# Patient Record
Sex: FEMALE | Race: WHITE | Employment: OTHER | URBAN - METROPOLITAN AREA
[De-identification: names, ages, dates, MRNs, and addresses within clinical notes are randomized per-mention and may not be internally consistent; named-entity substitution may affect disease eponyms.]

---

## 2021-09-13 NOTE — LETTER
[facility] [address], [address] [place] [address]  Consent for Procedure/Sedation  Date: ***         Time: ***    {[facility] ivs consent:6329} none

## 2022-11-24 ENCOUNTER — HOSPITAL ENCOUNTER (INPATIENT)
Facility: HOSPITAL | Age: 87
LOS: 12 days | Discharge: NURSING, REHAB OR LONG-TERM CARE | End: 2022-12-06
Attending: EMERGENCY MEDICINE | Admitting: STUDENT IN AN ORGANIZED HEALTH CARE EDUCATION/TRAINING PROGRAM
Payer: MEDICARE

## 2022-11-24 ENCOUNTER — APPOINTMENT (OUTPATIENT)
Dept: GENERAL RADIOLOGY | Facility: HOSPITAL | Age: 87
End: 2022-11-24
Attending: EMERGENCY MEDICINE
Payer: MEDICARE

## 2022-11-24 ENCOUNTER — APPOINTMENT (OUTPATIENT)
Dept: CT IMAGING | Facility: HOSPITAL | Age: 87
End: 2022-11-24
Attending: EMERGENCY MEDICINE
Payer: MEDICARE

## 2022-11-24 DIAGNOSIS — S32.010S CLOSED COMPRESSION FRACTURE OF L1 LUMBAR VERTEBRA, SEQUELA: ICD-10-CM

## 2022-11-24 DIAGNOSIS — R11.2 NAUSEA AND VOMITING IN ADULT: ICD-10-CM

## 2022-11-24 DIAGNOSIS — S32.030S CLOSED COMPRESSION FRACTURE OF L3 LUMBAR VERTEBRA, SEQUELA: ICD-10-CM

## 2022-11-24 DIAGNOSIS — S32.040S CLOSED COMPRESSION FRACTURE OF L4 LUMBAR VERTEBRA, SEQUELA: ICD-10-CM

## 2022-11-24 DIAGNOSIS — E87.1 HYPONATREMIA: Primary | ICD-10-CM

## 2022-11-24 DIAGNOSIS — M54.9 INTRACTABLE BACK PAIN: ICD-10-CM

## 2022-11-24 LAB
ALBUMIN SERPL-MCNC: 3.3 G/DL (ref 3.4–5)
ALBUMIN/GLOB SERPL: 0.9 {RATIO} (ref 1–2)
ALP LIVER SERPL-CCNC: 107 U/L
ALT SERPL-CCNC: 21 U/L
ANION GAP SERPL CALC-SCNC: 13 MMOL/L (ref 0–18)
AST SERPL-CCNC: 33 U/L (ref 15–37)
BASOPHILS # BLD AUTO: 0.01 X10(3) UL (ref 0–0.2)
BASOPHILS NFR BLD AUTO: 0.1 %
BILIRUB SERPL-MCNC: 1.2 MG/DL (ref 0.1–2)
BUN BLD-MCNC: 11 MG/DL (ref 7–18)
CALCIUM BLD-MCNC: 9.2 MG/DL (ref 8.5–10.1)
CHLORIDE SERPL-SCNC: 82 MMOL/L (ref 98–112)
CO2 SERPL-SCNC: 23 MMOL/L (ref 21–32)
CREAT BLD-MCNC: 0.72 MG/DL
EOSINOPHIL # BLD AUTO: 0 X10(3) UL (ref 0–0.7)
EOSINOPHIL NFR BLD AUTO: 0 %
ERYTHROCYTE [DISTWIDTH] IN BLOOD BY AUTOMATED COUNT: 12.1 %
GFR SERPLBLD BASED ON 1.73 SQ M-ARVRAT: 79 ML/MIN/1.73M2 (ref 60–?)
GLOBULIN PLAS-MCNC: 3.5 G/DL (ref 2.8–4.4)
GLUCOSE BLD-MCNC: 105 MG/DL (ref 70–99)
HCT VFR BLD AUTO: 35.7 %
HGB BLD-MCNC: 12.6 G/DL
IMM GRANULOCYTES # BLD AUTO: 0.05 X10(3) UL (ref 0–1)
IMM GRANULOCYTES NFR BLD: 0.6 %
LIPASE SERPL-CCNC: 96 U/L (ref 73–393)
LYMPHOCYTES # BLD AUTO: 0.62 X10(3) UL (ref 1–4)
LYMPHOCYTES NFR BLD AUTO: 6.9 %
MCH RBC QN AUTO: 31.7 PG (ref 26–34)
MCHC RBC AUTO-ENTMCNC: 35.3 G/DL (ref 31–37)
MCV RBC AUTO: 89.7 FL
MONOCYTES # BLD AUTO: 0.41 X10(3) UL (ref 0.1–1)
MONOCYTES NFR BLD AUTO: 4.5 %
NEUTROPHILS # BLD AUTO: 7.95 X10 (3) UL (ref 1.5–7.7)
NEUTROPHILS # BLD AUTO: 7.95 X10(3) UL (ref 1.5–7.7)
NEUTROPHILS NFR BLD AUTO: 87.9 %
OSMOLALITY SERPL CALC.SUM OF ELEC: 246 MOSM/KG (ref 275–295)
PLATELET # BLD AUTO: 177 10(3)UL (ref 150–450)
POTASSIUM SERPL-SCNC: 3.4 MMOL/L (ref 3.5–5.1)
PROT SERPL-MCNC: 6.8 G/DL (ref 6.4–8.2)
RBC # BLD AUTO: 3.98 X10(6)UL
SARS-COV-2 RNA RESP QL NAA+PROBE: NOT DETECTED
SODIUM SERPL-SCNC: 118 MMOL/L (ref 136–145)
WBC # BLD AUTO: 9 X10(3) UL (ref 4–11)

## 2022-11-24 PROCEDURE — 99222 1ST HOSP IP/OBS MODERATE 55: CPT | Performed by: STUDENT IN AN ORGANIZED HEALTH CARE EDUCATION/TRAINING PROGRAM

## 2022-11-24 PROCEDURE — 72072 X-RAY EXAM THORAC SPINE 3VWS: CPT | Performed by: EMERGENCY MEDICINE

## 2022-11-24 PROCEDURE — 70450 CT HEAD/BRAIN W/O DYE: CPT | Performed by: EMERGENCY MEDICINE

## 2022-11-24 PROCEDURE — 72110 X-RAY EXAM L-2 SPINE 4/>VWS: CPT | Performed by: EMERGENCY MEDICINE

## 2022-11-24 RX ORDER — ACETAMINOPHEN AND CODEINE PHOSPHATE 300; 30 MG/1; MG/1
1 TABLET ORAL EVERY 4 HOURS PRN
Status: DISCONTINUED | OUTPATIENT
Start: 2022-11-24 | End: 2022-12-06

## 2022-11-24 RX ORDER — SENNOSIDES 8.6 MG
17.2 TABLET ORAL NIGHTLY PRN
Status: DISCONTINUED | OUTPATIENT
Start: 2022-11-24 | End: 2022-12-06

## 2022-11-24 RX ORDER — MORPHINE SULFATE 2 MG/ML
2 INJECTION, SOLUTION INTRAMUSCULAR; INTRAVENOUS EVERY 30 MIN PRN
Status: DISCONTINUED | OUTPATIENT
Start: 2022-11-24 | End: 2022-11-26

## 2022-11-24 RX ORDER — ACETAMINOPHEN 500 MG
500 TABLET ORAL EVERY 4 HOURS PRN
Status: DISCONTINUED | OUTPATIENT
Start: 2022-11-24 | End: 2022-12-06

## 2022-11-24 RX ORDER — BISACODYL 10 MG
10 SUPPOSITORY, RECTAL RECTAL
Status: DISCONTINUED | OUTPATIENT
Start: 2022-11-24 | End: 2022-12-06

## 2022-11-24 RX ORDER — ACETAMINOPHEN AND CODEINE PHOSPHATE 300; 30 MG/1; MG/1
2 TABLET ORAL EVERY 4 HOURS PRN
Status: DISCONTINUED | OUTPATIENT
Start: 2022-11-24 | End: 2022-12-06

## 2022-11-24 RX ORDER — POLYETHYLENE GLYCOL 3350 17 G/17G
17 POWDER, FOR SOLUTION ORAL DAILY PRN
Status: DISCONTINUED | OUTPATIENT
Start: 2022-11-24 | End: 2022-12-06

## 2022-11-24 RX ORDER — ENOXAPARIN SODIUM 100 MG/ML
40 INJECTION SUBCUTANEOUS DAILY
Status: DISCONTINUED | OUTPATIENT
Start: 2022-11-25 | End: 2022-12-06

## 2022-11-24 RX ORDER — SODIUM CHLORIDE 9 MG/ML
INJECTION, SOLUTION INTRAVENOUS CONTINUOUS
Status: DISCONTINUED | OUTPATIENT
Start: 2022-11-24 | End: 2022-11-25

## 2022-11-24 RX ORDER — ONDANSETRON 2 MG/ML
4 INJECTION INTRAMUSCULAR; INTRAVENOUS EVERY 6 HOURS PRN
Status: DISCONTINUED | OUTPATIENT
Start: 2022-11-24 | End: 2022-12-06

## 2022-11-24 RX ORDER — SODIUM PHOSPHATE, DIBASIC AND SODIUM PHOSPHATE, MONOBASIC 7; 19 G/133ML; G/133ML
1 ENEMA RECTAL ONCE AS NEEDED
Status: DISCONTINUED | OUTPATIENT
Start: 2022-11-24 | End: 2022-12-06

## 2022-11-24 RX ORDER — METOCLOPRAMIDE HYDROCHLORIDE 5 MG/ML
10 INJECTION INTRAMUSCULAR; INTRAVENOUS EVERY 8 HOURS PRN
Status: DISCONTINUED | OUTPATIENT
Start: 2022-11-24 | End: 2022-12-06

## 2022-11-24 RX ORDER — POTASSIUM CHLORIDE 20 MEQ/1
40 TABLET, EXTENDED RELEASE ORAL ONCE
Status: COMPLETED | OUTPATIENT
Start: 2022-11-24 | End: 2022-11-24

## 2022-11-25 ENCOUNTER — APPOINTMENT (OUTPATIENT)
Dept: MRI IMAGING | Facility: HOSPITAL | Age: 87
End: 2022-11-25
Attending: INTERNAL MEDICINE
Payer: MEDICARE

## 2022-11-25 LAB
ANION GAP SERPL CALC-SCNC: 11 MMOL/L (ref 0–18)
ANION GAP SERPL CALC-SCNC: 15 MMOL/L (ref 0–18)
BASOPHILS # BLD AUTO: 0.01 X10(3) UL (ref 0–0.2)
BASOPHILS NFR BLD AUTO: 0.1 %
BILIRUB UR QL CFM: NEGATIVE
BILIRUB UR QL CFM: NEGATIVE
BUN BLD-MCNC: 8 MG/DL (ref 7–18)
BUN BLD-MCNC: 9 MG/DL (ref 7–18)
CALCIUM BLD-MCNC: 8.6 MG/DL (ref 8.5–10.1)
CALCIUM BLD-MCNC: 9 MG/DL (ref 8.5–10.1)
CHLORIDE SERPL-SCNC: 83 MMOL/L (ref 98–112)
CHLORIDE SERPL-SCNC: 85 MMOL/L (ref 98–112)
CLARITY UR REFRACT.AUTO: CLEAR
CLARITY UR REFRACT.AUTO: CLEAR
CO2 SERPL-SCNC: 19 MMOL/L (ref 21–32)
CO2 SERPL-SCNC: 21 MMOL/L (ref 21–32)
COLOR UR AUTO: YELLOW
COLOR UR AUTO: YELLOW
CREAT BLD-MCNC: 0.52 MG/DL
CREAT BLD-MCNC: 0.54 MG/DL
EOSINOPHIL # BLD AUTO: 0 X10(3) UL (ref 0–0.7)
EOSINOPHIL NFR BLD AUTO: 0 %
ERYTHROCYTE [DISTWIDTH] IN BLOOD BY AUTOMATED COUNT: 12.3 %
GFR SERPLBLD BASED ON 1.73 SQ M-ARVRAT: 87 ML/MIN/1.73M2 (ref 60–?)
GFR SERPLBLD BASED ON 1.73 SQ M-ARVRAT: 88 ML/MIN/1.73M2 (ref 60–?)
GLUCOSE BLD-MCNC: 101 MG/DL (ref 70–99)
GLUCOSE BLD-MCNC: 88 MG/DL (ref 70–99)
GLUCOSE UR STRIP.AUTO-MCNC: NEGATIVE MG/DL
GLUCOSE UR STRIP.AUTO-MCNC: NEGATIVE MG/DL
HCT VFR BLD AUTO: 32.8 %
HGB BLD-MCNC: 11.7 G/DL
HYALINE CASTS #/AREA URNS AUTO: PRESENT /LPF
HYALINE CASTS #/AREA URNS AUTO: PRESENT /LPF
IMM GRANULOCYTES # BLD AUTO: 0.04 X10(3) UL (ref 0–1)
IMM GRANULOCYTES NFR BLD: 0.5 %
KETONES UR STRIP.AUTO-MCNC: 40 MG/DL
KETONES UR STRIP.AUTO-MCNC: 40 MG/DL
LEUKOCYTE ESTERASE UR QL STRIP.AUTO: NEGATIVE
LEUKOCYTE ESTERASE UR QL STRIP.AUTO: NEGATIVE
LYMPHOCYTES # BLD AUTO: 0.65 X10(3) UL (ref 1–4)
LYMPHOCYTES NFR BLD AUTO: 7.5 %
MCH RBC QN AUTO: 31.7 PG (ref 26–34)
MCHC RBC AUTO-ENTMCNC: 35.7 G/DL (ref 31–37)
MCV RBC AUTO: 88.9 FL
MONOCYTES # BLD AUTO: 0.56 X10(3) UL (ref 0.1–1)
MONOCYTES NFR BLD AUTO: 6.5 %
NEUTROPHILS # BLD AUTO: 7.37 X10 (3) UL (ref 1.5–7.7)
NEUTROPHILS # BLD AUTO: 7.37 X10(3) UL (ref 1.5–7.7)
NEUTROPHILS NFR BLD AUTO: 85.4 %
NITRITE UR QL STRIP.AUTO: NEGATIVE
NITRITE UR QL STRIP.AUTO: NEGATIVE
OSMOLALITY SERPL CALC.SUM OF ELEC: 242 MOSM/KG (ref 275–295)
OSMOLALITY SERPL CALC.SUM OF ELEC: 242 MOSM/KG (ref 275–295)
PH UR STRIP.AUTO: 6 [PH] (ref 5–8)
PH UR STRIP.AUTO: 6 [PH] (ref 5–8)
PLATELET # BLD AUTO: 172 10(3)UL (ref 150–450)
POTASSIUM SERPL-SCNC: 3.2 MMOL/L (ref 3.5–5.1)
POTASSIUM SERPL-SCNC: 3.5 MMOL/L (ref 3.5–5.1)
RBC # BLD AUTO: 3.69 X10(6)UL
SODIUM SERPL-SCNC: 117 MMOL/L (ref 136–145)
SODIUM SERPL-SCNC: 117 MMOL/L (ref 136–145)
SODIUM SERPL-SCNC: 40 MMOL/L
SP GR UR STRIP.AUTO: >=1.03 (ref 1–1.03)
SP GR UR STRIP.AUTO: >=1.03 (ref 1–1.03)
TSI SER-ACNC: 1.22 MIU/ML (ref 0.36–3.74)
UROBILINOGEN UR STRIP.AUTO-MCNC: 0.2 MG/DL
UROBILINOGEN UR STRIP.AUTO-MCNC: 0.2 MG/DL
WBC # BLD AUTO: 8.6 X10(3) UL (ref 4–11)

## 2022-11-25 PROCEDURE — 99232 SBSQ HOSP IP/OBS MODERATE 35: CPT | Performed by: INTERNAL MEDICINE

## 2022-11-25 PROCEDURE — 0QS03ZZ REPOSITION LUMBAR VERTEBRA, PERCUTANEOUS APPROACH: ICD-10-PCS | Performed by: RADIOLOGY

## 2022-11-25 PROCEDURE — 99221 1ST HOSP IP/OBS SF/LOW 40: CPT | Performed by: NEUROLOGICAL SURGERY

## 2022-11-25 PROCEDURE — 99223 1ST HOSP IP/OBS HIGH 75: CPT | Performed by: INTERNAL MEDICINE

## 2022-11-25 PROCEDURE — 0QU03JZ SUPPLEMENT LUMBAR VERTEBRA WITH SYNTHETIC SUBSTITUTE, PERCUTANEOUS APPROACH: ICD-10-PCS | Performed by: RADIOLOGY

## 2022-11-25 PROCEDURE — 72158 MRI LUMBAR SPINE W/O & W/DYE: CPT | Performed by: INTERNAL MEDICINE

## 2022-11-25 PROCEDURE — 72157 MRI CHEST SPINE W/O & W/DYE: CPT | Performed by: INTERNAL MEDICINE

## 2022-11-25 RX ORDER — GADOTERATE MEGLUMINE 376.9 MG/ML
15 INJECTION INTRAVENOUS
Status: COMPLETED | OUTPATIENT
Start: 2022-11-25 | End: 2022-11-25

## 2022-11-25 RX ORDER — SODIUM CHLORIDE 1000 MG
1 TABLET, SOLUBLE MISCELLANEOUS
Status: DISCONTINUED | OUTPATIENT
Start: 2022-11-25 | End: 2022-11-29

## 2022-11-25 RX ORDER — POTASSIUM CHLORIDE 20 MEQ/1
40 TABLET, EXTENDED RELEASE ORAL ONCE
Status: COMPLETED | OUTPATIENT
Start: 2022-11-25 | End: 2022-11-25

## 2022-11-25 RX ORDER — SODIUM CHLORIDE 9 MG/ML
INJECTION, SOLUTION INTRAVENOUS CONTINUOUS
Status: DISCONTINUED | OUTPATIENT
Start: 2022-11-25 | End: 2022-11-27

## 2022-11-25 NOTE — PLAN OF CARE
NURSING ADMISSION NOTE    Patient admitted via Cart  Oriented to room. Safety precautions initiated. Bed in low position. Call light in reach. Assumed care of pt at 2030. Pt a&o x3. VSS. Afebrile. Pt c/o nausea, PRN given with good relief. Lidocaine patch placed mid back for pain, good relief. Voiding. Daughter at bedside & aware of POC. Call light within reach. Safety precautions in place.      Problem: METABOLIC/FLUID AND ELECTROLYTES - ADULT  Goal: Electrolytes maintained within normal limits  Description: INTERVENTIONS:  - Monitor labs and rhythm and assess patient for signs and symptoms of electrolyte imbalances  - Administer electrolyte replacement as ordered  - Monitor response to electrolyte replacements, including rhythm and repeat lab results as appropriate  - Fluid restriction as ordered  - Instruct patient on fluid and nutrition restrictions as appropriate  Outcome: Progressing     Problem: Impaired Functional Mobility  Goal: Achieve highest/safest level of mobility/gait  Description: Interventions:  - Assess patient's functional ability and stability  - Promote increasing activity/tolerance for mobility and gait  - Educate and engage patient/family in tolerated activity level and precautions  Outcome: Progressing     Problem: GASTROINTESTINAL - ADULT  Goal: Minimal or absence of nausea and vomiting  Description: INTERVENTIONS:  - Maintain adequate hydration with IV or PO as ordered and tolerated  - Nasogastric tube to low intermittent suction as ordered  - Evaluate effectiveness of ordered antiemetic medications  - Provide nonpharmacologic comfort measures as appropriate  - Advance diet as tolerated, if ordered  - Obtain nutritional consult as needed  - Evaluate fluid balance  Outcome: Progressing     Problem: GASTROINTESTINAL - ADULT  Goal: Maintains adequate nutritional intake (undernourished)  Description: INTERVENTIONS:  - Monitor percentage of each meal consumed  - Identify factors contributing to decreased intake, treat as appropriate  - Assist with meals as needed  - Monitor I&O, WT and lab values  - Obtain nutritional consult as needed  - Optimize oral hygiene and moisture  - Encourage food from home; allow for food preferences  - Enhance eating environment  Outcome: Progressing

## 2022-11-25 NOTE — PHYSICAL THERAPY NOTE
PT order received, chart reviewed. D/w MD, to hold PT until cleared by neurosurgery, now with order for MRI. Will f/u for PT at a later date when appropriate for skilled intervention.

## 2022-11-25 NOTE — PLAN OF CARE
Bedrest until cleared by neurosurgery. MRI ordered, awaiting completion. Turns in bed. Incontinent. NA still low. Per nephrology: IV fluids restarted, 1200 mL fluid restriction and start NA tabs . Intermittent nausea today, Zofran given x2 with improvement. A&Ox2-3. Sleeping in between cares. Low appetite, needs lots of encouragement to eat. Pain in mid back is minimal at rest, but spikes with movement. Pt is leary to take prns due to adverse reactions to several pain meds in the past. Pt feels lidocaine patch and rest are helpful for pain management at this time. May need more intervention once she is off bedrest.     Addendum: Pt is strict I&Os. Straight cathed for urine sample this afternoon, then placed Purewick late afternoon. Has not urinated since straight cath. If unable to obtain strict I&Os, may need to discuss Kiran with MD. If pt is cleared from bedrest, may be able to ambulate to bathroom for I&Os.

## 2022-11-26 LAB
ANION GAP SERPL CALC-SCNC: 12 MMOL/L (ref 0–18)
BUN BLD-MCNC: 7 MG/DL (ref 7–18)
CALCIUM BLD-MCNC: 9.1 MG/DL (ref 8.5–10.1)
CHLORIDE SERPL-SCNC: 87 MMOL/L (ref 98–112)
CO2 SERPL-SCNC: 20 MMOL/L (ref 21–32)
CREAT BLD-MCNC: 0.62 MG/DL
GFR SERPLBLD BASED ON 1.73 SQ M-ARVRAT: 85 ML/MIN/1.73M2 (ref 60–?)
GLUCOSE BLD-MCNC: 83 MG/DL (ref 70–99)
OSMOLALITY SERPL CALC.SUM OF ELEC: 245 MOSM/KG (ref 275–295)
PHOSPHATE SERPL-MCNC: 2.1 MG/DL (ref 2.5–4.9)
POTASSIUM SERPL-SCNC: 3.4 MMOL/L (ref 3.5–5.1)
POTASSIUM SERPL-SCNC: 3.4 MMOL/L (ref 3.5–5.1)
POTASSIUM SERPL-SCNC: 3.5 MMOL/L (ref 3.5–5.1)
SODIUM SERPL-SCNC: 119 MMOL/L (ref 136–145)
SODIUM SERPL-SCNC: 122 MMOL/L (ref 136–145)

## 2022-11-26 PROCEDURE — 99232 SBSQ HOSP IP/OBS MODERATE 35: CPT | Performed by: INTERNAL MEDICINE

## 2022-11-26 PROCEDURE — 99233 SBSQ HOSP IP/OBS HIGH 50: CPT | Performed by: INTERNAL MEDICINE

## 2022-11-26 PROCEDURE — 99231 SBSQ HOSP IP/OBS SF/LOW 25: CPT | Performed by: NEUROLOGICAL SURGERY

## 2022-11-26 RX ORDER — KETOROLAC TROMETHAMINE 15 MG/ML
15 INJECTION, SOLUTION INTRAMUSCULAR; INTRAVENOUS EVERY 8 HOURS PRN
Status: DISPENSED | OUTPATIENT
Start: 2022-11-26 | End: 2022-11-28

## 2022-11-26 RX ORDER — POTASSIUM CHLORIDE 14.9 MG/ML
20 INJECTION INTRAVENOUS ONCE
Status: DISCONTINUED | OUTPATIENT
Start: 2022-11-26 | End: 2022-11-26

## 2022-11-26 RX ORDER — FAMOTIDINE 20 MG/1
20 TABLET, FILM COATED ORAL DAILY
Status: DISCONTINUED | OUTPATIENT
Start: 2022-11-26 | End: 2022-12-06

## 2022-11-26 NOTE — PLAN OF CARE
Patient is alert and oriented, on RA, complains of back pain. Daughter doesn't want pt to have any narcotics for pain, hot pack given. Pt changed and repositioned, no acute events overnight. Bed alarm is on, call light within reach, will continue to monitor. Daughter at the bedside.       Problem: METABOLIC/FLUID AND ELECTROLYTES - ADULT  Goal: Electrolytes maintained within normal limits  Description: INTERVENTIONS:  - Monitor labs and rhythm and assess patient for signs and symptoms of electrolyte imbalances  - Administer electrolyte replacement as ordered  - Monitor response to electrolyte replacements, including rhythm and repeat lab results as appropriate  - Fluid restriction as ordered  - Instruct patient on fluid and nutrition restrictions as appropriate  Outcome: Progressing     Problem: Impaired Functional Mobility  Goal: Achieve highest/safest level of mobility/gait  Description: Interventions:  - Assess patient's functional ability and stability  - Promote increasing activity/tolerance for mobility and gait  - Educate and engage patient/family in tolerated activity level and precautions    Outcome: Progressing     Problem: GASTROINTESTINAL - ADULT  Goal: Minimal or absence of nausea and vomiting  Description: INTERVENTIONS:  - Maintain adequate hydration with IV or PO as ordered and tolerated  - Nasogastric tube to low intermittent suction as ordered  - Evaluate effectiveness of ordered antiemetic medications  - Provide nonpharmacologic comfort measures as appropriate  - Advance diet as tolerated, if ordered  - Obtain nutritional consult as needed  - Evaluate fluid balance  Outcome: Progressing

## 2022-11-27 ENCOUNTER — APPOINTMENT (OUTPATIENT)
Dept: GENERAL RADIOLOGY | Facility: HOSPITAL | Age: 87
End: 2022-11-27
Attending: INTERNAL MEDICINE
Payer: MEDICARE

## 2022-11-27 LAB
ANION GAP SERPL CALC-SCNC: 17 MMOL/L (ref 0–18)
BUN BLD-MCNC: 7 MG/DL (ref 7–18)
CALCIUM BLD-MCNC: 8.7 MG/DL (ref 8.5–10.1)
CHLORIDE SERPL-SCNC: 90 MMOL/L (ref 98–112)
CO2 SERPL-SCNC: 15 MMOL/L (ref 21–32)
CREAT BLD-MCNC: 0.45 MG/DL
GFR SERPLBLD BASED ON 1.73 SQ M-ARVRAT: 91 ML/MIN/1.73M2 (ref 60–?)
GLUCOSE BLD-MCNC: 85 MG/DL (ref 70–99)
OSMOLALITY SERPL CALC.SUM OF ELEC: 251 MOSM/KG (ref 275–295)
PHOSPHATE SERPL-MCNC: 2.8 MG/DL (ref 2.5–4.9)
POTASSIUM SERPL-SCNC: 3.7 MMOL/L (ref 3.5–5.1)
SODIUM SERPL-SCNC: 122 MMOL/L (ref 136–145)

## 2022-11-27 PROCEDURE — 99233 SBSQ HOSP IP/OBS HIGH 50: CPT | Performed by: INTERNAL MEDICINE

## 2022-11-27 PROCEDURE — 99231 SBSQ HOSP IP/OBS SF/LOW 25: CPT | Performed by: NEUROLOGICAL SURGERY

## 2022-11-27 PROCEDURE — 74018 RADEX ABDOMEN 1 VIEW: CPT | Performed by: INTERNAL MEDICINE

## 2022-11-27 PROCEDURE — 99232 SBSQ HOSP IP/OBS MODERATE 35: CPT | Performed by: INTERNAL MEDICINE

## 2022-11-27 RX ORDER — METOPROLOL TARTRATE 5 MG/5ML
5 INJECTION INTRAVENOUS ONCE
Status: COMPLETED | OUTPATIENT
Start: 2022-11-27 | End: 2022-11-27

## 2022-11-27 RX ORDER — POTASSIUM CHLORIDE 20 MEQ/1
20 TABLET, EXTENDED RELEASE ORAL ONCE
Status: COMPLETED | OUTPATIENT
Start: 2022-11-27 | End: 2022-11-27

## 2022-11-27 NOTE — PLAN OF CARE
Assumed care @ 0730. Patient alert, oriented to person, place and situation. Patient c/o moderate lower back pain with movement. Patient with poor oral intake. Patient's heart rate noted to be irregular 120's @ times, patient placed on Cardiac Telemetry monitoring. Tele shows Atrial Fibrillation, heart rate 110's-130's, occasionally 140's, Dr. Tino Cedillo notified. 1850- Patient c/o severe abdominal pain,  Patient's abdomen rounded soft, tender, bowel sounds active. Dr. Tino Cedillo notified, Toradol given as needed, KUB done. Lopressor 5 mg IV given,  ml bolus given. Patient's heart rate decreased to 80's-100's after Lopressor given converted to NSR @ 1800, heart rate 80's. . KUB showed bladder distention, bladder scan showed greater than 999 ml. Dr. Tino Cedillo notified,. Straight cath done, obtained 1500 ml catherine urine, Dr. Tino Cedillo and Dr. Martina Espinoza notified.

## 2022-11-27 NOTE — PLAN OF CARE
Lidocaine to mid back. Toradol given x1 this evening for new complaint of abdominal pressure. MD notified of new pain. Turns in bed. Pt in sever pain with turns, improves with rest. Avoiding narcotics per pt daughter. Pt also not requesting stronger intervention for pain at this time. Purewick in place. Sleeping most of the day. Taking small bites of food with lots of encouragement. Daughter at bedside attempting to feed pt often t/o the day. Pt with very poor appetite. NA improving slightly this afternoon.

## 2022-11-28 LAB
ANION GAP SERPL CALC-SCNC: 10 MMOL/L (ref 0–18)
BUN BLD-MCNC: 10 MG/DL (ref 7–18)
CALCIUM BLD-MCNC: 8.7 MG/DL (ref 8.5–10.1)
CHLORIDE SERPL-SCNC: 94 MMOL/L (ref 98–112)
CO2 SERPL-SCNC: 24 MMOL/L (ref 21–32)
CREAT BLD-MCNC: 0.56 MG/DL
GFR SERPLBLD BASED ON 1.73 SQ M-ARVRAT: 87 ML/MIN/1.73M2 (ref 60–?)
GLUCOSE BLD-MCNC: 83 MG/DL (ref 70–99)
INR BLD: 1.14 (ref 0.85–1.16)
OSMOLALITY SERPL CALC.SUM OF ELEC: 264 MOSM/KG (ref 275–295)
POTASSIUM SERPL-SCNC: 4 MMOL/L (ref 3.5–5.1)
PROTHROMBIN TIME: 14.6 SECONDS (ref 11.6–14.8)
SODIUM SERPL-SCNC: 128 MMOL/L (ref 136–145)

## 2022-11-28 PROCEDURE — 99232 SBSQ HOSP IP/OBS MODERATE 35: CPT | Performed by: INTERNAL MEDICINE

## 2022-11-28 PROCEDURE — 99231 SBSQ HOSP IP/OBS SF/LOW 25: CPT

## 2022-11-28 PROCEDURE — 99233 SBSQ HOSP IP/OBS HIGH 50: CPT | Performed by: INTERNAL MEDICINE

## 2022-11-28 PROCEDURE — 99232 SBSQ HOSP IP/OBS MODERATE 35: CPT | Performed by: PHYSICIAN ASSISTANT

## 2022-11-28 NOTE — CDS QUERY
Diagnosis Clarification - Compression Fracture  Migel Javed,  Clinical information in the patient's medical record (provided below) includes documentation of Compression Fracture. For accurate ICD-10-CM code assignment ,  PLEASE CHECK THE DIAGNOSIS THAT APPLIES TO THE ACUTE COMPRESSION FRACTURES AT T11 AND L4. SELECTION BY PROVIDER ONLY  [x  ] Traumatic Compression Fracture  [  ] Non-Traumatic Compression Fracture-due to_______________________  [  ] Other (please specify): ___________________________________________________________    [  ] Unable to Determine (please comment) ______________________________________________      Documentation from the medical record  Risk: adv age, recent fall, osteopenia    Clinical indicators : Severe back pain-intractable . Recent fall from toilet- 2 days PTA to buttocks. Per ER note, has been treated with meloxicam, Tylenol 3 and duloxetine for pain over past month by PCP for multiple spinal fractures  . These have been weaned rrapidly over past 24 hours due to nausea and vomiting. 11/24 Xray Thoracic and lumbar spine-FINDINGS:     There is exaggeration of the normal thoracic kyphosis. There is a moderate chronic appearing compression fracture within the mid/lower thoracic spine. There is a severe chronic appearing compression fracture within the lower thoracic spine. FINDINGS:         Precise level numbering is difficult given the overlying structures. Moderate age indeterminate compression fracture of L3. Mild chronic appearing compression fracture of L4. Mild to moderate chronic appearing compression fracture of L1. Osteopenia. Moderate degenerative changes in the lumbar spine. Multilevel facet arthropathy. MRI-CONCLUSION:         1. Chronic T7 and T9 vertebral compression deformities. 2. Acute T11 compression deformity with partial marrow replacement enhancement is noted.   Possibility of metastatic disease or cyst region cannot be excluded. This enhancement may be reactionary as well. 3. The chronic L1 and L3 compression deformities. 4. There is partial sacralization of L5.       5. Acute compression deformity of L4 with enhancement is noted. The enhancement may be related to the acute compression deformity itself. A marrow replacing lesion cannot be excluded. Repeat examination in 3-6 weeks to re-evaluate may be done. 11/26- Neuro Surgery PN-80year-old female with new T11 and L4 fractures with multiple other chronic fractures  A nice long discussion with the daughter regarding treatment options  Treatment options be bracing versus kyphoplasty    Treatment: Xrays, MRI, pain control, NS consult, brace vs kyphoplasty.      For questions regarding this query, please contact:Clinical  Louis Pop RN, BSN, CCDS                         [id number]       THIS FORM IS A PERMANENT PART OF THE MEDICAL RECORD

## 2022-11-28 NOTE — PLAN OF CARE
Pt received lethargic, more awake late morning/afternoon. Oriented x3. C/o back pain with movement. Poor appetite. Pt needs a lot of encouragement to eat. Large medications given crushed in applesauce. VSS, afebrile. HR in 140s late morning, confirmed with telemetry monitor that patient was in afib. 0600 metoprolol was held by night RN due to pt being NPO for possible procedure. Pt given scheduled metoprolol at 1010, HR decreased to mid 70s. Daughter updated on POC. Call light within reach. Fall precautions in place. 1800- Received call from IR that kyphoplasty will be done on 11/30. Informed patient and family, verbalized understanding.      Problem: METABOLIC/FLUID AND ELECTROLYTES - ADULT  Goal: Electrolytes maintained within normal limits  Description: INTERVENTIONS:  - Monitor labs and rhythm and assess patient for signs and symptoms of electrolyte imbalances  - Administer electrolyte replacement as ordered  - Monitor response to electrolyte replacements, including rhythm and repeat lab results as appropriate  - Fluid restriction as ordered  - Instruct patient on fluid and nutrition restrictions as appropriate  Outcome: Progressing     Problem: Impaired Functional Mobility  Goal: Achieve highest/safest level of mobility/gait  Description: Interventions:  - Assess patient's functional ability and stability  - Promote increasing activity/tolerance for mobility and gait  - Educate and engage patient/family in tolerated activity level and precautions  Outcome: Progressing     Problem: GASTROINTESTINAL - ADULT  Goal: Minimal or absence of nausea and vomiting  Description: INTERVENTIONS:  - Maintain adequate hydration with IV or PO as ordered and tolerated  - Nasogastric tube to low intermittent suction as ordered  - Evaluate effectiveness of ordered antiemetic medications  - Provide nonpharmacologic comfort measures as appropriate  - Advance diet as tolerated, if ordered  - Obtain nutritional consult as needed  - Evaluate fluid balance  Outcome: Progressing     Problem: GASTROINTESTINAL - ADULT  Goal: Maintains adequate nutritional intake (undernourished)  Description: INTERVENTIONS:  - Monitor percentage of each meal consumed  - Identify factors contributing to decreased intake, treat as appropriate  - Assist with meals as needed  - Monitor I&O, WT and lab values  - Obtain nutritional consult as needed  - Optimize oral hygiene and moisture  - Encourage food from home; allow for food preferences  - Enhance eating environment  Outcome: Not Progressing

## 2022-11-28 NOTE — OCCUPATIONAL THERAPY NOTE
Att'd to see pt this AM for OT eval. Per chart review and discussion with RN, Pt is on bedrest until sx planned for tomorrow. OT will follow up at later time as appropriate.

## 2022-11-28 NOTE — PROGRESS NOTES
Patient seen by Micaela Bumpers, PA-C today. Patient and family have decided to pursue IR kyphoplasty, she is scheduled for procedure tomorrow. No further recommendations from Neurosurgical standpoint. Will sign off. No follow up with our service is indicated. Gary Ventura.  Willian Masters, [address]  11/28/2022 3:12 PM  Spectra [id number]

## 2022-11-28 NOTE — PLAN OF CARE
Patient overnight was lethargic, oriented to person however disoriented to time, situation and place. Pt was having conversations with \"\" in room and daughter Tracy Hoffmann informed writer RN that he has been  for awhile. Tracyerickson Hoffmann informed of patient's disorientation at night and this is baseline. Pt was not impulsive, pleasantly confused and reoriented frequently. Daughter Tracy Hoffmann stayed at night for several hours to keep patient company. Patient denied pain. Lidocaine patch removed per MAR. Pt requested ice cream at  and patient consumed entire cup of vanilla ice cream before bed. HR controlled overnight 70s-80s NSR. Room air. VSS. Denied pain and did not show any signs of discomfort. Kiran intact draining catherine urine. Fall risk precautions in place. Patient has been NPO since midnight for possible kyphoplasty today. Daughter requested to be updated with a time if procedure is scheduled in IR. Will continue to monitor.     Problem: METABOLIC/FLUID AND ELECTROLYTES - ADULT  Goal: Electrolytes maintained within normal limits  Description: INTERVENTIONS:  - Monitor labs and rhythm and assess patient for signs and symptoms of electrolyte imbalances  - Administer electrolyte replacement as ordered  - Monitor response to electrolyte replacements, including rhythm and repeat lab results as appropriate  - Fluid restriction as ordered  - Instruct patient on fluid and nutrition restrictions as appropriate  Outcome: Progressing     Problem: Impaired Functional Mobility  Goal: Achieve highest/safest level of mobility/gait  Description: Interventions:  - Assess patient's functional ability and stability  - Promote increasing activity/tolerance for mobility and gait  - Educate and engage patient/family in tolerated activity level and precautions  Outcome: Progressing     Problem: GASTROINTESTINAL - ADULT  Goal: Minimal or absence of nausea and vomiting  Description: INTERVENTIONS:  - Maintain adequate hydration with IV or PO as ordered and tolerated  - Nasogastric tube to low intermittent suction as ordered  - Evaluate effectiveness of ordered antiemetic medications  - Provide nonpharmacologic comfort measures as appropriate  - Advance diet as tolerated, if ordered  - Obtain nutritional consult as needed  - Evaluate fluid balance  Outcome: Progressing  Goal: Maintains adequate nutritional intake (undernourished)  Description: INTERVENTIONS:  - Monitor percentage of each meal consumed  - Identify factors contributing to decreased intake, treat as appropriate  - Assist with meals as needed  - Monitor I&O, WT and lab values  - Obtain nutritional consult as needed  - Optimize oral hygiene and moisture  - Encourage food from home; allow for food preferences  - Enhance eating environment  Outcome: Progressing

## 2022-11-29 ENCOUNTER — ANESTHESIA EVENT (OUTPATIENT)
Dept: INTERVENTIONAL RADIOLOGY/VASCULAR | Facility: HOSPITAL | Age: 87
End: 2022-11-29
Payer: MEDICARE

## 2022-11-29 LAB
ANION GAP SERPL CALC-SCNC: 10 MMOL/L (ref 0–18)
BUN BLD-MCNC: 7 MG/DL (ref 7–18)
CALCIUM BLD-MCNC: 9.5 MG/DL (ref 8.5–10.1)
CHLORIDE SERPL-SCNC: 98 MMOL/L (ref 98–112)
CO2 SERPL-SCNC: 28 MMOL/L (ref 21–32)
CREAT BLD-MCNC: 0.45 MG/DL
GFR SERPLBLD BASED ON 1.73 SQ M-ARVRAT: 91 ML/MIN/1.73M2 (ref 60–?)
GLUCOSE BLD-MCNC: 90 MG/DL (ref 70–99)
OSMOLALITY SERPL CALC.SUM OF ELEC: 280 MOSM/KG (ref 275–295)
POTASSIUM SERPL-SCNC: 3 MMOL/L (ref 3.5–5.1)
SODIUM SERPL-SCNC: 136 MMOL/L (ref 136–145)

## 2022-11-29 PROCEDURE — 99233 SBSQ HOSP IP/OBS HIGH 50: CPT | Performed by: INTERNAL MEDICINE

## 2022-11-29 PROCEDURE — 99232 SBSQ HOSP IP/OBS MODERATE 35: CPT | Performed by: INTERNAL MEDICINE

## 2022-11-29 RX ORDER — POTASSIUM CHLORIDE 29.8 MG/ML
40 INJECTION INTRAVENOUS ONCE
Status: COMPLETED | OUTPATIENT
Start: 2022-11-29 | End: 2022-11-29

## 2022-11-29 NOTE — PROGRESS NOTES
11/29/22 1309   Clinical Encounter Type   Visited With Patient   Taxonomy   Intended Effects Promote a sense of peace   Methods Offer spiritual/Orthodoxy support   Interventions Active listening; Ask guided questions; Acknowledge current situation;[place]      visited with patient. Patient expressed a need for prayer for upcoming surgery.  completed a spontaneous prayer followed by [facility].  remains available. Spiritual Care can be requested via an [employer] consult or by pager at [phone]. JEREMÍAS Zavala [facility]  Extension: [phone]

## 2022-11-29 NOTE — SLP NOTE
SLP visit attempted x2 to monitor diet tolerance following advancement yesterday. However, pt politely requested SLP revisit at a later time as she was either resting or on the phone at time of my visits. Patient planned for kyphoplasty tomorrow and NPO after midnight for procedure. SLP will continue to monitor and re-attempt as appropriate.

## 2022-11-29 NOTE — PROGRESS NOTES
Pt alert x 4. VSS. Complaint of discomfort of right breast. Offered pain medication. Declined pain meds. Warm pack given with moderate relief. Afebrile. Kiran catheter in place with adequate amount of output. Daughter requested a decongestant for pt. MD nayak and PRN robitussin ordered. Call light on. No further needs at this time.

## 2022-11-29 NOTE — PLAN OF CARE
Alert and forgetful at times, K level was low, replaced per MD's order. Still has pain to mid left back, on Lidoderm patch with some relief. Manages to roll from side to sides. Cardiac monitor shows SVT with rates between 140-150's, symptomatic, has left upper chest discomfort, warm compress in place, given Tylenol with good relief, took a nap for a while, in better spirit when she awoke. Appetite is improving. For kyphoplasty tomorrow.    Problem: METABOLIC/FLUID AND ELECTROLYTES - ADULT  Goal: Electrolytes maintained within normal limits  Description: INTERVENTIONS:  - Monitor labs and rhythm and assess patient for signs and symptoms of electrolyte imbalances  - Administer electrolyte replacement as ordered  - Monitor response to electrolyte replacements, including rhythm and repeat lab results as appropriate  - Fluid restriction as ordered  - Instruct patient on fluid and nutrition restrictions as appropriate  Outcome: Not Progressing     Problem: Impaired Functional Mobility  Goal: Achieve highest/safest level of mobility/gait  Description: Interventions:  - Assess patient's functional ability and stability  - Promote increasing activity/tolerance for mobility and gait  - Educate and engage patient/family in tolerated activity level and precautions    Outcome: Not Progressing     Problem: GASTROINTESTINAL - ADULT  Goal: Minimal or absence of nausea and vomiting  Description: INTERVENTIONS:  - Maintain adequate hydration with IV or PO as ordered and tolerated  - Nasogastric tube to low intermittent suction as ordered  - Evaluate effectiveness of ordered antiemetic medications  - Provide nonpharmacologic comfort measures as appropriate  - Advance diet as tolerated, if ordered  - Obtain nutritional consult as needed  - Evaluate fluid balance  Outcome: Progressing

## 2022-11-29 NOTE — OCCUPATIONAL THERAPY NOTE
OT order noted in chart, OT order seen to be cancelled by MD, order acknowledged by this writer. Please re-order if needs arise after surgery.

## 2022-11-30 ENCOUNTER — APPOINTMENT (OUTPATIENT)
Dept: INTERVENTIONAL RADIOLOGY/VASCULAR | Facility: HOSPITAL | Age: 87
End: 2022-11-30
Attending: NEUROLOGICAL SURGERY
Payer: MEDICARE

## 2022-11-30 ENCOUNTER — ANESTHESIA (OUTPATIENT)
Dept: INTERVENTIONAL RADIOLOGY/VASCULAR | Facility: HOSPITAL | Age: 87
End: 2022-11-30
Payer: MEDICARE

## 2022-11-30 LAB
ANION GAP SERPL CALC-SCNC: 7 MMOL/L (ref 0–18)
BUN BLD-MCNC: 6 MG/DL (ref 7–18)
CALCIUM BLD-MCNC: 8.6 MG/DL (ref 8.5–10.1)
CHLORIDE SERPL-SCNC: 100 MMOL/L (ref 98–112)
CO2 SERPL-SCNC: 28 MMOL/L (ref 21–32)
CREAT BLD-MCNC: 0.52 MG/DL
GFR SERPLBLD BASED ON 1.73 SQ M-ARVRAT: 88 ML/MIN/1.73M2 (ref 60–?)
GLUCOSE BLD-MCNC: 92 MG/DL (ref 70–99)
OSMOLALITY SERPL CALC.SUM OF ELEC: 277 MOSM/KG (ref 275–295)
POTASSIUM SERPL-SCNC: 3.5 MMOL/L (ref 3.5–5.1)
SODIUM SERPL-SCNC: 135 MMOL/L (ref 136–145)

## 2022-11-30 PROCEDURE — 99232 SBSQ HOSP IP/OBS MODERATE 35: CPT | Performed by: HOSPITALIST

## 2022-11-30 PROCEDURE — 99232 SBSQ HOSP IP/OBS MODERATE 35: CPT | Performed by: INTERNAL MEDICINE

## 2022-11-30 RX ORDER — POTASSIUM CHLORIDE 29.8 MG/ML
40 INJECTION INTRAVENOUS ONCE
Status: DISCONTINUED | OUTPATIENT
Start: 2022-11-30 | End: 2022-11-30 | Stop reason: SDUPTHER

## 2022-11-30 RX ORDER — METOCLOPRAMIDE HYDROCHLORIDE 5 MG/ML
10 INJECTION INTRAMUSCULAR; INTRAVENOUS EVERY 8 HOURS PRN
Status: DISCONTINUED | OUTPATIENT
Start: 2022-11-30 | End: 2022-11-30 | Stop reason: HOSPADM

## 2022-11-30 RX ORDER — ONDANSETRON 2 MG/ML
4 INJECTION INTRAMUSCULAR; INTRAVENOUS EVERY 6 HOURS PRN
Status: DISCONTINUED | OUTPATIENT
Start: 2022-11-30 | End: 2022-11-30 | Stop reason: HOSPADM

## 2022-11-30 RX ORDER — CEFAZOLIN SODIUM/WATER 2 G/20 ML
SYRINGE (ML) INTRAVENOUS
Status: COMPLETED
Start: 2022-11-30 | End: 2022-11-30

## 2022-11-30 RX ORDER — NALOXONE HYDROCHLORIDE 0.4 MG/ML
80 INJECTION, SOLUTION INTRAMUSCULAR; INTRAVENOUS; SUBCUTANEOUS AS NEEDED
Status: DISCONTINUED | OUTPATIENT
Start: 2022-11-30 | End: 2022-11-30 | Stop reason: HOSPADM

## 2022-11-30 RX ORDER — LIDOCAINE HYDROCHLORIDE 10 MG/ML
INJECTION, SOLUTION INFILTRATION; PERINEURAL
Status: COMPLETED
Start: 2022-11-30 | End: 2022-11-30

## 2022-11-30 RX ORDER — CEFAZOLIN SODIUM/WATER 2 G/20 ML
SYRINGE (ML) INTRAVENOUS AS NEEDED
Status: DISCONTINUED | OUTPATIENT
Start: 2022-11-30 | End: 2022-11-30 | Stop reason: SURG

## 2022-11-30 RX ORDER — RUFINAMIDE 40 MG/ML
1 SUSPENSION ORAL DAILY
Status: DISCONTINUED | OUTPATIENT
Start: 2022-11-30 | End: 2022-12-06

## 2022-11-30 RX ADMIN — CEFAZOLIN SODIUM/WATER 2 G: 2 G/20 ML SYRINGE (ML) INTRAVENOUS at 11:39:00

## 2022-11-30 NOTE — ANESTHESIA POSTPROCEDURE EVALUATION
Ewa Chris [address] Patient Status:  Inpatient   Age/Gender 80year old female MRN [id number]   [facility] PCP Ligia Catalan MD       Anesthesia Post-op Note        Procedure Summary     Date: 11/30/22 Room / Location: [facility] Interventional Suites    Anesthesia Start: 0725 Anesthesia Stop: 9407    Procedure: IR VERTEBROPLASTY Diagnosis: (t11, l4 fx)    Scheduled Providers: Luly Mcduffie MD Anesthesiologist: Luly Mcduffie MD    Anesthesia Type: MAC ASA Status: 2          Anesthesia Type: MAC    Vitals Value Taken Time   /68 11/30/22 1206   Temp 98 11/30/22 1206   Pulse 73 11/30/22 1206   Resp 26 11/30/22 1206   SpO2 97 % 11/30/22 1206   Vitals shown include unvalidated device data. Patient Location: PACU    Anesthesia Type: general    Airway Patency: patent and extubated    Postop Pain Control: adequate    Mental Status: mildly sedated but able to meaningfully participate in the post-anesthesia evaluation    Nausea/Vomiting: none    Cardiopulmonary/Hydration status: stable euvolemic    Complications: no apparent anesthesia related complications    Postop vital signs: stable    Dental Exam: Unchanged from Preop    Patient to be discharged from PACU when criteria met.

## 2022-11-30 NOTE — PLAN OF CARE
Pt a/o x3, forgetful at times. VSS on RA. C/o back pain, interventions denied. Meds per MAR. Kiran draining. BM x2 overnight. Fall risk protocol followed, call light within reach.      Problem: GASTROINTESTINAL - ADULT  Goal: Minimal or absence of nausea and vomiting  Description: INTERVENTIONS:  - Maintain adequate hydration with IV or PO as ordered and tolerated  - Nasogastric tube to low intermittent suction as ordered  - Evaluate effectiveness of ordered antiemetic medications  - Provide nonpharmacologic comfort measures as appropriate  - Advance diet as tolerated, if ordered  - Obtain nutritional consult as needed  - Evaluate fluid balance  Outcome: Progressing

## 2022-11-30 NOTE — PROCEDURES
[facility]  Procedure Note    Omelia Asai Patient Status:  Inpatient    1931 MRN [id number]   [facility] Day # 6 PCP Taqueria Diaz MD     Procedure: L4 Kyphoplasty    Pre-Procedure Diagnosis:  L4 vertebral body fracture    Post-Procedure Diagnosis: Same    Anesthesia:  Sedation    Findings:  No complication    Specimens: None    Blood Loss:  < 5 cc    Tourniquet Time: None  Complications:  None  Drains:  None    Secondary Diagnosis:  N/A    Cadence Carlos MD  2022

## 2022-12-01 ENCOUNTER — APPOINTMENT (OUTPATIENT)
Dept: GENERAL RADIOLOGY | Facility: HOSPITAL | Age: 87
End: 2022-12-01
Attending: PHYSICIAN ASSISTANT
Payer: MEDICARE

## 2022-12-01 LAB
ANION GAP SERPL CALC-SCNC: 2 MMOL/L (ref 0–18)
BUN BLD-MCNC: 7 MG/DL (ref 7–18)
CALCIUM BLD-MCNC: 8.9 MG/DL (ref 8.5–10.1)
CHLORIDE SERPL-SCNC: 102 MMOL/L (ref 98–112)
CO2 SERPL-SCNC: 30 MMOL/L (ref 21–32)
CREAT BLD-MCNC: 0.53 MG/DL
GFR SERPLBLD BASED ON 1.73 SQ M-ARVRAT: 88 ML/MIN/1.73M2 (ref 60–?)
GLUCOSE BLD-MCNC: 92 MG/DL (ref 70–99)
OSMOLALITY SERPL CALC.SUM OF ELEC: 276 MOSM/KG (ref 275–295)
PHOSPHATE SERPL-MCNC: 3.1 MG/DL (ref 2.5–4.9)
POTASSIUM SERPL-SCNC: 4 MMOL/L (ref 3.5–5.1)
SODIUM SERPL-SCNC: 134 MMOL/L (ref 136–145)

## 2022-12-01 PROCEDURE — 72100 X-RAY EXAM L-S SPINE 2/3 VWS: CPT | Performed by: PHYSICIAN ASSISTANT

## 2022-12-01 PROCEDURE — 99232 SBSQ HOSP IP/OBS MODERATE 35: CPT | Performed by: HOSPITALIST

## 2022-12-01 NOTE — PLAN OF CARE
Pt had kyphoplasty of l4 today. Unable to kyphoplasty T11. Neurosurgery aware and will see pt tomorrow. TLSO brace ordered and  came out to measure pt this evening. Pt on bedrest until she has the brace and upright XR imaging. A&Ox3-4 but forgetful this am. A&Ox1-2 post procedure. Dressing to back WNL and CMS intact. Attempted to order pt dinner, but pt declined all options offered. Pt's daughter Todd Ast at bedside this evening helping pt order/eat food. BM today. Kiran in place. Tele NSR. Bed alarm on.

## 2022-12-01 NOTE — PROGRESS NOTES
Neurosurgery was notified yesterday of unsuccessful T11 kyphoplasty. Recommend TLSO brace to be worn when OOB for pain control. Order for brace placed yesterday. Discussed with RN - [facility] measured patient last night, brace to be delivered today. Upright XR lumbar ordered, should be completed once brace at bedside. No further recommendations from neurosurgical standpoint. Mily Metcalf.  Ashlee Gaspar [address]  12/1/2022 8:36 AM  Spectra [id number]

## 2022-12-01 NOTE — PLAN OF CARE
Patient alert and oriented x 2-3, forgetful, lungs clear on room air, abdomen soft, bowel sounds present, passing flatus, incontinent of bowel, duong draining clear, yellow urine, denies pain while lying still in bed, neurovascularly intact, IVF infusing TKVO, continuous telemetry and POX, VSS, daughter at bedside.

## 2022-12-01 NOTE — PLAN OF CARE
A&Ox3-4 today, but forgetful. Sleeping most of the day. VSS. Very poor appetite. Declined anything for breakfast. RN ordered turkey and mashed potatoes for lunch. Pt took 2 bites of each. RN encouraged pt to take another bite or two of turkey, which she was only able to take one more bite due to poor appetite. RN offered to help pt eat, but pt stated she was able to feed herself (which she was able). Denies nausea. Calorie count and dietician ordered. Tylenol with codeine given x2 to help with pt's severe pain with turning in bed. Pt reports it seemed to help somewhat, but still having moderate pain with turning in bed. TLSO brace brought in and modified late afternoon. After brace applied, pt reported, \"I can't tell if I am having back pain since I am having so much pain in my breasts and I can't breathe. I'm sure I am still having back pain\". Pt reports brace hurts her hernia, breasts, and restricts her breathing. Pt states she doesn't think she can eat with brace on. Another rep from [facility] to come back this evening to modify further for pt's hernia. [facility] rep suggested pt to wear a sports bra to help with breast pain. Left message for daughter to request sports bra and gave update on plan of care. Xray done with TLSO brace on. Spoke with Dr. Vickey Goodell who will review these. Per MD- ok to order PT/OT and pt can be out of bed with TLSO brace on. Removed Kiran per protocol. Due to void by 0000. Placing Purewick at shift change.

## 2022-12-02 PROCEDURE — 99231 SBSQ HOSP IP/OBS SF/LOW 25: CPT

## 2022-12-02 PROCEDURE — 99232 SBSQ HOSP IP/OBS MODERATE 35: CPT | Performed by: HOSPITALIST

## 2022-12-02 RX ORDER — DILTIAZEM HYDROCHLORIDE 5 MG/ML
5 INJECTION INTRAVENOUS ONCE
Status: COMPLETED | OUTPATIENT
Start: 2022-12-02 | End: 2022-12-02

## 2022-12-02 RX ORDER — DILTIAZEM HYDROCHLORIDE 120 MG/1
120 CAPSULE, EXTENDED RELEASE ORAL DAILY
Status: DISCONTINUED | OUTPATIENT
Start: 2022-12-02 | End: 2022-12-02

## 2022-12-02 RX ORDER — DILTIAZEM HYDROCHLORIDE 120 MG/1
120 CAPSULE, EXTENDED RELEASE ORAL DAILY
Status: DISCONTINUED | OUTPATIENT
Start: 2022-12-02 | End: 2022-12-04

## 2022-12-02 RX ORDER — METOPROLOL TARTRATE 5 MG/5ML
2.5 INJECTION INTRAVENOUS ONCE
Status: COMPLETED | OUTPATIENT
Start: 2022-12-02 | End: 2022-12-02

## 2022-12-02 RX ORDER — SODIUM CHLORIDE 9 MG/ML
INJECTION, SOLUTION INTRAVENOUS CONTINUOUS
Status: DISCONTINUED | OUTPATIENT
Start: 2022-12-02 | End: 2022-12-04

## 2022-12-02 NOTE — PLAN OF CARE
Patient alert and oriented x3-4, drowsy. VSS, afebrile. C/o back pain with movement declines medication. Denies SOB or nausea. Daughter was able to get patient to eat some food this evening for dinner. Still has poor appetite. Patient resting comfortably in bed, daughter at the bedside. Fall precautions in place. Call light in reach. 0130 - pts heart rate jumping up to the 140s. Patient does has a history of Afib and currently in afib. MD paged orders placed for fluid and one time dose of lopressor. Medications given per MAR. Heart rate remains in the 140s, notified MD. Orders placed for cardizem. Medication given per [facility]. Heart rate returned to high 90s - low 100s. Updated daughter at bedside on plan of care during this time. Will continue to monitor. Patient has yet to void post duong. Bladder scan only showing 286cc. MD notified. No new orders, just continue IV fluid as ordered.     Problem: METABOLIC/FLUID AND ELECTROLYTES - ADULT  Goal: Electrolytes maintained within normal limits  Description: INTERVENTIONS:  - Monitor labs and rhythm and assess patient for signs and symptoms of electrolyte imbalances  - Administer electrolyte replacement as ordered  - Monitor response to electrolyte replacements, including rhythm and repeat lab results as appropriate  - Fluid restriction as ordered  - Instruct patient on fluid and nutrition restrictions as appropriate  Outcome: Progressing     Problem: Impaired Functional Mobility  Goal: Achieve highest/safest level of mobility/gait  Description: Interventions:  - Assess patient's functional ability and stability  - Promote increasing activity/tolerance for mobility and gait  - Educate and engage patient/family in tolerated activity level and precautions  Outcome: Progressing     Problem: GASTROINTESTINAL - ADULT  Goal: Minimal or absence of nausea and vomiting  Description: INTERVENTIONS:  - Maintain adequate hydration with IV or PO as ordered and tolerated  - Nasogastric tube to low intermittent suction as ordered  - Evaluate effectiveness of ordered antiemetic medications  - Provide nonpharmacologic comfort measures as appropriate  - Advance diet as tolerated, if ordered  - Obtain nutritional consult as needed  - Evaluate fluid balance  Outcome: Progressing  Goal: Maintains adequate nutritional intake (undernourished)  Description: INTERVENTIONS:  - Monitor percentage of each meal consumed  - Identify factors contributing to decreased intake, treat as appropriate  - Assist with meals as needed  - Monitor I&O, WT and lab values  - Obtain nutritional consult as needed  - Optimize oral hygiene and moisture  - Encourage food from home; allow for food preferences  - Enhance eating environment  Outcome: Progressing     Problem: Delirium  Goal: Minimize duration of delirium  Description: Interventions:  - Encourage use of hearing aids, eye glasses  - Promote highest level of mobility daily  - Provide frequent reorientation  - Promote wakefulness i.e. lights on, blinds open  - Promote sleep, encourage patient's normal rest cycle i.e. lights off, TV off, minimize noise and interruptions  - Encourage family to assist in orientation and promotion of home routines  Outcome: Progressing

## 2022-12-02 NOTE — PHYSICAL THERAPY NOTE
PT order received, unsuccessful T11 kyphoplasty. Now has a TLSO to wear when OOB for pain control. Hold PT eval this AM due to increase HR. Will f/u later in PM as schedule allows, otherwise will be seen tomorrow. Nursing is aware.

## 2022-12-02 NOTE — OCCUPATIONAL THERAPY NOTE
Per PT services: \"PT/OT order received, unsuccessful T11 kyphoplasty. Now has a TLSO to wear when OOB for pain control. Hold PT/OT eval this AM due to elevated HR. Pt will be seen tomorrow. Nursing is aware. \"    Thank you for allowing me to care for this patient,   Glenis Dick, OTR/L   Occupational Therapist   [facility]

## 2022-12-03 PROCEDURE — 99232 SBSQ HOSP IP/OBS MODERATE 35: CPT | Performed by: HOSPITALIST

## 2022-12-03 NOTE — PLAN OF CARE
Pt received A&Ox2-3, confused/forgetful at times. Afebrile. C/o severe bilateral shoulder pain, heat packs applied. 1 tab tylenol-3 given per [facility] w/ relief, MD aware. Pt in a.fib w/ RVR, rates running 130s-140s. 12.5mg metoprolol ordered x2 this AM for total of 37.5mg, no change in rates. Dr. Lelia Alvarez and Ramin SHARMA updated on status frequently. Both IV and PO cardizem given per orders/MAR. Rate controlled briefly w/ IVP, but back sustaining in 130s-140s this afternoon. BP 83/56, repeat 92/61. Per Dr. Lelia Alvarez, okay to still give 60mg PO cardizem. Monitoring BP per flowsheets. As of 1855, rate now controlled a.fib in 80s-90s. /73. Bladder scanning pt q6h, orders to straight cath for >400cc. Straight cathed x2 today. Pt has not voided. PT/OT to f/u tomorrow pending HR. TLSO brace at bedside. Poor appetite, pt's dtr bringing food from home. IVF infusing @ 100ml/hr. Fall precautions in place. Call light in reach.

## 2022-12-03 NOTE — CM/SW NOTE
12/03/22 1600   CM/SW Referral Data   Referral Source Social Work (self-referral)   Reason for Referral Discharge planning   Informant EMR;Clinical Staff Member   Discharge Needs   Anticipated D/C needs Subacute rehab;Transportation services   Services Requested   PASRR Level 1 Submitted Yes       HOME SITUATION  Type of Home: House   Home Layout: Two level;Bed/bath upstairs  Stairs to Enter : 2  Stairs to Bedroom: 13  Railing: Yes     Lives With: Daughter (Daughter's fiance)  Drives: Yes     Prior Level of [place] per PT eval: Patient reports being independent gait without device, independent with stair negotiation, was able to drive, independent with ADL/self-care. Patient is a 79 y/o woman admitted with intractable back pain now s/p kyphoplasty. Noted therapy recommendations for [facility]. Referrals sent to facilities via [address]. PASRR completed and added to referral.  Await responses for accepting facilities for further DC planning. / to remain available for support and/or discharge planning.      Tila Melton LCSW  Discharge Planner  [phone]

## 2022-12-03 NOTE — PLAN OF CARE
Received pt alert and oriented x3, confused at times. VSS. Afebrile. Pt Afib at start of shift and switched to SR at 0200. HR between 70s-90s overnight. No complaints of pain or signs of discomfort. Bladder scan q6h. Bladder scan volume at 0000 was 325ml. Orders to straight cath >400ml. IVF infusing. Safety precautions in place and call light within reach. Bladder scan at 0600 was 398ml.     Problem: METABOLIC/FLUID AND ELECTROLYTES - ADULT  Goal: Electrolytes maintained within normal limits  Description: INTERVENTIONS:  - Monitor labs and rhythm and assess patient for signs and symptoms of electrolyte imbalances  - Administer electrolyte replacement as ordered  - Monitor response to electrolyte replacements, including rhythm and repeat lab results as appropriate  - Fluid restriction as ordered  - Instruct patient on fluid and nutrition restrictions as appropriate  Outcome: Progressing     Problem: GASTROINTESTINAL - ADULT  Goal: Minimal or absence of nausea and vomiting  Description: INTERVENTIONS:  - Maintain adequate hydration with IV or PO as ordered and tolerated  - Nasogastric tube to low intermittent suction as ordered  - Evaluate effectiveness of ordered antiemetic medications  - Provide nonpharmacologic comfort measures as appropriate  - Advance diet as tolerated, if ordered  - Obtain nutritional consult as needed  - Evaluate fluid balance  Outcome: Progressing     Problem: Delirium  Goal: Minimize duration of delirium  Description: Interventions:  - Encourage use of hearing aids, eye glasses  - Promote highest level of mobility daily  - Provide frequent reorientation  - Promote wakefulness i.e. lights on, blinds open  - Promote sleep, encourage patient's normal rest cycle i.e. lights off, TV off, minimize noise and interruptions  - Encourage family to assist in orientation and promotion of home routines  Outcome: Progressing

## 2022-12-03 NOTE — PLAN OF CARE
Patient c/o mild pain on lower back. Patient with poor oral intake. Tele showed NSR early this morning, converted to AFIB, heart rate 90's-100''s, denies chest discomfort, Dr. Juan Carlos Duenas notified. Patient did ntt void ,bladder scan @ 1230 was >999, ml Kiran inserted, obtained 900 ml catherine urine.

## 2022-12-04 ENCOUNTER — APPOINTMENT (OUTPATIENT)
Dept: GENERAL RADIOLOGY | Facility: HOSPITAL | Age: 87
End: 2022-12-04
Attending: INTERNAL MEDICINE
Payer: MEDICARE

## 2022-12-04 LAB
ANION GAP SERPL CALC-SCNC: 6 MMOL/L (ref 0–18)
BASOPHILS # BLD AUTO: 0.03 X10(3) UL (ref 0–0.2)
BASOPHILS NFR BLD AUTO: 0.2 %
BUN BLD-MCNC: 8 MG/DL (ref 7–18)
CALCIUM BLD-MCNC: 8.6 MG/DL (ref 8.5–10.1)
CHLORIDE SERPL-SCNC: 104 MMOL/L (ref 98–112)
CO2 SERPL-SCNC: 24 MMOL/L (ref 21–32)
CREAT BLD-MCNC: 0.38 MG/DL
EOSINOPHIL # BLD AUTO: 0.03 X10(3) UL (ref 0–0.7)
EOSINOPHIL NFR BLD AUTO: 0.2 %
ERYTHROCYTE [DISTWIDTH] IN BLOOD BY AUTOMATED COUNT: 13.7 %
GFR SERPLBLD BASED ON 1.73 SQ M-ARVRAT: 95 ML/MIN/1.73M2 (ref 60–?)
GLUCOSE BLD-MCNC: 115 MG/DL (ref 70–99)
HCT VFR BLD AUTO: 38.6 %
HGB BLD-MCNC: 13 G/DL
IMM GRANULOCYTES # BLD AUTO: 0.07 X10(3) UL (ref 0–1)
IMM GRANULOCYTES NFR BLD: 0.6 %
LYMPHOCYTES # BLD AUTO: 0.97 X10(3) UL (ref 1–4)
LYMPHOCYTES NFR BLD AUTO: 7.7 %
MAGNESIUM SERPL-MCNC: 1.5 MG/DL (ref 1.6–2.6)
MCH RBC QN AUTO: 31.6 PG (ref 26–34)
MCHC RBC AUTO-ENTMCNC: 33.7 G/DL (ref 31–37)
MCV RBC AUTO: 93.9 FL
MONOCYTES # BLD AUTO: 1.02 X10(3) UL (ref 0.1–1)
MONOCYTES NFR BLD AUTO: 8.1 %
NEUTROPHILS # BLD AUTO: 10.4 X10 (3) UL (ref 1.5–7.7)
NEUTROPHILS # BLD AUTO: 10.4 X10(3) UL (ref 1.5–7.7)
NEUTROPHILS NFR BLD AUTO: 83.2 %
OSMOLALITY SERPL CALC.SUM OF ELEC: 277 MOSM/KG (ref 275–295)
PLATELET # BLD AUTO: 303 10(3)UL (ref 150–450)
POTASSIUM SERPL-SCNC: 3.2 MMOL/L (ref 3.5–5.1)
RBC # BLD AUTO: 4.11 X10(6)UL
SODIUM SERPL-SCNC: 134 MMOL/L (ref 136–145)
WBC # BLD AUTO: 12.5 X10(3) UL (ref 4–11)

## 2022-12-04 PROCEDURE — 99232 SBSQ HOSP IP/OBS MODERATE 35: CPT | Performed by: HOSPITALIST

## 2022-12-04 PROCEDURE — 71045 X-RAY EXAM CHEST 1 VIEW: CPT | Performed by: INTERNAL MEDICINE

## 2022-12-04 RX ORDER — METOPROLOL TARTRATE 5 MG/5ML
2.5 INJECTION INTRAVENOUS EVERY 6 HOURS
Status: DISCONTINUED | OUTPATIENT
Start: 2022-12-04 | End: 2022-12-04

## 2022-12-04 RX ORDER — DILTIAZEM HYDROCHLORIDE 5 MG/ML
10 INJECTION INTRAVENOUS EVERY 4 HOURS PRN
Status: DISCONTINUED | OUTPATIENT
Start: 2022-12-04 | End: 2022-12-06

## 2022-12-04 RX ORDER — DILTIAZEM HYDROCHLORIDE 180 MG/1
180 CAPSULE, EXTENDED RELEASE ORAL DAILY
Status: DISCONTINUED | OUTPATIENT
Start: 2022-12-04 | End: 2022-12-05

## 2022-12-04 RX ORDER — FUROSEMIDE 10 MG/ML
20 INJECTION INTRAMUSCULAR; INTRAVENOUS ONCE
Status: COMPLETED | OUTPATIENT
Start: 2022-12-04 | End: 2022-12-04

## 2022-12-04 RX ORDER — DILTIAZEM HYDROCHLORIDE 5 MG/ML
10 INJECTION INTRAVENOUS ONCE
Status: COMPLETED | OUTPATIENT
Start: 2022-12-04 | End: 2022-12-04

## 2022-12-04 NOTE — PROGRESS NOTES
Pt is alert and oriented and daughter at bedside. Pt is on room air. Pt has duong draining yellow urine. Pt has been resting comfortably.

## 2022-12-04 NOTE — OCCUPATIONAL THERAPY NOTE
Spoke with Charge RN, pt is currently in AFib with RVR, Charge RN stated not approp for movement at this time. OT will follow as approp.

## 2022-12-05 ENCOUNTER — APPOINTMENT (OUTPATIENT)
Dept: GENERAL RADIOLOGY | Facility: HOSPITAL | Age: 87
End: 2022-12-05
Attending: HOSPITALIST
Payer: MEDICARE

## 2022-12-05 LAB
ANION GAP SERPL CALC-SCNC: 5 MMOL/L (ref 0–18)
ATRIAL RATE: 170 BPM
BUN BLD-MCNC: 9 MG/DL (ref 7–18)
CALCIUM BLD-MCNC: 8.8 MG/DL (ref 8.5–10.1)
CHLORIDE SERPL-SCNC: 103 MMOL/L (ref 98–112)
CO2 SERPL-SCNC: 28 MMOL/L (ref 21–32)
CREAT BLD-MCNC: 0.53 MG/DL
ERYTHROCYTE [DISTWIDTH] IN BLOOD BY AUTOMATED COUNT: 13.7 %
GFR SERPLBLD BASED ON 1.73 SQ M-ARVRAT: 87 ML/MIN/1.73M2 (ref 60–?)
GLUCOSE BLD-MCNC: 110 MG/DL (ref 70–99)
HCT VFR BLD AUTO: 38.1 %
HGB BLD-MCNC: 13 G/DL
MAGNESIUM SERPL-MCNC: 2.4 MG/DL (ref 1.6–2.6)
MCH RBC QN AUTO: 31.5 PG (ref 26–34)
MCHC RBC AUTO-ENTMCNC: 34.1 G/DL (ref 31–37)
MCV RBC AUTO: 92.3 FL
OSMOLALITY SERPL CALC.SUM OF ELEC: 281 MOSM/KG (ref 275–295)
PLATELET # BLD AUTO: 330 10(3)UL (ref 150–450)
POTASSIUM SERPL-SCNC: 3.4 MMOL/L (ref 3.5–5.1)
POTASSIUM SERPL-SCNC: 3.5 MMOL/L (ref 3.5–5.1)
Q-T INTERVAL: 276 MS
QRS DURATION: 76 MS
QTC CALCULATION (BEZET): 414 MS
R AXIS: 16 DEGREES
RBC # BLD AUTO: 4.13 X10(6)UL
SODIUM SERPL-SCNC: 136 MMOL/L (ref 136–145)
T AXIS: -64 DEGREES
VENTRICULAR RATE: 135 BPM
WBC # BLD AUTO: 8.5 X10(3) UL (ref 4–11)

## 2022-12-05 PROCEDURE — 99232 SBSQ HOSP IP/OBS MODERATE 35: CPT | Performed by: HOSPITALIST

## 2022-12-05 PROCEDURE — 71045 X-RAY EXAM CHEST 1 VIEW: CPT | Performed by: HOSPITALIST

## 2022-12-05 RX ORDER — DILTIAZEM HYDROCHLORIDE 120 MG/1
240 CAPSULE, EXTENDED RELEASE ORAL DAILY
Status: DISCONTINUED | OUTPATIENT
Start: 2022-12-05 | End: 2022-12-06

## 2022-12-05 NOTE — PROGRESS NOTES
Pt is alert and oriented and daughter at bedside. Pt has complaints of pain to back and  Given tylenol #3 1 tab with good relief. Pt has duong draining yellow urine. Pt has call light in reach and safety bed alarm on .

## 2022-12-05 NOTE — CM/SW NOTE
SW met with patient at bedside to provide her with a copy of [facility] patient choice list.     SW also called patient's daughter Ursula Cloud to provide her the list as well but was unable to reach her and left a voicemail requesting a call back. SW will continue to follow for plan of care changes and remain available for any additional DC needs or concerns.      Flakita Hull MSW, LSW  Discharge Planner   [phone]

## 2022-12-05 NOTE — PLAN OF CARE
Assumed care @ 0730 Patient c/o severe pain on left chest, respirations 24, slightly labored, O2 sat 92-94% on room air, lungs sound diminished. Telemetry showed Atrial Fibrillation, with RVR, heart rate 120's-140's, DR. Reno nayak, EKG done, chest xray done. Patient declined pain medication. , daughter @ bedside. Patient seen by Dr. Olivia Catalan, Cardizem 10 mg IV given. Patient's heart rate decreased to 80's-110's ,still Atrial Fibrillation chest pain subsided. Patient with poor oral intake. Kiran draining good amount of catherine urine. 1652- Tele shows Afib, heart rate 120's-140's, patient denies chest pain c/o slight  shortness of breath, O2 sat 92-94% on room air, Dr. Olivia Catalan notified, CArdizem 10 mg IV given, Lasix 20 mg IV given. . Patient's heart rate 100's-110's after Cardizem given, still in Atrial Fibrillation.

## 2022-12-05 NOTE — PLAN OF CARE
Patient received at 0730 on 12/5/22. Patient alert and oriented x4, no c/o pain, and duong draining with yellow urine. On tele - atrial fibrillation. Patient complains of decreased appetite, encouraged PO intake. Patient reports intermittent back pain, PRNs offered - pt declined, reports repositioning provides some relief. Family member at the bedside. Fall precautions in place, call light within reach, and bed alarm on.

## 2022-12-05 NOTE — PHYSICAL THERAPY NOTE
PHYSICAL THERAPY TREATMENT NOTE - INPATIENT    Room Number: 450/965-X     Session: 1       Presenting Problem: back pain, fall  Co-Morbidities : A-fib, chronic back pain, CA  ASSESSMENT     Pt with reports of increased mid back pain with movement. Educated pt in use of TLSO, as pt states she has not used it yet. Pt with inc HR with activity and reports of dizziness upon sitting EOB. BP assessed and at 142/105. Rn notified of HR and BP, stating pt can proceed as tolerated. However, pt declined OOB activity. Mod/Max A for bed mobility and sitting EOB activities. Pt would continue to benefit from inpatient skilled PT services to address these deficits and assist with return to PLOF. The AM-PAC '6-Clicks' Inpatient Basic Mobility Short Form was completed and this patient is demonstrating a 86.62% degree of impairment in mobility. Research supports that patients with this level of impairment may benefit from DC recommendation to [facility]. DISCHARGE RECOMMENDATIONS  PT Discharge Recommendations: Sub-acute rehabilitation     PLAN  PT Treatment Plan: Bed mobility; Patient education;Gait training;Range of motion;Strengthening;Transfer training  Rehab Potential : Fair  Frequency (Obs): 3-5x/week    CURRENT GOALS     Goal #1 Patient is able to demonstrate supine - sit EOB @ level: cga via log roll     Goal #2 Patient is able to demonstrate transfers Sit to/from Stand at assistance level: minimum assistance     Goal #3 Patient is able to ambulate 50 feet with assist device: walker - rolling at assistance level: cga     Goal #4    Goal #5    Goal #6    Goal Comments: Goals established on 12/3/2022    12/5/2022 all goals ongoing      SUBJECTIVE  \"I don't know how much I will be able to do. \"    OBJECTIVE  Precautions: TLSO (when sitting EOB, or OOB, when walking)    WEIGHT BEARING RESTRICTION  Weight Bearing Restriction: None                PAIN ASSESSMENT   Rating: 10  Location: mid back, upper L posterior shoulder  Management Techniques: Activity promotion;Relaxation;Repositioning; Body mechanics    BALANCE                                                                                                                       Static Sitting: Fair -  Dynamic Sitting: Poor +           Static Standing: Not tested  Dynamic Standing: Not tested    ACTIVITY TOLERANCE  Pulse: (!) 135  Heart Rate Source: Monitor     BP: (!) 142/105  BP Location: Left arm  BP Method: Automatic  Patient Position: Sitting    O2 WALK  Oxygen Therapy  SPO2% on Room Air at Rest: 97      AM-PAC '6-Clicks' INPATIENT SHORT FORM - BASIC MOBILITY  How much difficulty does the patient currently have. .. Patient Difficulty: Turning over in bed (including adjusting bedclothes, sheets and blankets)?: A Lot   Patient Difficulty: Sitting down on and standing up from a chair with arms (e.g., wheelchair, bedside commode, etc.): Unable   Patient Difficulty: Moving from lying on back to sitting on the side of the bed?: A Lot   How much help from another person does the patient currently need. .. Help from Another: Moving to and from a bed to a chair (including a wheelchair)?: Total   Help from Another: Need to walk in hospital room?: Total   Help from Another: Climbing 3-5 steps with a railing?: Total       AM-PAC Score:  Raw Score: 8   Approx Degree of Impairment: 86.62%   Standardized Score (AM-PAC Scale): 28.58   CMS Modifier (G-Code): CM    FUNCTIONAL ABILITY STATUS  Gait Assessment   Functional Mobility/Gait Assessment  Gait Assistance: Not tested    Skilled Therapy Provided    Bed Mobility:  Rolling: Mod A   Supine<>Sit: Mod A with HOB elevated   Sit<>Supine: Max A x 2     Transfer Mobility:  Sit<>Stand: NT   Stand<>Sit: NT   Gait: NT    Therapist's Comments: Pt lying in bed and agreed to PT. Pain rated 4/10 at rest. Instructed pt in ankle pumps and heel slides. Pt with reports of LE stiffness.  Pt instructed in proper body mechanics with bed mobility, log rolling technique. Mod A for rolling. Instructed in sidelying<>sit to EOB. Cued for breathing throughout. Inc HR upon sitting EOB, reports of dizziness, and BP elevated. Called RN, RN present and states ok to proceed, however with current vitals and pt's reports of dizziness, determined it was best not to proceed to attempt to standing. Pt assisted back into supine. HR slowly dec to 110's. Pt with reports of L posterior shoulder pain and mid back pain rated 10/10. RN notified. Pt left with all needs in reach, in comfortable position, and RN aware of session. THERAPEUTIC EXERCISES  Lower Extremity Ankle pumps  Heel slides  Within pain free range. Upper Extremity NT     Position Supine     Repetitions   10   Sets   1     Patient End of Session: In bed;Needs met;Call light within reach;RN aware of session/findings; All patient questions and concerns addressed;Bracing education provided per handout;SCDs in place; Alarm set

## 2022-12-06 VITALS
OXYGEN SATURATION: 94 % | HEIGHT: 67 IN | DIASTOLIC BLOOD PRESSURE: 81 MMHG | SYSTOLIC BLOOD PRESSURE: 127 MMHG | HEART RATE: 95 BPM | RESPIRATION RATE: 18 BRPM | TEMPERATURE: 98 F | BODY MASS INDEX: 21.97 KG/M2 | WEIGHT: 140 LBS

## 2022-12-06 LAB — POTASSIUM SERPL-SCNC: 4.3 MMOL/L (ref 3.5–5.1)

## 2022-12-06 PROCEDURE — 99239 HOSP IP/OBS DSCHRG MGMT >30: CPT | Performed by: HOSPITALIST

## 2022-12-06 RX ORDER — DILTIAZEM HYDROCHLORIDE 240 MG/1
240 CAPSULE, COATED, EXTENDED RELEASE ORAL DAILY
Qty: 30 CAPSULE | Refills: 0 | Status: SHIPPED | OUTPATIENT
Start: 2022-12-07

## 2022-12-06 RX ORDER — ACETAMINOPHEN AND CODEINE PHOSPHATE 300; 30 MG/1; MG/1
1 TABLET ORAL EVERY 6 HOURS PRN
Qty: 15 TABLET | Refills: 0 | Status: SHIPPED | OUTPATIENT
Start: 2022-12-06

## 2022-12-06 RX ORDER — RUFINAMIDE 40 MG/ML
1 SUSPENSION ORAL DAILY
Qty: 30 TABLET | Refills: 0 | Status: SHIPPED | OUTPATIENT
Start: 2022-12-07

## 2022-12-06 RX ORDER — ACETAMINOPHEN 500 MG
500 TABLET ORAL EVERY 4 HOURS PRN
Qty: 20 TABLET | Refills: 0 | Status: SHIPPED | OUTPATIENT
Start: 2022-12-06

## 2022-12-06 RX ORDER — TAMSULOSIN HYDROCHLORIDE 0.4 MG/1
0.4 CAPSULE ORAL NIGHTLY
Qty: 30 CAPSULE | Refills: 0 | Status: SHIPPED | OUTPATIENT
Start: 2022-12-06 | End: 2023-01-05

## 2022-12-06 NOTE — DISCHARGE INSTRUCTIONS
TLSO brace when you are out of bed.    Continue PT/OT  pull duong in 2-3 days once stronger at rehab, continue flomax to help urine output  Continue nutritional supplements and encourage oral intake    Future Appointments   Date Time Provider Karyn Carranza   12/23/2022  2:00 PM Lillie Roy, CHERELLE ALICIA [facility] [place]

## 2022-12-06 NOTE — PLAN OF CARE
Pt/dtr focused on improving her nutrition/strength. They are agreeable w/ rehab and dtr plans to try to improve pt po intake w/ shakes/supplements. We discussed importance of nutrition/PT.  Ok to [employer] as reviewed w/ MD.     ZACHARY Ahmadi  12:09 PM

## 2022-12-06 NOTE — PROGRESS NOTES
Pt is alert and oriented x4 family at bedside and pt is on room air. Pt has duong draining yellow urine. Pt has maintained a meli heart rate through the night. Safety measures in place.

## 2022-12-06 NOTE — CM/SW NOTE
CHICHI met with patient and her daughter at bedside. Patient reported that she would like to go to [address] [facility]. CHICHI reserved [facility] and will continue to provide facility with updates until medically cleared. Patient will not need insurance authorization. PASRR completed. CHICHI will continue to follow for plan of care changes and remain available for any additional DC needs or concerns.      Landen Claire MSW, LSW  Discharge Planner   [phone]

## 2022-12-06 NOTE — CM/SW NOTE
12/06/22 1400   Discharge disposition   Expected discharge disposition Skilled Nurs   Discharge transportation Tamra Warren spoke with Octaviano Denson at [address] [facility] [address] and they are able to accept patient today. [facility] [phone] has been requested to arrange ambulance for  time of 4:30pm. PCS form completed. [facility] Phone [phone]     SW will continue to follow for plan of care changes and remain available for any additional DC needs or concerns.      Justin Angel MSW, LSW  Discharge Planner   [phone]

## 2022-12-06 NOTE — PROGRESS NOTES
Patient chart reviewed for discharge: Medication Reconciliation completed, Specialist/PCP follow up listed, and disease specific Instructions/Education included in After Visit Summary. Discharge RN notified patient's RN of AVS completion and verified all consultants have signed off. Patient's RN to notify DC RN if discharge status changes. Plan for DC to [facility], pending acceptance/ bed availability. 1430 Pt set up for BLS transport for 4:30 to 4:45. Primary nurse Yojana informed. PCS form completed.

## 2022-12-07 ENCOUNTER — EXTERNAL FACILITY (OUTPATIENT)
Dept: FAMILY MEDICINE CLINIC | Facility: CLINIC | Age: 87
End: 2022-12-07

## 2022-12-07 DIAGNOSIS — S32.010S CLOSED COMPRESSION FRACTURE OF L1 LUMBAR VERTEBRA, SEQUELA: ICD-10-CM

## 2022-12-07 DIAGNOSIS — I48.91 ATRIAL FIBRILLATION WITH RVR (HCC): ICD-10-CM

## 2022-12-07 DIAGNOSIS — R33.9 URINARY RETENTION: ICD-10-CM

## 2022-12-07 DIAGNOSIS — E22.2 SIADH (SYNDROME OF INAPPROPRIATE ADH PRODUCTION) (HCC): ICD-10-CM

## 2022-12-07 DIAGNOSIS — S22.000D COMPRESSION FRACTURE OF THORACIC VERTEBRA WITH ROUTINE HEALING, UNSPECIFIED THORACIC VERTEBRAL LEVEL, SUBSEQUENT ENCOUNTER: ICD-10-CM

## 2022-12-07 DIAGNOSIS — R63.8 DECREASED ORAL INTAKE: ICD-10-CM
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
## 2022-12-07 DIAGNOSIS — M54.9 INTRACTABLE BACK PAIN: Primary | ICD-10-CM

## 2022-12-07 PROCEDURE — 99306 1ST NF CARE HIGH MDM 50: CPT | Performed by: FAMILY MEDICINE

## 2022-12-07 PROCEDURE — 1111F DSCHRG MED/CURRENT MED MERGE: CPT | Performed by: FAMILY MEDICINE

## 2022-12-08 PROBLEM — I48.91 ATRIAL FIBRILLATION  (CMD): Status: ACTIVE | Noted: 2022-12-08

## 2022-12-26 PROBLEM — E87.1 HYPONATREMIA: Status: ACTIVE | Noted: 2022-12-26

## 2023-01-09 PROBLEM — Z74.09 OTHER REDUCED MOBILITY: Status: ACTIVE | Noted: 2022-12-07

## 2023-01-09 PROBLEM — S32.030S: Status: ACTIVE | Noted: 2022-12-06

## 2023-01-09 PROBLEM — E22.2 SYNDROME OF INAPPROPRIATE SECRETION OF ANTIDIURETIC HORMONE (CMD): Status: ACTIVE | Noted: 2022-12-06

## 2023-01-09 PROBLEM — E87.1 HYPO-OSMOLALITY AND HYPONATREMIA: Status: ACTIVE | Noted: 2022-12-06

## 2023-02-13 ENCOUNTER — MED REC SCAN ONLY (OUTPATIENT)
Dept: FAMILY MEDICINE CLINIC | Facility: CLINIC | Age: 88
End: 2023-02-13

## 2023-02-23 ENCOUNTER — TELEPHONE (OUTPATIENT)
Dept: FAMILY MEDICINE CLINIC | Facility: CLINIC | Age: 88
End: 2023-02-23

## 2023-02-23 NOTE — TELEPHONE ENCOUNTER
[facility] notified. Patient's family is appealing the discharge so she may not come home as planned.

## 2023-02-23 NOTE — TELEPHONE ENCOUNTER
Christos from [place] [facility]" called [facility] Plan is for her to be discharged tomorrow to [facility] home. She is asking if Dr Eleuterio Whitmore will will her once released & if he will sign the Plan of Care - nursing, Phy Therapy & Occ Therapy.

## 2023-03-22 ENCOUNTER — APPOINTMENT (OUTPATIENT)
Dept: CT IMAGING | Facility: HOSPITAL | Age: 88
End: 2023-03-22
Attending: EMERGENCY MEDICINE
Payer: MEDICARE

## 2023-03-22 ENCOUNTER — APPOINTMENT (OUTPATIENT)
Dept: GENERAL RADIOLOGY | Facility: HOSPITAL | Age: 88
End: 2023-03-22
Attending: EMERGENCY MEDICINE
Payer: MEDICARE

## 2023-03-22 ENCOUNTER — APPOINTMENT (OUTPATIENT)
Dept: GENERAL RADIOLOGY | Facility: HOSPITAL | Age: 88
DRG: 699 | End: 2023-03-22
Attending: EMERGENCY MEDICINE
Payer: MEDICARE

## 2023-03-22 ENCOUNTER — HOSPITAL ENCOUNTER (INPATIENT)
Facility: HOSPITAL | Age: 88
LOS: 4 days | Discharge: NURSING, REHAB OR LONG-TERM CARE | DRG: 699 | End: 2023-03-26
Attending: EMERGENCY MEDICINE | Admitting: INTERNAL MEDICINE
Payer: MEDICARE

## 2023-03-22 ENCOUNTER — APPOINTMENT (OUTPATIENT)
Dept: CT IMAGING | Facility: HOSPITAL | Age: 88
DRG: 699 | End: 2023-03-22
Attending: EMERGENCY MEDICINE
Payer: MEDICARE

## 2023-03-22 ENCOUNTER — HOSPITAL ENCOUNTER (INPATIENT)
Facility: HOSPITAL | Age: 88
LOS: 4 days | Discharge: NURSING, REHAB OR LONG-TERM CARE | End: 2023-03-26
Attending: EMERGENCY MEDICINE | Admitting: INTERNAL MEDICINE
Payer: MEDICARE

## 2023-03-22 DIAGNOSIS — N39.0 URINARY TRACT INFECTION WITHOUT HEMATURIA, SITE UNSPECIFIED: ICD-10-CM

## 2023-03-22 DIAGNOSIS — E87.8 HYPOCHLOREMIA: ICD-10-CM

## 2023-03-22 DIAGNOSIS — R41.82 ALTERED MENTAL STATUS, UNSPECIFIED ALTERED MENTAL STATUS TYPE: Primary | ICD-10-CM

## 2023-03-22 DIAGNOSIS — E87.1 HYPONATREMIA: ICD-10-CM

## 2023-03-22 PROBLEM — G93.49 ENCEPHALOPATHY DUE TO INFECTION: Status: ACTIVE | Noted: 2023-03-22

## 2023-03-22 PROBLEM — B99.9 ENCEPHALOPATHY DUE TO INFECTION: Status: ACTIVE | Noted: 2023-03-22

## 2023-03-22 LAB
ALBUMIN SERPL-MCNC: 3.5 G/DL (ref 3.4–5)
ALBUMIN/GLOB SERPL: 0.7 {RATIO} (ref 1–2)
ALP LIVER SERPL-CCNC: 97 U/L
ALT SERPL-CCNC: 21 U/L
ANION GAP SERPL CALC-SCNC: 10 MMOL/L (ref 0–18)
AST SERPL-CCNC: 46 U/L (ref 15–37)
BASOPHILS # BLD AUTO: 0.02 X10(3) UL (ref 0–0.2)
BASOPHILS NFR BLD AUTO: 0.2 %
BILIRUB SERPL-MCNC: 2.6 MG/DL (ref 0.1–2)
BILIRUB UR QL STRIP.AUTO: NEGATIVE
BUN BLD-MCNC: 12 MG/DL (ref 7–18)
CALCIUM BLD-MCNC: 9.6 MG/DL (ref 8.5–10.1)
CHLORIDE SERPL-SCNC: 87 MMOL/L (ref 98–112)
CO2 SERPL-SCNC: 27 MMOL/L (ref 21–32)
COLOR UR AUTO: YELLOW
CREAT BLD-MCNC: 0.79 MG/DL
EOSINOPHIL # BLD AUTO: 0.01 X10(3) UL (ref 0–0.7)
EOSINOPHIL NFR BLD AUTO: 0.1 %
ERYTHROCYTE [DISTWIDTH] IN BLOOD BY AUTOMATED COUNT: 14.3 %
GFR SERPLBLD BASED ON 1.73 SQ M-ARVRAT: 71 ML/MIN/1.73M2 (ref 60–?)
GLOBULIN PLAS-MCNC: 4.8 G/DL (ref 2.8–4.4)
GLUCOSE BLD-MCNC: 101 MG/DL (ref 70–99)
GLUCOSE UR STRIP.AUTO-MCNC: NEGATIVE MG/DL
HCT VFR BLD AUTO: 45.5 %
HGB BLD-MCNC: 15.2 G/DL
IMM GRANULOCYTES # BLD AUTO: 0.04 X10(3) UL (ref 0–1)
IMM GRANULOCYTES NFR BLD: 0.4 %
LACTATE SERPL-SCNC: 1.6 MMOL/L (ref 0.4–2)
LYMPHOCYTES # BLD AUTO: 0.86 X10(3) UL (ref 1–4)
LYMPHOCYTES NFR BLD AUTO: 8.3 %
MCH RBC QN AUTO: 30.5 PG (ref 26–34)
MCHC RBC AUTO-ENTMCNC: 33.4 G/DL (ref 31–37)
MCV RBC AUTO: 91.4 FL
MONOCYTES # BLD AUTO: 0.54 X10(3) UL (ref 0.1–1)
MONOCYTES NFR BLD AUTO: 5.2 %
NEUTROPHILS # BLD AUTO: 8.85 X10 (3) UL (ref 1.5–7.7)
NEUTROPHILS # BLD AUTO: 8.85 X10(3) UL (ref 1.5–7.7)
NEUTROPHILS NFR BLD AUTO: 85.8 %
NITRITE UR QL STRIP.AUTO: NEGATIVE
OSMOLALITY SERPL CALC.SUM OF ELEC: 258 MOSM/KG (ref 275–295)
PH UR STRIP.AUTO: 6 [PH] (ref 5–8)
PLATELET # BLD AUTO: 187 10(3)UL (ref 150–450)
POTASSIUM SERPL-SCNC: 4.8 MMOL/L (ref 3.5–5.1)
PROT SERPL-MCNC: 8.3 G/DL (ref 6.4–8.2)
PROT UR STRIP.AUTO-MCNC: >=500 MG/DL
RBC # BLD AUTO: 4.98 X10(6)UL
RBC #/AREA URNS AUTO: >10 /HPF
SARS-COV-2 RNA RESP QL NAA+PROBE: NOT DETECTED
SODIUM SERPL-SCNC: 124 MMOL/L (ref 136–145)
SP GR UR STRIP.AUTO: 1.02 (ref 1–1.03)
UROBILINOGEN UR STRIP.AUTO-MCNC: <2 MG/DL
WBC # BLD AUTO: 10.3 X10(3) UL (ref 4–11)
WBC #/AREA URNS AUTO: >50 /HPF
WBC CLUMPS UR QL AUTO: PRESENT /HPF

## 2023-03-22 PROCEDURE — 71045 X-RAY EXAM CHEST 1 VIEW: CPT | Performed by: EMERGENCY MEDICINE

## 2023-03-22 PROCEDURE — 70450 CT HEAD/BRAIN W/O DYE: CPT | Performed by: EMERGENCY MEDICINE

## 2023-03-22 PROCEDURE — 99223 1ST HOSP IP/OBS HIGH 75: CPT | Performed by: INTERNAL MEDICINE

## 2023-03-22 RX ORDER — POLYETHYLENE GLYCOL 3350 17 G/17G
17 POWDER, FOR SOLUTION ORAL DAILY PRN
Status: DISCONTINUED | OUTPATIENT
Start: 2023-03-22 | End: 2023-03-26

## 2023-03-22 RX ORDER — ONDANSETRON 2 MG/ML
4 INJECTION INTRAMUSCULAR; INTRAVENOUS EVERY 6 HOURS PRN
Status: DISCONTINUED | OUTPATIENT
Start: 2023-03-22 | End: 2023-03-26

## 2023-03-22 RX ORDER — METOPROLOL TARTRATE 50 MG/1
50 TABLET, FILM COATED ORAL 2 TIMES DAILY
Status: DISCONTINUED | OUTPATIENT
Start: 2023-03-22 | End: 2023-03-26

## 2023-03-22 RX ORDER — ACETAMINOPHEN 500 MG
500 TABLET ORAL EVERY 4 HOURS PRN
Status: DISCONTINUED | OUTPATIENT
Start: 2023-03-22 | End: 2023-03-26

## 2023-03-22 RX ORDER — METOPROLOL TARTRATE 50 MG/1
50 TABLET, FILM COATED ORAL 2 TIMES DAILY
COMMUNITY

## 2023-03-22 RX ORDER — ACETAMINOPHEN 325 MG/1
325 TABLET ORAL EVERY 6 HOURS PRN
COMMUNITY
End: 2023-03-26

## 2023-03-22 RX ORDER — SODIUM CHLORIDE 9 MG/ML
INJECTION, SOLUTION INTRAVENOUS CONTINUOUS
Status: DISCONTINUED | OUTPATIENT
Start: 2023-03-22 | End: 2023-03-25

## 2023-03-22 RX ORDER — SODIUM PHOSPHATE, DIBASIC AND SODIUM PHOSPHATE, MONOBASIC 7; 19 G/133ML; G/133ML
1 ENEMA RECTAL ONCE AS NEEDED
Status: DISCONTINUED | OUTPATIENT
Start: 2023-03-22 | End: 2023-03-26

## 2023-03-22 RX ORDER — SENNOSIDES 8.6 MG
17.2 TABLET ORAL NIGHTLY PRN
Status: DISCONTINUED | OUTPATIENT
Start: 2023-03-22 | End: 2023-03-26

## 2023-03-22 RX ORDER — DILTIAZEM HYDROCHLORIDE 240 MG/1
240 CAPSULE, COATED, EXTENDED RELEASE ORAL DAILY
Status: DISCONTINUED | OUTPATIENT
Start: 2023-03-22 | End: 2023-03-22

## 2023-03-22 RX ORDER — BISACODYL 10 MG
10 SUPPOSITORY, RECTAL RECTAL
Status: DISCONTINUED | OUTPATIENT
Start: 2023-03-22 | End: 2023-03-26

## 2023-03-22 RX ORDER — MIDODRINE HYDROCHLORIDE 5 MG/1
5 TABLET ORAL
COMMUNITY

## 2023-03-22 RX ORDER — HEPARIN SODIUM 5000 [USP'U]/ML
5000 INJECTION, SOLUTION INTRAVENOUS; SUBCUTANEOUS EVERY 8 HOURS SCHEDULED
Status: DISCONTINUED | OUTPATIENT
Start: 2023-03-22 | End: 2023-03-26

## 2023-03-22 RX ORDER — METOCLOPRAMIDE HYDROCHLORIDE 5 MG/ML
10 INJECTION INTRAMUSCULAR; INTRAVENOUS EVERY 8 HOURS PRN
Status: DISCONTINUED | OUTPATIENT
Start: 2023-03-22 | End: 2023-03-26

## 2023-03-22 RX ORDER — DIGOXIN 125 MCG
125 TABLET ORAL DAILY
COMMUNITY

## 2023-03-22 NOTE — PLAN OF CARE
NURSING ADMISSION NOTE      Patient admitted via Cart  Oriented to room. Safety precautions initiated. Bed in low position. Call light in reach. Pt a/o x4, lethargic. VSS, remained afebrile. Admissions in navigator completed. Pt's daughter reports pt was c/o back pain, but pt asleep. Chronic harvey in place since November. Fall precautions in place. Call light within reach.

## 2023-03-22 NOTE — ED QUICK NOTES
Orders for admission, patient is aware of plan and ready to go upstairs. Any questions, please call ED ANJELICA Turner at extension [phone].      Patient Covid vaccination status: Unvaccinated     COVID Test Ordered in ED: Rapid SARS-CoV-2 by PCR    COVID Suspicion at Admission: N/A    Running Infusions:  Per MAR    Mental Status/LOC at time of transport: A&Ox2-3    Other pertinent information:   CIWA score: N/A   NIH score:  N/A

## 2023-03-22 NOTE — ED QUICK NOTES
Recd report from [facility]EN[facility] Berman [facility]  Patient sleeping on cart, arousable to voice  On continuous cardiac monitoring  Daughter at bedside  Call light within reach  MD at bedside for assessment, wctm closely

## 2023-03-23 ENCOUNTER — APPOINTMENT (OUTPATIENT)
Dept: GENERAL RADIOLOGY | Facility: HOSPITAL | Age: 88
End: 2023-03-23
Attending: INTERNAL MEDICINE
Payer: MEDICARE

## 2023-03-23 ENCOUNTER — APPOINTMENT (OUTPATIENT)
Dept: GENERAL RADIOLOGY | Facility: HOSPITAL | Age: 88
DRG: 699 | End: 2023-03-23
Attending: INTERNAL MEDICINE
Payer: MEDICARE

## 2023-03-23 LAB
ANION GAP SERPL CALC-SCNC: 8 MMOL/L (ref 0–18)
ATRIAL RATE: 340 BPM
BASOPHILS # BLD AUTO: 0.03 X10(3) UL (ref 0–0.2)
BASOPHILS NFR BLD AUTO: 0.5 %
BUN BLD-MCNC: 10 MG/DL (ref 7–18)
CALCIUM BLD-MCNC: 8.7 MG/DL (ref 8.5–10.1)
CHLORIDE SERPL-SCNC: 94 MMOL/L (ref 98–112)
CO2 SERPL-SCNC: 27 MMOL/L (ref 21–32)
CREAT BLD-MCNC: 0.48 MG/DL
EOSINOPHIL # BLD AUTO: 0.01 X10(3) UL (ref 0–0.7)
EOSINOPHIL NFR BLD AUTO: 0.2 %
ERYTHROCYTE [DISTWIDTH] IN BLOOD BY AUTOMATED COUNT: 14.5 %
GFR SERPLBLD BASED ON 1.73 SQ M-ARVRAT: 89 ML/MIN/1.73M2 (ref 60–?)
GLUCOSE BLD-MCNC: 94 MG/DL (ref 70–99)
HCT VFR BLD AUTO: 35 %
HGB BLD-MCNC: 11.7 G/DL
IMM GRANULOCYTES # BLD AUTO: 0.03 X10(3) UL (ref 0–1)
IMM GRANULOCYTES NFR BLD: 0.5 %
LYMPHOCYTES # BLD AUTO: 0.63 X10(3) UL (ref 1–4)
LYMPHOCYTES NFR BLD AUTO: 9.5 %
MCH RBC QN AUTO: 31 PG (ref 26–34)
MCHC RBC AUTO-ENTMCNC: 33.4 G/DL (ref 31–37)
MCV RBC AUTO: 92.6 FL
MONOCYTES # BLD AUTO: 0.45 X10(3) UL (ref 0.1–1)
MONOCYTES NFR BLD AUTO: 6.8 %
NEUTROPHILS # BLD AUTO: 5.49 X10 (3) UL (ref 1.5–7.7)
NEUTROPHILS # BLD AUTO: 5.49 X10(3) UL (ref 1.5–7.7)
NEUTROPHILS NFR BLD AUTO: 82.5 %
OSMOLALITY SERPL CALC.SUM OF ELEC: 267 MOSM/KG (ref 275–295)
PLATELET # BLD AUTO: 182 10(3)UL (ref 150–450)
POTASSIUM SERPL-SCNC: 3.6 MMOL/L (ref 3.5–5.1)
POTASSIUM SERPL-SCNC: 3.9 MMOL/L (ref 3.5–5.1)
Q-T INTERVAL: 310 MS
QRS DURATION: 74 MS
QTC CALCULATION (BEZET): 370 MS
R AXIS: 8 DEGREES
RBC # BLD AUTO: 3.78 X10(6)UL
SODIUM SERPL-SCNC: 129 MMOL/L (ref 136–145)
T AXIS: -81 DEGREES
VENTRICULAR RATE: 86 BPM
WBC # BLD AUTO: 6.6 X10(3) UL (ref 4–11)

## 2023-03-23 PROCEDURE — 99232 SBSQ HOSP IP/OBS MODERATE 35: CPT | Performed by: INTERNAL MEDICINE

## 2023-03-23 PROCEDURE — 71046 X-RAY EXAM CHEST 2 VIEWS: CPT | Performed by: INTERNAL MEDICINE

## 2023-03-23 RX ORDER — POTASSIUM CHLORIDE 20 MEQ/1
40 TABLET, EXTENDED RELEASE ORAL EVERY 4 HOURS
Status: DISPENSED | OUTPATIENT
Start: 2023-03-23 | End: 2023-03-23

## 2023-03-23 RX ORDER — HYDRALAZINE HYDROCHLORIDE 20 MG/ML
10 INJECTION INTRAMUSCULAR; INTRAVENOUS EVERY 4 HOURS PRN
Status: DISCONTINUED | OUTPATIENT
Start: 2023-03-23 | End: 2023-03-26

## 2023-03-23 NOTE — CM/SW NOTE
SW noted PT recommendation for [facility] placement for patient. SW sent referral for [facility] via [facility] and will discuss options list with patient and family once it is available. SW will continue to follow for plan of care changes and remain available for any additional DC needs or concerns.      Alise Melgar MSW, LSW  Discharge Planner   [phone]

## 2023-03-23 NOTE — CDS QUERY
Clarification - Potential Diagnosis Shari Casiano  Dear Dr. Angelika Khan:  Clinical information in the patient's medical record (provided below) includes documentation of diagnoses with potential association. For accurate ICD-10-CM code assignment, please clarify the following:  Please clarify the relationship, if any, between the Acute Cystitis and the Chronic Bowie Catheter:  [ x  ] Yes, there is an association and/or causal relationship between the Urinary Tract Infection and the Chronic Bowie Catheter  [   ] No, there is no association and/or causal relationship between the Urinary Tract Infection and the Chronic Bowie Catheter  [   ] Other, (please specify):     Documentation from the Medical Record:   Risk Factors:    Chronic Bowie Catheter for urinary retention     Clinical Indicators:   ___03/22 Urinalysis: cloudy, moderate blood, WBC >50, Moderate Leukocyte esterase, culture pending.  ___03/22: ER Physician's Note: Patient's daughter reports that they were in an outside hospital last week where the patient's  Bowie catheter was exchanged after became dislodged. She reports that the urine was very cloudy at that time but they did not do any urine testing.   ___03/22 H&P: A/P-Acute cystitis with chronic catheter    Treatment:   Urinalysis, Urine cultures, IV Rocephin, IV Fluids          For questions regarding this query, please contact Clinical Documentation :   Taylor Louie RN [phone]  [email or web address]  Thank you  THIS FORM IS A PERMANENT PART OF THE MEDICAL RECORD

## 2023-03-23 NOTE — PROGRESS NOTES
Pt is alert and oriented x4 and has a harvey draining yellow urine. Pt has ivf running and has iv antibiotics ordered. Fall precaution in place and call light in reach. Pt has no complaints of pain.

## 2023-03-24 LAB
ANION GAP SERPL CALC-SCNC: 8 MMOL/L (ref 0–18)
BASOPHILS # BLD AUTO: 0.03 X10(3) UL (ref 0–0.2)
BASOPHILS NFR BLD AUTO: 0.5 %
BUN BLD-MCNC: 6 MG/DL (ref 7–18)
CALCIUM BLD-MCNC: 8.3 MG/DL (ref 8.5–10.1)
CHLORIDE SERPL-SCNC: 98 MMOL/L (ref 98–112)
CO2 SERPL-SCNC: 24 MMOL/L (ref 21–32)
CREAT BLD-MCNC: 0.35 MG/DL
EOSINOPHIL # BLD AUTO: 0.04 X10(3) UL (ref 0–0.7)
EOSINOPHIL NFR BLD AUTO: 0.7 %
ERYTHROCYTE [DISTWIDTH] IN BLOOD BY AUTOMATED COUNT: 14.7 %
GFR SERPLBLD BASED ON 1.73 SQ M-ARVRAT: 96 ML/MIN/1.73M2 (ref 60–?)
GLUCOSE BLD-MCNC: 87 MG/DL (ref 70–99)
HCT VFR BLD AUTO: 34.8 %
HGB BLD-MCNC: 11.1 G/DL
IMM GRANULOCYTES # BLD AUTO: 0.02 X10(3) UL (ref 0–1)
IMM GRANULOCYTES NFR BLD: 0.4 %
LYMPHOCYTES # BLD AUTO: 0.61 X10(3) UL (ref 1–4)
LYMPHOCYTES NFR BLD AUTO: 11.1 %
MCH RBC QN AUTO: 30.8 PG (ref 26–34)
MCHC RBC AUTO-ENTMCNC: 31.9 G/DL (ref 31–37)
MCV RBC AUTO: 96.7 FL
MONOCYTES # BLD AUTO: 0.36 X10(3) UL (ref 0.1–1)
MONOCYTES NFR BLD AUTO: 6.5 %
NEUTROPHILS # BLD AUTO: 4.44 X10 (3) UL (ref 1.5–7.7)
NEUTROPHILS # BLD AUTO: 4.44 X10(3) UL (ref 1.5–7.7)
NEUTROPHILS NFR BLD AUTO: 80.8 %
OSMOLALITY SERPL CALC.SUM OF ELEC: 267 MOSM/KG (ref 275–295)
PLATELET # BLD AUTO: 181 10(3)UL (ref 150–450)
POTASSIUM SERPL-SCNC: 3.9 MMOL/L (ref 3.5–5.1)
RBC # BLD AUTO: 3.6 X10(6)UL
SODIUM SERPL-SCNC: 130 MMOL/L (ref 136–145)
WBC # BLD AUTO: 5.5 X10(3) UL (ref 4–11)

## 2023-03-24 PROCEDURE — 99232 SBSQ HOSP IP/OBS MODERATE 35: CPT | Performed by: HOSPITALIST

## 2023-03-24 NOTE — CM/SW NOTE
NANDO received call back from patient's daughter Linad Souza regarding DC planning. [facility] choice list was received by Linda Souza and she is reviewing options. Jada requested [address] which is not one of the accepting facilities. [facility] reported that they do not have any available beds. Daughter asked if we could hold on to patient until [address] and SW informed her that patient cannot be held for the sake of placement if there are other placement options. Daughter is aware and understanding. SW reinforced that once patient is DC'ed that she will need to go to [facility] same day. Daughter agreed. SW will follow up with patient's daughter for [facility] choice. SW will continue to follow for plan of care changes and remain available for any additional DC needs or concerns.      Renee Erickson MSW, LSW  Discharge Planner   [phone]

## 2023-03-24 NOTE — CM/SW NOTE
SW called and attempted to speak with patient's daughter Branden Montes due to patient's confusion. SW was unable to reach her via phone but left a voicemail requesting a call back to discuss [facility] options list.     SW left copy of [facility] choice list in patient's room for daughter to review. SW will continue to follow for plan of care changes and remain available for any additional DC needs or concerns.      Tresa Mckinney MSW, LSW  Discharge Planner   [phone]

## 2023-03-24 NOTE — PLAN OF CARE
Resting in bed. Forgetful & confused. Reorinted & redirected PRN. Able to follow simple commands & carry conversation w/ staff. Remains on ceftriaxone IV for UTI. No adverse reactions. Voiding w/ no difficuilty,heavily soaked last incontinence episode. Lidoderm patch applied to L lower back. Turned & repositioned. All needs attended. Daughter called & she was given update of plan for patient to stay while still waiting for cultures.

## 2023-03-24 NOTE — PROGRESS NOTES
Pt is alert and oriented x1 but confused. Daughter at bedside and pt has moments when oxygen saturation drops and I applied 1 liter nasal cannula. Pt has ivf running. Pt has safety measures in place and call light in reach. Pt has had harvey removed at 1600 and a voiding trial is in place. Pt has no complaints of pain. Continue to monitor voiding.

## 2023-03-25 ENCOUNTER — APPOINTMENT (OUTPATIENT)
Dept: ULTRASOUND IMAGING | Facility: HOSPITAL | Age: 88
DRG: 699 | End: 2023-03-25
Attending: HOSPITALIST
Payer: MEDICARE

## 2023-03-25 ENCOUNTER — APPOINTMENT (OUTPATIENT)
Dept: ULTRASOUND IMAGING | Facility: HOSPITAL | Age: 88
End: 2023-03-25
Attending: HOSPITALIST
Payer: MEDICARE

## 2023-03-25 VITALS
DIASTOLIC BLOOD PRESSURE: 64 MMHG | TEMPERATURE: 98 F | HEART RATE: 68 BPM | BODY MASS INDEX: 22 KG/M2 | WEIGHT: 141 LBS | SYSTOLIC BLOOD PRESSURE: 113 MMHG | RESPIRATION RATE: 18 BRPM | OXYGEN SATURATION: 98 %

## 2023-03-25 LAB
ANION GAP SERPL CALC-SCNC: 5 MMOL/L (ref 0–18)
BUN BLD-MCNC: 5 MG/DL (ref 7–18)
CALCIUM BLD-MCNC: 8.5 MG/DL (ref 8.5–10.1)
CHLORIDE SERPL-SCNC: 99 MMOL/L (ref 98–112)
CO2 SERPL-SCNC: 29 MMOL/L (ref 21–32)
CREAT BLD-MCNC: 0.52 MG/DL
GFR SERPLBLD BASED ON 1.73 SQ M-ARVRAT: 88 ML/MIN/1.73M2 (ref 60–?)
GLUCOSE BLD-MCNC: 91 MG/DL (ref 70–99)
OSMOLALITY SERPL CALC.SUM OF ELEC: 273 MOSM/KG (ref 275–295)
POTASSIUM SERPL-SCNC: 3.7 MMOL/L (ref 3.5–5.1)
SODIUM SERPL-SCNC: 133 MMOL/L (ref 136–145)

## 2023-03-25 PROCEDURE — 99232 SBSQ HOSP IP/OBS MODERATE 35: CPT | Performed by: HOSPITALIST

## 2023-03-25 RX ORDER — CIPROFLOXACIN 500 MG/1
500 TABLET, FILM COATED ORAL
Status: DISCONTINUED | OUTPATIENT
Start: 2023-03-25 | End: 2023-03-25

## 2023-03-25 RX ORDER — CIPROFLOXACIN 500 MG/1
500 TABLET, FILM COATED ORAL 2 TIMES DAILY
Qty: 5 TABLET | Refills: 0 | Status: SHIPPED | OUTPATIENT
Start: 2023-03-25 | End: 2023-03-28

## 2023-03-25 RX ORDER — CIPROFLOXACIN 500 MG/1
500 TABLET, FILM COATED ORAL
Status: DISCONTINUED | OUTPATIENT
Start: 2023-03-25 | End: 2023-03-26

## 2023-03-25 NOTE — CM/SW NOTE
03/25/23 1544   Discharge disposition   Expected discharge disposition [address] at [facility]   Discharge transportation [address]     Notified by RN pt may be medically cleared to discharge based on results of STAT BMP drawn today. SW spoke with pt's dtr via phone regarding [facility] choice, pt's dtr selected the [address] [place], [place] [address]Th Street of [address]. NANDO spoke with Bryan Roach at the [address] [place] [address] who confirmed they can accept pt and have a bed available today. [facility] placed on [facility]. Updated RN and Bryan Roach at the [address] [place] [address]. PCS form completed and available for RN to print.       The [address] [place] [address] Street at [address]  Discharge Planner

## 2023-03-25 NOTE — DISCHARGE INSTRUCTIONS
Recommend 2L fluids restriction in 24 hours periods. Please check sodium in 3 days after arrival to SNF.

## 2023-03-25 NOTE — PROGRESS NOTES
Patient seen and examined. Discharge if ultrasound not showing any ureteral obstruction. Hospitalist portion of med rec completed.     Dennis Bravo MD  [facility]  Internal Medicine Hospitalist

## 2023-03-25 NOTE — PLAN OF CARE
Received pt a/o x1, confused & lethargic. More alert at times w/ x2 orientation. VSS. Afebrile. On 2L nc to sleep. Denies pain. Left lower back soreness, tender to touch. Up to bathroom w/ walker x1 assist, voiding. IVF infusing per MAR. Daughter at bedside & updated on POC. Call light within reach. Safety precautions in place.      Problem: Impaired Cognition  Goal: Patient will exhibit improved attention, thought processing and/or memory  Description: Interventions:  Outcome: Progressing     Problem: RISK FOR INFECTION - ADULT  Goal: Absence of fever/infection during anticipated neutropenic period  Description: INTERVENTIONS  - Monitor WBC  - Administer growth factors as ordered  - Implement neutropenic guidelines  Outcome: Progressing

## 2023-03-26 PROCEDURE — 99238 HOSP IP/OBS DSCHRG MGMT 30/<: CPT | Performed by: HOSPITALIST

## 2023-03-26 NOTE — PLAN OF CARE
Patient alert and oriented to person and place, confused and lethargic. More alert around meal time. Vital signs stable. On 2L/min O2 NC. Afebrile. Pt complains of stiffness and pain to lower back, lidocaine patch given per [facility] with noted relief. Up to bathroom with walker and x1 assist. Able to void. Incontinent of bowel. Call light within reach. Will continue to monitor. Safety precautions in place.     Problem: RISK FOR INFECTION - ADULT  Goal: Absence of fever/infection during anticipated neutropenic period  Description: INTERVENTIONS  - Monitor WBC  - Administer growth factors as ordered  - Implement neutropenic guidelines  Outcome: Progressing     Problem: Impaired Cognition  Goal: Patient will exhibit improved attention, thought processing and/or memory  Description: Interventions:  - Minimize distractions in the room when full attention is required  - Allow additional time for processing after asking questions or providing instructions  Outcome: Not Progressing

## 2023-03-26 NOTE — PLAN OF CARE
NURSING DISCHARGE NOTE    Discharged Rehab facility via Ambulance. Accompanied by Family member  Belongings Taken by patient/family. Received pt a/o x2, confused & lethargic. More alert at times. VSS. Afebrile. Pt to discharge to [facility] [employer]. Report called & given to [facility], [phone], at [facility]. Planned for transport at 2200. Daughter at bedside & aware of plan. PIV removed. Bathed prior to discharge. 0030: [place] updated that transport was delayed but patient would still be arriving tonight. [facility] acknowledged and agreeable. Transport arrived. Belongings sent with pt daughter.      Problem: Impaired Cognition  Goal: Patient will exhibit improved attention, thought processing and/or memory  Description: Interventions:       Problem: RISK FOR INFECTION - ADULT  Goal: Absence of fever/infection during anticipated neutropenic period  Description: INTERVENTIONS  - Monitor WBC  - Administer growth factors as ordered  - Implement neutropenic guidelines  Outcome: Progressing

## 2023-03-27 ENCOUNTER — TELEPHONE (OUTPATIENT)
Dept: INTERNAL MEDICINE CLINIC | Age: 88
End: 2023-03-27

## 2023-03-27 ENCOUNTER — INITIAL APN SNF VISIT (OUTPATIENT)
Dept: INTERNAL MEDICINE CLINIC | Age: 88
End: 2023-03-27

## 2023-03-27 VITALS
TEMPERATURE: 97 F | HEART RATE: 90 BPM | OXYGEN SATURATION: 98 % | DIASTOLIC BLOOD PRESSURE: 71 MMHG | BODY MASS INDEX: 18 KG/M2 | WEIGHT: 117.63 LBS | SYSTOLIC BLOOD PRESSURE: 127 MMHG | RESPIRATION RATE: 18 BRPM

## 2023-03-27 DIAGNOSIS — D69.6 THROMBOCYTOPENIA (HCC): ICD-10-CM

## 2023-03-27 DIAGNOSIS — D64.9 ANEMIA, UNSPECIFIED TYPE: ICD-10-CM

## 2023-03-27 DIAGNOSIS — R53.1 GENERAL WEAKNESS: ICD-10-CM

## 2023-03-27 DIAGNOSIS — R41.82 ALTERED MENTAL STATUS, UNSPECIFIED ALTERED MENTAL STATUS TYPE: ICD-10-CM

## 2023-03-27 DIAGNOSIS — G93.49 ENCEPHALOPATHY DUE TO INFECTION: ICD-10-CM

## 2023-03-27 DIAGNOSIS — N39.0 URINARY TRACT INFECTION WITHOUT HEMATURIA, SITE UNSPECIFIED: Primary | ICD-10-CM

## 2023-03-27 DIAGNOSIS — R33.9 URINARY RETENTION: ICD-10-CM

## 2023-03-27 DIAGNOSIS — E87.1 HYPONATREMIA: ICD-10-CM

## 2023-03-27 DIAGNOSIS — I95.9 HYPOTENSION, UNSPECIFIED HYPOTENSION TYPE: ICD-10-CM

## 2023-03-27 DIAGNOSIS — B99.9 ENCEPHALOPATHY DUE TO INFECTION: ICD-10-CM

## 2023-03-27 DIAGNOSIS — E22.2 SIADH (SYNDROME OF INAPPROPRIATE ADH PRODUCTION) (HCC): ICD-10-CM

## 2023-03-27 DIAGNOSIS — S32.010S CLOSED COMPRESSION FRACTURE OF L1 LUMBAR VERTEBRA, SEQUELA: ICD-10-CM

## 2023-03-27 DIAGNOSIS — I48.91 ATRIAL FIBRILLATION, UNSPECIFIED TYPE (HCC): ICD-10-CM

## 2023-03-28 ENCOUNTER — EXTERNAL FACILITY (OUTPATIENT)
Dept: FAMILY MEDICINE CLINIC | Facility: CLINIC | Age: 88
End: 2023-03-28

## 2023-03-28 DIAGNOSIS — I95.9 HYPOTENSION, UNSPECIFIED HYPOTENSION TYPE: ICD-10-CM

## 2023-03-28 DIAGNOSIS — G93.49 ENCEPHALOPATHY DUE TO INFECTION: ICD-10-CM

## 2023-03-28 DIAGNOSIS — M81.0 OSTEOPOROSIS, UNSPECIFIED OSTEOPOROSIS TYPE, UNSPECIFIED PATHOLOGICAL FRACTURE PRESENCE: ICD-10-CM

## 2023-03-28 DIAGNOSIS — E22.2 SIADH (SYNDROME OF INAPPROPRIATE ADH PRODUCTION) (HCC): ICD-10-CM

## 2023-03-28 DIAGNOSIS — I48.91 ATRIAL FIBRILLATION, UNSPECIFIED TYPE (HCC): ICD-10-CM

## 2023-03-28 DIAGNOSIS — R41.82 ALTERED MENTAL STATUS, UNSPECIFIED ALTERED MENTAL STATUS TYPE: Primary | ICD-10-CM

## 2023-03-28 DIAGNOSIS — D69.6 THROMBOCYTOPENIA (HCC): ICD-10-CM

## 2023-03-28 DIAGNOSIS — I48.0 PAF (PAROXYSMAL ATRIAL FIBRILLATION) (HCC): ICD-10-CM

## 2023-03-28 DIAGNOSIS — R53.1 GENERAL WEAKNESS: ICD-10-CM

## 2023-03-28 DIAGNOSIS — N39.0 URINARY TRACT INFECTION WITHOUT HEMATURIA, SITE UNSPECIFIED: ICD-10-CM

## 2023-03-28 DIAGNOSIS — E87.8 HYPOCHLOREMIA: ICD-10-CM

## 2023-03-28 DIAGNOSIS — B99.9 ENCEPHALOPATHY DUE TO INFECTION: ICD-10-CM

## 2023-03-28 DIAGNOSIS — R11.2 NAUSEA AND VOMITING IN ADULT: ICD-10-CM

## 2023-03-28 DIAGNOSIS — E87.1 HYPONATREMIA: ICD-10-CM

## 2023-03-28 PROBLEM — M54.9 INTRACTABLE BACK PAIN: Status: RESOLVED | Noted: 2022-11-24 | Resolved: 2023-03-28

## 2023-03-28 PROCEDURE — 1111F DSCHRG MED/CURRENT MED MERGE: CPT | Performed by: FAMILY MEDICINE

## 2023-03-28 PROCEDURE — 99306 1ST NF CARE HIGH MDM 50: CPT | Performed by: FAMILY MEDICINE

## 2023-03-30 ENCOUNTER — SNF VISIT (OUTPATIENT)
Dept: INTERNAL MEDICINE CLINIC | Age: 88
End: 2023-03-30

## 2023-03-30 VITALS
SYSTOLIC BLOOD PRESSURE: 112 MMHG | WEIGHT: 118 LBS | BODY MASS INDEX: 18 KG/M2 | TEMPERATURE: 97 F | OXYGEN SATURATION: 98 % | DIASTOLIC BLOOD PRESSURE: 73 MMHG | RESPIRATION RATE: 18 BRPM | HEART RATE: 76 BPM

## 2023-03-30 DIAGNOSIS — G93.49 ENCEPHALOPATHY DUE TO INFECTION: ICD-10-CM

## 2023-03-30 DIAGNOSIS — E22.2 SIADH (SYNDROME OF INAPPROPRIATE ADH PRODUCTION) (HCC): ICD-10-CM

## 2023-03-30 DIAGNOSIS — I48.91 ATRIAL FIBRILLATION, UNSPECIFIED TYPE (HCC): ICD-10-CM

## 2023-03-30 DIAGNOSIS — B99.9 ENCEPHALOPATHY DUE TO INFECTION: ICD-10-CM

## 2023-03-30 DIAGNOSIS — R53.1 GENERAL WEAKNESS: ICD-10-CM

## 2023-03-30 DIAGNOSIS — R33.9 URINARY RETENTION: Primary | ICD-10-CM

## 2023-03-30 DIAGNOSIS — R60.0 PEDAL EDEMA: ICD-10-CM

## 2023-03-30 DIAGNOSIS — E87.1 HYPONATREMIA: ICD-10-CM

## 2023-03-30 PROCEDURE — 99309 SBSQ NF CARE MODERATE MDM 30: CPT | Performed by: NURSE PRACTITIONER

## 2023-03-30 PROCEDURE — 1111F DSCHRG MED/CURRENT MED MERGE: CPT | Performed by: NURSE PRACTITIONER

## 2023-03-30 RX ORDER — POLYETHYLENE GLYCOL 3350 17 G/17G
17 POWDER, FOR SOLUTION ORAL DAILY PRN
COMMUNITY

## 2023-04-03 ENCOUNTER — SNF VISIT (OUTPATIENT)
Dept: INTERNAL MEDICINE CLINIC | Age: 88
End: 2023-04-03

## 2023-04-03 VITALS
HEART RATE: 94 BPM | RESPIRATION RATE: 18 BRPM | WEIGHT: 118 LBS | DIASTOLIC BLOOD PRESSURE: 84 MMHG | OXYGEN SATURATION: 93 % | SYSTOLIC BLOOD PRESSURE: 114 MMHG | BODY MASS INDEX: 18 KG/M2 | TEMPERATURE: 98 F

## 2023-04-03 DIAGNOSIS — I48.91 ATRIAL FIBRILLATION, UNSPECIFIED TYPE (HCC): ICD-10-CM

## 2023-04-03 DIAGNOSIS — R41.82 ALTERED MENTAL STATUS, UNSPECIFIED ALTERED MENTAL STATUS TYPE: ICD-10-CM

## 2023-04-03 DIAGNOSIS — B99.9 ENCEPHALOPATHY DUE TO INFECTION: ICD-10-CM

## 2023-04-03 DIAGNOSIS — E22.2 SIADH (SYNDROME OF INAPPROPRIATE ADH PRODUCTION) (HCC): ICD-10-CM

## 2023-04-03 DIAGNOSIS — U07.1 COVID: Primary | ICD-10-CM

## 2023-04-03 DIAGNOSIS — R53.1 GENERAL WEAKNESS: ICD-10-CM

## 2023-04-03 DIAGNOSIS — G93.49 ENCEPHALOPATHY DUE TO INFECTION: ICD-10-CM

## 2023-04-03 DIAGNOSIS — R60.0 PEDAL EDEMA: ICD-10-CM

## 2023-04-03 DIAGNOSIS — R33.9 URINARY RETENTION: ICD-10-CM

## 2023-04-05 ENCOUNTER — SNF VISIT (OUTPATIENT)
Dept: INTERNAL MEDICINE CLINIC | Age: 88
End: 2023-04-05

## 2023-04-05 VITALS
HEART RATE: 76 BPM | DIASTOLIC BLOOD PRESSURE: 76 MMHG | TEMPERATURE: 98 F | SYSTOLIC BLOOD PRESSURE: 148 MMHG | RESPIRATION RATE: 18 BRPM | OXYGEN SATURATION: 100 %

## 2023-04-05 DIAGNOSIS — E22.2 SIADH (SYNDROME OF INAPPROPRIATE ADH PRODUCTION) (HCC): ICD-10-CM

## 2023-04-05 DIAGNOSIS — G93.49 ENCEPHALOPATHY DUE TO INFECTION: ICD-10-CM

## 2023-04-05 DIAGNOSIS — U07.1 COVID: Primary | ICD-10-CM

## 2023-04-05 DIAGNOSIS — I48.91 ATRIAL FIBRILLATION, UNSPECIFIED TYPE (HCC): ICD-10-CM

## 2023-04-05 DIAGNOSIS — R53.1 GENERAL WEAKNESS: ICD-10-CM

## 2023-04-05 DIAGNOSIS — B99.9 ENCEPHALOPATHY DUE TO INFECTION: ICD-10-CM

## 2023-04-05 DIAGNOSIS — E87.1 HYPONATREMIA: ICD-10-CM

## 2023-04-05 DIAGNOSIS — R60.0 PEDAL EDEMA: ICD-10-CM

## 2023-04-05 PROCEDURE — 99309 SBSQ NF CARE MODERATE MDM 30: CPT | Performed by: NURSE PRACTITIONER

## 2023-04-05 PROCEDURE — 1111F DSCHRG MED/CURRENT MED MERGE: CPT | Performed by: NURSE PRACTITIONER

## 2023-04-06 ENCOUNTER — APPOINTMENT (OUTPATIENT)
Dept: GENERAL RADIOLOGY | Facility: HOSPITAL | Age: 88
End: 2023-04-06
Attending: EMERGENCY MEDICINE
Payer: MEDICARE

## 2023-04-06 ENCOUNTER — APPOINTMENT (OUTPATIENT)
Dept: CT IMAGING | Facility: HOSPITAL | Age: 88
End: 2023-04-06
Attending: EMERGENCY MEDICINE
Payer: MEDICARE

## 2023-04-06 ENCOUNTER — HOSPITAL ENCOUNTER (EMERGENCY)
Facility: HOSPITAL | Age: 88
Discharge: HOME | End: 2023-04-06
Attending: EMERGENCY MEDICINE
Payer: MEDICARE

## 2023-04-06 VITALS
OXYGEN SATURATION: 96 % | TEMPERATURE: 97 F | DIASTOLIC BLOOD PRESSURE: 77 MMHG | RESPIRATION RATE: 18 BRPM | SYSTOLIC BLOOD PRESSURE: 141 MMHG | HEART RATE: 74 BPM

## 2023-04-06 DIAGNOSIS — S40.011A CONTUSION OF RIGHT SHOULDER, INITIAL ENCOUNTER: ICD-10-CM
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
## 2023-04-06 DIAGNOSIS — L03.116 CELLULITIS OF LEFT LOWER EXTREMITY: ICD-10-CM

## 2023-04-06 LAB
ALBUMIN SERPL-MCNC: 2.4 G/DL (ref 3.4–5)
ALBUMIN/GLOB SERPL: 0.7 {RATIO} (ref 1–2)
ALP LIVER SERPL-CCNC: 79 U/L
ALT SERPL-CCNC: 20 U/L
ANION GAP SERPL CALC-SCNC: 6 MMOL/L (ref 0–18)
AST SERPL-CCNC: 28 U/L (ref 15–37)
BASOPHILS # BLD AUTO: 0.03 X10(3) UL (ref 0–0.2)
BASOPHILS NFR BLD AUTO: 0.5 %
BILIRUB SERPL-MCNC: 0.6 MG/DL (ref 0.1–2)
BILIRUB UR QL STRIP.AUTO: NEGATIVE
BUN BLD-MCNC: 7 MG/DL (ref 7–18)
CALCIUM BLD-MCNC: 7.6 MG/DL (ref 8.5–10.1)
CHLORIDE SERPL-SCNC: 99 MMOL/L (ref 98–112)
CLARITY UR REFRACT.AUTO: CLEAR
CO2 SERPL-SCNC: 32 MMOL/L (ref 21–32)
COLOR UR AUTO: YELLOW
CREAT BLD-MCNC: 0.56 MG/DL
EOSINOPHIL # BLD AUTO: 0 X10(3) UL (ref 0–0.7)
EOSINOPHIL NFR BLD AUTO: 0 %
ERYTHROCYTE [DISTWIDTH] IN BLOOD BY AUTOMATED COUNT: 14.7 %
GFR SERPLBLD BASED ON 1.73 SQ M-ARVRAT: 86 ML/MIN/1.73M2 (ref 60–?)
GLOBULIN PLAS-MCNC: 3.4 G/DL (ref 2.8–4.4)
GLUCOSE BLD-MCNC: 133 MG/DL (ref 70–99)
GLUCOSE UR STRIP.AUTO-MCNC: NEGATIVE MG/DL
HCT VFR BLD AUTO: 35.6 %
HGB BLD-MCNC: 11.6 G/DL
IMM GRANULOCYTES # BLD AUTO: 0.03 X10(3) UL (ref 0–1)
IMM GRANULOCYTES NFR BLD: 0.5 %
KETONES UR STRIP.AUTO-MCNC: NEGATIVE MG/DL
LEUKOCYTE ESTERASE UR QL STRIP.AUTO: NEGATIVE
LYMPHOCYTES # BLD AUTO: 0.55 X10(3) UL (ref 1–4)
LYMPHOCYTES NFR BLD AUTO: 10 %
MAGNESIUM SERPL-MCNC: 1.5 MG/DL (ref 1.6–2.6)
MCH RBC QN AUTO: 30 PG (ref 26–34)
MCHC RBC AUTO-ENTMCNC: 32.6 G/DL (ref 31–37)
MCV RBC AUTO: 92 FL
MONOCYTES # BLD AUTO: 0.34 X10(3) UL (ref 0.1–1)
MONOCYTES NFR BLD AUTO: 6.2 %
NEUTROPHILS # BLD AUTO: 4.57 X10 (3) UL (ref 1.5–7.7)
NEUTROPHILS # BLD AUTO: 4.57 X10(3) UL (ref 1.5–7.7)
NEUTROPHILS NFR BLD AUTO: 82.8 %
NITRITE UR QL STRIP.AUTO: NEGATIVE
OSMOLALITY SERPL CALC.SUM OF ELEC: 284 MOSM/KG (ref 275–295)
PH UR STRIP.AUTO: 6 [PH] (ref 5–8)
PLATELET # BLD AUTO: 163 10(3)UL (ref 150–450)
POTASSIUM SERPL-SCNC: 2.9 MMOL/L (ref 3.5–5.1)
PROT SERPL-MCNC: 5.8 G/DL (ref 6.4–8.2)
PROT UR STRIP.AUTO-MCNC: NEGATIVE MG/DL
RBC # BLD AUTO: 3.87 X10(6)UL
SODIUM SERPL-SCNC: 137 MMOL/L (ref 136–145)
SP GR UR STRIP.AUTO: 1.01 (ref 1–1.03)
UROBILINOGEN UR STRIP.AUTO-MCNC: <2 MG/DL
WBC # BLD AUTO: 5.5 X10(3) UL (ref 4–11)

## 2023-04-06 PROCEDURE — 99285 EMERGENCY DEPT VISIT HI MDM: CPT

## 2023-04-06 PROCEDURE — 73030 X-RAY EXAM OF SHOULDER: CPT | Performed by: EMERGENCY MEDICINE

## 2023-04-06 PROCEDURE — 70450 CT HEAD/BRAIN W/O DYE: CPT | Performed by: EMERGENCY MEDICINE

## 2023-04-06 PROCEDURE — 36415 COLL VENOUS BLD VENIPUNCTURE: CPT

## 2023-04-06 PROCEDURE — 80053 COMPREHEN METABOLIC PANEL: CPT | Performed by: EMERGENCY MEDICINE

## 2023-04-06 PROCEDURE — 72125 CT NECK SPINE W/O DYE: CPT | Performed by: EMERGENCY MEDICINE

## 2023-04-06 PROCEDURE — 83735 ASSAY OF MAGNESIUM: CPT | Performed by: EMERGENCY MEDICINE

## 2023-04-06 PROCEDURE — 71046 X-RAY EXAM CHEST 2 VIEWS: CPT | Performed by: EMERGENCY MEDICINE

## 2023-04-06 PROCEDURE — 85025 COMPLETE CBC W/AUTO DIFF WBC: CPT | Performed by: EMERGENCY MEDICINE

## 2023-04-06 PROCEDURE — 73080 X-RAY EXAM OF ELBOW: CPT | Performed by: EMERGENCY MEDICINE

## 2023-04-06 PROCEDURE — 81001 URINALYSIS AUTO W/SCOPE: CPT | Performed by: EMERGENCY MEDICINE

## 2023-04-06 RX ORDER — DOXYCYCLINE HYCLATE 100 MG/1
100 CAPSULE ORAL 2 TIMES DAILY
Qty: 14 CAPSULE | Refills: 0 | Status: SHIPPED | OUTPATIENT
Start: 2023-04-06 | End: 2023-04-13

## 2023-04-06 RX ORDER — DOXYCYCLINE HYCLATE 100 MG/1
100 CAPSULE ORAL ONCE
Status: COMPLETED | OUTPATIENT
Start: 2023-04-06 | End: 2023-04-06

## 2023-04-06 RX ORDER — MAGNESIUM OXIDE 400 MG/1
400 TABLET ORAL ONCE
Status: COMPLETED | OUTPATIENT
Start: 2023-04-06 | End: 2023-04-06

## 2023-04-06 RX ORDER — POTASSIUM CHLORIDE 20 MEQ/1
40 TABLET, EXTENDED RELEASE ORAL ONCE
Status: COMPLETED | OUTPATIENT
Start: 2023-04-06 | End: 2023-04-06

## 2023-04-06 NOTE — ED INITIAL ASSESSMENT (HPI)
Pt c/o of felling fatigue & shoulder pain   recent + covid test a week ago. Also controlled fall at [facility] with RN.  Witnessed with no head injury or LOC

## 2023-04-06 NOTE — ED QUICK NOTES
Medication given to pt (K+, Mg); pt attempted to take w/parfait; pt refused to complete dosage and said she \"will do it later\". ANJELICA wilder.

## 2023-04-06 NOTE — DISCHARGE INSTRUCTIONS
Return for new or worsening symptoms such as shortness of breath, increased pain, further falls or fever

## 2023-04-07 NOTE — ED QUICK NOTES
Pt going back to the [address] [place] [address]. Spoke to nurse, Adriana Canales who called to check on pt status.  Awaiting medicar, ETA 60-90 mins

## 2023-04-09 ENCOUNTER — APPOINTMENT (OUTPATIENT)
Dept: GENERAL RADIOLOGY | Facility: HOSPITAL | Age: 88
End: 2023-04-09
Attending: EMERGENCY MEDICINE
Payer: MEDICARE

## 2023-04-09 ENCOUNTER — HOSPITAL ENCOUNTER (EMERGENCY)
Facility: HOSPITAL | Age: 88
Discharge: HOME | End: 2023-04-09
Attending: EMERGENCY MEDICINE
Payer: MEDICARE

## 2023-04-09 VITALS
OXYGEN SATURATION: 100 % | SYSTOLIC BLOOD PRESSURE: 100 MMHG | TEMPERATURE: 98 F | HEART RATE: 86 BPM | DIASTOLIC BLOOD PRESSURE: 69 MMHG | RESPIRATION RATE: 18 BRPM

## 2023-04-09 DIAGNOSIS — R05.9 COUGH, UNSPECIFIED TYPE: Primary | ICD-10-CM

## 2023-04-09 LAB
ALBUMIN SERPL-MCNC: 2.8 G/DL (ref 3.4–5)
ALBUMIN/GLOB SERPL: 0.7 {RATIO} (ref 1–2)
ALP LIVER SERPL-CCNC: 93 U/L
ALT SERPL-CCNC: 22 U/L
ANION GAP SERPL CALC-SCNC: 3 MMOL/L (ref 0–18)
AST SERPL-CCNC: 22 U/L (ref 15–37)
BASOPHILS # BLD AUTO: 0.02 X10(3) UL (ref 0–0.2)
BASOPHILS NFR BLD AUTO: 0.3 %
BILIRUB SERPL-MCNC: 1.1 MG/DL (ref 0.1–2)
BUN BLD-MCNC: 8 MG/DL (ref 7–18)
CALCIUM BLD-MCNC: 8.8 MG/DL (ref 8.5–10.1)
CHLORIDE SERPL-SCNC: 94 MMOL/L (ref 98–112)
CO2 SERPL-SCNC: 37 MMOL/L (ref 21–32)
CREAT BLD-MCNC: 0.62 MG/DL
EOSINOPHIL # BLD AUTO: 0 X10(3) UL (ref 0–0.7)
EOSINOPHIL NFR BLD AUTO: 0 %
ERYTHROCYTE [DISTWIDTH] IN BLOOD BY AUTOMATED COUNT: 14.7 %
GFR SERPLBLD BASED ON 1.73 SQ M-ARVRAT: 84 ML/MIN/1.73M2 (ref 60–?)
GLOBULIN PLAS-MCNC: 4.2 G/DL (ref 2.8–4.4)
GLUCOSE BLD-MCNC: 105 MG/DL (ref 70–99)
HCT VFR BLD AUTO: 39.9 %
HGB BLD-MCNC: 12.7 G/DL
IMM GRANULOCYTES # BLD AUTO: 0.04 X10(3) UL (ref 0–1)
IMM GRANULOCYTES NFR BLD: 0.5 %
LYMPHOCYTES # BLD AUTO: 0.43 X10(3) UL (ref 1–4)
LYMPHOCYTES NFR BLD AUTO: 5.6 %
MCH RBC QN AUTO: 29.6 PG (ref 26–34)
MCHC RBC AUTO-ENTMCNC: 31.8 G/DL (ref 31–37)
MCV RBC AUTO: 93 FL
MONOCYTES # BLD AUTO: 0.19 X10(3) UL (ref 0.1–1)
MONOCYTES NFR BLD AUTO: 2.5 %
NEUTROPHILS # BLD AUTO: 7.03 X10 (3) UL (ref 1.5–7.7)
NEUTROPHILS # BLD AUTO: 7.03 X10(3) UL (ref 1.5–7.7)
NEUTROPHILS NFR BLD AUTO: 91.1 %
OSMOLALITY SERPL CALC.SUM OF ELEC: 277 MOSM/KG (ref 275–295)
PLATELET # BLD AUTO: 199 10(3)UL (ref 150–450)
POTASSIUM SERPL-SCNC: 4.3 MMOL/L (ref 3.5–5.1)
PROT SERPL-MCNC: 7 G/DL (ref 6.4–8.2)
RBC # BLD AUTO: 4.29 X10(6)UL
SODIUM SERPL-SCNC: 134 MMOL/L (ref 136–145)
WBC # BLD AUTO: 7.7 X10(3) UL (ref 4–11)

## 2023-04-09 PROCEDURE — 71046 X-RAY EXAM CHEST 2 VIEWS: CPT | Performed by: EMERGENCY MEDICINE

## 2023-04-09 PROCEDURE — 99285 EMERGENCY DEPT VISIT HI MDM: CPT

## 2023-04-09 PROCEDURE — 85025 COMPLETE CBC W/AUTO DIFF WBC: CPT | Performed by: EMERGENCY MEDICINE

## 2023-04-09 PROCEDURE — 80053 COMPREHEN METABOLIC PANEL: CPT | Performed by: EMERGENCY MEDICINE

## 2023-04-09 PROCEDURE — 36415 COLL VENOUS BLD VENIPUNCTURE: CPT

## 2023-04-09 NOTE — ED INITIAL ASSESSMENT (HPI)
PT PRESENTS TO ED WITH COVID, PER EMS NURSING HOME IS CONCERNED FOR PNEUMONIA, PT HAS NO COMPLAINTS, A&OX4
No

## 2023-04-09 NOTE — ED QUICK NOTES
Report received from [facility]. Assumed care of pt at this time. Pt is resting comfortably on the stretcher. Pt given additional warm blanket as requested. Daughter at bedside.

## 2023-04-09 NOTE — ED QUICK NOTES
Blood would not draw for labs from IV insertion, attempted peripheral stick x 1 without success. Will ask another RN to attempt.

## 2023-04-10 ENCOUNTER — SNF VISIT (OUTPATIENT)
Dept: INTERNAL MEDICINE CLINIC | Age: 88
End: 2023-04-10

## 2023-04-10 DIAGNOSIS — J12.82 PNEUMONIA DUE TO COVID-19 VIRUS: ICD-10-CM

## 2023-04-10 DIAGNOSIS — R53.1 WEAKNESS: ICD-10-CM

## 2023-04-10 DIAGNOSIS — I48.91 ATRIAL FIBRILLATION, UNSPECIFIED TYPE (HCC): ICD-10-CM

## 2023-04-10 DIAGNOSIS — L03.116 CELLULITIS OF LEFT FOOT: ICD-10-CM

## 2023-04-10 DIAGNOSIS — U07.1 PNEUMONIA DUE TO COVID-19 VIRUS: ICD-10-CM
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
## 2023-04-10 DIAGNOSIS — R41.0 CONFUSION: ICD-10-CM

## 2023-04-10 DIAGNOSIS — R53.1 GENERAL WEAKNESS: ICD-10-CM

## 2023-04-10 DIAGNOSIS — E22.2 SIADH (SYNDROME OF INAPPROPRIATE ADH PRODUCTION) (HCC): ICD-10-CM

## 2023-04-10 DIAGNOSIS — U07.1 COVID: Primary | ICD-10-CM

## 2023-04-10 PROCEDURE — 99309 SBSQ NF CARE MODERATE MDM 30: CPT | Performed by: NURSE PRACTITIONER

## 2023-04-10 PROCEDURE — 1111F DSCHRG MED/CURRENT MED MERGE: CPT | Performed by: NURSE PRACTITIONER

## 2023-04-10 NOTE — DISCHARGE INSTRUCTIONS
Your x-rays today do not show definitive pneumonia. However, you are currently on doxycycline for cellulitis of your feet and this is excellent management of bacterial pneumonia as well.   You should continue to follow with your primary care doctor

## 2023-04-10 NOTE — PLAN OF CARE
Next appt 04/14/2023  Last appt 03/14/2023    Refill request for   Disp Refills Start End    amitriptyline (ELAVIL) 50 MG tablet 30 tablet 0 3/23/2023     Sig - Route: Take 1 tablet by mouth nightly. - Oral      New prescription, refill appropriate?    Routed to provider's pool to review, thanks.        Pt a/o x2-3, forgetful and lethargic. O2 dropped to low 80s. Put 1L NC, stable at %. Pt c/o of lower back pain. Has lidocaine patch on currently. Meds given per MAR. IVF infusing. C/o nausea, zofran given prn. Up to chair midday with x2 assist. Bowie catheter removed @ 1600 for voiding trial. Fall precautions in place. Call light within reach.      1900 - RA at 97%

## 2023-04-10 NOTE — ED QUICK NOTES
Pt resting comfortably on the stretcher with eyes closed, pt appears to be sleeping. Respirations easy, nonlabored.

## 2023-04-10 NOTE — ED QUICK NOTES
External female catheter placed and connected to suction for voiding needs. Warm blanket given to pt as requested. Dinner tray delivered to bedside. Pt and daughter updated on ETA for ambulance.

## 2023-04-11 ENCOUNTER — EXTERNAL FACILITY (OUTPATIENT)
Dept: FAMILY MEDICINE CLINIC | Facility: CLINIC | Age: 88
End: 2023-04-11

## 2023-04-11 VITALS
RESPIRATION RATE: 20 BRPM | BODY MASS INDEX: 18 KG/M2 | SYSTOLIC BLOOD PRESSURE: 135 MMHG | OXYGEN SATURATION: 95 % | DIASTOLIC BLOOD PRESSURE: 85 MMHG | HEART RATE: 95 BPM | TEMPERATURE: 98 F | WEIGHT: 118 LBS

## 2023-04-11 DIAGNOSIS — R53.1 GENERAL WEAKNESS: ICD-10-CM

## 2023-04-11 DIAGNOSIS — R41.82 ALTERED MENTAL STATUS, UNSPECIFIED ALTERED MENTAL STATUS TYPE: ICD-10-CM

## 2023-04-11 DIAGNOSIS — D50.8 OTHER IRON DEFICIENCY ANEMIA: ICD-10-CM

## 2023-04-11 DIAGNOSIS — R11.2 NAUSEA AND VOMITING IN ADULT: ICD-10-CM

## 2023-04-11 DIAGNOSIS — I48.0 PAF (PAROXYSMAL ATRIAL FIBRILLATION) (HCC): ICD-10-CM

## 2023-04-11 DIAGNOSIS — N30.00 ACUTE CYSTITIS WITHOUT HEMATURIA: Primary | ICD-10-CM

## 2023-04-11 DIAGNOSIS — E87.8 HYPOCHLOREMIA: ICD-10-CM

## 2023-04-11 DIAGNOSIS — E87.1 HYPONATREMIA: ICD-10-CM

## 2023-04-11 DIAGNOSIS — D69.6 THROMBOCYTOPENIA (HCC): ICD-10-CM

## 2023-04-11 DIAGNOSIS — E22.2 SIADH (SYNDROME OF INAPPROPRIATE ADH PRODUCTION) (HCC): ICD-10-CM

## 2023-04-11 DIAGNOSIS — M19.90 ARTHRITIS: ICD-10-CM

## 2023-04-11 PROCEDURE — 99315 NF DSCHRG MGMT 30 MIN/LESS: CPT | Performed by: FAMILY MEDICINE

## 2023-04-11 RX ORDER — CIPROFLOXACIN 250 MG/1
250 TABLET, FILM COATED ORAL 2 TIMES DAILY
COMMUNITY
Start: 2023-04-11 | End: 2023-04-15

## 2023-04-13 ENCOUNTER — SNF DISCHARGE (OUTPATIENT)
Dept: INTERNAL MEDICINE CLINIC | Age: 88
End: 2023-04-13

## 2023-04-13 VITALS
TEMPERATURE: 97 F | BODY MASS INDEX: 18 KG/M2 | SYSTOLIC BLOOD PRESSURE: 113 MMHG | RESPIRATION RATE: 18 BRPM | OXYGEN SATURATION: 98 % | HEART RATE: 69 BPM | DIASTOLIC BLOOD PRESSURE: 75 MMHG | WEIGHT: 118 LBS

## 2023-04-13 DIAGNOSIS — U07.1 COVID: Primary | ICD-10-CM

## 2023-04-13 DIAGNOSIS — I48.91 ATRIAL FIBRILLATION, UNSPECIFIED TYPE (HCC): ICD-10-CM

## 2023-04-13 DIAGNOSIS — E22.2 SIADH (SYNDROME OF INAPPROPRIATE ADH PRODUCTION) (HCC): ICD-10-CM

## 2023-04-13 DIAGNOSIS — R53.1 WEAKNESS: ICD-10-CM

## 2023-04-13 DIAGNOSIS — U07.1 PNEUMONIA DUE TO COVID-19 VIRUS: ICD-10-CM

## 2023-04-13 DIAGNOSIS — R53.1 GENERAL WEAKNESS: ICD-10-CM

## 2023-04-13 DIAGNOSIS — J12.82 PNEUMONIA DUE TO COVID-19 VIRUS: ICD-10-CM

## 2023-04-13 DIAGNOSIS — R41.0 CONFUSION: ICD-10-CM

## 2023-04-13 PROCEDURE — 1111F DSCHRG MED/CURRENT MED MERGE: CPT | Performed by: NURSE PRACTITIONER

## 2023-04-13 PROCEDURE — 99316 NF DSCHRG MGMT 30 MIN+: CPT | Performed by: NURSE PRACTITIONER

## 2023-04-29 PROBLEM — I21.4 NSTEMI (NON-ST ELEVATED MYOCARDIAL INFARCTION) (CMD): Status: ACTIVE | Noted: 2023-04-29

## 2023-09-03 ENCOUNTER — HOSPITAL ENCOUNTER (INPATIENT)
Facility: HOSPITAL | Age: 88
LOS: 5 days | Discharge: HOME HEALTH | End: 2023-09-10
Attending: EMERGENCY MEDICINE | Admitting: HOSPITALIST
Payer: MEDICARE

## 2023-09-03 ENCOUNTER — APPOINTMENT (OUTPATIENT)
Dept: CT IMAGING | Facility: HOSPITAL | Age: 88
End: 2023-09-03
Attending: EMERGENCY MEDICINE
Payer: MEDICARE

## 2023-09-03 ENCOUNTER — APPOINTMENT (OUTPATIENT)
Dept: GENERAL RADIOLOGY | Facility: HOSPITAL | Age: 88
End: 2023-09-03
Attending: EMERGENCY MEDICINE
Payer: MEDICARE

## 2023-09-03 DIAGNOSIS — R77.8 ELEVATED TROPONIN: ICD-10-CM

## 2023-09-03 DIAGNOSIS — J90 PLEURAL EFFUSION: ICD-10-CM

## 2023-09-03 DIAGNOSIS — R42 LIGHTHEADEDNESS: Primary | ICD-10-CM

## 2023-09-03 PROBLEM — R79.89 ELEVATED TROPONIN: Status: ACTIVE | Noted: 2023-09-03

## 2023-09-03 PROBLEM — R73.9 HYPERGLYCEMIA: Status: ACTIVE | Noted: 2023-09-03

## 2023-09-03 PROBLEM — R79.89 AZOTEMIA: Status: ACTIVE | Noted: 2023-09-03

## 2023-09-03 PROBLEM — E87.3 METABOLIC ALKALOSIS: Status: ACTIVE | Noted: 2023-09-03

## 2023-09-03 LAB
ALBUMIN SERPL-MCNC: 3.1 G/DL (ref 3.4–5)
ALBUMIN/GLOB SERPL: 0.7 {RATIO} (ref 1–2)
ALP LIVER SERPL-CCNC: 90 U/L
ALT SERPL-CCNC: 23 U/L
ANION GAP SERPL CALC-SCNC: 1 MMOL/L (ref 0–18)
AST SERPL-CCNC: 22 U/L (ref 15–37)
BASOPHILS # BLD AUTO: 0.01 X10(3) UL (ref 0–0.2)
BASOPHILS NFR BLD AUTO: 0.1 %
BILIRUB SERPL-MCNC: 1.4 MG/DL (ref 0.1–2)
BILIRUB UR QL STRIP.AUTO: NEGATIVE
BUN BLD-MCNC: 15 MG/DL (ref 7–18)
CALCIUM BLD-MCNC: 7.9 MG/DL (ref 8.5–10.1)
CHLORIDE SERPL-SCNC: 102 MMOL/L (ref 98–112)
CHOLEST SERPL-MCNC: 140 MG/DL (ref ?–200)
CLARITY UR REFRACT.AUTO: CLEAR
CO2 SERPL-SCNC: 33 MMOL/L (ref 21–32)
COLOR UR AUTO: COLORLESS
CREAT BLD-MCNC: 0.6 MG/DL
D DIMER PPP FEU-MCNC: 8.39 UG/ML FEU (ref ?–0.91)
DIGOXIN SERPL-MCNC: 1.33 NG/ML (ref 0.8–2)
EGFRCR SERPLBLD CKD-EPI 2021: 85 ML/MIN/1.73M2 (ref 60–?)
EOSINOPHIL # BLD AUTO: 0 X10(3) UL (ref 0–0.7)
EOSINOPHIL NFR BLD AUTO: 0 %
ERYTHROCYTE [DISTWIDTH] IN BLOOD BY AUTOMATED COUNT: 17.5 %
GLOBULIN PLAS-MCNC: 4.2 G/DL (ref 2.8–4.4)
GLUCOSE BLD-MCNC: 116 MG/DL (ref 70–99)
GLUCOSE UR STRIP.AUTO-MCNC: NORMAL MG/DL
HCT VFR BLD AUTO: 35.6 %
HDLC SERPL-MCNC: 73 MG/DL (ref 40–59)
HGB BLD-MCNC: 10.8 G/DL
IMM GRANULOCYTES # BLD AUTO: 0.03 X10(3) UL (ref 0–1)
IMM GRANULOCYTES NFR BLD: 0.4 %
KETONES UR STRIP.AUTO-MCNC: NEGATIVE MG/DL
LDLC SERPL CALC-MCNC: 50 MG/DL (ref ?–100)
LEUKOCYTE ESTERASE UR QL STRIP.AUTO: 500
LYMPHOCYTES # BLD AUTO: 0.61 X10(3) UL (ref 1–4)
LYMPHOCYTES NFR BLD AUTO: 9 %
MCH RBC QN AUTO: 27.6 PG (ref 26–34)
MCHC RBC AUTO-ENTMCNC: 30.3 G/DL (ref 31–37)
MCV RBC AUTO: 91 FL
MONOCYTES # BLD AUTO: 0.38 X10(3) UL (ref 0.1–1)
MONOCYTES NFR BLD AUTO: 5.6 %
NEUTROPHILS # BLD AUTO: 5.72 X10 (3) UL (ref 1.5–7.7)
NEUTROPHILS # BLD AUTO: 5.72 X10(3) UL (ref 1.5–7.7)
NEUTROPHILS NFR BLD AUTO: 84.9 %
NITRITE UR QL STRIP.AUTO: NEGATIVE
NONHDLC SERPL-MCNC: 67 MG/DL (ref ?–130)
NT-PROBNP SERPL-MCNC: 4314 PG/ML (ref ?–450)
OSMOLALITY SERPL CALC.SUM OF ELEC: 284 MOSM/KG (ref 275–295)
PH UR STRIP.AUTO: 7 [PH] (ref 5–8)
PLATELET # BLD AUTO: 245 10(3)UL (ref 150–450)
POTASSIUM SERPL-SCNC: 4.3 MMOL/L (ref 3.5–5.1)
PROT SERPL-MCNC: 7.3 G/DL (ref 6.4–8.2)
Q-T INTERVAL: 346 MS
QRS DURATION: 64 MS
QTC CALCULATION (BEZET): 365 MS
R AXIS: 79 DEGREES
RBC # BLD AUTO: 3.91 X10(6)UL
SARS-COV-2 RNA RESP QL NAA+PROBE: NOT DETECTED
SODIUM SERPL-SCNC: 136 MMOL/L (ref 136–145)
SP GR UR STRIP.AUTO: 1.02 (ref 1–1.03)
T AXIS: -20 DEGREES
TRIGL SERPL-MCNC: 91 MG/DL (ref 30–149)
TROPONIN I HIGH SENSITIVITY: 46 NG/L
TROPONIN I HIGH SENSITIVITY: 61 NG/L
UROBILINOGEN UR STRIP.AUTO-MCNC: NORMAL MG/DL
VENTRICULAR RATE: 67 BPM
VLDLC SERPL CALC-MCNC: 13 MG/DL (ref 0–30)
WBC # BLD AUTO: 6.8 X10(3) UL (ref 4–11)

## 2023-09-03 PROCEDURE — 99223 1ST HOSP IP/OBS HIGH 75: CPT | Performed by: STUDENT IN AN ORGANIZED HEALTH CARE EDUCATION/TRAINING PROGRAM

## 2023-09-03 PROCEDURE — 71275 CT ANGIOGRAPHY CHEST: CPT | Performed by: EMERGENCY MEDICINE

## 2023-09-03 PROCEDURE — 71045 X-RAY EXAM CHEST 1 VIEW: CPT | Performed by: EMERGENCY MEDICINE

## 2023-09-03 RX ORDER — METOCLOPRAMIDE HYDROCHLORIDE 5 MG/ML
10 INJECTION INTRAMUSCULAR; INTRAVENOUS EVERY 8 HOURS PRN
Status: DISCONTINUED | OUTPATIENT
Start: 2023-09-03 | End: 2023-09-10

## 2023-09-03 RX ORDER — ACETAMINOPHEN 500 MG
500 TABLET ORAL EVERY 4 HOURS PRN
Status: DISCONTINUED | OUTPATIENT
Start: 2023-09-03 | End: 2023-09-10

## 2023-09-03 RX ORDER — FUROSEMIDE 10 MG/ML
20 INJECTION INTRAMUSCULAR; INTRAVENOUS ONCE
Status: COMPLETED | OUTPATIENT
Start: 2023-09-04 | End: 2023-09-04

## 2023-09-03 RX ORDER — POLYETHYLENE GLYCOL 3350 17 G/17G
17 POWDER, FOR SOLUTION ORAL DAILY PRN
Status: DISCONTINUED | OUTPATIENT
Start: 2023-09-03 | End: 2023-09-10

## 2023-09-03 RX ORDER — ASPIRIN 81 MG/1
324 TABLET, CHEWABLE ORAL ONCE
Status: COMPLETED | OUTPATIENT
Start: 2023-09-03 | End: 2023-09-03

## 2023-09-03 RX ORDER — ENOXAPARIN SODIUM 100 MG/ML
40 INJECTION SUBCUTANEOUS DAILY
Status: DISCONTINUED | OUTPATIENT
Start: 2023-09-04 | End: 2023-09-10

## 2023-09-03 RX ORDER — ONDANSETRON 2 MG/ML
4 INJECTION INTRAMUSCULAR; INTRAVENOUS EVERY 6 HOURS PRN
Status: DISCONTINUED | OUTPATIENT
Start: 2023-09-03 | End: 2023-09-10

## 2023-09-03 RX ORDER — SENNOSIDES 8.6 MG
17.2 TABLET ORAL NIGHTLY PRN
Status: DISCONTINUED | OUTPATIENT
Start: 2023-09-03 | End: 2023-09-10

## 2023-09-03 RX ORDER — BISACODYL 10 MG
10 SUPPOSITORY, RECTAL RECTAL
Status: DISCONTINUED | OUTPATIENT
Start: 2023-09-03 | End: 2023-09-10

## 2023-09-03 RX ORDER — FUROSEMIDE 10 MG/ML
40 INJECTION INTRAMUSCULAR; INTRAVENOUS ONCE
Status: COMPLETED | OUTPATIENT
Start: 2023-09-03 | End: 2023-09-03

## 2023-09-03 RX ORDER — ECHINACEA PURPUREA EXTRACT 125 MG
1 TABLET ORAL
Status: DISCONTINUED | OUTPATIENT
Start: 2023-09-03 | End: 2023-09-10

## 2023-09-03 RX ORDER — HYDRALAZINE HYDROCHLORIDE 20 MG/ML
5 INJECTION INTRAMUSCULAR; INTRAVENOUS EVERY 6 HOURS PRN
Status: DISCONTINUED | OUTPATIENT
Start: 2023-09-03 | End: 2023-09-10

## 2023-09-03 RX ORDER — ENEMA 19; 7 G/133ML; G/133ML
1 ENEMA RECTAL ONCE AS NEEDED
Status: DISCONTINUED | OUTPATIENT
Start: 2023-09-03 | End: 2023-09-10

## 2023-09-03 NOTE — ED QUICK NOTES
Orders for admission, patient is aware of plan and ready to go upstairs. Any questions, please call ED RN Lamar Neal at extension [phone].      Patient Covid vaccination status: Unvaccinated     COVID Test Ordered in ED: Rapid SARS-CoV-2 by PCR    COVID Suspicion at Admission: N/A    Running Infusions:  None    Mental Status/LOC at time of transport: AO x 2    Other pertinent information: Oxygen 2L NC  CIWA score: N/A   NIH score:  N/A

## 2023-09-03 NOTE — ED INITIAL ASSESSMENT (HPI)
Pt to ED via EMS from home for HTN. Per EMS pts family was concerned that pts BP was high, states the systolic was in the 521E. EMS BP was 130/70. Pt is awake and alert. EMS has pt on 4L NC. States the pt was at 87% on RA. Pt states she has oxygen at home that she uses as need. She is 86% on RA, placed on 3L NC, SPO2 improved to 97%.

## 2023-09-03 NOTE — ED QUICK NOTES
Pt's daughter states Liam Gordillo mom is continent and knows when she needs to use the bathroom. \"  Refuses a Pure Wick at this time. Will call for assistance with Bedpan when pt needs to void.

## 2023-09-03 NOTE — ED QUICK NOTES
Phone call received from [facility], pt's daughter refused CT for her mom, requesting to speak to MD.    ER MD notified.

## 2023-09-04 ENCOUNTER — APPOINTMENT (OUTPATIENT)
Dept: GENERAL RADIOLOGY | Facility: HOSPITAL | Age: 88
End: 2023-09-04
Attending: INTERNAL MEDICINE
Payer: MEDICARE

## 2023-09-04 ENCOUNTER — APPOINTMENT (OUTPATIENT)
Dept: ULTRASOUND IMAGING | Facility: HOSPITAL | Age: 88
End: 2023-09-04
Attending: INTERNAL MEDICINE
Payer: MEDICARE

## 2023-09-04 LAB
ANION GAP SERPL CALC-SCNC: 5 MMOL/L (ref 0–18)
BASOPHILS # BLD AUTO: 0.02 X10(3) UL (ref 0–0.2)
BASOPHILS NFR BLD AUTO: 0.1 %
BASOPHILS NFR PLR: 0 %
BUN BLD-MCNC: 12 MG/DL (ref 7–18)
CALCIUM BLD-MCNC: 7.9 MG/DL (ref 8.5–10.1)
CHLORIDE SERPL-SCNC: 98 MMOL/L (ref 98–112)
CHOLEST PLR-MCNC: <50 MG/DL
CO2 SERPL-SCNC: 34 MMOL/L (ref 21–32)
COLOR FLD: YELLOW
CREAT BLD-MCNC: 0.63 MG/DL
DIGOXIN SERPL-MCNC: 1.34 NG/ML (ref 0.8–2)
EGFRCR SERPLBLD CKD-EPI 2021: 84 ML/MIN/1.73M2 (ref 60–?)
EOSINOPHIL # BLD AUTO: 0.01 X10(3) UL (ref 0–0.7)
EOSINOPHIL NFR BLD AUTO: 0.1 %
EOSINOPHIL NFR PLR: 0 %
ERYTHROCYTE [DISTWIDTH] IN BLOOD BY AUTOMATED COUNT: 17.4 %
GLUCOSE BLD-MCNC: 99 MG/DL (ref 70–99)
GLUCOSE PLR-MCNC: 116 MG/DL
HCT VFR BLD AUTO: 35.4 %
HGB BLD-MCNC: 11.1 G/DL
IMM GRANULOCYTES # BLD AUTO: 0.09 X10(3) UL (ref 0–1)
IMM GRANULOCYTES NFR BLD: 0.6 %
INR BLD: 1.26 (ref 0.85–1.16)
LDH FLD L TO P-CCNC: 88 U/L
LYMPHOCYTES # BLD AUTO: 0.14 X10(3) UL (ref 1–4)
LYMPHOCYTES NFR BLD AUTO: 0.9 %
LYMPHOCYTES NFR PLR: 5 %
MAGNESIUM SERPL-MCNC: 1.6 MG/DL (ref 1.6–2.6)
MCH RBC QN AUTO: 27.5 PG (ref 26–34)
MCHC RBC AUTO-ENTMCNC: 31.4 G/DL (ref 31–37)
MCV RBC AUTO: 87.8 FL
MONOCYTES # BLD AUTO: 0.6 X10(3) UL (ref 0.1–1)
MONOCYTES NFR BLD AUTO: 4 %
MONOS+MACROS NFR PLR: 22 %
NEUTROPHILS # BLD AUTO: 14.02 X10 (3) UL (ref 1.5–7.7)
NEUTROPHILS # BLD AUTO: 14.02 X10(3) UL (ref 1.5–7.7)
NEUTROPHILS NFR BLD AUTO: 94.3 %
NEUTROPHILS NFR PLR: 73 %
OSMOLALITY SERPL CALC.SUM OF ELEC: 284 MOSM/KG (ref 275–295)
PH PLR: 7.53 [PH] (ref 7.2–7.5)
PLATELET # BLD AUTO: 223 10(3)UL (ref 150–450)
POTASSIUM SERPL-SCNC: 3.7 MMOL/L (ref 3.5–5.1)
PROT PLR-MCNC: 3.1 G/DL
PROTHROMBIN TIME: 15.8 SECONDS (ref 11.6–14.8)
RBC # BLD AUTO: 4.03 X10(6)UL
RBC PLEURAL FLUID: <3000 /MM3
SODIUM SERPL-SCNC: 137 MMOL/L (ref 136–145)
TOTAL CELLS COUNTED FLD: 100
TURBIDITY CSF QL: CLEAR
WBC # BLD AUTO: 14.9 X10(3) UL (ref 4–11)
WBC # PLR: 376 /MM3

## 2023-09-04 PROCEDURE — 0W993ZZ DRAINAGE OF RIGHT PLEURAL CAVITY, PERCUTANEOUS APPROACH: ICD-10-PCS | Performed by: INTERNAL MEDICINE

## 2023-09-04 PROCEDURE — 32555 ASPIRATE PLEURA W/ IMAGING: CPT | Performed by: INTERNAL MEDICINE

## 2023-09-04 PROCEDURE — 99233 SBSQ HOSP IP/OBS HIGH 50: CPT | Performed by: STUDENT IN AN ORGANIZED HEALTH CARE EDUCATION/TRAINING PROGRAM

## 2023-09-04 RX ORDER — DIGOXIN 125 MCG
125 TABLET ORAL DAILY
Status: DISCONTINUED | OUTPATIENT
Start: 2023-09-04 | End: 2023-09-06

## 2023-09-04 RX ORDER — MAGNESIUM OXIDE 400 MG/1
400 TABLET ORAL ONCE
Status: COMPLETED | OUTPATIENT
Start: 2023-09-04 | End: 2023-09-04

## 2023-09-04 NOTE — PROGRESS NOTES
There is a small R apical PTX with poss trace basilar PTX as well, which could be consistent with a trapped lung.  Will continue with supp O2, [lace pm tele and repeat CXR in the AM.

## 2023-09-04 NOTE — BRIEF PROCEDURE NOTE
Bartolo Vinson 7287 Patient Status:  Observation    1931 MRN [id number]   [facility] 3NE-A Attending Alex Acosta MD   Hosp Day # 0 PCP Tammi Riley MD     Thoracentesis Procedure Note    Consent: Informed consent was obtained. Risks of the procedure were discussed including: infection, bleeding, pain, pneumothorax. Under sterile conditions the patient was positioned. chlorhexidine solution and sterile drapes were utilized. 1% buffered lidocaine was used to anesthetize the rib space marked by US. Fluid was obtained without any difficulties and minimal blood loss. A dressing was applied to the wound and wound care instructions were provided. Findings  1200 ml of clear yellow pleural fluid was obtained. A sample was sent to Pathology for cytogenetics, flow, and cell counts, as well as for infection analysis. Complications:  None; patient tolerated the procedure well. There was a slight amount of air at the end of the thora that can be seen with a trapped lung. CXR ordered. Condition: stable    A follow up chest x-ray was ordered.

## 2023-09-04 NOTE — PHYSICAL THERAPY NOTE
Order received, chart reviewed. Communicated with MD Tinsley, neurosurgery to be consulted for compression fractures, pt to be bedrest until seen, informed RN/PCT. Will hold and follow as appropriate.      Roxana Martin, PT  09/04/23

## 2023-09-05 ENCOUNTER — APPOINTMENT (OUTPATIENT)
Dept: MRI IMAGING | Facility: HOSPITAL | Age: 88
End: 2023-09-05
Attending: STUDENT IN AN ORGANIZED HEALTH CARE EDUCATION/TRAINING PROGRAM
Payer: MEDICARE

## 2023-09-05 ENCOUNTER — APPOINTMENT (OUTPATIENT)
Dept: GENERAL RADIOLOGY | Facility: HOSPITAL | Age: 88
End: 2023-09-05
Attending: INTERNAL MEDICINE
Payer: MEDICARE

## 2023-09-05 ENCOUNTER — APPOINTMENT (OUTPATIENT)
Dept: CV DIAGNOSTICS | Facility: HOSPITAL | Age: 88
End: 2023-09-05
Attending: STUDENT IN AN ORGANIZED HEALTH CARE EDUCATION/TRAINING PROGRAM
Payer: MEDICARE

## 2023-09-05 LAB
ANION GAP SERPL CALC-SCNC: 3 MMOL/L (ref 0–18)
BUN BLD-MCNC: 16 MG/DL (ref 7–18)
CALCIUM BLD-MCNC: 7.6 MG/DL (ref 8.5–10.1)
CHLORIDE SERPL-SCNC: 98 MMOL/L (ref 98–112)
CO2 SERPL-SCNC: 33 MMOL/L (ref 21–32)
CREAT BLD-MCNC: 0.62 MG/DL
EGFRCR SERPLBLD CKD-EPI 2021: 84 ML/MIN/1.73M2 (ref 60–?)
ERYTHROCYTE [DISTWIDTH] IN BLOOD BY AUTOMATED COUNT: 17.3 %
GLUCOSE BLD-MCNC: 110 MG/DL (ref 70–99)
HCT VFR BLD AUTO: 31.6 %
HGB BLD-MCNC: 9.8 G/DL
MAGNESIUM SERPL-MCNC: 1.7 MG/DL (ref 1.6–2.6)
MCH RBC QN AUTO: 27.6 PG (ref 26–34)
MCHC RBC AUTO-ENTMCNC: 31 G/DL (ref 31–37)
MCV RBC AUTO: 89 FL
OSMOLALITY SERPL CALC.SUM OF ELEC: 280 MOSM/KG (ref 275–295)
PLATELET # BLD AUTO: 201 10(3)UL (ref 150–450)
POTASSIUM SERPL-SCNC: 3.6 MMOL/L (ref 3.5–5.1)
RBC # BLD AUTO: 3.55 X10(6)UL
SODIUM SERPL-SCNC: 134 MMOL/L (ref 136–145)
WBC # BLD AUTO: 7.4 X10(3) UL (ref 4–11)

## 2023-09-05 PROCEDURE — 99221 1ST HOSP IP/OBS SF/LOW 40: CPT | Performed by: NEUROLOGICAL SURGERY

## 2023-09-05 PROCEDURE — 93306 TTE W/DOPPLER COMPLETE: CPT | Performed by: STUDENT IN AN ORGANIZED HEALTH CARE EDUCATION/TRAINING PROGRAM

## 2023-09-05 PROCEDURE — 72146 MRI CHEST SPINE W/O DYE: CPT | Performed by: STUDENT IN AN ORGANIZED HEALTH CARE EDUCATION/TRAINING PROGRAM

## 2023-09-05 PROCEDURE — 99233 SBSQ HOSP IP/OBS HIGH 50: CPT | Performed by: STUDENT IN AN ORGANIZED HEALTH CARE EDUCATION/TRAINING PROGRAM

## 2023-09-05 PROCEDURE — 71045 X-RAY EXAM CHEST 1 VIEW: CPT | Performed by: INTERNAL MEDICINE

## 2023-09-05 RX ORDER — SODIUM CHLORIDE 9 MG/ML
INJECTION, SOLUTION INTRAVENOUS ONCE
Status: COMPLETED | OUTPATIENT
Start: 2023-09-05 | End: 2023-09-05

## 2023-09-05 RX ORDER — MAGNESIUM OXIDE 400 MG/1
400 TABLET ORAL ONCE
Status: DISCONTINUED | OUTPATIENT
Start: 2023-09-05 | End: 2023-09-10

## 2023-09-05 RX ORDER — MIDODRINE HYDROCHLORIDE 2.5 MG/1
2.5 TABLET ORAL
Status: DISCONTINUED | OUTPATIENT
Start: 2023-09-05 | End: 2023-09-05

## 2023-09-05 NOTE — PLAN OF CARE
Updated Dr. Carolann Feng with vital signs after bolus. No new orde  Bilateral Bunny Boots placed on pt's heels. no complications

## 2023-09-05 NOTE — PHYSICAL THERAPY NOTE
Order received for PT eval and chart reviewed. Pt with possible new T12 fracture per neurosurgery. MRI pending to r/o pathological fracture. PT will continue to follow.

## 2023-09-05 NOTE — PLAN OF CARE
Assumed pt care this moring at 0730. Pt is A&Ox2, confused. Wears glasses. 2 L NC, BP runs on lower end. Metoprolol and digoxin held. Tele: Afib   Denies having pain. Pt had a R thoracentesis at bedside today. See pulm notes. L wrist saline lock. Pt has bilateral heels scabs. R heel scab is black, nonblanchable, pillow to elevate heels. Consult placed to neurosurgeon, neurosurgeon will call hospitalist for further instructions. Bedrest until seen by neurosurgeon. Bed alarm on, bed in lowest position, call light within reach. Daughter consented for pt to have thoracentesis. Night nurse endorsed MRI screening.      Problem: RESPIRATORY - ADULT  Goal: Achieves optimal ventilation and oxygenation  Description: INTERVENTIONS:  - Assess for changes in respiratory status  - Assess for changes in mentation and behavior  - Position to facilitate oxygenation and minimize respiratory effort  - Oxygen supplementation based on oxygen saturation or ABGs  - Provide Smoking Cessation handout, if applicable  - Encourage broncho-pulmonary hygiene including cough, deep breathe, Incentive Spirometry  - Assess the need for suctioning and perform as needed  - Assess and instruct to report SOB or any respiratory difficulty  - Respiratory Therapy support as indicated  - Manage/alleviate anxiety  - Monitor for signs/symptoms of CO2 retention  Outcome: Progressing

## 2023-09-05 NOTE — PLAN OF CARE
A&Ox1. Oriented to self. NSR on tele. Daily weights. 2D echo performed, results pending. MRI incomplete d/t pt being uncomfortable. Mg 1.7, Mg tab given per protocol.      Problem: RESPIRATORY - ADULT  Goal: Achieves optimal ventilation and oxygenation  Description: INTERVENTIONS:  - Assess for changes in respiratory status  - Assess for changes in mentation and behavior  - Position to facilitate oxygenation and minimize respiratory effort  - Oxygen supplementation based on oxygen saturation or ABGs  - Provide Smoking Cessation handout, if applicable  - Encourage broncho-pulmonary hygiene including cough, deep breathe, Incentive Spirometry  - Assess the need for suctioning and perform as needed  - Assess and instruct to report SOB or any respiratory difficulty  - Respiratory Therapy support as indicated  - Manage/alleviate anxiety  - Monitor for signs/symptoms of CO2 retention  Outcome: Progressing

## 2023-09-05 NOTE — PLAN OF CARE
Received pt. In bed on 2L NC. Lungs diminshed. .  Right thorecentesis band aid dry and intact. Pt. Denies any pain. MRI screening completed with help from daughter. A fib with controlled rate. SBP in the 80-90's. Pt.  Asymptomatic. Dr. Ruth Lee is aware. Will continue to monitor. Daughter at bedside and that made pt. Very happy. Her daughter is her main care giver. IV site saline locked. They were instructed that she could not get out of bed until neuro surgeon cleared her this am.  They insisted and were made aware of possible complications. She did get up to [facility] and voided. She tolerated well. Mepilex applied to entire spine due to reddness. Mg was replaced. Repeat labs in am.  Safety measures in place.

## 2023-09-06 ENCOUNTER — NURSE ONLY (OUTPATIENT)
Dept: ELECTROPHYSIOLOGY | Facility: HOSPITAL | Age: 88
End: 2023-09-06
Attending: STUDENT IN AN ORGANIZED HEALTH CARE EDUCATION/TRAINING PROGRAM
Payer: MEDICARE

## 2023-09-06 PROBLEM — I48.20 CHRONIC ATRIAL FIBRILLATION (HCC): Status: ACTIVE | Noted: 2023-09-06

## 2023-09-06 LAB
MAGNESIUM SERPL-MCNC: 1.8 MG/DL (ref 1.6–2.6)
NON GYNE INTERPRETATION: NEGATIVE

## 2023-09-06 PROCEDURE — 99232 SBSQ HOSP IP/OBS MODERATE 35: CPT | Performed by: HOSPITALIST

## 2023-09-06 PROCEDURE — 95816 EEG AWAKE AND DROWSY: CPT | Performed by: OTHER

## 2023-09-06 RX ORDER — DIGOXIN 125 MCG
125 TABLET ORAL
Status: DISCONTINUED | OUTPATIENT
Start: 2023-09-08 | End: 2023-09-10

## 2023-09-06 RX ORDER — FUROSEMIDE 20 MG/1
20 TABLET ORAL DAILY
Status: DISCONTINUED | OUTPATIENT
Start: 2023-09-06 | End: 2023-09-07

## 2023-09-06 RX ORDER — FUROSEMIDE 20 MG/1
20 TABLET ORAL
Status: DISCONTINUED | OUTPATIENT
Start: 2023-09-06 | End: 2023-09-06

## 2023-09-06 NOTE — PROGRESS NOTES
Assumed patient care at [facility] 23 on tele controlled   On O22lc prn   Safety precautions in place   Dtr staying at bedside   Updated on poc   Will continue to monitor pt

## 2023-09-06 NOTE — PROCEDURES
.  EEG REPORT;    Reason for Examination: Altered mental status    Technical Summary:   18 Channels of EEG and 1 Channel of EKG was performed utilizing internation 10/20 method. Background Activity: The background activity consisted of 8 hz waveforms, reactive to eye opening/ external stimulation. Abnormality:  Throughout the recording, mild to moderate, polymorphic, 3 to 6 Hz slow activity was noted diffusely. Activation:    Hyperventilation:   Not performed. Photic Stimulation:  No driving response seen. Sleep:  Stage I sleep seen. Impression:  Abnormal EEG. Moderate diffuse slowing into delta and theta range was noted. This constellation of findings can be seen in encephalopathy due to metabolic/toxic etiology, medication effects or diffuse cerebral injury. No focal, lateralized or generalized epileptiform activity seen. Clinical correlation is recommended. Elise Gregory MD  [facility].

## 2023-09-06 NOTE — CDS QUERY
Uncertain Diagnosis  Suzanne Minor  Dear Dr. Amor Neri ,  Clinical information (provided below) indicates a documented diagnosis urinary tract infection (UTI) by the ED physician . PLEASE clarify the diagnosis of UTI     [  x ] UTI ruled out   [   ] UTI ruled in    [   ] Other (please specify):    Documentation from the Medical Record:   Risk; adv age , frail. Clinical indicators; Lightheadedness. Afebrile. No abdominal pain . UA from ED- colorless, 1+ blood, trace protein. 500 LE, WBC 11-20   MDM ED MD; he was given Rocephin for urinary tract infection. UCX . 9/5 06:54- 10-50,000cfu/ml Multiple species present-probable contamination. Treatment; Rocephin given in ED. For questions regarding this query, please contact Clinical :   Chidi Encinas RN, BSN, [place] [facility] Syd. [email or web address]                                                             THIS FORM IS A PERMANENT PART OF THE MEDICAL RECORD

## 2023-09-06 NOTE — CM/SW NOTE
CM visited patient for needs assessment, patient sleeping. CM/SW to follow up tomorrow for Neurosurgery recommendation regarding activity orders for pending therapy evaluation.       Yuko Thibodeaux RN Case Manager [id number]

## 2023-09-06 NOTE — CDS QUERY
NUTRITIONAL ASSESSMENT  CLINICAL DOCUMENTATION CLARIFICATION FORM    Dear Elvie Morales ,  Clinical information (provided below) includes documentation of malnutrition by the Clinical Dietician. PLEASE INDICATE IF YOU ARE IN AGREEMENT WITH THE FOLLOWING   ASSESSMENT BY THE CLINICAL DIETICIAN:  Pt meets severe  malnutrition criteria. [ x ] Yes, I agree with this assessment      [  ] No, I do not agree with this assessment             [   ] Other              [  ] Unable to be determined             If not in agreement with this assessment, please provide your independent assessment of the patient's nutritional status:  ____________________________        Documentation from the Medical Record:   Risk; adv age , chronic illness . BMI 16. Clinical indicators; Cachectic, frail per cardiology. Dietician consult; Pt meets severe malnutrition criteria. CRITERIA FOR MALNUTRITION DIAGNOSIS:  Criteria for severe malnutrition diagnosis: chronic illness related to wt loss greater than 10% in 6 months, energy intake less than 75% for greater than 1 month, body fat severe depletion, and muscle mass severe depletion        NUTRITION INTERVENTION:     RD nutrition Care Plan- See RD nutrition assessment for additional recommendations  Meal and Snacks - monitor patient po intake. Encourage adequate po of appropriate diet. Medical Food Supplements - Magic Cup Daily and Spray instant breakfast Daily. Rationale/use for oral supplements discussed. Vitamin and Mineral Supplements - adding Multivitamin with minerals          For questions regarding this query, please contact Clinical Documentation : Misty Plaza RN, BSN, [address] [place] [address] ext [phone]. Sirisha Barber. [email or web address]                                                                           THIS FORM IS A PERMANENT PART OF THE MEDICAL RECORD

## 2023-09-06 NOTE — PHYSICAL THERAPY NOTE
Physical Therapy  Order for PT eval received. Have been holding pt from PT due to concern about new compression deformities at T11 and L4 noted in limited MRI of lumbar spine on 9/5. Awaiting formal activity orders and plan for spine from neurosurgery team before initiating mobility.

## 2023-09-07 PROCEDURE — 99232 SBSQ HOSP IP/OBS MODERATE 35: CPT | Performed by: HOSPITALIST

## 2023-09-07 RX ORDER — DIGOXIN 125 MCG
125 TABLET ORAL
Refills: 0 | Status: SHIPPED | COMMUNITY
Start: 2023-09-08

## 2023-09-07 RX ORDER — FUROSEMIDE 20 MG/1
20 TABLET ORAL
Refills: 0 | Status: SHIPPED | COMMUNITY
Start: 2023-09-08

## 2023-09-07 RX ORDER — FUROSEMIDE 20 MG/1
20 TABLET ORAL
Status: DISCONTINUED | OUTPATIENT
Start: 2023-09-08 | End: 2023-09-10

## 2023-09-07 RX ORDER — MIDODRINE HYDROCHLORIDE 2.5 MG/1
2.5 TABLET ORAL 2 TIMES DAILY
Status: DISCONTINUED | OUTPATIENT
Start: 2023-09-07 | End: 2023-09-07

## 2023-09-07 NOTE — CDS QUERY
Clarification of significance of elevated troponin   Margarita Licea  Dear Dr. Fabrice Moreno,  Clinical information (provided below) includes documentation of elevated troponin with conflicting significance. PLEASE (X) THE DIAGNOSIS THAT APPLIES TO THE ELEVATED TROPONIN LEVEL    SELECTION BY PROVIDER ONLY  ( x )  Type 2 MI (due to demand ischemia)due to pleural effusion  (  ) Elevated troponin without clinical significance   (  )  Other (please specify):       Documentation form the medical record   Risk; Pleural effusion. Hypoxia, chronic afib    Clinical indicators; SpO2 85% RA. Lightheadedness , SOB. No chest pain. Bp 165/108 in ED. Diminished breath sounds , crackles L base on exam .    Troponin; 61, 46. EKG; Afib, T wave inversion in lead III      CTA with Large bilateral pleural effusions. H&P and hospitalist progress notes; elevated troponin,Suspect type 2 MI, demand ischemia due to pleural effusion    Cardiology consult ; Her first troponin was minimally elevated and her second troponin was normal.  This does not appear to be consistent with an acute coronary syndrome. 9/5 Cardiology progress note; Non-MI troponin elevation-61-->46-no ACS-clinically insignificant    ECHO; Left ventricle: The cavity size was normal. Wall thickness was normal.      Systolic function was normal. The estimated ejection fraction was 60-65%,      by visual assessment. No diagnostic evidence for regional wall motion      abnormalities. Unable to assess LV diastolic function due to heart      rhythm. Treatment; Cardiology consulted, ECHO.     If you have any questions, please contact Clinical Documentation Julio Cesar Snow RN, BSN    at #[place] [address]

## 2023-09-07 NOTE — PHYSICAL THERAPY NOTE
PHYSICAL THERAPY EVALUATION - INPATIENT     Room Number: 1105/4855-O  Evaluation Date: 9/7/2023  Type of Evaluation: Initial  Physician Order: PT Eval and Treat    Presenting Problem: hypertension, light-headedness, acute hypoxic resp failure  Co-Morbidities : afib, chronic back pain  Reason for Therapy: Mobility Dysfunction and Discharge Planning    History related to current admission: Patient is a 80year old female admitted on 9/3/2023 from home for incr weakness, light-headedness, acute hypoxic respiratory failure. Pt diagnosed with hypertension, acute hypoxic resp failure. Abnormal EEG. Moderate diffuse slowing into delta and theta range was noted. This constellation of findings can be seen in encephalopathy due to metabolic/toxic etiology, medication effects or diffuse cerebral injury. No focal, lateralized or generalized epileptiform activity seen. Clinical correlation is recommended. XR CHEST:  There are moderate to large bilateral pleural effusions and there is dependent atelectasis in the lower lobes. CT ANGIOGRAPHY:  1. Large bilateral pleural effusions are noted with associated dependent atelectasis in lungs. 2. There is no pulmonary embolism. 3. There are new compression deformities at L1, T12, T11 and T9. These are of uncertain acuity. There is no significant retropulsion. 4. There is global cardiomegaly and coronary artery atherosclerosis. XR CHEST:  1. Interval right thoracentesis with 1 cm right apical pneumothorax and mild residual effusion in the right lung base. 2. Stable moderate left effusion. MRI SPINE THORACIC:  1. The exam is incomplete, with the patient aborting the exam after the performance of a preliminary  imaging. 2. There are acute appearing compression deformities involving T11 with 90% vertebral body height loss and L4 with 80% vertebral body height loss.    3. There are remote appearing compression deformities suggested involving T9, L1 and L3.   4. Moderate bilateral pleural effusions. THORACENTESIS COMPLETED 9/4/23    RECENT ADMISSOINS:  3/22-3/26/23: UTI associated with indwelling catheter, AMS -->BRADLY  ASSESSMENT   In this PT evaluation, the patient presents with the following impairments impaired posture, decr static and dynamic standing balance, decr activity tolerance/endurance, decr functional mobility . These impairments and comorbidities manifest themselves as functional limitations in independent bed mobility, transfers, and gait. The patient is below baseline and would benefit from skilled inpatient PT to address the above deficits to assist patient in returning to prior to level of function. Functional outcome measures completed include [facility]. The AM-PAC '6-Clicks' Inpatient Basic Mobility Short Form was completed and this patient is demonstrating a Approx Degree of Impairment: 41.77%  degree of impairment in mobility. Research supports that patients with this level of impairment may benefit from [address] Per pt's daughter, she will decline HH services. DISCHARGE RECOMMENDATIONS  PT Discharge Recommendations: Home with home health PT    PLAN  PT Treatment Plan: Body mechanics; Bed mobility; Energy conservation; Endurance; Patient education; Family education;Gait training;Neuromuscular re-educate;Range of motion;Strengthening;Stoop training;Stair training;Transfer training;Balance training  Rehab Potential : Good  Frequency (Obs): 3-5x/week  Number of Visits to Meet Established Goals: 4      CURRENT GOALS    Goal #1 Patient is able to demonstrate supine - sit EOB @ level: modified independent     Goal #2 Patient is able to demonstrate transfers Sit to/from Stand at assistance level: modified independent     Goal #3 Patient is able to ambulate 50 feet with assist device: walker - rolling at assistance level: supervision     Goal #4    Goal #5    Goal #6    Goal Comments: Goals established on 9/7/2023    HOME SITUATION  Type of Home: House   Home Layout: Two level  Stairs to Enter : 1     Stairs to Bedroom: 14  Railing: Yes    Lives With: Daughter (dtrs BF)  Drives: No  Patient Owned Equipment: Rolling walker (x2)  Patient Regularly Uses: Glasses    Prior Level of [place]: Per pt lives in two story home. Daughter and daughter's boyfriend live with patient. Pt does not need assistance from family for any mobility tasks. Ambulates with RW, owns 2 one for upstairs/one for downstairs. Has had 3 falls in last 4 weeks. Does not have any difficulty with stairs at home typically. PT reports bilateral heel pain due to sores. Reports history of 5 different nursing home stays at different places each time and she said she is never going back to a nursing home. SUBJECTIVE  \"IF you touch my feet I will kill you and I mean that literally. \"      OBJECTIVE  Precautions: Spine;Bed/chair alarm  Fall Risk: High fall risk    WEIGHT BEARING RESTRICTION  Weight Bearing Restriction: None                PAIN ASSESSMENT  Ratin  Location: denies       COGNITION  Overall Cognitive Status:  WFL - within functional limits    RANGE OF MOTION AND STRENGTH ASSESSMENT  Upper extremity ROM and strength are within functional limits   Lower extremity ROM is within functional limits  Lower extremity strength is within functional limits       BALANCE  Static Sitting: Good  Dynamic Sitting: Fair +  Static Standing: Fair  Dynamic Standing: Fair -    ADDITIONAL TESTS                                    ACTIVITY TOLERANCE                         O2 WALK  Oxygen Therapy  SPO2% on Oxygen at Rest: 94  SPO2% Ambulation on Oxygen: 97    NEUROLOGICAL FINDINGS                        AM-PAC '6-Clicks' INPATIENT SHORT FORM - BASIC MOBILITY  How much difficulty does the patient currently have. ..   Patient Difficulty: Turning over in bed (including adjusting bedclothes, sheets and blankets)?: None   Patient Difficulty: Sitting down on and standing up from a chair with arms (e.g., wheelchair, bedside commode, etc.): A Little   Patient Difficulty: Moving from lying on back to sitting on the side of the bed?: A Little   How much help from another person does the patient currently need. .. Help from Another: Moving to and from a bed to a chair (including a wheelchair)?: A Little   Help from Another: Need to walk in hospital room?: A Little   Help from Another: Climbing 3-5 steps with a railing?: A Little       AM-PAC Score:  Raw Score: 19   Approx Degree of Impairment: 41.77%   Standardized Score (AM-PAC Scale): 45.44   CMS Modifier (G-Code): CK    FUNCTIONAL ABILITY STATUS  Gait Assessment   Functional Mobility/Gait Assessment  Gait Assistance: Contact guard assist  Distance (ft): 5  Assistive Device: Rolling walker  Pattern:  (very kyphotic posture)    Skilled Therapy Provided     Bed Mobility:  Rolling: [place]  Supine to sit: [place]   Sit to supine: NT     Transfer Mobility:  Sit to stand: CGA to RW- verbal cues for hand placement    Stand to sit: CGA  Gait = CGA x 5 feet, very kyphotic posturing, cues for upright posture, decr fartun. Preferred to have RUE on the front of the walker and the LUE on handle of RW. Therapist's Comments: Patient presents sitting up in bed. Discussed role and goal of physical therapy. Pt reporting no back pain. Pt in agreement to session. With max encouragement and repeated explanation of role of therapist, pt agreed to therapy session. Bed mobility at [facility], incr time to scoot forward to EOB to reach foot flat position. Writer attempted to assist pt at [facility], but pt yelled out saying \"if you ever touch my feet again I will kill you and I mean that literally. \" Pt has bilateral heel sores, but doesn't wear any special shoes, etc. She wears socks or slide on shoes at home. Pt with intermittent retropulsion in sitting, requiring steadying CGA. Pt requesting to sit EOB, needing incr time before sit to stand.  She stated her chest felt weird due to the supplemental oxygen she had been receiving- and caused her voice to be very raspy. After about 8 minutes, sit to stand completed at [facility] to RW, verbal cues for hand placement. Pt requesting to use bathroom, ambulated at CGA x 5 feet, very kyphotic posturing, cues for upright posture, decr fartun. See OT note for specifics of toileting and pericare. Required steadying assist while completing pericare. Pt preferred to have RUE on front of walker and LUE on handle of RW, cued for safe hand placement. Once completed, pt ambulated to bedside chair at [facility] w/ RW. Throughout session, pt with no complaints of pain in back. Educated on importance of continued out of bed functional mobility. Encouraged pt to continue to ambulate with RN/PCT staff. Pt will continue to benefit from IP PT interventions. Upon discharge, recommendation to HHPT and intermittent supervision. Pt verbalizes understanding. RN and SW aware. Exercise/Education Provided:  Bed mobility  Body mechanics  Energy conservation  Functional activity tolerated  Gait training  Transfer training    Patient End of Session: Up in chair;Needs met;Call light within reach;RN aware of session/findings; All patient questions and concerns addressed; Alarm set; Discussed recommendations with /      Patient Evaluation Complexity Level:  History Moderate - 1 or 2 personal factors and/or co-morbidities   Examination of body systems Low - addressing 1-2 elements   Clinical Presentation Low - Stable   Clinical Decision Making Low - Stable       PT Session Time: 40 minutes  Gait Training: 10 minutes  Therapeutic Activity: 10 minutes  Neuromuscular Re-education:  minutes  Therapeutic Exercise:  minutes

## 2023-09-07 NOTE — PLAN OF CARE
Assumed care at 1930  On O2 1L. NC, SOB on exertion  Back, legs/feet pain when moved. Refused pain med  On Lasix PO  Repositioned  PT /OT to see. Problem: RESPIRATORY - ADULT  Goal: Achieves optimal ventilation and oxygenation  Description: INTERVENTIONS:  - Assess for changes in respiratory status  - Assess for changes in mentation and behavior  - Position to facilitate oxygenation and minimize respiratory effort  - Oxygen supplementation based on oxygen saturation or ABGs  - Provide Smoking Cessation handout, if applicable  - Encourage broncho-pulmonary hygiene including cough, deep breathe, Incentive Spirometry  - Assess the need for suctioning and perform as needed  - Assess and instruct to report SOB or any respiratory difficulty  - Respiratory Therapy support as indicated  - Manage/alleviate anxiety  - Monitor for signs/symptoms of CO2 retention  Outcome: Progressing     Problem: PAIN - ADULT  Goal: Verbalizes/displays adequate comfort level or patient's stated pain goal  Description: INTERVENTIONS:  - Encourage pt to monitor pain and request assistance  - Assess pain using appropriate pain scale  - Administer analgesics based on type and severity of pain and evaluate response  - Implement non-pharmacological measures as appropriate and evaluate response  - Consider cultural and social influences on pain and pain management  - Manage/alleviate anxiety  - Utilize distraction and/or relaxation techniques  - Monitor for opioid side effects  - Notify MD/LIP if interventions unsuccessful or patient reports new pain  - Anticipate increased pain with activity and pre-medicate as appropriate  Outcome: Progressing     Problem: SAFETY ADULT - FALL  Goal: Free from fall injury  Description: INTERVENTIONS:  - Assess pt frequently for physical needs  - Identify cognitive and physical deficits and behaviors that affect risk of falls.   - [place] fall precautions as indicated by assessment.  - Educate pt/family on patient safety including physical limitations  - Instruct pt to call for assistance with activity based on assessment  - Modify environment to reduce risk of injury  - Provide assistive devices as appropriate  - Consider OT/PT consult to assist with strengthening/mobility  - Encourage toileting schedule  Outcome: Progressing     Problem: DISCHARGE PLANNING  Goal: Discharge to home or other facility with appropriate resources  Description: INTERVENTIONS:  - Identify barriers to discharge w/pt and caregiver  - Include patient/family/discharge partner in discharge planning  - Arrange for needed discharge resources and transportation as appropriate  - Identify discharge learning needs (meds, wound care, etc)  - Arrange for interpreters to assist at discharge as needed  - Consider post-discharge preferences of patient/family/discharge partner  - Complete POLST form as appropriate  - Assess patient's ability to be responsible for managing their own health  - Refer to Case Management Department for coordinating discharge planning if the patient needs post-hospital services based on physician/LIP order or complex needs related to functional status, cognitive ability or social support system  Outcome: Progressing     Problem: Patient/Family Goals  Goal: Patient/Family Long Term Goal  Description: Patient's Long Term Goal:discharge home    Interventions:  - PT/OT  -meds    - See additional Care Plan goals for specific interventions  9/7/2023 0027 by Ann Allison, RN  Outcome: Progressing  Goal: Patient/Family Short Term Goal  Description: Patient's Short Term Goal: breath easy    Interventions:   - O2 as needed  -respositioned  -lasix  -labs  -monitor vital signs  -consults  - See additional Care Plan goals for specific interventions  9/7/2023 0027 by Ann Allison, RN  Outcome: Progressing

## 2023-09-07 NOTE — PLAN OF CARE
A&O x 3  1L nasal cannula  Afib on Lovenox  Bilateral ulcers on heel mepilex  Pt. Will work with pt.    Current plan of care continued       Problem: Patient/Family Goals  Goal: Patient/Family Long Term Goal  Description: Patient's Long Term Goal:discharge home    Interventions:  - PT/OT  -meds    - See additional Care Plan goals for specific interventions  Outcome: Progressing  Goal: Patient/Family Short Term Goal  Description: Patient's Short Term Goal: breath easy    Interventions:   - O2 as needed  -respositioned  -lasix  -labs  -monitor vital signs  -consults  - See additional Care Plan goals for specific interventions  Outcome: Progressing

## 2023-09-07 NOTE — CM/SW NOTE
Spoke to pt's dtr Abner Ast, about PT recommendations for Molly 78 at [place]. Pt's dtr declines HHC at [place]. She has a walker, cane, wedge pillow, a combination shower chair/commode, and home oxygen. Pt uses home oxygen mostly at night at 1.5-2L/NC. Pt's dtr is unsure of the company that supplies the oxygen. She has not run out of tanks, as they primarily use the concentrator in the home. The only need for home would be a commode for home. The current combination shower chair/commode pt has is very uncomfortable for pt. DME referral sent for commode for home use. CM/SW will remain available for DC planning and/or support.      ALBA Garcia, VIA [facility]    [id number]

## 2023-09-08 ENCOUNTER — APPOINTMENT (OUTPATIENT)
Dept: ULTRASOUND IMAGING | Facility: HOSPITAL | Age: 88
End: 2023-09-08
Attending: INTERNAL MEDICINE
Payer: MEDICARE

## 2023-09-08 ENCOUNTER — APPOINTMENT (OUTPATIENT)
Dept: GENERAL RADIOLOGY | Facility: HOSPITAL | Age: 88
End: 2023-09-08
Attending: HOSPITALIST
Payer: MEDICARE

## 2023-09-08 ENCOUNTER — APPOINTMENT (OUTPATIENT)
Dept: GENERAL RADIOLOGY | Facility: HOSPITAL | Age: 88
End: 2023-09-08
Attending: RADIOLOGY
Payer: MEDICARE

## 2023-09-08 LAB
ANION GAP SERPL CALC-SCNC: 3 MMOL/L (ref 0–18)
BASOPHILS NFR PLR: 0 %
BUN BLD-MCNC: 11 MG/DL (ref 7–18)
CALCIUM BLD-MCNC: 7.9 MG/DL (ref 8.5–10.1)
CHLORIDE SERPL-SCNC: 102 MMOL/L (ref 98–112)
CO2 SERPL-SCNC: 32 MMOL/L (ref 21–32)
COLOR FLD: YELLOW
CREAT BLD-MCNC: 0.46 MG/DL
EGFRCR SERPLBLD CKD-EPI 2021: 90 ML/MIN/1.73M2 (ref 60–?)
EOSINOPHIL NFR PLR: 0 %
GLUCOSE BLD-MCNC: 100 MG/DL (ref 70–99)
INR BLD: 1.2 (ref 0.85–1.16)
LYMPHOCYTES NFR PLR: 48 %
MONOS+MACROS NFR PLR: 16 %
NEUTROPHILS NFR PLR: 36 %
OSMOLALITY SERPL CALC.SUM OF ELEC: 283 MOSM/KG (ref 275–295)
POTASSIUM SERPL-SCNC: 4 MMOL/L (ref 3.5–5.1)
PROTHROMBIN TIME: 15.2 SECONDS (ref 11.6–14.8)
RBC PLEURAL FLUID: <3000 /MM3
SODIUM SERPL-SCNC: 137 MMOL/L (ref 136–145)
TOTAL CELLS COUNTED FLD: 100
WBC # PLR: 855 /MM3

## 2023-09-08 PROCEDURE — 99232 SBSQ HOSP IP/OBS MODERATE 35: CPT | Performed by: HOSPITALIST

## 2023-09-08 PROCEDURE — 32555 ASPIRATE PLEURA W/ IMAGING: CPT | Performed by: INTERNAL MEDICINE

## 2023-09-08 PROCEDURE — 0W993ZZ DRAINAGE OF RIGHT PLEURAL CAVITY, PERCUTANEOUS APPROACH: ICD-10-PCS | Performed by: RADIOLOGY

## 2023-09-08 PROCEDURE — 71045 X-RAY EXAM CHEST 1 VIEW: CPT | Performed by: HOSPITALIST

## 2023-09-08 NOTE — CM/SW NOTE
ALEXEI called daughter Theodore Rojo to follow up regarding discharge planning. Family not at bedside at this time. VM full, unable to leave message for daughter. CM wanted to clarify if they would like a standard commode for home, as per previous documentation, theirs is uncomfortable. Juan Mckay with [facility] stated they only provide standard bedside commodes. Order for standard commode entered. MD to cosign. MD to copy and paste into his note: The patient is physically incapable of utilizing regular toilet facilities because they are confined to a single room at times due to severe heel pain, making walking far distances difficult. Also patient has had frequent falls and a bedside commode would assist with safety for patient within the home. Message sent to MD to sign order/note. Per chart review, patient will go for a thoracentesis. No anticipated discharge today. Juan Mckay with [facility] notified of potential discharge for weekend. Per Enrique Ariza delivery of a commode would be Monday 9/11. Update 1600: CM met with daughter at bedside to follow up on discharge planning. She would like standard bedside commode at discharge. Daughter still declining HH. MD signed order, uploaded to referral. MD to still enter verbiage into his note. Patient down for thoracentesis.     *need signed note     Mariana Saravia, RN, BSN

## 2023-09-08 NOTE — IMAGING NOTE
Pt here for Thoracentesis. Informed consent obtained by Dr Ashli Reed. Presently denies pain. Tolerated procedure well. Vital signs stable on room air post procedure, see Emar. SBAR report given to Floresita Samayoa RN. Transported pt to  362, awaiting chest xray and transport, update given to Ultrasound.

## 2023-09-08 NOTE — PLAN OF CARE
A+Ox3-4  RA  Tele afib  Thoracentesis performed today. ... 550mL removed from right lung per report from procedure. Incontinent of b/b. Nancy care provided. No c/o pain or s/s of distress  Safety precautions in place. . Daughter at bedside. Will continue to monitor.                  Problem: Patient/Family Goals  Goal: Patient/Family Long Term Goal  Description: Patient's Long Term Goal:discharge home    Interventions:  - PT/OT  -meds    - See additional Care Plan goals for specific interventions  Outcome: Progressing  Goal: Patient/Family Short Term Goal  Description: Patient's Short Term Goal: breath easy    Interventions:   - O2 as needed  -respositioned  -lasix  -labs  -monitor vital signs  -consults  - See additional Care Plan goals for specific interventions  Outcome: Progressing

## 2023-09-08 NOTE — PROGRESS NOTES
A+Ox3-4  RA  Tele afib  Coughing episode x 1. O2 sats stable. Incontinent of b/b. Nancy care provided. No c/o pain or s/s of distress  Safety precautions in place  Will continue to monitor.

## 2023-09-08 NOTE — PHYSICAL THERAPY NOTE
PT attempted to see pt for follow up therapy session 2x today. First attempt this morning around 1000 and pt stating she was not feeling well and was in a lot of pain, requesting writer to return at 1430. Returned around 1430 and pt stating her daughter just arrived and is on her way up to the room. Encouraged pt to get up into the chair to enjoy her visit with dtr, but pt states she was too uncomfortable in chair yesterday. Pt requesting re-attempt tomorrow. Will follow up as schedule allows and as clinically appropriate.

## 2023-09-08 NOTE — PROCEDURES
[facility]  Procedure Note    Phi Hassan Patient Status:  Inpatient    1931 MRN [id number]   [facility] 3NE-A Attending Roby Doctor, [address] [facility] Day # 3 PCP Neri Escobedo MD     Procedure: US thoracentesis R    Pre-Procedure Diagnosis:  Effusion    Post-Procedure Diagnosis: Same    Anesthesia:  Local    Findings:  5f one step to R pleural space    Specimens: See report    Blood Loss:  Minimal    Tourniquet Time: None  Complications:  None  Drains:  None    Secondary Diagnosis:  None    Adin Yu MD  2023

## 2023-09-09 ENCOUNTER — APPOINTMENT (OUTPATIENT)
Dept: GENERAL RADIOLOGY | Facility: HOSPITAL | Age: 88
End: 2023-09-09
Attending: INTERNAL MEDICINE
Payer: MEDICARE

## 2023-09-09 PROBLEM — J96.01 ACUTE RESPIRATORY FAILURE WITH HYPOXIA (HCC): Status: ACTIVE | Noted: 2023-09-09

## 2023-09-09 LAB
GLUCOSE PLR-MCNC: 106 MG/DL
LDH FLD L TO P-CCNC: 166 U/L
PROT PLR-MCNC: 2.8 G/DL

## 2023-09-09 PROCEDURE — 71045 X-RAY EXAM CHEST 1 VIEW: CPT | Performed by: INTERNAL MEDICINE

## 2023-09-09 PROCEDURE — 99232 SBSQ HOSP IP/OBS MODERATE 35: CPT | Performed by: HOSPITALIST

## 2023-09-09 NOTE — PLAN OF CARE
Assumed care at 0730  A&o x 4  Rm air  Stand-by x1 with walker  AFIB on monitor  Regular diet  Continent/incontinent  CXR completed  Bilateral ulcers to heels mepilex applied  Possible discharge if CXR improved  Continuing current plan of care      Problem: Patient/Family Goals  Goal: Patient/Family Long Term Goal  Description: Patient's Long Term Goal:discharge home    Interventions:  - PT/OT  -meds    - See additional Care Plan goals for specific interventions  Outcome: Progressing  Goal: Patient/Family Short Term Goal  Description: Patient's Short Term Goal: breath easy    Interventions:   - O2 as needed  -respositioned  -lasix  -labs  -monitor vital signs  -consults  - See additional Care Plan goals for specific interventions  Outcome: Progressing

## 2023-09-09 NOTE — CM/SW NOTE
Sent MD Gao to [facility] in [facility] for commode. Anticipating delivery Monday. SW/CM to remain available for support and/or discharge planning.     LENNIE Tse  Discharge Planner

## 2023-09-09 NOTE — PHYSICAL THERAPY NOTE
PHYSICAL THERAPY TREATMENT NOTE - INPATIENT    Room Number: 2494/2313-P     Session: 1     Number of Visits to Meet Established Goals: 4    Presenting Problem: hypertension, light-headedness, acute hypoxic resp failure  Co-Morbidities : afib, chronic back pain    Reason for Therapy: Mobility Dysfunction and Discharge Planning     History related to current admission: Patient is a 80year old female admitted on 9/3/2023 from home for incr weakness, light-headedness, acute hypoxic respiratory failure. Pt diagnosed with hypertension, acute hypoxic resp failure. ASSESSMENT   Patient has impaired activity and gait tolerance and endurance, patient self-limits gait despite attempt to ambulate more as patient only wanted to ambulate to the bathroom with nursing staff and also did not want to ambulate outside of the room. Patient needs increase in time to initiate and complete all tasks. Patient will continue to benefit from skilled inpt PT to maximize potential while in acute care setting. Will continue to upgrade as able and tolerated towards goals. DISCHARGE RECOMMENDATIONS  PT Discharge Recommendations: Home with home health PT     PLAN  PT Treatment Plan: Bed mobility; Patient education;Gait training;Range of motion;Strengthening;Transfer training  Rehab Potential : Fair  Frequency (Obs): 3-5x/week    CURRENT GOALS   Goal #1 Patient is able to demonstrate supine - sit EOB @ level: modified independent      Goal #2 Patient is able to demonstrate transfers Sit to/from Stand at assistance level: modified independent      Goal #3 Patient is able to ambulate 50 feet with assist device: walker - rolling at assistance level: supervision      Goal #4     Goal #5     Goal #6     Goal Comments: Goals established on 9/7/2023 9/9/2023 all goals ongoing      SUBJECTIVE  \"Don't touch my feet or I'm going to kill you! \"    OBJECTIVE  Precautions: Spine;Bed/chair alarm    WEIGHT BEARING RESTRICTION  Weight Bearing Restriction: None                PAIN ASSESSMENT   Ratin  Location: LE/feet  Management Techniques: Activity promotion;Repositioning    BALANCE                                                                                                                       Static Sitting: Good  Dynamic Sitting: Fair +           Static Standing: Fair (with RW)  Dynamic Standing: Fair (with RW)    ACTIVITY TOLERANCE                         O2 WALK         AM-PAC '6-Clicks' INPATIENT SHORT FORM - BASIC MOBILITY  How much difficulty does the patient currently have. .. Patient Difficulty: Turning over in bed (including adjusting bedclothes, sheets and blankets)?: None   Patient Difficulty: Sitting down on and standing up from a chair with arms (e.g., wheelchair, bedside commode, etc.): A Little   Patient Difficulty: Moving from lying on back to sitting on the side of the bed?: A Little   How much help from another person does the patient currently need. .. Help from Another: Moving to and from a bed to a chair (including a wheelchair)?: A Little   Help from Another: Need to walk in hospital room?: A Little   Help from Another: Climbing 3-5 steps with a railing?: A Little       AM-PAC Score:  Raw Score: 19   Approx Degree of Impairment: 41.77%   Standardized Score (AM-PAC Scale): 45.44   CMS Modifier (G-Code): CK    FUNCTIONAL ABILITY STATUS  Gait Assessment   Functional Mobility/Gait Assessment  Gait Assistance: Contact guard assist  Distance (ft): 5  Assistive Device: Rolling walker  Pattern:  (very kyphotic posture)    Skilled Therapy Provided    Bed Mobility:  Rolling: Mod Ind with HOB elevated   Supine<>Sit: Mod Ind with HOB elevated   Sit<>Supine: NT     Transfer Mobility:  Sit<>Stand: cga with the RW, cues for hand placement/safety/set-up   Stand<>Sit: cga   Gait: cga with the RW    Therapist's Comments: RN approved session, patient was received in bed with HOB elevated.   Patient performed supine->sit at the EOB, patient reported feeling dizzy after assuming sitting at the EOB, BP stable; patient reported (-)dizziness after sitting for few mins, patient performed LAQ  Patient performed sit->stand with the RW with cues for hand placement as patient was going to pull up on the RW, ambulated to the recliner chair only, offered to assist patient to ambulate to the bathroom but patient refused and only wanted to ambulate with nursing staff, also refused to ambulate more and wanted to sit in the chair, stand->sit cga with cues for set-up. THERAPEUTIC EXERCISES  Lower Extremity Heel slides (patient is insistent that her LE cannot touch the sheets), SLR, ankle DF/PF, LAQ     Upper Extremity      Position Sitting and Supine     Repetitions   10   Sets        Patient End of Session: Up in chair;Needs met;Call light within reach;RN aware of session/findings; All patient questions and concerns addressed; Alarm set    PT Session Time: 30 minutes  Gait Trainin minutes  Therapeutic Activity: 8 minutes  Therapeutic Exercise: 8 minutes   Neuromuscular Re-education:  minutes

## 2023-09-09 NOTE — PLAN OF CARE
Assumed care at [facility]. No acute distress noted. Pt A&Ox3-4. Room air. NSR. Afib on tele, controlled. Regular diet. Continent/incontinent. Brief and lucio in place. Declining purewick at this time. Ambulatory x2 w/ walker. Meds per MAR. No c/o pain. Scheduled lidocaine patch to back. No n/v/d. Patient and family updated on POC. Case mgmt following. Safety precautions in place. Resting in bed at this time. Daughter at bedside. Needs met.      Problem: Patient/Family Goals  Goal: Patient/Family Long Term Goal  Description: Patient's Long Term Goal:discharge home    Interventions:  - PT/OT  -meds    - See additional Care Plan goals for specific interventions  Outcome: Progressing  Goal: Patient/Family Short Term Goal  Description: Patient's Short Term Goal: breath easy    Interventions:   - O2 as needed  -respositioned  -lasix  -labs  -monitor vital signs  -consults  - See additional Care Plan goals for specific interventions  Outcome: Progressing     Problem: RESPIRATORY - ADULT  Goal: Achieves optimal ventilation and oxygenation  Description: INTERVENTIONS:  - Assess for changes in respiratory status  - Assess for changes in mentation and behavior  - Position to facilitate oxygenation and minimize respiratory effort  - Oxygen supplementation based on oxygen saturation or ABGs  - Provide Smoking Cessation handout, if applicable  - Encourage broncho-pulmonary hygiene including cough, deep breathe, Incentive Spirometry  - Assess the need for suctioning and perform as needed  - Assess and instruct to report SOB or any respiratory difficulty  - Respiratory Therapy support as indicated  - Manage/alleviate anxiety  - Monitor for signs/symptoms of CO2 retention  Outcome: Progressing     Problem: PAIN - ADULT  Goal: Verbalizes/displays adequate comfort level or patient's stated pain goal  Description: INTERVENTIONS:  - Encourage pt to monitor pain and request assistance  - Assess pain using appropriate pain scale  - Administer analgesics based on type and severity of pain and evaluate response  - Implement non-pharmacological measures as appropriate and evaluate response  - Consider cultural and social influences on pain and pain management  - Manage/alleviate anxiety  - Utilize distraction and/or relaxation techniques  - Monitor for opioid side effects  - Notify MD/LIP if interventions unsuccessful or patient reports new pain  - Anticipate increased pain with activity and pre-medicate as appropriate  Outcome: Progressing     Problem: SAFETY ADULT - FALL  Goal: Free from fall injury  Description: INTERVENTIONS:  - Assess pt frequently for physical needs  - Identify cognitive and physical deficits and behaviors that affect risk of falls.   - [place] fall precautions as indicated by assessment.  - Educate pt/family on patient safety including physical limitations  - Instruct pt to call for assistance with activity based on assessment  - Modify environment to reduce risk of injury  - Provide assistive devices as appropriate  - Consider OT/PT consult to assist with strengthening/mobility  - Encourage toileting schedule  Outcome: Progressing     Problem: DISCHARGE PLANNING  Goal: Discharge to home or other facility with appropriate resources  Description: INTERVENTIONS:  - Identify barriers to discharge w/pt and caregiver  - Include patient/family/discharge partner in discharge planning  - Arrange for needed discharge resources and transportation as appropriate  - Identify discharge learning needs (meds, wound care, etc)  - Arrange for interpreters to assist at discharge as needed  - Consider post-discharge preferences of patient/family/discharge partner  - Complete POLST form as appropriate  - Assess patient's ability to be responsible for managing their own health  - Refer to Case Management Department for coordinating discharge planning if the patient needs post-hospital services based on physician/LIP order or complex needs related to functional status, cognitive ability or social support system  Outcome: Progressing

## 2023-09-10 VITALS
HEART RATE: 92 BPM | WEIGHT: 92.88 LBS | OXYGEN SATURATION: 95 % | SYSTOLIC BLOOD PRESSURE: 96 MMHG | RESPIRATION RATE: 26 BRPM | BODY MASS INDEX: 15 KG/M2 | TEMPERATURE: 98 F | DIASTOLIC BLOOD PRESSURE: 84 MMHG

## 2023-09-10 PROCEDURE — 99239 HOSP IP/OBS DSCHRG MGMT >30: CPT | Performed by: HOSPITALIST

## 2023-09-10 NOTE — PLAN OF CARE
Assumed care at 0730  A/O x4  RA  RAÚL fib on tele  VSS  Complains of pain to bilateral heels. Mepilex changed. Skin breakdown noted. MD aware. Heels floating off bed. Mepilex to sacrum / back reinforced. No skin break down noted. Non-cardiac electrolyte protocol  Takes meds crushed in apple sauce  Refused lovenx for VTE  Refused lidocaine patch  Regular diet. Low appetite. Daily weight  Up 1 with walker  All needs met. Pt updated. Will continue with plan of care. Patient planned to discharge home later today. Problem: SAFETY ADULT - FALL  Goal: Free from fall injury  Description: INTERVENTIONS:  - Assess pt frequently for physical needs  - Identify cognitive and physical deficits and behaviors that affect risk of falls.   - [place] fall precautions as indicated by assessment.  - Educate pt/family on patient safety including physical limitations  - Instruct pt to call for assistance with activity based on assessment  - Modify environment to reduce risk of injury  - Provide assistive devices as appropriate  - Consider OT/PT consult to assist with strengthening/mobility  - Encourage toileting schedule  Outcome: Progressing

## 2023-09-10 NOTE — PROGRESS NOTES
NURSING DISCHARGE NOTE    Discharged Home via Wheelchair. Accompanied by Family member and Support staff  Belongings Taken by patient/family. PIV removed  AVS printed and reviewed with patient  All questions answered. Pt educated on need to schedule follow-up with pulmonology & wound care services. Pt escorted to [facility] to be taken home by her daughter, Abner Santos.

## 2023-09-10 NOTE — PLAN OF CARE
Received patient awake x3. Awake till now, 0235am. Denies any pain unless her heels were touching the bed. So elevated heels on pillows. Afib on tele. On 1.5LNC, sats 96%. Up to [facility] w/ 1 assist. Brushed her teeth independently. Discharge planning to home, daughter will pick her up. Problem: SAFETY ADULT - FALL  Goal: Free from fall injury  Description: INTERVENTIONS:  - Assess pt frequently for physical needs  - Identify cognitive and physical deficits and behaviors that affect risk of falls.   - [place] fall precautions as indicated by assessment.  - Educate pt/family on patient safety including physical limitations  - Instruct pt to call for assistance with activity based on assessment  - Modify environment to reduce risk of injury  - Provide assistive devices as appropriate  - Consider OT/PT consult to assist with strengthening/mobility  - Encourage toileting schedule  Outcome: Progressing

## 2023-09-12 LAB — NON GYNE INTERPRETATION: NEGATIVE

## 2023-10-22 ENCOUNTER — APPOINTMENT (OUTPATIENT)
Dept: GENERAL RADIOLOGY | Facility: HOSPITAL | Age: 88
End: 2023-10-22
Attending: EMERGENCY MEDICINE
Payer: MEDICARE

## 2023-10-22 ENCOUNTER — HOSPITAL ENCOUNTER (INPATIENT)
Facility: HOSPITAL | Age: 88
LOS: 6 days | Discharge: NURSING, REHAB OR LONG-TERM CARE | End: 2023-10-29
Attending: EMERGENCY MEDICINE | Admitting: HOSPITALIST
Payer: MEDICARE

## 2023-10-22 ENCOUNTER — APPOINTMENT (OUTPATIENT)
Dept: CT IMAGING | Facility: HOSPITAL | Age: 88
End: 2023-10-22
Attending: EMERGENCY MEDICINE
Payer: MEDICARE

## 2023-10-22 DIAGNOSIS — I50.9 ACUTE ON CHRONIC CONGESTIVE HEART FAILURE, UNSPECIFIED HEART FAILURE TYPE (HCC): ICD-10-CM

## 2023-10-22 DIAGNOSIS — R06.00 DYSPNEA, UNSPECIFIED TYPE: Primary | ICD-10-CM

## 2023-10-22 DIAGNOSIS — R41.82 ALTERED MENTAL STATUS, UNSPECIFIED ALTERED MENTAL STATUS TYPE: ICD-10-CM

## 2023-10-22 DIAGNOSIS — N30.01 ACUTE CYSTITIS WITH HEMATURIA: ICD-10-CM

## 2023-10-22 DIAGNOSIS — J90 PLEURAL EFFUSION: ICD-10-CM

## 2023-10-22 LAB
ALBUMIN SERPL-MCNC: 2.9 G/DL (ref 3.4–5)
ALBUMIN/GLOB SERPL: 0.6 {RATIO} (ref 1–2)
ALP LIVER SERPL-CCNC: 80 U/L
ALT SERPL-CCNC: 37 U/L
ANION GAP SERPL CALC-SCNC: 5 MMOL/L (ref 0–18)
APTT PPP: 26.4 SECONDS (ref 23.3–35.6)
ARTERIAL PATENCY WRIST A: POSITIVE
AST SERPL-CCNC: 34 U/L (ref 15–37)
BASE EXCESS BLDA CALC-SCNC: 3.8 MMOL/L (ref ?–2)
BASOPHILS # BLD AUTO: 0.01 X10(3) UL (ref 0–0.2)
BASOPHILS NFR BLD AUTO: 0.1 %
BILIRUB SERPL-MCNC: 1.9 MG/DL (ref 0.1–2)
BILIRUB UR QL STRIP.AUTO: NEGATIVE
BODY TEMPERATURE: 98.6 F
BUN BLD-MCNC: 27 MG/DL (ref 7–18)
CA-I BLD-SCNC: 1.06 MMOL/L (ref 0.95–1.32)
CALCIUM BLD-MCNC: 8 MG/DL (ref 8.5–10.1)
CHLORIDE SERPL-SCNC: 98 MMOL/L (ref 98–112)
CO2 SERPL-SCNC: 30 MMOL/L (ref 21–32)
COHGB MFR BLD: 3.4 % SAT (ref 0–3)
COLOR UR AUTO: YELLOW
CREAT BLD-MCNC: 0.92 MG/DL
EGFRCR SERPLBLD CKD-EPI 2021: 59 ML/MIN/1.73M2 (ref 60–?)
EOSINOPHIL # BLD AUTO: 0 X10(3) UL (ref 0–0.7)
EOSINOPHIL NFR BLD AUTO: 0 %
ERYTHROCYTE [DISTWIDTH] IN BLOOD BY AUTOMATED COUNT: 17.4 %
FLUAV + FLUBV RNA SPEC NAA+PROBE: NEGATIVE
FLUAV + FLUBV RNA SPEC NAA+PROBE: NEGATIVE
GLOBULIN PLAS-MCNC: 4.6 G/DL (ref 2.8–4.4)
GLUCOSE BLD-MCNC: 126 MG/DL (ref 70–99)
GLUCOSE UR STRIP.AUTO-MCNC: 30 MG/DL
HCO3 BLDA-SCNC: 27.8 MEQ/L (ref 21–27)
HCT VFR BLD AUTO: 33.6 %
HGB BLD-MCNC: 10.1 G/DL
HGB BLD-MCNC: 10.4 G/DL
HYALINE CASTS #/AREA URNS AUTO: PRESENT /LPF
IMM GRANULOCYTES # BLD AUTO: 0.04 X10(3) UL (ref 0–1)
IMM GRANULOCYTES NFR BLD: 0.6 %
INR BLD: 1.42 (ref 0.85–1.16)
L/M: 3 L/MIN
LACTATE BLD-SCNC: 2.1 MMOL/L (ref 0.5–2)
LACTATE SERPL-SCNC: 2.1 MMOL/L (ref 0.4–2)
LACTATE SERPL-SCNC: 2.2 MMOL/L (ref 0.4–2)
LEUKOCYTE ESTERASE UR QL STRIP.AUTO: 500
LYMPHOCYTES # BLD AUTO: 0.38 X10(3) UL (ref 1–4)
LYMPHOCYTES NFR BLD AUTO: 5.3 %
MCH RBC QN AUTO: 26.3 PG (ref 26–34)
MCHC RBC AUTO-ENTMCNC: 30.1 G/DL (ref 31–37)
MCV RBC AUTO: 87.5 FL
METHGB MFR BLD: 0.9 % SAT (ref 0.4–1.5)
MONOCYTES # BLD AUTO: 0.36 X10(3) UL (ref 0.1–1)
MONOCYTES NFR BLD AUTO: 5 %
NEUTROPHILS # BLD AUTO: 6.42 X10 (3) UL (ref 1.5–7.7)
NEUTROPHILS # BLD AUTO: 6.42 X10(3) UL (ref 1.5–7.7)
NEUTROPHILS NFR BLD AUTO: 89 %
NITRITE UR QL STRIP.AUTO: NEGATIVE
NT-PROBNP SERPL-MCNC: ABNORMAL PG/ML (ref ?–450)
OSMOLALITY SERPL CALC.SUM OF ELEC: 283 MOSM/KG (ref 275–295)
OXYHGB MFR BLDA: 90.4 % (ref 92–100)
PCO2 BLDA: 55 MM HG (ref 35–45)
PH BLDA: 7.35 [PH] (ref 7.35–7.45)
PH UR STRIP.AUTO: 5.5 [PH] (ref 5–8)
PLATELET # BLD AUTO: 215 10(3)UL (ref 150–450)
PO2 BLDA: 71 MM HG (ref 80–100)
POTASSIUM BLD-SCNC: 4.6 MMOL/L (ref 3.6–5.1)
POTASSIUM SERPL-SCNC: 4.6 MMOL/L (ref 3.5–5.1)
PROCALCITONIN SERPL-MCNC: <0.05 NG/ML (ref ?–0.16)
PROT SERPL-MCNC: 7.5 G/DL (ref 6.4–8.2)
PROT UR STRIP.AUTO-MCNC: 200 MG/DL
PROTHROMBIN TIME: 17.4 SECONDS (ref 11.6–14.8)
RBC # BLD AUTO: 3.84 X10(6)UL
RSV RNA SPEC NAA+PROBE: NEGATIVE
SARS-COV-2 RNA RESP QL NAA+PROBE: NOT DETECTED
SODIUM BLD-SCNC: 134 MMOL/L (ref 135–145)
SODIUM SERPL-SCNC: 133 MMOL/L (ref 136–145)
SP GR UR STRIP.AUTO: 1.03 (ref 1–1.03)
TROPONIN I HIGH SENSITIVITY: 23 NG/L
UROBILINOGEN UR STRIP.AUTO-MCNC: NORMAL MG/DL
WBC # BLD AUTO: 7.2 X10(3) UL (ref 4–11)
WBC #/AREA URNS AUTO: >50 /HPF

## 2023-10-22 PROCEDURE — 99223 1ST HOSP IP/OBS HIGH 75: CPT | Performed by: STUDENT IN AN ORGANIZED HEALTH CARE EDUCATION/TRAINING PROGRAM

## 2023-10-22 PROCEDURE — 71045 X-RAY EXAM CHEST 1 VIEW: CPT | Performed by: EMERGENCY MEDICINE

## 2023-10-22 PROCEDURE — 70450 CT HEAD/BRAIN W/O DYE: CPT | Performed by: EMERGENCY MEDICINE

## 2023-10-22 RX ORDER — FUROSEMIDE 10 MG/ML
20 INJECTION INTRAMUSCULAR; INTRAVENOUS ONCE
Status: COMPLETED | OUTPATIENT
Start: 2023-10-22 | End: 2023-10-22

## 2023-10-23 PROBLEM — N30.01 ACUTE CYSTITIS WITH HEMATURIA: Status: ACTIVE | Noted: 2023-10-23

## 2023-10-23 PROBLEM — I50.9 ACUTE ON CHRONIC CONGESTIVE HEART FAILURE, UNSPECIFIED HEART FAILURE TYPE (HCC): Status: ACTIVE | Noted: 2023-10-23

## 2023-10-23 LAB
ALBUMIN SERPL-MCNC: 2.8 G/DL (ref 3.4–5)
ALBUMIN/GLOB SERPL: 0.7 {RATIO} (ref 1–2)
ALP LIVER SERPL-CCNC: 70 U/L
ALT SERPL-CCNC: 29 U/L
ANION GAP SERPL CALC-SCNC: 3 MMOL/L (ref 0–18)
ARTERIAL PATENCY WRIST A: POSITIVE
AST SERPL-CCNC: 12 U/L (ref 15–37)
BASE EXCESS BLDA CALC-SCNC: 10.2 MMOL/L (ref ?–2)
BASE EXCESS BLDA CALC-SCNC: 5.9 MMOL/L (ref ?–2)
BASE EXCESS BLDA CALC-SCNC: 9.9 MMOL/L (ref ?–2)
BASOPHILS # BLD AUTO: 0.01 X10(3) UL (ref 0–0.2)
BASOPHILS NFR BLD AUTO: 0.1 %
BILIRUB SERPL-MCNC: 1.2 MG/DL (ref 0.1–2)
BODY TEMPERATURE: 98.6 F
BUN BLD-MCNC: 27 MG/DL (ref 7–18)
CA-I BLD-SCNC: 1.08 MMOL/L (ref 0.95–1.32)
CA-I BLD-SCNC: 1.1 MMOL/L (ref 0.95–1.32)
CALCIUM BLD-MCNC: 7.9 MG/DL (ref 8.5–10.1)
CHLORIDE SERPL-SCNC: 99 MMOL/L (ref 98–112)
CO2 SERPL-SCNC: 33 MMOL/L (ref 21–32)
COHGB MFR BLD: 1.6 % SAT (ref 0–3)
COHGB MFR BLD: 3.3 % SAT (ref 0–3)
COHGB MFR BLD: 3.5 % SAT (ref 0–3)
CREAT BLD-MCNC: 0.85 MG/DL
EGFRCR SERPLBLD CKD-EPI 2021: 65 ML/MIN/1.73M2 (ref 60–?)
EOSINOPHIL # BLD AUTO: 0 X10(3) UL (ref 0–0.7)
EOSINOPHIL NFR BLD AUTO: 0 %
ERYTHROCYTE [DISTWIDTH] IN BLOOD BY AUTOMATED COUNT: 17.5 %
EXPIRATORY PRESSURE: 5 CM H2O
FIO2: 50 %
FIO2: 50 %
GLOBULIN PLAS-MCNC: 3.9 G/DL (ref 2.8–4.4)
GLUCOSE BLD-MCNC: 112 MG/DL (ref 70–99)
HCO3 BLDA-SCNC: 29.5 MEQ/L (ref 21–27)
HCO3 BLDA-SCNC: 32.6 MEQ/L (ref 21–27)
HCO3 BLDA-SCNC: 32.9 MEQ/L (ref 21–27)
HCT VFR BLD AUTO: 32.4 %
HGB BLD-MCNC: 10.1 G/DL
HGB BLD-MCNC: 10.2 G/DL
HGB BLD-MCNC: 8.8 G/DL
HGB BLD-MCNC: 9.8 G/DL
IMM GRANULOCYTES # BLD AUTO: 0.07 X10(3) UL (ref 0–1)
IMM GRANULOCYTES NFR BLD: 0.5 %
IPAP MAX: 35 CM H2O
IPAP MAX: 35 CM H2O
IPAP MIN: 10 CM H2O
IPAP MIN: 10 CM H2O
L/M: 15 L/MIN
LACTATE BLD-SCNC: 2 MMOL/L (ref 0.5–2)
LACTATE BLD-SCNC: 2.5 MMOL/L (ref 0.5–2)
LACTATE SERPL-SCNC: 1.5 MMOL/L (ref 0.4–2)
LACTATE SERPL-SCNC: 3.2 MMOL/L (ref 0.4–2)
LYMPHOCYTES # BLD AUTO: 0.13 X10(3) UL (ref 1–4)
LYMPHOCYTES NFR BLD AUTO: 0.9 %
MCH RBC QN AUTO: 26.7 PG (ref 26–34)
MCHC RBC AUTO-ENTMCNC: 30.2 G/DL (ref 31–37)
MCV RBC AUTO: 88.3 FL
METHGB MFR BLD: 0.4 % SAT (ref 0.4–1.5)
METHGB MFR BLD: 0.5 % SAT (ref 0.4–1.5)
METHGB MFR BLD: 0.9 % SAT (ref 0.4–1.5)
MONOCYTES # BLD AUTO: 0.8 X10(3) UL (ref 0.1–1)
MONOCYTES NFR BLD AUTO: 5.2 %
NEUTROPHILS # BLD AUTO: 14.23 X10 (3) UL (ref 1.5–7.7)
NEUTROPHILS # BLD AUTO: 14.23 X10(3) UL (ref 1.5–7.7)
NEUTROPHILS NFR BLD AUTO: 93.3 %
OSMOLALITY SERPL CALC.SUM OF ELEC: 286 MOSM/KG (ref 275–295)
OXYHGB MFR BLDA: 94.6 % (ref 92–100)
OXYHGB MFR BLDA: 96.3 % (ref 92–100)
OXYHGB MFR BLDA: 96.6 % (ref 92–100)
PCO2 BLDA: 54 MM HG (ref 35–45)
PCO2 BLDA: 57 MM HG (ref 35–45)
PCO2 BLDA: 67 MM HG (ref 35–45)
PEEP: 5 CM H2O
PH BLDA: 7.31 [PH] (ref 7.35–7.45)
PH BLDA: 7.41 [PH] (ref 7.35–7.45)
PH BLDA: 7.43 [PH] (ref 7.35–7.45)
PLATELET # BLD AUTO: 205 10(3)UL (ref 150–450)
PO2 BLDA: 102 MM HG (ref 80–100)
PO2 BLDA: 116 MM HG (ref 80–100)
PO2 BLDA: 77 MM HG (ref 80–100)
POTASSIUM BLD-SCNC: 3.9 MMOL/L (ref 3.6–5.1)
POTASSIUM BLD-SCNC: 4 MMOL/L (ref 3.6–5.1)
POTASSIUM SERPL-SCNC: 4 MMOL/L (ref 3.5–5.1)
PROT SERPL-MCNC: 6.7 G/DL (ref 6.4–8.2)
Q-T INTERVAL: 330 MS
QRS DURATION: 66 MS
QTC CALCULATION (BEZET): 433 MS
R AXIS: 58 DEGREES
RBC # BLD AUTO: 3.67 X10(6)UL
SODIUM BLD-SCNC: 133 MMOL/L (ref 135–145)
SODIUM BLD-SCNC: 134 MMOL/L (ref 135–145)
SODIUM SERPL-SCNC: 135 MMOL/L (ref 136–145)
T AXIS: -24 DEGREES
TIDAL VOLUME: 450 ML
TIDAL VOLUME: 450 ML
VENT RATE: 18 /MIN
VENT RATE: 18 /MIN
VENTRICULAR RATE: 104 BPM
WBC # BLD AUTO: 15.2 X10(3) UL (ref 4–11)

## 2023-10-23 PROCEDURE — 99232 SBSQ HOSP IP/OBS MODERATE 35: CPT | Performed by: INTERNAL MEDICINE

## 2023-10-23 PROCEDURE — 5A09357 ASSISTANCE WITH RESPIRATORY VENTILATION, LESS THAN 24 CONSECUTIVE HOURS, CONTINUOUS POSITIVE AIRWAY PRESSURE: ICD-10-PCS | Performed by: HOSPITALIST

## 2023-10-23 RX ORDER — METOCLOPRAMIDE HYDROCHLORIDE 5 MG/ML
5 INJECTION INTRAMUSCULAR; INTRAVENOUS EVERY 8 HOURS PRN
Status: DISCONTINUED | OUTPATIENT
Start: 2023-10-23 | End: 2023-10-29

## 2023-10-23 RX ORDER — POLYETHYLENE GLYCOL 3350 17 G/17G
17 POWDER, FOR SOLUTION ORAL DAILY PRN
Status: DISCONTINUED | OUTPATIENT
Start: 2023-10-23 | End: 2023-10-29

## 2023-10-23 RX ORDER — BISACODYL 10 MG
10 SUPPOSITORY, RECTAL RECTAL
Status: DISCONTINUED | OUTPATIENT
Start: 2023-10-23 | End: 2023-10-29

## 2023-10-23 RX ORDER — FUROSEMIDE 10 MG/ML
20 INJECTION INTRAMUSCULAR; INTRAVENOUS DAILY
Status: DISCONTINUED | OUTPATIENT
Start: 2023-10-23 | End: 2023-10-28

## 2023-10-23 RX ORDER — ENOXAPARIN SODIUM 100 MG/ML
30 INJECTION SUBCUTANEOUS DAILY
Status: DISCONTINUED | OUTPATIENT
Start: 2023-10-23 | End: 2023-10-26

## 2023-10-23 RX ORDER — ACETAMINOPHEN 500 MG
500 TABLET ORAL EVERY 4 HOURS PRN
Status: DISCONTINUED | OUTPATIENT
Start: 2023-10-23 | End: 2023-10-29

## 2023-10-23 RX ORDER — BENZONATATE 200 MG/1
200 CAPSULE ORAL 3 TIMES DAILY PRN
Status: DISCONTINUED | OUTPATIENT
Start: 2023-10-23 | End: 2023-10-29

## 2023-10-23 RX ORDER — ECHINACEA PURPUREA EXTRACT 125 MG
1 TABLET ORAL
Status: DISCONTINUED | OUTPATIENT
Start: 2023-10-23 | End: 2023-10-29

## 2023-10-23 RX ORDER — MELATONIN
3 NIGHTLY PRN
Status: DISCONTINUED | OUTPATIENT
Start: 2023-10-23 | End: 2023-10-29

## 2023-10-23 RX ORDER — SENNOSIDES 8.6 MG
17.2 TABLET ORAL NIGHTLY PRN
Status: DISCONTINUED | OUTPATIENT
Start: 2023-10-23 | End: 2023-10-29

## 2023-10-23 RX ORDER — DIGOXIN 125 MCG
125 TABLET ORAL
Status: DISCONTINUED | OUTPATIENT
Start: 2023-10-23 | End: 2023-10-29

## 2023-10-23 RX ORDER — GUAIFENESIN 600 MG/1
600 TABLET, EXTENDED RELEASE ORAL 2 TIMES DAILY PRN
Status: DISCONTINUED | OUTPATIENT
Start: 2023-10-23 | End: 2023-10-29

## 2023-10-23 RX ORDER — ONDANSETRON 2 MG/ML
4 INJECTION INTRAMUSCULAR; INTRAVENOUS EVERY 6 HOURS PRN
Status: DISCONTINUED | OUTPATIENT
Start: 2023-10-23 | End: 2023-10-29

## 2023-10-23 NOTE — PROGRESS NOTES
Abg drawn  Bipap initiated 14/5/50% at this time. RN and MD notified. 10/23/23 0236   BiPAP   $ RT Standby Charge (per 15 min) 1   $ BiPAP Initial day & set up Yes   Device V60   BiPAP / CPAP CE# v26   BiPAP bacteria filter Yes   BiPAP Pre-use check ok? Yes   BIPAP plugged into main power?  Yes   Mode Spontaneous/Timed   Interface Full face mask   Mask Size Medium   Control Settings   Set Rate 14 breaths/min   Set IPAP 14   Set EPAP 5   Oxygen Percent 50 %   Inspiratory time 0.9   Insp rise time 3   BiPAP/CPAP Alarm Settings   Hi Rate 40   Low Rate 10   Hi    Low    Hi Pressure 40   Low Pressure 5   Low Pressure Delay 20   Low MV 2   BiPAP/CPAP Monitored Parameters   PIP 14   Total Rate 26 breaths/min   Minute Volume 8   Tidal Volume 303   Total Leak 14   Trigger % 100   Ti/Ttot % 31   Toleration Well

## 2023-10-23 NOTE — PROGRESS NOTES
Spoke with Pulmonary MD, changed modes to AVAPS. 10/23/23 0257   BiPAP   $ RT Standby Charge (per 15 min) 1   Device V60   BiPAP / CPAP CE# v26   BiPAP bacteria filter Yes   BIPAP plugged into main power?  Yes   Mode AVAPS   Interface Full face mask   Mask Size Medium   Control Settings   Oxygen Percent 500 %   Inspiratory time 0.9   Insp rise time 3   BiPAP/CPAP Alarm Settings   Hi Rate 40   Low Rate 10   Hi    Low    Hi Pressure 40   Low Pressure 5   Low Pressure Delay 20   Low MV 2   BiPAP/CPAP Monitored Parameters   PIP 13   Total Rate 22 breaths/min   Minute Volume 10   Tidal Volume 425   Total Leak 20   Trigger % 85   Ti/Ttot % 30   Toleration Well

## 2023-10-23 NOTE — ED QUICK NOTES
Pt straight cath for urine. Tolerated well. Pt repositioned with warm blankets, clean dry sheets, pads and placed on purewick for comfort. Daughter updated by phone.

## 2023-10-23 NOTE — CM/SW NOTE
10/23/23 1500   CM/SW Referral Data   Referral Source Social Work (self-referral)   Reason for Referral Discharge planning   Informant EMR;Clinical Staff Member   Patient [address]   Patient lives with Daughter   Patient Status Prior to Admission   Services in place prior to admission DME/Supplies at home   Type of DME/Supplies Straight [employer] Quiver; Wheeled Walker;Commode; Shower Chair;Home Oxygen     Sw left message for daughter to discuss eventual dc plans. Pt is 79 yo female, admitted due to acute hypoxemic/hypercapneic respiratory failure: 2/2 diastolic heart failure with bilateral pleural effusions . Pt had admission here from 9/3-9/10 due to resp failure/pleural effusions and required thoracentesis. Pt resides with daughter - has home O2 in place. PT advised  HHC last admission but daughter declined. Sw to follow.     Vivek Mendoza LCSW  /Discharge Planner

## 2023-10-23 NOTE — PROGRESS NOTES
10/23/23 1135   BiPAP   $ BiPAP in use daily Yes   Device V60   BiPAP / CPAP CE# v26   BiPAP bacteria filter Yes   BIPAP plugged into main power?  Yes   Mode AVAPS   Interface Full face mask   Mask Size Medium   Control Settings   Set Rate 18 breaths/min   Set EPAP 5   Oxygen Percent 50 %   Inspiratory time 0.9   Insp rise time 3      AVAPS   Min IPAP 35   Max IPAP 10   EPAP Level 5   Set rate 18   Tidal volume 450   BiPAP/CPAP Alarm Settings   Hi Rate 40   Low Rate 10   Hi    Low    Hi Pressure 40   Low Pressure 5   Low Pressure Delay 20   Low MV 2.0   BiPAP/CPAP Monitored Parameters   PIP 21   Total Rate 18 breaths/min   Minute Volume 8   Tidal Volume 445   Total Leak 21   Trigger % 69   Ti/Ttot % 27   Toleration Well

## 2023-10-24 ENCOUNTER — APPOINTMENT (OUTPATIENT)
Dept: GENERAL RADIOLOGY | Facility: HOSPITAL | Age: 88
End: 2023-10-24
Attending: STUDENT IN AN ORGANIZED HEALTH CARE EDUCATION/TRAINING PROGRAM
Payer: MEDICARE

## 2023-10-24 ENCOUNTER — APPOINTMENT (OUTPATIENT)
Dept: ULTRASOUND IMAGING | Facility: HOSPITAL | Age: 88
End: 2023-10-24
Attending: INTERNAL MEDICINE
Payer: MEDICARE

## 2023-10-24 ENCOUNTER — APPOINTMENT (OUTPATIENT)
Dept: GENERAL RADIOLOGY | Facility: HOSPITAL | Age: 88
End: 2023-10-24
Attending: INTERNAL MEDICINE
Payer: MEDICARE

## 2023-10-24 LAB
INR BLD: 1.52 (ref 0.85–1.16)
PROTHROMBIN TIME: 18.4 SECONDS (ref 11.6–14.8)

## 2023-10-24 PROCEDURE — 71045 X-RAY EXAM CHEST 1 VIEW: CPT | Performed by: STUDENT IN AN ORGANIZED HEALTH CARE EDUCATION/TRAINING PROGRAM

## 2023-10-24 PROCEDURE — 32555 ASPIRATE PLEURA W/ IMAGING: CPT | Performed by: INTERNAL MEDICINE

## 2023-10-24 PROCEDURE — 99232 SBSQ HOSP IP/OBS MODERATE 35: CPT | Performed by: INTERNAL MEDICINE

## 2023-10-24 PROCEDURE — 71045 X-RAY EXAM CHEST 1 VIEW: CPT | Performed by: INTERNAL MEDICINE

## 2023-10-24 PROCEDURE — 0W993ZZ DRAINAGE OF RIGHT PLEURAL CAVITY, PERCUTANEOUS APPROACH: ICD-10-PCS | Performed by: STUDENT IN AN ORGANIZED HEALTH CARE EDUCATION/TRAINING PROGRAM

## 2023-10-24 NOTE — PLAN OF CARE
Assumed care for pt at 19:30 on 10/23    A&Ox3, lethargic. C/o R heel pain. Declined tylenol. Mepliex applied to heel and bilateral heel protectors placed. Legs elevated, pain relieved. VSS on 4L. Afib on tele, rates 90-100s. Digoxin given as metoprolol was held by day RN and so was the digoxin due at 0900. Pt only takes digoxin M W F, so it was given at 21:49. Lungs diminished. Pt declined when asked to take in deep breath. Almost 250 on IS best effort. Purewick in place. Bed pan used as pt is too sleepy/deconditioned. Bilateral SCDs applied for VTE prophylaxis. Pt also received Lovenox 10/23. Plan: CXR AM shift, Rocephin daily, 20 IV lasix daily, NPO for potential thoracentesis    Bed alarm on. Daughter at bedside. Call light in reach. Able to make needs known.

## 2023-10-24 NOTE — PLAN OF CARE
Pt in bed awake,forgetful  Denies any pain   2L O2 /nc,, saturating  92%  desats on room air 84%  Bilateral heel wounds noted,pictures taken  Boots applied and foot elevated . Wound care consult ,ordered  Will continue to monitor    Problem: CARDIOVASCULAR - ADULT  Goal: Maintains optimal cardiac output and hemodynamic stability  Description: INTERVENTIONS:  - Monitor vital signs, rhythm, and trends  - Monitor for bleeding, hypotension and signs of decreased cardiac output  - Evaluate effectiveness of vasoactive medications to optimize hemodynamic stability  - Monitor arterial and/or venous puncture sites for bleeding and/or hematoma  - Assess quality of pulses, skin color and temperature  - Assess for signs of decreased coronary artery perfusion - ex.  Angina  - Evaluate fluid balance, assess for edema, trend weights  Outcome: Progressing  Goal: Absence of cardiac arrhythmias or at baseline  Description: INTERVENTIONS:  - Continuous cardiac monitoring, monitor vital signs, obtain 12 lead EKG if indicated  - Evaluate effectiveness of antiarrhythmic and heart rate control medications as ordered  - Initiate emergency measures for life threatening arrhythmias  - Monitor electrolytes and administer replacement therapy as ordered  Outcome: Progressing     Problem: RESPIRATORY - ADULT  Goal: Achieves optimal ventilation and oxygenation  Description: INTERVENTIONS:  - Assess for changes in respiratory status  - Assess for changes in mentation and behavior  - Position to facilitate oxygenation and minimize respiratory effort  - Oxygen supplementation based on oxygen saturation or ABGs  - Provide Smoking Cessation handout, if applicable  - Encourage broncho-pulmonary hygiene including cough, deep breathe, Incentive Spirometry  - Assess the need for suctioning and perform as needed  - Assess and instruct to report SOB or any respiratory difficulty  - Respiratory Therapy support as indicated  - Manage/alleviate anxiety  - Monitor for signs/symptoms of CO2 retention  Outcome: Progressing     Problem: SKIN/TISSUE INTEGRITY - ADULT  Goal: Incision(s), wounds(s) or drain site(s) healing without S/S of infection  Description: INTERVENTIONS:  - Assess and document risk factors for pressure ulcer development  - Assess and document skin integrity  - Assess and document dressing/incision, wound bed, drain sites and surrounding tissue  - Implement wound care per orders  - Initiate isolation precautions as appropriate  - Initiate Pressure Ulcer prevention bundle as indicated  Outcome: Progressing

## 2023-10-24 NOTE — PLAN OF CARE
Problem: CARDIOVASCULAR - ADULT  Goal: Maintains optimal cardiac output and hemodynamic stability  Description: INTERVENTIONS:  - Monitor vital signs, rhythm, and trends  - Monitor for bleeding, hypotension and signs of decreased cardiac output  - Evaluate effectiveness of vasoactive medications to optimize hemodynamic stability  - Monitor arterial and/or venous puncture sites for bleeding and/or hematoma  - Assess quality of pulses, skin color and temperature  - Assess for signs of decreased coronary artery perfusion - ex.  Angina  - Evaluate fluid balance, assess for edema, trend weights  Outcome: Progressing  Goal: Absence of cardiac arrhythmias or at baseline  Description: INTERVENTIONS:  - Continuous cardiac monitoring, monitor vital signs, obtain 12 lead EKG if indicated  - Evaluate effectiveness of antiarrhythmic and heart rate control medications as ordered  - Initiate emergency measures for life threatening arrhythmias  - Monitor electrolytes and administer replacement therapy as ordered  Outcome: Progressing     Problem: RESPIRATORY - ADULT  Goal: Achieves optimal ventilation and oxygenation  Description: INTERVENTIONS:  - Assess for changes in respiratory status  - Assess for changes in mentation and behavior  - Position to facilitate oxygenation and minimize respiratory effort  - Oxygen supplementation based on oxygen saturation or ABGs  - Provide Smoking Cessation handout, if applicable  - Encourage broncho-pulmonary hygiene including cough, deep breathe, Incentive Spirometry  - Assess the need for suctioning and perform as needed  - Assess and instruct to report SOB or any respiratory difficulty  - Respiratory Therapy support as indicated  - Manage/alleviate anxiety  - Monitor for signs/symptoms of CO2 retention  Outcome: Progressing     Problem: Safety Risk - Non-Violent Restraints  Goal: Patient will remain free from self-harm  Description: INTERVENTIONS:  - Apply the least restrictive restraint to prevent harm  - Notify patient and family of reasons restraints applied  - Assess for any contributing factors to confusion (electrolyte disturbances, delirium, medications)  - Discontinue any unnecessary medical devices as soon as possible  - Assess the patient's physical comfort, circulation, skin condition, hydration, nutrition and elimination needs   - Reorient and redirection as needed  - Assess for the need to continue restraints  Outcome: Progressing  Received from [address], s/p R thoracentesis. Pt A&Ox1-2, forgetful; denies any sob or cp at this time. R upper back thoracentesis site with dressing D/I/C, no oozing no hematoma noted. B feet/heel protectors on. Fall precautions continued. 02 sats on 2l per nc 100%. Will continue to monitor. Continue fall precautions. Monitor R upper back thoracentesis site for any oozing or hematoma. Reposition in bed q2 hours and prn. . Continue fall precautions. Continue B heel protectors, BLE scd's. Continue IV abx's.

## 2023-10-24 NOTE — IMAGING NOTE
Pt here for image guided thoracentesis - right. Informed consent obtained by Dr Deniz Segovia. Positioned pt on cart. 900 mL removed. Presently denies pain. Tolerated procedure well. Vital signs stable on 2 LPM NC. SBAR report given to ANJELICA Gill. Transported pt to Rm [id number] accompanied by transporter.

## 2023-10-24 NOTE — PHYSICAL THERAPY NOTE
Physical therapy consult requested. Pt is a 81 y/o female admitted with acute respiratory failure secondary to diastolic heart failure with bilateral pleural effusions. Pt recently admitted 9/3/23-9/10/23 with similar diagnosis and underwent thoracentesis on 9/4/23 and again on 9/8/23. Medical plan at this time is for thoracentesis this afternoon. Additionally, pt has bilateral heel wounds, and wound care consult pending. Will hold therapy evaluation until after completion of thoracentesis.

## 2023-10-24 NOTE — PLAN OF CARE
Npo maintained  For right Thoracentesis today  New IV started right arm  Pt pulled out 2 IV's. on left hand. per pt she thought she's going home  Pt with periods of confusion noted,reoriented  No restraints    Spoke with daughter Jada,updated POC  Will continue to monitor.

## 2023-10-24 NOTE — CDS QUERY
[place]    Dear Dr Dennis Michel,  Clinical information (provided below) includes documentation of Heart Failure by the ED provider and the pulmonologist. Based on your clinical judgement,   PLEASE CLARIFY THE DIAGNOSIS OF HEART FAILURE:    [ x ] Acute on Chronic Diastolic Heart Failure is confirmed      [  ] Heart failure is ruled out             [  Sandi Garza, Please comment: ___________________      Documentation from the Medical Record:     Risks: Hx of CHF    Clinical Indicators:    -BNP- 10,729    -echo 9/23-Systolic function was normal. The estimated ejection fraction was 60-65%, by visual assessment. No diagnostic evidence for regional wall motion abnormalities. Unable to assess LV diastolic function due to heart rhythm. -CXR- heart mostly obscured but appears probably enlarged. No sign of pneumothorax, but there is opacity at the lung base bilateral worse on the right likely combination of effusion and atelectasis/consolidation    -ED- the patient also has a history of congestive heart failure and fluid overload as per my review of medical records and discharge summary from last month. #Acute on chronic congestive heart failure, unspecified heart failure     -10/23-10/24 Pulmonary PN- Acute hypoxemic/hypercapnic respiratory failure: 2/2 diastolic heart failure with bilateral pleural effusions R>L and associated compressive atelectasis     Treatment: IV Lasix, I/Os, cardiac diet         For questions regarding this query, please contact Clinical Documentation : Kesha Esqueda ext [phone].   THIS FORM IS A PERMANENT PART OF THE MEDICAL RECORD

## 2023-10-24 NOTE — PROCEDURES
[facility]  Procedure Note    Kami Alfaro Patient Status:  Inpatient    1931 MRN [id number]   [facility] 2NE-A Attending Taya Morales MD   Hosp Day # 1 PCP Niko Taylor MD     Procedure: Right thoracentesis    Pre-Procedure Diagnosis: Right pleural effusion    Post-Procedure Diagnosis: Same    Anesthesia:  Local    Findings: Moderate right pleural effusion. Successful right thoracentesis. Drainage of yellow serous fluid, 900 mL.     Specimens: None    Blood Loss: 0    Tourniquet Time: 0  Complications:  None  Drains: None    Secondary Diagnosis: None    Anniece Fleischer, MD  10/24/2023

## 2023-10-24 NOTE — INTERVAL H&P NOTE
The above referenced H&P was reviewed by Kita Cruz MD on 10/24/2023, the patient was examined and no significant changes have occurred in the patient's condition since the H&P was performed. Risks, benefits, alternative treatments and consequences of no treatment were discussed. We will proceed with procedure as planned.       Kita Cruz MD  10/24/2023  2:53 PM

## 2023-10-25 ENCOUNTER — APPOINTMENT (OUTPATIENT)
Dept: GENERAL RADIOLOGY | Facility: HOSPITAL | Age: 88
End: 2023-10-25
Attending: HOSPITALIST
Payer: MEDICARE

## 2023-10-25 LAB
ALBUMIN SERPL-MCNC: 2.6 G/DL (ref 3.4–5)
ALBUMIN/GLOB SERPL: 0.7 {RATIO} (ref 1–2)
ALP LIVER SERPL-CCNC: 67 U/L
ALT SERPL-CCNC: 21 U/L
ANION GAP SERPL CALC-SCNC: 0 MMOL/L (ref 0–18)
AST SERPL-CCNC: 5 U/L (ref 15–37)
BASOPHILS # BLD AUTO: 0.02 X10(3) UL (ref 0–0.2)
BASOPHILS NFR BLD AUTO: 0.2 %
BILIRUB SERPL-MCNC: 0.8 MG/DL (ref 0.1–2)
BUN BLD-MCNC: 25 MG/DL (ref 7–18)
CALCIUM BLD-MCNC: 7.7 MG/DL (ref 8.5–10.1)
CHLORIDE SERPL-SCNC: 97 MMOL/L (ref 98–112)
CO2 SERPL-SCNC: 37 MMOL/L (ref 21–32)
CREAT BLD-MCNC: 0.6 MG/DL
EGFRCR SERPLBLD CKD-EPI 2021: 85 ML/MIN/1.73M2 (ref 60–?)
EOSINOPHIL # BLD AUTO: 0.01 X10(3) UL (ref 0–0.7)
EOSINOPHIL NFR BLD AUTO: 0.1 %
ERYTHROCYTE [DISTWIDTH] IN BLOOD BY AUTOMATED COUNT: 17.2 %
GLOBULIN PLAS-MCNC: 4 G/DL (ref 2.8–4.4)
GLUCOSE BLD-MCNC: 98 MG/DL (ref 70–99)
HCT VFR BLD AUTO: 33.5 %
HGB BLD-MCNC: 9.7 G/DL
IMM GRANULOCYTES # BLD AUTO: 0.05 X10(3) UL (ref 0–1)
IMM GRANULOCYTES NFR BLD: 0.6 %
LACTATE SERPL-SCNC: 1.3 MMOL/L (ref 0.4–2)
LYMPHOCYTES # BLD AUTO: 0.24 X10(3) UL (ref 1–4)
LYMPHOCYTES NFR BLD AUTO: 2.8 %
MAGNESIUM SERPL-MCNC: 1.9 MG/DL (ref 1.6–2.6)
MCH RBC QN AUTO: 25.9 PG (ref 26–34)
MCHC RBC AUTO-ENTMCNC: 29 G/DL (ref 31–37)
MCV RBC AUTO: 89.6 FL
MONOCYTES # BLD AUTO: 0.49 X10(3) UL (ref 0.1–1)
MONOCYTES NFR BLD AUTO: 5.7 %
NEUTROPHILS # BLD AUTO: 7.76 X10 (3) UL (ref 1.5–7.7)
NEUTROPHILS # BLD AUTO: 7.76 X10(3) UL (ref 1.5–7.7)
NEUTROPHILS NFR BLD AUTO: 90.6 %
OSMOLALITY SERPL CALC.SUM OF ELEC: 282 MOSM/KG (ref 275–295)
PLATELET # BLD AUTO: 196 10(3)UL (ref 150–450)
POTASSIUM SERPL-SCNC: 4 MMOL/L (ref 3.5–5.1)
PROT SERPL-MCNC: 6.6 G/DL (ref 6.4–8.2)
RBC # BLD AUTO: 3.74 X10(6)UL
SODIUM SERPL-SCNC: 134 MMOL/L (ref 136–145)
WBC # BLD AUTO: 8.6 X10(3) UL (ref 4–11)

## 2023-10-25 PROCEDURE — 99232 SBSQ HOSP IP/OBS MODERATE 35: CPT | Performed by: HOSPITALIST

## 2023-10-25 PROCEDURE — 74230 X-RAY XM SWLNG FUNCJ C+: CPT | Performed by: HOSPITALIST

## 2023-10-25 NOTE — VIDEO SWALLOW STUDY NOTE
ADULT VIDEOFLUOROSCOPIC SWALLOWING STUDY    Admission Date: 10/22/2023  Evaluation Date: 10/25/23  Radiologist: Pilar Dietz    RECOMMENDATIONS   Diet Recommendations - Solids: Regular  Diet Recommendations - Liquids: Nectar thick liquids/ Mildly thick    Further Follow-up:  Follow Up Needed (Documentation Required): Yes  SLP Follow-up Date: 10/26/23  Compensatory Strategies Recommended: Small bites and sips  Aspiration Precautions: Upright position  Medication Administration Recommendations: Whole in puree  Treatment Plan/Recommendations: Dysphagia therapy    HISTORY   Background/Objective Information: Patient seen at bedside this morning and exhibited overt s/s of aspiration with thin liquids. Therefore, VFSS recommended. See previous SLP note for history. Problem List  Principal Problem:    Dyspnea, unspecified type  Active Problems:    Altered mental status, unspecified altered mental status type    Pleural effusion    Acute on chronic congestive heart failure, unspecified heart failure type ([address]    Acute cystitis with hematuria      Past Medical History  Past Medical History:   Diagnosis Date    Arrhythmia     Arthritis     Back problem     Cancer ([address]     Cataract     Osteoporosis     Personal history of antineoplastic chemotherapy        Current Diet Consistency: NPO        History of Recent: Difficulty breathing     Imaging results:   CXR 10/24/23  CONCLUSION:  Interval decrease in size of right pleural effusion status post thoracentesis. No measurable pneumothorax. Stable heart size and pulmonary vascularity. Stable left pleural effusion. There is scattered prominent interstitial markings throughout both lungs which have improved.          LOCATION:  [id number]                  Dictated by (CST): Yecenia Bansal MD on 10/24/2023 at 4:01 PM       Finalized by (CST): Yecenia Bansal MD on 10/24/2023 at 4:02 PM     Reason for Referral: R/O aspiration      Family/Patient Goals: none stated ASSESSMENT   DYSPHAGIA ASSESSMENT  Test completed in conjunction with Radiologist.  Patient Positioned: Upright;Midline. Patient Viewed: Lateral.  Patient Alertness: Fully alert. Consistencies Presented: Thin liquids; Nectar thick liquids/ Mildly thick;Puree;Hard solid to assess oropharyngeal swallow function and assess for compensatory strategies to improve safe swallow function. THIN LIQUIDS  Method of Presentation: Cup;Straw  Oral Phase of Swallow (VFSS - Thin Liquids): Within Functional Limits  Triggered at: Pyriform sinuses  Residue Severity, Location: Mild;Valleculae  Laryngeal Penetration: Before the swallow  Tracheal Aspiration: Before the swallow  Cough/Throat Clear Response: Yes  Cough/Throat Clear Effective: No  NECTAR THICK LIQUIDS/ MILDLY THICK  Method of Presentation: Cup  Oral Phase of Swallow (VFSS - Nectar Thick Liquids): Within Functional Limits  Triggered at: Valleculae  Residue Severity, Location: Mild;Valleculae  Laryngeal Penetration: Trace  Tracheal Aspiration: None     PUREE  Oral Phase of Swallow (VFSS - Puree): Within Functional Limits  Triggered at: Valleculae  Residue Severity, Location: Mild/Mod;Valleculae  Cleared/Reduced with: Secondary swallow  Laryngeal Penetration: None  Tracheal Aspiration: None     HARD SOLID  Oral Phase of Swallow (VFSS - Hard Solid): Within Functional Limits  Triggered at: Valleculae  Residue Severity, Location: Mild/Mod;Valleculae  Cleared/Reduced with: Secondary swallow  Laryngeal Penetration: None  Tracheal Aspiration: None  Penetration Aspiration Scale Score: Score 7: Material enters the airway, passes below the vocal folds, and is not ejected from the trachea despite effort       Overall Impression:   Patient presented with moderate oropharyngeal dysphagia characterized by delayed pharyngeal swallow initiation and mildly reduced base of tongue retraction.  This resulted in trace aspiration before the swallow with smaller bolus of thin liquid and serene aspiration before the swallow with larger bolus of thin liquid. Patient with immediate cough response following aspiration events, but cough was weak and non-clearing. Trace penetration, but no aspiration observed with mildly thick liquids. Recommend patient continue a regular diet and mildly thick liquids with strict aspiration precautions. Bolus size and rate of intake should be limited. Patient should be in an upright seated position for all PO intake. SLP will continue to follow to monitor diet tolerance and initiate dysphagia therapy. Education provided re: results and recommendations following exam. Suspect reinforcement needed as patient exhibiting mild confusion. GOALS  Goal #1 VFSS  Met   Goal #2 The patient will tolerate regular consistency and mildly thick liquids without overt signs or symptoms of aspiration with 95 % accuracy over 1-2 session(s). In Progress   Goal #3 The patient will utilize compensatory strategies as outlined by  VFSS including Small bites, Small sips, Upright 90 degrees with mild assistance 100 % of the time across 2 sessions. In Progress   Goal #4 Patient will complete pharyngeal strengthening exercises with 80% accuracy when provided mild cues within 3 visits. In Progress   Goal #5     Goal #6     Goal #7     Goal #8           EDUCATION/INSTRUCTION  Reviewed results and recommendations with patient/family/caregiver. Agreement/Understanding verbalized and all questions answered to their apparent satisfaction. INTERDISCIPLINARY COMMUNICATION  Reviewed results with Radiologist; agreement verbalized.                      FOLLOW UP  Treatment Plan/Recommendations: Dysphagia therapy           Thank you for your referral.   If you have any questions, please contact ADELAIDE Felix

## 2023-10-25 NOTE — PHYSICAL THERAPY NOTE
Attempted to see pt today for PT eval. Pt declined getting out of bed. Pt encouraged to get out of bed even to chair and pt continued to refuse. Pt not aware of being in the hospital and continued to ask when her daughter would be here to pick her up. Will continue to follow. Thank you.

## 2023-10-25 NOTE — PLAN OF CARE
On and off coughing noted with drinking liquids   Breathing almost the same as yesterday before Thoracentesis  2L O2 96%,desats on room air to 88%,pt sob with minimal activity  Continue IV Lasix  Video swallow done ,recommend nectar thick liquids  Aspiration precautions,Ate 60% for lunch  Continue to monitor  Problem: CARDIOVASCULAR - ADULT  Goal: Maintains optimal cardiac output and hemodynamic stability  Description: INTERVENTIONS:  - Monitor vital signs, rhythm, and trends  - Monitor for bleeding, hypotension and signs of decreased cardiac output  - Evaluate effectiveness of vasoactive medications to optimize hemodynamic stability  - Monitor arterial and/or venous puncture sites for bleeding and/or hematoma  - Assess quality of pulses, skin color and temperature  - Assess for signs of decreased coronary artery perfusion - ex.  Angina  - Evaluate fluid balance, assess for edema, trend weights  10/25/2023 1410 by Galindo Faye RN  Outcome: Progressing  10/25/2023 1410 by Galindo Faye RN  Outcome: Progressing  Goal: Absence of cardiac arrhythmias or at baseline  Description: INTERVENTIONS:  - Continuous cardiac monitoring, monitor vital signs, obtain 12 lead EKG if indicated  - Evaluate effectiveness of antiarrhythmic and heart rate control medications as ordered  - Initiate emergency measures for life threatening arrhythmias  - Monitor electrolytes and administer replacement therapy as ordered  10/25/2023 1410 by Galindo Faye RN  Outcome: Progressing  10/25/2023 1410 by Galindo Faye RN  Outcome: Progressing     Problem: RESPIRATORY - ADULT  Goal: Achieves optimal ventilation and oxygenation  Description: INTERVENTIONS:  - Assess for changes in respiratory status  - Assess for changes in mentation and behavior  - Position to facilitate oxygenation and minimize respiratory effort  - Oxygen supplementation based on oxygen saturation or ABGs  - Provide Smoking Cessation handout, if applicable  - Encourage broncho-pulmonary hygiene including cough, deep breathe, Incentive Spirometry  - Assess the need for suctioning and perform as needed  - Assess and instruct to report SOB or any respiratory difficulty  - Respiratory Therapy support as indicated  - Manage/alleviate anxiety  - Monitor for signs/symptoms of CO2 retention  10/25/2023 1410 by Dm Iraheta RN  Outcome: Progressing  10/25/2023 1410 by Dm Iraheta RN  Outcome: Progressing     Problem: SKIN/TISSUE INTEGRITY - ADULT  Goal: Incision(s), wounds(s) or drain site(s) healing without S/S of infection  Description: INTERVENTIONS:  - Assess and document risk factors for pressure ulcer development  - Assess and document skin integrity  - Assess and document dressing/incision, wound bed, drain sites and surrounding tissue  - Implement wound care per orders  - Initiate isolation precautions as appropriate  - Initiate Pressure Ulcer prevention bundle as indicated  10/25/2023 1410 by Dm Iraheta RN  Outcome: Progressing  10/25/2023 1410 by Dm Iraheta RN  Outcome: Progressing

## 2023-10-25 NOTE — SLP NOTE
ADULT SWALLOWING EVALUATION    ASSESSMENT    ASSESSMENT/OVERALL IMPRESSION:  Patient is a 79 y/o female admitted with AMS and dyspnea. PMHx significant for afib and pleural effusion. SLP order received to evaluate oropharyngeal swallow d/t concern for aspiration. Patient received alert in bed. Vocal quality was severely breathy which patient reported has been the case for a few weeks. She denied history of dysphagia symptoms and reported consuming a regular diet and thin liquids at baseline. Patient presented with suspected mistimed pharyngeal swallow initiation. Bolus acceptance was adequate without evidence of anterior bolus loss. Mastication and AP bolus transit were thorough and efficient without evidence of oral residue. Patient with immediate cough x3 with thin liquids via cup or straw. No overt s/s of aspiration observed with mildly thick liquids. Recommend patient initiate a regular diet and mildly thick liquids. Patient would benefit from further evaluation of oropharyngeal swallow via VFSS given acute onset of symptoms in the last day. Education provided re: results and recommendations; pt agreeable to plan.           RECOMMENDATIONS   Diet Recommendations - Solids: Regular  Diet Recommendations - Liquids: Nectar thick liquids/ Mildly thick                        Compensatory Strategies Recommended: Small bites and sips  Aspiration Precautions: Upright position  Medication Administration Recommendations: One pill at a time  Treatment Plan/Recommendations: Videofluoroscopic swallow study       HISTORY   MEDICAL HISTORY  Reason for Referral: R/O aspiration    Problem List  Principal Problem:    Dyspnea, unspecified type  Active Problems:    Altered mental status, unspecified altered mental status type    Pleural effusion    Acute on chronic congestive heart failure, unspecified heart failure type ([address]    Acute cystitis with hematuria      Past Medical History  Past Medical History:   Diagnosis Date Arrhythmia     Arthritis     Back problem     Cancer [facility])     Cataract     Osteoporosis     Personal history of antineoplastic chemotherapy           Diet Prior to Admission: Regular; Thin liquids       Patient/Family Goals: none stated    SWALLOWING HISTORY  Current Diet Consistency: NPO  Dysphagia History: as above  Imaging Results:   CXR 10/24/23  CONCLUSION:  Interval decrease in size of right pleural effusion status post thoracentesis. No measurable pneumothorax. Stable heart size and pulmonary vascularity. Stable left pleural effusion. There is scattered prominent interstitial markings throughout both lungs which have improved. LOCATION:  [id number]                  Dictated by (CST): Willa Moya MD on 10/24/2023 at 4:01 PM       Finalized by (CST): Willa Moya MD on 10/24/2023 at 4:02 PM       SUBJECTIVE       OBJECTIVE   ORAL MOTOR EXAMINATION  Dentition: Functional  Symmetry: Within Functional Limits  Strength: Within Functional Limits     Range of Motion: Within Functional Limits       Voice Quality: Breathy;Aphonic  Respiratory Status: Nasal cannula  Consistencies Trialed: Thin liquids; Nectar thick liquids;Puree;Hard solid  Method of Presentation: Staff/Clinician assistance  Patient Positioning: Upright;Midline    Oral Phase of Swallow: Within Functional Limits                      Pharyngeal Phase of Swallow: Impaired  Laryngeal Elevation Timing: Appears impaired        (Please note: Silent aspiration cannot be evaluated clinically.  Videofluoroscopic Swallow Study is required to rule-out silent aspiration.)    Esophageal Phase of Swallow: No complaints consistent with possible esophageal involvement  Comments: d/w RN              GOALS  Goal #1 VFSS  In Progress   Goal #2     Goal #3     Goal #4     Goal #5     Goal #6     Goal #7     Goal #8       FOLLOW UP  Treatment Plan/Recommendations: Videofluoroscopic swallow study     Follow Up Needed (Documentation Required): Yes  SLP Follow-up Date: 10/25/23    Thank you for your referral.   If you have any questions, please contact ADELAIDE Baires

## 2023-10-25 NOTE — PLAN OF CARE
Spoke with daughter Jon Abbott- discuss goals of care and to consult. Palliative. she decline Palliative consult.

## 2023-10-25 NOTE — PROGRESS NOTES
Assumed care of patient ~1530. Patient resting in bed. Aox3-4; confused at times. Currently on 2L NC, will wean as able. POC: IV rocephin, IV lasix daily. 1715: offered for patient to get into chair or edge of bed briefly, patient declined. Explained to patient importance of sitting in chair to prevent further weakness. Offered to turn patient, patient states she is comfortable and does not want to be turned.

## 2023-10-26 ENCOUNTER — APPOINTMENT (OUTPATIENT)
Dept: CT IMAGING | Facility: HOSPITAL | Age: 88
End: 2023-10-26
Attending: INTERNAL MEDICINE
Payer: MEDICARE

## 2023-10-26 LAB
ALBUMIN SERPL-MCNC: 2.1 G/DL (ref 3.4–5)
ALBUMIN/GLOB SERPL: 0.5 {RATIO} (ref 1–2)
ALP LIVER SERPL-CCNC: 61 U/L
ALT SERPL-CCNC: 17 U/L
ANION GAP SERPL CALC-SCNC: 2 MMOL/L (ref 0–18)
AST SERPL-CCNC: 17 U/L (ref 15–37)
BASOPHILS # BLD AUTO: 0.02 X10(3) UL (ref 0–0.2)
BASOPHILS NFR BLD AUTO: 0.2 %
BILIRUB SERPL-MCNC: 0.7 MG/DL (ref 0.1–2)
BUN BLD-MCNC: 26 MG/DL (ref 7–18)
CALCIUM BLD-MCNC: 7.9 MG/DL (ref 8.5–10.1)
CHLORIDE SERPL-SCNC: 101 MMOL/L (ref 98–112)
CO2 SERPL-SCNC: 31 MMOL/L (ref 21–32)
CREAT BLD-MCNC: 0.55 MG/DL
EGFRCR SERPLBLD CKD-EPI 2021: 86 ML/MIN/1.73M2 (ref 60–?)
EOSINOPHIL # BLD AUTO: 0.09 X10(3) UL (ref 0–0.7)
EOSINOPHIL NFR BLD AUTO: 1 %
ERYTHROCYTE [DISTWIDTH] IN BLOOD BY AUTOMATED COUNT: 17.6 %
GLOBULIN PLAS-MCNC: 4 G/DL (ref 2.8–4.4)
GLUCOSE BLD-MCNC: 102 MG/DL (ref 70–99)
HCT VFR BLD AUTO: 30.6 %
HGB BLD-MCNC: 9.1 G/DL
IMM GRANULOCYTES # BLD AUTO: 0.04 X10(3) UL (ref 0–1)
IMM GRANULOCYTES NFR BLD: 0.4 %
LYMPHOCYTES # BLD AUTO: 0.49 X10(3) UL (ref 1–4)
LYMPHOCYTES NFR BLD AUTO: 5.4 %
MAGNESIUM SERPL-MCNC: 1.8 MG/DL (ref 1.6–2.6)
MCH RBC QN AUTO: 26.6 PG (ref 26–34)
MCHC RBC AUTO-ENTMCNC: 29.7 G/DL (ref 31–37)
MCV RBC AUTO: 89.5 FL
MONOCYTES # BLD AUTO: 0.62 X10(3) UL (ref 0.1–1)
MONOCYTES NFR BLD AUTO: 6.9 %
NEUTROPHILS # BLD AUTO: 7.77 X10 (3) UL (ref 1.5–7.7)
NEUTROPHILS # BLD AUTO: 7.77 X10(3) UL (ref 1.5–7.7)
NEUTROPHILS NFR BLD AUTO: 86.1 %
OSMOLALITY SERPL CALC.SUM OF ELEC: 283 MOSM/KG (ref 275–295)
PLATELET # BLD AUTO: 188 10(3)UL (ref 150–450)
POTASSIUM SERPL-SCNC: 4.4 MMOL/L (ref 3.5–5.1)
PROT SERPL-MCNC: 6.1 G/DL (ref 6.4–8.2)
RBC # BLD AUTO: 3.42 X10(6)UL
SODIUM SERPL-SCNC: 134 MMOL/L (ref 136–145)
WBC # BLD AUTO: 9 X10(3) UL (ref 4–11)

## 2023-10-26 PROCEDURE — 99232 SBSQ HOSP IP/OBS MODERATE 35: CPT | Performed by: HOSPITALIST

## 2023-10-26 PROCEDURE — 71250 CT THORAX DX C-: CPT | Performed by: INTERNAL MEDICINE

## 2023-10-26 RX ORDER — MAGNESIUM OXIDE 400 MG/1
400 TABLET ORAL ONCE
Status: COMPLETED | OUTPATIENT
Start: 2023-10-26 | End: 2023-10-26

## 2023-10-26 RX ORDER — ENOXAPARIN SODIUM 100 MG/ML
40 INJECTION SUBCUTANEOUS DAILY
Status: DISCONTINUED | OUTPATIENT
Start: 2023-10-27 | End: 2023-10-29

## 2023-10-26 NOTE — PHYSICAL THERAPY NOTE
PHYSICAL THERAPY EVALUATION - INPATIENT     Room Number: 2931/9547-Y  Evaluation Date: 10/26/2023  Type of Evaluation: Initial  Physician Order: PT Eval and Treat    Presenting Problem: TME, pleural effusion s/p thoracentesis 10/24/23  Co-Morbidities : Afib, CHF, T11 and L4 compression fractures  Reason for Therapy: Mobility Dysfunction and Discharge Planning    History related to current admission: Patient is a 80year old female admitted on 10/22/2023 from home for AMS and dyspnea. Head CT (-). Pt diagnosed with TME and recurrent  B pleural effusions secondary to diastolic dysfunction. Pt s/p IR thoracentesis 10/24/23. Recent admission:   9/3- 9/9/23: pleural effusion sp thoracentesis 9/4 and 9/8, T11 and L4 compression fractures (from home and DC home)    ASSESSMENT   In this PT evaluation, the patient presents with the following impairments limited endurance, poor trunk control, LE strength deficits, B heel wounds, balance impairments, and decreased activity tolerance. These impairments and comorbidities manifest themselves as functional limitations in independent bed mobility, transfers, and gait. The patient is below baseline and would benefit from skilled inpatient PT to address the above deficits to assist patient in returning to prior to level of function. Functional outcome measures completed include [facility]. The AM-PAC '6-Clicks' Inpatient Basic Mobility Short Form was completed and this patient is demonstrating a Approx Degree of Impairment: 68.66%  degree of impairment in mobility. Research supports that patients with this level of impairment may benefit from [facility]. DISCHARGE RECOMMENDATIONS  PT Discharge Recommendations: Sub-acute rehabilitation    PLAN  PT Treatment Plan: Bed mobility; Endurance; Energy conservation;Patient education; Family education;Gait training;Strengthening;Balance training;Transfer training  Rehab Potential : Fair  Frequency (Obs): 3-5x/week  Number of Visits to Meet Established Goals: 5      CURRENT GOALS    Goal #1 Patient is able to demonstrate supine - sit EOB @ level: supervision     Goal #2 Patient is able to demonstrate transfers Sit to/from Stand at assistance level: minimum assistance     Goal #3 Patient is able to ambulate 50 feet with assist device: walker - rolling at assistance level: minimum assistance     Goal #4    Goal #5    Goal #6    Goal Comments: Goals established on 10/26/2023    HOME SITUATION  Type of Home: [address]: Two level; Able to live on main level                Lives With: Daughter  Drives: No  Patient Owned Equipment: Rolling walker  Patient Regularly Uses: Home O2    Prior Level of [place]: Pt lives with daughter in 2 story home. Pt typically independent with ADL and mobility. Pt reports she has not been ambulating with RW or cane. Pt does use stairs but reports she has bedrooms on the 1st floor if needed. Pt has home 02 but uses prn. SUBJECTIVE  \"You can take me downstairs to leave. \"       OBJECTIVE  Precautions: Bed/chair alarm;Spine  Fall Risk: High fall risk    WEIGHT BEARING RESTRICTION  Weight Bearing Restriction: None                PAIN ASSESSMENT  Rating: Unable to rate  Location: B heels  Management Techniques: Repositioning    COGNITION  Orientation Level:  oriented to person, disoriented to time, and disoriented to situation  Memory:  decreased short term memory  Following Commands:  follows one step commands with increased time and follows one step commands with repetition  Safety Judgement:  decreased awareness of need for assistance and decreased awareness of need for safety  Awareness of Errors:  decreased awareness of errors     RANGE OF MOTION AND STRENGTH ASSESSMENT  Upper extremity ROM and strength are within functional limits     Lower extremity ROM is within functional limits     Lower extremity strength is within functional limits except for the following:    Right Hip flexion  3+/5  Left Hip flexion 3+/5  Right Knee extension  3+/5  Left Knee extension  3+/5      BALANCE  Static Sitting: Fair  Dynamic Sitting: Fair -  Static Standing: Poor +  Dynamic Standing: Poor +    ADDITIONAL TESTS                                    ACTIVITY TOLERANCE                         O2 WALK  Oxygen Therapy  SPO2% on Room Air at Rest: 95  SPO2% on Oxygen at Rest: 98  At rest oxygen flow (liters per minute): 2    NEUROLOGICAL FINDINGS                        AM-PAC '6-Clicks' INPATIENT SHORT FORM - BASIC MOBILITY  How much difficulty does the patient currently have. .. Patient Difficulty: Turning over in bed (including adjusting bedclothes, sheets and blankets)?: A Little   Patient Difficulty: Sitting down on and standing up from a chair with arms (e.g., wheelchair, bedside commode, etc.): A Lot   Patient Difficulty: Moving from lying on back to sitting on the side of the bed?: A Lot   How much help from another person does the patient currently need. .. Help from Another: Moving to and from a bed to a chair (including a wheelchair)?: A Lot   Help from Another: Need to walk in hospital room?: A Lot   Help from Another: Climbing 3-5 steps with a railing?: Total       AM-PAC Score:  Raw Score: 12   Approx Degree of Impairment: 68.66%   Standardized Score (AM-PAC Scale): 35.33   CMS Modifier (G-Code): CL    FUNCTIONAL ABILITY STATUS  Gait Assessment   Functional Mobility/Gait Assessment  Gait Assistance: Minimum assistance  Distance (ft): 5  Assistive Device: Rolling walker  Pattern: Shuffle;L Foot flat;R Foot flat (excessive kyphosis)    Skilled Therapy Provided   Pt received supine in bed and is agreeable to therapy. Pt supine-sit slowly with MIN assist. Pt requires encouragement for initiation of movement and assist for LE/trunk support. Pt requires assist to don socks. Pt demonstrates fair static sitting balance at EOB. Pt sit-stand with RW and MAX assist for force generation. Pt given VC for posture and LE placement.  Pt stood statically for 2 min with MIN assist. Pt returned to sitting EOB due to fatigue and heel pain. Pt agreeable to sit up in bedside chair with encouragement. Pt sit-stand with RW and MAX assist for force generation. Pt ambulated 5ft with RW and MIN assist for balance/walker management. Pt ambulates with shuffle gait pattern, poor foot clearance, and excessive kyphosis. Pt given VC for posture and sequencing with RW. Pt returned to sitting up in bedside chair. Pt sit-stand with MAX assist for chair alarm pad placement. Pt returned to bedside chair. Reviewed POC and activity recommendations. Pt left with call light in reach and alarm donned. Bed Mobility:  Rolling: MIN  Supine to sit: MIN   Sit to supine: NT     Transfer Mobility:  Sit to stand: MAX   Stand to sit: MOD  Gait = MIN    Therapist's Comments: Recommend use of RW for transfers. 02 sat .>90% on 2L when 02 pleth accurate. Exercise/Education Provided:  Bed mobility  Energy conservation  Functional activity tolerated  Gait training  Posture  Strengthening  Transfer training    Patient End of Session: Up in chair;Needs met;Call light within reach;RN aware of session/findings; All patient questions and concerns addressed; Alarm set; Family present      Patient Evaluation Complexity Level:  History High - 3 or more personal factors and/or co-morbidities   Examination of body systems Moderate - addressing a total of 3 or more elements   Clinical Presentation Moderate - Evolving   Clinical Decision Making Moderate - Evolving       PT Session Time: 35 minutes  Therapeutic Activity: 15 minutes

## 2023-10-26 NOTE — SLP NOTE
SPEECH DAILY NOTE - INPATIENT    ASSESSMENT & PLAN   ASSESSMENT  Pt seen for dysphagia tx to assess tolerance with recommended diet, ensure proper utilization of aspiration precautions and provide pt/family education. Patient received alert, but confused, in bed. Speech was tangential. Patient was unable to recall participating in swallow study yesterday. Results and recommendations of exam were reviewed. Patient reported no PO intake today, but was unable to recall why. PO trials of mildly thick liquids were provided. No overt s/s of aspiration observed. Patient politely declined solid PO trials. SLP provided education re: pharyngeal strengthening exercises, effortful swallow and Martita, which she was able to complete with moderate cues. Recommend patient continue a regular diet with mildly thick liquids. SLP will continue to follow per POC. Diet Recommendations - Solids: Regular  Diet Recommendations - Liquids: Nectar thick liquids/ Mildly thick    Compensatory Strategies Recommended: Small bites and sips  Aspiration Precautions: Upright position  Medication Administration Recommendations: Whole in puree                     Discharge Recommendations  Discharge Recommendations/Plan: Sub-acute rehabilitation    Treatment Plan  Treatment Plan/Recommendations: Dysphagia therapy    Interdisciplinary Communication: NA            GOALS  Goal #1 VFSS  Met   Goal #2 The patient will tolerate regular consistency and mildly thick liquids without overt signs or symptoms of aspiration with 95 % accuracy over 1-2 session(s). In Progress   Goal #3 The patient will utilize compensatory strategies as outlined by  VFSS including Small bites, Small sips, Upright 90 degrees with mild assistance 100 % of the time across 2 sessions. In Progress   Goal #4 Patient will complete pharyngeal strengthening exercises with 80% accuracy when provided mild cues within 3 visits.      In Progress   Goal #5       Goal #6       Goal #7 Goal #8          FOLLOW UP  Follow Up Needed (Documentation Required): Yes  SLP Follow-up Date: 10/27/23       Session: 1    If you have any questions, please contact ADELAIDE Amin

## 2023-10-26 NOTE — PROGRESS NOTES
Pharmacy Note: Renal dose adjustment   Natalee Perez was originally prescribed enoxaparin 40 mg subcutaneous every 24 hours  which was renally dose adjusted at the time of the original order per P&T approved renal dosing protocol. Renal function has now improved. Estimated Creatinine Clearance: 54.5 mL/min (based on SCr of 0.55 mg/dL). Her calculated creatinine clearance is now > 30 mL/min, therefore, the dose has been changed back to the original order per P&T approved protocol.       Thank you,   Carmel Jorge, PharmD  10/26/2023 9:57 AM

## 2023-10-26 NOTE — PLAN OF CARE
Patient alert and oriented x 2-3; confused. On 2L NC. Up with x1-2 w/ walker. Afib on tele. Incontinent of bowel/bladder. No complaints of pain, chest pain/discomfort. Complaints of SOB, pulm aware. POC: IV lasix daily, IV abx for UTI. Call light within reach. Fall precautions in place.      Problem: Patient/Family Goals  Goal: Patient/Family Long Term Goal  Description: Patient's Long Term Goal: to go home    Interventions:  - wean O2 as able  -meds as ordered  - See additional Care Plan goals for specific interventions  Outcome: Progressing  Goal: Patient/Family Short Term Goal  Description: Patient's Short Term Goal: to feel better    Interventions:   - IV abx for UTI  -await urine cultures  - See additional Care Plan goals for specific interventions  Outcome: Progressing     Problem: Safety Risk - Non-Violent Restraints  Goal: Patient will remain free from self-harm  Description: INTERVENTIONS:  - Apply the least restrictive restraint to prevent harm  - Notify patient and family of reasons restraints applied  - Assess for any contributing factors to confusion (electrolyte disturbances, delirium, medications)  - Discontinue any unnecessary medical devices as soon as possible  - Assess the patient's physical comfort, circulation, skin condition, hydration, nutrition and elimination needs   - Reorient and redirection as needed  - Assess for the need to continue restraints  Outcome: Progressing     Problem: CARDIOVASCULAR - ADULT  Goal: Maintains optimal cardiac output and hemodynamic stability  Description: INTERVENTIONS:  - Monitor vital signs, rhythm, and trends  - Monitor for bleeding, hypotension and signs of decreased cardiac output  - Evaluate effectiveness of vasoactive medications to optimize hemodynamic stability  - Monitor arterial and/or venous puncture sites for bleeding and/or hematoma  - Assess quality of pulses, skin color and temperature  - Assess for signs of decreased coronary artery perfusion - ex. Angina  - Evaluate fluid balance, assess for edema, trend weights  Outcome: Progressing  Goal: Absence of cardiac arrhythmias or at baseline  Description: INTERVENTIONS:  - Continuous cardiac monitoring, monitor vital signs, obtain 12 lead EKG if indicated  - Evaluate effectiveness of antiarrhythmic and heart rate control medications as ordered  - Initiate emergency measures for life threatening arrhythmias  - Monitor electrolytes and administer replacement therapy as ordered  Outcome: Progressing     Problem: RESPIRATORY - ADULT  Goal: Achieves optimal ventilation and oxygenation  Description: INTERVENTIONS:  - Assess for changes in respiratory status  - Assess for changes in mentation and behavior  - Position to facilitate oxygenation and minimize respiratory effort  - Oxygen supplementation based on oxygen saturation or ABGs  - Provide Smoking Cessation handout, if applicable  - Encourage broncho-pulmonary hygiene including cough, deep breathe, Incentive Spirometry  - Assess the need for suctioning and perform as needed  - Assess and instruct to report SOB or any respiratory difficulty  - Respiratory Therapy support as indicated  - Manage/alleviate anxiety  - Monitor for signs/symptoms of CO2 retention  Outcome: Progressing     Problem: SKIN/TISSUE INTEGRITY - ADULT  Goal: Incision(s), wounds(s) or drain site(s) healing without S/S of infection  Description: INTERVENTIONS:  - Assess and document risk factors for pressure ulcer development  - Assess and document skin integrity  - Assess and document dressing/incision, wound bed, drain sites and surrounding tissue  - Implement wound care per orders  - Initiate isolation precautions as appropriate  - Initiate Pressure Ulcer prevention bundle as indicated  Outcome: Progressing

## 2023-10-26 NOTE — CONSULTS
[facility]  Report of Inpatient Wound Care Consultation    Rob Osei Patient Status:  Inpatient    1931 MRN [id number]   [facility] 2NE-A Attending An Desouza,    Hosp Day # 3 PCP Matt Hampton MD     Reason for Consultation:  Bilateral heels     History of Present Illness: Rob Osei is a a(n) 80year old female. Patient presents with pressure injuries to  bilateral heels. History:  Past Medical History:   Diagnosis Date    Arrhythmia     Arthritis     Back problem     Cancer ([address]     Cataract     Osteoporosis     Personal history of antineoplastic chemotherapy      Past Surgical History:   Procedure Laterality Date    OTHER SURGICAL HISTORY  2014    Procedure: HIP HEMIARTHROPLASTY/ BIPOLAR;  Surgeon: Kyra Miller MD;  Location: [address] [place] [address] OR    REMOVAL OF TONSILS,12+ Y/O        reports that she has never smoked. She has never used smokeless tobacco. She reports that she does not currently use alcohol. She reports that she does not use drugs.     Allergies:  @ALLERGY    Laboratory Data:  Lab Results   Component Value Date    WBC 9.0 10/26/2023    HGB 9.1 10/26/2023    HCT 30.6 10/26/2023    .0 10/26/2023    CREATSERUM 0.55 10/26/2023    BUN 26 10/26/2023     10/26/2023    K 4.4 10/26/2023     10/26/2023    CO2 31.0 10/26/2023     10/26/2023    CA 7.9 10/26/2023    ALB 2.1 10/26/2023    ALKPHO 61 10/26/2023    BILT 0.7 10/26/2023    TP 6.1 10/26/2023    AST 17 10/26/2023    ALT 17 10/26/2023    MG 1.8 10/26/2023         Impression:  Wound 10/24/23 Heel Left (Active)   Date First Assessed/Time First Assessed: 10/24/23 0904   Location: Heel  Wound Location Orientation: Left      Assessments 10/26/2023  4:11 PM   Wound Image      Drainage Amount None   Wound Length (cm) 1 cm   Wound Width (cm) 1 cm   Wound Surface Area (cm^2) 1 cm^2   Wound Depth (cm) 0 cm   Wound Volume (cm^3) 0 cm^3   Margins Attached edges   Nancy-wound Assessment Blanchable erythema   Wound Bed Eschar (%) 100 %   Wound Odor None   Shape eschar vs callous ? Pressure Injury Stage Unstageable       Wound 10/24/23 Heel Right (Active)   Date First Assessed/Time First Assessed: 10/24/23 0928   Location: Heel  Wound Location Orientation: Right      Assessments 10/26/2023  4:12 PM   Wound Image      Drainage Amount None   Wound Length (cm) 1.1 cm   Wound Width (cm) 1 cm   Wound Surface Area (cm^2) 1.1 cm^2   Wound Depth (cm) 0.2 cm   Wound Volume (cm^3) 0.22 cm^3   Margins Attached edges   Nancy-wound Assessment Blanchable erythema;Callous   Wound Granulation Tissue Pink;Spongy   Wound Bed Granulation (%) 5 %   Wound Bed Slough (%) 5 %   Wound Bed Eschar (%) 90 %   Wound Odor None   Pressure Injury Stage Unstageable   Local pulse : normal , palpable pulse  Capillary refill : < 3 seconds  Temperature : within normal limits     Wound Cleaning and Dressings:  Wound cleansing:  Cleanse with saline  Wound product: Paint with betadine. Cover with bordered gauze or bordered gauze. Dressing change frequency:  Change dressing daily and/or PRN    Compression Therapy:   Elevate leg(s) as much as possible      Miscellaneous/Additional Orders:  Offloading: Air mattress, Turn Q 2 hours, and Heel off-loading boot(s)  Miscellaneous orders: Increase dietary protein intake. Dietitian to evaluate protein supplements     Care Summary: Discussed Plan of Care at beside with patient. Patient verbally acknowledges understanding of all instructions and all questions were answered. Recommendations:  No x ray of heels on file  No arterial studies on file      Thank you for allowing me to participate in the care of your patient. Time Spent 30 minutes , Thank you.     Jada Farrell RN, BSN [facility]   Wound Care pager [phone]  10/26/2023  4:16 PM

## 2023-10-26 NOTE — CM/SW NOTE
10/26/23 1300   CM/SW Referral Data   Referral Source Social Work (self-referral)   Reason for Referral Discharge planning   Informant Patient;Daughter;EMR   Discharge Needs   Anticipated D/C needs Subacute rehab;Transportation services     HOME SITUATION  Type of Home: House   Home Layout: Two level; Able to live on main level     Lives With: Daughter  Drives: No  Patient Owned Equipment: Rolling walker  Patient Regularly Uses: Home O2     Prior Level of [place]: Pt lives with daughter in 2 story home. Pt typically independent with ADL and mobility. Pt reports she has not been ambulating with RW or cane. Pt does use stairs but reports she has bedrooms on the 1st floor if needed. Pt has home 02 but uses prn. (Per PT evaluation)      PT recommending [facility]. Met with patient and daughter Pancho Mcdaniel) at bedside to discuss discharge planning. Discussed PT rec of [facility], they were agreeable with trying [facility] again however would need to check Medicare days. She shared patient has been to several [facility]s before and have had negative experiences. She does NOT want [facility], [facility], [facility], or [facility]. They would prefer [facility] or [facility]. Explained will send referral to those to options and additional facilities to see their options available. Will need to check Medicare days with facility. Andreia Child shared she was at [facility] in May last so days may have re-set. Discussed back up plan if days have not rest and offered HH, they do not see benefit of HH. Sent [facility] referrals in aidin. Will need PASRR. SW/CM to remain available for support and/or discharge planning. Addendum 3:55pm: PASRR completed, letter uploaded to aidin.       LENNIE Delarosa  Discharge Planner  [phone]

## 2023-10-26 NOTE — PLAN OF CARE
Patient alert and oriented x 4. On room air. Family at bedside and helpful ,Afib  on cardiac monitor. Patient denies any chest pain or chest discomfort at this time. Patient voids, no BM during this shift ,patient resting with daughter , skin care wound care provided, all dressing changed and made comfortable, Plan of care updated, all questions answered. Safety precautions in place. Bed alarm on. Will continue to monitor tele/labs/vital signs closely. Plan  Ivabt rocephin , iv diuretic   Problem: CARDIOVASCULAR - ADULT  Goal: Maintains optimal cardiac output and hemodynamic stability  Description: INTERVENTIONS:  - Monitor vital signs, rhythm, and trends  - Monitor for bleeding, hypotension and signs of decreased cardiac output  - Evaluate effectiveness of vasoactive medications to optimize hemodynamic stability  - Monitor arterial and/or venous puncture sites for bleeding and/or hematoma  - Assess quality of pulses, skin color and temperature  - Assess for signs of decreased coronary artery perfusion - ex.  Angina  - Evaluate fluid balance, assess for edema, trend weights  Outcome: Progressing  Goal: Absence of cardiac arrhythmias or at baseline  Description: INTERVENTIONS:  - Continuous cardiac monitoring, monitor vital signs, obtain 12 lead EKG if indicated  - Evaluate effectiveness of antiarrhythmic and heart rate control medications as ordered  - Initiate emergency measures for life threatening arrhythmias  - Monitor electrolytes and administer replacement therapy as ordered  Outcome: Progressing     Problem: Safety Risk - Non-Violent Restraints  Goal: Patient will remain free from self-harm  Description: INTERVENTIONS:  - Apply the least restrictive restraint to prevent harm  - Notify patient and family of reasons restraints applied  - Assess for any contributing factors to confusion (electrolyte disturbances, delirium, medications)  - Discontinue any unnecessary medical devices as soon as possible  - Assess the patient's physical comfort, circulation, skin condition, hydration, nutrition and elimination needs   - Reorient and redirection as needed  - Assess for the need to continue restraints  Outcome: Progressing     Problem: RESPIRATORY - ADULT  Goal: Achieves optimal ventilation and oxygenation  Description: INTERVENTIONS:  - Assess for changes in respiratory status  - Assess for changes in mentation and behavior  - Position to facilitate oxygenation and minimize respiratory effort  - Oxygen supplementation based on oxygen saturation or ABGs  - Provide Smoking Cessation handout, if applicable  - Encourage broncho-pulmonary hygiene including cough, deep breathe, Incentive Spirometry  - Assess the need for suctioning and perform as needed  - Assess and instruct to report SOB or any respiratory difficulty  - Respiratory Therapy support as indicated  - Manage/alleviate anxiety  - Monitor for signs/symptoms of CO2 retention  Outcome: Progressing     Problem: SKIN/TISSUE INTEGRITY - ADULT  Goal: Incision(s), wounds(s) or drain site(s) healing without S/S of infection  Description: INTERVENTIONS:  - Assess and document risk factors for pressure ulcer development  - Assess and document skin integrity  - Assess and document dressing/incision, wound bed, drain sites and surrounding tissue  - Implement wound care per orders  - Initiate isolation precautions as appropriate  - Initiate Pressure Ulcer prevention bundle as indicated  Outcome: Progressing     Problem: RESPIRATORY - ADULT  Goal: Achieves optimal ventilation and oxygenation  Description: INTERVENTIONS:  - Assess for changes in respiratory status  - Assess for changes in mentation and behavior  - Position to facilitate oxygenation and minimize respiratory effort  - Oxygen supplementation based on oxygen saturation or ABGs  - Provide Smoking Cessation handout, if applicable  - Encourage broncho-pulmonary hygiene including cough, deep breathe, Incentive Spirometry  - Assess the need for suctioning and perform as needed  - Assess and instruct to report SOB or any respiratory difficulty  - Respiratory Therapy support as indicated  - Manage/alleviate anxiety  - Monitor for signs/symptoms of CO2 retention  Outcome: Progressing

## 2023-10-27 ENCOUNTER — APPOINTMENT (OUTPATIENT)
Dept: GENERAL RADIOLOGY | Facility: HOSPITAL | Age: 88
End: 2023-10-27
Attending: RADIOLOGY
Payer: MEDICARE

## 2023-10-27 ENCOUNTER — APPOINTMENT (OUTPATIENT)
Dept: ULTRASOUND IMAGING | Facility: HOSPITAL | Age: 88
End: 2023-10-27
Attending: INTERNAL MEDICINE
Payer: MEDICARE

## 2023-10-27 LAB
BASOPHILS NFR PLR: 0 %
COLOR FLD: YELLOW
EOSINOPHIL NFR PLR: 0 %
GLUCOSE PLR-MCNC: 99 MG/DL
INR BLD: 1.22 (ref 0.85–1.16)
LDH FLD L TO P-CCNC: 107 U/L
LYMPHOCYTES NFR PLR: 16 %
MAGNESIUM SERPL-MCNC: 2.1 MG/DL (ref 1.6–2.6)
MESOTHL CELL NFR PLR: 2 %
MONOS+MACROS NFR PLR: 19 %
NEUTROPHILS NFR PLR: 63 %
PROT PLR-MCNC: 2.5 G/DL
PROTHROMBIN TIME: 15.4 SECONDS (ref 11.6–14.8)
RBC PLEURAL FLUID: 6000 /MM3
TOTAL CELLS COUNTED FLD: 100
WBC # PLR: 412 /MM3

## 2023-10-27 PROCEDURE — 89050 BODY FLUID CELL COUNT: CPT | Performed by: INTERNAL MEDICINE

## 2023-10-27 PROCEDURE — 99232 SBSQ HOSP IP/OBS MODERATE 35: CPT | Performed by: HOSPITALIST

## 2023-10-27 PROCEDURE — 83615 LACTATE (LD) (LDH) ENZYME: CPT | Performed by: INTERNAL MEDICINE

## 2023-10-27 PROCEDURE — 82945 GLUCOSE OTHER FLUID: CPT | Performed by: INTERNAL MEDICINE

## 2023-10-27 PROCEDURE — 71045 X-RAY EXAM CHEST 1 VIEW: CPT | Performed by: RADIOLOGY

## 2023-10-27 PROCEDURE — 84157 ASSAY OF PROTEIN OTHER: CPT | Performed by: INTERNAL MEDICINE

## 2023-10-27 PROCEDURE — 0W9B3ZZ DRAINAGE OF LEFT PLEURAL CAVITY, PERCUTANEOUS APPROACH: ICD-10-PCS | Performed by: RADIOLOGY

## 2023-10-27 PROCEDURE — 89051 BODY FLUID CELL COUNT: CPT | Performed by: INTERNAL MEDICINE

## 2023-10-27 PROCEDURE — 32555 ASPIRATE PLEURA W/ IMAGING: CPT | Performed by: INTERNAL MEDICINE

## 2023-10-27 NOTE — CM/SW NOTE
Spoke with patient's daughter Ariana [place]) regarding discharge planning. Discussed accepting [facility] list, informed her that [facility] [address] is able to accept and [facility] is able to accept pending information on when Prolia injection is due next. Cecelia Fajardo shared that patient recently received a Prolia Injection and likely isn't due for another until the spring. She plans to reach out to PCP with date she received it and SW to try reaching out as well. Informed her [facility] confirmed patient's days have re-set. SW placed accepting [facility] list in patient's room per Jada's request. RN updated. Attempted to call both Dr. Brooks Tom office numbers, was able to leave a voice mail at the second number asking about Prolia. Await call back. Updated [place] [facility] with information above. Await [facility] choice and patient' to be medically cleared for discharge. SW will remain available. PCP: Dr. [facility]  [phone]    Addendum 3:45pm: Spoke with Cecelia Fajardo who confirmed with Dr. Lopez  office that patient received Prolia injection on 8/30/23 and next injection is due in 6 months. Updated [place] [facility].        LENNIE Schneider  Discharge Planner  [phone]

## 2023-10-27 NOTE — OCCUPATIONAL THERAPY NOTE
OCCUPATIONAL THERAPY                    OT order received, chart reviewed. Patient declined OT evaluation today. She stated that she is is waiting for a procedure. Scheduled for thoracentesis per RN. Will re-attempt later as able and as appropriate.

## 2023-10-27 NOTE — IMAGING NOTE
Patient received in holding. All pertinent labs reviewed. 700 cc removed by DR Mitchell  Puncture site to left lower back with tegaderm dressing in place, C/D/I. Patient denies pain. Report given to [facility]. Patient transported to [phone] accompanied by transport.

## 2023-10-27 NOTE — PLAN OF CARE
Patient alert and oriented x 3 sleepy and weak, . On 2L o2 support ,Afib on cardiac monitor. hr  controlled Patient denies any chest pain or chest discomfort at this time. Patient voids, last BM 10/25., total care , skin care provided, assisted to feed and hydrated , all dressing changed ,Plan of care updated, all questions answered. Safety precautions in place. Bed alarm on. Will continue to monitor tele/labs/vital signs closely. Problem: CARDIOVASCULAR - ADULT  Goal: Maintains optimal cardiac output and hemodynamic stability  Description: INTERVENTIONS:  - Monitor vital signs, rhythm, and trends  - Monitor for bleeding, hypotension and signs of decreased cardiac output  - Evaluate effectiveness of vasoactive medications to optimize hemodynamic stability  - Monitor arterial and/or venous puncture sites for bleeding and/or hematoma  - Assess quality of pulses, skin color and temperature  - Assess for signs of decreased coronary artery perfusion - ex.  Angina  - Evaluate fluid balance, assess for edema, trend weights  Outcome: Progressing  Goal: Absence of cardiac arrhythmias or at baseline  Description: INTERVENTIONS:  - Continuous cardiac monitoring, monitor vital signs, obtain 12 lead EKG if indicated  - Evaluate effectiveness of antiarrhythmic and heart rate control medications as ordered  - Initiate emergency measures for life threatening arrhythmias  - Monitor electrolytes and administer replacement therapy as ordered  Outcome: Progressing     Problem: Safety Risk - Non-Violent Restraints  Goal: Patient will remain free from self-harm  Description: INTERVENTIONS:  - Apply the least restrictive restraint to prevent harm  - Notify patient and family of reasons restraints applied  - Assess for any contributing factors to confusion (electrolyte disturbances, delirium, medications)  - Discontinue any unnecessary medical devices as soon as possible  - Assess the patient's physical comfort, circulation, skin condition, hydration, nutrition and elimination needs   - Reorient and redirection as needed  - Assess for the need to continue restraints  Outcome: Progressing     Problem: RESPIRATORY - ADULT  Goal: Achieves optimal ventilation and oxygenation  Description: INTERVENTIONS:  - Assess for changes in respiratory status  - Assess for changes in mentation and behavior  - Position to facilitate oxygenation and minimize respiratory effort  - Oxygen supplementation based on oxygen saturation or ABGs  - Provide Smoking Cessation handout, if applicable  - Encourage broncho-pulmonary hygiene including cough, deep breathe, Incentive Spirometry  - Assess the need for suctioning and perform as needed  - Assess and instruct to report SOB or any respiratory difficulty  - Respiratory Therapy support as indicated  - Manage/alleviate anxiety  - Monitor for signs/symptoms of CO2 retention  Outcome: Progressing     Problem: SKIN/TISSUE INTEGRITY - ADULT  Goal: Incision(s), wounds(s) or drain site(s) healing without S/S of infection  Description: INTERVENTIONS:  - Assess and document risk factors for pressure ulcer development  - Assess and document skin integrity  - Assess and document dressing/incision, wound bed, drain sites and surrounding tissue  - Implement wound care per orders  - Initiate isolation precautions as appropriate  - Initiate Pressure Ulcer prevention bundle as indicated  Outcome: Progressing

## 2023-10-27 NOTE — PROCEDURES
[facility]  Procedure Note    Raza Holden Patient Status:  Inpatient    1931 MRN [id number]   [facility] 2NE-A Attending Brenda Olmstead DO   Hosp Day # 4 PCP Lis Krishna MD     Procedure: Left thoracentesis    Pre-Procedure Diagnosis:  Left pleural effusion    Post-Procedure Diagnosis: Same    Anesthesia:  Local    Findings:  Serous fluid    Specimens: See dictation    Blood Loss:  < 5 cc    Tourniquet Time: None  Complications:  None  Drains:  none    Secondary Diagnosis:  N/A    Virgilio Faust MD  10/27/2023

## 2023-10-27 NOTE — PLAN OF CARE
Patient alert and oriented x 2-3; confused. On 1L NC. Up with x2 w/ walker. Afib on tele. Incontinent of bowel/bladder. No complaints of pain, shortness of breath, chest pain/discomfort. POC: L side thoracentesis today. Call light within reach. Fall precautions in place. 1850: Daughter called and updated on result of thoracentesis.  All questions answered  Problem: Patient/Family Goals  Goal: Patient/Family Long Term Goal  Description: Patient's Long Term Goal: to go home    Interventions:  - procedures as ordered  -labs as ordered  - See additional Care Plan goals for specific interventions  Outcome: Progressing  Goal: Patient/Family Short Term Goal  Description: Patient's Short Term Goal: to feel better    Interventions:   - wean O2 as able  -pain meds as needed  - See additional Care Plan goals for specific interventions  Outcome: Progressing     Problem: Safety Risk - Non-Violent Restraints  Goal: Patient will remain free from self-harm  Description: INTERVENTIONS:  - Apply the least restrictive restraint to prevent harm  - Notify patient and family of reasons restraints applied  - Assess for any contributing factors to confusion (electrolyte disturbances, delirium, medications)  - Discontinue any unnecessary medical devices as soon as possible  - Assess the patient's physical comfort, circulation, skin condition, hydration, nutrition and elimination needs   - Reorient and redirection as needed  - Assess for the need to continue restraints  Outcome: Progressing     Problem: CARDIOVASCULAR - ADULT  Goal: Maintains optimal cardiac output and hemodynamic stability  Description: INTERVENTIONS:  - Monitor vital signs, rhythm, and trends  - Monitor for bleeding, hypotension and signs of decreased cardiac output  - Evaluate effectiveness of vasoactive medications to optimize hemodynamic stability  - Monitor arterial and/or venous puncture sites for bleeding and/or hematoma  - Assess quality of pulses, skin color and temperature  - Assess for signs of decreased coronary artery perfusion - ex.  Angina  - Evaluate fluid balance, assess for edema, trend weights  Outcome: Progressing  Goal: Absence of cardiac arrhythmias or at baseline  Description: INTERVENTIONS:  - Continuous cardiac monitoring, monitor vital signs, obtain 12 lead EKG if indicated  - Evaluate effectiveness of antiarrhythmic and heart rate control medications as ordered  - Initiate emergency measures for life threatening arrhythmias  - Monitor electrolytes and administer replacement therapy as ordered  Outcome: Progressing     Problem: RESPIRATORY - ADULT  Goal: Achieves optimal ventilation and oxygenation  Description: INTERVENTIONS:  - Assess for changes in respiratory status  - Assess for changes in mentation and behavior  - Position to facilitate oxygenation and minimize respiratory effort  - Oxygen supplementation based on oxygen saturation or ABGs  - Provide Smoking Cessation handout, if applicable  - Encourage broncho-pulmonary hygiene including cough, deep breathe, Incentive Spirometry  - Assess the need for suctioning and perform as needed  - Assess and instruct to report SOB or any respiratory difficulty  - Respiratory Therapy support as indicated  - Manage/alleviate anxiety  - Monitor for signs/symptoms of CO2 retention  Outcome: Progressing     Problem: SKIN/TISSUE INTEGRITY - ADULT  Goal: Incision(s), wounds(s) or drain site(s) healing without S/S of infection  Description: INTERVENTIONS:  - Assess and document risk factors for pressure ulcer development  - Assess and document skin integrity  - Assess and document dressing/incision, wound bed, drain sites and surrounding tissue  - Implement wound care per orders  - Initiate isolation precautions as appropriate  - Initiate Pressure Ulcer prevention bundle as indicated  Outcome: Progressing

## 2023-10-27 NOTE — SLP NOTE
Patient is NPO for thoracentesis today as a result unable to see patient for follow up for meal monitor.

## 2023-10-28 PROCEDURE — 99232 SBSQ HOSP IP/OBS MODERATE 35: CPT | Performed by: HOSPITALIST

## 2023-10-28 RX ORDER — FUROSEMIDE 20 MG/1
20 TABLET ORAL DAILY
Status: DISCONTINUED | OUTPATIENT
Start: 2023-10-28 | End: 2023-10-29

## 2023-10-28 NOTE — CM/SW NOTE
Spoke with daughter to discuss [facility] choice. Daughter wanted to triple check Medicare/insurance BRAD[facility] days that pt has remaining. SW indicated that [facility] was able to verify days and pt has full days remaining. SW requested to have daughter update on [facility] choice once available.      CATHERINE Harris, [facility]  Discharge [address] [facility].

## 2023-10-28 NOTE — PLAN OF CARE
Assumed care of pt at 1930  A/Ox2, oriented to person and situation. 1L NC w/ adequate o2 sats. Afib on tele. HR 90s-100s. Pt denies shortness of breath or chest pain. Pt endorses pain on L side. Declines pain meds at this time. Non-pharmacologic interventions implemented. Bandaid to L back incision post L thora. Old drainage present. Incontinent of bladder and bowel. Voiding per purewick. Fall precautions in place. Call light in reach. Daughter at bedside. Both updated on plan of care.      Problem: Patient/Family Goals  Goal: Patient/Family Long Term Goal  Description: Patient's Long Term Goal: \"go home\"     Interventions:  - medications as ordered by physician   - testing as ordered by physician   - PT/OT  - See additional Care Plan goals for specific interventions  Outcome: Progressing  Goal: Patient/Family Short Term Goal  Description: Patient's Short Term Goal: \"breathe better\"     Interventions:   - medications as ordered by physician   - testing as ordered by physician   - see pulmonology   - See additional Care Plan goals for specific interventions  Outcome: Progressing     Problem: Safety Risk - Non-Violent Restraints  Goal: Patient will remain free from self-harm  Description: INTERVENTIONS:  - Apply the least restrictive restraint to prevent harm  - Notify patient and family of reasons restraints applied  - Assess for any contributing factors to confusion (electrolyte disturbances, delirium, medications)  - Discontinue any unnecessary medical devices as soon as possible  - Assess the patient's physical comfort, circulation, skin condition, hydration, nutrition and elimination needs   - Reorient and redirection as needed  - Assess for the need to continue restraints  Outcome: Progressing     Problem: CARDIOVASCULAR - ADULT  Goal: Maintains optimal cardiac output and hemodynamic stability  Description: INTERVENTIONS:  - Monitor vital signs, rhythm, and trends  - Monitor for bleeding, hypotension and signs of decreased cardiac output  - Evaluate effectiveness of vasoactive medications to optimize hemodynamic stability  - Monitor arterial and/or venous puncture sites for bleeding and/or hematoma  - Assess quality of pulses, skin color and temperature  - Assess for signs of decreased coronary artery perfusion - ex.  Angina  - Evaluate fluid balance, assess for edema, trend weights  Outcome: Progressing  Goal: Absence of cardiac arrhythmias or at baseline  Description: INTERVENTIONS:  - Continuous cardiac monitoring, monitor vital signs, obtain 12 lead EKG if indicated  - Evaluate effectiveness of antiarrhythmic and heart rate control medications as ordered  - Initiate emergency measures for life threatening arrhythmias  - Monitor electrolytes and administer replacement therapy as ordered  Outcome: Progressing     Problem: RESPIRATORY - ADULT  Goal: Achieves optimal ventilation and oxygenation  Description: INTERVENTIONS:  - Assess for changes in respiratory status  - Assess for changes in mentation and behavior  - Position to facilitate oxygenation and minimize respiratory effort  - Oxygen supplementation based on oxygen saturation or ABGs  - Provide Smoking Cessation handout, if applicable  - Encourage broncho-pulmonary hygiene including cough, deep breathe, Incentive Spirometry  - Assess the need for suctioning and perform as needed  - Assess and instruct to report SOB or any respiratory difficulty  - Respiratory Therapy support as indicated  - Manage/alleviate anxiety  - Monitor for signs/symptoms of CO2 retention  Outcome: Progressing     Problem: SKIN/TISSUE INTEGRITY - ADULT  Goal: Incision(s), wounds(s) or drain site(s) healing without S/S of infection  Description: INTERVENTIONS:  - Assess and document risk factors for pressure ulcer development  - Assess and document skin integrity  - Assess and document dressing/incision, wound bed, drain sites and surrounding tissue  - Implement wound care per orders  - Initiate isolation precautions as appropriate  - Initiate Pressure Ulcer prevention bundle as indicated  Outcome: Progressing

## 2023-10-29 VITALS
BODY MASS INDEX: 16 KG/M2 | OXYGEN SATURATION: 92 % | SYSTOLIC BLOOD PRESSURE: 108 MMHG | RESPIRATION RATE: 18 BRPM | TEMPERATURE: 98 F | WEIGHT: 104.75 LBS | DIASTOLIC BLOOD PRESSURE: 74 MMHG | HEART RATE: 105 BPM

## 2023-10-29 LAB
ANION GAP SERPL CALC-SCNC: 6 MMOL/L (ref 0–18)
BASOPHILS # BLD AUTO: 0.03 X10(3) UL (ref 0–0.2)
BASOPHILS NFR BLD AUTO: 0.4 %
BUN BLD-MCNC: 17 MG/DL (ref 7–18)
CALCIUM BLD-MCNC: 8.4 MG/DL (ref 8.5–10.1)
CHLORIDE SERPL-SCNC: 100 MMOL/L (ref 98–112)
CO2 SERPL-SCNC: 33 MMOL/L (ref 21–32)
CREAT BLD-MCNC: 0.48 MG/DL
EGFRCR SERPLBLD CKD-EPI 2021: 89 ML/MIN/1.73M2 (ref 60–?)
EOSINOPHIL # BLD AUTO: 0.09 X10(3) UL (ref 0–0.7)
EOSINOPHIL NFR BLD AUTO: 1.3 %
ERYTHROCYTE [DISTWIDTH] IN BLOOD BY AUTOMATED COUNT: 17.6 %
GLUCOSE BLD-MCNC: 99 MG/DL (ref 70–99)
HCT VFR BLD AUTO: 31.2 %
HGB BLD-MCNC: 9.8 G/DL
IMM GRANULOCYTES # BLD AUTO: 0.03 X10(3) UL (ref 0–1)
IMM GRANULOCYTES NFR BLD: 0.4 %
LYMPHOCYTES # BLD AUTO: 0.85 X10(3) UL (ref 1–4)
LYMPHOCYTES NFR BLD AUTO: 12.3 %
MAGNESIUM SERPL-MCNC: 1.7 MG/DL (ref 1.6–2.6)
MCH RBC QN AUTO: 26.1 PG (ref 26–34)
MCHC RBC AUTO-ENTMCNC: 31.4 G/DL (ref 31–37)
MCV RBC AUTO: 83.2 FL
MONOCYTES # BLD AUTO: 0.56 X10(3) UL (ref 0.1–1)
MONOCYTES NFR BLD AUTO: 8.1 %
NEUTROPHILS # BLD AUTO: 5.33 X10 (3) UL (ref 1.5–7.7)
NEUTROPHILS # BLD AUTO: 5.33 X10(3) UL (ref 1.5–7.7)
NEUTROPHILS NFR BLD AUTO: 77.5 %
OSMOLALITY SERPL CALC.SUM OF ELEC: 290 MOSM/KG (ref 275–295)
PLATELET # BLD AUTO: 227 10(3)UL (ref 150–450)
POTASSIUM SERPL-SCNC: 4.1 MMOL/L (ref 3.5–5.1)
RBC # BLD AUTO: 3.75 X10(6)UL
SODIUM SERPL-SCNC: 139 MMOL/L (ref 136–145)
WBC # BLD AUTO: 6.9 X10(3) UL (ref 4–11)

## 2023-10-29 PROCEDURE — 99239 HOSP IP/OBS DSCHRG MGMT >30: CPT | Performed by: HOSPITALIST

## 2023-10-29 RX ORDER — FUROSEMIDE 20 MG/1
20 TABLET ORAL DAILY
Qty: 30 TABLET | Refills: 0 | Status: SHIPPED | OUTPATIENT
Start: 2023-10-29

## 2023-10-29 NOTE — PROGRESS NOTES
Discharge Note:     Patient tele discontinued,   Patient did not have  IV prior to discharge, was removed yesterday, MD was aware of this. Patient escorted on stretcher via EMS to rehab [facility]. Daughter updated and meeting pt at facility. Discharge paperwork provided to EMS. Report given to ANJELICA Faith from [facility], answered all questions.

## 2023-10-29 NOTE — CM/SW NOTE
VM left for daughter to discuss DC plans and f/u with choice. Await call back. 1:15- second VM left for [facility]. Verbalized in VM of pt being medically cleared for DC and will need choice. RN aware. ADDENDUM:   Received call back from pt dtr. Minh Ann for [employer]. Reserved in [place] and spoke with liaison, Aiyana Castro. Bed available today, asking for a later DC.  set up amb transport for 5:30 PM, PCS completed. Dtr and RN aware. Message sent to [place] on [address] to call [place] at  [phone] for report.         10/29/23 1400   Discharge disposition   Expected discharge disposition subacute   Post Acute Care Provider [facility]   Discharge transportation [place] [id number] [address], [place]  Discharge [address] [facility]

## 2023-10-29 NOTE — PLAN OF CARE
Shift Note:  Assumed care of patient. Patient a bit lethargic and oriented to self only during assessment, very soft spoken. Patient desating to 80s, placed on 3L, SpO2 >91%, denies difficulty breathing, lung sounds diminished. Denies any cardiac symptoms, NSR with HR in 90s, low 100s on tele. Denies pain at this time. Incontinent of bowel and bladder, purewick and brief in place. Ambulates with 2 max assist and a walker, call light within reach, tolerating care well.      POC:  - [facility] at discharge, daughter to select, SW updated

## 2023-10-29 NOTE — PLAN OF CARE
Assumed care at [address] [place] [address]. Pt is A&O x2, drowsy. Pt is on 1L O2 NC, O2 sats WNL. Afib on tele, VSS. Incontinent of B&B, purewick in place. Denies pain at this time. Up  x 2 w/ walker, tolerating well. Plan of care reviewed with patient and family, verbalizes understanding, all needs addressed at this time, pt seems to be resting comfortably. Call light within reach. POC: Wean O2, poss DC 10/29?     Problem: Patient/Family Goals  Goal: Patient/Family Long Term Goal  Description: Patient's Long Term Goal: to go home    Interventions:  - comply to care plan   - follow diet orders  - See additional Care Plan goals for specific interventions  10/28/2023 2259 by Zenobia Greene RN  Outcome: Progressing  10/28/2023 2258 by Zenobia Greene RN  Outcome: Progressing  Goal: Patient/Family Short Term Goal  Description: Patient's Short Term Goal: to feel better    Interventions:   - take medications as prescribed  - Mds to see  - See additional Care Plan goals for specific interventions  10/28/2023 2259 by Zenobia Greene RN  Outcome: Progressing  10/28/2023 2258 by Zenobia Greene RN  Outcome: Progressing     Problem: Safety Risk - Non-Violent Restraints  Goal: Patient will remain free from self-harm  Description: INTERVENTIONS:  - Apply the least restrictive restraint to prevent harm  - Notify patient and family of reasons restraints applied  - Assess for any contributing factors to confusion (electrolyte disturbances, delirium, medications)  - Discontinue any unnecessary medical devices as soon as possible  - Assess the patient's physical comfort, circulation, skin condition, hydration, nutrition and elimination needs   - Reorient and redirection as needed  - Assess for the need to continue restraints  10/28/2023 2259 by Zenobia Greene RN  Outcome: Progressing  10/28/2023 2258 by Zenobia Greene RN  Outcome: Progressing     Problem: CARDIOVASCULAR - ADULT  Goal: Maintains optimal cardiac output and hemodynamic stability  Description: INTERVENTIONS:  - Monitor vital signs, rhythm, and trends  - Monitor for bleeding, hypotension and signs of decreased cardiac output  - Evaluate effectiveness of vasoactive medications to optimize hemodynamic stability  - Monitor arterial and/or venous puncture sites for bleeding and/or hematoma  - Assess quality of pulses, skin color and temperature  - Assess for signs of decreased coronary artery perfusion - ex.  Angina  - Evaluate fluid balance, assess for edema, trend weights  10/28/2023 2259 by Moises Russell RN  Outcome: Progressing  10/28/2023 2258 by Moises Russell RN  Outcome: Progressing  Goal: Absence of cardiac arrhythmias or at baseline  Description: INTERVENTIONS:  - Continuous cardiac monitoring, monitor vital signs, obtain 12 lead EKG if indicated  - Evaluate effectiveness of antiarrhythmic and heart rate control medications as ordered  - Initiate emergency measures for life threatening arrhythmias  - Monitor electrolytes and administer replacement therapy as ordered  10/28/2023 2259 by Moises Russell RN  Outcome: Progressing  10/28/2023 2258 by Moises Russell RN  Outcome: Progressing     Problem: RESPIRATORY - ADULT  Goal: Achieves optimal ventilation and oxygenation  Description: INTERVENTIONS:  - Assess for changes in respiratory status  - Assess for changes in mentation and behavior  - Position to facilitate oxygenation and minimize respiratory effort  - Oxygen supplementation based on oxygen saturation or ABGs  - Provide Smoking Cessation handout, if applicable  - Encourage broncho-pulmonary hygiene including cough, deep breathe, Incentive Spirometry  - Assess the need for suctioning and perform as needed  - Assess and instruct to report SOB or any respiratory difficulty  - Respiratory Therapy support as indicated  - Manage/alleviate anxiety  - Monitor for signs/symptoms of CO2 retention  10/28/2023 2259 by Moises Russell RN  Outcome: Progressing  10/28/2023 2258 by Alicia Gilmore RN  Outcome: Progressing     Problem: SKIN/TISSUE INTEGRITY - ADULT  Goal: Incision(s), wounds(s) or drain site(s) healing without S/S of infection  Description: INTERVENTIONS:  - Assess and document risk factors for pressure ulcer development  - Assess and document skin integrity  - Assess and document dressing/incision, wound bed, drain sites and surrounding tissue  - Implement wound care per orders  - Initiate isolation precautions as appropriate  - Initiate Pressure Ulcer prevention bundle as indicated  10/28/2023 2259 by Alicia Gilmore RN  Outcome: Progressing  10/28/2023 2258 by Alicia Gilmore, RN  Outcome: Progressing

## 2023-10-29 NOTE — PLAN OF CARE
Received report on this Pt with bedside rounding at 0730. Pt A&O, forgetful at times. Pt's is in AFib on Tele monitor, sats greater than 92% on RA, 1L Oxygen applied per NC when repositioning. PRN Tylenol administered x1 per Pt request for heel pain. Offered to get Pt out of bed and in the chair, Pt declined a couple times. This afternoon, Pt agreed, was washed up, dressings changed, Pt stood up next to bed for a minute, then said she didn't want to sit in the chair, so Pt turned and repositioned in bed. Pt's IV was out at shift change, Pt's daughter spent the night, stated, \"My mom took her IV out\". Attempted to restart IV, unable. Paged and spoke with Dr. Miguel Chappell at bedside, orders received to keep IV out, and he changed Lasix to po, administered. Plan for discharge tomorrow to [facility]. Pt's daughter, Bhavna Charles was at bedside, POC discussed with her, her questions answered, she left for the afternoon, said she'll come back this evening. Report given to night nurse at this time with bedside rounding.       Problem: Safety Risk - Non-Violent Restraints  Goal: Patient will remain free from self-harm  Description: INTERVENTIONS:  - Apply the least restrictive restraint to prevent harm  - Notify patient and family of reasons restraints applied  - Assess for any contributing factors to confusion (electrolyte disturbances, delirium, medications)  - Discontinue any unnecessary medical devices as soon as possible  - Assess the patient's physical comfort, circulation, skin condition, hydration, nutrition and elimination needs   - Reorient and redirection as needed  - Assess for the need to continue restraints  Outcome: Progressing     Problem: CARDIOVASCULAR - ADULT  Goal: Maintains optimal cardiac output and hemodynamic stability  Description: INTERVENTIONS:  - Monitor vital signs, rhythm, and trends  - Monitor for bleeding, hypotension and signs of decreased cardiac output  - Evaluate effectiveness of vasoactive medications to optimize hemodynamic stability  - Monitor arterial and/or venous puncture sites for bleeding and/or hematoma  - Assess quality of pulses, skin color and temperature  - Assess for signs of decreased coronary artery perfusion - ex.  Angina  - Evaluate fluid balance, assess for edema, trend weights  Outcome: Progressing  Goal: Absence of cardiac arrhythmias or at baseline  Description: INTERVENTIONS:  - Continuous cardiac monitoring, monitor vital signs, obtain 12 lead EKG if indicated  - Evaluate effectiveness of antiarrhythmic and heart rate control medications as ordered  - Initiate emergency measures for life threatening arrhythmias  - Monitor electrolytes and administer replacement therapy as ordered  Outcome: Progressing     Problem: RESPIRATORY - ADULT  Goal: Achieves optimal ventilation and oxygenation  Description: INTERVENTIONS:  - Assess for changes in respiratory status  - Assess for changes in mentation and behavior  - Position to facilitate oxygenation and minimize respiratory effort  - Oxygen supplementation based on oxygen saturation or ABGs  - Provide Smoking Cessation handout, if applicable  - Encourage broncho-pulmonary hygiene including cough, deep breathe, Incentive Spirometry  - Assess the need for suctioning and perform as needed  - Assess and instruct to report SOB or any respiratory difficulty  - Respiratory Therapy support as indicated  - Manage/alleviate anxiety  - Monitor for signs/symptoms of CO2 retention  Outcome: Progressing     Problem: SKIN/TISSUE INTEGRITY - ADULT  Goal: Incision(s), wounds(s) or drain site(s) healing without S/S of infection  Description: INTERVENTIONS:  - Assess and document risk factors for pressure ulcer development  - Assess and document skin integrity  - Assess and document dressing/incision, wound bed, drain sites and surrounding tissue  - Implement wound care per orders  - Initiate isolation precautions as appropriate  - Initiate Pressure Ulcer prevention bundle as indicated  Outcome: Progressing

## 2023-10-30 LAB — NON GYNE INTERPRETATION: NEGATIVE

## 2023-11-01 ENCOUNTER — TELEPHONE (OUTPATIENT)
Dept: CARDIOLOGY UNIT | Facility: HOSPITAL | Age: 88
End: 2023-11-01

## 2024-01-01 ENCOUNTER — APPOINTMENT (OUTPATIENT)
Dept: GENERAL RADIOLOGY | Facility: HOSPITAL | Age: 89
End: 2024-01-01
Attending: EMERGENCY MEDICINE
Payer: MEDICARE

## 2024-01-01 ENCOUNTER — APPOINTMENT (OUTPATIENT)
Dept: GENERAL RADIOLOGY | Facility: HOSPITAL | Age: 89
End: 2024-01-01
Attending: INTERNAL MEDICINE
Payer: MEDICARE

## 2024-01-01 ENCOUNTER — APPOINTMENT (OUTPATIENT)
Dept: CT IMAGING | Facility: HOSPITAL | Age: 89
End: 2024-01-01
Attending: EMERGENCY MEDICINE
Payer: MEDICARE

## 2024-01-01 ENCOUNTER — APPOINTMENT (OUTPATIENT)
Dept: GENERAL RADIOLOGY | Facility: HOSPITAL | Age: 89
End: 2024-01-01
Attending: NURSE PRACTITIONER
Payer: MEDICARE

## 2024-01-01 ENCOUNTER — APPOINTMENT (OUTPATIENT)
Dept: CV DIAGNOSTICS | Facility: HOSPITAL | Age: 89
End: 2024-01-01
Attending: NURSE PRACTITIONER
Payer: MEDICARE

## 2024-01-01 ENCOUNTER — HOSPITAL ENCOUNTER (INPATIENT)
Facility: HOSPITAL | Age: 89
LOS: 30 days | End: 2025-01-01
Attending: EMERGENCY MEDICINE | Admitting: INTERNAL MEDICINE
Payer: MEDICARE

## 2024-01-01 ENCOUNTER — HOSPITAL ENCOUNTER (INPATIENT)
Facility: HOSPITAL | Age: 89
LOS: 29 days | End: 2025-01-01
Attending: EMERGENCY MEDICINE | Admitting: INTERNAL MEDICINE
Payer: MEDICARE

## 2024-01-01 DIAGNOSIS — A41.9 SEPSIS, DUE TO UNSPECIFIED ORGANISM, UNSPECIFIED WHETHER ACUTE ORGAN DYSFUNCTION PRESENT (HCC): Primary | ICD-10-CM

## 2024-01-01 DIAGNOSIS — J96.02 ACUTE RESPIRATORY FAILURE WITH HYPERCAPNIA (HCC): ICD-10-CM

## 2024-01-01 DIAGNOSIS — J18.9 PNEUMONIA DUE TO INFECTIOUS ORGANISM, UNSPECIFIED LATERALITY, UNSPECIFIED PART OF LUNG: ICD-10-CM

## 2024-01-01 DIAGNOSIS — I48.91 ATRIAL FIBRILLATION WITH RAPID VENTRICULAR RESPONSE (HCC): ICD-10-CM

## 2024-01-01 LAB
ALBUMIN SERPL-MCNC: 4 G/DL (ref 3.2–4.8)
ALP LIVER SERPL-CCNC: 112 U/L
ALT SERPL-CCNC: 30 U/L
ANION GAP SERPL CALC-SCNC: 0 MMOL/L (ref 0–18)
ANION GAP SERPL CALC-SCNC: 2 MMOL/L (ref 0–18)
ANION GAP SERPL CALC-SCNC: 3 MMOL/L (ref 0–18)
ANION GAP SERPL CALC-SCNC: 3 MMOL/L (ref 0–18)
ANION GAP SERPL CALC-SCNC: 4 MMOL/L (ref 0–18)
ANION GAP SERPL CALC-SCNC: 5 MMOL/L (ref 0–18)
ANION GAP SERPL CALC-SCNC: 6 MMOL/L (ref 0–18)
ANION GAP SERPL CALC-SCNC: 6 MMOL/L (ref 0–18)
ANION GAP SERPL CALC-SCNC: 7 MMOL/L (ref 0–18)
AST SERPL-CCNC: 24 U/L (ref ?–34)
BASE EXCESS BLD CALC-SCNC: 13.3 MMOL/L (ref ?–2)
BASE EXCESS BLD CALC-SCNC: 19.7 MMOL/L (ref ?–2)
BASE EXCESS BLD CALC-SCNC: 6.8 MMOL/L (ref ?–2)
BASOPHILS # BLD AUTO: 0.01 X10(3) UL (ref 0–0.2)
BASOPHILS # BLD AUTO: 0.02 X10(3) UL (ref 0–0.2)
BASOPHILS # BLD AUTO: 0.03 X10(3) UL (ref 0–0.2)
BASOPHILS # BLD AUTO: 0.04 X10(3) UL (ref 0–0.2)
BASOPHILS # BLD AUTO: 0.05 X10(3) UL (ref 0–0.2)
BASOPHILS # BLD AUTO: 0.05 X10(3) UL (ref 0–0.2)
BASOPHILS # BLD AUTO: 0.06 X10(3) UL (ref 0–0.2)
BASOPHILS # BLD AUTO: 0.07 X10(3) UL (ref 0–0.2)
BASOPHILS NFR BLD AUTO: 0.1 %
BASOPHILS NFR BLD AUTO: 0.2 %
BASOPHILS NFR BLD AUTO: 0.3 %
BASOPHILS NFR BLD AUTO: 0.4 %
BASOPHILS NFR BLD AUTO: 0.4 %
BASOPHILS NFR BLD AUTO: 0.5 %
BASOPHILS NFR BLD AUTO: 0.5 %
BASOPHILS NFR BLD AUTO: 0.6 %
BILIRUB DIRECT SERPL-MCNC: 0.5 MG/DL (ref ?–0.3)
BILIRUB SERPL-MCNC: 1 MG/DL (ref 0.2–0.9)
BILIRUB UR QL: NEGATIVE
BLOOD GAS EPAP: 5 CM H2O
BLOOD GAS EPAP: 5 CM H2O
BLOOD GAS IPAP: 12 CM H2O
BLOOD GAS IPAP: 12 CM H2O
BNP SERPL-MCNC: 690 PG/ML (ref ?–100)
BUN BLD-MCNC: 16 MG/DL (ref 9–23)
BUN BLD-MCNC: 16 MG/DL (ref 9–23)
BUN BLD-MCNC: 18 MG/DL (ref 9–23)
BUN BLD-MCNC: 18 MG/DL (ref 9–23)
BUN BLD-MCNC: 19 MG/DL (ref 9–23)
BUN BLD-MCNC: 19 MG/DL (ref 9–23)
BUN BLD-MCNC: 20 MG/DL (ref 9–23)
BUN BLD-MCNC: 21 MG/DL (ref 9–23)
BUN BLD-MCNC: 22 MG/DL (ref 9–23)
BUN BLD-MCNC: 27 MG/DL (ref 9–23)
BUN BLD-MCNC: 38 MG/DL (ref 9–23)
BUN BLD-MCNC: 41 MG/DL (ref 9–23)
BUN BLD-MCNC: 49 MG/DL (ref 9–23)
BUN BLD-MCNC: 50 MG/DL (ref 9–23)
BUN/CREAT SERPL: 34 (ref 10–20)
BUN/CREAT SERPL: 38.3 (ref 10–20)
BUN/CREAT SERPL: 40 (ref 10–20)
BUN/CREAT SERPL: 40.4 (ref 10–20)
BUN/CREAT SERPL: 45.7 (ref 10–20)
BUN/CREAT SERPL: 47.4 (ref 10–20)
BUN/CREAT SERPL: 47.6 (ref 10–20)
BUN/CREAT SERPL: 47.6 (ref 10–20)
BUN/CREAT SERPL: 50 (ref 10–20)
BUN/CREAT SERPL: 51.2 (ref 10–20)
BUN/CREAT SERPL: 52.5 (ref 10–20)
BUN/CREAT SERPL: 54 (ref 10–20)
BUN/CREAT SERPL: 54.3 (ref 10–20)
BUN/CREAT SERPL: 56.8 (ref 10–20)
BUN/CREAT SERPL: 57.7 (ref 10–20)
BUN/CREAT SERPL: 58.8 (ref 10–20)
BUN/CREAT SERPL: 64.4 (ref 10–20)
BUN/CREAT SERPL: 80.3 (ref 10–20)
BUN/CREAT SERPL: >100 (ref 10–20)
CALCIUM BLD-MCNC: 10.3 MG/DL (ref 8.7–10.4)
CALCIUM BLD-MCNC: 8.2 MG/DL (ref 8.7–10.4)
CALCIUM BLD-MCNC: 8.6 MG/DL (ref 8.7–10.4)
CALCIUM BLD-MCNC: 8.7 MG/DL (ref 8.7–10.4)
CALCIUM BLD-MCNC: 8.7 MG/DL (ref 8.7–10.4)
CALCIUM BLD-MCNC: 8.8 MG/DL (ref 8.7–10.4)
CALCIUM BLD-MCNC: 8.9 MG/DL (ref 8.7–10.4)
CALCIUM BLD-MCNC: 9 MG/DL (ref 8.7–10.4)
CALCIUM BLD-MCNC: 9.1 MG/DL (ref 8.7–10.4)
CALCIUM BLD-MCNC: 9.3 MG/DL (ref 8.7–10.4)
CALCIUM BLD-MCNC: 9.4 MG/DL (ref 8.7–10.4)
CALCIUM BLD-MCNC: 9.4 MG/DL (ref 8.7–10.4)
CALCIUM BLD-MCNC: 9.5 MG/DL (ref 8.7–10.4)
CALCIUM BLD-MCNC: 9.6 MG/DL (ref 8.7–10.4)
CALCIUM BLD-MCNC: 9.8 MG/DL (ref 8.7–10.4)
CALCIUM BLD-MCNC: 9.9 MG/DL (ref 8.7–10.4)
CHLORIDE SERPL-SCNC: 100 MMOL/L (ref 98–112)
CHLORIDE SERPL-SCNC: 104 MMOL/L (ref 98–112)
CHLORIDE SERPL-SCNC: 104 MMOL/L (ref 98–112)
CHLORIDE SERPL-SCNC: 105 MMOL/L (ref 98–112)
CHLORIDE SERPL-SCNC: 107 MMOL/L (ref 98–112)
CHLORIDE SERPL-SCNC: 107 MMOL/L (ref 98–112)
CHLORIDE SERPL-SCNC: 108 MMOL/L (ref 98–112)
CHLORIDE SERPL-SCNC: 108 MMOL/L (ref 98–112)
CHLORIDE SERPL-SCNC: 109 MMOL/L (ref 98–112)
CHLORIDE SERPL-SCNC: 110 MMOL/L (ref 98–112)
CHLORIDE SERPL-SCNC: 92 MMOL/L (ref 98–112)
CHLORIDE SERPL-SCNC: 94 MMOL/L (ref 98–112)
CHLORIDE SERPL-SCNC: 96 MMOL/L (ref 98–112)
CHLORIDE SERPL-SCNC: 98 MMOL/L (ref 98–112)
CHLORIDE SERPL-SCNC: 98 MMOL/L (ref 98–112)
CHLORIDE SERPL-SCNC: 99 MMOL/L (ref 98–112)
CHLORIDE SERPL-SCNC: 99 MMOL/L (ref 98–112)
CLARITY UR: CLEAR
CO2 SERPL-SCNC: 27 MMOL/L (ref 21–32)
CO2 SERPL-SCNC: 28 MMOL/L (ref 21–32)
CO2 SERPL-SCNC: 29 MMOL/L (ref 21–32)
CO2 SERPL-SCNC: 30 MMOL/L (ref 21–32)
CO2 SERPL-SCNC: 31 MMOL/L (ref 21–32)
CO2 SERPL-SCNC: 31 MMOL/L (ref 21–32)
CO2 SERPL-SCNC: 32 MMOL/L (ref 21–32)
CO2 SERPL-SCNC: 34 MMOL/L (ref 21–32)
CO2 SERPL-SCNC: 36 MMOL/L (ref 21–32)
CO2 SERPL-SCNC: 36 MMOL/L (ref 21–32)
CO2 SERPL-SCNC: >40 MMOL/L (ref 21–32)
COLOR UR: YELLOW
CREAT BLD-MCNC: 0.34 MG/DL
CREAT BLD-MCNC: 0.35 MG/DL
CREAT BLD-MCNC: 0.35 MG/DL
CREAT BLD-MCNC: 0.37 MG/DL
CREAT BLD-MCNC: 0.38 MG/DL
CREAT BLD-MCNC: 0.4 MG/DL
CREAT BLD-MCNC: 0.4 MG/DL
CREAT BLD-MCNC: 0.41 MG/DL
CREAT BLD-MCNC: 0.41 MG/DL
CREAT BLD-MCNC: 0.42 MG/DL
CREAT BLD-MCNC: 0.47 MG/DL
CREAT BLD-MCNC: 0.5 MG/DL
CREAT BLD-MCNC: 0.5 MG/DL
CREAT BLD-MCNC: 0.59 MG/DL
CREAT BLD-MCNC: 0.61 MG/DL
CREAT BLD-MCNC: 0.71 MG/DL
DEPRECATED RDW RBC AUTO: 64.8 FL (ref 35.1–46.3)
DEPRECATED RDW RBC AUTO: 65 FL (ref 35.1–46.3)
DEPRECATED RDW RBC AUTO: 65.8 FL (ref 35.1–46.3)
DEPRECATED RDW RBC AUTO: 66.4 FL (ref 35.1–46.3)
DEPRECATED RDW RBC AUTO: 67.1 FL (ref 35.1–46.3)
DEPRECATED RDW RBC AUTO: 67.3 FL (ref 35.1–46.3)
DEPRECATED RDW RBC AUTO: 67.7 FL (ref 35.1–46.3)
DEPRECATED RDW RBC AUTO: 68 FL (ref 35.1–46.3)
DEPRECATED RDW RBC AUTO: 68.1 FL (ref 35.1–46.3)
DEPRECATED RDW RBC AUTO: 68.3 FL (ref 35.1–46.3)
DEPRECATED RDW RBC AUTO: 68.9 FL (ref 35.1–46.3)
DEPRECATED RDW RBC AUTO: 69.4 FL (ref 35.1–46.3)
DEPRECATED RDW RBC AUTO: 70.1 FL (ref 35.1–46.3)
DEPRECATED RDW RBC AUTO: 71.4 FL (ref 35.1–46.3)
DEPRECATED RDW RBC AUTO: 72.5 FL (ref 35.1–46.3)
DEPRECATED RDW RBC AUTO: 72.7 FL (ref 35.1–46.3)
DEPRECATED RDW RBC AUTO: 73.4 FL (ref 35.1–46.3)
DEPRECATED RDW RBC AUTO: 74.8 FL (ref 35.1–46.3)
DEPRECATED RDW RBC AUTO: 75.4 FL (ref 35.1–46.3)
DEPRECATED RDW RBC AUTO: 76.9 FL (ref 35.1–46.3)
DIGOXIN SERPL-MCNC: 0.65 NG/ML (ref 0.8–2)
DIGOXIN SERPL-MCNC: <0.14 NG/ML (ref 0.8–2)
EGFRCR SERPLBLD CKD-EPI 2021: 79 ML/MIN/1.73M2 (ref 60–?)
EGFRCR SERPLBLD CKD-EPI 2021: 83 ML/MIN/1.73M2 (ref 60–?)
EGFRCR SERPLBLD CKD-EPI 2021: 84 ML/MIN/1.73M2 (ref 60–?)
EGFRCR SERPLBLD CKD-EPI 2021: 87 ML/MIN/1.73M2 (ref 60–?)
EGFRCR SERPLBLD CKD-EPI 2021: 87 ML/MIN/1.73M2 (ref 60–?)
EGFRCR SERPLBLD CKD-EPI 2021: 89 ML/MIN/1.73M2 (ref 60–?)
EGFRCR SERPLBLD CKD-EPI 2021: 91 ML/MIN/1.73M2 (ref 60–?)
EGFRCR SERPLBLD CKD-EPI 2021: 92 ML/MIN/1.73M2 (ref 60–?)
EGFRCR SERPLBLD CKD-EPI 2021: 93 ML/MIN/1.73M2 (ref 60–?)
EGFRCR SERPLBLD CKD-EPI 2021: 94 ML/MIN/1.73M2 (ref 60–?)
EGFRCR SERPLBLD CKD-EPI 2021: 95 ML/MIN/1.73M2 (ref 60–?)
EGFRCR SERPLBLD CKD-EPI 2021: 95 ML/MIN/1.73M2 (ref 60–?)
EGFRCR SERPLBLD CKD-EPI 2021: 96 ML/MIN/1.73M2 (ref 60–?)
EOSINOPHIL # BLD AUTO: 0 X10(3) UL (ref 0–0.7)
EOSINOPHIL # BLD AUTO: 0.01 X10(3) UL (ref 0–0.7)
EOSINOPHIL # BLD AUTO: 0.02 X10(3) UL (ref 0–0.7)
EOSINOPHIL # BLD AUTO: 0.03 X10(3) UL (ref 0–0.7)
EOSINOPHIL # BLD AUTO: 0.03 X10(3) UL (ref 0–0.7)
EOSINOPHIL # BLD AUTO: 0.04 X10(3) UL (ref 0–0.7)
EOSINOPHIL # BLD AUTO: 0.06 X10(3) UL (ref 0–0.7)
EOSINOPHIL # BLD AUTO: 0.07 X10(3) UL (ref 0–0.7)
EOSINOPHIL # BLD AUTO: 0.07 X10(3) UL (ref 0–0.7)
EOSINOPHIL # BLD AUTO: 0.14 X10(3) UL (ref 0–0.7)
EOSINOPHIL # BLD AUTO: 0.15 X10(3) UL (ref 0–0.7)
EOSINOPHIL # BLD AUTO: 0.17 X10(3) UL (ref 0–0.7)
EOSINOPHIL # BLD AUTO: 0.27 X10(3) UL (ref 0–0.7)
EOSINOPHIL NFR BLD AUTO: 0 %
EOSINOPHIL NFR BLD AUTO: 0.1 %
EOSINOPHIL NFR BLD AUTO: 0.2 %
EOSINOPHIL NFR BLD AUTO: 0.2 %
EOSINOPHIL NFR BLD AUTO: 0.4 %
EOSINOPHIL NFR BLD AUTO: 0.5 %
EOSINOPHIL NFR BLD AUTO: 0.6 %
EOSINOPHIL NFR BLD AUTO: 0.7 %
EOSINOPHIL NFR BLD AUTO: 0.7 %
EOSINOPHIL NFR BLD AUTO: 0.9 %
EOSINOPHIL NFR BLD AUTO: 1.3 %
EOSINOPHIL NFR BLD AUTO: 1.3 %
EOSINOPHIL NFR BLD AUTO: 1.9 %
EOSINOPHIL NFR BLD AUTO: 2.7 %
ERYTHROCYTE [DISTWIDTH] IN BLOOD BY AUTOMATED COUNT: 19.6 % (ref 11–15)
ERYTHROCYTE [DISTWIDTH] IN BLOOD BY AUTOMATED COUNT: 19.7 % (ref 11–15)
ERYTHROCYTE [DISTWIDTH] IN BLOOD BY AUTOMATED COUNT: 19.9 % (ref 11–15)
ERYTHROCYTE [DISTWIDTH] IN BLOOD BY AUTOMATED COUNT: 20.2 % (ref 11–15)
ERYTHROCYTE [DISTWIDTH] IN BLOOD BY AUTOMATED COUNT: 20.3 % (ref 11–15)
ERYTHROCYTE [DISTWIDTH] IN BLOOD BY AUTOMATED COUNT: 20.4 % (ref 11–15)
ERYTHROCYTE [DISTWIDTH] IN BLOOD BY AUTOMATED COUNT: 20.5 % (ref 11–15)
ERYTHROCYTE [DISTWIDTH] IN BLOOD BY AUTOMATED COUNT: 20.6 % (ref 11–15)
ERYTHROCYTE [DISTWIDTH] IN BLOOD BY AUTOMATED COUNT: 20.9 % (ref 11–15)
ERYTHROCYTE [DISTWIDTH] IN BLOOD BY AUTOMATED COUNT: 21 % (ref 11–15)
ERYTHROCYTE [DISTWIDTH] IN BLOOD BY AUTOMATED COUNT: 21.1 % (ref 11–15)
ERYTHROCYTE [DISTWIDTH] IN BLOOD BY AUTOMATED COUNT: 21.2 % (ref 11–15)
ERYTHROCYTE [DISTWIDTH] IN BLOOD BY AUTOMATED COUNT: 21.4 % (ref 11–15)
ERYTHROCYTE [DISTWIDTH] IN BLOOD BY AUTOMATED COUNT: 21.5 % (ref 11–15)
ERYTHROCYTE [DISTWIDTH] IN BLOOD BY AUTOMATED COUNT: 21.6 % (ref 11–15)
ERYTHROCYTE [DISTWIDTH] IN BLOOD BY AUTOMATED COUNT: 21.7 % (ref 11–15)
ERYTHROCYTE [DISTWIDTH] IN BLOOD BY AUTOMATED COUNT: 21.8 % (ref 11–15)
ERYTHROCYTE [DISTWIDTH] IN BLOOD BY AUTOMATED COUNT: 22.2 % (ref 11–15)
ERYTHROCYTE [DISTWIDTH] IN BLOOD BY AUTOMATED COUNT: 22.3 % (ref 11–15)
ERYTHROCYTE [DISTWIDTH] IN BLOOD BY AUTOMATED COUNT: 22.4 % (ref 11–15)
FLUAV + FLUBV RNA SPEC NAA+PROBE: NEGATIVE
FLUAV + FLUBV RNA SPEC NAA+PROBE: NEGATIVE
GLUCOSE BLD-MCNC: 100 MG/DL (ref 70–99)
GLUCOSE BLD-MCNC: 107 MG/DL (ref 70–99)
GLUCOSE BLD-MCNC: 112 MG/DL (ref 70–99)
GLUCOSE BLD-MCNC: 112 MG/DL (ref 70–99)
GLUCOSE BLD-MCNC: 115 MG/DL (ref 70–99)
GLUCOSE BLD-MCNC: 116 MG/DL (ref 70–99)
GLUCOSE BLD-MCNC: 118 MG/DL (ref 70–99)
GLUCOSE BLD-MCNC: 120 MG/DL (ref 70–99)
GLUCOSE BLD-MCNC: 120 MG/DL (ref 70–99)
GLUCOSE BLD-MCNC: 123 MG/DL (ref 70–99)
GLUCOSE BLD-MCNC: 123 MG/DL (ref 70–99)
GLUCOSE BLD-MCNC: 127 MG/DL (ref 70–99)
GLUCOSE BLD-MCNC: 129 MG/DL (ref 70–99)
GLUCOSE BLD-MCNC: 143 MG/DL (ref 70–99)
GLUCOSE BLD-MCNC: 144 MG/DL (ref 70–99)
GLUCOSE BLD-MCNC: 96 MG/DL (ref 70–99)
GLUCOSE BLD-MCNC: 96 MG/DL (ref 70–99)
GLUCOSE BLD-MCNC: 98 MG/DL (ref 70–99)
GLUCOSE BLDC GLUCOMTR-MCNC: 102 MG/DL (ref 70–99)
GLUCOSE BLDC GLUCOMTR-MCNC: 104 MG/DL (ref 70–99)
GLUCOSE BLDC GLUCOMTR-MCNC: 106 MG/DL (ref 70–99)
GLUCOSE BLDC GLUCOMTR-MCNC: 106 MG/DL (ref 70–99)
GLUCOSE BLDC GLUCOMTR-MCNC: 107 MG/DL (ref 70–99)
GLUCOSE BLDC GLUCOMTR-MCNC: 108 MG/DL (ref 70–99)
GLUCOSE BLDC GLUCOMTR-MCNC: 110 MG/DL (ref 70–99)
GLUCOSE BLDC GLUCOMTR-MCNC: 111 MG/DL (ref 70–99)
GLUCOSE BLDC GLUCOMTR-MCNC: 112 MG/DL (ref 70–99)
GLUCOSE BLDC GLUCOMTR-MCNC: 113 MG/DL (ref 70–99)
GLUCOSE BLDC GLUCOMTR-MCNC: 113 MG/DL (ref 70–99)
GLUCOSE BLDC GLUCOMTR-MCNC: 114 MG/DL (ref 70–99)
GLUCOSE BLDC GLUCOMTR-MCNC: 118 MG/DL (ref 70–99)
GLUCOSE BLDC GLUCOMTR-MCNC: 126 MG/DL (ref 70–99)
GLUCOSE BLDC GLUCOMTR-MCNC: 126 MG/DL (ref 70–99)
GLUCOSE BLDC GLUCOMTR-MCNC: 127 MG/DL (ref 70–99)
GLUCOSE BLDC GLUCOMTR-MCNC: 130 MG/DL (ref 70–99)
GLUCOSE BLDC GLUCOMTR-MCNC: 131 MG/DL (ref 70–99)
GLUCOSE BLDC GLUCOMTR-MCNC: 132 MG/DL (ref 70–99)
GLUCOSE BLDC GLUCOMTR-MCNC: 137 MG/DL (ref 70–99)
GLUCOSE BLDC GLUCOMTR-MCNC: 146 MG/DL (ref 70–99)
GLUCOSE BLDC GLUCOMTR-MCNC: 146 MG/DL (ref 70–99)
GLUCOSE BLDC GLUCOMTR-MCNC: 162 MG/DL (ref 70–99)
GLUCOSE BLDC GLUCOMTR-MCNC: 80 MG/DL (ref 70–99)
GLUCOSE BLDC GLUCOMTR-MCNC: 87 MG/DL (ref 70–99)
GLUCOSE BLDC GLUCOMTR-MCNC: 94 MG/DL (ref 70–99)
GLUCOSE BLDC GLUCOMTR-MCNC: 95 MG/DL (ref 70–99)
GLUCOSE UR-MCNC: NORMAL MG/DL
HCO3 BLDA-SCNC: 30.2 MEQ/L (ref 21–27)
HCO3 BLDA-SCNC: 35.3 MEQ/L (ref 21–27)
HCO3 BLDA-SCNC: 40.3 MEQ/L (ref 21–27)
HCT VFR BLD AUTO: 26.5 %
HCT VFR BLD AUTO: 27.3 %
HCT VFR BLD AUTO: 27.7 %
HCT VFR BLD AUTO: 27.8 %
HCT VFR BLD AUTO: 28.1 %
HCT VFR BLD AUTO: 28.2 %
HCT VFR BLD AUTO: 28.2 %
HCT VFR BLD AUTO: 28.3 %
HCT VFR BLD AUTO: 28.3 %
HCT VFR BLD AUTO: 28.7 %
HCT VFR BLD AUTO: 29.3 %
HCT VFR BLD AUTO: 29.7 %
HCT VFR BLD AUTO: 30.1 %
HCT VFR BLD AUTO: 30.2 %
HCT VFR BLD AUTO: 30.4 %
HCT VFR BLD AUTO: 30.9 %
HCT VFR BLD AUTO: 31.8 %
HCT VFR BLD AUTO: 37.5 %
HCT VFR BLD AUTO: 38.4 %
HCT VFR BLD AUTO: 40.5 %
HGB BLD-MCNC: 10.7 G/DL
HGB BLD-MCNC: 10.9 G/DL
HGB BLD-MCNC: 11.9 G/DL
HGB BLD-MCNC: 8.7 G/DL
HGB BLD-MCNC: 8.7 G/DL
HGB BLD-MCNC: 8.8 G/DL
HGB BLD-MCNC: 8.9 G/DL
HGB BLD-MCNC: 9 G/DL
HGB BLD-MCNC: 9.1 G/DL
HGB BLD-MCNC: 9.1 G/DL
HGB BLD-MCNC: 9.2 G/DL
HGB BLD-MCNC: 9.2 G/DL
HGB BLD-MCNC: 9.5 G/DL
HGB BLD-MCNC: 9.5 G/DL
HGB BLD-MCNC: 9.6 G/DL
HGB UR QL STRIP.AUTO: NEGATIVE
HYALINE CASTS #/AREA URNS AUTO: PRESENT /LPF
IMM GRANULOCYTES # BLD AUTO: 0.03 X10(3) UL (ref 0–1)
IMM GRANULOCYTES # BLD AUTO: 0.03 X10(3) UL (ref 0–1)
IMM GRANULOCYTES # BLD AUTO: 0.04 X10(3) UL (ref 0–1)
IMM GRANULOCYTES # BLD AUTO: 0.05 X10(3) UL (ref 0–1)
IMM GRANULOCYTES # BLD AUTO: 0.06 X10(3) UL (ref 0–1)
IMM GRANULOCYTES # BLD AUTO: 0.06 X10(3) UL (ref 0–1)
IMM GRANULOCYTES # BLD AUTO: 0.07 X10(3) UL (ref 0–1)
IMM GRANULOCYTES # BLD AUTO: 0.07 X10(3) UL (ref 0–1)
IMM GRANULOCYTES # BLD AUTO: 0.08 X10(3) UL (ref 0–1)
IMM GRANULOCYTES # BLD AUTO: 0.1 X10(3) UL (ref 0–1)
IMM GRANULOCYTES # BLD AUTO: 0.14 X10(3) UL (ref 0–1)
IMM GRANULOCYTES NFR BLD: 0.3 %
IMM GRANULOCYTES NFR BLD: 0.3 %
IMM GRANULOCYTES NFR BLD: 0.4 %
IMM GRANULOCYTES NFR BLD: 0.5 %
IMM GRANULOCYTES NFR BLD: 0.6 %
IMM GRANULOCYTES NFR BLD: 1.3 %
KETONES UR-MCNC: NEGATIVE MG/DL
LACTATE SERPL-SCNC: 1.1 MMOL/L (ref 0.5–2)
LEUKOCYTE ESTERASE UR QL STRIP.AUTO: 25
LIPASE SERPL-CCNC: 35 U/L (ref 12–53)
LYMPHOCYTES # BLD AUTO: 0.4 X10(3) UL (ref 1–4)
LYMPHOCYTES # BLD AUTO: 0.42 X10(3) UL (ref 1–4)
LYMPHOCYTES # BLD AUTO: 0.47 X10(3) UL (ref 1–4)
LYMPHOCYTES # BLD AUTO: 0.48 X10(3) UL (ref 1–4)
LYMPHOCYTES # BLD AUTO: 0.52 X10(3) UL (ref 1–4)
LYMPHOCYTES # BLD AUTO: 0.58 X10(3) UL (ref 1–4)
LYMPHOCYTES # BLD AUTO: 0.6 X10(3) UL (ref 1–4)
LYMPHOCYTES # BLD AUTO: 0.61 X10(3) UL (ref 1–4)
LYMPHOCYTES # BLD AUTO: 0.61 X10(3) UL (ref 1–4)
LYMPHOCYTES # BLD AUTO: 0.62 X10(3) UL (ref 1–4)
LYMPHOCYTES # BLD AUTO: 0.71 X10(3) UL (ref 1–4)
LYMPHOCYTES # BLD AUTO: 0.76 X10(3) UL (ref 1–4)
LYMPHOCYTES # BLD AUTO: 0.77 X10(3) UL (ref 1–4)
LYMPHOCYTES # BLD AUTO: 0.82 X10(3) UL (ref 1–4)
LYMPHOCYTES # BLD AUTO: 0.83 X10(3) UL (ref 1–4)
LYMPHOCYTES # BLD AUTO: 0.89 X10(3) UL (ref 1–4)
LYMPHOCYTES NFR BLD AUTO: 2.3 %
LYMPHOCYTES NFR BLD AUTO: 3 %
LYMPHOCYTES NFR BLD AUTO: 4 %
LYMPHOCYTES NFR BLD AUTO: 4.5 %
LYMPHOCYTES NFR BLD AUTO: 4.5 %
LYMPHOCYTES NFR BLD AUTO: 4.7 %
LYMPHOCYTES NFR BLD AUTO: 5.1 %
LYMPHOCYTES NFR BLD AUTO: 5.6 %
LYMPHOCYTES NFR BLD AUTO: 5.7 %
LYMPHOCYTES NFR BLD AUTO: 5.7 %
LYMPHOCYTES NFR BLD AUTO: 6 %
LYMPHOCYTES NFR BLD AUTO: 6.8 %
LYMPHOCYTES NFR BLD AUTO: 6.9 %
LYMPHOCYTES NFR BLD AUTO: 6.9 %
LYMPHOCYTES NFR BLD AUTO: 7.2 %
LYMPHOCYTES NFR BLD AUTO: 7.8 %
LYMPHOCYTES NFR BLD AUTO: 7.9 %
LYMPHOCYTES NFR BLD AUTO: 9.3 %
LYMPHOCYTES NFR BLD AUTO: 9.5 %
MAGNESIUM SERPL-MCNC: 2 MG/DL (ref 1.6–2.6)
MAGNESIUM SERPL-MCNC: 2.1 MG/DL (ref 1.6–2.6)
MCH RBC QN AUTO: 27.2 PG (ref 26–34)
MCH RBC QN AUTO: 27.4 PG (ref 26–34)
MCH RBC QN AUTO: 27.5 PG (ref 26–34)
MCH RBC QN AUTO: 27.6 PG (ref 26–34)
MCH RBC QN AUTO: 27.6 PG (ref 26–34)
MCH RBC QN AUTO: 27.8 PG (ref 26–34)
MCH RBC QN AUTO: 27.8 PG (ref 26–34)
MCH RBC QN AUTO: 27.9 PG (ref 26–34)
MCH RBC QN AUTO: 27.9 PG (ref 26–34)
MCH RBC QN AUTO: 28 PG (ref 26–34)
MCH RBC QN AUTO: 28.1 PG (ref 26–34)
MCH RBC QN AUTO: 28.3 PG (ref 26–34)
MCH RBC QN AUTO: 28.5 PG (ref 26–34)
MCH RBC QN AUTO: 28.9 PG (ref 26–34)
MCH RBC QN AUTO: 29 PG (ref 26–34)
MCH RBC QN AUTO: 29.1 PG (ref 26–34)
MCH RBC QN AUTO: 29.3 PG (ref 26–34)
MCH RBC QN AUTO: 29.4 PG (ref 26–34)
MCHC RBC AUTO-ENTMCNC: 28.4 G/DL (ref 31–37)
MCHC RBC AUTO-ENTMCNC: 28.5 G/DL (ref 31–37)
MCHC RBC AUTO-ENTMCNC: 29.4 G/DL (ref 31–37)
MCHC RBC AUTO-ENTMCNC: 29.9 G/DL (ref 31–37)
MCHC RBC AUTO-ENTMCNC: 29.9 G/DL (ref 31–37)
MCHC RBC AUTO-ENTMCNC: 30.5 G/DL (ref 31–37)
MCHC RBC AUTO-ENTMCNC: 30.6 G/DL (ref 31–37)
MCHC RBC AUTO-ENTMCNC: 30.7 G/DL (ref 31–37)
MCHC RBC AUTO-ENTMCNC: 30.9 G/DL (ref 31–37)
MCHC RBC AUTO-ENTMCNC: 31 G/DL (ref 31–37)
MCHC RBC AUTO-ENTMCNC: 31.1 G/DL (ref 31–37)
MCHC RBC AUTO-ENTMCNC: 31.3 G/DL (ref 31–37)
MCHC RBC AUTO-ENTMCNC: 31.4 G/DL (ref 31–37)
MCHC RBC AUTO-ENTMCNC: 31.7 G/DL (ref 31–37)
MCHC RBC AUTO-ENTMCNC: 31.8 G/DL (ref 31–37)
MCHC RBC AUTO-ENTMCNC: 31.9 G/DL (ref 31–37)
MCHC RBC AUTO-ENTMCNC: 32.2 G/DL (ref 31–37)
MCHC RBC AUTO-ENTMCNC: 32.8 G/DL (ref 31–37)
MCHC RBC AUTO-ENTMCNC: 32.9 G/DL (ref 31–37)
MCHC RBC AUTO-ENTMCNC: 33.1 G/DL (ref 31–37)
MCV RBC AUTO: 87.3 FL
MCV RBC AUTO: 87.9 FL
MCV RBC AUTO: 88 FL
MCV RBC AUTO: 88.4 FL
MCV RBC AUTO: 88.4 FL
MCV RBC AUTO: 89.1 FL
MCV RBC AUTO: 89.5 FL
MCV RBC AUTO: 89.7 FL
MCV RBC AUTO: 89.7 FL
MCV RBC AUTO: 89.8 FL
MCV RBC AUTO: 90.5 FL
MCV RBC AUTO: 90.9 FL
MCV RBC AUTO: 91 FL
MCV RBC AUTO: 91.3 FL
MCV RBC AUTO: 91.5 FL
MCV RBC AUTO: 91.6 FL
MCV RBC AUTO: 93.4 FL
MCV RBC AUTO: 96.9 FL
MCV RBC AUTO: 97.1 FL
MCV RBC AUTO: 97.2 FL
MODIFIED ALLEN TEST: POSITIVE
MODIFIED ALLEN TEST: POSITIVE
MONOCYTES # BLD AUTO: 0.43 X10(3) UL (ref 0.1–1)
MONOCYTES # BLD AUTO: 0.44 X10(3) UL (ref 0.1–1)
MONOCYTES # BLD AUTO: 0.45 X10(3) UL (ref 0.1–1)
MONOCYTES # BLD AUTO: 0.49 X10(3) UL (ref 0.1–1)
MONOCYTES # BLD AUTO: 0.49 X10(3) UL (ref 0.1–1)
MONOCYTES # BLD AUTO: 0.51 X10(3) UL (ref 0.1–1)
MONOCYTES # BLD AUTO: 0.54 X10(3) UL (ref 0.1–1)
MONOCYTES # BLD AUTO: 0.55 X10(3) UL (ref 0.1–1)
MONOCYTES # BLD AUTO: 0.55 X10(3) UL (ref 0.1–1)
MONOCYTES # BLD AUTO: 0.6 X10(3) UL (ref 0.1–1)
MONOCYTES # BLD AUTO: 0.61 X10(3) UL (ref 0.1–1)
MONOCYTES # BLD AUTO: 0.61 X10(3) UL (ref 0.1–1)
MONOCYTES # BLD AUTO: 0.63 X10(3) UL (ref 0.1–1)
MONOCYTES # BLD AUTO: 0.63 X10(3) UL (ref 0.1–1)
MONOCYTES # BLD AUTO: 0.64 X10(3) UL (ref 0.1–1)
MONOCYTES # BLD AUTO: 0.69 X10(3) UL (ref 0.1–1)
MONOCYTES # BLD AUTO: 0.7 X10(3) UL (ref 0.1–1)
MONOCYTES # BLD AUTO: 0.75 X10(3) UL (ref 0.1–1)
MONOCYTES # BLD AUTO: 1.28 X10(3) UL (ref 0.1–1)
MONOCYTES NFR BLD AUTO: 3.5 %
MONOCYTES NFR BLD AUTO: 3.5 %
MONOCYTES NFR BLD AUTO: 3.9 %
MONOCYTES NFR BLD AUTO: 4.1 %
MONOCYTES NFR BLD AUTO: 4.2 %
MONOCYTES NFR BLD AUTO: 4.6 %
MONOCYTES NFR BLD AUTO: 5.1 %
MONOCYTES NFR BLD AUTO: 5.2 %
MONOCYTES NFR BLD AUTO: 5.3 %
MONOCYTES NFR BLD AUTO: 5.6 %
MONOCYTES NFR BLD AUTO: 5.8 %
MONOCYTES NFR BLD AUTO: 6 %
MONOCYTES NFR BLD AUTO: 6.1 %
MONOCYTES NFR BLD AUTO: 6.2 %
MONOCYTES NFR BLD AUTO: 6.3 %
MONOCYTES NFR BLD AUTO: 6.4 %
MONOCYTES NFR BLD AUTO: 6.4 %
MONOCYTES NFR BLD AUTO: 7 %
MONOCYTES NFR BLD AUTO: 8.1 %
MRSA DNA SPEC QL NAA+PROBE: POSITIVE
NEUTROPHILS # BLD AUTO: 10.21 X10 (3) UL (ref 1.5–7.7)
NEUTROPHILS # BLD AUTO: 10.21 X10(3) UL (ref 1.5–7.7)
NEUTROPHILS # BLD AUTO: 10.5 X10 (3) UL (ref 1.5–7.7)
NEUTROPHILS # BLD AUTO: 10.5 X10(3) UL (ref 1.5–7.7)
NEUTROPHILS # BLD AUTO: 10.98 X10 (3) UL (ref 1.5–7.7)
NEUTROPHILS # BLD AUTO: 10.98 X10(3) UL (ref 1.5–7.7)
NEUTROPHILS # BLD AUTO: 11.14 X10 (3) UL (ref 1.5–7.7)
NEUTROPHILS # BLD AUTO: 11.14 X10(3) UL (ref 1.5–7.7)
NEUTROPHILS # BLD AUTO: 11.56 X10 (3) UL (ref 1.5–7.7)
NEUTROPHILS # BLD AUTO: 11.56 X10(3) UL (ref 1.5–7.7)
NEUTROPHILS # BLD AUTO: 12.15 X10 (3) UL (ref 1.5–7.7)
NEUTROPHILS # BLD AUTO: 12.15 X10(3) UL (ref 1.5–7.7)
NEUTROPHILS # BLD AUTO: 13.67 X10 (3) UL (ref 1.5–7.7)
NEUTROPHILS # BLD AUTO: 13.67 X10(3) UL (ref 1.5–7.7)
NEUTROPHILS # BLD AUTO: 17.14 X10 (3) UL (ref 1.5–7.7)
NEUTROPHILS # BLD AUTO: 17.14 X10(3) UL (ref 1.5–7.7)
NEUTROPHILS # BLD AUTO: 6.83 X10 (3) UL (ref 1.5–7.7)
NEUTROPHILS # BLD AUTO: 6.83 X10(3) UL (ref 1.5–7.7)
NEUTROPHILS # BLD AUTO: 7.06 X10 (3) UL (ref 1.5–7.7)
NEUTROPHILS # BLD AUTO: 7.06 X10(3) UL (ref 1.5–7.7)
NEUTROPHILS # BLD AUTO: 7.6 X10 (3) UL (ref 1.5–7.7)
NEUTROPHILS # BLD AUTO: 7.6 X10(3) UL (ref 1.5–7.7)
NEUTROPHILS # BLD AUTO: 7.96 X10 (3) UL (ref 1.5–7.7)
NEUTROPHILS # BLD AUTO: 7.96 X10(3) UL (ref 1.5–7.7)
NEUTROPHILS # BLD AUTO: 8.16 X10 (3) UL (ref 1.5–7.7)
NEUTROPHILS # BLD AUTO: 8.16 X10(3) UL (ref 1.5–7.7)
NEUTROPHILS # BLD AUTO: 8.26 X10 (3) UL (ref 1.5–7.7)
NEUTROPHILS # BLD AUTO: 8.26 X10(3) UL (ref 1.5–7.7)
NEUTROPHILS # BLD AUTO: 8.8 X10 (3) UL (ref 1.5–7.7)
NEUTROPHILS # BLD AUTO: 8.8 X10(3) UL (ref 1.5–7.7)
NEUTROPHILS # BLD AUTO: 9.04 X10 (3) UL (ref 1.5–7.7)
NEUTROPHILS # BLD AUTO: 9.04 X10(3) UL (ref 1.5–7.7)
NEUTROPHILS # BLD AUTO: 9.37 X10 (3) UL (ref 1.5–7.7)
NEUTROPHILS # BLD AUTO: 9.37 X10(3) UL (ref 1.5–7.7)
NEUTROPHILS # BLD AUTO: 9.47 X10 (3) UL (ref 1.5–7.7)
NEUTROPHILS # BLD AUTO: 9.47 X10(3) UL (ref 1.5–7.7)
NEUTROPHILS # BLD AUTO: 9.49 X10 (3) UL (ref 1.5–7.7)
NEUTROPHILS # BLD AUTO: 9.49 X10(3) UL (ref 1.5–7.7)
NEUTROPHILS NFR BLD AUTO: 80.8 %
NEUTROPHILS NFR BLD AUTO: 82.5 %
NEUTROPHILS NFR BLD AUTO: 83.2 %
NEUTROPHILS NFR BLD AUTO: 84.9 %
NEUTROPHILS NFR BLD AUTO: 85.7 %
NEUTROPHILS NFR BLD AUTO: 86 %
NEUTROPHILS NFR BLD AUTO: 86.1 %
NEUTROPHILS NFR BLD AUTO: 86.1 %
NEUTROPHILS NFR BLD AUTO: 86.2 %
NEUTROPHILS NFR BLD AUTO: 86.4 %
NEUTROPHILS NFR BLD AUTO: 87 %
NEUTROPHILS NFR BLD AUTO: 87.3 %
NEUTROPHILS NFR BLD AUTO: 88.6 %
NEUTROPHILS NFR BLD AUTO: 89.6 %
NEUTROPHILS NFR BLD AUTO: 90.4 %
NEUTROPHILS NFR BLD AUTO: 90.6 %
NEUTROPHILS NFR BLD AUTO: 90.9 %
NEUTROPHILS NFR BLD AUTO: 90.9 %
NEUTROPHILS NFR BLD AUTO: 93.5 %
NITRITE UR QL STRIP.AUTO: NEGATIVE
NT-PROBNP SERPL-MCNC: 7642 PG/ML (ref ?–450)
O2 CT BLD-SCNC: 14.1 VOL% (ref 15–23)
O2 CT BLD-SCNC: 15.1 VOL% (ref 15–23)
O2 CT BLD-SCNC: 15.8 VOL% (ref 15–23)
O2 CT BLD-SCNC: 16.9 VOL% (ref 15–23)
O2/TOTAL GAS SETTING VFR VENT: 100 %
O2/TOTAL GAS SETTING VFR VENT: 30 %
O2/TOTAL GAS SETTING VFR VENT: 50 %
O2/TOTAL GAS SETTING VFR VENT: 60 %
OSMOLALITY SERPL CALC.SUM OF ELEC: 278 MOSM/KG (ref 275–295)
OSMOLALITY SERPL CALC.SUM OF ELEC: 280 MOSM/KG (ref 275–295)
OSMOLALITY SERPL CALC.SUM OF ELEC: 280 MOSM/KG (ref 275–295)
OSMOLALITY SERPL CALC.SUM OF ELEC: 283 MOSM/KG (ref 275–295)
OSMOLALITY SERPL CALC.SUM OF ELEC: 283 MOSM/KG (ref 275–295)
OSMOLALITY SERPL CALC.SUM OF ELEC: 284 MOSM/KG (ref 275–295)
OSMOLALITY SERPL CALC.SUM OF ELEC: 290 MOSM/KG (ref 275–295)
OSMOLALITY SERPL CALC.SUM OF ELEC: 290 MOSM/KG (ref 275–295)
OSMOLALITY SERPL CALC.SUM OF ELEC: 291 MOSM/KG (ref 275–295)
OSMOLALITY SERPL CALC.SUM OF ELEC: 292 MOSM/KG (ref 275–295)
OSMOLALITY SERPL CALC.SUM OF ELEC: 294 MOSM/KG (ref 275–295)
OSMOLALITY SERPL CALC.SUM OF ELEC: 295 MOSM/KG (ref 275–295)
OSMOLALITY SERPL CALC.SUM OF ELEC: 300 MOSM/KG (ref 275–295)
OSMOLALITY SERPL CALC.SUM OF ELEC: 309 MOSM/KG (ref 275–295)
OSMOLALITY SERPL CALC.SUM OF ELEC: 309 MOSM/KG (ref 275–295)
OSMOLALITY SERPL CALC.SUM OF ELEC: 314 MOSM/KG (ref 275–295)
OSMOLALITY SERPL CALC.SUM OF ELEC: 318 MOSM/KG (ref 275–295)
PCO2 BLDA: 119 MM HG (ref 35–45)
PCO2 BLDA: 49 MM HG (ref 35–45)
PCO2 BLDA: 53 MM HG (ref 35–45)
PCO2 BLDA: 97 MM HG (ref 35–45)
PEEP SETTING VENT: 5 CM H2O
PEEP SETTING VENT: 5 CM H2O
PH BLDA: 7.19 [PH] (ref 7.35–7.45)
PH BLDA: 7.32 [PH] (ref 7.35–7.45)
PH BLDA: 7.4 [PH] (ref 7.35–7.45)
PH BLDA: 7.5 [PH] (ref 7.35–7.45)
PH UR: 5.5 [PH] (ref 5–8)
PHOSPHATE SERPL-MCNC: 2.5 MG/DL (ref 2.4–5.1)
PHOSPHATE SERPL-MCNC: 4.1 MG/DL (ref 2.4–5.1)
PLATELET # BLD AUTO: 118 10(3)UL (ref 150–450)
PLATELET # BLD AUTO: 155 10(3)UL (ref 150–450)
PLATELET # BLD AUTO: 158 10(3)UL (ref 150–450)
PLATELET # BLD AUTO: 160 10(3)UL (ref 150–450)
PLATELET # BLD AUTO: 179 10(3)UL (ref 150–450)
PLATELET # BLD AUTO: 190 10(3)UL (ref 150–450)
PLATELET # BLD AUTO: 193 10(3)UL (ref 150–450)
PLATELET # BLD AUTO: 212 10(3)UL (ref 150–450)
PLATELET # BLD AUTO: 214 10(3)UL (ref 150–450)
PLATELET # BLD AUTO: 216 10(3)UL (ref 150–450)
PLATELET # BLD AUTO: 217 10(3)UL (ref 150–450)
PLATELET # BLD AUTO: 223 10(3)UL (ref 150–450)
PLATELET # BLD AUTO: 229 10(3)UL (ref 150–450)
PLATELET # BLD AUTO: 231 10(3)UL (ref 150–450)
PLATELET # BLD AUTO: 238 10(3)UL (ref 150–450)
PLATELET # BLD AUTO: 243 10(3)UL (ref 150–450)
PLATELET # BLD AUTO: 246 10(3)UL (ref 150–450)
PLATELET # BLD AUTO: 253 10(3)UL (ref 150–450)
PLATELET # BLD AUTO: 255 10(3)UL (ref 150–450)
PLATELET # BLD AUTO: 258 10(3)UL (ref 150–450)
PLATELET MORPHOLOGY: NORMAL
PLATELETS.RETICULATED NFR BLD AUTO: 14.1 % (ref 0–7)
PLATELETS.RETICULATED NFR BLD AUTO: 16.4 % (ref 0–7)
PLATELETS.RETICULATED NFR BLD AUTO: 17.2 % (ref 0–7)
PLATELETS.RETICULATED NFR BLD AUTO: 20 % (ref 0–7)
PO2 BLDA: 111 MM HG (ref 80–100)
PO2 BLDA: 183 MM HG (ref 80–100)
PO2 BLDA: 199 MM HG (ref 80–100)
PO2 BLDA: 427 MM HG (ref 80–100)
POTASSIUM SERPL-SCNC: 3.4 MMOL/L (ref 3.5–5.1)
POTASSIUM SERPL-SCNC: 3.8 MMOL/L (ref 3.5–5.1)
POTASSIUM SERPL-SCNC: 3.9 MMOL/L (ref 3.5–5.1)
POTASSIUM SERPL-SCNC: 4 MMOL/L (ref 3.5–5.1)
POTASSIUM SERPL-SCNC: 4.1 MMOL/L (ref 3.5–5.1)
POTASSIUM SERPL-SCNC: 4.2 MMOL/L (ref 3.5–5.1)
POTASSIUM SERPL-SCNC: 4.2 MMOL/L (ref 3.5–5.1)
POTASSIUM SERPL-SCNC: 4.3 MMOL/L (ref 3.5–5.1)
POTASSIUM SERPL-SCNC: 4.3 MMOL/L (ref 3.5–5.1)
POTASSIUM SERPL-SCNC: 4.4 MMOL/L (ref 3.5–5.1)
POTASSIUM SERPL-SCNC: 4.5 MMOL/L (ref 3.5–5.1)
POTASSIUM SERPL-SCNC: 4.5 MMOL/L (ref 3.5–5.1)
POTASSIUM SERPL-SCNC: 4.6 MMOL/L (ref 3.5–5.1)
POTASSIUM SERPL-SCNC: 4.9 MMOL/L (ref 3.5–5.1)
POTASSIUM SERPL-SCNC: 4.9 MMOL/L (ref 3.5–5.1)
POTASSIUM SERPL-SCNC: 5.1 MMOL/L (ref 3.5–5.1)
POTASSIUM SERPL-SCNC: 5.1 MMOL/L (ref 3.5–5.1)
POTASSIUM SERPL-SCNC: 5.3 MMOL/L (ref 3.5–5.1)
PRESSURE SUPPORT SETTING VENT: 5 CM H2O
PROCALCITONIN SERPL-MCNC: 0.1 NG/ML (ref ?–0.05)
PROT SERPL-MCNC: 8.2 G/DL (ref 5.7–8.2)
PROT UR-MCNC: 50 MG/DL
PUNCTURE CHARGE: YES
Q-T INTERVAL: 250 MS
QRS DURATION: 62 MS
QTC CALCULATION (BEZET): 362 MS
R AXIS: 71 DEGREES
RBC # BLD AUTO: 2.96 X10(6)UL
RBC # BLD AUTO: 3.04 X10(6)UL
RBC # BLD AUTO: 3.1 X10(6)UL
RBC # BLD AUTO: 3.11 X10(6)UL
RBC # BLD AUTO: 3.16 X10(6)UL
RBC # BLD AUTO: 3.18 X10(6)UL
RBC # BLD AUTO: 3.2 X10(6)UL
RBC # BLD AUTO: 3.2 X10(6)UL
RBC # BLD AUTO: 3.21 X10(6)UL
RBC # BLD AUTO: 3.22 X10(6)UL
RBC # BLD AUTO: 3.23 X10(6)UL
RBC # BLD AUTO: 3.3 X10(6)UL
RBC # BLD AUTO: 3.31 X10(6)UL
RBC # BLD AUTO: 3.36 X10(6)UL
RBC # BLD AUTO: 3.47 X10(6)UL
RBC # BLD AUTO: 3.87 X10(6)UL
RBC # BLD AUTO: 3.95 X10(6)UL
RBC # BLD AUTO: 4.17 X10(6)UL
RESP RATE: 20 BPM
RESP RATE: 20 BPM
RSV RNA SPEC NAA+PROBE: NEGATIVE
SAO2 % BLDA: 98.8 % (ref 94–100)
SAO2 % BLDA: >99 % (ref 94–100)
SARS-COV-2 RNA RESP QL NAA+PROBE: NOT DETECTED
SODIUM SERPL-SCNC: 133 MMOL/L (ref 136–145)
SODIUM SERPL-SCNC: 133 MMOL/L (ref 136–145)
SODIUM SERPL-SCNC: 134 MMOL/L (ref 136–145)
SODIUM SERPL-SCNC: 134 MMOL/L (ref 136–145)
SODIUM SERPL-SCNC: 135 MMOL/L (ref 136–145)
SODIUM SERPL-SCNC: 137 MMOL/L (ref 136–145)
SODIUM SERPL-SCNC: 139 MMOL/L (ref 136–145)
SODIUM SERPL-SCNC: 140 MMOL/L (ref 136–145)
SODIUM SERPL-SCNC: 141 MMOL/L (ref 136–145)
SODIUM SERPL-SCNC: 142 MMOL/L (ref 136–145)
SODIUM SERPL-SCNC: 144 MMOL/L (ref 136–145)
SODIUM SERPL-SCNC: 144 MMOL/L (ref 136–145)
SODIUM SERPL-SCNC: 145 MMOL/L (ref 136–145)
SODIUM SERPL-SCNC: 147 MMOL/L (ref 136–145)
SP GR UR STRIP: 1.02 (ref 1–1.03)
SPECIMEN VOL 24H UR: 450 ML
T AXIS: 2 DEGREES
TRIGL SERPL-MCNC: 62 MG/DL (ref 30–149)
TRIGL SERPL-MCNC: 63 MG/DL (ref 30–149)
TRIGL SERPL-MCNC: 63 MG/DL (ref 30–149)
TRIGL SERPL-MCNC: 64 MG/DL (ref 30–149)
TRIGL SERPL-MCNC: 69 MG/DL (ref 30–149)
TRIGL SERPL-MCNC: 93 MG/DL (ref 30–149)
TSI SER-ACNC: 2.14 UIU/ML (ref 0.55–4.78)
UROBILINOGEN UR STRIP-ACNC: 2
VANCOMYCIN PEAK SERPL-MCNC: 23.1 UG/ML (ref 30–50)
VANCOMYCIN TROUGH SERPL-MCNC: 12.3 UG/ML (ref 10–20)
VENTRICULAR RATE: 126 BPM
WBC # BLD AUTO: 10.2 X10(3) UL (ref 4–11)
WBC # BLD AUTO: 10.2 X10(3) UL (ref 4–11)
WBC # BLD AUTO: 10.5 X10(3) UL (ref 4–11)
WBC # BLD AUTO: 10.8 X10(3) UL (ref 4–11)
WBC # BLD AUTO: 10.9 X10(3) UL (ref 4–11)
WBC # BLD AUTO: 11.8 X10(3) UL (ref 4–11)
WBC # BLD AUTO: 12.1 X10(3) UL (ref 4–11)
WBC # BLD AUTO: 12.1 X10(3) UL (ref 4–11)
WBC # BLD AUTO: 12.4 X10(3) UL (ref 4–11)
WBC # BLD AUTO: 12.7 X10(3) UL (ref 4–11)
WBC # BLD AUTO: 13.4 X10(3) UL (ref 4–11)
WBC # BLD AUTO: 15.8 X10(3) UL (ref 4–11)
WBC # BLD AUTO: 18.3 X10(3) UL (ref 4–11)
WBC # BLD AUTO: 7.6 X10(3) UL (ref 4–11)
WBC # BLD AUTO: 8 X10(3) UL (ref 4–11)
WBC # BLD AUTO: 8.7 X10(3) UL (ref 4–11)
WBC # BLD AUTO: 8.9 X10(3) UL (ref 4–11)
WBC # BLD AUTO: 9.6 X10(3) UL (ref 4–11)
WBC # BLD AUTO: 9.6 X10(3) UL (ref 4–11)
WBC # BLD AUTO: 9.9 X10(3) UL (ref 4–11)

## 2024-01-01 PROCEDURE — 99233 SBSQ HOSP IP/OBS HIGH 50: CPT | Performed by: INTERNAL MEDICINE

## 2024-01-01 PROCEDURE — 05H633Z INSERTION OF INFUSION DEVICE INTO LEFT SUBCLAVIAN VEIN, PERCUTANEOUS APPROACH: ICD-10-PCS | Performed by: INTERNAL MEDICINE

## 2024-01-01 PROCEDURE — G0316 PROLNG IP/OBS E/M EA ADDL 15 MIN: HCPCS | Performed by: INTERNAL MEDICINE

## 2024-01-01 PROCEDURE — 99223 1ST HOSP IP/OBS HIGH 75: CPT | Performed by: INTERNAL MEDICINE

## 2024-01-01 PROCEDURE — B547ZZA ULTRASONOGRAPHY OF LEFT SUBCLAVIAN VEIN, GUIDANCE: ICD-10-PCS | Performed by: INTERNAL MEDICINE

## 2024-01-01 PROCEDURE — 74177 CT ABD & PELVIS W/CONTRAST: CPT | Performed by: EMERGENCY MEDICINE

## 2024-01-01 PROCEDURE — 99222 1ST HOSP IP/OBS MODERATE 55: CPT | Performed by: NURSE PRACTITIONER

## 2024-01-01 PROCEDURE — 71045 X-RAY EXAM CHEST 1 VIEW: CPT | Performed by: INTERNAL MEDICINE

## 2024-01-01 PROCEDURE — 99291 CRITICAL CARE FIRST HOUR: CPT | Performed by: INTERNAL MEDICINE

## 2024-01-01 PROCEDURE — 5A09357 ASSISTANCE WITH RESPIRATORY VENTILATION, LESS THAN 24 CONSECUTIVE HOURS, CONTINUOUS POSITIVE AIRWAY PRESSURE: ICD-10-PCS | Performed by: INTERNAL MEDICINE

## 2024-01-01 PROCEDURE — 5A1955Z RESPIRATORY VENTILATION, GREATER THAN 96 CONSECUTIVE HOURS: ICD-10-PCS | Performed by: INTERNAL MEDICINE

## 2024-01-01 PROCEDURE — 93306 TTE W/DOPPLER COMPLETE: CPT | Performed by: NURSE PRACTITIONER

## 2024-01-01 PROCEDURE — 71045 X-RAY EXAM CHEST 1 VIEW: CPT | Performed by: NURSE PRACTITIONER

## 2024-01-01 PROCEDURE — 0BH17EZ INSERTION OF ENDOTRACHEAL AIRWAY INTO TRACHEA, VIA NATURAL OR ARTIFICIAL OPENING: ICD-10-PCS | Performed by: INTERNAL MEDICINE

## 2024-01-01 PROCEDURE — 31500 INSERT EMERGENCY AIRWAY: CPT | Performed by: INTERNAL MEDICINE

## 2024-01-01 PROCEDURE — 71045 X-RAY EXAM CHEST 1 VIEW: CPT | Performed by: EMERGENCY MEDICINE

## 2024-01-01 PROCEDURE — 99232 SBSQ HOSP IP/OBS MODERATE 35: CPT | Performed by: INTERNAL MEDICINE

## 2024-01-01 PROCEDURE — 99498 ADVNCD CARE PLAN ADDL 30 MIN: CPT | Performed by: INTERNAL MEDICINE

## 2024-01-01 PROCEDURE — 71250 CT THORAX DX C-: CPT | Performed by: EMERGENCY MEDICINE

## 2024-01-01 PROCEDURE — 99497 ADVNCD CARE PLAN 30 MIN: CPT | Performed by: INTERNAL MEDICINE

## 2024-01-01 PROCEDURE — 70450 CT HEAD/BRAIN W/O DYE: CPT | Performed by: EMERGENCY MEDICINE

## 2024-01-01 PROCEDURE — 5A09557 ASSISTANCE WITH RESPIRATORY VENTILATION, GREATER THAN 96 CONSECUTIVE HOURS, CONTINUOUS POSITIVE AIRWAY PRESSURE: ICD-10-PCS | Performed by: INTERNAL MEDICINE

## 2024-01-01 PROCEDURE — 99231 SBSQ HOSP IP/OBS SF/LOW 25: CPT | Performed by: NURSE PRACTITIONER

## 2024-01-01 PROCEDURE — 99222 1ST HOSP IP/OBS MODERATE 55: CPT | Performed by: INTERNAL MEDICINE

## 2024-01-01 RX ORDER — VANCOMYCIN HYDROCHLORIDE 50 MG/ML
125 KIT ORAL DAILY
Status: DISCONTINUED | OUTPATIENT
Start: 2024-01-01 | End: 2024-01-01

## 2024-01-01 RX ORDER — ACETAMINOPHEN 650 MG/1
650 SUPPOSITORY RECTAL ONCE
Status: COMPLETED | OUTPATIENT
Start: 2024-01-01 | End: 2024-01-01

## 2024-01-01 RX ORDER — SODIUM CHLORIDE 9 MG/ML
INJECTION, SOLUTION INTRAVENOUS CONTINUOUS
Status: DISCONTINUED | OUTPATIENT
Start: 2024-01-01 | End: 2024-01-01

## 2024-01-01 RX ORDER — DEXMEDETOMIDINE HYDROCHLORIDE 4 UG/ML
INJECTION, SOLUTION INTRAVENOUS CONTINUOUS
Status: DISCONTINUED | OUTPATIENT
Start: 2024-01-01 | End: 2024-01-01

## 2024-01-01 RX ORDER — PHENYLEPHRINE HCL IN 0.9% NACL 1 MG/10 ML
VIAL (ML) INTRAVENOUS
Status: DISPENSED
Start: 2024-01-01 | End: 2024-01-01

## 2024-01-01 RX ORDER — MIDODRINE HYDROCHLORIDE 5 MG/1
5 TABLET ORAL 3 TIMES DAILY
Status: DISCONTINUED | OUTPATIENT
Start: 2024-01-01 | End: 2024-01-01

## 2024-01-01 RX ORDER — DIGOXIN 250 MCG
250 TABLET ORAL ONCE
Status: COMPLETED | OUTPATIENT
Start: 2024-01-01 | End: 2024-01-01

## 2024-01-01 RX ORDER — ACETYLCYSTEINE 200 MG/ML
2 SOLUTION ORAL; RESPIRATORY (INHALATION) 3 TIMES DAILY
Status: DISCONTINUED | OUTPATIENT
Start: 2024-01-01 | End: 2024-01-01

## 2024-01-01 RX ORDER — WHEAT DEXTRIN/ASPARTAME 3 G/6 G
1 POWDER IN PACKET (EA) ORAL DAILY PRN
COMMUNITY

## 2024-01-01 RX ORDER — IPRATROPIUM BROMIDE AND ALBUTEROL SULFATE 2.5; .5 MG/3ML; MG/3ML
3 SOLUTION RESPIRATORY (INHALATION) EVERY 6 HOURS PRN
Status: DISCONTINUED | OUTPATIENT
Start: 2024-01-01 | End: 2024-01-01 | Stop reason: DRUGHIGH

## 2024-01-01 RX ORDER — IBUPROFEN 600 MG/1
600 TABLET, FILM COATED ORAL EVERY 4 HOURS PRN
Status: DISCONTINUED | OUTPATIENT
Start: 2024-01-01 | End: 2024-01-01

## 2024-01-01 RX ORDER — IPRATROPIUM BROMIDE AND ALBUTEROL SULFATE 2.5; .5 MG/3ML; MG/3ML
3 SOLUTION RESPIRATORY (INHALATION) EVERY 6 HOURS PRN
Status: DISCONTINUED | OUTPATIENT
Start: 2024-01-01 | End: 2025-01-01

## 2024-01-01 RX ORDER — MINERAL OIL AND PETROLATUM 150; 830 MG/G; MG/G
1 OINTMENT OPHTHALMIC NIGHTLY
Status: DISCONTINUED | OUTPATIENT
Start: 2024-01-01 | End: 2024-01-01

## 2024-01-01 RX ORDER — ACETAMINOPHEN/DIPHENHYDRAMINE 500MG-25MG
1 TABLET ORAL DAILY PRN
COMMUNITY

## 2024-01-01 RX ORDER — DOXEPIN HYDROCHLORIDE 50 MG/1
1 CAPSULE ORAL DAILY
COMMUNITY

## 2024-01-01 RX ORDER — GLYCOPYRROLATE 0.2 MG/ML
0.1 INJECTION, SOLUTION INTRAMUSCULAR; INTRAVENOUS EVERY 6 HOURS PRN
Status: DISCONTINUED | OUTPATIENT
Start: 2024-01-01 | End: 2025-01-01

## 2024-01-01 RX ORDER — BISACODYL 10 MG
10 SUPPOSITORY, RECTAL RECTAL
Status: DISCONTINUED | OUTPATIENT
Start: 2024-01-01 | End: 2025-01-01

## 2024-01-01 RX ORDER — SENNOSIDES 8.8 MG/5ML
10 LIQUID ORAL 2 TIMES DAILY
Status: DISCONTINUED | OUTPATIENT
Start: 2024-01-01 | End: 2025-01-01

## 2024-01-01 RX ORDER — FAMOTIDINE 20 MG/1
20 TABLET, FILM COATED ORAL DAILY
Status: DISCONTINUED | OUTPATIENT
Start: 2025-01-01 | End: 2025-01-01

## 2024-01-01 RX ORDER — MIDAZOLAM HYDROCHLORIDE 1 MG/ML
2 INJECTION INTRAMUSCULAR; INTRAVENOUS
Status: DISCONTINUED | OUTPATIENT
Start: 2024-01-01 | End: 2024-01-01

## 2024-01-01 RX ORDER — POTASSIUM CHLORIDE 1.5 G/1.58G
40 POWDER, FOR SOLUTION ORAL ONCE
Status: COMPLETED | OUTPATIENT
Start: 2024-01-01 | End: 2024-01-01

## 2024-01-01 RX ORDER — POTASSIUM CHLORIDE 1.5 G/1.58G
40 POWDER, FOR SOLUTION ORAL EVERY 4 HOURS
Status: COMPLETED | OUTPATIENT
Start: 2024-01-01 | End: 2024-01-01

## 2024-01-01 RX ORDER — FUROSEMIDE 10 MG/ML
40 INJECTION INTRAMUSCULAR; INTRAVENOUS DAILY
Status: DISCONTINUED | OUTPATIENT
Start: 2024-01-01 | End: 2024-01-01

## 2024-01-01 RX ORDER — MORPHINE SULFATE 2 MG/ML
1 INJECTION, SOLUTION INTRAMUSCULAR; INTRAVENOUS EVERY 2 HOUR PRN
Status: DISCONTINUED | OUTPATIENT
Start: 2024-01-01 | End: 2024-01-01

## 2024-01-01 RX ORDER — CHLORHEXIDINE GLUCONATE ORAL RINSE 1.2 MG/ML
15 SOLUTION DENTAL
Status: DISCONTINUED | OUTPATIENT
Start: 2024-01-01 | End: 2024-01-01

## 2024-01-01 RX ORDER — FUROSEMIDE 20 MG/1
20 TABLET ORAL DAILY
Status: DISCONTINUED | OUTPATIENT
Start: 2024-01-01 | End: 2025-01-01

## 2024-01-01 RX ORDER — IBUPROFEN 200 MG
200 TABLET ORAL EVERY 4 HOURS PRN
Status: DISCONTINUED | OUTPATIENT
Start: 2024-01-01 | End: 2024-01-01

## 2024-01-01 RX ORDER — POTASSIUM CHLORIDE 14.9 MG/ML
20 INJECTION INTRAVENOUS ONCE
Status: COMPLETED | OUTPATIENT
Start: 2024-01-01 | End: 2024-01-01

## 2024-01-01 RX ORDER — ACETAMINOPHEN 10 MG/ML
15 INJECTION, SOLUTION INTRAVENOUS EVERY 6 HOURS PRN
Status: DISCONTINUED | OUTPATIENT
Start: 2024-01-01 | End: 2025-01-01

## 2024-01-01 RX ORDER — ACETAZOLAMIDE 250 MG/1
250 TABLET ORAL DAILY
Status: DISCONTINUED | OUTPATIENT
Start: 2024-01-01 | End: 2025-01-01

## 2024-01-01 RX ORDER — VANCOMYCIN HYDROCHLORIDE
25 ONCE
Status: COMPLETED | OUTPATIENT
Start: 2024-01-01 | End: 2024-01-01

## 2024-01-01 RX ORDER — IPRATROPIUM BROMIDE AND ALBUTEROL SULFATE 2.5; .5 MG/3ML; MG/3ML
3 SOLUTION RESPIRATORY (INHALATION)
Status: DISCONTINUED | OUTPATIENT
Start: 2024-01-01 | End: 2024-01-01

## 2024-01-01 RX ORDER — ONDANSETRON 2 MG/ML
4 INJECTION INTRAMUSCULAR; INTRAVENOUS EVERY 6 HOURS PRN
Status: DISCONTINUED | OUTPATIENT
Start: 2024-01-01 | End: 2025-01-01

## 2024-01-01 RX ORDER — ACETAMINOPHEN 10 MG/ML
INJECTION, SOLUTION INTRAVENOUS
Status: COMPLETED
Start: 2024-01-01 | End: 2024-01-01

## 2024-01-01 RX ORDER — DOXEPIN HYDROCHLORIDE 50 MG/1
1 CAPSULE ORAL DAILY
Status: DISCONTINUED | OUTPATIENT
Start: 2024-01-01 | End: 2025-01-01

## 2024-01-01 RX ORDER — FAMOTIDINE 10 MG/ML
20 INJECTION, SOLUTION INTRAVENOUS
Status: DISCONTINUED | OUTPATIENT
Start: 2024-01-01 | End: 2024-01-01

## 2024-01-01 RX ORDER — FUROSEMIDE 20 MG/1
20 TABLET ORAL DAILY
COMMUNITY

## 2024-01-01 RX ORDER — ACETAMINOPHEN 650 MG/1
650 SUPPOSITORY RECTAL EVERY 4 HOURS PRN
Status: DISCONTINUED | OUTPATIENT
Start: 2024-01-01 | End: 2025-01-01

## 2024-01-01 RX ORDER — ACETAZOLAMIDE 250 MG/1
250 TABLET ORAL DAILY
COMMUNITY

## 2024-01-01 RX ORDER — ACETAMINOPHEN 500 MG
500 TABLET ORAL EVERY 4 HOURS PRN
Status: DISCONTINUED | OUTPATIENT
Start: 2024-01-01 | End: 2024-01-01

## 2024-01-01 RX ORDER — METOCLOPRAMIDE HYDROCHLORIDE 5 MG/ML
10 INJECTION INTRAMUSCULAR; INTRAVENOUS EVERY 8 HOURS PRN
Status: DISCONTINUED | OUTPATIENT
Start: 2024-01-01 | End: 2025-01-01

## 2024-01-01 RX ORDER — MIDODRINE HYDROCHLORIDE 5 MG/1
10 TABLET ORAL 3 TIMES DAILY
Status: DISCONTINUED | OUTPATIENT
Start: 2024-01-01 | End: 2025-01-01

## 2024-01-01 RX ORDER — FUROSEMIDE 10 MG/ML
40 INJECTION INTRAMUSCULAR; INTRAVENOUS
Status: DISCONTINUED | OUTPATIENT
Start: 2024-01-01 | End: 2024-01-01

## 2024-01-01 RX ORDER — METOPROLOL TARTRATE 1 MG/ML
5 INJECTION, SOLUTION INTRAVENOUS EVERY 4 HOURS PRN
Status: DISCONTINUED | OUTPATIENT
Start: 2024-01-01 | End: 2025-01-01

## 2024-01-01 RX ORDER — SODIUM BICARBONATE 650 MG/1
325 TABLET ORAL AS NEEDED
Status: DISCONTINUED | OUTPATIENT
Start: 2024-01-01 | End: 2025-01-01

## 2024-01-01 RX ORDER — ALBUTEROL SULFATE 5 MG/ML
5 SOLUTION RESPIRATORY (INHALATION) EVERY 4 HOURS PRN
Status: DISCONTINUED | OUTPATIENT
Start: 2024-01-01 | End: 2025-01-01

## 2024-01-01 RX ORDER — DIGOXIN 125 MCG
125 TABLET ORAL DAILY
Status: DISCONTINUED | OUTPATIENT
Start: 2024-01-01 | End: 2025-01-01

## 2024-01-01 RX ORDER — MORPHINE SULFATE 2 MG/ML
2 INJECTION, SOLUTION INTRAMUSCULAR; INTRAVENOUS EVERY 2 HOUR PRN
Status: DISCONTINUED | OUTPATIENT
Start: 2024-01-01 | End: 2024-01-01

## 2024-01-01 RX ORDER — MORPHINE SULFATE 4 MG/ML
4 INJECTION, SOLUTION INTRAMUSCULAR; INTRAVENOUS EVERY 2 HOUR PRN
Status: DISCONTINUED | OUTPATIENT
Start: 2024-01-01 | End: 2024-01-01

## 2024-01-01 RX ORDER — MIDODRINE HYDROCHLORIDE 10 MG/1
10 TABLET ORAL 3 TIMES DAILY
COMMUNITY

## 2024-01-01 RX ORDER — DIGOXIN 0.25 MG/ML
250 INJECTION INTRAMUSCULAR; INTRAVENOUS ONCE
Status: COMPLETED | OUTPATIENT
Start: 2024-01-01 | End: 2024-01-01

## 2024-01-01 RX ORDER — FUROSEMIDE 10 MG/ML
20 INJECTION INTRAMUSCULAR; INTRAVENOUS ONCE
Status: COMPLETED | OUTPATIENT
Start: 2024-01-01 | End: 2024-01-01

## 2024-01-01 RX ORDER — IPRATROPIUM BROMIDE AND ALBUTEROL SULFATE 2.5; .5 MG/3ML; MG/3ML
3 SOLUTION RESPIRATORY (INHALATION) EVERY 6 HOURS PRN
Status: DISCONTINUED | OUTPATIENT
Start: 2024-01-01 | End: 2024-01-01

## 2024-01-01 RX ORDER — METOPROLOL TARTRATE 25 MG/1
25 TABLET, FILM COATED ORAL
Status: DISCONTINUED | OUTPATIENT
Start: 2024-01-01 | End: 2025-01-01

## 2024-01-01 RX ORDER — METOPROLOL TARTRATE 25 MG/1
25 TABLET, FILM COATED ORAL
Status: DISCONTINUED | OUTPATIENT
Start: 2024-01-01 | End: 2024-01-01

## 2024-01-01 RX ORDER — ACETAMINOPHEN 160 MG/5ML
650 SOLUTION ORAL EVERY 4 HOURS PRN
Status: DISCONTINUED | OUTPATIENT
Start: 2024-01-01 | End: 2025-01-01

## 2024-01-01 RX ORDER — IBUPROFEN 400 MG/1
400 TABLET, FILM COATED ORAL EVERY 4 HOURS PRN
Status: DISCONTINUED | OUTPATIENT
Start: 2024-01-01 | End: 2024-01-01

## 2024-01-01 RX ORDER — POLYETHYLENE GLYCOL 3350 17 G/17G
17 POWDER, FOR SOLUTION ORAL DAILY PRN
Status: DISCONTINUED | OUTPATIENT
Start: 2024-01-01 | End: 2025-01-01

## 2024-01-01 RX ORDER — MIDODRINE HYDROCHLORIDE 5 MG/1
5 TABLET ORAL 3 TIMES DAILY
COMMUNITY

## 2024-01-20 ENCOUNTER — HOSPITAL ENCOUNTER (INPATIENT)
Facility: HOSPITAL | Age: 89
LOS: 12 days | Discharge: NURSING, REHAB OR LONG-TERM CARE | End: 2024-02-02
Attending: EMERGENCY MEDICINE | Admitting: HOSPITALIST
Payer: MEDICARE

## 2024-01-20 ENCOUNTER — APPOINTMENT (OUTPATIENT)
Dept: GENERAL RADIOLOGY | Facility: HOSPITAL | Age: 89
End: 2024-01-20
Attending: EMERGENCY MEDICINE
Payer: MEDICARE

## 2024-01-20 DIAGNOSIS — E87.29 RESPIRATORY ACIDOSIS: ICD-10-CM

## 2024-01-20 DIAGNOSIS — I26.94 MULTIPLE SUBSEGMENTAL PULMONARY EMBOLI WITHOUT ACUTE COR PULMONALE (HCC): ICD-10-CM

## 2024-01-20 DIAGNOSIS — E87.5 HYPERKALEMIA: ICD-10-CM

## 2024-01-20 DIAGNOSIS — J90 PLEURAL EFFUSION: Primary | ICD-10-CM

## 2024-01-20 DIAGNOSIS — I50.9 ACUTE ON CHRONIC CONGESTIVE HEART FAILURE, UNSPECIFIED HEART FAILURE TYPE (HCC): ICD-10-CM

## 2024-01-20 DIAGNOSIS — J96.01 ACUTE RESPIRATORY FAILURE WITH HYPOXIA (HCC): ICD-10-CM

## 2024-01-20 DIAGNOSIS — R40.0 SOMNOLENCE: ICD-10-CM

## 2024-01-20 PROBLEM — I50.33 ACUTE ON CHRONIC DIASTOLIC CONGESTIVE HEART FAILURE (HCC): Status: ACTIVE | Noted: 2024-01-20

## 2024-01-20 PROBLEM — A41.9 SEPSIS (HCC): Status: ACTIVE | Noted: 2024-01-20

## 2024-01-20 PROBLEM — I50.33 ACUTE ON CHRONIC DIASTOLIC CONGESTIVE HEART FAILURE (HCC): Status: ACTIVE | Noted: 2024-01-01

## 2024-01-20 PROBLEM — J96.00 ACUTE RESPIRATORY FAILURE (HCC): Status: ACTIVE | Noted: 2023-09-09

## 2024-01-20 PROBLEM — A41.9 SEPSIS (HCC): Status: ACTIVE | Noted: 2024-01-01

## 2024-01-20 LAB
ALBUMIN SERPL-MCNC: 2.9 G/DL (ref 3.4–5)
ALBUMIN/GLOB SERPL: 0.7 {RATIO} (ref 1–2)
ALP LIVER SERPL-CCNC: 84 U/L
ANION GAP SERPL CALC-SCNC: 5 MMOL/L (ref 0–18)
AST SERPL-CCNC: 43 U/L (ref 15–37)
BASE EXCESS BLDA CALC-SCNC: 0.9 MMOL/L (ref ?–2)
BASE EXCESS BLDA CALC-SCNC: 1.8 MMOL/L (ref ?–2)
BASOPHILS # BLD AUTO: 0.01 X10(3) UL (ref 0–0.2)
BASOPHILS NFR BLD AUTO: 0.1 %
BILIRUB SERPL-MCNC: 1.5 MG/DL (ref 0.1–2)
BILIRUB UR QL STRIP.AUTO: NEGATIVE
BODY TEMPERATURE: 98.6 F
BODY TEMPERATURE: 98.6 F
BUN BLD-MCNC: 34 MG/DL (ref 9–23)
CA-I BLD-SCNC: 1.25 MMOL/L (ref 0.95–1.32)
CA-I BLD-SCNC: 1.26 MMOL/L (ref 0.95–1.32)
CALCIUM BLD-MCNC: 8.5 MG/DL (ref 8.5–10.1)
CHLORIDE SERPL-SCNC: 103 MMOL/L (ref 98–112)
CLARITY UR REFRACT.AUTO: CLEAR
CO2 SERPL-SCNC: 30 MMOL/L (ref 21–32)
COHGB MFR BLD: 2.4 % SAT (ref 0–3)
COHGB MFR BLD: 2.8 % SAT (ref 0–3)
COLOR UR AUTO: YELLOW
CREAT BLD-MCNC: 1.16 MG/DL
EGFRCR SERPLBLD CKD-EPI 2021: 44 ML/MIN/1.73M2 (ref 60–?)
EOSINOPHIL # BLD AUTO: 0 X10(3) UL (ref 0–0.7)
EOSINOPHIL NFR BLD AUTO: 0 %
ERYTHROCYTE [DISTWIDTH] IN BLOOD BY AUTOMATED COUNT: 18.8 %
EXPIRATORY PRESSURE: 10 CM H2O
FIO2: 100 %
FLUAV + FLUBV RNA SPEC NAA+PROBE: NEGATIVE
FLUAV + FLUBV RNA SPEC NAA+PROBE: NEGATIVE
GLOBULIN PLAS-MCNC: 4.1 G/DL (ref 2.8–4.4)
GLUCOSE BLD-MCNC: 123 MG/DL (ref 70–99)
GLUCOSE UR STRIP.AUTO-MCNC: NORMAL MG/DL
HCO3 BLDA-SCNC: 25.6 MEQ/L (ref 21–27)
HCO3 BLDA-SCNC: 26.2 MEQ/L (ref 21–27)
HCT VFR BLD AUTO: 27.8 %
HGB BLD-MCNC: 8.2 G/DL
HGB BLD-MCNC: 8.2 G/DL
HGB BLD-MCNC: 8.3 G/DL
HYALINE CASTS #/AREA URNS AUTO: PRESENT /LPF
IMM GRANULOCYTES # BLD AUTO: 0.04 X10(3) UL (ref 0–1)
IMM GRANULOCYTES NFR BLD: 0.5 %
IPAP MAX: 35 CM H2O
IPAP MIN: 25 CM H2O
KETONES UR STRIP.AUTO-MCNC: NEGATIVE MG/DL
L/M: 15 L/MIN
LACTATE BLD-SCNC: 4.6 MMOL/L (ref 0.5–2)
LACTATE BLD-SCNC: 5.2 MMOL/L (ref 0.5–2)
LACTATE SERPL-SCNC: 6.3 MMOL/L (ref 0.4–2)
LEUKOCYTE ESTERASE UR QL STRIP.AUTO: NEGATIVE
LYMPHOCYTES # BLD AUTO: 0.44 X10(3) UL (ref 1–4)
LYMPHOCYTES NFR BLD AUTO: 5.1 %
MCH RBC QN AUTO: 24.6 PG (ref 26–34)
MCHC RBC AUTO-ENTMCNC: 29.5 G/DL (ref 31–37)
MCV RBC AUTO: 83.2 FL
METHGB MFR BLD: 0.4 % SAT (ref 0.4–1.5)
METHGB MFR BLD: 0.7 % SAT (ref 0.4–1.5)
MONOCYTES # BLD AUTO: 0.67 X10(3) UL (ref 0.1–1)
MONOCYTES NFR BLD AUTO: 7.8 %
NEUTROPHILS # BLD AUTO: 7.43 X10 (3) UL (ref 1.5–7.7)
NEUTROPHILS # BLD AUTO: 7.43 X10(3) UL (ref 1.5–7.7)
NEUTROPHILS NFR BLD AUTO: 86.5 %
NITRITE UR QL STRIP.AUTO: NEGATIVE
NT-PROBNP SERPL-MCNC: ABNORMAL PG/ML (ref ?–450)
OSMOLALITY SERPL CALC.SUM OF ELEC: 295 MOSM/KG (ref 275–295)
OXYHGB MFR BLDA: 90.2 % (ref 92–100)
OXYHGB MFR BLDA: 96.6 % (ref 92–100)
PCO2 BLDA: 60 MM HG (ref 35–45)
PCO2 BLDA: 64 MM HG (ref 35–45)
PH BLDA: 7.27 [PH] (ref 7.35–7.45)
PH BLDA: 7.28 [PH] (ref 7.35–7.45)
PH UR STRIP.AUTO: 5 [PH] (ref 5–8)
PLATELET # BLD AUTO: 302 10(3)UL (ref 150–450)
PO2 BLDA: 128 MM HG (ref 80–100)
PO2 BLDA: 73 MM HG (ref 80–100)
POTASSIUM BLD-SCNC: 5.1 MMOL/L (ref 3.6–5.1)
POTASSIUM BLD-SCNC: 5.2 MMOL/L (ref 3.6–5.1)
POTASSIUM SERPL-SCNC: 5 MMOL/L (ref 3.5–5.1)
PROT SERPL-MCNC: 7 G/DL (ref 6.4–8.2)
PROT UR STRIP.AUTO-MCNC: 50 MG/DL
RBC # BLD AUTO: 3.34 X10(6)UL
RBC UR QL AUTO: NEGATIVE
RSV RNA SPEC NAA+PROBE: NEGATIVE
SARS-COV-2 RNA RESP QL NAA+PROBE: NOT DETECTED
SODIUM BLD-SCNC: 135 MMOL/L (ref 135–145)
SODIUM BLD-SCNC: 136 MMOL/L (ref 135–145)
SODIUM SERPL-SCNC: 138 MMOL/L (ref 136–145)
SP GR UR STRIP.AUTO: 1.02 (ref 1–1.03)
TIDAL VOLUME: 450 ML
TROPONIN I SERPL HS-MCNC: 25 NG/L
UROBILINOGEN UR STRIP.AUTO-MCNC: NORMAL MG/DL
VENT RATE: 18 /MIN
WBC # BLD AUTO: 8.6 X10(3) UL (ref 4–11)

## 2024-01-20 PROCEDURE — 71045 X-RAY EXAM CHEST 1 VIEW: CPT | Performed by: EMERGENCY MEDICINE

## 2024-01-20 PROCEDURE — 99223 1ST HOSP IP/OBS HIGH 75: CPT | Performed by: HOSPITALIST

## 2024-01-20 RX ORDER — FUROSEMIDE 10 MG/ML
80 INJECTION INTRAMUSCULAR; INTRAVENOUS ONCE
Status: COMPLETED | OUTPATIENT
Start: 2024-01-20 | End: 2024-01-20

## 2024-01-21 ENCOUNTER — APPOINTMENT (OUTPATIENT)
Dept: ULTRASOUND IMAGING | Facility: HOSPITAL | Age: 89
End: 2024-01-21
Attending: NURSE PRACTITIONER
Payer: MEDICARE

## 2024-01-21 ENCOUNTER — APPOINTMENT (OUTPATIENT)
Dept: CT IMAGING | Facility: HOSPITAL | Age: 89
End: 2024-01-21
Attending: NURSE PRACTITIONER
Payer: MEDICARE

## 2024-01-21 ENCOUNTER — APPOINTMENT (OUTPATIENT)
Dept: CV DIAGNOSTICS | Facility: HOSPITAL | Age: 89
End: 2024-01-21
Attending: INTERNAL MEDICINE
Payer: MEDICARE

## 2024-01-21 ENCOUNTER — APPOINTMENT (OUTPATIENT)
Dept: GENERAL RADIOLOGY | Facility: HOSPITAL | Age: 89
End: 2024-01-21
Attending: NURSE PRACTITIONER
Payer: MEDICARE

## 2024-01-21 PROBLEM — R40.0 SOMNOLENCE: Status: ACTIVE | Noted: 2024-01-01

## 2024-01-21 PROBLEM — E87.5 HYPERKALEMIA: Status: ACTIVE | Noted: 2024-01-01

## 2024-01-21 PROBLEM — E87.29 RESPIRATORY ACIDOSIS: Status: ACTIVE | Noted: 2024-01-21

## 2024-01-21 PROBLEM — J96.01 ACUTE RESPIRATORY FAILURE WITH HYPOXIA (HCC): Status: ACTIVE | Noted: 2024-01-01

## 2024-01-21 PROBLEM — J96.01 ACUTE RESPIRATORY FAILURE WITH HYPOXIA (HCC): Status: ACTIVE | Noted: 2024-01-21

## 2024-01-21 PROBLEM — E87.29 RESPIRATORY ACIDOSIS: Status: ACTIVE | Noted: 2024-01-01

## 2024-01-21 PROBLEM — R40.0 SOMNOLENCE: Status: ACTIVE | Noted: 2024-01-21

## 2024-01-21 PROBLEM — E87.5 HYPERKALEMIA: Status: ACTIVE | Noted: 2024-01-21

## 2024-01-21 LAB
ADENOVIRUS PCR:: NOT DETECTED
ALBUMIN SERPL-MCNC: 2.7 G/DL (ref 3.4–5)
ALBUMIN/GLOB SERPL: 0.7 {RATIO} (ref 1–2)
ALP LIVER SERPL-CCNC: 77 U/L
AMMONIA PLAS-MCNC: 17 UMOL/L (ref 11–32)
ANION GAP SERPL CALC-SCNC: 9 MMOL/L (ref 0–18)
APTT PPP: 136.4 SECONDS (ref 23.3–35.6)
APTT PPP: 185.9 SECONDS (ref 23.3–35.6)
APTT PPP: 27.5 SECONDS (ref 23.3–35.6)
APTT PPP: >240 SECONDS (ref 23.3–35.6)
ARTERIAL PATENCY WRIST A: POSITIVE
AST SERPL-CCNC: 86 U/L (ref 15–37)
B PARAPERT DNA SPEC QL NAA+PROBE: NOT DETECTED
B PERT DNA SPEC QL NAA+PROBE: NOT DETECTED
BASE EXCESS BLDA CALC-SCNC: 4.4 MMOL/L (ref ?–2)
BASOPHILS # BLD AUTO: 0.01 X10(3) UL (ref 0–0.2)
BASOPHILS # BLD AUTO: 0.01 X10(3) UL (ref 0–0.2)
BASOPHILS NFR BLD AUTO: 0.1 %
BASOPHILS NFR BLD AUTO: 0.1 %
BILIRUB SERPL-MCNC: 1.4 MG/DL (ref 0.1–2)
BODY TEMPERATURE: 98.6 F
BUN BLD-MCNC: 40 MG/DL (ref 9–23)
C PNEUM DNA SPEC QL NAA+PROBE: NOT DETECTED
CA-I BLD-SCNC: 1.06 MMOL/L (ref 0.95–1.32)
CALCIUM BLD-MCNC: 8.2 MG/DL (ref 8.5–10.1)
CHLORIDE SERPL-SCNC: 99 MMOL/L (ref 98–112)
CK SERPL-CCNC: 32 U/L
CO2 SERPL-SCNC: 27 MMOL/L (ref 21–32)
CORONAVIRUS 229E PCR:: NOT DETECTED
CORONAVIRUS HKU1 PCR:: NOT DETECTED
CORONAVIRUS NL63 PCR:: NOT DETECTED
CORONAVIRUS OC43 PCR:: NOT DETECTED
CREAT BLD-MCNC: 1.35 MG/DL
D DIMER PPP FEU-MCNC: 2.38 UG/ML FEU (ref ?–0.92)
EGFRCR SERPLBLD CKD-EPI 2021: 37 ML/MIN/1.73M2 (ref 60–?)
EOSINOPHIL # BLD AUTO: 0 X10(3) UL (ref 0–0.7)
EOSINOPHIL # BLD AUTO: 0 X10(3) UL (ref 0–0.7)
EOSINOPHIL NFR BLD AUTO: 0 %
EOSINOPHIL NFR BLD AUTO: 0 %
ERYTHROCYTE [DISTWIDTH] IN BLOOD BY AUTOMATED COUNT: 18.5 %
ERYTHROCYTE [DISTWIDTH] IN BLOOD BY AUTOMATED COUNT: 18.9 %
EST. AVERAGE GLUCOSE BLD GHB EST-MCNC: 111 MG/DL (ref 68–126)
FIBRINOGEN PPP-MCNC: 204 MG/DL (ref 180–480)
FLUAV RNA SPEC QL NAA+PROBE: NOT DETECTED
FLUBV RNA SPEC QL NAA+PROBE: NOT DETECTED
GLOBULIN PLAS-MCNC: 4.1 G/DL (ref 2.8–4.4)
GLUCOSE BLD-MCNC: 101 MG/DL (ref 70–99)
GLUCOSE BLD-MCNC: 115 MG/DL (ref 70–99)
GLUCOSE BLD-MCNC: 121 MG/DL (ref 70–99)
GLUCOSE BLD-MCNC: 121 MG/DL (ref 70–99)
GLUCOSE BLD-MCNC: 231 MG/DL (ref 70–99)
GLUCOSE BLD-MCNC: 51 MG/DL (ref 70–99)
GLUCOSE BLD-MCNC: 97 MG/DL (ref 70–99)
HBA1C MFR BLD: 5.5 % (ref ?–5.7)
HCO3 BLDA-SCNC: 28.4 MEQ/L (ref 21–27)
HCT VFR BLD AUTO: 22.3 %
HCT VFR BLD AUTO: 27.2 %
HGB BLD-MCNC: 7 G/DL
HGB BLD-MCNC: 7.8 G/DL
IMM GRANULOCYTES # BLD AUTO: 0.05 X10(3) UL (ref 0–1)
IMM GRANULOCYTES # BLD AUTO: 0.07 X10(3) UL (ref 0–1)
IMM GRANULOCYTES NFR BLD: 0.5 %
IMM GRANULOCYTES NFR BLD: 0.5 %
INR BLD: 1.64 (ref 0.8–1.2)
INR BLD: 1.84 (ref 0.8–1.2)
L PNEUMO AG UR QL: NEGATIVE
L/M: 5 L/MIN
LACTATE BLD-SCNC: 2.2 MMOL/L (ref 0.5–2)
LACTATE SERPL-SCNC: 4.1 MMOL/L (ref 0.4–2)
LACTATE SERPL-SCNC: 6 MMOL/L (ref 0.4–2)
LYMPHOCYTES # BLD AUTO: 0.35 X10(3) UL (ref 1–4)
LYMPHOCYTES # BLD AUTO: 0.53 X10(3) UL (ref 1–4)
LYMPHOCYTES NFR BLD AUTO: 2.7 %
LYMPHOCYTES NFR BLD AUTO: 5.2 %
MAGNESIUM SERPL-MCNC: 1.8 MG/DL (ref 1.6–2.6)
MCH RBC QN AUTO: 24.6 PG (ref 26–34)
MCH RBC QN AUTO: 24.6 PG (ref 26–34)
MCHC RBC AUTO-ENTMCNC: 28.7 G/DL (ref 31–37)
MCHC RBC AUTO-ENTMCNC: 31.4 G/DL (ref 31–37)
MCV RBC AUTO: 78.5 FL
MCV RBC AUTO: 85.8 FL
METAPNEUMOVIRUS PCR:: NOT DETECTED
MONOCYTES # BLD AUTO: 1.12 X10(3) UL (ref 0.1–1)
MONOCYTES # BLD AUTO: 1.72 X10(3) UL (ref 0.1–1)
MONOCYTES NFR BLD AUTO: 11 %
MONOCYTES NFR BLD AUTO: 13.3 %
MRSA DNA SPEC QL NAA+PROBE: NEGATIVE
MYCOPLASMA PNEUMONIA PCR:: NOT DETECTED
NEUTROPHILS # BLD AUTO: 10.8 X10 (3) UL (ref 1.5–7.7)
NEUTROPHILS # BLD AUTO: 10.8 X10(3) UL (ref 1.5–7.7)
NEUTROPHILS # BLD AUTO: 8.45 X10 (3) UL (ref 1.5–7.7)
NEUTROPHILS # BLD AUTO: 8.45 X10(3) UL (ref 1.5–7.7)
NEUTROPHILS NFR BLD AUTO: 83.2 %
NEUTROPHILS NFR BLD AUTO: 83.4 %
NT-PROBNP SERPL-MCNC: ABNORMAL PG/ML (ref ?–450)
OSMOLALITY SERPL CALC.SUM OF ELEC: 290 MOSM/KG (ref 275–295)
PARAINFLUENZA 1 PCR:: NOT DETECTED
PARAINFLUENZA 2 PCR:: NOT DETECTED
PARAINFLUENZA 3 PCR:: NOT DETECTED
PARAINFLUENZA 4 PCR:: NOT DETECTED
PCO2 BLDA: 43 MM HG (ref 35–45)
PH BLDA: 7.44 [PH] (ref 7.35–7.45)
PHOSPHATE SERPL-MCNC: 6.1 MG/DL (ref 2.5–4.9)
PLATELET # BLD AUTO: 255 10(3)UL (ref 150–450)
PLATELET # BLD AUTO: 277 10(3)UL (ref 150–450)
PO2 BLDA: 136 MM HG (ref 80–100)
POTASSIUM BLD-SCNC: 4.3 MMOL/L (ref 3.6–5.1)
POTASSIUM SERPL-SCNC: 4.6 MMOL/L (ref 3.5–5.1)
PROCALCITONIN SERPL-MCNC: <0.05 NG/ML (ref ?–0.16)
PROT SERPL-MCNC: 6.8 G/DL (ref 6.4–8.2)
PROTHROMBIN TIME: 19.6 SECONDS (ref 11.6–14.8)
PROTHROMBIN TIME: 21.4 SECONDS (ref 11.6–14.8)
RBC # BLD AUTO: 2.84 X10(6)UL
RBC # BLD AUTO: 3.17 X10(6)UL
RHINOVIRUS/ENTERO PCR:: NOT DETECTED
RSV RNA SPEC QL NAA+PROBE: NOT DETECTED
SARS-COV-2 RNA NPH QL NAA+NON-PROBE: NOT DETECTED
SODIUM BLD-SCNC: 135 MMOL/L (ref 135–145)
SODIUM SERPL-SCNC: 135 MMOL/L (ref 136–145)
STREP PNEUMO ANTIGEN, URINE: NEGATIVE
WBC # BLD AUTO: 10.2 X10(3) UL (ref 4–11)
WBC # BLD AUTO: 13 X10(3) UL (ref 4–11)

## 2024-01-21 PROCEDURE — 71275 CT ANGIOGRAPHY CHEST: CPT | Performed by: NURSE PRACTITIONER

## 2024-01-21 PROCEDURE — 93970 EXTREMITY STUDY: CPT | Performed by: NURSE PRACTITIONER

## 2024-01-21 PROCEDURE — 70450 CT HEAD/BRAIN W/O DYE: CPT | Performed by: NURSE PRACTITIONER

## 2024-01-21 PROCEDURE — 99291 CRITICAL CARE FIRST HOUR: CPT | Performed by: INTERNAL MEDICINE

## 2024-01-21 PROCEDURE — 99233 SBSQ HOSP IP/OBS HIGH 50: CPT | Performed by: STUDENT IN AN ORGANIZED HEALTH CARE EDUCATION/TRAINING PROGRAM

## 2024-01-21 PROCEDURE — 71045 X-RAY EXAM CHEST 1 VIEW: CPT | Performed by: NURSE PRACTITIONER

## 2024-01-21 PROCEDURE — 99291 CRITICAL CARE FIRST HOUR: CPT | Performed by: NURSE PRACTITIONER

## 2024-01-21 PROCEDURE — 93306 TTE W/DOPPLER COMPLETE: CPT | Performed by: INTERNAL MEDICINE

## 2024-01-21 PROCEDURE — 3E033XZ INTRODUCTION OF VASOPRESSOR INTO PERIPHERAL VEIN, PERCUTANEOUS APPROACH: ICD-10-PCS | Performed by: STUDENT IN AN ORGANIZED HEALTH CARE EDUCATION/TRAINING PROGRAM

## 2024-01-21 PROCEDURE — 5A09457 ASSISTANCE WITH RESPIRATORY VENTILATION, 24-96 CONSECUTIVE HOURS, CONTINUOUS POSITIVE AIRWAY PRESSURE: ICD-10-PCS | Performed by: STUDENT IN AN ORGANIZED HEALTH CARE EDUCATION/TRAINING PROGRAM

## 2024-01-21 RX ORDER — MIDODRINE HYDROCHLORIDE 5 MG/1
5 TABLET ORAL AS NEEDED
COMMUNITY
End: 2024-02-02

## 2024-01-21 RX ORDER — ACETAMINOPHEN 500 MG
500 TABLET ORAL EVERY 4 HOURS PRN
Status: DISCONTINUED | OUTPATIENT
Start: 2024-01-21 | End: 2024-02-02

## 2024-01-21 RX ORDER — ENOXAPARIN SODIUM 100 MG/ML
40 INJECTION SUBCUTANEOUS DAILY
Status: DISCONTINUED | OUTPATIENT
Start: 2024-01-21 | End: 2024-01-21

## 2024-01-21 RX ORDER — ACETAMINOPHEN 10 MG/ML
15 INJECTION, SOLUTION INTRAVENOUS EVERY 6 HOURS PRN
Status: DISCONTINUED | OUTPATIENT
Start: 2024-01-21 | End: 2024-01-29

## 2024-01-21 RX ORDER — FUROSEMIDE 10 MG/ML
20 INJECTION INTRAMUSCULAR; INTRAVENOUS ONCE
Status: COMPLETED | OUTPATIENT
Start: 2024-01-21 | End: 2024-01-21

## 2024-01-21 RX ORDER — ONDANSETRON 2 MG/ML
4 INJECTION INTRAMUSCULAR; INTRAVENOUS EVERY 6 HOURS PRN
Status: DISCONTINUED | OUTPATIENT
Start: 2024-01-21 | End: 2024-02-02

## 2024-01-21 RX ORDER — MAGNESIUM SULFATE HEPTAHYDRATE 40 MG/ML
2 INJECTION, SOLUTION INTRAVENOUS ONCE
Status: COMPLETED | OUTPATIENT
Start: 2024-01-21 | End: 2024-01-21

## 2024-01-21 RX ORDER — PHENTOLAMINE MESYLATE 5 MG/ML
10 INJECTION, POWDER, FOR SOLUTION INTRAMUSCULAR; INTRAVENOUS
Status: COMPLETED | OUTPATIENT
Start: 2024-01-21 | End: 2024-01-21

## 2024-01-21 RX ORDER — DEXTROSE MONOHYDRATE 25 G/50ML
50 INJECTION, SOLUTION INTRAVENOUS
Status: DISCONTINUED | OUTPATIENT
Start: 2024-01-21 | End: 2024-02-02

## 2024-01-21 RX ORDER — METOCLOPRAMIDE HYDROCHLORIDE 5 MG/ML
10 INJECTION INTRAMUSCULAR; INTRAVENOUS EVERY 8 HOURS PRN
Status: DISCONTINUED | OUTPATIENT
Start: 2024-01-21 | End: 2024-01-22

## 2024-01-21 RX ORDER — HEPARIN SODIUM 1000 [USP'U]/ML
80 INJECTION, SOLUTION INTRAVENOUS; SUBCUTANEOUS ONCE
Status: COMPLETED | OUTPATIENT
Start: 2024-01-21 | End: 2024-01-21

## 2024-01-21 RX ORDER — HEPARIN SODIUM AND DEXTROSE 10000; 5 [USP'U]/100ML; G/100ML
18 INJECTION INTRAVENOUS ONCE
Status: COMPLETED | OUTPATIENT
Start: 2024-01-21 | End: 2024-01-21

## 2024-01-21 RX ORDER — ONDANSETRON 2 MG/ML
4 INJECTION INTRAMUSCULAR; INTRAVENOUS EVERY 6 HOURS PRN
Status: DISCONTINUED | OUTPATIENT
Start: 2024-01-21 | End: 2024-01-21

## 2024-01-21 RX ORDER — SODIUM CHLORIDE 9 MG/ML
INJECTION, SOLUTION INTRAVENOUS CONTINUOUS
Status: DISCONTINUED | OUTPATIENT
Start: 2024-01-21 | End: 2024-01-22

## 2024-01-21 RX ORDER — HEPARIN SODIUM AND DEXTROSE 10000; 5 [USP'U]/100ML; G/100ML
INJECTION INTRAVENOUS CONTINUOUS
Status: DISCONTINUED | OUTPATIENT
Start: 2024-01-21 | End: 2024-01-30

## 2024-01-21 RX ORDER — NICOTINE POLACRILEX 4 MG
30 LOZENGE BUCCAL
Status: DISCONTINUED | OUTPATIENT
Start: 2024-01-21 | End: 2024-02-02

## 2024-01-21 RX ORDER — NICOTINE POLACRILEX 4 MG
15 LOZENGE BUCCAL
Status: DISCONTINUED | OUTPATIENT
Start: 2024-01-21 | End: 2024-02-02

## 2024-01-21 RX ORDER — MORPHINE SULFATE 2 MG/ML
0.5 INJECTION, SOLUTION INTRAMUSCULAR; INTRAVENOUS EVERY 4 HOURS PRN
Status: DISCONTINUED | OUTPATIENT
Start: 2024-01-21 | End: 2024-02-02

## 2024-01-21 NOTE — ED QUICK NOTES
Orders for admission, patient is aware of plan and ready to go upstairs. Any questions, please call ED RN [place] at extension [phone].     Patient Covid vaccination status: Unvaccinated     COVID Test Ordered in ED: SARS-CoV-2/Flu A and B/RSV by PCR (GeneXpert)    COVID Suspicion at Admission: N/A    Running Infusions:    norepinephrine 3 mcg/min (01/20/24 2227)        Mental Status/LOC at time of transport: Disoriented x4 Follows commands intermittenly.    Other pertinent information:  Currently on Bi-Pap at this time.   CIWA score: N/A   NIH score:  N/A

## 2024-01-21 NOTE — CONSULTS
[facility]  Cardiology Critical Care Consultation    Yin Butcher Patient Status:  Inpatient    1931 MRN [id number]   Location [facility] 4SW-A Attending Skyla Tinsley MD   Hosp Day # 0 PCP Mina Walker MD     Reason for Consultation:  Pleural effusion, elevated pro-BNP and concern for right heart strain on CT    History of Present Illness:  Yin Butcher is a a(n) 89 year old female who is minimally responsive on non-invasive ventilation in the MICU.   Her family contacted EMS yesterday as she was unresponsive. Per EMS report they were not allowed to enter the home beyond the foyer. One EMS personnel was admitted and brought the patient to the ambulance to assess and manage. The home was noted to be less then ideal living conditions/hoarding. No family at bedside this morning.    History:  Past Medical History:   Diagnosis Date    Arrhythmia     Arthritis     Back problem     Cancer (HCC)     Cataract     Osteoporosis     Personal history of antineoplastic chemotherapy      Past Surgical History:   Procedure Laterality Date    OTHER SURGICAL HISTORY  2014    Procedure: HIP HEMIARTHROPLASTY/ BIPOLAR;  Surgeon: Vasu Matamoros MD;  Location:  MAIN OR    REMOVAL OF TONSILS,12+ Y/O       Family History   Family history unknown: Yes      reports that she has never smoked. She has never used smokeless tobacco. She reports that she does not currently use alcohol. She reports that she does not use drugs.    Allergies:  Allergies   Allergen Reactions    Erythromycin OTHER (SEE COMMENTS)    Oxycodone HALLUCINATION    Gabapentin NAUSEA ONLY and OTHER (SEE COMMENTS)     Pt stated she didn't feel like herself     Tizanidine ITCHING and OTHER (SEE COMMENTS)     Pt states burning sensation        Medications:    Current Facility-Administered Medications:     norepinephrine (Levophed) 4 mg/250mL infusion premix, 0.5-30 mcg/min, Intravenous, Continuous PRN    ondansetron (Zofran)  4 MG/2ML injection 4 mg, 4 mg, Intravenous, Q6H PRN    metoclopramide (Reglan) 5 mg/mL injection 10 mg, 10 mg, Intravenous, Q8H PRN    piperacillin-tazobactam (Zosyn) 3.375 g in dextrose 5% 100 mL IVPB-ADDV, 3.375 g, Intravenous, Q8H    sodium chloride 0.9% infusion, , Intravenous, Continuous    glucose (Dex4) 15 GM/59ML oral liquid 15 g, 15 g, Oral, Q15 Min PRN **OR** glucose (Glutose) 40% oral gel 15 g, 15 g, Oral, Q15 Min PRN **OR** glucose-vitamin C (Dex-4) chewable tab 4 tablet, 4 tablet, Oral, Q15 Min PRN **OR** dextrose 50% injection 50 mL, 50 mL, Intravenous, Q15 Min PRN **OR** glucose (Dex4) 15 GM/59ML oral liquid 30 g, 30 g, Oral, Q15 Min PRN **OR** glucose (Glutose) 40% oral gel 30 g, 30 g, Oral, Q15 Min PRN **OR** glucose-vitamin C (Dex-4) chewable tab 8 tablet, 8 tablet, Oral, Q15 Min PRN    acetaminophen (Tylenol Extra Strength) tab 500 mg, 500 mg, Oral, Q4H PRN    heparin (Porcine) 01174 units/250mL infusion CONTINUOUS, 200-3,000 Units/hr, Intravenous, Continuous    phentolamine (Regitine) 10 mg in sodium chloride 0.9% syringe (ADULT EXTRAVASATION), 10 mg, Subcutaneous, See Admin Instructions    Review of Systems:  Unreliable and thus unobtainable due to current medical illness.    BP 98/54 (BP Location: Right arm)   Pulse 75   Temp 97.1 °F (36.2 °C) (Temporal)   Resp 15   Wt 109 lb 2 oz (49.5 kg)   SpO2 99%   BMI 17.09 kg/m²   Temp (24hrs), Av.4 °F (35.8 °C), Min:95.5 °F (35.3 °C), Max:97.1 °F (36.2 °C)       Intake/Output Summary (Last 24 hours) at 2024 1124  Last data filed at 2024 0727  Gross per 24 hour   Intake 660 ml   Output 350 ml   Net 310 ml     Wt Readings from Last 3 Encounters:   24 109 lb 2 oz (49.5 kg)   10/29/23 104 lb 11.5 oz (47.5 kg)   09/10/23 92 lb 14.4 oz (42.1 kg)       Physical Exam:   General: Non-invasive ventilation,  Frail appearing, minimally responsive.  HEENT: Normocephalic, anicteric sclera, neck supple.  Neck: Difficult to gauge JVD, carotids  2+, no bruits.  Cardiac: Irregular rate and rhythm. S1, S2 normal. No murmur, pericardial rub, S3.  Lungs: Clear without wheezes, rales, rhonchi or dullness.  Normal excursions and effort.  Abdomen: Soft, non-tender.   Extremities: Without clubbing, cyanosis or edema.  Peripheral pulses are 2+.  Neurologic: Non-invasive ventilation,  Frail appearing, minimally responsive.  Skin: Warm and dry.     Laboratory Data:  Lab Results   Component Value Date    WBC 10.2 01/21/2024    HGB 7.8 01/21/2024    HCT 27.2 01/21/2024    .0 01/21/2024    CREATSERUM 1.35 01/21/2024    BUN 40 01/21/2024     01/21/2024    K 4.6 01/21/2024    CL 99 01/21/2024    CO2 27.0 01/21/2024     01/21/2024    CA 8.2 01/21/2024    ALB 2.7 01/21/2024    ALKPHO 77 01/21/2024    BILT 1.4 01/21/2024    TP 6.8 01/21/2024    AST 86 01/21/2024    ALT  01/21/2024      Comment:      Due to  backorder we are temporarily unable to offer hospital-based ALT testing at [facility].   If urgently needed, please order ALT test code [id number].   The new order will need a new venipuncture and will be sent to [facility] for testing.   The expected turnaround time will be within 24 hours.     PTT 27.5 01/21/2024    INR 1.64 01/21/2024    PTP 19.6 01/21/2024    DDIMER 2.38 01/20/2024    MG 1.8 01/21/2024    PHOS 6.1 01/21/2024    CK 32 01/20/2024    PGLU 121 01/21/2024     Recent Labs   Lab 01/20/24  2159   TROPHS 25   CK 32         Imaging:  EKG 1/20/2024: afib with VR 72 bpm with no acute ST/T wave changes    CT 1/21/2024: CONCLUSION:    1. There are acute bilateral lower lobe and right middle lobe pulmonary emboli.  The critical results were called to the patient's nurse by the vision radiologist at 0318 a.m..   2. Findings suggest right heart failure/right heart strain.   3. Markedly enlarged left atrium raises suspicion for mitral valve stenosis.   4. Large right and moderate left pleural effusions are noted.   5. Bilateral lower lobe  and right middle lobe atelectasis.   Dictated by (CST): Jacques Diaz MD on 1/21/2024 at 9:21 AM     Impression:  Principal Problem:    Pleural effusion  Active Problems:    Acute respiratory failure (HCC)    Acute on chronic congestive heart failure, unspecified heart failure type (HCC)    Acute on chronic diastolic congestive heart failure (HCC)    Sepsis (HCC)    Acute respiratory failure with hypoxia (HCC)    Somnolence    Hyperkalemia    Respiratory acidosis  Chronic atrial fibrillation  Frequent falls    Recommendations:  -tele monitoring  -IV levophed to maintain SBP greater than 90 mmHg and MAP greater than 60 mmHg  -2D echo ordered; I would not be surprised if she has cardiomyopathy and if so clinically could be stress induced  -historically has not been on anti-coagulation; however, agree with IV heparin for possibility of pulmonary embolism on the differential  -patient was hesitant of taking diuretics in the past; has had prior thoracentesis; given IV diuretics in ER but clinically appears hypovolemic  -patient appears appropriate for conservative medical management  -goals of care and end of life decisions should be addressed by primary service with family especially with third hospitalization in the past 5 months and EMS findings of home    D/w MICU RN at bedside.    Critical care time of 35 minutes spent today    Thank you for allowing me to participate in the care of your patient.    Dipak Umana MD  1/21/2024  11:24 AM

## 2024-01-21 NOTE — SLP NOTE
SLP order received to evaluate oropharyngeal swallow d/t failed RN dysphagia screen. However, patient requiring BiPAP support at this time as not appropriate for SLP intervention. Will continue to follow and evaluate as medically appropriate and schedule allows.

## 2024-01-21 NOTE — ED QUICK NOTES
EMS states they had difficult time entering house, as daughter was not allowing them in the house past the foyer. They state home conditions were not ideal/hoarder.

## 2024-01-21 NOTE — CONSULTS
ICU  Critical Care APRN Consult Note    NAME: Yin Butcher - ROOM: 455/[facility]-A - MRN: [id number] - Age: 89 year old - :1931    History Of Present Illness:  Yin Butcher is a 89 year old female with PMHx significant for afib, hypertension who was brought to the ED with AMS, noted to be hypotensive, hypoxic and hypothermic.  She was placed on Bipap, pan cultured, started on BSAB and levophed.  She was sent to ICU for further care.  Upon exam obtunded, moves to painful stimuli.  Pending CT brain and CTA.  Appears malnourished.     Past Medical History:  Past Medical History:   Diagnosis Date    Arrhythmia     Arthritis     Back problem     Cancer (HCC)     Cataract     Osteoporosis     Personal history of antineoplastic chemotherapy      Past Surgical History:   Past Surgical History:   Procedure Laterality Date    OTHER SURGICAL HISTORY  2014    Procedure: HIP HEMIARTHROPLASTY/ BIPOLAR;  Surgeon: Vasu Matamoros MD;  Location: [facility] MAIN OR    REMOVAL OF TONSILS,12+ Y/O        Family History:   Family History   Family history unknown: Yes     Social History:    reports that she has never smoked. She has never used smokeless tobacco. She reports that she does not currently use alcohol. She reports that she does not use drugs.     Review of Systems:   A comprehensive 10 point review of systems was completed.  Pertinent positives and negatives noted in the HPI.    Current Facility-Administered Medications   Medication Dose Route Frequency    enoxaparin (Lovenox) 40 MG/0.4ML SUBQ injection 40 mg  40 mg Subcutaneous Daily    norepinephrine (Levophed) 4 mg/250mL infusion premix  0.5-30 mcg/min Intravenous Continuous PRN    ondansetron (Zofran) 4 MG/2ML injection 4 mg  4 mg Intravenous Q6H PRN    metoclopramide (Reglan) 5 mg/mL injection 10 mg  10 mg Intravenous Q8H PRN    piperacillin-tazobactam (Zosyn) 3.375 g in dextrose 5% 100 mL IVPB-ADDV  3.375 g Intravenous Once    piperacillin-tazobactam (Zosyn)  3.375 g in dextrose 5% 100 mL IVPB-ADDV  3.375 g Intravenous Q8H    sodium chloride 0.9% infusion   Intravenous Continuous    glucose (Dex4) 15 GM/59ML oral liquid 15 g  15 g Oral Q15 Min PRN    Or    glucose (Glutose) 40% oral gel 15 g  15 g Oral Q15 Min PRN    Or    glucose-vitamin C (Dex-4) chewable tab 4 tablet  4 tablet Oral Q15 Min PRN    Or    dextrose 50% injection 50 mL  50 mL Intravenous Q15 Min PRN    Or    glucose (Dex4) 15 GM/59ML oral liquid 30 g  30 g Oral Q15 Min PRN    Or    glucose (Glutose) 40% oral gel 30 g  30 g Oral Q15 Min PRN    Or    glucose-vitamin C (Dex-4) chewable tab 8 tablet  8 tablet Oral Q15 Min PRN    acetaminophen (Tylenol Extra Strength) tab 500 mg  500 mg Oral Q4H PRN     OBJECTIVE  Vitals:  BP 93/55   Pulse 68   Temp 96.9 °F (36.1 °C) (Temporal)   Resp 14   Wt 109 lb 2 oz (49.5 kg)   SpO2 90%   BMI 17.09 kg/m²           Bipap  Physical Exam:    General Appearance: Obtunded, cooperative, no acute distress, appears stated age  Neck: No JVD, neck supple, no adenopathy, trachea midline, no carotid bruits  Lungs: Clear to auscultation bilaterally, respirations unlabored  Heart: Regular rate and rhythm, S1 and S2 normal, no murmur, rub or gallop  Abdomen: Soft, non-tender, bowel sounds active all four quadrants, no masses, no organomegaly  Extremities: Atraumatic, no cyanosis or edema, capillary refill <3 sec.    Pulses: 2+ and symmetric all extremities  Skin: Skin color, texture, turgor normal for ethnicity, no rashes or lesions, warm and dry  Neurologic: CNII-XII intact, normal strength    Data this admission:  XR CHEST AP PORTABLE  (CPT=71045)    Result Date: 1/20/2024  CONCLUSION:  Cardiomegaly with mild pulmonary venous congestion.  Bilateral pleural effusions with bibasilar airspace disease right greater than left.  Increased interstitial markings throughout both lungs concerning for pulmonary edema.  No pneumothorax.  Calcified breast implants.   LOCATION:  [facility]       Dictated by (CST): Christina Gudino MD on 1/20/2024 at 10:29 PM     Finalized by (CST): Christina Gudino MD on 1/20/2024 at 10:30 PM       Labs:  Lab Results   Component Value Date    WBC 8.6 01/20/2024    HGB 8.2 01/20/2024    HCT 27.8 01/20/2024    .0 01/20/2024    CREATSERUM 1.16 01/20/2024    BUN 34 01/20/2024     01/20/2024    K 5.0 01/20/2024     01/20/2024    CO2 30.0 01/20/2024     01/20/2024    CA 8.5 01/20/2024    ALB 2.9 01/20/2024    ALKPHO 84 01/20/2024    BILT 1.5 01/20/2024    TP 7.0 01/20/2024    AST 43 01/20/2024    ALT  01/20/2024      Comment:      Due to  backorder we are temporarily unable to offer hospital-based ALT testing at [place] [facility].   If urgently needed, please order ALT test code [id number].   The new order will need a new venipuncture and will be sent to [facility] for testing.   The expected turnaround time will be within 24 hours.      Assessment/Plan:    Sepsis/ lactic acidosis  Continue bipap  Procal, sputum, rvp, urine pending  Cxr in AM  HOb>30  Maintain SBP>90, map>60  IVF  Hypoxia 2/2 above?  D dimer elevated- send for CTA to r/o PE  Continue Bipap  Repeat ABG in AM  Repeat CXR in AM  CHF  Cardiology on consult  Lower rate IVF  Afib  Rate control  FLORESITA  Renal dose medication  Recheck labs in AM     F/E/N:    -IV fluids  -Follow lytes and replete prn  -    Proph:  -SQ Lovenox  -SCD    Dispo:     Code Status: Full Code  -ICU for close monitoring    Plan of care discussed with intensivist on-call. Dr. Lockhart.          MAXIM Morton  Critical Care Nurse Practitioner  Spectra # [phone]            A total of 35 minutes of critical care time (exclusive of billable procedures) was administered. This involved direct patient intervention, complex decision making, and/or extensive discussions (>50% face to face time) with the patient, family, and clinical staff.    The 21st Century Cures Act makes medical notes like these available to patients in the  interest of transparency. Please be advised this is a medical document. Medical documents are intended to carry relevant information, facts as evident, and the clinical opinion of the practitioner. The medical note is intended as peer to peer communication and may appear blunt or direct. It is written in medical language and may contain abbreviations or verbiage that are unfamiliar.         Intensivist addendum:   I agree with APN note above. I have independently seen and evaluated the patient. I agree with the management plan outlined above. Continue AVAPS. Heparin gtt for PE. Empiric antibiotics. Called Jada (daughter - HCPOA) and she confirmed full code status. Critical care time: 50 minutes    Ihsan Lockhart MD  Pulmonary and Critical Care Medicine

## 2024-01-21 NOTE — PLAN OF CARE
Assumed care of pt around 1100. Pt alert, oriented only to self. Hallucinating that other people are in the room. At times calm and cooperative, other times agitated and telling staff that she is going to murder them. Afib with PVCs.. Levo titrated per order, see flowsheets. AVAPs removed and placed on 3-5L. Pt removed harvey catheter with balloon inflated despite having restraints on, critical care APN aware. Heparin gtt continued per order. See flowsheets for full assessments. POC discussed with pt's daughter via phone call. Care endorsed to oncoming RN.

## 2024-01-21 NOTE — H&P
[facility]     Yin Butcher Patient Status:  Emergency    1931 MRN [id number]   Location [facility] EMERGENCY DEPARTMENT Attending Horace Wills MD   Hosp Day # 0 PCP Mina Walker MD     Chief Complaint: unresponsive    Subjective:    History of Present Illness:     Yin Butcher is a 89 year old female with medical history of atrial fibrillation who was brought to the ER with complaints of being unresponsive.  Her family called EMS as she was noted to be unresponsive.  Unknown when she was last normal .  When EMS arrived they were not allowed in the house immediately to assess the patient as it was unsafe due to hoarding.  Patient was eventually brought to the ER and she was noted to have hypothermia and placed on a elizabeth hugger,  she was also hypoxic and placed on BIPAP.  Patient is currently unresponsive and unable to provide any history,  no family is present at bedside    History/Other:    Past Medical History:  Past Medical History:   Diagnosis Date    Arrhythmia     Arthritis     Back problem     Cancer (HCC)     Cataract     Osteoporosis     Personal history of antineoplastic chemotherapy      Past Surgical History:   Past Surgical History:   Procedure Laterality Date    OTHER SURGICAL HISTORY  2014    Procedure: HIP HEMIARTHROPLASTY/ BIPOLAR;  Surgeon: Vasu Matamoros MD;  Location:  MAIN OR    REMOVAL OF TONSILS,12+ Y/O        Family History:   Family History   Family history unknown: Yes     Social History:    reports that she has never smoked. She has never used smokeless tobacco. She reports that she does not currently use alcohol. She reports that she does not use drugs.     Allergies:   Allergies   Allergen Reactions    Erythromycin OTHER (SEE COMMENTS)    Oxycodone HALLUCINATION    Gabapentin NAUSEA ONLY and OTHER (SEE COMMENTS)     Pt stated she didn't feel like herself     Tizanidine ITCHING and OTHER (SEE COMMENTS)     Pt states  burning sensation        Medications:    Current Facility-Administered Medications on File Prior to Encounter   Medication Dose Route Frequency Provider Last Rate Last Admin    [COMPLETED] magnesium oxide (Mag-Ox) tab 400 mg  400 mg Oral Once Landon Codrero DO   400 mg at 10/26/23 0929    [COMPLETED] cefTRIAXone (Rocephin) 1 g in D5W 100 mL IVPB-ADD  1 g Intravenous Once Rhett Kuo MD   Stopped at 10/22/23 3459     Current Outpatient Medications on File Prior to Encounter   Medication Sig Dispense Refill    furosemide 20 MG Oral Tab Take 1 tablet (20 mg total) by mouth daily. 30 tablet 0    digoxin 0.125 MG Oral Tab Take 1 tablet (125 mcg total) by mouth Every Monday, Wednesday, and Friday.  0    polyethylene glycol, PEG 3350, 17 g Oral Powd Pack Take 17 g by mouth daily as needed (constipation).      metoprolol tartrate 50 MG Oral Tab Take 12.5 mg by mouth 2 (two) times daily.      lidocaine-menthol 4-1 % External Patch Place 1 patch onto the skin daily. 30 patch 0    multivitamin Oral Chew Tab Chew 1 tablet by mouth daily. 30 tablet 0    acetaminophen 500 MG Oral Tab Take 1 tablet (500 mg total) by mouth every 4 (four) hours as needed for Pain. 20 tablet 0    Denosumab (PROLIA SC) Inject into the skin.      Calcium Carbonate-Vitamin D (CALCIUM-D) 600-400 MG-UNIT Oral Tab Take  by mouth.         Review of Systems:   A comprehensive review of systems was completed.    Pertinent positives and negatives noted in the HPI.    Objective:   Physical Exam:    /71   Pulse 60   Temp (!) 95.6 °F (35.3 °C) (Rectal)   Resp 20   Wt 105 lb 13.1 oz (48 kg)   SpO2 95%   BMI 16.57 kg/m²   General: unresponsive  Respiratory: diminished bilaterally  Cardiovascular: S1, S2. Regular rate and rhythm  Abdomen: Soft, non-distended, positive bowel sounds  Neuro: No new focal deficits  Extremities: trace edema      Results:    Labs:      Labs Last 24 Hours:    Recent Labs   Lab 01/20/24  2159   RBC 3.34*   HGB 8.2*   HCT  27.8*   MCV 83.2   MCH 24.6*   MCHC 29.5*   RDW 18.8   NEPRELIM 7.43   WBC 8.6   .0       Recent Labs   Lab 01/20/24 2159   *   BUN 34*   CREATSERUM 1.16*   EGFRCR 44*   CA 8.5   ALB 2.9*      K 5.0      CO2 30.0   ALKPHO 84   AST 43*   BILT 1.5   TP 7.0       Lab Results   Component Value Date    PT 15.5 (H) 07/13/2014    PT 14.4 (H) 07/11/2014    INR 1.22 (H) 10/27/2023    INR 1.52 (H) 10/24/2023    INR 1.42 (H) 10/22/2023       Recent Labs   Lab 01/20/24 2159   TROPHS 25       Recent Labs   Lab 01/20/24 2159   PBNP 15,224*       No results for input(s): \"PCT\" in the last 168 hours.    Imaging: Imaging data reviewed in Epic.    Assessment & Plan:      #Unresponsive  # Hypothermic  # Hypotensive on pressors  -elizabeth hupater  -rule out sepsis  -Blood Cx pending  -UA, reflex culture  -COVID, Influenza, RSV negative  -CT brain, chest x-ray and CTA chest pending    # CHF, diastolic dysfunction  -ECHO  -diuresis as able  -ProBNP 15,224    # Lactic acidosis  -due to hypoxia vs sepsis  -given pulmonary edema will hold off of aggressive IVF and bolus    # Acute hypoxic respiratory failure  -on BIPAP  -diuresis  -empiric antibiotics while await Cx results    # Normocytic anemia  -trend    # A.fib  -on dig and BB PTA  -not on anticoagulation    # FLORESITA  -baseline Cr around 0.5    Plan of care discussed with ER physician    Jodie Barbosa MD    Supplementary Documentation:     The 21st Century Cures Act makes medical notes like these available to patients in the interest of transparency. Please be advised this is a medical document. Medical documents are intended to carry relevant information, facts as evident, and the clinical opinion of the practitioner. The medical note is intended as peer to peer communication and may appear blunt or direct. It is written in medical language and may contain abbreviations or verbiage that are unfamiliar.

## 2024-01-21 NOTE — PROGRESS NOTES
Yin Butcher Patient Status:  Emergency    1931 MRN [id number]   Location [facility] EMERGENCY DEPARTMENT Attending Horace Wills MD   Hosp Day # 0 PCP Mina Walker MD     Cardiology Nocturnal APN Note    Page Received: 9333    Briefly: (Documentation from chart review)     Yin Butcher is a 89 y/o female with PMH/PSH of Chronic Af, not on OAC due to frequent falls and recurrent respiratory failure and pleural effusions. Her family contacted EMS today as she was unresponsive. Per EMS report they were not allowed to enter the home beyond the foyer. One EMS personnel was admitted and brought the patient to the ambulance to assess and manage. The home was noted to be less then ideal living conditions/hoarding.     Hypoxic, placed on BiPap in the ED    Chest x-ray with bilateral pleural effusions    BNP 15,224    Troponin 15    Lactic 6.3    WBC 8.6    Primary Cardiologist None    Vital Signs:       2024    10:45 PM 2024    11:00 PM   Vitals History   /83 102/71   Pulse 76 60   Resp 20 20   SpO2 93 % 95 %        Labs:   Lab Results   Component Value Date    WBC 8.6 2024    HGB 8.2 2024    HCT 27.8 2024    .0 2024    CREATSERUM 1.16 2024    BUN 34 2024     2024    K 5.0 2024     2024    CO2 30.0 2024     2024    CA 8.5 2024    ALB 2.9 2024    ALKPHO 84 2024    BILT 1.5 2024    TP 7.0 2024    AST 43 2024    ALT  2024      Comment:      Due to  backorder we are temporarily unable to offer hospital-based ALT testing at [facility].   If urgently needed, please order ALT test code [id number].   The new order will need a new venipuncture and will be sent to [facility] for testing.   The expected turnaround time will be within 24 hours.     TROPHS 25 2024       Diagnostics:   XR CHEST AP PORTABLE  (CPT=71045)    Result Date:  1/20/2024  CONCLUSION:  Cardiomegaly with mild pulmonary venous congestion.  Bilateral pleural effusions with bibasilar airspace disease right greater than left.  Increased interstitial markings throughout both lungs concerning for pulmonary edema.  No pneumothorax.  Calcified breast implants.   LOCATION:  [facility]      Dictated by (CST): Christina Gudino MD on 1/20/2024 at 10:29 PM     Finalized by (CST): Christina Gudino MD on 1/20/2024 at 10:30 PM        Allergies:  Allergies   Allergen Reactions    Erythromycin OTHER (SEE COMMENTS)    Oxycodone HALLUCINATION    Gabapentin NAUSEA ONLY and OTHER (SEE COMMENTS)     Pt stated she didn't feel like herself     Tizanidine ITCHING and OTHER (SEE COMMENTS)     Pt states burning sensation        Medications:    norepinephrine    Assessment:     Acute respiratory failure  - On BiPap  - Thirds hospitalization in 5 months  - Thoracentesis on prior admissions  - Chest x-ray with bilateral pleural effusions  - BNP 15,224  - Troponin 25  - Lactic 6.3    AF  - Chronic  - Rate controlled  - Not on OAC secondary to frequent falls    Plan:    - Continue to monitor overnight  - Formal Cardiology consult to follow in MINDI Russo  [facility]  1/20/2024  11:22 PM

## 2024-01-21 NOTE — ED QUICK NOTES
Conversation had with JUAN MIGUEL regarding harvey. PT is hemodynamically unstable, strict I&O's.
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
## 2024-01-21 NOTE — PLAN OF CARE
Assumed care of pt at approximately 0007. Received pt resting in bed on AVAPS 75%. Pt somnolent, responsive to painful stimuli. Hypothermic, elizabeth hugger applied. Imaging positive, see results, icu apn notified, see orders, heparin gtt started. Hypotensive, levophed titrated to pt specific BP goal. Pt attempting to remove AVAPS masks and peripheral IV's, soft wrist restraints applied for pt safety. Bowie intact, see I&O. Lactic 4.1, icu apn notified. Replaced electrolytes per protocol. See flowsheet/MAR.

## 2024-01-21 NOTE — ED QUICK NOTES
Report given to ANJELICA Garibay. PT waxing and waning with neuro assessment. Able to follow commands intermittently

## 2024-01-21 NOTE — PROGRESS NOTES
01/21/24 0521   BiPAP   $ RT Standby Charge (per 15 min) 1   Device V60   BiPAP / CPAP CE# 007   BiPAP bacteria filter Yes   BIPAP plugged into main power? Yes   Mode AVAPS   Interface Full face mask   Mask Size Medium   Control Settings   Set Rate 18 breaths/min   Oxygen Percent 100 %   Inspiratory time 0.9   Insp rise time 3   AVAPS   Min IPAP 25   Max IPAP 35   EPAP Level 10   Set rate 18   Tidal volume 400   BiPAP/CPAP Alarm Settings   Hi Rate 45   Low Rate 10   Hi VT 1200   Low    Hi Pressure 40   Low Pressure 5   Low Pressure Delay 20   Low MV 2   BiPAP/CPAP Monitored Parameters   PIP 25   Total Rate 24 breaths/min   Minute Volume 16   Tidal Volume 588   Trigger % 74   Ti/Ttot % 27   EPAP 10   Toleration Well

## 2024-01-21 NOTE — PROGRESS NOTES
[facility]     Hospitalist Progress Note     Yin Butcher Patient Status:  Inpatient    1931 MRN [id number]   Location [facility] 4SW-A Attending Skyla Tinsley MD   Hosp Day # 0 PCP Mina Walker MD     Chief Complaint: Shock     Subjective:     Patient  fatigued, not answering questions     Objective:    Review of Systems:   A comprehensive review of systems was completed; pertinent positive and negatives stated in subjective.    Vital signs:  Temp:  [95.5 °F (35.3 °C)-96.9 °F (36.1 °C)] 96.9 °F (36.1 °C)  Pulse:  [57-83] 81  Resp:  [10-30] 14  BP: ()/(36-83) 94/56  SpO2:  [75 %-100 %] 100 %  FiO2 (%):  [75 %-100 %] 100 %    Physical Exam:    General: No acute distress, elderly lady   Respiratory: No wheezes, no rhonchi  Cardiovascular: S1, S2, regular rate and rhythm  Abdomen: Soft, Non-tender, non-distended, positive bowel sounds  Neuro: No new focal deficits.   Extremities: No edema      Diagnostic Data:    Labs:  Recent Labs   Lab 24  0441   WBC 8.6 10.2   HGB 8.2* 7.8*   MCV 83.2 85.8   .0 277.0   INR  --  1.64*       Recent Labs   Lab 24  0441   * 101*   BUN 34* 40*   CREATSERUM 1.16* 1.35*   CA 8.5 8.2*   ALB 2.9* 2.7*    135*   K 5.0 4.6    99   CO2 30.0 27.0   ALKPHO 84 77   AST 43* 86*   BILT 1.5 1.4   TP 7.0 6.8       CrCl cannot be calculated (Unknown ideal weight.).    Recent Labs   Lab 24   TROPHS 25   CK 32       Recent Labs   Lab 24  0441   PTP 19.6*   INR 1.64*                  Microbiology    No results found for this visit on 24.      Imaging: Reviewed in Epic.    Medications:    piperacillin-tazobactam  3.375 g Intravenous Q8H       Assessment & Plan:      #Shock, hypovolemic vs septic  vs cardiogenic  Pressors  IV Abx   Monitor BLood CX  Monitor I/O   #Bilateral pleural effusion  #Acute hypoxic resp failure due to acute PE with ? RV strain and pleural  effusion  BIPAP  Diuresis   Hep gtt   Doppler LE   #Lactic acidosis due to above, trend  #Anemia  Iron panel, below baseline   #Hypothermia on admission  Coritsol level in AM  #CHF with acute exacerbation   Echo         Skyla Tinsley MD    Supplementary Documentation:     Quality:  DVT Mechanical Prophylaxis:   SCDs,    DVT Pharmacologic Prophylaxis   Medication    heparin (Porcine) 59913 units/250mL infusion CONTINUOUS                Code Status: Full Code  Bowie: Bowie catheter in place  Bowie Duration (in days): 2  Central line:    ELMO:     Discharge is dependent on: clinical   At this point Ms. Butcher is expected to be discharge to: tbd     The 21st Century Cures Act makes medical notes like these available to patients in the interest of transparency. Please be advised this is a medical document. Medical documents are intended to carry relevant information, facts as evident, and the clinical opinion of the practitioner. The medical note is intended as peer to peer communication and may appear blunt or direct. It is written in medical language and may contain abbreviations or verbiage that are unfamiliar.             **Certification      PHYSICIAN Certification of Need for Inpatient Hospitalization - Initial Certification    Patient will require inpatient services that will reasonably be expected to span two midnight's based on the clinical documentation in H+P.   Based on patients current state of illness, I anticipate that, after discharge, patient will require TBD.

## 2024-01-21 NOTE — RESPIRATORY THERAPY NOTE
Patient refused ABG.  When explained why an ABG is necessary, the patient pulled her arm away and repeatedly said \"no, no no.\"  When asked if she wants to be able to take a break from the BiPAP mask, again she repeatedly said \"no, no, no.\"  Patient currently remains on AVAPS.  Will attempt ABG again later in AM.

## 2024-01-21 NOTE — PHYSICAL THERAPY NOTE
PT order received, chart reviewed.  Pt with increased O2 needs, on bipap.  Will f/u for PT at a later date.
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
## 2024-01-21 NOTE — DIETARY NOTE
[facility]  NUTRITION ASSESSMENT    Unable to diagnose malnutrition criteria at this time.    NUTRITION INTERVENTION:    Meal and Snacks - ADAT once medically appropriate; monitor patient po intake. Encourage adequate po of appropriate diet.  Medical Food Supplements - Will evaluate need when diet is advanced Rationale/use for oral supplements discussed.  Enteral Nutrition - If diet unable to advance, recommend placing feeding tube and initiating TF is consistent with GOC.  Vitamin and Mineral Supplements - Recommend adding Chewable MVI once diet advanced.  Coordination of Nutrition Care - Recommend SLP consult prior to diet advancement. and Palliative care consult for goal of care    PATIENT STATUS: 89 year old female admitted on 1/20 presents with AMS, hypotensive, hypoxic and hypothermic. Pt screened d/t MST score 2. Attempted to visit at bedside but pt is somnolent on AVAPS - unable to provide hx. Pt hypotensive requiring pressors. SLP consulted for swallow eval but currently not appropriate while on AVAPS. No GI symptoms noted and NKFA. No significant wt changes noted per chart review but pt with low BMI. Palliative care consulted for GOC discussion. RD to remain available for recommendations as warranted pending GOC.    PMH:  has a past medical history of Arrhythmia, Arthritis, Back problem, Cancer (HCC), Cataract, Osteoporosis, and Personal history of antineoplastic chemotherapy.    ANTHROPOMETRICS:  Ht:  5'7\"  Wt: 49.5 kg (109 lb 2 oz).   BMI: Body mass index is 17.09 kg/m².  IBW: 61.4 kg    WEIGHT HISTORY: Per chart, pt with ~9 lb wt loss x 10 months (8%, not significant per standards).  Patient Weight(s) for the past 336 hrs:   Weight   01/21/24 0007 49.5 kg (109 lb 2 oz)   01/20/24 2143 48 kg (105 lb 13.1 oz)       Wt Readings from Last 10 Encounters:   01/21/24 49.5 kg (109 lb 2 oz)   10/29/23 47.5 kg (104 lb 11.5 oz)   09/10/23 42.1 kg (92 lb 14.4 oz)   04/13/23 53.5 kg (118 lb)   04/10/23 53.5 kg  (118 lb)   04/03/23 53.5 kg (118 lb)   03/30/23 53.5 kg (118 lb)   03/27/23 53.3 kg (117 lb 9.6 oz)   03/22/23 64 kg (141 lb)   12/05/22 63.5 kg (140 lb)        NUTRITION:  Diet:       Procedures    NPO        Percent Meals Eaten (last 3 days)       None            Food Allergies: No  Cultural/Ethnic/Adventist Preferences Addressed: Yes    GI SYSTEM REVIEW: WNL  Skin/Wounds: WNL    NUTRITION RELATED PHYSICAL FINDINGS:     1. Body Fat/Muscle Mass:  MARGARITA     2. Fluid Accumulation: none per RN documentation     NUTRITION PRESCRIPTION: 49.5 kg  Calories: 9407-6310 calories/day (30-35 kcal/kg)  Protein: 59-74 grams protein/day (1.2-1.5 gm/kg)  Fluid: ~1 ml/kcal or per MD discretion    NUTRITION DIAGNOSIS/PROBLEM:  Inadequate oral intake related to inability to take or tolerate as evidenced by need for NPO status    MONITOR AND EVALUATE/NUTRITION GOALS:  Weight stable within 1 to 2 lbs during admission - New  Start alternative nutrition in 24-48 hrs if diet is not able to advance- New      MEDICATIONS:  Abx  Gtt: levophed at 3 mcg/min, NS at 100 ml/hr    LABS:  Reviewed     Pt is at High nutrition risk    Kathryn Mora, RD, LDN, CNSC  Clinical Dietitian  Spectra: [phone]

## 2024-01-22 ENCOUNTER — APPOINTMENT (OUTPATIENT)
Dept: GENERAL RADIOLOGY | Facility: HOSPITAL | Age: 89
End: 2024-01-22
Attending: NURSE PRACTITIONER
Payer: MEDICARE

## 2024-01-22 PROBLEM — I26.94 MULTIPLE SUBSEGMENTAL PULMONARY EMBOLI WITHOUT ACUTE COR PULMONALE (HCC): Status: ACTIVE | Noted: 2024-01-01

## 2024-01-22 PROBLEM — Z51.5 PALLIATIVE CARE BY SPECIALIST: Status: ACTIVE | Noted: 2024-01-01

## 2024-01-22 PROBLEM — G93.40 ENCEPHALOPATHY: Status: ACTIVE | Noted: 2024-01-22

## 2024-01-22 PROBLEM — Z71.89 GOALS OF CARE, COUNSELING/DISCUSSION: Status: ACTIVE | Noted: 2024-01-22

## 2024-01-22 PROBLEM — Z71.89 GOALS OF CARE, COUNSELING/DISCUSSION: Status: ACTIVE | Noted: 2024-01-01

## 2024-01-22 PROBLEM — Z51.5 PALLIATIVE CARE BY SPECIALIST: Status: ACTIVE | Noted: 2024-01-22

## 2024-01-22 PROBLEM — I26.94 MULTIPLE SUBSEGMENTAL PULMONARY EMBOLI WITHOUT ACUTE COR PULMONALE (HCC): Status: ACTIVE | Noted: 2024-01-22

## 2024-01-22 PROBLEM — G93.40 ENCEPHALOPATHY: Status: ACTIVE | Noted: 2024-01-01

## 2024-01-22 LAB
ALBUMIN SERPL-MCNC: 2.8 G/DL (ref 3.4–5)
ALBUMIN/GLOB SERPL: 0.8 {RATIO} (ref 1–2)
ALP LIVER SERPL-CCNC: 68 U/L
ANION GAP SERPL CALC-SCNC: 4 MMOL/L (ref 0–18)
ANTIBODY SCREEN: NEGATIVE
APTT PPP: 38.8 SECONDS (ref 23.3–35.6)
APTT PPP: 39.2 SECONDS (ref 23.3–35.6)
APTT PPP: 53.9 SECONDS (ref 23.3–35.6)
AST SERPL-CCNC: 70 U/L (ref 15–37)
BASOPHILS # BLD AUTO: 0.02 X10(3) UL (ref 0–0.2)
BASOPHILS NFR BLD AUTO: 0.1 %
BILIRUB SERPL-MCNC: 1.4 MG/DL (ref 0.1–2)
BUN BLD-MCNC: 45 MG/DL (ref 9–23)
CALCIUM BLD-MCNC: 8.2 MG/DL (ref 8.5–10.1)
CHLORIDE SERPL-SCNC: 102 MMOL/L (ref 98–112)
CO2 SERPL-SCNC: 32 MMOL/L (ref 21–32)
CORTIS SERPL-MCNC: 60.4 UG/DL
CREAT BLD-MCNC: 1.28 MG/DL
EGFRCR SERPLBLD CKD-EPI 2021: 39 ML/MIN/1.73M2 (ref 60–?)
EOSINOPHIL # BLD AUTO: 0 X10(3) UL (ref 0–0.7)
EOSINOPHIL NFR BLD AUTO: 0 %
ERYTHROCYTE [DISTWIDTH] IN BLOOD BY AUTOMATED COUNT: 18.6 %
GLOBULIN PLAS-MCNC: 3.6 G/DL (ref 2.8–4.4)
GLUCOSE BLD-MCNC: 101 MG/DL (ref 70–99)
GLUCOSE BLD-MCNC: 111 MG/DL (ref 70–99)
GLUCOSE BLD-MCNC: 119 MG/DL (ref 70–99)
GLUCOSE BLD-MCNC: 136 MG/DL (ref 70–99)
GLUCOSE BLD-MCNC: 178 MG/DL (ref 70–99)
HCT VFR BLD AUTO: 23.5 %
HGB BLD-MCNC: 7.1 G/DL
IMM GRANULOCYTES # BLD AUTO: 0.1 X10(3) UL (ref 0–1)
IMM GRANULOCYTES NFR BLD: 0.6 %
LYMPHOCYTES # BLD AUTO: 0.47 X10(3) UL (ref 1–4)
LYMPHOCYTES NFR BLD AUTO: 3 %
MAGNESIUM SERPL-MCNC: 2.2 MG/DL (ref 1.6–2.6)
MCH RBC QN AUTO: 24.2 PG (ref 26–34)
MCHC RBC AUTO-ENTMCNC: 30.2 G/DL (ref 31–37)
MCV RBC AUTO: 80.2 FL
MONOCYTES # BLD AUTO: 1.71 X10(3) UL (ref 0.1–1)
MONOCYTES NFR BLD AUTO: 11 %
NEUTROPHILS # BLD AUTO: 13.22 X10 (3) UL (ref 1.5–7.7)
NEUTROPHILS # BLD AUTO: 13.22 X10(3) UL (ref 1.5–7.7)
NEUTROPHILS NFR BLD AUTO: 85.3 %
OSMOLALITY SERPL CALC.SUM OF ELEC: 298 MOSM/KG (ref 275–295)
PLATELET # BLD AUTO: 262 10(3)UL (ref 150–450)
POTASSIUM SERPL-SCNC: 3.7 MMOL/L (ref 3.5–5.1)
PROT SERPL-MCNC: 6.4 G/DL (ref 6.4–8.2)
Q-T INTERVAL: 372 MS
QRS DURATION: 72 MS
QTC CALCULATION (BEZET): 407 MS
R AXIS: 64 DEGREES
RBC # BLD AUTO: 2.93 X10(6)UL
RH BLOOD TYPE: POSITIVE
SODIUM SERPL-SCNC: 138 MMOL/L (ref 136–145)
T AXIS: -4 DEGREES
VENTRICULAR RATE: 72 BPM
WBC # BLD AUTO: 15.5 X10(3) UL (ref 4–11)

## 2024-01-22 PROCEDURE — 99291 CRITICAL CARE FIRST HOUR: CPT | Performed by: STUDENT IN AN ORGANIZED HEALTH CARE EDUCATION/TRAINING PROGRAM

## 2024-01-22 PROCEDURE — 99233 SBSQ HOSP IP/OBS HIGH 50: CPT | Performed by: STUDENT IN AN ORGANIZED HEALTH CARE EDUCATION/TRAINING PROGRAM

## 2024-01-22 PROCEDURE — 99221 1ST HOSP IP/OBS SF/LOW 40: CPT | Performed by: STUDENT IN AN ORGANIZED HEALTH CARE EDUCATION/TRAINING PROGRAM

## 2024-01-22 PROCEDURE — 71045 X-RAY EXAM CHEST 1 VIEW: CPT | Performed by: NURSE PRACTITIONER

## 2024-01-22 RX ORDER — METOCLOPRAMIDE HYDROCHLORIDE 5 MG/ML
5 INJECTION INTRAMUSCULAR; INTRAVENOUS EVERY 8 HOURS PRN
Status: DISCONTINUED | OUTPATIENT
Start: 2024-01-22 | End: 2024-02-02

## 2024-01-22 RX ORDER — POTASSIUM CHLORIDE 14.9 MG/ML
20 INJECTION INTRAVENOUS ONCE
Status: COMPLETED | OUTPATIENT
Start: 2024-01-22 | End: 2024-01-22

## 2024-01-22 RX ORDER — HEPARIN SODIUM 1000 [USP'U]/ML
30 INJECTION, SOLUTION INTRAVENOUS; SUBCUTANEOUS ONCE
Status: COMPLETED | OUTPATIENT
Start: 2024-01-22 | End: 2024-01-22

## 2024-01-22 RX ORDER — MIDODRINE HYDROCHLORIDE 5 MG/1
5 TABLET ORAL EVERY 8 HOURS SCHEDULED
Status: DISCONTINUED | OUTPATIENT
Start: 2024-01-22 | End: 2024-01-23

## 2024-01-22 NOTE — PROGRESS NOTES
[facility] Note:  Renal Dose Adjustment for Metoclopramide (REGLAN)    Yin Butcher has been prescribed metoclopramide (REGLAN) 10 mg every 8 hours as needed for nausea/vomiting,    Estimated Creatinine Clearance: 22.8 mL/min (A) (based on SCr of 1.28 mg/dL (H)).    Calculated creatinine clearance is < 40 ml/min, therefore, the dose of metoclopramide (REGLAN) has been changed to 5 mg every 8 hours as needed for nausea/vomiting per P&T approved protocol.    Thank you,  Mariajose Luna, PharmD  1/22/2024 5:12 PM
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
## 2024-01-22 NOTE — PROGRESS NOTES
[facility]     Hospitalist Progress Note     Yin Butcher Patient Status:  Inpatient    1931 MRN [id number]   Location [facility] 4SW-A Attending Skyla Tinsley MD   Hosp Day # 1 PCP Mina Walker MD     Chief Complaint: Shock     Subjective:  Patient awake  and remains stable off bipap.  She reports no complaints.  Denies SOB/nausea/abdominal pain.  Knows year not month \"[place]\" and knows town but on current place.      Objective:    Review of Systems:   A comprehensive review of systems was completed; pertinent positive and negatives stated in subjective.    Vital signs:  Temp:  [97.7 °F (36.5 °C)-99.1 °F (37.3 °C)] 99.1 °F (37.3 °C)  Pulse:  [] 104  Resp:  [12-36] 26  BP: ()/(37-79) 106/49  SpO2:  [85 %-100 %] 94 %  FiO2 (%):  [60 %] 60 %    Physical Exam:    General: No acute distress, elderly lady 89 year old female   Respiratory: No wheezes, no rhonchi on O2 NC  Cardiovascular: S1, S2, irregular 90s +murmur  Abdomen: Soft, Non-tender, non-distended, possible hernia  Neuro: moves all extremities. Not oriented to month, place knows year/town/self  Extremities: No edema      Diagnostic Data:    Labs:  Recent Labs   Lab 24   WBC 8.6 10.2 13.0* 15.5*   HGB 8.2* 7.8* 7.0* 7.1*   MCV 83.2 85.8 78.5* 80.2   .0 277.0 255.0 262.0   INR  --  1.64* 1.84*  --        Recent Labs   Lab 24   * 101* 111*   BUN 34* 40* 45*   CREATSERUM 1.16* 1.35* 1.28*   CA 8.5 8.2* 8.2*   ALB 2.9* 2.7* 2.8*    135* 138   K 5.0 4.6 3.7    99 102   CO2 30.0 27.0 32.0   ALKPHO 84 77 68   AST 43* 86* 70*   BILT 1.5 1.4 1.4   TP 7.0 6.8 6.4       Estimated Creatinine Clearance: 22.8 mL/min (A) (based on SCr of 1.28 mg/dL (H)).    Recent Labs   Lab 24  2159   TROPHS 25   CK 32       Recent Labs   Lab 24  0441 24  1933   PTP 19.6* 21.4*   INR 1.64* 1.84*           Microbiology    Hospital Encounter on 01/20/24   1. Blood Culture     Status: None (Preliminary result)    Collection Time: 01/20/24  9:59 PM    Specimen: Blood,peripheral   Result Value Ref Range    Blood Culture Result No Growth 1 Day N/A         Imaging: Reviewed in Epic.    Medications:    midodrine  5 mg Oral Q8H VICTORIANO       Assessment & Plan:      #Shock, hypovolemic vs septic  vs cardiogenic, improved   Wean pressors, on midodrine  Zosyn IV, NGTD on blood CX    #Chronic HFpEF with acute exacerbation, Bilateral pleural effusions  Diuresis if able  Cards following  Echo reviewed  CT reviewed  Prior bilateral thoras on in october    #Acute hypoxic resp failure due to acute PE with possible RV strain on CT and pleural effusion  Off BIPAP, wean o2 as able  Hep gtt   Doppler LE neg  RV strain on CT,  Echo normal RVSF    #Lactic acidosis due to above, trend    #Chronic Afib, controlled  Follow tele    #Anemia  Iron panel, below baseline, trend    #Hypothermia on admission, improved  Coritsol level in AM    #Dementia  Poor living conditions PTA  CT brain chronic    #Dysphagia  Slp evaluated    #Chronic heel ulcers, chronic back wound POA  wound care eval  mepilex over spine    Case d/w pt, RN.     SUSAN [facility]  12:05 PM     Supplementary Documentation:     Quality:  DVT Mechanical Prophylaxis:   SCDs,    DVT Pharmacologic Prophylaxis   Medication    heparin (Porcine) 52411 units/250mL infusion CONTINUOUS                Code Status: Full Code  Bowie: External urinary catheter in place    Discharge is dependent on: clinical   At this point Ms. Butcher is expected to be discharge to: tbd     The 21st Century Cures Act makes medical notes like these available to patients in the interest of transparency. Please be advised this is a medical document. Medical documents are intended to carry relevant information, facts as evident, and the clinical opinion of the practitioner. The medical note is intended as peer to  peer communication and may appear blunt or direct. It is written in medical language and may contain abbreviations or verbiage that are unfamiliar.             **Certification      PHYSICIAN Certification of Need for Inpatient Hospitalization - Initial Certification    Patient will require inpatient services that will reasonably be expected to span two midnight's based on the clinical documentation in H+P.   Based on patients current state of illness, I anticipate that, after discharge, patient will require TBD.    Addendum:    Agree with above note.  Pt. Seen and examined. Pt more awake but confused .     Gen: NAD  CVS: s1s2  Resp: CTA  Abd: soft    #Shock, hypovolemic vs septic  vs cardiogenic  Pressors  IV Abx   Monitor BLood CX  Monitor I/O   #Bilateral pleural effusion  #Acute hypoxic resp failure due to acute PE with ? RV strain and pleural effusion  BIPAP  Diuresis   Hep gtt   Doppler LE   #Lactic acidosis due to above, trend  #Anemia  Iron panel, below baseline   #Hypothermia on admission  Coritsol level normal   #CHF with acute exacerbation   Echo          Pt seen an examined independently. Chart reviewed.  Labs and imaging over the last 24 hours have been reviewed.  Note has been reviewed by me and modified as needed.  Exam and Impression/ Recs as noted above.  D/w staff and with pt  More than 50% of clinical time and 100% of the medical decision making performed by me.    Skyla Tinsley MD

## 2024-01-22 NOTE — PROGRESS NOTES
Cardiology Progress Note        Yin Butcher Patient Status:  Inpatient    1931 MRN [id number]   [facility] 4SW-A Attending Skyla Tinsley MD   Hosp Day # 1 PCP Mina Walker MD     Subjective:  Lethargic, breathing comfortably on 1 L NC.    Medications:   midodrine  5 mg Oral Q8H VICTORIANO       Continuous Infusions:   norepinephrine 4 mcg/min (24 0400)    continuous dose heparin 600 Units/hr (24 0429)         Allergies:  Allergies   Allergen Reactions    Erythromycin OTHER (SEE COMMENTS)    Oxycodone HALLUCINATION    Gabapentin NAUSEA ONLY and OTHER (SEE COMMENTS)     Pt stated she didn't feel like herself     Tizanidine ITCHING and OTHER (SEE COMMENTS)     Pt states burning sensation          Intake/Output:    Intake/Output Summary (Last 24 hours) at 2024 0949  Last data filed at 2024 0800  Gross per 24 hour   Intake 1482.3 ml   Output 700 ml   Net 782.3 ml           Last 3 Weights   24 0000 113 lb 12.1 oz (51.6 kg)   24 0007 109 lb 2 oz (49.5 kg)   24 2143 105 lb 13.1 oz (48 kg)   10/29/23 0600 104 lb 11.5 oz (47.5 kg)   10/24/23 0457 114 lb 3.2 oz (51.8 kg)   10/23/23 0340 92 lb 9.5 oz (42 kg)   10/22/23 2033 92 lb 9.5 oz (42 kg)   09/10/23 1203 92 lb 14.4 oz (42.1 kg)   23 0746 105 lb 12.8 oz (48 kg)   23 2218 106 lb (48.1 kg)            Physical Exam:    Temp:  [97.7 °F (36.5 °C)-99.1 °F (37.3 °C)] 99.1 °F (37.3 °C)  Pulse:  [] 90  Resp:  [12-36] 24  BP: ()/(37-79) 108/53  SpO2:  [85 %-100 %] 96 %  FiO2 (%):  [60 %] 60 %    General: No apparent distress  HEENT: No focal deficits.  Neck: supple. JVP normal  Cardiac: Regular rhythm, S1, S2 normal,  rub or gallop.  No murmur.  Lungs: CTA  Abdomen: Soft, non-tender.   Extremities: No edema  Neurologic: no focal deficits  Skin: Warm and dry.     Telemetry: fib    EKG:      Echo:      Cardiac Cath:      Labs:  HEM:  Recent Labs   Lab 24  2159 24  0441  01/21/24 1933 01/22/24 0224   WBC 8.6 10.2 13.0* 15.5*   HGB 8.2* 7.8* 7.0* 7.1*   .0 277.0 255.0 262.0       Chem:  Recent Labs   Lab 01/20/24 2159 01/21/24 0441 01/22/24 0224    135* 138   K 5.0 4.6 3.7    99 102   CO2 30.0 27.0 32.0   BUN 34* 40* 45*   CREATSERUM 1.16* 1.35* 1.28*   CA 8.5 8.2* 8.2*   MG  --  1.8 2.2   PHOS  --  6.1*  --    * 101* 111*       Recent Labs   Lab 01/20/24 2159 01/21/24 0441 01/22/24 0224   AST 43* 86* 70*   ALB 2.9* 2.7* 2.8*       Recent Labs   Lab 01/20/24 2159   CK 32       Recent Labs   Lab 01/21/24 0441 01/21/24 1933   PTP 19.6* 21.4*   INR 1.64* 1.84*                 Impression:  Pulmonary emboli - in heparin.  Echo w/o RV strain.  Chronic afib - rate controlled.  Hypoxic resp failure-  preserved EF on echo.  Right pleural effusion  AMS with underlying dementia  Hypotension - on levo.  On midodrine at home.  Anemia, Hb 7.1 this am.          Plan:  Goals of care discussions ongoing.  Poor prognosis.  May need right thora.  Resume midodrine, wean off levo.        Satya Baker MD  1/22/2024  9:49 AM  Cr care 30 min.

## 2024-01-22 NOTE — CONSULTS
[facility]  Palliative Care Initial Consult Note    Yin Butcher Patient Status:  Inpatient    1931 MRN [id number]   Location [facility] 4SW-A Attending Skyla Tinsley MD   Hosp Day # 1 PCP Mina Walker MD     Date of Consult: 2024  Patient seen at: [facility] Inpatient    Reason for Consultation: Consult ordered by:: Grant Ventura for evaluation of Palliative Care needs and Goals of care discussion.    Subjective     History of Present Illness: Yin Butcher is a 89 year old female with Afib, recurrent UTI, AHRF, HFpEF, recurrent pleural effusion, anemia, chronic back pain  who was admitted on 2024 for AMS and AOB. Work up in our hospital revealed Hypothermia, hypotension on pressors, FLORESITA, lactic acidosis, acute bilateral PE.    In brief, pt lives with her daughter Jada. Palliative service was consulted twice in 2022 and 10/2023, both times, Jada declined our services. This is the 4th hospitalization in the past year.     Today is day #1 of hospitalization.   During admission, pt was cleared for a modified diet, transitioned off of AVAPS to NC, and worked with PT (68% impairment). She remains in restraints for AMS and removal of lines.     When I entered the room, the patient was sleeping but easy to wake. No family present at bedside.   Pt states that she \"left a friend of a friend in the car\" and that she needs to leave to help them. Unable to contribute significantly to ROS and understand current situation. She will wake for seconds to minute and then fall back to sleep.      Review of Systems:    Denies SOB and pain at rest. Unable to answer other questions d/t lethargy.   Bowel Movement    No data found in the last 1 encounters.       Wt Readings from Last 6 Encounters:   24 113 lb 12.1 oz (51.6 kg)   10/29/23 104 lb 11.5 oz (47.5 kg)   09/10/23 92 lb 14.4 oz (42.1 kg)   23 118 lb (53.5 kg)   04/10/23 118 lb (53.5 kg)   23 118 lb (53.5 kg)         Palliative Care Social History:   Marital Status:   Children: Yes  Living Situation Prior to Admit: Home with family   Is Patient Confused: Yes    Substance History:   reports that she has never smoked. She has never used smokeless tobacco.  reports that she does not currently use alcohol.  reports no history of drug use.    Spiritual Assessment:   Orthodoxy - Parish Not Listed  Unable to address during encounter     Past Medical History/Past Surgical History:     Medical History: obtained from [facility]  Past Medical History:   Diagnosis Date    Arrhythmia     Arthritis     Back problem     Cancer (HCC)     Cataract     Osteoporosis     Personal history of antineoplastic chemotherapy      Past Surgical History:   Procedure Laterality Date    OTHER SURGICAL HISTORY  7/12/2014    Procedure: HIP HEMIARTHROPLASTY/ BIPOLAR;  Surgeon: Vasu Matamoros MD;  Location: [facility] MAIN OR    REMOVAL OF TONSILS,12+ Y/O         Family History: obtained from [facility]  Family History   Family history unknown: Yes       Allergies:  Allergies   Allergen Reactions    Erythromycin OTHER (SEE COMMENTS)    Oxycodone HALLUCINATION    Gabapentin NAUSEA ONLY and OTHER (SEE COMMENTS)     Pt stated she didn't feel like herself     Tizanidine ITCHING and OTHER (SEE COMMENTS)     Pt states burning sensation        Medications:     Current Facility-Administered Medications:     midodrine (ProAmatine) tab 5 mg, 5 mg, Oral, Q8H VICTORIANO    norepinephrine (Levophed) 4 mg/250mL infusion premix, 0.5-30 mcg/min, Intravenous, Continuous PRN    ondansetron (Zofran) 4 MG/2ML injection 4 mg, 4 mg, Intravenous, Q6H PRN    metoclopramide (Reglan) 5 mg/mL injection 10 mg, 10 mg, Intravenous, Q8H PRN    glucose (Dex4) 15 GM/59ML oral liquid 15 g, 15 g, Oral, Q15 Min PRN **OR** glucose (Glutose) 40% oral gel 15 g, 15 g, Oral, Q15 Min PRN **OR** glucose-vitamin C (Dex-4) chewable tab 4 tablet, 4 tablet, Oral, Q15 Min PRN **OR** dextrose 50% injection 50 mL, 50 mL,  Intravenous, Q15 Min PRN **OR** glucose (Dex4) 15 GM/59ML oral liquid 30 g, 30 g, Oral, Q15 Min PRN **OR** glucose (Glutose) 40% oral gel 30 g, 30 g, Oral, Q15 Min PRN **OR** glucose-vitamin C (Dex-4) chewable tab 8 tablet, 8 tablet, Oral, Q15 Min PRN    acetaminophen (Tylenol Extra Strength) tab 500 mg, 500 mg, Oral, Q4H PRN    heparin (Porcine) 40128 units/250mL infusion CONTINUOUS, 200-3,000 Units/hr, Intravenous, Continuous    acetaminophen (Ofirmev) 10 mg/mL infusion premix 750 mg, 15 mg/kg, Intravenous, Q6H PRN    morphINE PF 2 MG/ML injection 0.5 mg, 0.5 mg, Intravenous, Q4H PRN    Functional Status History:  Unable to address home efficiency of ADL/IADL at this time.     Palliative Performance Scale:   Current Palliative performance scale PPSv2 (%): 35   % Ambulation Activity Level Self-Care Intake Consciousness   100 Full  Normal  No Disease Full Normal Full   90 Full  Normal  Some Disease Full Normal Full   80 Full  Normal w/effort  Some Disease Full Normal or reduced Full   70 Reduced  Can't Perform Job  Some Disease Full Normal or reduced Full   60 Reduced  Can't Perform Hobby   Significant Disease Occ Assist Normal or reduced Full or confused   50 Mainly sit/lie Can't do any work  Extensive Disease Partial Assist Normal or reduced Full or confused   40 Mainly in bed Can't do any work  Extensive Disease Mainly Assist Normal or reduced Full or confused   30 Bed Bound Can't do any work  Extensive Disease Max Assist  Total Care Reduced  Drowsy/confused   20 Bed Bound Can't do any work  Extensive Disease Max Assist  Total Care Minimal  Drowsy/confused   10 Bed Bound Can't do any work  Extensive Disease Max Assist  Total Care Mouth Care  Drowsy/confused   0 Death        Objective      Vital Signs:  Blood pressure 99/56, pulse 87, temperature 98.3 °F (36.8 °C), temperature source Temporal, resp. rate 24, height 5' 7\" (1.702 m), weight 113 lb 12.1 oz (51.6 kg), SpO2 93%.  Body mass index is 17.82  kg/m².  Non-verbal signs of pain present: No    Physical Exam:  General: Lethargic. In no apparent respiratory distress. Body habitus Thin   HEENT: glasses on. Mouth breathing when asleep.   Lungs: Normal effort on NC  Abdomen: Soft, non-tender, non-distended   Extremities: SCDs in place  Neurologic: Alert and oriented to person   Psychiatric: pleasantly confused when awake    Skin: Warm and dry. (PIV in right arm, distal to this is cooler than the rest of Prema's body).     Hematology:  Lab Results   Component Value Date    WBC 15.5 (H) 01/22/2024    HGB 7.1 (L) 01/22/2024    HCT 23.5 (L) 01/22/2024    .0 01/22/2024       Coags:  Lab Results   Component Value Date    PT 15.5 (H) 07/13/2014    INR 1.84 (H) 01/21/2024    PTT 38.8 (H) 01/22/2024       Chemistry:  Lab Results   Component Value Date    CREATSERUM 1.28 (H) 01/22/2024    BUN 45 (H) 01/22/2024     01/22/2024    K 3.7 01/22/2024     01/22/2024    CO2 32.0 01/22/2024     (H) 01/22/2024    CA 8.2 (L) 01/22/2024    ALB 2.8 (L) 01/22/2024    ALKPHO 68 01/22/2024    BILT 1.4 01/22/2024    TP 6.4 01/22/2024    AST 70 (H) 01/22/2024    ALT  01/22/2024      Comment:      Due to  backorder we are temporarily unable to offer hospital-based ALT testing at [facility].   If urgently needed, please order ALT test code [id number].   The new order will need a new venipuncture and will be sent to [facility] for testing.   The expected turnaround time will be within 24 hours.     DDIMER 2.38 (H) 01/20/2024    MG 2.2 01/22/2024    PHOS 6.1 (H) 01/21/2024    TROP <0.046 02/19/2015       Imaging:  XR CHEST AP PORTABLE  (CPT=71045)    Result Date: 1/22/2024  CONCLUSION:  Increase in size of small left pleural effusion.  Stable medium-sized right pleural effusion.  There is stable atelectasis/consolidation in the lower lungs with stable interstitial and airspace opacities most pronounced in the mid to lower lungs bilaterally.  There is  cardiomegaly with mild pulmonary vascular congestion.  The overall findings could be due to congestive failure with pulmonary edema, with multifocal pneumonia or other etiologies not entirely excluded.  Clinical correlation recommended.    LOCATION:  [id number]      Dictated by (CST): Andrez Ho MD on 1/22/2024 at 5:45 AM     Finalized by (CST): Andrez Ho MD on 1/22/2024 at 5:46 AM       CT BRAIN OR HEAD (17351)    Result Date: 1/21/2024  CONCLUSION:  Chronic changes in the brain, as described, but no acute intracranial bleed, or other acute intracranial process identified.   LOCATION:  [facility]   Dictated by (CST): Malcom Purcell MD on 1/21/2024 at 9:29 PM     Finalized by (CST): Malcom Purcell MD on 1/21/2024 at 9:30 PM       US VENOUS DOPPLER LEG BILAT - DIAG IMG (CPT=93970)    Result Date: 1/21/2024  CONCLUSION:  Negative for DVT bilateral lower extremity.   LOCATION:  [facility]   Dictated by (CST): Malcom Purcell MD on 1/21/2024 at 5:53 PM     Finalized by (CST): Malcom Purcell MD on 1/21/2024 at 5:53 PM       CT ANGIOGRAPHY, CHEST (CPT=71275)    Result Date: 1/21/2024  CONCLUSION:  1. There are acute bilateral lower lobe and right middle lobe pulmonary emboli.  The critical results were called to the patient's nurse by the vision radiologist at 0318 a.m.. 2. Findings suggest right heart failure/right heart strain. 3. Markedly enlarged left atrium raises suspicion for mitral valve stenosis. 4. Large right and moderate left pleural effusions are noted. 5. Bilateral lower lobe and right middle lobe atelectasis. 6. Multiple other incidental findings as detailed above.     LOCATION:  [facility]   Dictated by (CST): Jacques Diaz MD on 1/21/2024 at 9:21 AM     Finalized by (CST): Jacques Diaz MD on 1/21/2024 at 9:30 AM       CT BRAIN OR HEAD ([id number])    Result Date: 1/21/2024  CONCLUSION:  There is no acute intracranial hemorrhage, mass effect or midline shift.  Chronic microvascular ischemic changes and  atrophy are stable compared to the previous CT.  There is nothing specific for an acute territorial infarct.  If there is clinical concern for an acute stroke then follow-up MRI is recommended.    LOCATION:  [facility]   Dictated by (CST): Jacques Diaz MD on 1/21/2024 at 9:16 AM     Finalized by (CST): Jacques Diaz MD on 1/21/2024 at 9:21 AM       XR CHEST AP PORTABLE  (CPT=71045)    Result Date: 1/21/2024  CONCLUSION:  Moderate right pleural effusion with volume loss is stable.   LOCATION:  [facility]      Dictated by (CST): Mina Smith MD on 1/21/2024 at 7:27 AM     Finalized by (CST): Mina Smith MD on 1/21/2024 at 7:27 AM       XR CHEST AP PORTABLE  (CPT=71045)    Result Date: 1/20/2024  CONCLUSION:  Cardiomegaly with mild pulmonary venous congestion.  Bilateral pleural effusions with bibasilar airspace disease right greater than left.  Increased interstitial markings throughout both lungs concerning for pulmonary edema.  No pneumothorax.  Calcified breast implants.   LOCATION:  [facility]      Dictated by (CST): Christina Gudino MD on 1/20/2024 at 10:29 PM     Finalized by (CST): Christina Gudino MD on 1/20/2024 at 10:30 PM        Summary of Discussion      LVM for Jada.     Advance Care Planning counseling and discussion:   HC POA Documentation Not on file.      POLST FORM Not on file  Full Code    Assessment and Recommendations        Principal Problem:    Pleural effusion  Active Problems:    Acute respiratory failure (HCC)    Acute on chronic congestive heart failure, unspecified heart failure type (HCC)    Acute on chronic diastolic congestive heart failure (HCC)    Sepsis (HCC)    Acute respiratory failure with hypoxia (HCC)    Somnolence    Hyperkalemia    Respiratory acidosis   Palliative Care encounter    Goals of care:   Awaiting call back from Jada  Code Status: Full Code    Discussed today's visit with RN.    Palliative Care Follow Up: Palliative care team will Continue to follow while  inpatient.  Palliative care follow up outpatient: TBD.    Thank you for allowing Palliative Care services to participate in the care of Yin Butcher.    A total of 40 minutes were spent on this consult, which included all of the following: chart review, direct face to face contact, history taking, physical examination, counseling and coordinating care, and documentation.     Kathy Bourne MD  1/22/2024  2:33 PM  Palliative Care Services    The 21st Century Cures Act makes medical notes like these available to patients in the interest of transparency. Please be advised this is a medical document. Medical documents are intended to carry relevant information, facts as evident, and the clinical opinion of the practitioner. The medical note is intended as peer to peer communication and may appear blunt or direct. It is written in medical language and may contain abbreviations or verbiage that are unfamiliar.

## 2024-01-22 NOTE — SLP NOTE
ADULT SWALLOWING EVALUATION    ASSESSMENT    ASSESSMENT/OVERALL IMPRESSION:  Patient is a 89 y/o female admitted with episode of unresponsiveness. SLP order received to evaluate oropharyngeal swallow d/t failed RN dysphagia screen. Patient known to this service for prior dysphagia assessment and management. VFSS completed 10/25/23 an recommended a regular diet and mildly thick liquids. Today, patient received alert, but somewhat confused, in bed. She reported continuing to consume mildly thick liquids at home and denied overt s/s of aspiration.    Patient presented with suspected mistimed pharyngeal swallow initiation. Throat clear observed x2 with thin liquids. No overt s/s of aspiration observed with mildly thick liquids. Mastication and AP bolus transit were thorough and efficient without evidence of oral residue.     Recommend patient initiate a regular diet and mildly thick liquids. Bolus size and rate of intake should be limited. SLP will continue to follow to monitor diet tolerance and adjust as appropriate. Education provided re: results and recommendations.         RECOMMENDATIONS   Diet Recommendations - Solids: Regular  Diet Recommendations - Liquids: Nectar thick liquids/ Mildly thick                        Compensatory Strategies Recommended: Small bites and sips  Aspiration Precautions: Upright position  Medication Administration Recommendations: One pill at a time  Treatment Plan/Recommendations: Aspiration precautions       HISTORY   MEDICAL HISTORY  Reason for Referral: RN dysphagia screen    Problem List  Principal Problem:    Pleural effusion  Active Problems:    Acute respiratory failure (HCC)    Acute on chronic congestive heart failure, unspecified heart failure type (HCC)    Acute on chronic diastolic congestive heart failure (HCC)    Sepsis (HCC)    Acute respiratory failure with hypoxia (HCC)    Somnolence    Hyperkalemia    Respiratory acidosis      Past Medical History  Past Medical  History:   Diagnosis Date    Arrhythmia     Arthritis     Back problem     Cancer (HCC)     Cataract     Osteoporosis     Personal history of antineoplastic chemotherapy           Diet Prior to Admission: Regular;Nectar thick liquids/ Mildly thick       Patient/Family Goals: none stated    SWALLOWING HISTORY  Current Diet Consistency: NPO  Dysphagia History: as above  Imaging Results:   CXR 1/22/24  CONCLUSION:  Increase in size of small left pleural effusion.  Stable medium-sized right pleural effusion.  There is stable atelectasis/consolidation in the lower lungs with stable interstitial and airspace opacities most pronounced in the mid to lower   lungs bilaterally.  There is cardiomegaly with mild pulmonary vascular congestion.  The overall findings could be due to congestive failure with pulmonary edema, with multifocal pneumonia or other etiologies not entirely excluded.  Clinical correlation   recommended.          LOCATION:  [facility]                  Dictated by (CST): Andrez Ho MD on 1/22/2024 at 5:45 AM       Finalized by (CST): Andrez Ho MD on 1/22/2024 at 5:46 AM     SUBJECTIVE       OBJECTIVE   ORAL MOTOR EXAMINATION  Dentition: Functional  Symmetry: Within Functional Limits  Strength: Within Functional Limits     Range of Motion: Within Functional Limits       Voice Quality: Clear  Respiratory Status: Nasal cannula  Consistencies Trialed: Thin liquids;Nectar thick liquids;Puree;Soft solid;Hard solid  Method of Presentation: Staff/Clinician assistance  Patient Positioning: Upright;Midline    Oral Phase of Swallow: Within Functional Limits                      Pharyngeal Phase of Swallow: Impaired  Laryngeal Elevation Timing: Appears impaired        (Please note: Silent aspiration cannot be evaluated clinically. Videofluoroscopic Swallow Study is required to rule-out silent aspiration.)    Esophageal Phase of Swallow: No complaints consistent with possible esophageal involvement  Comments: d/w  RN              GOALS  Goal #1 The patient will tolerate regular consistency and mildly thick liquids without overt signs or symptoms of aspiration with 95 % accuracy over 1-2 session(s).  In Progress   Goal #2 The patient/family/caregiver will demonstrate understanding and implementation of aspiration precautions and swallow strategies independently over 1-2 session(s).    In Progress   Goal #3     Goal #4     Goal #5     Goal #6     Goal #7     Goal #8       FOLLOW UP  Treatment Plan/Recommendations: Aspiration precautions     Follow Up Needed (Documentation Required): Yes  SLP Follow-up Date: 01/23/24    Thank you for your referral.   If you have any questions, please contact ADELAIDE Womack

## 2024-01-22 NOTE — OCCUPATIONAL THERAPY NOTE
OCCUPATIONAL THERAPY EVALUATION - INPATIENT     Room Number: 455/455-A  Evaluation Date: 1/22/2024  Type of Evaluation: Initial  Presenting Problem: pleural effusion, PE    Physician Order: IP Consult to Occupational Therapy  Reason for Therapy: ADL/IADL Dysfunction and Discharge Planning    History: Patient is a 89 year old female admitted on 1/20/2024 with Presenting Problem: pleural effusion, PE. Co-Morbidities : h/o Afib, chronic back pain. Pt was admitted from home on 1/20 with altered mental status. Admitted for pleural effusion and B PE.  B LE doppler negative for DVTs.      Chest CT 1/21  CONCLUSION:    1. There are acute bilateral lower lobe and right middle lobe pulmonary emboli.  The critical results were called to the patient's nurse by the vision radiologist at 0318 a.m..   2. Findings suggest right heart failure/right heart strain.   3. Markedly enlarged left atrium raises suspicion for mitral valve stenosis.   4. Large right and moderate left pleural effusions are noted.   5. Bilateral lower lobe and right middle lobe atelectasis.   6. Multiple other incidental findings as detailed above.       ASSESSMENT   Patient presents with the following performance deficits: decreased activity endurance and decreased standing balance. These deficits impact the patient’s ability to participate in ADL, transfers, instrumental activities of daily living, rest and sleep, leisure and social participation.     The patient is functioning below her previous functional level and would benefit from skilled inpatient OT to address the above deficits, maximizing patient’s ability to return safely to her prior level of function.    OT Discharge Recommendations: Sub-acute rehabilitation        EVALUATION SESSION:  Patient Start of Session: supine  FUNCTIONAL TRANSFER ASSESSMENT  Sit to Stand: Edge of Bed  Edge of Bed: Dependent (max A x 2 hand-held to stand)    BED MOBILITY  Supine to Sit : Minimal Assist  Sit to Supine (OT):  Maximum Assist    ALTON              O2 SATURATIONS  Oxygen Therapy  SPO2% on Oxygen at Rest: 92  At rest oxygen flow (liters per minute): 1    COGNITION  Following Commands:  follows one step commands with increased time and follows one step commands with repetition  Initiation: hand over hand to initiate tasks  Safety Judgement:  decreased awareness of need for assistance and decreased awareness of need for safety  Awareness of Errors:  assistance required to identify errors made and assistance required to correct errors made  Awareness of Deficits:  decreased awareness of deficits  Problem Solving:  assistance required to identify errors made, assistance required to generate solutions, and assistance required to implement solutions    Upper Extremity   ROM: within functional limits   Strength: within functional limits   EDUCATION PROVIDED     Therapist comments: Pt was leaning forward while in bed to wave at OT.  1 liter at rest, 94%.   After OT and PT removed wrist restraints, pt started to assist with supine to sit, min A.   CGA static sitting balance. Max A x 2 hand-held to stand.  OT was blocking pt's L foot from sliding. Pt commented, \"She is sliding with me!\"  Increased agitation noted.  Max A x 2 stand to sit, min A x 2 sit to supine.  Wrist restraints back on.  91% using 1 liter.    Patient End of Session: In bed;Needs met;With  staff;Call light within reach;RN aware of session/findings;All patient questions and concerns addressed    OCCUPATIONAL PROFILE    HOME SITUATION  Type of Home: House  Home Layout: Two level  Lives With: Daughter                             Prior Level of Function: Per  note, APS is involved. Possible hoarding situation. Daughter is home with the pt.  Refer to SW note.   Uses RW and wheelchair. Deferred calling pt's daughter.       PAIN ASSESSMENT  Ratin          OBJECTIVE  Precautions: Bed/chair alarm  Fall Risk: High fall risk      ASSESSMENTS    AM-PAC  ‘6-Clicks’ Inpatient Daily Activity Short Form  -   Putting on and taking off regular lower body clothing?: A Lot  -   Bathing (including washing, rinsing, drying)?: A Lot  -   Toileting, which includes using toilet, bedpan or urinal? : A Lot  -   Putting on and taking off regular upper body clothing?: A Little  -   Taking care of personal grooming such as brushing teeth?: A Little  -   Eating meals?: A Little    AM-PAC Score:  Score: 15  Approx Degree of Impairment: 56.46%  Standardized Score (AM-PAC Scale): 34.69    ADDITIONAL TESTS     NEUROLOGICAL FINDINGS      COGNITION ASSESSMENTS       PLAN  OT Treatment Plan: Balance activities;Energy conservation/work simplification techniques;ADL training;UE strengthening/ROM;Functional transfer training;Patient/Family training;Cognitive reorientation;Patient/Family education;Equipment eval/education  Rehab Potential : Fair  Frequency: 3x/week  Number of Visits to Meet Established Goals: 5    ADL Goals   Patient will perform upper body dressing:  with setup  Patient will perform lower body dressing:  with mod assist  Patient will perform toileting: with mod assist    Functional Transfer Goals  Patient will transfer to bedside commode:  with mod assist    UE Exercise Program Goal  Patient will be supervision with bilateral AROM HEP (home exercise program).    Patient Evaluation Complexity Level:   Occupational Profile/Medical History LOW - Brief history including review of medical or therapy records    Specific performance deficits impacting engagement in ADL/IADL LOW  1 - 3 performance deficits    Client Assessment/Performance Deficits MODERATE - Comorbidities and min to mod modifications of tasks    Clinical Decision Making LOW - Analysis of occupational profile, problem-focused assessments, limited treatment options    Overall Complexity LOW     OT Session Time: 19 minutes  Self-Care Home Management: 3 minutes  Therapeutic Activity: 8  minutes

## 2024-01-22 NOTE — PROGRESS NOTES
Yin Butcher Patient Status:  Inpatient    1931 MRN [id number]   Location [facility] 4SW-A Attending Skyla Tinsley MD   Hosp Day # 1 PCP Mina Walker MD     Critical Care Progress Note          Subjective:  Weaned off BiPAP this AM.    Objective:    Medications:   piperacillin-tazobactam  3.375 g Intravenous Q8H       FiO2 (%):  [60 %] 60 %      Intake/Output Summary (Last 24 hours) at 2024 0819  Last data filed at 2024 0456  Gross per 24 hour   Intake 1482.3 ml   Output 700 ml   Net 782.3 ml       /60   Pulse 101   Temp 99.1 °F (37.3 °C) (Temporal)   Resp 18   Ht 170.2 cm (5' 7\")   Wt 113 lb 12.1 oz (51.6 kg)   SpO2 100%   BMI 17.82 kg/m²     Physical Exam:   General Appearance: Alert, cooperative  Neck: No JVD, neck supple, no adenopathy, trachea midline, no carotid bruits  Lungs: Diminished to auscultation bilaterally, respirations unlabored  Heart:Irregular rate and rhythm, S1 and S2 normal, no murmur, rub or gallop  Abdomen: Soft, non-tender, bowel sounds active all four quadrants, no masses, no organomegaly  Extremities: Extremities normal, atraumatic, no cyanosis or edema,capillary refill <3 sec.    Pulses: 2+ and symmetric all extremities  Skin: Skin color, texture, turgor normal for ethnicity, no rashes or lesions, warm and dry      Recent Labs   Lab 24   RBC 2.93*   HGB 7.1*   HCT 23.5*   MCV 80.2   MCH 24.2*   MCHC 30.2*   RDW 18.6   NEPRELIM 13.22*   WBC 15.5*   .0     Recent Labs   Lab 24  2159 24  0441 24   * 101* 111*   BUN 34* 40* 45*   CREATSERUM 1.16* 1.35* 1.28*   CA 8.5 8.2* 8.2*   ALB 2.9* 2.7* 2.8*    135* 138   K 5.0 4.6 3.7    99 102   CO2 30.0 27.0 32.0   ALKPHO 84 77 68   AST 43* 86* 70*   BILT 1.5 1.4 1.4   TP 7.0 6.8 6.4     Recent Labs   Lab 246 24  1846   ABGPHT 7.28* 7.44   EGTIMW6V 60* 43   QNOKJ7W 128* 136*   ABGHCO3 25.6 28.4*   ABGBE 0.9 4.4*   TEMP 98.6 98.6    DAVID Not Applicable Positive   SITE Right Brachial Left Brachial   DEV Bi-PAP Nasal cannula   THGB 8.2*  --      No results for input(s): \"BNP\" in the last 168 hours.  Recent Labs   Lab 01/20/24  2159   CK 32       XR CHEST AP PORTABLE  (CPT=71045)    Result Date: 1/22/2024  CONCLUSION:  Increase in size of small left pleural effusion.  Stable medium-sized right pleural effusion.  There is stable atelectasis/consolidation in the lower lungs with stable interstitial and airspace opacities most pronounced in the mid to lower lungs bilaterally.  There is cardiomegaly with mild pulmonary vascular congestion.  The overall findings could be due to congestive failure with pulmonary edema, with multifocal pneumonia or other etiologies not entirely excluded.  Clinical correlation recommended.    LOCATION:  [id number]      Dictated by (CST): Andrez Ho MD on 1/22/2024 at 5:45 AM     Finalized by (CST): Andrez Ho MD on 1/22/2024 at 5:46 AM       CT BRAIN OR HEAD (18959)    Result Date: 1/21/2024  CONCLUSION:  Chronic changes in the brain, as described, but no acute intracranial bleed, or other acute intracranial process identified.   LOCATION:  [facility]   Dictated by (CST): Malcom Purcell MD on 1/21/2024 at 9:29 PM     Finalized by (CST): Malcom Purcell MD on 1/21/2024 at 9:30 PM       US VENOUS DOPPLER LEG BILAT - DIAG IMG (CPT=93970)    Result Date: 1/21/2024  CONCLUSION:  Negative for DVT bilateral lower extremity.   LOCATION:  [facility]   Dictated by (CST): Malcom Purcell MD on 1/21/2024 at 5:53 PM     Finalized by (CST): Malcom Purcell MD on 1/21/2024 at 5:53 PM           Assessment/Plan:  Shock- Hypovolemic vs cardiogenic  - IVF discontinued  - TTE lvef 65-70%, No RWMA. Unable to assess diastolic dysfunction, however with previously reported diastolic dysfunction  - Vasopressors to Maintain MAP >65, currently on levophed.  - Resume home midodrine  - Home BB on hold  - AM cortisol normal    Acute hypoxic  respiratory failure- likely multifactorial  - CTA with acute bilateral PE, bilateral pleural effusions, possible pna?  - RVP negative  - MRSA negative  - Sputum culture pending collection  - S/p Zosyn (1/20-1/22)  - Will monitor off antibiotics now    Acute bilateral PE  - LE doppler negative  - TTE without right heart strain  - Heparin drip    Bilateral pleural effusions  - Recurrent, likely due to diastolic dysfunction  - May need to consider thoracentesis if no improvement  - Diurese as able    FLORESITA  - Likely 2/2 to poor PO intake  - Cr improving- baseline 0.5  - Will discontinue IVF  - Avoid nephrotoxins  - Strict I/O    Leukocytosis  - Procal normal  - Afebrile  - Blood culture NGTD  - Monitor    HFpEF  - TTE 65-70%, No RWMA. PASP  - Diurese as able  - Will stop IVF  - Cardiology following    Afib  - Not on anticoagulation at home  - Home meds on hold (BB, dig, lasix)    Acute Encephalopathy  - CT brain negative for acute process  - U/A bland  - Ammonia normal  - Mildly uremic, unlikely to be contributing  - Monitor    F/E/N:    - NPO pending swallow eval  - Replete electrolytes per protocol  - IVF    Proph:  - Heparin drip  - SCD  - PT/OT    Dispo:   - Full code  - Came from home with poor living conditions. SW/CM involvement appreciated.  - Palliative care following    Plan of care discussed with intensivist, Dr. Whitley Rider [facility]  Critical Care  [id number]    Intensivist Addendum:  I agree with APN note above. I have independently seen & evaluated the patient.  I agree with the management plan outlined above with the following changes/additions:    Prema is admitted for respiratory failure and found to have bilateral PE and pleural efusions along with heart failure.  This is a recurrent condition and with poor home living conditions. Palliative care has been consulted.  Currently holding on thoracentesis given heparin and stable oxygen needs. Will continue ICU level of care for now as she  remains on levophed    Critical Care Time: 36 minutes spent (excluding separate billable procedures) was spent in the evaluation and management of the patient, including chart review and discussion      Fortino Arreaga, DO  Pulmonary & Critical Care Medicine  [facility]

## 2024-01-22 NOTE — PLAN OF CARE
Initially unresponsive to painful stimulus, no movements to upper extremities at all, legs would withdraw to pain, pupils 1B.  Lungs diminished, initially on AVAPS 18 400 0.6 25-35/10, now HFNC.  Active bowel sounds.  Purewick external catheter with clear yellow urine.  Skin intact other than ecchymosis to arms and redness knees down (with 1+ edema).  Remains on levophed to maintain MAP >65 and heparin.  SCD's applied as venous dopplers were negative for DVT.  Daughter at bedside.  To CT for head due to new unresponsiveness.  Upon moving back to bed she awoke, confused to all but name.  Initially stated pain to back but after repositioning she denies pain.  Voice is weak and hoarse (daughter states from vocal cord paralysis from intubation in past).  Now oriented to name and year, cooperative, pleasant, tolerating ice chips.  Denies nausea and dyspnea.  Consent signed for PRBC.  Holding drawing type and screen until next set of labs.  E-mail address provided to elie Elias so she can e-mail HCPOA forms to registration.  See assessments and flowsheets for further data.

## 2024-01-22 NOTE — PHYSICAL THERAPY NOTE
PHYSICAL THERAPY EVALUATION - INPATIENT     Room Number: 455/455-A  Evaluation Date: 1/22/2024  Type of Evaluation: Initial  Physician Order: PT Eval and Treat    Presenting Problem: pleural effusion  Co-Morbidities : h/o Afib, chronic back pain  Reason for Therapy: Mobility Dysfunction and Discharge Planning    History related to current admission: Patient is a 89 year old female admitted on 1/20/2024 from home with AMS, pleural effusion.      ASSESSMENT   In this PT evaluation, the patient presents with the following impairments decreased activity tolerance, balance, strength.  These impairments and comorbidities manifest themselves as functional limitations in independent bed mobility, transfers, and gait.  The patient is below baseline and would benefit from skilled inpatient PT to address the above deficits to assist patient in returning to prior to level of function.   Functional outcome measures completed include [facility].  The -PAC '6-Clicks' Inpatient Basic Mobility Short Form was completed and this patient is demonstrating a Approx Degree of Impairment: 68.66%  degree of impairment in mobility. Research supports that patients with this level of impairment may benefit from BRADLY.    DISCHARGE RECOMMENDATIONS  PT Discharge Recommendations: Sub-acute rehabilitation    PLAN  PT Treatment Plan: Bed mobility;Body mechanics;Endurance;Energy conservation;Patient education;Family education;Gait training;Range of motion;Strengthening;Transfer training;Balance training  Rehab Potential : Good  Frequency (Obs):  (2-3x/week)  Number of Visits to Meet Established Goals: 5      CURRENT GOALS    Goal #1 Patient is able to demonstrate supine - sit EOB @ level: supervision     Goal #2 Patient is able to demonstrate transfers EOB to/from Chair/Wheelchair at assistance level: min A     Goal #3      Goal #4    Goal #5    Goal #6    Goal Comments: Goals established on 1/22/2024    HOME SITUATION  Type of Home: House   Home  Layout: Two level                Lives With: Daughter             Prior Level of [place]: Pt poor historian- at times states uses walker to wheel around in, then w/c.  Pt states bedroom is upstairs.  From EMR, possible 1st floor bedrooms however charting this admit states possible hoarding conditions.      SUBJECTIVE  \"I'm strong!\"      OBJECTIVE  Precautions: Bed/chair alarm  Fall Risk: High fall risk    WEIGHT BEARING RESTRICTION                   PAIN ASSESSMENT  Rating: Unable to rate  Location: R heel  Management Techniques: Relaxation;Repositioning;Activity promotion;Body mechanics    COGNITION  Attention Span:  attends with cues to redirect  Following Commands:  follows one step commands with repetition  Problem Solving:  assistance required to identify errors made, assistance required to generate solutions, and assistance required to implement solutions    RANGE OF MOTION AND STRENGTH ASSESSMENT  Upper extremity ROM and strength are within functional limits     Lower extremity ROM is within functional limits except for R knee- unable to obtain full knee extension, grossly 25 degrees from full ext.      Lower extremity strength:  grossly 3/5 bilaterally.  Noted tight hamstrings in seated positioning, difficult moving through full ROM due to.      BALANCE  Static Sitting: Fair +  Dynamic Sitting: Poor +  Static Standing: Dependent       ADDITIONAL TESTS                                    ACTIVITY TOLERANCE                         O2 WALK  Oxygen Therapy  SPO2% Ambulation on Oxygen: 90  Ambulation oxygen flow (liters per minute): 1    NEUROLOGICAL FINDINGS                        AM-PAC '6-Clicks' INPATIENT SHORT FORM - BASIC MOBILITY  How much difficulty does the patient currently have...  Patient Difficulty: Turning over in bed (including adjusting bedclothes, sheets and blankets)?: A Little   Patient Difficulty: Sitting down on and standing up from a chair with arms (e.g., wheelchair, bedside commode,  etc.): A Lot   Patient Difficulty: Moving from lying on back to sitting on the side of the bed?: A Little   How much help from another person does the patient currently need...   Help from Another: Moving to and from a bed to a chair (including a wheelchair)?: A Lot   Help from Another: Need to walk in hospital room?: Total   Help from Another: Climbing 3-5 steps with a railing?: Total       AM-PAC Score:  Raw Score: 12   Approx Degree of Impairment: 68.66%   Standardized Score (AM-PAC Scale): 35.33   CMS Modifier (G-Code): CL    FUNCTIONAL ABILITY STATUS  Gait Assessment        Skilled Therapy Provided     Bed Mobility:  Rolling: min A  Supine to sit: min A   Sit to supine: min a x2     Transfer Mobility:  Sit to stand: max A x2 with notable windswept positioning, R foot sliding out- pt becoming irritated at OT for blocking foot despite sliding  Stand to sit: max A x2  Gait = unable to assess.    Therapist's Comments: RN present to change pad underneath as purewick had shifted.  Pt alert throughout session, however speaking nonsensically at times.      Exercise/Education Provided:  Bed mobility  Body mechanics  Functional activity tolerated  Transfer training    Patient End of Session: In bed;Needs met;Call light within reach;With  staff;RN aware of session/findings;All patient questions and concerns addressed;Alarm set;Restraints      Patient Evaluation Complexity Level:  History High - 3 or more personal factors and/or co-morbidities   Examination of body systems Moderate - addressing a total of 3 or more elements   Clinical Presentation Moderate - Evolving   Clinical Decision Making Moderate - Evolving       PT Session Time: 20 minutes    Therapeutic Activity: 15 minutes

## 2024-01-22 NOTE — PROGRESS NOTES
Patient hypoglycemic on shift at 51. During hypoglycemia episode patient fully awake and at her baseline alertness. This RN treated blood sugar with D50 push with no issue. Upon return to room for 15 recheck blood sugar improved but patient noted to be significantly less responsive and more AMS. Awakens to painful stimuli but not able to meaningfully interact with staff. APN notified and stat ABG drawn. Patient put back on BIPAP. Stat Head CT also ordered and TBD.

## 2024-01-23 ENCOUNTER — APPOINTMENT (OUTPATIENT)
Dept: GENERAL RADIOLOGY | Facility: HOSPITAL | Age: 89
End: 2024-01-23
Payer: MEDICARE

## 2024-01-23 LAB
ALBUMIN SERPL-MCNC: 2.6 G/DL (ref 3.4–5)
ALBUMIN/GLOB SERPL: 0.7 {RATIO} (ref 1–2)
ALP LIVER SERPL-CCNC: 79 U/L
ANION GAP SERPL CALC-SCNC: 1 MMOL/L (ref 0–18)
APTT PPP: 34.1 SECONDS (ref 23.3–35.6)
APTT PPP: 54.9 SECONDS (ref 23.3–35.6)
APTT PPP: 82.3 SECONDS (ref 23.3–35.6)
ARTERIAL PATENCY WRIST A: POSITIVE
AST SERPL-CCNC: 34 U/L (ref 15–37)
BASE EXCESS BLDA CALC-SCNC: 5.7 MMOL/L (ref ?–2)
BASE EXCESS BLDA CALC-SCNC: 7.1 MMOL/L (ref ?–2)
BASE EXCESS BLDA CALC-SCNC: 9 MMOL/L (ref ?–2)
BASOPHILS # BLD AUTO: 0.01 X10(3) UL (ref 0–0.2)
BASOPHILS NFR BLD AUTO: 0.1 %
BILIRUB SERPL-MCNC: 1.1 MG/DL (ref 0.1–2)
BODY TEMPERATURE: 98.6 F
BUN BLD-MCNC: 30 MG/DL (ref 9–23)
CA-I BLD-SCNC: 1.24 MMOL/L (ref 0.95–1.32)
CALCIUM BLD-MCNC: 8.3 MG/DL (ref 8.5–10.1)
CHLORIDE SERPL-SCNC: 103 MMOL/L (ref 98–112)
CO2 SERPL-SCNC: 34 MMOL/L (ref 21–32)
COHGB MFR BLD: 2.2 % SAT (ref 0–3)
COHGB MFR BLD: 2.7 % SAT (ref 0–3)
COHGB MFR BLD: 2.7 % SAT (ref 0–3)
CREAT BLD-MCNC: 0.86 MG/DL
DEPRECATED HBV CORE AB SER IA-ACNC: 24.1 NG/ML
EGFRCR SERPLBLD CKD-EPI 2021: 63 ML/MIN/1.73M2 (ref 60–?)
EOSINOPHIL # BLD AUTO: 0 X10(3) UL (ref 0–0.7)
EOSINOPHIL NFR BLD AUTO: 0 %
ERYTHROCYTE [DISTWIDTH] IN BLOOD BY AUTOMATED COUNT: 18.4 %
FOLATE SERPL-MCNC: 7.5 NG/ML (ref 8.7–?)
GLOBULIN PLAS-MCNC: 3.8 G/DL (ref 2.8–4.4)
GLUCOSE BLD-MCNC: 125 MG/DL (ref 70–99)
HCO3 BLDA-SCNC: 29.1 MEQ/L (ref 21–27)
HCO3 BLDA-SCNC: 30.5 MEQ/L (ref 21–27)
HCO3 BLDA-SCNC: 32 MEQ/L (ref 21–27)
HCT VFR BLD AUTO: 24.7 %
HGB BLD-MCNC: 6.8 G/DL
HGB BLD-MCNC: 7.2 G/DL
HGB BLD-MCNC: 8.6 G/DL
HGB BLD-MCNC: 8.7 G/DL
HGB BLD-MCNC: 8.9 G/DL
IMM GRANULOCYTES # BLD AUTO: 0.08 X10(3) UL (ref 0–1)
IMM GRANULOCYTES NFR BLD: 0.6 %
IRON SATN MFR SERPL: 2 %
IRON SERPL-MCNC: 9 UG/DL
L/M: 2 L/MIN
L/M: 3 L/MIN
L/M: 5 L/MIN
LACTATE BLD-SCNC: 1.5 MMOL/L (ref 0.5–2)
LYMPHOCYTES # BLD AUTO: 0.54 X10(3) UL (ref 1–4)
LYMPHOCYTES NFR BLD AUTO: 3.9 %
MCH RBC QN AUTO: 24.1 PG (ref 26–34)
MCHC RBC AUTO-ENTMCNC: 29.1 G/DL (ref 31–37)
MCV RBC AUTO: 82.6 FL
METHGB MFR BLD: 0.4 % SAT (ref 0.4–1.5)
METHGB MFR BLD: 0.6 % SAT (ref 0.4–1.5)
METHGB MFR BLD: 1.2 % SAT (ref 0.4–1.5)
MONOCYTES # BLD AUTO: 1.48 X10(3) UL (ref 0.1–1)
MONOCYTES NFR BLD AUTO: 10.6 %
NEUTROPHILS # BLD AUTO: 11.87 X10 (3) UL (ref 1.5–7.7)
NEUTROPHILS # BLD AUTO: 11.87 X10(3) UL (ref 1.5–7.7)
NEUTROPHILS NFR BLD AUTO: 84.8 %
OSMOLALITY SERPL CALC.SUM OF ELEC: 294 MOSM/KG (ref 275–295)
OXYHGB MFR BLDA: 80.4 % (ref 92–100)
OXYHGB MFR BLDA: 95.8 % (ref 92–100)
OXYHGB MFR BLDA: 97.4 % (ref 92–100)
PCO2 BLDA: 56 MM HG (ref 35–45)
PCO2 BLDA: 62 MM HG (ref 35–45)
PCO2 BLDA: 69 MM HG (ref 35–45)
PH BLDA: 7.31 [PH] (ref 7.35–7.45)
PH BLDA: 7.33 [PH] (ref 7.35–7.45)
PH BLDA: 7.4 [PH] (ref 7.35–7.45)
PLATELET # BLD AUTO: 247 10(3)UL (ref 150–450)
PO2 BLDA: 220 MM HG (ref 80–100)
PO2 BLDA: 53 MM HG (ref 80–100)
PO2 BLDA: 90 MM HG (ref 80–100)
POTASSIUM BLD-SCNC: 3.8 MMOL/L (ref 3.6–5.1)
POTASSIUM SERPL-SCNC: 3.3 MMOL/L (ref 3.5–5.1)
POTASSIUM SERPL-SCNC: 3.3 MMOL/L (ref 3.5–5.1)
PROT SERPL-MCNC: 6.4 G/DL (ref 6.4–8.2)
RBC # BLD AUTO: 2.99 X10(6)UL
SODIUM BLD-SCNC: 137 MMOL/L (ref 135–145)
SODIUM SERPL-SCNC: 138 MMOL/L (ref 136–145)
TIBC SERPL-MCNC: 393 UG/DL (ref 240–450)
TRANSFERRIN SERPL-MCNC: 264 MG/DL (ref 200–360)
VIT B12 SERPL-MCNC: 586 PG/ML (ref 193–986)
WBC # BLD AUTO: 14 X10(3) UL (ref 4–11)

## 2024-01-23 PROCEDURE — 99231 SBSQ HOSP IP/OBS SF/LOW 25: CPT | Performed by: STUDENT IN AN ORGANIZED HEALTH CARE EDUCATION/TRAINING PROGRAM

## 2024-01-23 PROCEDURE — 73070 X-RAY EXAM OF ELBOW: CPT

## 2024-01-23 PROCEDURE — 71045 X-RAY EXAM CHEST 1 VIEW: CPT

## 2024-01-23 PROCEDURE — 99233 SBSQ HOSP IP/OBS HIGH 50: CPT | Performed by: STUDENT IN AN ORGANIZED HEALTH CARE EDUCATION/TRAINING PROGRAM

## 2024-01-23 PROCEDURE — 30233N1 TRANSFUSION OF NONAUTOLOGOUS RED BLOOD CELLS INTO PERIPHERAL VEIN, PERCUTANEOUS APPROACH: ICD-10-PCS | Performed by: STUDENT IN AN ORGANIZED HEALTH CARE EDUCATION/TRAINING PROGRAM

## 2024-01-23 RX ORDER — ALBUMIN (HUMAN) 12.5 G/50ML
25 SOLUTION INTRAVENOUS ONCE
Status: COMPLETED | OUTPATIENT
Start: 2024-01-23 | End: 2024-01-23

## 2024-01-23 RX ORDER — SODIUM CHLORIDE 9 MG/ML
INJECTION, SOLUTION INTRAVENOUS ONCE
Status: COMPLETED | OUTPATIENT
Start: 2024-01-23 | End: 2024-01-23

## 2024-01-23 RX ORDER — RUFINAMIDE 40 MG/ML
1 SUSPENSION ORAL DAILY
Status: DISCONTINUED | OUTPATIENT
Start: 2024-01-24 | End: 2024-02-02

## 2024-01-23 RX ORDER — HEPARIN SODIUM 1000 [USP'U]/ML
30 INJECTION, SOLUTION INTRAVENOUS; SUBCUTANEOUS ONCE
Status: COMPLETED | OUTPATIENT
Start: 2024-01-23 | End: 2024-01-23

## 2024-01-23 RX ORDER — MIDODRINE HYDROCHLORIDE 10 MG/1
10 TABLET ORAL EVERY 8 HOURS SCHEDULED
Status: DISCONTINUED | OUTPATIENT
Start: 2024-01-23 | End: 2024-01-24

## 2024-01-23 NOTE — PROGRESS NOTES
Yin Butcher Patient Status:  Inpatient    1931 MRN [id number]   Location [facility] 4SW-A Attending Skyla Tinsley MD   Hosp Day # 2 PCP Mina Walker MD     Critical Care Progress Note          Subjective:  Weaned to 2L/NC this AM. Remains on low dose vasopressors.    Objective:    Medications:   potassium chloride  40 mEq Intravenous Once    midodrine  5 mg Oral Q8H VICTORIANO              Intake/Output Summary (Last 24 hours) at 2024 0722  Last data filed at 2024 0620  Gross per 24 hour   Intake 1836 ml   Output 550 ml   Net 1286 ml       BP (!) 81/42   Pulse 80   Temp 98.2 °F (36.8 °C) (Temporal)   Resp (!) 32   Ht 170.2 cm (5' 7\")   Wt 115 lb 11.9 oz (52.5 kg)   SpO2 98%   BMI 18.13 kg/m²     Physical Exam:   General Appearance: Alert, cooperative  Neck: No JVD, neck supple, no adenopathy, trachea midline, no carotid bruits  Lungs: Diminished to auscultation bilaterally, respirations unlabored  Heart:Irregular rate and rhythm, S1 and S2 normal, no murmur, rub or gallop  Abdomen: Soft, non-tender, bowel sounds active all four quadrants, no masses, no organomegaly  Extremities: Extremities normal, atraumatic, no cyanosis or edema,capillary refill <3 sec.    Pulses: 2+ and symmetric all extremities  Skin: Skin color, texture, turgor normal for ethnicity, no rashes or lesions, warm and dry      Recent Labs   Lab 24  0506   RBC 2.99*   HGB 7.2*   HCT 24.7*   MCV 82.6   MCH 24.1*   MCHC 29.1*   RDW 18.4   NEPRELIM 11.87*   WBC 14.0*   .0     Recent Labs   Lab 24  0441 24  0224 24  0506   * 111* 125*   BUN 40* 45* 30*   CREATSERUM 1.35* 1.28* 0.86   CA 8.2* 8.2* 8.3*   ALB 2.7* 2.8* 2.6*   * 138 138   K 4.6 3.7 3.3*  3.3*   CL 99 102 103   CO2 27.0 32.0 34.0*   ALKPHO 77 68 79   AST 86* 70* 34   BILT 1.4 1.4 1.1   TP 6.8 6.4 6.4     Recent Labs   Lab 24  1846   ABGPHT 7.28* 7.44   CQMZEE4J 60* 43   MHRQT2T 128* 136*    ABGHCO3 25.6 28.4*   ABGBE 0.9 4.4*   TEMP 98.6 98.6   DAVID Not Applicable Positive   SITE Right Brachial Left Brachial   DEV Bi-PAP Nasal cannula   THGB 8.2*  --      No results for input(s): \"BNP\" in the last 168 hours.  Recent Labs   Lab 01/20/24  2159   CK 32       No results found.       Assessment/Plan:  Shock- Hypovolemic vs cardiogenic  - IVF discontinued with concern for volume overload  - TTE LVEF 65-70%, No RWMA. Unable to assess diastolic dysfunction, however with previously reported diastolic dysfunction  - Vasopressors to Maintain MAP >65, currently on levophed.  - Resume home midodrine 10mg q8h  - Home BB on hold  - AM cortisol normal  - Obtain ABG this AM due to increasing vasopressor requirements to assess for worsening acidosis.  - May need to consider arterial line/central line placement if unable to wean vasopressors.    Acute hypoxic respiratory failure- likely multifactorial  - CTA with acute bilateral PE, bilateral pleural effusions, less likely pna  - RVP negative  - MRSA negative  - Sputum culture pending collection  - S/p Zosyn (1/20-1/22)  - Will monitor off antibiotics now    Acute bilateral PE  - LE doppler negative  - TTE without right heart strain  - Heparin drip    Bilateral pleural effusions  - Recurrent, likely due to diastolic dysfunction  - Repeat chest xray today  - May need to consider thoracentesis if no improvement  - Diurese as able    FLORESITA  - Likely 2/2 to poor PO intake  - Cr improving- baseline 0.5  - Will discontinue IVF  - Avoid nephrotoxins  - Strict I/O    Leukocytosis  - Procal normal  - Afebrile  - Blood culture NGTD  - Monitor    HFpEF  - TTE 65-70%, No RWMA. PASP  - Diurese as able  - Will stop IVF  - Cardiology following    Chronic Afib  - Not on anticoagulation at home  - Home meds on hold (BB, dig, lasix)    Acute Encephalopathy with underlying dementia  - Baseline unclear  - CT brain negative for acute process  - U/A bland  - Ammonia normal  - Mildly uremic,  unlikely to be contributing  - Monitor    Hypokalemia  - Replete    Anemia, likely chronic  - No s/s of bleeding  - Check iron studies  - Check folate/B12  - Monitor    F/E/N:    - ADAT- poor PO intake. Will place on a calorie count and consult dietician for supplements. May need to consider DHT/TF if no improvement.  - Replete electrolytes per protocol  - IVF    Proph:  - Heparin drip  - SCDs  - PT/OT    Wounds present on admission  - Wound care consult    Dispo:   - Full code  - Came from home with poor living conditions. SW/CM involvement appreciated.  - Palliative care following for ongoing goals of care.    Plan of care discussed with intensivist, Dr. Whitley Rider [facility]  Critical Care  [phone]      Intensivist Addendum:  I agree with APN note above. I have independently seen & evaluated the patient.  I agree with the management plan outlined above with the following changes/additions:    Patient continues on heparin drip and still with hypotension despite levophed/midodrine.  She has pleural effusions that are worsening but given stable oxygen needs and hypotension will hold on thoracentesis or diuresis. Palliative care is following for goals of care.  There is an active adult protective services case.    Critical Care Time: 36 minutes spent (excluding separate billable procedures) was spent in the evaluation and management of the patient, including chart review and discussion

## 2024-01-23 NOTE — PLAN OF CARE
Initially drowsy, woke after I started IV, ADL's completed and she was alert and interactive during this time, only oriented to self and year.  Back asleep around 2330.  Daughter at bedside for ADLs.  Pupils 2 brisk, lungs diminished, able to wean O2 from 4 to 2 L.  Active bowel sounds.  Purewick external catheter with clear yellow urine.  Knees down less red tonight, blanchable red to bilateral heels and ankles, stage 2 pressure injury to mid spine (photo taken and mepilex replaced).  Still with ecchymosis to arms and new edema distal to left AC area (IV site from Sunday that infiltrated).  Remains on levophed to maintain MAP >65 and heparin.  SCD's and heel protector boots remain intact.  Voice remains weak and hoarse.  See assessments and flowsheets for further data.

## 2024-01-23 NOTE — DIETARY MALNUTRITION NOTE
[facility]  NUTRITION ASSESSMENT    Pt meets severe malnutrition criteria at this time.    CRITERIA FOR MALNUTRITION DIAGNOSIS:  Criteria for severe malnutrition diagnosis: chronic illness related to body fat severe depletion and muscle mass severe depletion    NUTRITION INTERVENTION:    Meal and Snacks - Continue Regular Diet with nectar thick liquids as tolerated per SLP; monitor patient po intake. Encourage adequate po of appropriate diet.  Medical Food Supplements - RD added Magic Cup TID and Nepro daily. Rationale/use for oral supplements discussed.  Enteral Nutrition - If diet unable to advance, recommend placing feeding tube and initiating TF is consistent with GOC.  Vitamin and Mineral Supplements - Recommend adding Chewable MVI.  Coordination of Nutrition Care - Recommend SLP consult prior to diet advancement. and Palliative care consult for goal of care    PATIENT STATUS: 1/23: Pt weaned off Bipap yesterday. SLP evaluated and recommends regular solids and nectar thick liquids. Visited pt at bedside. Pt is awake but not answering questions appropriately. Per RN, pt ate some [place]er toast and 100% of Magic Cup for bfast. RN reports pt taking liquids better than solids; will also add Nepro. No GI symptoms noted at this time but last BM PTA. Calorie count ordered. Palliative care involved for GOC discussions. Will continue to monitor and follow up as appropriate.     1/21: 89 year old female admitted on 1/20 presents with AMS, hypotensive, hypoxic and hypothermic. Pt screened d/t MST score 2. Attempted to visit at bedside but pt is somnolent on AVAPS - unable to provide hx. Pt hypotensive requiring pressors. SLP consulted for swallow eval but currently not appropriate while on AVAPS. No GI symptoms noted and NKFA. No significant wt changes noted per chart review but pt with low BMI. Palliative care consulted for GOC discussion. RD to remain available for recommendations as warranted pending GOC.    PMH:   has a past medical history of Arrhythmia, Arthritis, Back problem, Cancer (HCC), Cataract, Osteoporosis, and Personal history of antineoplastic chemotherapy.    ANTHROPOMETRICS:  Ht:  5'7\"  Wt: 52.5 kg (115 lb 11.9 oz).   BMI: Body mass index is 18.13 kg/m².  IBW: 61.4 kg    WEIGHT HISTORY: Per chart, pt with ~9 lb wt loss x 10 months (8%, not significant per standards).  Patient Weight(s) for the past 336 hrs:   Weight   01/23/24 0000 52.5 kg (115 lb 11.9 oz)   01/22/24 0000 51.6 kg (113 lb 12.1 oz)   01/21/24 0007 49.5 kg (109 lb 2 oz)   01/20/24 2143 48 kg (105 lb 13.1 oz)       Wt Readings from Last 10 Encounters:   01/23/24 52.5 kg (115 lb 11.9 oz)   10/29/23 47.5 kg (104 lb 11.5 oz)   09/10/23 42.1 kg (92 lb 14.4 oz)   04/13/23 53.5 kg (118 lb)   04/10/23 53.5 kg (118 lb)   04/03/23 53.5 kg (118 lb)   03/30/23 53.5 kg (118 lb)   03/27/23 53.3 kg (117 lb 9.6 oz)   03/22/23 64 kg (141 lb)   12/05/22 63.5 kg (140 lb)        NUTRITION:  Diet:       Procedures    Regular/General diet Fluid Consistency: Nectar Thick / Mildly Thick Liquids; Is Patient on Accuchecks? Yes; Is Patient on Suicide Precautions? No        Percent Meals Eaten (last 3 days)       Date/Time Percent Meals Eaten (%)    01/22/24 0800 0 %    01/22/24 1200 0 %     Percent Meals Eaten (%): pt states she only eats chocolate. will reattempt ordering at later time. per daughter, pt eat a lot of ice cream and cake at home. at 01/22/24 1200    01/22/24 1600 0 %     Percent Meals Eaten (%): ordered pt strawberry yogurt/cake parfait at 01/22/24 1600            Food Allergies: No  Cultural/Ethnic/Worship Preferences Addressed: Yes    GI SYSTEM REVIEW: WNL  Skin/Wounds: stage II pressure injury to medial back    NUTRITION RELATED PHYSICAL FINDINGS:     1. Body Fat/Muscle Mass: severe depletion body fat Orbital fat pad, Buccal fat pad, and Triceps and severe muscle depletion Temple region, Clavicle region, Shoulder/Acromion process, Scapula region, Thigh  region, and Calf region     2. Fluid Accumulation: lower extremity edema and b/l feet      NUTRITION PRESCRIPTION: 52.5 kg  Calories: 9904-3330 calories/day (30-35 kcal/kg)  Protein: 63-79 grams protein/day (1.2-1.5 gm/kg)  Fluid: ~1 ml/kcal or per MD discretion    NUTRITION DIAGNOSIS/PROBLEM:  Inadequate oral intake related to insufficient appetite resulting in inadequate nutrition intake as evidenced by documented/reported insufficient oral intake    MONITOR AND EVALUATE/NUTRITION GOALS:  PO intake of 75% of meals TID - New  PO intake of 75% of oral nutrition supplement/s - New  Weight stable within 1 to 2 lbs during admission - Ongoing  Start alternative nutrition in 24-48 hrs if diet is not able to advance- Resolved      MEDICATIONS:  Abx  Gtt: levophed at 6 mcg/min    LABS:  K+ 3.3    Pt is at High nutrition risk    Kathryn Mora RD, LDN, CNSC  Clinical Dietitian  Spectra: [id number]

## 2024-01-23 NOTE — PROGRESS NOTES
[facility]     Hospitalist Progress Note     Yin Butcher Patient Status:  Inpatient    1931 MRN [id number]   Location [facility] 4SW-A Attending Skyla Tinsley MD   Hosp Day # 2 PCP Mina Walker MD     Chief Complaint: Shock     Subjective:  Patient requiring levo 8mcg this am.  ABG reviewed.  On heparin drip.  No black or bloody stools per RN.  +LUE ecchymotic and RN reports IVs removed from that arm prior.      Objective:    Review of Systems:  comprehensive review of systems was completed; pertinent positive and negatives stated in subjective.    Vital signs:  Temp:  [97.7 °F (36.5 °C)-98.6 °F (37 °C)] 97.7 °F (36.5 °C)  Pulse:  [70-99] 93  Resp:  [3-40] 23  BP: ()/(38-69) 92/53  SpO2:  [91 %-100 %] 99 %    Physical Exam:    General: No acute distress, elderly lady 89 year old female   Respiratory: No wheezes, no rhonchi on O2 NC 2L  Cardiovascular: S1, S2, irregular 80ss +murmur  Abdomen: Soft, Non-tender, non-distended, possible hernia  Neuro: moves all extremities. Pt confused.  Extremities: No edema, LUE ecchymoses      Diagnostic Data:    Labs:  Recent Labs   Lab 24  0506   WBC 8.6 10.2 13.0* 15.5* 14.0*   HGB 8.2* 7.8* 7.0* 7.1* 7.2*   MCV 83.2 85.8 78.5* 80.2 82.6   .0 277.0 255.0 262.0 247.0   INR  --  1.64* 1.84*  --   --        Recent Labs   Lab 24  0506   * 111* 125*   BUN 40* 45* 30*   CREATSERUM 1.35* 1.28* 0.86   CA 8.2* 8.2* 8.3*   ALB 2.7* 2.8* 2.6*   * 138 138   K 4.6 3.7 3.3*  3.3*   CL 99 102 103   CO2 27.0 32.0 34.0*   ALKPHO 77 68 79   AST 86* 70* 34   BILT 1.4 1.4 1.1   TP 6.8 6.4 6.4       Estimated Creatinine Clearance: 34.6 mL/min (based on SCr of 0.86 mg/dL).    Recent Labs   Lab 24  2159   TROPHS 25   CK 32       Recent Labs   Lab 24  0441 24  1933   PTP 19.6* 21.4*   INR 1.64* 1.84*          Microbiology    Hospital  Encounter on 01/20/24   1. Blood Culture     Status: None (Preliminary result)    Collection Time: 01/20/24  9:59 PM    Specimen: Blood,peripheral   Result Value Ref Range    Blood Culture Result No Growth 2 Days N/A         Imaging: Reviewed in Epic.    Medications:    midodrine  10 mg Oral Q8H VICTORIANO    [START ON 1/24/2024] multivitamin  1 tablet Oral Daily    iron sucrose  200 mg Intravenous Daily    sodium chloride   Intravenous Once       Assessment & Plan:      #Shock, hypovolemic vs septic  vs cardiogenic, improved   Levo 8mcg today, on midodrine 10mg TID, and transfuse today  Zosyn IV, NGTD on blood CX    #Chronic HFpEF with acute exacerbation, Bilateral pleural effusions  Diuresis once able  Cards following  Echo reviewed  CT reviewed  Prior bilateral thoras on in october    #Acute hypoxic resp failure due to acute PE with possible RV strain on CT and pleural effusion  Off BIPAP, wean o2 as able, ABG reviewed  Hep gtt   Doppler LE neg  RV strain on CT,  Echo normal RVSF    #Lactic acidosis due to above, trend    #Chronic Afib, controlled  Follow tele  resume BB from home once BP improved    #Anemia, folic/iron deficiency  Add ferritin, give venofer, PRBCs today due to hypotension  No black or bloody stools    #Hypothermia on admission, resolved  Coritsol level in AM    #Dementia w/ deconditioning  Poor living conditions PTA  CT brain chronic  PT recs BRADLY    #Dysphagia  Slp evaluated - regular/nectar thick    #Chronic heel ulcers, chronic back wound POA  wound care eval  mepilex over spine    #Hypokalemia replace per protocol    Case d/w pt, RN, MD.       SUSAN Wadsworth  12:05 PM     Supplementary Documentation:     Quality:  DVT Mechanical Prophylaxis:   SCDs,    DVT Pharmacologic Prophylaxis   Medication    heparin (Porcine) 57915 units/250mL infusion CONTINUOUS                Code Status: Full Code  [place]: External urinary catheter in place    The 21st Century Cures Act makes medical notes like these  available to patients in the interest of transparency. Please be advised this is a medical document. Medical documents are intended to carry relevant information, facts as evident, and the clinical opinion of the practitioner. The medical note is intended as peer to peer communication and may appear blunt or direct. It is written in medical language and may contain abbreviations or verbiage that are unfamiliar.             **Certification      PHYSICIAN Certification of Need for Inpatient Hospitalization - Initial Certification    Patient will require inpatient services that will reasonably be expected to span two midnight's based on the clinical documentation in H+P.   Based on patients current state of illness, I anticipate that, after discharge, patient will require TBD.    Addendum:    Agree with above note.  Pt. Seen and examined.    Gen: NAD  CVS: s1s2  Resp: CTA  Abd: soft      #Shock, hypovolemic vs septic  vs cardiogenic vs hemorrhagic   Pressors with increasing pressor needs on 1/23- with worsening anemia on 1/23- transufse 1 u PRB  IV Abx   Monitor BLood CX  Monitor I/O   #Bilateral pleural effusion  Diuresis  Monitor I/O  #Acute hypoxic resp failure due to acute PE with ? RV strain and pleural effusion  No RV strain on Echo   Hep gtt   Doppler LE - no DVT  #Lactic acidosis due to above, trend  #Anemia  Iron panel, below baseline   #Hypothermia on admission  Cortisol level normal   #CHF with acute exacerbation          Pt seen an examined independently. Chart reviewed.  Labs and imaging over the last 24 hours have been reviewed.  Note has been reviewed by me and modified as needed.  Exam and Impression/ Recs as noted above.  D/w staff and with pt  More than 50% of clinical time and 100% of the medical decision making performed by me.    Skyla Tinsley MD

## 2024-01-23 NOTE — CONSULTS
[facility]  Report of Inpatient Wound Care Consultation    Yin Butcher Patient Status:  Inpatient    1931 MRN [id number]   Location [facility] 4SW-A Attending Skyla Tinsley MD   Hosp Day # 2 PCP Mina Walker MD     Reason for Consultation:  Medial back ,heel    History of Present Illness:  Yin Butcher is a a(n) 89 year old female. Patient presents with pressure injury to right heel.Patient is non verbal.RN in the room.    History:  Past Medical History:   Diagnosis Date    Arrhythmia     Arthritis     Back problem     Cancer (HCC)     Cataract     Osteoporosis     Personal history of antineoplastic chemotherapy      Past Surgical History:   Procedure Laterality Date    OTHER SURGICAL HISTORY  2014    Procedure: HIP HEMIARTHROPLASTY/ BIPOLAR;  Surgeon: Vasu Matamoros MD;  Location:  MAIN OR    REMOVAL OF TONSILS,12+ Y/O        reports that she has never smoked. She has never used smokeless tobacco. She reports that she does not currently use alcohol. She reports that she does not use drugs.    Allergies:  @ALLERGY    Laboratory Data:  Lab Results   Component Value Date    WBC 14.0 2024    HGB 8.6 2024    HCT 24.7 2024    .0 2024    CREATSERUM 0.86 2024    BUN 30 2024     2024    K 3.3 2024    K 3.3 2024     2024    CO2 34.0 2024     2024    CA 8.3 2024    ALB 2.6 2024    ALKPHO 79 2024    BILT 1.1 2024    TP 6.4 2024    AST 34 2024    ALT  2024      Comment:      Due to  backorder we are temporarily unable to offer hospital-based ALT testing at [facility].   If urgently needed, please order ALT test code [id number].   The new order will need a new venipuncture and will be sent to [facility] for testing.   The expected turnaround time will be within 24 hours.     PTT 34.1 2024    B12 586 2024    PGLU 178 2024          Impression:  Wound 01/23/24 Back Medial (Active)   Date First Assessed: 01/23/24   Present on Original Admission: Yes  Primary Wound Type: Pressure Injury  Location: Back  Wound Location Orientation: Medial  Wound Description (Comments): stage 2 decub      Assessments 1/23/2024  3:59 PM   Wound Image     Drainage Amount None   Wound Bed Epithelium (%) 100 % (fragile epithelialized skin)   Wound Odor None   Shape blanchable erythema       Wound 01/23/24 Heel Right (Active)   Date First Assessed/Time First Assessed: 01/23/24 1540   Location: Heel  Wound Location Orientation: Right      Assessments 1/23/2024  4:00 PM   Wound Image     Drainage Amount None   Wound Length (cm) 1 cm   Wound Width (cm) 1 cm   Wound Surface Area (cm^2) 1 cm^2   Wound Depth (cm) 0 cm   Wound Volume (cm^3) 0 cm^3   Margins Attached edges   Nancy-wound Assessment Edema;Dry;Other (Comment) (erythema)   Wound Bed Eschar (%) 100 %   Wound Odor None   Pressure Injury Stage Unstageable   Local pulse : palpable , dorsalis pedis   Capillary refill : <3 seconds  Temperature : within normal limits     Wound Cleaning and Dressings:  Right heel  Wound cleansing:  Cleanse with saline  Wound product: Paint with betadine daily.Cover with bordered foam or a gauze.  Dressing change frequency:  Change dressing daily and/or PRN      Miscellaneous/Additional Orders:  Offloading: Turn Q 2 hours and as needed, bilateral off loading heel boots    Miscellaneous orders: Increase dietary protein intake.Dietitian to evaluate amount of protein intake/supplements         Thank you for allowing me to participate in the care of your patient.  Time Spent 30 minutes , Thank you.    Capri Triplett RN, BSN [facility]   Wound Care pager [phone]  1/23/2024  4:01 PM    dr. taurus castrejon
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
## 2024-01-23 NOTE — PROGRESS NOTES
Cardiology Progress Note        Yin Butcher Patient Status:  Inpatient    1931 MRN [id number]   [facility] 4SW-A Attending Skyla Tinsley MD   Hosp Day # 2 PCP Mina Walker MD     Subjective:  MS waxes and wanes.  Could not wake up enough to take pills this morning.  BP's remain soft, on levo.    Medications:   potassium chloride  40 mEq Intravenous Once    midodrine  10 mg Oral Q8H VICTORIANO       Continuous Infusions:   norepinephrine 4 mcg/min (24 0724)    continuous dose heparin 900 Units/hr (24 0400)         Allergies:  Allergies   Allergen Reactions    Erythromycin OTHER (SEE COMMENTS)    Oxycodone HALLUCINATION    Gabapentin NAUSEA ONLY and OTHER (SEE COMMENTS)     Pt stated she didn't feel like herself     Tizanidine ITCHING and OTHER (SEE COMMENTS)     Pt states burning sensation          Intake/Output:    Intake/Output Summary (Last 24 hours) at 2024 0729  Last data filed at 2024 0620  Gross per 24 hour   Intake 1836 ml   Output 550 ml   Net 1286 ml           Last 3 Weights   24 0000 115 lb 11.9 oz (52.5 kg)   24 0000 113 lb 12.1 oz (51.6 kg)   24 0007 109 lb 2 oz (49.5 kg)   24 2143 105 lb 13.1 oz (48 kg)   10/29/23 0600 104 lb 11.5 oz (47.5 kg)   10/24/23 0457 114 lb 3.2 oz (51.8 kg)   10/23/23 0340 92 lb 9.5 oz (42 kg)   10/22/23 2033 92 lb 9.5 oz (42 kg)   09/10/23 1203 92 lb 14.4 oz (42.1 kg)   23 0746 105 lb 12.8 oz (48 kg)   23 2218 106 lb (48.1 kg)            Physical Exam:    Temp:  [98.2 °F (36.8 °C)-99.1 °F (37.3 °C)] 98.2 °F (36.8 °C)  Pulse:  [] 71  Resp:  [3-34] 34  BP: ()/(38-69) 75/38  SpO2:  [91 %-100 %] 95 %    General: No apparent distress  HEENT: No focal deficits.  Neck: supple. JVP normal  Cardiac: Regular rhythm, S1, S2 normal,  rub or gallop.  No murmur.  Lungs: CTA  Abdomen: Soft, non-tender.   Extremities: No edema  Neurologic: no focal deficits  Skin: Warm and dry.     Telemetry:  fib    EKG:      Echo:      Cardiac Cath:      Labs:  HEM:  Recent Labs   Lab 01/20/24 2159 01/21/24 0441 01/21/24 1933 01/22/24 0224 01/23/24  0506   WBC 8.6 10.2 13.0* 15.5* 14.0*   HGB 8.2* 7.8* 7.0* 7.1* 7.2*   .0 277.0 255.0 262.0 247.0       Chem:  Recent Labs   Lab 01/20/24 2159 01/21/24 0441 01/22/24 0224 01/23/24  0506    135* 138 138   K 5.0 4.6 3.7 3.3*  3.3*    99 102 103   CO2 30.0 27.0 32.0 34.0*   BUN 34* 40* 45* 30*   CREATSERUM 1.16* 1.35* 1.28* 0.86   CA 8.5 8.2* 8.2* 8.3*   MG  --  1.8 2.2  --    PHOS  --  6.1*  --   --    * 101* 111* 125*       Recent Labs   Lab 01/20/24 2159 01/21/24 0441 01/22/24 0224 01/23/24  0506   AST 43* 86* 70* 34   ALB 2.9* 2.7* 2.8* 2.6*       Recent Labs   Lab 01/20/24 2159   CK 32       Recent Labs   Lab 01/21/24 0441 01/21/24 1933   PTP 19.6* 21.4*   INR 1.64* 1.84*                 Impression:  Pulmonary emboli - on heparin.  Echo w/o RV strain.  Chronic afib - rate controlled.  Hypoxic resp failure-  preserved EF on echo.  Right pleural effusion  AMS with underlying dementia  Hypotension - on levo.  On midodrine at home.  Anemia, Hb stable, in 7's.          Plan:  Goals of care discussions ongoing.  Poor prognosis.  May need right thora.  CXR pending.  Resume midodrine at 10 mg TID, wean off levo if able.  Change to DOAC if can swallow reliably.  Eliquis as PO intake poor.          Satya Baker MD  L3

## 2024-01-24 ENCOUNTER — APPOINTMENT (OUTPATIENT)
Dept: GENERAL RADIOLOGY | Facility: HOSPITAL | Age: 89
End: 2024-01-24
Payer: MEDICARE

## 2024-01-24 ENCOUNTER — APPOINTMENT (OUTPATIENT)
Dept: CT IMAGING | Facility: HOSPITAL | Age: 89
End: 2024-01-24
Attending: PHYSICIAN ASSISTANT
Payer: MEDICARE

## 2024-01-24 ENCOUNTER — APPOINTMENT (OUTPATIENT)
Dept: GENERAL RADIOLOGY | Facility: HOSPITAL | Age: 89
End: 2024-01-24
Attending: NURSE PRACTITIONER
Payer: MEDICARE

## 2024-01-24 ENCOUNTER — APPOINTMENT (OUTPATIENT)
Dept: ULTRASOUND IMAGING | Facility: HOSPITAL | Age: 89
End: 2024-01-24
Payer: MEDICARE

## 2024-01-24 LAB
ALBUMIN SERPL-MCNC: 2.8 G/DL (ref 3.4–5)
ALBUMIN/GLOB SERPL: 0.9 {RATIO} (ref 1–2)
ALP LIVER SERPL-CCNC: 77 U/L
ANION GAP SERPL CALC-SCNC: <0 MMOL/L (ref 0–18)
APTT PPP: 87.2 SECONDS (ref 23.3–35.6)
ARTERIAL PATENCY WRIST A: POSITIVE
ARTERIAL PATENCY WRIST A: POSITIVE
AST SERPL-CCNC: 17 U/L (ref 15–37)
BASE EXCESS BLDA CALC-SCNC: 5.8 MMOL/L (ref ?–2)
BASE EXCESS BLDA CALC-SCNC: 8.9 MMOL/L (ref ?–2)
BASOPHILS # BLD AUTO: 0 X10(3) UL (ref 0–0.2)
BASOPHILS NFR BLD AUTO: 0 %
BILIRUB SERPL-MCNC: 1.5 MG/DL (ref 0.1–2)
BLOOD TYPE BARCODE: 5100
BODY TEMPERATURE: 98.6 F
BODY TEMPERATURE: 98.6 F
BUN BLD-MCNC: 25 MG/DL (ref 9–23)
CA-I BLD-SCNC: 1.25 MMOL/L (ref 0.95–1.32)
CALCIUM BLD-MCNC: 8.3 MG/DL (ref 8.5–10.1)
CHLORIDE SERPL-SCNC: 103 MMOL/L (ref 98–112)
CO2 SERPL-SCNC: 34 MMOL/L (ref 21–32)
COHGB MFR BLD: 1.8 % SAT (ref 0–3)
COHGB MFR BLD: 2.3 % SAT (ref 0–3)
CREAT BLD-MCNC: 0.48 MG/DL
EGFRCR SERPLBLD CKD-EPI 2021: 89 ML/MIN/1.73M2 (ref 60–?)
EOSINOPHIL # BLD AUTO: 0 X10(3) UL (ref 0–0.7)
EOSINOPHIL NFR BLD AUTO: 0 %
ERYTHROCYTE [DISTWIDTH] IN BLOOD BY AUTOMATED COUNT: 18.3 %
EXPIRATORY PRESSURE: 10 CM H2O
EXPIRATORY PRESSURE: 5 CM H2O
FIO2: 30 %
FIO2: 40 %
GLOBULIN PLAS-MCNC: 3.2 G/DL (ref 2.8–4.4)
GLUCOSE BLD-MCNC: 105 MG/DL (ref 70–99)
HCO3 BLDA-SCNC: 29.4 MEQ/L (ref 21–27)
HCO3 BLDA-SCNC: 31.9 MEQ/L (ref 21–27)
HCT VFR BLD AUTO: 27 %
HGB BLD-MCNC: 8.1 G/DL
HGB BLD-MCNC: 8.3 G/DL
HGB BLD-MCNC: 8.6 G/DL
IMM GRANULOCYTES # BLD AUTO: 0.08 X10(3) UL (ref 0–1)
IMM GRANULOCYTES NFR BLD: 0.7 %
IPAP MAX: 35 CM H2O
IPAP MAX: 35 CM H2O
IPAP MIN: 25 CM H2O
IPAP MIN: 25 CM H2O
LACTATE BLD-SCNC: 0.8 MMOL/L (ref 0.5–2)
LYMPHOCYTES # BLD AUTO: 0.43 X10(3) UL (ref 1–4)
LYMPHOCYTES NFR BLD AUTO: 3.6 %
MCH RBC QN AUTO: 25.6 PG (ref 26–34)
MCHC RBC AUTO-ENTMCNC: 30 G/DL (ref 31–37)
MCV RBC AUTO: 85.4 FL
METHGB MFR BLD: 0.7 % SAT (ref 0.4–1.5)
METHGB MFR BLD: 0.9 % SAT (ref 0.4–1.5)
MONOCYTES # BLD AUTO: 0.96 X10(3) UL (ref 0.1–1)
MONOCYTES NFR BLD AUTO: 8.1 %
NEUTROPHILS # BLD AUTO: 10.34 X10 (3) UL (ref 1.5–7.7)
NEUTROPHILS # BLD AUTO: 10.34 X10(3) UL (ref 1.5–7.7)
NEUTROPHILS NFR BLD AUTO: 87.6 %
OSMOLALITY SERPL CALC.SUM OF ELEC: 283 MOSM/KG (ref 275–295)
OXYHGB MFR BLDA: 95.6 % (ref 92–100)
OXYHGB MFR BLDA: 96.4 % (ref 92–100)
P/F RATIO: 293 MMHG
P/F RATIO: 353 MMHG
PCO2 BLDA: 58 MM HG (ref 35–45)
PCO2 BLDA: 66 MM HG (ref 35–45)
PH BLDA: 7.31 [PH] (ref 7.35–7.45)
PH BLDA: 7.39 [PH] (ref 7.35–7.45)
PLATELET # BLD AUTO: 199 10(3)UL (ref 150–450)
PO2 BLDA: 141 MM HG (ref 80–100)
PO2 BLDA: 88 MM HG (ref 80–100)
POTASSIUM BLD-SCNC: 4 MMOL/L (ref 3.6–5.1)
POTASSIUM SERPL-SCNC: 3.9 MMOL/L (ref 3.5–5.1)
POTASSIUM SERPL-SCNC: 3.9 MMOL/L (ref 3.5–5.1)
PROT SERPL-MCNC: 6 G/DL (ref 6.4–8.2)
RBC # BLD AUTO: 3.16 X10(6)UL
SODIUM BLD-SCNC: 136 MMOL/L (ref 135–145)
SODIUM SERPL-SCNC: 134 MMOL/L (ref 136–145)
TIDAL VOLUME: 450 ML
TIDAL VOLUME: 450 ML
UNIT VOLUME: 350 ML
VENT RATE: 18 /MIN
VENT RATE: 18 /MIN
WBC # BLD AUTO: 11.8 X10(3) UL (ref 4–11)

## 2024-01-24 PROCEDURE — 71045 X-RAY EXAM CHEST 1 VIEW: CPT

## 2024-01-24 PROCEDURE — 99497 ADVNCD CARE PLAN 30 MIN: CPT | Performed by: STUDENT IN AN ORGANIZED HEALTH CARE EDUCATION/TRAINING PROGRAM

## 2024-01-24 PROCEDURE — 71045 X-RAY EXAM CHEST 1 VIEW: CPT | Performed by: NURSE PRACTITIONER

## 2024-01-24 PROCEDURE — 74177 CT ABD & PELVIS W/CONTRAST: CPT | Performed by: PHYSICIAN ASSISTANT

## 2024-01-24 PROCEDURE — 93971 EXTREMITY STUDY: CPT

## 2024-01-24 PROCEDURE — 99291 CRITICAL CARE FIRST HOUR: CPT | Performed by: INTERNAL MEDICINE

## 2024-01-24 PROCEDURE — 99233 SBSQ HOSP IP/OBS HIGH 50: CPT | Performed by: HOSPITALIST

## 2024-01-24 PROCEDURE — 99232 SBSQ HOSP IP/OBS MODERATE 35: CPT | Performed by: STUDENT IN AN ORGANIZED HEALTH CARE EDUCATION/TRAINING PROGRAM

## 2024-01-24 RX ORDER — FUROSEMIDE 10 MG/ML
20 INJECTION INTRAMUSCULAR; INTRAVENOUS ONCE
Status: COMPLETED | OUTPATIENT
Start: 2024-01-24 | End: 2024-01-24

## 2024-01-24 RX ORDER — MIDODRINE HYDROCHLORIDE 5 MG/1
10 TABLET ORAL 3 TIMES DAILY
Status: DISCONTINUED | OUTPATIENT
Start: 2024-01-24 | End: 2024-02-02

## 2024-01-24 RX ORDER — IOHEXOL 350 MG/ML
65 INJECTION, SOLUTION INTRAVENOUS
Status: COMPLETED | OUTPATIENT
Start: 2024-01-24 | End: 2024-01-24

## 2024-01-24 NOTE — PROGRESS NOTES
Patient received alert to self, all needs anticipated by staff. Requiring Bipap, Avaps this shift re: blood gases - please refer to results review. Patient pulling off mask, telemetry leads,taking off night gown. Attempts to redirect unsuccessful, due in part to decreased cognition. APRN notified, soft restraints re initiated with resolution, frequent rounds made by writer. Patient appears to be resting comfortably.

## 2024-01-24 NOTE — PLAN OF CARE
Pt was lethargic throughout the shift. Occasional times where pt would be alert enough to eat/take medications. Pt was at max oriented to self. Pt's Daughter was updated on POC from this RN and the ICU MD.      Pt's daughter explained to this RN that the reason the MDs have not been able to get a hold of the Daughter is because of her lack of cell service and The daughter does work at a location without cell service or wifi The daughter confirmed that the pt lives with the daughter and occasionally the fiance of the daughter steps in to help care for the pt.    MDs updated on this information. Potential meeting between palliative and the daughter tomorrow. (See palliative note)    Hernia noted on physical assessment. MD wanted imaging. Oral contrast ordered. Night shift endorsed. NGT dropped for nutrition and for oral contrast.    Daughter came to bedside this evening. Pt more alert in daughter's presence.     Pt's daughter states that pt is highly sensitive to pain medication. Please only use tylenol, hot/cold packs, or lidocaine patch for pain.  Bed alarm on, restraints on for pulling of lines, daughter at bedside, no complaints at this time. Night shift endorsed.

## 2024-01-24 NOTE — PROGRESS NOTES
[facility]  Palliative Care Progress Note    Yin Butcher Patient Status:  Inpatient    1931 MRN [id number]   Location [facility] 4SW-A Attending Skyla Tinsley MD   Hosp Day # 3 PCP Mina Walker MD     History/Other:      Yin Butcher is a 89 year old female with Afib, recurrent UTI, HFpEF, recurrent pleural effusion, anemia, chronic back pain  who was admitted on 2024 for AMS and AOB. Work up in our hospital revealed Hypothermia, hypotension on pressors, FLORESITA, lactic acidosis, acute bilateral PE.    In brief, pt lives with her daughter Jada. Palliative service was consulted twice in 2022 and 10/2023, both times, Jada declined our services. This is the 4th hospitalization in the past year.     Consult ordered by:: Grant Ventura for evaluation of Palliative Care needs and Goals of care discussion.    Allergies:  Allergies   Allergen Reactions    Erythromycin OTHER (SEE COMMENTS)    Oxycodone HALLUCINATION    Gabapentin NAUSEA ONLY and OTHER (SEE COMMENTS)     Pt stated she didn't feel like herself     Tizanidine ITCHING and OTHER (SEE COMMENTS)     Pt states burning sensation      Medications:     Current Facility-Administered Medications:     furosemide (Lasix) 10 mg/mL injection 20 mg, 20 mg, Intravenous, Once    midodrine (ProAmatine) tab 10 mg, 10 mg, Oral, Q8H VICTORIANO    multivitamin (Centrum) chewable tab (Adult) 1 tablet, 1 tablet, Oral, Daily    iron sucrose (Venofer) 20 mg/mL injection 200 mg, 200 mg, Intravenous, Daily    metoclopramide (Reglan) 5 mg/mL injection 5 mg, 5 mg, Intravenous, Q8H PRN    norepinephrine (Levophed) 4 mg/250mL infusion premix, 0.5-30 mcg/min, Intravenous, Continuous PRN    ondansetron (Zofran) 4 MG/2ML injection 4 mg, 4 mg, Intravenous, Q6H PRN    glucose (Dex4) 15 GM/59ML oral liquid 15 g, 15 g, Oral, Q15 Min PRN **OR** glucose (Glutose) 40% oral gel 15 g, 15 g, Oral, Q15 Min PRN **OR** glucose-vitamin C (Dex-4) chewable tab 4 tablet, 4 tablet, Oral,  Q15 Min PRN **OR** dextrose 50% injection 50 mL, 50 mL, Intravenous, Q15 Min PRN **OR** glucose (Dex4) 15 GM/59ML oral liquid 30 g, 30 g, Oral, Q15 Min PRN **OR** glucose (Glutose) 40% oral gel 30 g, 30 g, Oral, Q15 Min PRN **OR** glucose-vitamin C (Dex-4) chewable tab 8 tablet, 8 tablet, Oral, Q15 Min PRN    acetaminophen (Tylenol Extra Strength) tab 500 mg, 500 mg, Oral, Q4H PRN    heparin (Porcine) 03397 units/250mL infusion CONTINUOUS, 200-3,000 Units/hr, Intravenous, Continuous    acetaminophen (Ofirmev) 10 mg/mL infusion premix 750 mg, 15 mg/kg, Intravenous, Q6H PRN    morphINE PF 2 MG/ML injection 0.5 mg, 0.5 mg, Intravenous, Q4H PRN    Subjective      Interval events:     -Required AVAPS overnight  -hypercapnic and very lethargic     When I entered the room, the patient was sleeping. No family present at bedside.   Discussed with RN; she states that daughter has limited cell reception at work.   I did not wake pt as she was resting comfortably off of AVAPS on NC.     Symptom assessment: stephen     See summary of discussion below.     Review of Systems:  Pertinent items are noted in subjective.  Bowel Movement    No data found in the last 1 encounters.     Objective:     Vital Signs:  Blood pressure 91/55, pulse 89, temperature 98.3 °F (36.8 °C), temperature source Temporal, resp. rate 20, height 5' 7\" (1.702 m), weight 115 lb 11.9 oz (52.5 kg), SpO2 99%.  Body mass index is 18.13 kg/m².  Non-verbal signs of pain present: No  Current Palliative performance scale PPSv2 (%): 35    Physical Exam:  Resting and appeared comfortable.         Results:     Hematology:  Lab Results   Component Value Date    WBC 11.8 (H) 01/24/2024    HGB 8.1 (L) 01/24/2024    HCT 27.0 (L) 01/24/2024    .0 01/24/2024     Coags:  Lab Results   Component Value Date    PT 15.5 (H) 07/13/2014    INR 1.84 (H) 01/21/2024    PTT 87.2 (H) 01/24/2024     Chemistry:  Lab Results   Component Value Date    CREATSERUM 0.48 (L) 01/24/2024    BUN  25 (H) 01/24/2024     (L) 01/24/2024    K 3.9 01/24/2024    K 3.9 01/24/2024     01/24/2024    CO2 34.0 (H) 01/24/2024     (H) 01/24/2024    CA 8.3 (L) 01/24/2024    ALB 2.8 (L) 01/24/2024    ALKPHO 77 01/24/2024    BILT 1.5 01/24/2024    TP 6.0 (L) 01/24/2024    AST 17 01/24/2024    ALT  01/24/2024      Comment:      Due to  backorder we are temporarily unable to offer hospital-based ALT testing at [facility].   If urgently needed, please order ALT test code [id number].   The new order will need a new venipuncture and will be sent to [facility] for testing.   The expected turnaround time will be within 24 hours.     DDIMER 2.38 (H) 01/20/2024    MG 2.2 01/22/2024    PHOS 6.1 (H) 01/21/2024    TROP <0.046 02/19/2015     Imaging:  XR CHEST AP/PA (1 VIEW) (CPT=71045)    Result Date: 1/24/2024  CONCLUSION:  There is no significant change in the appearance of the portable chest radiograph.  Moderate to large right and small left pleural effusions are noted with dependent atelectasis or consolidation in the lower lobes.   LOCATION:  [facility]      Dictated by (CST): Manohar Salinas MD on 1/24/2024 at 8:36 AM     Finalized by (CST): Manohar Salinas MD on 1/24/2024 at 8:37 AM       XR ELBOW, (2 VIEWS), LEFT (CPT=73070)    Result Date: 1/23/2024  CONCLUSION:  Tissue swelling.  No acute fracture or other acute bony process.  LOCATION:  [facility]   Dictated by (CST): Malcom Purcell MD on 1/23/2024 at 12:23 PM     Finalized by (CST): Malcom Purcell MD on 1/23/2024 at 12:24 PM       XR CHEST AP/PA (1 VIEW) (CPT=71045)    Result Date: 1/23/2024  CONCLUSION:    On the right, there is worsening appearance with decreased aeration of the right lung with an ovoid area of aeration right upper-mid lung, but decreasing in size since yesterday, with the rest of the lung opacified by what appears to be combination of pleural fluid, lung consolidation and potentially mass.  The left chest is better aerated, but also  shows decrease in aeration with left pleural effusion and lung consolidation.  The heart is mostly obscured.  No sign of pneumothorax heavily calcified breast implants.  LOCATION:  [facility]      Dictated by (CST): Malcom Purcell MD on 1/23/2024 at 9:35 AM     Finalized by (CST): Malcom Purcell MD on 1/23/2024 at 9:36 AM           Summary of Discussion     LVM for Jada this morning.   This is the 3rd attempt to contact.     Advance Care Planning counseling and discussion:   HC POA Documentation Completed--Document in Epic.    POLST FORM Not completed  Full Code    Assessment and Recommendations       Principal Problem:    Pleural effusion  Active Problems:    Acute respiratory failure (HCC)    Acute on chronic congestive heart failure, unspecified heart failure type (HCC)    Acute on chronic diastolic congestive heart failure (HCC)    Sepsis (HCC)    Acute respiratory failure with hypoxia (HCC)    Somnolence    Hyperkalemia    Respiratory acidosis    Multiple subsegmental pulmonary emboli without acute cor pulmonale (HCC)    Palliative care by specialist    Goals of care, counseling/discussion    Encephalopathy    Goals of care: ongoing   Awaiting to hear back from daughter  Pt does not demonstrate the capacity for complex medical decision making at this time  Code Status: Full Code    Discussed today's visit with RN and PA.    Palliative Care Follow Up: Palliative care team will Continue to follow while inpatient.  Palliative care follow up outpatient: is indicated but not currently enrolled in palliative care program.    Thank you for allowing Palliative Care services to participate in the care of Yin Butcher.    A total of 25 minutes were spent on this consult, which included all of the following: chart review, direct face to face contact, history taking, physical examination, counseling and coordinating care, and documentation.     Kathy Bourne MD  1/24/2024  11:14 AM  Palliative Care Services    The 21st  Century Cures Act makes medical notes like these available to patients in the interest of transparency. Please be advised this is a medical document. Medical documents are intended to carry relevant information, facts as evident, and the clinical opinion of the practitioner. The medical note is intended as peer to peer communication and may appear blunt or direct. It is written in medical language and may contain abbreviations or verbiage that are unfamiliar.      Addendum: Spoke with daughter Jada by phone. Discussed current condition and provided brief updates. Jada and Prema (earlier in the admission) expressed that they would never want to be in a nursing home long-term, but are open to [facility]. We discussed hospital acquired debility, age, and comorbid medical conditions in relation to goals; daughter hopes that pt is able to return home and be back to her functional baseline. Expressed concern that if pt went into cardiac arrest or required intubation that Prema is unlikely to return to her baseline, even with intensive rehab and significant time. Jada states that she will speak with her fiance about the above. Emailed POLST for completeness and education.   She plans on being here tomorrow either from 10-11 AM or 3-4 PM. -team made aware.    An additional 35 minutes spent on encounter for a total of 60 minutes, >16 spent on ACP.   Kathy Bourne MD, 01/24/24, 4:48 PM

## 2024-01-24 NOTE — PHYSICAL THERAPY NOTE
Following for PT this date.  Pt has remained lethargic and asleep, not appropriate for PT.  D/w RN.  Will f/u at a later date.

## 2024-01-24 NOTE — PROGRESS NOTES
01/24/24 0545   BiPAP   $ RT Standby Charge (per 15 min) 1   Device V60   BiPAP / CPAP CE# v60-09   BiPAP bacteria filter Yes   BiPAP Pre-use check ok? Yes   BIPAP plugged into main power? Yes   Mode AVAPS   Interface Full face mask   Mask Size Medium   Control Settings   Oxygen Percent 40 %   Inspiratory time 0.9   Insp rise time 4   AVAPS   Min IPAP 25   Max IPAP 35   EPAP Level 5   Set rate 18   Tidal volume 450   BiPAP/CPAP Alarm Settings   Hi Rate 50   Low Rate 10   Hi VT 1200   Low    Hi Pressure 40   Low Pressure 8   Low Pressure Delay 20   Low MV 2   BiPAP/CPAP Monitored Parameters   Pulse 72   SpO2 100 %   PIP 24   Total Rate 18 breaths/min   Minute Volume 9   Tidal Volume 502   Total Leak 15   Trigger % 19   Ti/Ttot % 27   Toleration Well     Received pt on AVAPS, tolerating fairly well. Repeat ABG drawn, results relayed to APN. EPAP decreased from 10 to 5. Will continue to monitor.

## 2024-01-24 NOTE — PLAN OF CARE
Received bedside report around 0715. Pt is alert x1-2 with many episodes of hallucinations that include her parents and daughter being in the room. The pt also was observed attempting to \"invisible food\" into her mouth. She attempted to bite her fingers thinking that they had food in them. RN redirected and pt remained safe and complied with redirection. Pt was able to be weaned off pressors after 1u of PRBCs.LUE was monitored for increased bruising. APN notified and updated on care. Night Nurse endorsed. Family did not show/did not  the phone for updated plan of care. Mds are aware and night shift was endorsed this as well.

## 2024-01-24 NOTE — PROGRESS NOTES
01/23/24 2212   BiPAP   Device V60   BiPAP / CPAP CE# 9   BiPAP bacteria filter Yes   BiPAP Pre-use check ok? Yes   BIPAP plugged into main power? Yes   Mode AVAPS   Interface Full face mask   Mask Size Medium   AVAPS   Min IPAP 25   Max IPAP 35   EPAP Level 10   Set rate 18   Tidal volume 450   SIPAP   FiO2 (%) 30 %     Patient on AVAPS with above settings. Patient tolerating well.     Shawnee Guy, RRT

## 2024-01-24 NOTE — PROGRESS NOTES
Cardiology Progress Note        Yin Butcher Patient Status:  Inpatient    1931 MRN [id number]   [facility] 4SW-A Attending Skyla Tinsley MD   Hosp Day # 3 PCP Mina Walker MD     Subjective:  MS waxes and wanes.  Off pressors.  BP's remain soft, on levo.  Medications:   midodrine  10 mg Oral Q8H VICTORIANO    multivitamin  1 tablet Oral Daily    iron sucrose  200 mg Intravenous Daily       Continuous Infusions:   norepinephrine Stopped (24 1501)    continuous dose heparin 1,000 Units/hr (24 2306)         Allergies:  Allergies   Allergen Reactions    Erythromycin OTHER (SEE COMMENTS)    Oxycodone HALLUCINATION    Gabapentin NAUSEA ONLY and OTHER (SEE COMMENTS)     Pt stated she didn't feel like herself     Tizanidine ITCHING and OTHER (SEE COMMENTS)     Pt states burning sensation          Intake/Output:    Intake/Output Summary (Last 24 hours) at 2024 0732  Last data filed at 2024 0600  Gross per 24 hour   Intake 995.97 ml   Output 450 ml   Net 545.97 ml           Last 3 Weights   24 0000 115 lb 11.9 oz (52.5 kg)   24 0000 113 lb 12.1 oz (51.6 kg)   24 0007 109 lb 2 oz (49.5 kg)   24 2143 105 lb 13.1 oz (48 kg)   10/29/23 0600 104 lb 11.5 oz (47.5 kg)   10/24/23 0457 114 lb 3.2 oz (51.8 kg)   10/23/23 0340 92 lb 9.5 oz (42 kg)   10/22/23 2033 92 lb 9.5 oz (42 kg)   09/10/23 1203 92 lb 14.4 oz (42.1 kg)   23 0746 105 lb 12.8 oz (48 kg)   23 2218 106 lb (48.1 kg)            Physical Exam:    Temp:  [97.4 °F (36.3 °C)-98.2 °F (36.8 °C)] 97.8 °F (36.6 °C)  Pulse:  [70-97] 74  Resp:  [12-40] 18  BP: ()/(41-99) 90/51  SpO2:  [81 %-100 %] 100 %  FiO2 (%):  [30 %] 30 %    General: No apparent distress  HEENT: No focal deficits.  Neck: supple. JVP normal  Cardiac: Regular rhythm, S1, S2 normal,  rub or gallop.  No murmur.  Lungs: CTA  Abdomen: Soft, non-tender.   Extremities: No edema  Neurologic: no focal deficits  Skin: Warm and  dry.     Telemetry: fib    EKG:      Echo:      Cardiac Cath:      Labs:  HEM:  Recent Labs   Lab 01/21/24 0441 01/21/24 1933 01/22/24 0224 01/23/24  0506 01/23/24  1527 01/24/24  0434   WBC 10.2 13.0* 15.5* 14.0*  --  11.8*   HGB 7.8* 7.0* 7.1* 7.2* 8.6* 8.1*   .0 255.0 262.0 247.0  --  199.0       Chem:  Recent Labs   Lab 01/20/24 2159 01/21/24 0441 01/22/24 0224 01/23/24  0506 01/24/24  0434    135* 138 138 134*   K 5.0 4.6 3.7 3.3*  3.3* 3.9  3.9    99 102 103 103   CO2 30.0 27.0 32.0 34.0* 34.0*   BUN 34* 40* 45* 30* 25*   CREATSERUM 1.16* 1.35* 1.28* 0.86 0.48*   CA 8.5 8.2* 8.2* 8.3* 8.3*   MG  --  1.8 2.2  --   --    PHOS  --  6.1*  --   --   --    * 101* 111* 125* 105*       Recent Labs   Lab 01/20/24 2159 01/21/24 0441 01/22/24 0224 01/23/24  0506 01/24/24  0434   AST 43* 86* 70* 34 17   ALB 2.9* 2.7* 2.8* 2.6* 2.8*       Recent Labs   Lab 01/20/24 2159   CK 32       Recent Labs   Lab 01/21/24 0441 01/21/24 1933   PTP 19.6* 21.4*   INR 1.64* 1.84*                 Impression:  Pulmonary emboli - on heparin.  Echo w/o RV strain.  Chronic afib - rate controlled.  Hypoxic resp failure-  preserved EF on echo.  Right pleural effusion  AMS with underlying dementia  Hypotension - off levo.  On midodrine at home.  Anemia, Hb stable, in 7's.          Plan:  Goals of care discussions ongoing.  Poor prognosis.  May need right thora.  CXR reviewed.  Resumed midodrine at 10 mg TID.  Change to DOAC if can swallow reliably.  Eliquis as PO intake poor.          Satya Baker MD  L3

## 2024-01-24 NOTE — PROGRESS NOTES
[facility]     Hospitalist Progress Note     Yin Butcher Patient Status:  Inpatient    1931 MRN [id number]   Location [facility] 4SW-A Attending Brooke Tracy MD   Hosp Day # 3 PCP Mina Walker MD     Chief Complaint: unresponsive    Subjective:     Patient is confused    Objective:    Review of Systems:   A comprehensive review of systems was not completed    Vital signs:  Temp:  [97.4 °F (36.3 °C)-98.3 °F (36.8 °C)] 98.3 °F (36.8 °C)  Pulse:  [72-97] 89  Resp:  [12-29] 20  BP: ()/(48-99) 91/55  SpO2:  [91 %-100 %] 99 %  FiO2 (%):  [30 %-40 %] 40 %    Physical Exam:    General: No acute distress  Respiratory: No wheezes, no rhonchi  Cardiovascular: S1, S2, regular rate and rhythm  Abdomen: Soft, Non-tender, non-distended, positive bowel sounds  Neuro: No new focal deficits.   Extremities: No edema      Diagnostic Data:    Labs:  Recent Labs   Lab 24  0506 24  1527 24  0434   WBC 10.2 13.0* 15.5* 14.0*  --  11.8*   HGB 7.8* 7.0* 7.1* 7.2* 8.6* 8.1*   MCV 85.8 78.5* 80.2 82.6  --  85.4   .0 255.0 262.0 247.0  --  199.0   INR 1.64* 1.84*  --   --   --   --        Recent Labs   Lab 24  0506 24  0434   * 125* 105*   BUN 45* 30* 25*   CREATSERUM 1.28* 0.86 0.48*   CA 8.2* 8.3* 8.3*   ALB 2.8* 2.6* 2.8*    138 134*   K 3.7 3.3*  3.3* 3.9  3.9    103 103   CO2 32.0 34.0* 34.0*   ALKPHO 68 79 77   AST 70* 34 17   BILT 1.4 1.1 1.5   TP 6.4 6.4 6.0*       Estimated Creatinine Clearance: 62 mL/min (A) (based on SCr of 0.48 mg/dL (L)).    Recent Labs   Lab 24  2159   TROPHS 25   CK 32       Recent Labs   Lab 24  0441 24  1933   PTP 19.6* 21.4*   INR 1.64* 1.84*                  Microbiology    Hospital Encounter on 24   1. Blood Culture     Status: None (Preliminary result)    Collection Time: 24  9:59 PM    Specimen: Blood,peripheral   Result Value Ref  Range    Blood Culture Result No Growth 3 Days N/A         Imaging: Reviewed in Epic.    Medications:    midodrine  10 mg Oral Q8H VICTORIANO    multivitamin  1 tablet Oral Daily    iron sucrose  200 mg Intravenous Daily       Assessment & Plan:      #avap at night  #confusion  #ac hypoxic resp failure  #shock and diastolic dysfct on Midodrine  #PE  #b/l pleural effusion  #FLORESITA  #Chronic afib      Brooke Tracy MD    Supplementary Documentation:     Quality:  DVT Mechanical Prophylaxis:   SCDs,    DVT Pharmacologic Prophylaxis   Medication    heparin (Porcine) 72827 units/250mL infusion CONTINUOUS                Code Status: Full Code  Bowie: External urinary catheter in place  Bowie Duration (in days):   Central line:    ELMO:     Discharge is dependent on: progress  At this point Ms. Butcher is expected to be discharge to: home    The 21st Century Cures Act makes medical notes like these available to patients in the interest of transparency. Please be advised this is a medical document. Medical documents are intended to carry relevant information, facts as evident, and the clinical opinion of the practitioner. The medical note is intended as peer to peer communication and may appear blunt or direct. It is written in medical language and may contain abbreviations or verbiage that are unfamiliar.     Dietitian Malnutrition Assessment        Evaluation for Malnutrition: Criteria for severe malnutrition diagnosis- chronic illness related to   Body fat severe depletion., Muscle mass severe depletion.               RD Malnutrition Care Plan:      Body Fat/Muscle Mass:          Physician Assessment    Patient has a diagnosis of severe malnutrition

## 2024-01-24 NOTE — CM/SW NOTE
01/23/24 1800   CM/SW Referral Data   Referral Source Social Work (self-referral)   Reason for Referral Discharge planning   Informant EMR;Patient   Medical Hx   Does patient have an established PCP? Yes   Significant Past Medical/Mental Health Hx Afib, Cancer, Arthritis, Osteoporosis, back pain   Patient Info   Advanced directives? Yes  (Pt's Dtr Jada is HCPMARINA)   Patient's Current Mental Status at Time of Assessment Alert;Memory Impairments   Patient's Home Environment House   Patient lives with Daughter  (Jada)   Patient Status Prior to Admission   Independent with ADLs and Mobility No   Pt. requires assistance with Housework;Driving;Meals;Bathing;Ambulating;Dressing;Medications;Toileting;Finances   Discharge Needs   Anticipated D/C needs Subacute rehab   Services Requested   PASRR Level 1 Submitted Yes     Met w/ pt to discuss DC planning. Pt awake, alert. Provides some accurate information but some that is not matching with known history. Pt reports she lives with her dtr Jada, but also reports living with both of her parents. At time of assessment, pt was receiving pain medications for her left arm pain. Pt w/ noted swelling and bruising to left arm. When asked what happened to her arm, pt states \"I don't know, I wasn't there when it happened.\"     Discussed w/ pt that PT is recommending [facility] for her at [place]. She reports \"I have been there, I don't want to go back.\" Discussed facilities pt has been to in the past per chart review. Referrals sent to [facility] and additional facilities. PASRR completed and added to [facility] referral. Asked pt if I could contact her dtr and HCPOA Jada to discuss DC planning. Pt reports \"I will call her myself.\" Assisted pt with dialing her dtr's number but pt accurately recalled her phone number.     Call received from APS  Yareli, She asked that CM/SW provide her with DC disposition once available to allow for follow up. [phone]     CM/SW will remain available for DC  planning and/or support.     GOLDEN ProN, CMSRN    [id number]

## 2024-01-24 NOTE — DIETARY NOTE
[facility]   CLINICAL NUTRITION    Calorie Count Results:  1/23:  B- 400 kcal, 13 gm pro  L- 0 kcal, 0 gm pro  D- 453 kcal, 20 gm pro    1/24:  B- 0 kcal, 0 gm pro  L- 407 kcal, 10 gm pro  D- pending    1/25:  B- pending  L- pending  D- pending    Avg per meal: 252 kcal and 9 gm protein (meeting ~48% kcal and 43% protein needs) so far.    Will continue to monitor and follow up as appropriate.    Kathryn Mora RD, LDN, [facility]  Clinical Dietitian  Spectra: [phone]

## 2024-01-24 NOTE — CDS QUERY
CLINICAL DOCUMENTATION CLARIFICATION FORM    Dear Dr. Tracy ,  Clinical information (provided below) includes a diagnosis of Sepsis documented starting on 1/20/24, but is not consistently noted in subsequent documentation.     Based on your medical judgment, and the clinical indicators included below, can you please clarify the diagnosis of Sepsis:     (  x ) Sepsis diagnosis was ruled out  (   ) Sepsis due to ____________was present and is now resolved   (   ) Other: _________________    Documentation from the Medical Record:   Possible Risk: Afib, Arthritis, Cancer, and Personal history of antineoplastic chemotherapy.    Clinical Indicators:  -1/20/24 H&P: Assessment & Plan: #Unresponsive, # Hypothermic, # Hypotensive on pressors  -elizabeth kincaid  -rule out sepsis    -1/21/24 Pulmonologist: Assessment/Plan:  Sepsis/ lactic acidosis    -1/21/24 Hospitalist: Assessment & Plan: #Shock, hypovolemic vs septic vs cardiogenic    -1/24 Pulmonologist: - CTA with acute bilateral PE, bilateral pleural effusions, less likely PNA.  Repeat CXR worsening; would benefit from thoracentesis to facilitate weaning off  AVAPS  - S/p Zosyn (1/20-1/22); Will monitor off antibiotics now    -1/24 Hospitalist: Assessment & Plan: #avap at night  #confusion  #ac hypoxic resp failure  #shock and diastolic dysfct on Midodrine  #PE  #b/l pleural effusion  #FLORESITA  #Chronic afib      Treatment: - S/p Zosyn (1/20-1/22) -Pulmonologist consult - Cardiology consult -Vasopressors - RVP - MRSA culture - Sputum culture pending collection        For questions regarding this query, please contact Clinical : Erma Ramirez [phone]  THIS FORM IS A PERMANENT PART OF THE MEDICAL RECORD

## 2024-01-24 NOTE — RESPIRATORY THERAPY NOTE
Sharad ABG on patient @1800. Results pH: 7.33/ CO2: 62/ PaO2: 53/ HCO3: 29.1. Suspect sample is actually venous instead of arterial. Spoke to APRN and another sample to be drawn later.       Jcarlos Romero, [place], RRT

## 2024-01-24 NOTE — PROGRESS NOTES
[facility]    Yin Butcher Patient Status:  Inpatient    1931 MRN [id number]   [facility] 4SW-A Attending Brooke Tracy MD   Hosp Day # 3 PCP Mina Walker MD     Critical Care Progress Note     Date of Admission: 2024  9:42 PM  Admission Diagnosis: Hyperkalemia [E87.5]  Somnolence [R40.0]  Respiratory acidosis [E87.29]  Pleural effusion [J90]  Acute respiratory failure with hypoxia (HCC) [J96.01]  Acute on chronic congestive heart failure, unspecified heart failure type (HCC) [I50.9]     S: remains on low dose levophed      Current Medications:    Current Facility-Administered Medications:     midodrine (ProAmatine) tab 10 mg, 10 mg, Oral, Q8H VICTORIANO    multivitamin (Centrum) chewable tab (Adult) 1 tablet, 1 tablet, Oral, Daily    iron sucrose (Venofer) 20 mg/mL injection 200 mg, 200 mg, Intravenous, Daily    metoclopramide (Reglan) 5 mg/mL injection 5 mg, 5 mg, Intravenous, Q8H PRN    norepinephrine (Levophed) 4 mg/250mL infusion premix, 0.5-30 mcg/min, Intravenous, Continuous PRN    ondansetron (Zofran) 4 MG/2ML injection 4 mg, 4 mg, Intravenous, Q6H PRN    glucose (Dex4) 15 GM/59ML oral liquid 15 g, 15 g, Oral, Q15 Min PRN **OR** glucose (Glutose) 40% oral gel 15 g, 15 g, Oral, Q15 Min PRN **OR** glucose-vitamin C (Dex-4) chewable tab 4 tablet, 4 tablet, Oral, Q15 Min PRN **OR** dextrose 50% injection 50 mL, 50 mL, Intravenous, Q15 Min PRN **OR** glucose (Dex4) 15 GM/59ML oral liquid 30 g, 30 g, Oral, Q15 Min PRN **OR** glucose (Glutose) 40% oral gel 30 g, 30 g, Oral, Q15 Min PRN **OR** glucose-vitamin C (Dex-4) chewable tab 8 tablet, 8 tablet, Oral, Q15 Min PRN    acetaminophen (Tylenol Extra Strength) tab 500 mg, 500 mg, Oral, Q4H PRN    heparin (Porcine) 02405 units/250mL infusion CONTINUOUS, 200-3,000 Units/hr, Intravenous, Continuous    acetaminophen (Ofirmev) 10 mg/mL infusion premix 750 mg, 15 mg/kg, Intravenous, Q6H PRN    morphINE PF 2 MG/ML injection 0.5 mg, 0.5 mg,  Intravenous, Q4H PRN     OBJECTIVE:  BP 90/51   Pulse 74   Temp 97.8 °F (36.6 °C) (Temporal)   Resp 18   Ht 170.2 cm (5' 7\")   Wt 115 lb 11.9 oz (52.5 kg)   SpO2 100%   BMI 18.13 kg/m²   On AVAPS 40%      Wt Readings from Last 3 Encounters:   01/23/24 115 lb 11.9 oz (52.5 kg)   10/29/23 104 lb 11.5 oz (47.5 kg)   09/10/23 92 lb 14.4 oz (42.1 kg)        I/O last 3 completed shifts:  In: 2352 [P.O.:560; I.V.:1285.3; Blood:256.7; IV PIGGYBACK:250]  Out: 950 [Urine:950]  No intake/output data recorded.     General appearance: appears stated age, slowed mentation, and on AvAPS  Lungs: diminished breath sounds bilaterally  Heart: irregularly irregular rhythm  Abdomen: soft, non-tender; bowel sounds normal; no masses,  no organomegaly  Extremities: edema trace     Lab Results   Component Value Date    WBC 11.8 01/24/2024    RBC 3.16 01/24/2024    HGB 8.1 01/24/2024    HCT 27.0 01/24/2024    MCV 85.4 01/24/2024    MCH 25.6 01/24/2024    MCHC 30.0 01/24/2024    RDW 18.3 01/24/2024    .0 01/24/2024     Lab Results   Component Value Date     01/24/2024    K 3.9 01/24/2024    K 3.9 01/24/2024     01/24/2024    CO2 34.0 01/24/2024    BUN 25 01/24/2024    CREATSERUM 0.48 01/24/2024     01/24/2024    CA 8.3 01/24/2024    ALKPHO 77 01/24/2024    ALT  01/24/2024      Comment:      Due to  backorder we are temporarily unable to offer hospital-based ALT testing at [facility].   If urgently needed, please order ALT test code [id number].   The new order will need a new venipuncture and will be sent to [facility] for testing.   The expected turnaround time will be within 24 hours.     AST 17 01/24/2024    BILT 1.5 01/24/2024    ALB 2.8 01/24/2024    TP 6.0 01/24/2024     Lab Results   Component Value Date    PT 15.5 (H) 07/13/2014    PT 14.4 (H) 07/11/2014    INR 1.84 (H) 01/21/2024    INR 1.64 (H) 01/21/2024    INR 1.22 (H) 10/27/2023        Recent Labs   Lab 01/23/24  2359   ABGPHT 7.31*    UBRIBF5L 66*   ANIRQ0U 88   ABGHCO3 29.4*   ABGBE 5.8*   TEMP 98.6   DAVID Positive   SITE Right Radial   DEV Bi-PAP   THGB 8.6*       Imaging: CTA reviewed. Per EMR  CXR shows worsening effusions R>L     Assessment/Plan:  Shock- Hypovolemic vs cardiogenic  - IVF discontinued with concern for volume overload  - TTE LVEF 65-70% with component of diastolic dysfunction  - Vasopressors to Maintain MAP >65, currently on levophed.  - has chronic hypotension; on home midodrine 10mg q8h- will tolerate MAP 55  - Home BB on hold  - AM cortisol normal  - May need to consider arterial line/central line placement if unable to wean vasopressors.     Acute hypoxic and hypercapnic respiratory failure- likely multifactorial  - cont with AVAPS for now.  OK to come off while eating for now  - CTA with acute bilateral PE, bilateral pleural effusions, less likely PNA.  Repeat CXR worsening; would benefit from thoracentesis to facilitate weaning off AVAPS  - RVP negative  - MRSA negative  - Sputum culture pending collection  - S/p Zosyn (1/20-1/22); Will monitor off antibiotics now     Acute bilateral PE  - LE doppler negative  - TTE without right heart strain  - Heparin drip- transition to DOAC after any procedures needed are performed     Bilateral pleural effusions  - Recurrent, likely due to diastolic dysfunction  - R effusion is partially loculated; concern for other possible etiologies including malignancy; will pursue thoracentesis  - Diurese as able- limited by hypotension; may need to use albumin in combination     FLORESITA- resolved  - 2/2 to poor PO intake  - back to baseline 0.5  - off IVF  - Avoid nephrotoxins  - Strict I/O    HFpEF  - TTE 65-70%, likely diastolic dysfunction  - Diurese as able  - Will stop IVF  - Cardiology following     Chronic Afib  - Not on anticoagulation at home  - Home meds on hold (BB, dig, lasix)     Acute Encephalopathy with underlying dementia  - Baseline unclear  - CT brain negative for acute  process  - U/A bland  - Ammonia normal  - Monitor     Hypokalemia  - Replete     Anemia, likely chronic- normocytic in nature  - No s/s of bleeding  - iron studies show component of iron deficiency- likely poor po intake  - folate low- replaced, B12 wnl  - Monitor- no signs of active bleeding     F/E/N:    - ADAT- poor PO intake. Will place on a calorie count and consult dietician for supplements. May need to consider DHT/TF if no improvement.  - Replete electrolytes per protocol  - IVF     Proph:  - Heparin drip  - SCDs  - PT/OT     Wounds present on heal, was there on admission  - Wound care following     Dispo: Full code listed but not appropriate given multiple comorbidities including dementia and advanced age.    - Came from home with poor living conditions. SW/CM involvement appreciated.  - Palliative care following for ongoing goals of care.    Critical Care Time: 45 minutes    Teofilo Cordero MD  1/24/2024  8:09 AM

## 2024-01-25 PROBLEM — K43.9 VENTRAL HERNIA WITHOUT OBSTRUCTION OR GANGRENE: Status: ACTIVE | Noted: 2024-01-01

## 2024-01-25 PROBLEM — K43.9 VENTRAL HERNIA WITHOUT OBSTRUCTION OR GANGRENE: Status: ACTIVE | Noted: 2024-01-25

## 2024-01-25 LAB
ALBUMIN SERPL-MCNC: 2.6 G/DL (ref 3.4–5)
ALBUMIN/GLOB SERPL: 0.8 {RATIO} (ref 1–2)
ALP LIVER SERPL-CCNC: 80 U/L
ANION GAP SERPL CALC-SCNC: 2 MMOL/L (ref 0–18)
APTT PPP: 78.6 SECONDS (ref 23.3–35.6)
AST SERPL-CCNC: 18 U/L (ref 15–37)
BASOPHILS # BLD AUTO: 0.01 X10(3) UL (ref 0–0.2)
BASOPHILS NFR BLD AUTO: 0.1 %
BILIRUB SERPL-MCNC: 1.2 MG/DL (ref 0.1–2)
BUN BLD-MCNC: 21 MG/DL (ref 9–23)
CALCIUM BLD-MCNC: 8.7 MG/DL (ref 8.5–10.1)
CHLORIDE SERPL-SCNC: 103 MMOL/L (ref 98–112)
CO2 SERPL-SCNC: 34 MMOL/L (ref 21–32)
CREAT BLD-MCNC: 0.49 MG/DL
EGFRCR SERPLBLD CKD-EPI 2021: 88 ML/MIN/1.73M2 (ref 60–?)
EOSINOPHIL # BLD AUTO: 0 X10(3) UL (ref 0–0.7)
EOSINOPHIL NFR BLD AUTO: 0 %
ERYTHROCYTE [DISTWIDTH] IN BLOOD BY AUTOMATED COUNT: 19.2 %
GLOBULIN PLAS-MCNC: 3.1 G/DL (ref 2.8–4.4)
GLUCOSE BLD-MCNC: 105 MG/DL (ref 70–99)
GLUCOSE BLD-MCNC: 78 MG/DL (ref 70–99)
HCT VFR BLD AUTO: 25.6 %
HGB BLD-MCNC: 7.8 G/DL
IMM GRANULOCYTES # BLD AUTO: 0.07 X10(3) UL (ref 0–1)
IMM GRANULOCYTES NFR BLD: 0.8 %
LYMPHOCYTES # BLD AUTO: 0.49 X10(3) UL (ref 1–4)
LYMPHOCYTES NFR BLD AUTO: 5.6 %
MCH RBC QN AUTO: 25.7 PG (ref 26–34)
MCHC RBC AUTO-ENTMCNC: 30.5 G/DL (ref 31–37)
MCV RBC AUTO: 84.2 FL
MONOCYTES # BLD AUTO: 1.05 X10(3) UL (ref 0.1–1)
MONOCYTES NFR BLD AUTO: 11.9 %
NEUTROPHILS # BLD AUTO: 7.19 X10 (3) UL (ref 1.5–7.7)
NEUTROPHILS # BLD AUTO: 7.19 X10(3) UL (ref 1.5–7.7)
NEUTROPHILS NFR BLD AUTO: 81.6 %
OSMOLALITY SERPL CALC.SUM OF ELEC: 290 MOSM/KG (ref 275–295)
PLATELET # BLD AUTO: 185 10(3)UL (ref 150–450)
POTASSIUM SERPL-SCNC: 4 MMOL/L (ref 3.5–5.1)
PROT SERPL-MCNC: 5.7 G/DL (ref 6.4–8.2)
RBC # BLD AUTO: 3.04 X10(6)UL
SODIUM SERPL-SCNC: 139 MMOL/L (ref 136–145)
WBC # BLD AUTO: 8.8 X10(3) UL (ref 4–11)

## 2024-01-25 PROCEDURE — 99233 SBSQ HOSP IP/OBS HIGH 50: CPT | Performed by: HOSPITALIST

## 2024-01-25 PROCEDURE — 99233 SBSQ HOSP IP/OBS HIGH 50: CPT | Performed by: INTERNAL MEDICINE

## 2024-01-25 PROCEDURE — 99232 SBSQ HOSP IP/OBS MODERATE 35: CPT | Performed by: STUDENT IN AN ORGANIZED HEALTH CARE EDUCATION/TRAINING PROGRAM

## 2024-01-25 PROCEDURE — 99222 1ST HOSP IP/OBS MODERATE 55: CPT | Performed by: STUDENT IN AN ORGANIZED HEALTH CARE EDUCATION/TRAINING PROGRAM

## 2024-01-25 RX ORDER — POLYETHYLENE GLYCOL 3350 17 G/17G
17 POWDER, FOR SOLUTION ORAL DAILY PRN
Status: DISCONTINUED | OUTPATIENT
Start: 2024-01-25 | End: 2024-02-02

## 2024-01-25 RX ORDER — BISACODYL 10 MG
10 SUPPOSITORY, RECTAL RECTAL
Status: DISCONTINUED | OUTPATIENT
Start: 2024-01-25 | End: 2024-02-02

## 2024-01-25 RX ORDER — DIGOXIN 125 MCG
125 TABLET ORAL
Status: DISCONTINUED | OUTPATIENT
Start: 2024-01-26 | End: 2024-02-02

## 2024-01-25 RX ORDER — DOCUSATE SODIUM 100 MG/1
100 CAPSULE, LIQUID FILLED ORAL 2 TIMES DAILY
Status: DISCONTINUED | OUTPATIENT
Start: 2024-01-25 | End: 2024-01-27

## 2024-01-25 RX ORDER — ENEMA 19; 7 G/133ML; G/133ML
1 ENEMA RECTAL ONCE AS NEEDED
Status: DISCONTINUED | OUTPATIENT
Start: 2024-01-25 | End: 2024-02-02

## 2024-01-25 NOTE — SLP NOTE
SPEECH DAILY NOTE - INPATIENT    ASSESSMENT & PLAN   ASSESSMENT  Pt seen for dysphagia tx to assess tolerance with recommended diet, ensure proper utilization of aspiration precautions and provide pt/family education.  Patient sat at edge of bed with assistance from PT. PO trials of thin and mildly thick liquids provided. Delayed cough noted x2 with thin liquid trials. No overt s/s of aspiration observed with mildly thick liquids. Recommend patient continue a regular diet and mildly thick liquids with general safe swallowing precautions. Suspect this may be patient's baseline diet, but difficult to obtain history. SLP will continue to follow per POC.    Diet Recommendations - Solids: Regular  Diet Recommendations - Liquids: Nectar thick liquids/ Mildly thick    Compensatory Strategies Recommended: Small bites and sips  Aspiration Precautions: Upright position  Medication Administration Recommendations: One pill at a time                     Treatment Plan  Treatment Plan/Recommendations: Aspiration precautions    Interdisciplinary Communication: NA            GOALS  Goal #1 The patient will tolerate regular consistency and mildly thick liquids without overt signs or symptoms of aspiration with 95 % accuracy over 1-2 session(s).  In Progress   Goal #2 The patient/family/caregiver will demonstrate understanding and implementation of aspiration precautions and swallow strategies independently over 1-2 session(s).     In Progress   Goal #3       Goal #4       Goal #5       Goal #6       Goal #7       Goal #8          FOLLOW UP  Follow Up Needed (Documentation Required): Yes  SLP Follow-up Date: 01/29/24       Session: 2    If you have any questions, please contact ADELAIDE Womack

## 2024-01-25 NOTE — DIETARY MALNUTRITION NOTE
[facility]  NUTRITION ASSESSMENT    Pt meets severe malnutrition criteria at this time.    CRITERIA FOR MALNUTRITION DIAGNOSIS:  Criteria for severe malnutrition diagnosis: chronic illness related to body fat severe depletion and muscle mass severe depletion    NUTRITION INTERVENTION:    Enteral Nutrition - Recommend starting Jevity 1.5 at 25 mL/hr advancing 10 mL/hr q 6 hours to goal rate of 45 mL/hr.   This will provide 1620 kcal, 69 grams protein, 820 mL total free water, and 100% of RDI's.   Recommend 140 mL water flush q 4 hours, TF+FWF provides 1660 mL total fluids.    PATIENT STATUS:   1/25: RD consulted for TF as pt unable to take adequate po. Recs as per above. Only bites noted on calorie count. AVAPs and HFNC. Pt lethargic. Will follow and support as able.     1/23: Pt weaned off Bipap yesterday. SLP evaluated and recommends regular solids and nectar thick liquids. Visited pt at bedside. Pt is awake but not answering questions appropriately. Per RN, pt ate some [place]e toast and 100% of Magic Cup for bfast. RN reports pt taking liquids better than solids; will also add Nepro. No GI symptoms noted at this time but last BM PTA. Calorie count ordered. Palliative care involved for GOC discussions. Will continue to monitor and follow up as appropriate.     1/21: 89 year old female admitted on 1/20 presents with AMS, hypotensive, hypoxic and hypothermic. Pt screened d/t MST score 2. Attempted to visit at bedside but pt is somnolent on AVAPS - unable to provide hx. Pt hypotensive requiring pressors. SLP consulted for swallow eval but currently not appropriate while on AVAPS. No GI symptoms noted and NKFA. No significant wt changes noted per chart review but pt with low BMI. Palliative care consulted for GOC discussion. RD to remain available for recommendations as warranted pending GOC.    PMH:  has a past medical history of Arrhythmia, Arthritis, Back problem, Cancer (HCC), Cataract, Osteoporosis, and  Personal history of antineoplastic chemotherapy.    ANTHROPOMETRICS:  Ht:  5'7\"  Wt: 52.5 kg (115 lb 11.9 oz).   BMI: Body mass index is 18.13 kg/m².  IBW: 61.4 kg    WEIGHT HISTORY: Per chart, pt with ~9 lb wt loss x 10 months (8%, not significant per standards).  Patient Weight(s) for the past 336 hrs:   Weight   01/23/24 0000 52.5 kg (115 lb 11.9 oz)   01/22/24 0000 51.6 kg (113 lb 12.1 oz)   01/21/24 0007 49.5 kg (109 lb 2 oz)   01/20/24 2143 48 kg (105 lb 13.1 oz)       Wt Readings from Last 10 Encounters:   01/23/24 52.5 kg (115 lb 11.9 oz)   10/29/23 47.5 kg (104 lb 11.5 oz)   09/10/23 42.1 kg (92 lb 14.4 oz)   04/13/23 53.5 kg (118 lb)   04/10/23 53.5 kg (118 lb)   04/03/23 53.5 kg (118 lb)   03/30/23 53.5 kg (118 lb)   03/27/23 53.3 kg (117 lb 9.6 oz)   03/22/23 64 kg (141 lb)   12/05/22 63.5 kg (140 lb)        NUTRITION:  Diet:       Procedures    Regular/General diet Fluid Consistency: Nectar Thick / Mildly Thick Liquids; Is Patient on Accuchecks? Yes; Is Patient on Suicide Precautions? No        Percent Meals Eaten (last 3 days)       Date/Time Percent Meals Eaten (%)    01/22/24 0800 0 %    01/22/24 1200 0 %     Percent Meals Eaten (%): pt states she only eats chocolate. will reattempt ordering at later time. per daughter, pt eat a lot of ice cream and cake at home. at 01/22/24 1200    01/22/24 1600 0 %     Percent Meals Eaten (%): ordered pt strawberry yogurt/cake parfait at 01/22/24 1600    01/23/24 1100 25 %    01/23/24 1800 40 %            Food Allergies: No  Cultural/Ethnic/Moravian Preferences Addressed: Yes    GI SYSTEM REVIEW: WNL  Skin/Wounds: stage II pressure injury to medial back    NUTRITION RELATED PHYSICAL FINDINGS:     1. Body Fat/Muscle Mass: severe depletion body fat Orbital fat pad, Buccal fat pad, and Triceps and severe muscle depletion Temple region, Clavicle region, Shoulder/Acromion process, Scapula region, Thigh region, and Calf region     2. Fluid Accumulation: lower extremity  edema and b/l feet      NUTRITION PRESCRIPTION: 52.5 kg  Calories: 3927-1348 calories/day (30-35 kcal/kg)  Protein: 63-79 grams protein/day (1.2-1.5 gm/kg)  Fluid: ~1 ml/kcal or per MD discretion    NUTRITION DIAGNOSIS/PROBLEM:  Inadequate oral intake related to insufficient appetite resulting in inadequate nutrition intake as evidenced by documented/reported insufficient oral intake    MONITOR AND EVALUATE/NUTRITION GOALS:  PO intake of 75% of meals TID - Not met, Continues  PO intake of 75% of oral nutrition supplement/s - Not met, Continues  Weight stable within 1 to 2 lbs during admission - Ongoing  Start alternative nutrition in 24-48 hrs if diet is not able to advance- Resolved  Tolerate alternative nutrition at 100% of goal - New      MEDICATIONS:  Abx, MV    LABS:  reviewed    Pt is at High nutrition risk    Otilia Gates RD, LDN  Clinical Dietitian

## 2024-01-25 NOTE — PROGRESS NOTES
[facility]     Hospitalist Progress Note     Yin Butcher Patient Status:  Inpatient    1931 MRN [id number]   Location [facility] 4SW-A Attending Brooke Tracy MD   Hosp Day # 4 PCP Mina Walker MD     Chief Complaint: Shock     Subjective:  Patient fatigued/confused.   DHT placed yesterday and pt had CT a/p.  No BMs since admission per RN.  On O2 NC and on avaps overnight.     Objective:    Review of Systems:  comprehensive review of systems was completed; pertinent positive and negatives stated in subjective.    Vital signs:  Temp:  [97 °F (36.1 °C)-98.4 °F (36.9 °C)] 97.2 °F (36.2 °C)  Pulse:  [] 96  Resp:  [11-40] 11  BP: ()/(51-85) 113/65  SpO2:  [92 %-100 %] 95 %  FiO2 (%):  [40 %] 40 %    Physical Exam:    General: No acute distress, elderly lady 89 year old female   Respiratory: No wheezes, no rhonchi on O2 NC   Cardiovascular: S1, S2, irregular 80s +murmur  Abdomen: Soft, Non-tender, non-distended, +hernia  Neuro: moves all extremities. Pt confused.  Extremities: No edema, LUE ecchymoses/swelling      Diagnostic Data:    Labs:  Recent Labs   Lab 24  0224 24  0506 24  1527 24  0434 24  0806   WBC 10.2 13.0* 15.5* 14.0*  --  11.8* 8.8   HGB 7.8* 7.0* 7.1* 7.2* 8.6* 8.1* 7.8*   MCV 85.8 78.5* 80.2 82.6  --  85.4 84.2   .0 255.0 262.0 247.0  --  199.0 185.0   INR 1.64* 1.84*  --   --   --   --   --        Recent Labs   Lab 24  0506 24  0434 24  0806   * 105* 78   BUN 30* 25* 21   CREATSERUM 0.86 0.48* 0.49*   CA 8.3* 8.3* 8.7   ALB 2.6* 2.8* 2.6*    134* 139   K 3.3*  3.3* 3.9  3.9 4.0    103 103   CO2 34.0* 34.0* 34.0*   ALKPHO 79 77 80   AST 34 17 18   BILT 1.1 1.5 1.2   TP 6.4 6.0* 5.7*       Estimated Creatinine Clearance: 60.7 mL/min (A) (based on SCr of 0.49 mg/dL (L)).    Recent Labs   Lab 24  2159   TROPHS 25   CK 32       Recent Labs   Lab 24  [birth year]31  01/21/24  1933   PTP 19.6* 21.4*   INR 1.64* 1.84*          Microbiology    Hospital Encounter on 01/20/24   1. Blood Culture     Status: None (Preliminary result)    Collection Time: 01/20/24  9:59 PM    Specimen: Blood,peripheral   Result Value Ref Range    Blood Culture Result No Growth 4 Days N/A         Imaging: Reviewed in Epic.    Medications:    metoprolol tartrate  12.5 mg Oral 2x Daily(Beta Blocker)    [START ON 1/26/2024] digoxin  125 mcg Oral Q MWF    midodrine  10 mg Oral TID    multivitamin  1 tablet Oral Daily       Assessment & Plan:      #Shock, hypovolemic and or cardiogenic, resolved  Off levophed, on midodrine 10mg TID, and received PRBCs  Zosyn IV discontinued, monitor off abx, NGTD on blood CX    #Chronic HFpEF with acute exacerbation, Bilateral pleural effusions  Lasic IV as able  Cards following  Echo reviewed  CT reviewed  Prior bilateral thoras on in october    #Acute hypoxic resp failure due to acute PE with possible RV strain on CT and pleural effusion  Off BIPAP, O2 NC  Hep gtt   Doppler LE neg  RV strain on CT,  Echo normal RVSF    #Lactic acidosis due to above, trend    #Chronic Afib, controlled  Follow tele  Digoxin/lopressor per cards    #Anemia, folic/iron deficiency  given venofer, PRBCs   No black or bloody stools    #Hypothermia on admission, resolved  Coritsol level in AM    #Dementia w/ deconditioning  Poor living conditions PTA  CT brain chronic  PT recs [facility]    #Dysphagia  SLP following, required DHT    #Abdominal hernia w/ copious stool within hernia sace  No BM since admission  CT reviewed  Gen surgery eval    #Chronic heel ulcers, chronic back wound POA  wound care eval  mepilex over spine    #Hypokalemia replace per protocol    #LUE swelling/ecchymoses, prior IVs removed and no DVT on doppler    Case d/w pt, RN, MD.       SUSAN Wadsworth  2:38 PM     Supplementary Documentation:     Quality:  DVT Mechanical Prophylaxis:   SCDs,    DVT Pharmacologic Prophylaxis    Medication    heparin (Porcine) 06761 units/250mL infusion CONTINUOUS                Code Status: Full Code  [place]: External urinary catheter in place    The 21st Century Cures Act makes medical notes like these available to patients in the interest of transparency. Please be advised this is a medical document. Medical documents are intended to carry relevant information, facts as evident, and the clinical opinion of the practitioner. The medical note is intended as peer to peer communication and may appear blunt or direct. It is written in medical language and may contain abbreviations or verbiage that are unfamiliar.   Addendum:    Agree with above note.  Pt. Seen and examined.    Gen: NAD  CVS: s1s2  Resp: CTA  Abd: soft    #avap at night, shock etiology unclear- resolved, on Midodrine for now to reeval in am  #anemia s/p transfusion  #confusion at baseline  #ac hypoxic resp failure resolved  #shock and diastolic dysfct on Midodrine  #PE on hep iv  #b/l pleural effusion  #FLORESITA  #Chronic afib hr controlled  #SLP reeval, DHT meanwhile, tx for dysphagia  #Chronic heel ulcers, chronic back wound POA  wound care eval  mepilex over spine      Pt seen an examined independently. Chart reviewed.  Labs and imaging over the last 24 hours have been reviewed.  Note has been reviewed by me and modified as needed.  Exam and Impression/ Recs as noted above.  D/w staff and with pt  More than 50% of clinical time and 100% of the medical decision making performed by me.    Brooke Tracy MD

## 2024-01-25 NOTE — PHYSICAL THERAPY NOTE
PHYSICAL THERAPY TREATMENT NOTE - INPATIENT    Room Number: 455/455-A     Session: 1     Number of Visits to Meet Established Goals: 5    Presenting Problem: pleural effusion  Co-Morbidities : h/o Afib, chronic back pain  ASSESSMENT     Pt able to wake up upon sitting up at eob.  Pt alert while seated and following ~25% of verbal commands. Continue to rec BRDALY at dc to progress ind as able.  Will continue to follow while in house.      DISCHARGE RECOMMENDATIONS  PT Discharge Recommendations: Sub-acute rehabilitation     PLAN  PT Treatment Plan: Bed mobility;Body mechanics;Endurance;Energy conservation;Patient education;Family education;Gait training;Range of motion;Strengthening;Transfer training;Balance training  Rehab Potential : Good  Frequency (Obs):  (2-3x/week)    CURRENT GOALS     Goal #1 Patient is able to demonstrate supine - sit EOB @ level: supervision      Goal #2 Patient is able to demonstrate transfers EOB to/from Chair/Wheelchair at assistance level: min A      Goal #3        Goal #4     Goal #5     Goal #6     Goal Comments: Goals established on 1/22/2024 1/25/2024 all goals ongoing      SUBJECTIVE  \"Ya.\"   Pt mostly non-verbal, very soft spoken when attempting to speak.    OBJECTIVE  Precautions: Bed/chair alarm    WEIGHT BEARING RESTRICTION                   PAIN ASSESSMENT   Rating: Unable to rate  Location: R heel  Management Techniques: Relaxation;Repositioning;Activity promotion;Body mechanics    BALANCE                                                                                                                       Static Sitting: Fair +  Dynamic Sitting: Poor +           Static Standing: Not tested       ACTIVITY TOLERANCE                         O2 WALK         AM-PAC '6-Clicks' INPATIENT SHORT FORM - BASIC MOBILITY  How much difficulty does the patient currently have...  Patient Difficulty: Turning over in bed (including adjusting bedclothes, sheets and blankets)?: A Lot    Patient Difficulty: Sitting down on and standing up from a chair with arms (e.g., wheelchair, bedside commode, etc.): A Lot   Patient Difficulty: Moving from lying on back to sitting on the side of the bed?: Unable   How much help from another person does the patient currently need...   Help from Another: Moving to and from a bed to a chair (including a wheelchair)?: Total   Help from Another: Need to walk in hospital room?: Total   Help from Another: Climbing 3-5 steps with a railing?: Total       AM-PAC Score:  Raw Score: 8   Approx Degree of Impairment: 86.62%   Standardized Score (AM-PAC Scale): 28.58   CMS Modifier (G-Code): CM    FUNCTIONAL ABILITY STATUS  Gait Assessment        Skilled Therapy Provided    Bed Mobility:  Rolling: max A   Supine<>Sit: max A   Sit<>Supine: max A       Therapist's Comments: Pt tolerated sitting eob with vc's, no LOB.        THERAPEUTIC EXERCISES  Lower Extremity Alternating marching  Knee extension     Upper Extremity Elbow flex/ext     Position Sitting     Repetitions   10   Sets   1     Patient End of Session: Needs met;Call light within reach;RN aware of session/findings;All patient questions and concerns addressed;Alarm set;Discussed recommendations with /;Restraints;SCDs in place;In bed    PT Session Time: 12 minutes    Therapeutic Activity: 12 minutes

## 2024-01-25 NOTE — CONSULTS
[facility]  Report of Consultation    Yin Butcher Patient Status:  Inpatient    1931 MRN [id number]   Location [facility] 4SW-A Attending Brooke Tracy MD   Hosp Day # 4 PCP Mina Walker MD     Requesting Physician:  Aurelia Patino PA-C    Reason for Consultation:  Umbilical hernia     History of Present Illness:  Yin Butcher is a 89 year old female was brought to [facility] ER on 24 via ambulance unresponsive. This history was obtained through chart review as the patient is very lethargic and did not respond during examination.     Upon presentation to the hospital, the patient was found to be hypothermic, hypoxic and hypotensive. She was placed on BiPAP and started on Levophed. She was transferred to the ICU for further management. She was found to have bilateral pleural effusions, acute exacerbation of CHF, bilateral PE. Critical care, cardiology, and palliative care was consulted for further evaluation.     Today, the patient was found to have an abdominal wall hernia. She underwent a CT of the abdomen and pelvis which reveals umbilical hernia containing segment of the mid transverse colon. There is copious stool within the colon within the hernia sac. There is narrowing of the lumen of the transverse colon as it enters the hernia sac without significant narrowing as it exits the sac. No evidence of bowel obstruction or free intraperitoneal air.     The patient has a past medical history of atrial fibrillation, hypertension, arthritis, osteoporosis, colon cancer.       History:  Past Medical History:   Diagnosis Date    Arrhythmia     Arthritis     Back problem     Cancer (HCC)     Cataract     Osteoporosis     Personal history of antineoplastic chemotherapy      Past Surgical History:   Procedure Laterality Date    OTHER SURGICAL HISTORY  2014    Procedure: HIP HEMIARTHROPLASTY/ BIPOLAR;  Surgeon: Vasu Matamoros MD;  Location:  MAIN OR    REMOVAL OF  TONSILS,12+ Y/O       Family History   Family history unknown: Yes      reports that she has never smoked. She has never used smokeless tobacco. She reports that she does not currently use alcohol. She reports that she does not use drugs.    Allergies:  Allergies   Allergen Reactions    Erythromycin OTHER (SEE COMMENTS)    Oxycodone HALLUCINATION    Gabapentin NAUSEA ONLY and OTHER (SEE COMMENTS)     Pt stated she didn't feel like herself     Tizanidine ITCHING and OTHER (SEE COMMENTS)     Pt states burning sensation        Medications:  Medications Prior to Admission   Medication Sig    midodrine 5 MG Oral Tab Take 1 tablet (5 mg total) by mouth as needed (as needed).    furosemide 20 MG Oral Tab Take 1 tablet (20 mg total) by mouth daily.    digoxin 0.125 MG Oral Tab Take 1 tablet (125 mcg total) by mouth Every Monday, Wednesday, and Friday.    metoprolol tartrate 50 MG Oral Tab Take 0.5 tablets (25 mg total) by mouth 2 (two) times daily.    multivitamin Oral Chew Tab Chew 1 tablet by mouth daily.    acetaminophen 500 MG Oral Tab Take 1 tablet (500 mg total) by mouth every 4 (four) hours as needed for Pain.    polyethylene glycol, PEG 3350, 17 g Oral Powd Pack Take 17 g by mouth daily as needed (constipation). (Patient not taking: Reported on 1/21/2024)    lidocaine-menthol 4-1 % External Patch Place 1 patch onto the skin daily.    Denosumab (PROLIA SC) Inject into the skin.    Calcium Carbonate-Vitamin D (CALCIUM-D) 600-400 MG-UNIT Oral Tab Take  by mouth.         Current Facility-Administered Medications:     metoprolol tartrate (Lopressor) partial tab 12.5 mg, 12.5 mg, Oral, 2x Daily(Beta Blocker)    [START ON 1/26/2024] digoxin (Lanoxin) tab 125 mcg, 125 mcg, Oral, Q MWF    midodrine (ProAmatine) tab 10 mg, 10 mg, Oral, TID    multivitamin (Centrum) chewable tab (Adult) 1 tablet, 1 tablet, Oral, Daily    metoclopramide (Reglan) 5 mg/mL injection 5 mg, 5 mg, Intravenous, Q8H PRN    norepinephrine (Levophed) 4  mg/250mL infusion premix, 0.5-30 mcg/min, Intravenous, Continuous PRN    ondansetron (Zofran) 4 MG/2ML injection 4 mg, 4 mg, Intravenous, Q6H PRN    glucose (Dex4) 15 GM/59ML oral liquid 15 g, 15 g, Oral, Q15 Min PRN **OR** glucose (Glutose) 40% oral gel 15 g, 15 g, Oral, Q15 Min PRN **OR** glucose-vitamin C (Dex-4) chewable tab 4 tablet, 4 tablet, Oral, Q15 Min PRN **OR** dextrose 50% injection 50 mL, 50 mL, Intravenous, Q15 Min PRN **OR** glucose (Dex4) 15 GM/59ML oral liquid 30 g, 30 g, Oral, Q15 Min PRN **OR** glucose (Glutose) 40% oral gel 30 g, 30 g, Oral, Q15 Min PRN **OR** glucose-vitamin C (Dex-4) chewable tab 8 tablet, 8 tablet, Oral, Q15 Min PRN    acetaminophen (Tylenol Extra Strength) tab 500 mg, 500 mg, Oral, Q4H PRN    heparin (Porcine) 90420 units/250mL infusion CONTINUOUS, 200-3,000 Units/hr, Intravenous, Continuous    acetaminophen (Ofirmev) 10 mg/mL infusion premix 750 mg, 15 mg/kg, Intravenous, Q6H PRN    morphINE PF 2 MG/ML injection 0.5 mg, 0.5 mg, Intravenous, Q4H PRN    Review of Systems:  Pertinent items are noted in HPI.  Unable to obtain to patient condition      Physical Exam:  Blood pressure 113/65, pulse 96, temperature 97.2 °F (36.2 °C), temperature source Temporal, resp. rate 11, height 67\", weight 115 lb 11.9 oz (52.5 kg), SpO2 95%.    General:  Lethargic. No apparent distress.    HEENT: Exam is unremarkable.  Without scleral icterus.  Mucous membranes are moist. EOM are WNL.    Neck: No tenderness to palpitation.  Full range of motion to flexion and extension, lateral rotation and lateral flexion of cervical spine.  No JVD. Supple.     Lungs:  Clear to auscultation bilaterally. No wheezes, no rales, no rhonchi. Good excursion of the diaphragms. No secondary use of accessory respiratory musculature.    Cardiac: Regular rate and rhythm. No murmur.    Abdomen:  Soft, non-distended, non-tender to palpation, reducible ventral hernia, defect approximately 3cm, no overlying skin color  changes     Extremities:  No lower extremity edema noted.  Without clubbing or cyanosis.      Skin: Normal texture and turgor.    Laboratory Data:  Recent Labs   Lab 01/23/24  0506 01/23/24  1527 01/24/24  0434 01/25/24  0806   RBC 2.99*  --  3.16* 3.04*   HGB 7.2* 8.6* 8.1* 7.8*   HCT 24.7*  --  27.0* 25.6*   MCV 82.6  --  85.4 84.2   MCH 24.1*  --  25.6* 25.7*   MCHC 29.1*  --  30.0* 30.5*   RDW 18.4  --  18.3 19.2   NEPRELIM 11.87*  --  10.34* 7.19   WBC 14.0*  --  11.8* 8.8   .0  --  199.0 185.0       Recent Labs   Lab 01/23/24  0506 01/24/24  0434 01/25/24  0806   * 105* 78   BUN 30* 25* 21   CREATSERUM 0.86 0.48* 0.49*   CA 8.3* 8.3* 8.7   ALB 2.6* 2.8* 2.6*    134* 139   K 3.3*  3.3* 3.9  3.9 4.0    103 103   CO2 34.0* 34.0* 34.0*   ALKPHO 79 77 80   AST 34 17 18   BILT 1.1 1.5 1.2   TP 6.4 6.0* 5.7*         Recent Labs   Lab 01/21/24  0441 01/21/24  0945 01/21/24 1933 01/22/24 0224 01/23/24 1932 01/24/24  0434 01/25/24  0436   PTP 19.6*  --  21.4*  --   --   --   --    INR 1.64*  --  1.84*  --   --   --   --    PTT 27.5   < > 185.9*   < > 82.3* 87.2* 78.6*    < > = values in this interval not displayed.         CT ABDOMEN+PELVIS(CONTRAST ONLY)(CPT=74177)    Result Date: 1/25/2024  CONCLUSION:  1. There is a umbilical hernia containing a segment of the mid transverse colon.  There is copious stool within the colon is including within the hernia sac.  There is narrowing of the lumen of the transverse colon as it enters the hernia sac without significant narrowing as it exits the sac.  Surgical consultation recommended.  No bowel wall thickening is identified in the herniated segment of the transverse colon. 2. No evidence for bowel obstruction.  No free intraperitoneal air.  3. Large bilateral pleural effusions with bibasilar adjacent airspace disease.  The airspace disease could reflect atelectasis or pneumonia. 4.  Cardiomegaly.  Extensive coronary artery calcifications are  seen.  Mitral annular calcifications and calcifications are seen at the aortic root. 5. Infiltration of the subcutaneous fat of the abdominal wall suggest anasarca.    LOCATION:  [facility]   Dictated by (CST): Jacques Diaz MD on 1/25/2024 at 3:03 AM     Finalized by (CST): Jacques Diaz MD on 1/25/2024 at 3:10 AM       XR CHEST AP PORTABLE  (CPT=71045)    Result Date: 1/24/2024  CONCLUSION:  The patient is rotated to the right of midline.  Dobbhoff tube has a tortuous course but appears to terminate in the region of the mid stomach.  Otherwise no significant interval change.    LOCATION:  [id number]      Dictated by (CST): Andrez Ho MD on 1/24/2024 at 3:52 PM     Finalized by (CST): Andrez Ho MD on 1/24/2024 at 3:53 PM          Carmen Buchanan PA-C  1/25/2024  3:20 PM      Medical Decision Making         Impression:  Patient Active Problem List   Diagnosis    Hip fracture (HCC)    Hypotension    Osteoporosis    General weakness    Anemia    Thrombocytopenia (HCC)    Colon cancer (HCC)    Arthritis    Cancer (HCC)    PAF (paroxysmal atrial fibrillation) (HCC)    Hyponatremia    Nausea and vomiting in adult    SIADH (syndrome of inappropriate ADH production) ([facility])    Closed compression fracture of L1 lumbar vertebra, sequela    Encephalopathy, INFECTION    Altered mental status, unspecified altered mental status type    UTI (urinary tract infection)    Hypochloremia    Lightheadedness    Hyperglycemia    Azotemia    Metabolic alkalosis    Pleural effusion    Elevated troponin    Chronic atrial fibrillation (HCC)    Acute respiratory failure (HCC)    Dyspnea, unspecified type    Acute on chronic congestive heart failure, unspecified heart failure type (HCC)    Acute cystitis with hematuria    Acute on chronic diastolic congestive heart failure (HCC)    Sepsis (HCC)    Acute respiratory failure with hypoxia (HCC)    Somnolence    Hyperkalemia    Respiratory acidosis    Multiple subsegmental pulmonary  emboli without acute cor pulmonale (HCC)    Palliative care by specialist    Goals of care, counseling/discussion    Encephalopathy       This is a 89 yo woman with a ventral hernia    Patient has a significant past medical history including CHF, afib, dementia, and PE with respiratory failure  She was found to have a ventral hernia on exam  CT imaging showed ventral hernia containing segment of transverse colon with some narrowing of the colon lumen, but no evidence of obstruction  On physical exam, hernia is completely reducible and without tenderness    No surgical intervention  Recommend continued monitoring  Recommend miralax for bowel regimen    Surgery will sign off  Please page with any questions or concerns, especially any changes in the patient's conditions  Rest of care per primary    Thank you for letting me participate in the care of this patient      Is this a shared or split note between Advanced Practice Provider and Physician? Yes    Jess Acharya MD  [facility]  1/25/2024  8:02 PM

## 2024-01-25 NOTE — PROGRESS NOTES
Progress Note  Yin Butcher Patient Status:  Inpatient    1931 MRN [id number]   Location [facility] 4SW-A Attending Brooke Tracy MD   Hosp Day # 4 PCP Mina Walker MD     Subjective:  Drowsy, lethargic, does respond to name. No acute distress.     Objective:  /80   Pulse 96   Temp 97.9 °F (36.6 °C) (Temporal)   Resp 19   Ht 5' 7\" (1.702 m)   Wt 115 lb 11.9 oz (52.5 kg)   SpO2 98%   BMI 18.13 kg/m²     Telemetry: afib.      Intake/Output:    Intake/Output Summary (Last 24 hours) at 2024 0926  Last data filed at 2024 0449  Gross per 24 hour   Intake 336.7 ml   Output 1100 ml   Net -763.3 ml       Last 3 Weights   24 0000 115 lb 11.9 oz (52.5 kg)   24 0000 113 lb 12.1 oz (51.6 kg)   24 0007 109 lb 2 oz (49.5 kg)   24 2143 105 lb 13.1 oz (48 kg)   10/29/23 0600 104 lb 11.5 oz (47.5 kg)   10/24/23 0457 114 lb 3.2 oz (51.8 kg)   10/23/23 0340 92 lb 9.5 oz (42 kg)   10/22/23 2033 92 lb 9.5 oz (42 kg)   09/10/23 1203 92 lb 14.4 oz (42.1 kg)   23 0746 105 lb 12.8 oz (48 kg)   23 2218 106 lb (48.1 kg)       Labs:  Recent Labs   Lab 24  0506 24  0434 24  0806   * 105* 78   BUN 30* 25* 21   CREATSERUM 0.86 0.48* 0.49*   EGFRCR 63 89 88   CA 8.3* 8.3* 8.7    134* 139   K 3.3*  3.3* 3.9  3.9 4.0    103 103   CO2 34.0* 34.0* 34.0*     Recent Labs   Lab 24  0506 24  1527 24  0434 24  0806   RBC 2.99*  --  3.16* 3.04*   HGB 7.2* 8.6* 8.1* 7.8*   HCT 24.7*  --  27.0* 25.6*   MCV 82.6  --  85.4 84.2   MCH 24.1*  --  25.6* 25.7*   MCHC 29.1*  --  30.0* 30.5*   RDW 18.4  --  18.3 19.2   NEPRELIM 11.87*  --  10.34* 7.19   WBC 14.0*  --  11.8* 8.8   .0  --  199.0 185.0         Recent Labs   Lab 24  2159   TROPHS 25   CK 32       Review of Systems   Constitutional: Positive for malaise/fatigue.   Cardiovascular:  Positive for irregular heartbeat.   Respiratory: Negative.          Physical Exam:    Gen: drowsy, lethargic, nad, frail/cachectic   Heent: pupils equal, reactive. Mucous membranes moist.   Neck: no jvd  Cardiac: irregularly irregular, normal S1,S2, 2/6 hsm  Lungs: clear anteriorly.  Abd: soft, NT/ND +bs  Ext: no edema  Skin: Warm, dry  Neuro: drowsy, lethargic, underlying dementia.        Medications:     midodrine  10 mg Oral TID    multivitamin  1 tablet Oral Daily      norepinephrine Stopped (01/23/24 1501)    continuous dose heparin 1,000 Units/hr (01/24/24 2300)       Admitted after EMS contacted due to being unresponsive at home. Admitted on NIPPV with minimal responsiveness.     Assessment:  Admitted with acute respiratory failure, multifactorial with PE, effusions, ? Pna  Pulm following  Abx stopped. Nippv as needed.   Shock-resolved  Off pressors. On home midodrine.   Acute bilateral PE  LE US negative for DVT  Echo without evidence of RV strain  Heparin gtt-->transition to doac when no further procedures needed as per pulm.   Chronic Afib, rate controlled  Historically not on AC  Echo 1/21/24-LVEF 65-70%, normal wall motion. RV function normal. Mild-mod MR, mild-mod TR  Right pleural effusion  Recurrent, partially loculated. Plans for thora as per pulm.   AMS with underlying dementia  Hypotension-chronic on midodrine  Off pressors  Anemia, chronic--hgb 7.8 today  LORIE-venofer  CAD-calcifications noted on CT  Social issues--came from home with poor living conditions--SW following.  Profound weakness, poor po intake  Dobhoff in place  Palliative care following.    Plan:  BP stable on midodrine. Rates starting to increase. Will resume low dose bb with hold parameters--can slowly increase to home dose as tolerated.   Resume home digoxin as well.   Remains on heparin gtt with plans to transition to doac once no further procedures planned per pulm    Plan of care discussed with patient, RN.    Leslie Rogers, APRN  1/25/2024  9:26 AM  [phone]  [facility]  [phone]        Patient seen and examined independently.  Note reviewed and labs reviewed. Agree with above assessment and plan.  89-year-old female who presented with acute respiratory failure which appears to be multifactorial in nature.  She was also hypotensive initially and requiring pressor support.  Shock component suspected to be due to intravascular volume depletion.  Has chronic history of hypotension and is on midodrine.  Also noted to have acute bilateral pulmonary emboli.  Echo was completed and negative for RV strain.  As blood pressure has improved, recommend resuming low-dose beta-blocker.  Resume home digoxin given underlying prior history of atrial fibrillation.  Continue heparin infusion and plan for eventual transition to DOAC.  Will continue to follow.        Edgard Pack DO  Cardiologist  [facility]  1/25/2024 1:49 PM      Note to the patient: The 21st Century Cures Act makes medical notes like these available to patients in the interest of transparency. However, be advised that this is a medical document. It is intended as peer to peer communication. It is written in medical language and may contain abbreviations or verbiage that are unfamiliar. It may appear blunt or direct. Medical documents are intended to carry relevant information, facts as evident, and clinical opinion of the practitioner.     Disclaimer: Components of this note were documented using voice recognition system and are subject to errors not corrected at proofreading. Contact the author of this note for any clarifications.

## 2024-01-25 NOTE — PLAN OF CARE
Received patient alert to self and very lethargic. 3L HF.  Avaps 40% at night. Afebrile. Controlled afib on the monitor. SBP stable. DB in place. Heparin gtt. Oral contrast given for CT. No c/o pain. CT completed. Daughter at bedside.

## 2024-01-25 NOTE — PROGRESS NOTES
[facility]  Palliative Care Progress Note    Yin Butcher Patient Status:  Inpatient    1931 MRN [id number]   [facility] 4SW-A Attending Skyla Tinsley MD   Hosp Day # 4 PCP Mina Walker MD     History/Other:      Yin Butcher is a 89 year old female with Afib, recurrent UTI, HFpEF, recurrent pleural effusion, anemia, chronic back pain  who was admitted on 2024 for AMS and AOB. Work up in our hospital revealed Hypothermia, hypotension on pressors, FLORESITA, lactic acidosis, acute bilateral PE.    In brief, pt lives with her daughter Jada. Palliative service was consulted twice in 2022 and 10/2023, both times, Jada declined our services. This is the 4th hospitalization in the past year.     Consult ordered by:: Grant Ventura for evaluation of Palliative Care needs and Goals of care discussion.    Allergies:  Allergies   Allergen Reactions    Erythromycin OTHER (SEE COMMENTS)    Oxycodone HALLUCINATION    Gabapentin NAUSEA ONLY and OTHER (SEE COMMENTS)     Pt stated she didn't feel like herself     Tizanidine ITCHING and OTHER (SEE COMMENTS)     Pt states burning sensation      Medications:     Current Facility-Administered Medications:     midodrine (ProAmatine) tab 10 mg, 10 mg, Oral, TID    multivitamin (Centrum) chewable tab (Adult) 1 tablet, 1 tablet, Oral, Daily    metoclopramide (Reglan) 5 mg/mL injection 5 mg, 5 mg, Intravenous, Q8H PRN    norepinephrine (Levophed) 4 mg/250mL infusion premix, 0.5-30 mcg/min, Intravenous, Continuous PRN    ondansetron (Zofran) 4 MG/2ML injection 4 mg, 4 mg, Intravenous, Q6H PRN    glucose (Dex4) 15 GM/59ML oral liquid 15 g, 15 g, Oral, Q15 Min PRN **OR** glucose (Glutose) 40% oral gel 15 g, 15 g, Oral, Q15 Min PRN **OR** glucose-vitamin C (Dex-4) chewable tab 4 tablet, 4 tablet, Oral, Q15 Min PRN **OR** dextrose 50% injection 50 mL, 50 mL, Intravenous, Q15 Min PRN **OR** glucose (Dex4) 15 GM/59ML oral liquid 30 g, 30 g, Oral, Q15 Min PRN  **OR** glucose (Glutose) 40% oral gel 30 g, 30 g, Oral, Q15 Min PRN **OR** glucose-vitamin C (Dex-4) chewable tab 8 tablet, 8 tablet, Oral, Q15 Min PRN    acetaminophen (Tylenol Extra Strength) tab 500 mg, 500 mg, Oral, Q4H PRN    heparin (Porcine) 08439 units/250mL infusion CONTINUOUS, 200-3,000 Units/hr, Intravenous, Continuous    acetaminophen (Ofirmev) 10 mg/mL infusion premix 750 mg, 15 mg/kg, Intravenous, Q6H PRN    morphINE PF 2 MG/ML injection 0.5 mg, 0.5 mg, Intravenous, Q4H PRN    Subjective      Interval events:     -AVAPS overnight; remains very lethargic   -daughter visited last night  -Jada emailed back with additional information: she will come in later this afternoon and that her choices for SR are [facility] followed by [facility]. She does not want [facility]. This was communicated to the team.     When I entered the room, the patient was sleeping but easy to wake and sitting up in bed. No family present at bedside.   Discussed with RN.  Pt states that she is no pain. She also denies feeling drowsy/fatigued, despite being very lethargic nearing somnolence.     See summary of discussion below.     Review of Systems:  Pertinent items are noted in subjective.  Bowel Movement    No data found in the last 1 encounters.         Objective:     Vital Signs:  Blood pressure 110/70, pulse 117, temperature 97.8 °F (36.6 °C), temperature source Temporal, resp. rate 19, height 5' 7\" (1.702 m), weight 115 lb 11.9 oz (52.5 kg), SpO2 97%.  Body mass index is 18.13 kg/m².  Non-verbal signs of pain present: No  Current Palliative performance scale PPSv2 (%): 30    Physical Exam:  General: sleeping and nearing somnolence. On 2L NC and appears comfortable. Body habitus Thin   HEENT: soft voice.    Lungs:  on NC  Abdomen: no TTP   Extremities: LE bunny boots.   Neurologic: Aox2  but very lethargic with inconsistent answers   Skin: Warm and dry. Ues with ecchymosis         Results:     Hematology:  Lab Results   Component Value  Date    WBC 8.8 01/25/2024    HGB 7.8 (L) 01/25/2024    HCT 25.6 (L) 01/25/2024    .0 01/25/2024     Coags:  Lab Results   Component Value Date    PT 15.5 (H) 07/13/2014    INR 1.84 (H) 01/21/2024    PTT 78.6 (H) 01/25/2024     Chemistry:  Lab Results   Component Value Date    CREATSERUM 0.49 (L) 01/25/2024    BUN 21 01/25/2024     01/25/2024    K 4.0 01/25/2024     01/25/2024    CO2 34.0 (H) 01/25/2024    GLU 78 01/25/2024    CA 8.7 01/25/2024    ALB 2.6 (L) 01/25/2024    ALKPHO 80 01/25/2024    BILT 1.2 01/25/2024    TP 5.7 (L) 01/25/2024    AST 18 01/25/2024    ALT  01/25/2024      Comment:      Due to  backorder we are temporarily unable to offer hospital-based ALT testing at [facility].   If urgently needed, please order ALT test code [id number].   The new order will need a new venipuncture and will be sent to [facility] for testing.   The expected turnaround time will be within 24 hours.     DDIMER 2.38 (H) 01/20/2024    MG 2.2 01/22/2024    PHOS 6.1 (H) 01/21/2024    TROP <0.046 02/19/2015     Imaging:  CT ABDOMEN+PELVIS(CONTRAST ONLY)(CPT=74177)    Result Date: 1/25/2024  CONCLUSION:  1. There is a umbilical hernia containing a segment of the mid transverse colon.  There is copious stool within the colon is including within the hernia sac.  There is narrowing of the lumen of the transverse colon as it enters the hernia sac without significant narrowing as it exits the sac.  Surgical consultation recommended.  No bowel wall thickening is identified in the herniated segment of the transverse colon. 2. No evidence for bowel obstruction.  No free intraperitoneal air.  3. Large bilateral pleural effusions with bibasilar adjacent airspace disease.  The airspace disease could reflect atelectasis or pneumonia. 4.  Cardiomegaly.  Extensive coronary artery calcifications are seen.  Mitral annular calcifications and calcifications are seen at the aortic root. 5. Infiltration of the  subcutaneous fat of the abdominal wall suggest anasarca.    LOCATION:  [facility]   Dictated by (CST): Jacques Diaz MD on 1/25/2024 at 3:03 AM     Finalized by (CST): Jacques Diaz MD on 1/25/2024 at 3:10 AM       XR CHEST AP PORTABLE  (CPT=71045)    Result Date: 1/24/2024  CONCLUSION:  The patient is rotated to the right of midline.  Dobbhoff tube has a tortuous course but appears to terminate in the region of the mid stomach.  Otherwise no significant interval change.    LOCATION:  [id number]      Dictated by (CST): Andrez Ho MD on 1/24/2024 at 3:52 PM     Finalized by (CST): Andrez Ho MD on 1/24/2024 at 3:53 PM       US VENOUS DOPPLER ARM LEFT - DIAG IMG (CPT=93971)    Result Date: 1/24/2024  CONCLUSION:  No evidence of deep venous thrombosis in the left upper extremity within the limitations of the exam as above.    LOCATION:  [id number]   Dictated by (CST): Andrez Ho MD on 1/24/2024 at 2:31 PM     Finalized by (CST): Andrez Ho MD on 1/24/2024 at 2:32 PM       XR CHEST AP/PA (1 VIEW) (CPT=71045)    Result Date: 1/24/2024  CONCLUSION:  There is no significant change in the appearance of the portable chest radiograph.  Moderate to large right and small left pleural effusions are noted with dependent atelectasis or consolidation in the lower lobes.   LOCATION:  [facility]      Dictated by (CST): Manohar Salinas MD on 1/24/2024 at 8:36 AM     Finalized by (CST): Manohar Salinas MD on 1/24/2024 at 8:37 AM       XR ELBOW, (2 VIEWS), LEFT (CPT=73070)    Result Date: 1/23/2024  CONCLUSION:  Tissue swelling.  No acute fracture or other acute bony process.  LOCATION:  [facility]   Dictated by (CST): Malcom Purcell MD on 1/23/2024 at 12:23 PM     Finalized by (CST): aMlcom Purcell MD on 1/23/2024 at 12:24 PM           Summary of Discussion     Jada emailed last night (3:38 AM) that she would be in later this afternoon after a late night.   Will attempt to follow up later this morning/afternoon.     Advance  Care Planning counseling and discussion:    POA Documentation Completed--Document in Epic.    POLST FORM Not completed  Full Code    Assessment and Recommendations       Principal Problem:    Pleural effusion  Active Problems:    Acute respiratory failure (HCC)    Acute on chronic congestive heart failure, unspecified heart failure type (HCC)    Acute on chronic diastolic congestive heart failure (HCC)    Sepsis (HCC)    Acute respiratory failure with hypoxia (HCC)    Somnolence    Hyperkalemia    Respiratory acidosis    Multiple subsegmental pulmonary emboli without acute cor pulmonale (HCC)    Palliative care by specialist    Goals of care, counseling/discussion    Encephalopathy    Goals of care: ongoing   Daughter is acting POA   Pt does not demonstrate the capacity for complex medical decision making at this time  Code Status: Full Code    Discussed today's visit with RN and PA.    Palliative Care Follow Up: Palliative care team will Continue to follow while inpatient.  Palliative care follow up outpatient: is indicated but not currently enrolled in palliative care program.    Thank you for allowing Palliative Care services to participate in the care of Yin Butcher.    A total of 25 minutes were spent on this consult, which included all of the following: chart review, direct face to face contact, history taking, physical examination, counseling and coordinating care, and documentation.     Kathy Bourne MD  1/25/2024  9:40 AM  Palliative Care Services    The 21st Century Cures Act makes medical notes like these available to patients in the interest of transparency. Please be advised this is a medical document. Medical documents are intended to carry relevant information, facts as evident, and the clinical opinion of the practitioner. The medical note is intended as peer to peer communication and may appear blunt or direct. It is written in medical language and may contain abbreviations or verbiage that  are unfamiliar.      Addendum: spoke with Jada by phone. Reiterated to team her preferences for [facility]. She has yet to discuss POLST with lisset, but they plan to do so later today. Jada will be here around 3/4 PM. No other changes. Support provided. An additional 10 minutes spent on encounter for a total of 25 minutes.   Kathy Bourne MD, 01/25/24, 1:04 PM

## 2024-01-25 NOTE — CM/SW NOTE
Contacted by Dr. Bourne re: [facility] placement. Pt's dtr indicated that her first choice for [facility] would be [facility] and second choice would be [facility]. Unfortunately, [facility] unable to accept pt. [facility] had initially declined referral as pt was still on AVAPs at this time. Pt now on 1L/NC. Liaison Yin contacted to provide updated clinicals. They will review clinical information but could potentially accept pt. Pt cleared for PO intake and has been on a regular diet w/ NTL. Calorie count initiated and RD following.     Pt's Dtr Jada contacted to provide update on DC planning.  left w/ CM's contact information.    Addendum:  Call received from pt's Dtr Jada. She would want pt to go to [facility] for [facility] if possible, would consider [facility] if [facility] could not accept. If neither facility can accept, Pt's dtr would want pt to go home. DME in place reviewed. Pt has a RW, pt's dtr uses yoga blocks and pillows to position pt in the bed. She also has SCD for her BLE. [facility] offered but pt's dtr declines, she manages pt's meds and day to day care. Jada reports her fiance was a  and assists pt with exercise in the home.     CM/SW will remain available for DC planning and/or support.     GOLDEN ProN, CMSRN    [id number]

## 2024-01-25 NOTE — PROGRESS NOTES
[facility]    Yin Butcher Patient Status:  Inpatient    1931 MRN [id number]   [facility] 4SW-A Attending Brooke Tracy MD   Hosp Day # 4 PCP Mina Walker MD     Critical Care Progress Note     Date of Admission: 2024  9:42 PM  Admission Diagnosis: Hyperkalemia [E87.5]  Somnolence [R40.0]  Respiratory acidosis [E87.29]  Pleural effusion [J90]  Acute respiratory failure with hypoxia (HCC) [J96.01]  Acute on chronic congestive heart failure, unspecified heart failure type (HCC) [I50.9]     S:  no new events. Transitioned to HFNC      Current Medications:    Current Facility-Administered Medications:     metoprolol tartrate (Lopressor) partial tab 12.5 mg, 12.5 mg, Oral, 2x Daily(Beta Blocker)    [START ON 2024] digoxin (Lanoxin) tab 125 mcg, 125 mcg, Oral, Q MWF    midodrine (ProAmatine) tab 10 mg, 10 mg, Oral, TID    multivitamin (Centrum) chewable tab (Adult) 1 tablet, 1 tablet, Oral, Daily    metoclopramide (Reglan) 5 mg/mL injection 5 mg, 5 mg, Intravenous, Q8H PRN    norepinephrine (Levophed) 4 mg/250mL infusion premix, 0.5-30 mcg/min, Intravenous, Continuous PRN    ondansetron (Zofran) 4 MG/2ML injection 4 mg, 4 mg, Intravenous, Q6H PRN    glucose (Dex4) 15 GM/59ML oral liquid 15 g, 15 g, Oral, Q15 Min PRN **OR** glucose (Glutose) 40% oral gel 15 g, 15 g, Oral, Q15 Min PRN **OR** glucose-vitamin C (Dex-4) chewable tab 4 tablet, 4 tablet, Oral, Q15 Min PRN **OR** dextrose 50% injection 50 mL, 50 mL, Intravenous, Q15 Min PRN **OR** glucose (Dex4) 15 GM/59ML oral liquid 30 g, 30 g, Oral, Q15 Min PRN **OR** glucose (Glutose) 40% oral gel 30 g, 30 g, Oral, Q15 Min PRN **OR** glucose-vitamin C (Dex-4) chewable tab 8 tablet, 8 tablet, Oral, Q15 Min PRN    acetaminophen (Tylenol Extra Strength) tab 500 mg, 500 mg, Oral, Q4H PRN    heparin (Porcine) 21604 units/250mL infusion CONTINUOUS, 200-3,000 Units/hr, Intravenous, Continuous    acetaminophen (Ofirmev) 10 mg/mL infusion  premix 750 mg, 15 mg/kg, Intravenous, Q6H PRN    morphINE PF 2 MG/ML injection 0.5 mg, 0.5 mg, Intravenous, Q4H PRN     OBJECTIVE:  /65   Pulse 96   Temp 97.2 °F (36.2 °C) (Temporal)   Resp 11   Ht 170.2 cm (5' 7\")   Wt 115 lb 11.9 oz (52.5 kg)   SpO2 95%   BMI 18.13 kg/m²      3L nc at rest, AVAPS with sleep      Wt Readings from Last 3 Encounters:   01/23/24 115 lb 11.9 oz (52.5 kg)   10/29/23 104 lb 11.5 oz (47.5 kg)   09/10/23 92 lb 14.4 oz (42.1 kg)        I/O last 3 completed shifts:  In: 336.7 [I.V.:336.7]  Out: 1400 [Urine:1400]  I/O this shift:  In: -   Out: 100 [Urine:100]     General appearance: appears stated age and slowed mentation  Lungs: diminished breath sounds bilaterally  Heart: irregularly irregular rhythm  Abdomen: soft, non-tender; bowel sounds normal; no masses,  no organomegaly  Extremities: edema +     Lab Results   Component Value Date    WBC 8.8 01/25/2024    RBC 3.04 01/25/2024    HGB 7.8 01/25/2024    HCT 25.6 01/25/2024    MCV 84.2 01/25/2024    MCH 25.7 01/25/2024    MCHC 30.5 01/25/2024    RDW 19.2 01/25/2024    .0 01/25/2024     Lab Results   Component Value Date     01/25/2024    K 4.0 01/25/2024     01/25/2024    CO2 34.0 01/25/2024    BUN 21 01/25/2024    CREATSERUM 0.49 01/25/2024    GLU 78 01/25/2024    CA 8.7 01/25/2024    ALKPHO 80 01/25/2024    ALT  01/25/2024      Comment:      Due to  backorder we are temporarily unable to offer hospital-based ALT testing at [facility].   If urgently needed, please order ALT test code [id number].   The new order will need a new venipuncture and will be sent to [facility] for testing.   The expected turnaround time will be within 24 hours.     AST 18 01/25/2024    BILT 1.2 01/25/2024    ALB 2.6 01/25/2024    TP 5.7 01/25/2024     Lab Results   Component Value Date    PT 15.5 (H) 07/13/2014    PT 14.4 (H) 07/11/2014    INR 1.84 (H) 01/21/2024    INR 1.64 (H) 01/21/2024    INR 1.22 (H) 10/27/2023          Imaging: CT abd/pelvis reviewed. Umbilical hernia with colon and stool     Assessment/Plan:  Shock- Hypovolemic vs cardiogenic  - IVF discontinued with concern for volume overload  - TTE LVEF 65-70% with component of diastolic dysfunction  - Vasopressors to Maintain MAP >65, currently off all pressors  - has chronic hypotension; on home midodrine 10mg q8h- will tolerate MAP 55  - Home BB on hold  - AM cortisol normal     Acute hypoxic and hypercapnic respiratory failure- multifactorial  - weaned to nasal canula with AVAPS during sleep.  - CTA with acute bilateral PE, bilateral pleural effusions, less likely PNA.  weaned off AVAPS w/o thoracentesis- still has signs of volume OL- will diurese  - RVP negative  - MRSA negative  - Sputum culture pending collection  - S/p Zosyn (1/20-1/22); Will monitor off antibiotics now     Acute bilateral PE  - LE doppler negative  - TTE without right heart strain  - Heparin drip- transition to DOAC if no procedures planned.     Bilateral pleural effusions  - Recurrent, likely due to diastolic dysfunction  - R effusion is partially loculated; worse in film but clinically seems better  - Diurese as able- limited by hypotension; may need to use albumin in combination  - hold on thoracentesis. High risk for PTX ex-vacuo bc effusion is loculated and will likely recur bc pt remains volume overloaded     FLORESITA- resolved  - 2/2 to poor PO intake  - back to baseline   - off IVF  - Avoid nephrotoxins  - Strict I/O     HFpEF  - TTE 65-70%, likely diastolic dysfunction  - Diurese as able  - Will stop IVF  - Cardiology following     Chronic Afib  - Not on anticoagulation at home  - Home meds on hold (BB, dig, lasix)     Acute Encephalopathy with underlying dementia  - Baseline unclear  - CT brain negative for acute process  - U/A bland  - Ammonia normal  - Monitor     Umbilical hernia- concern for entrapment  - consult Gen Surg for assistance     Anemia, likely chronic- normocytic in nature  -  No s/s of bleeding  - iron studies show component of iron deficiency- likely poor po intake  - folate low- replaced, B12 wnl  - Monitor- no signs of active bleeding     F/E/N:    - ADAT- poor PO intake. Will place on a calorie count and consult dietician for supplements. May need to consider DHT/TF if no improvement.  - Replete electrolytes per protocol  - IVF     Proph:  - Heparin drip  - SCDs  - PT/OT     Wounds present on heal, was there on admission  - Wound care following     Dispo: Full code listed but not appropriate given multiple comorbidities including dementia and advanced age.  d/w daughter; she insists in maintaining this order.  - SW/CM involvement appreciated.  - Palliative care following for ongoing goals of care.  - transfer to CTU if able to remain off pressors.    Teofilo Cordero MD  1/25/2024  1:38 PM       No

## 2024-01-26 PROBLEM — Z71.89 ADVANCE CARE PLANNING: Status: ACTIVE | Noted: 2024-01-26

## 2024-01-26 PROBLEM — Z71.89 ADVANCE CARE PLANNING: Status: ACTIVE | Noted: 2024-01-01

## 2024-01-26 LAB
APTT PPP: 63.7 SECONDS (ref 23.3–35.6)
APTT PPP: 79.5 SECONDS (ref 23.3–35.6)

## 2024-01-26 PROCEDURE — 99233 SBSQ HOSP IP/OBS HIGH 50: CPT | Performed by: HOSPITALIST

## 2024-01-26 PROCEDURE — 99233 SBSQ HOSP IP/OBS HIGH 50: CPT | Performed by: NURSE PRACTITIONER

## 2024-01-26 RX ORDER — BISACODYL 10 MG
10 SUPPOSITORY, RECTAL RECTAL ONCE
Status: COMPLETED | OUTPATIENT
Start: 2024-01-26 | End: 2024-01-26

## 2024-01-26 RX ORDER — FUROSEMIDE 20 MG/1
20 TABLET ORAL DAILY
Status: DISCONTINUED | OUTPATIENT
Start: 2024-01-26 | End: 2024-01-30

## 2024-01-26 NOTE — PROGRESS NOTES
[facility]     Hospitalist Progress Note     Yin Butcher Patient Status:  Inpatient    1931 MRN [id number]   Location [facility] 4SW-A Attending Brooke Tracy MD   Hosp Day # 5 PCP Mina Walker MD     Chief Complaint: Shock     Subjective:  Patient fatigued/confused.   DHT placed yesterday and pt had CT a/p.  No BMs since admission per RN.  On O2 NC and on avaps overnight.     Objective:    Review of Systems:  comprehensive review of systems was completed; pertinent positive and negatives stated in subjective.    Vital signs:  Temp:  [97.2 °F (36.2 °C)-99 °F (37.2 °C)] 98 °F (36.7 °C)  Pulse:  [] 103  Resp:  [11-38] 24  BP: ()/() 132/82  SpO2:  [90 %-100 %] 94 %    Physical Exam:    General: ill/frail appearing, elderly lady 89 year old female   Respiratory: No wheezes, no rhonchi on O2 NC 2L  Cardiovascular: S1, S2, irregular 80s +murmur  Abdomen: Soft, Non-tender, non-distended, +hernia  Neuro: moves all extremities. Pt confused/fatigued  Extremities: No edema, LUE ecchymoses/swelling      Diagnostic Data:    Labs:  Recent Labs   Lab 24  04424  1933 01/22/24  0224 24  0506 24  1527 24  0434 24  0806   WBC 10.2 13.0* 15.5* 14.0*  --  11.8* 8.8   HGB 7.8* 7.0* 7.1* 7.2* 8.6* 8.1* 7.8*   MCV 85.8 78.5* 80.2 82.6  --  85.4 84.2   .0 255.0 262.0 247.0  --  199.0 185.0   INR 1.64* 1.84*  --   --   --   --   --        Recent Labs   Lab 24  0506 24  0434 24  0806   * 105* 78   BUN 30* 25* 21   CREATSERUM 0.86 0.48* 0.49*   CA 8.3* 8.3* 8.7   ALB 2.6* 2.8* 2.6*    134* 139   K 3.3*  3.3* 3.9  3.9 4.0    103 103   CO2 34.0* 34.0* 34.0*   ALKPHO 79 77 80   AST 34 17 18   BILT 1.1 1.5 1.2   TP 6.4 6.0* 5.7*       Estimated Creatinine Clearance: 62.9 mL/min (A) (based on SCr of 0.49 mg/dL (L)).    Recent Labs   Lab 24  2159   TROPHS 25   CK 32       Recent Labs   Lab 24  0441 24  1933    PTP 19.6* 21.4*   INR 1.64* 1.84*          Microbiology    Hospital Encounter on 01/20/24   1. Blood Culture     Status: None    Collection Time: 01/20/24  9:59 PM    Specimen: Blood,peripheral   Result Value Ref Range    Blood Culture Result No Growth 5 Days N/A         Imaging: Reviewed in Epic.    Medications:    metoprolol tartrate  12.5 mg Oral 2x Daily(Beta Blocker)    digoxin  125 mcg Oral Q MWF    docusate sodium  100 mg Oral BID    midodrine  10 mg Oral TID    multivitamin  1 tablet Oral Daily       Assessment & Plan:    #Shock, hypovolemic and or cardiogenic, resolved  Off levophed, on midodrine 10mg TID - taper?  received PRBCs  Zosyn IV discontinued, monitor off abx, NGTD on blood CX    #Chronic HFpEF with acute exacerbation, Bilateral pleural effusions  Defer diuresis to cards/pulm  Echo reviewed  CT reviewed  Prior bilateral thoras on in october    #Acute hypoxic resp failure due to acute PE with possible RV strain on CT and pleural effusion  Off BIPAP, O2 NC 2L  Hep gtt   Doppler LE neg  RV strain on CT,  Echo normal RVSF    #Chronic Afib, controlled  Follow tele  Digoxin/lopressor per cards    #Anemia, folic/iron deficiency  S/p venofer x3, PRBCs   No black or bloody stools      #Dementia w/ Delirium, deconditioning, FTT  Poor living conditions PTA  CT brain chronic  PT recs [facility]    #Dysphagia  SLP following, required DHT for TF    #Abdominal hernia w/ copious stool within hernia sace  No BM since admission  CT reviewed  Gen surgery evaluated - no intervention    #Chronic heel ulcers, chronic back wound POA  wound care eval  mepilex over spine    #Lactic acidosis due to above, resolved  #Hypothermia on admission, resolved  #Hypokalemia resolved   #LUE swelling/ecchymoses, prior IVs removed and no DVT on doppler    Case d/w pt, RN.   May be able to taper midodrine.     SUSAN Wadsworth  10:07 AM     Supplementary Documentation:     Quality:  DVT Mechanical Prophylaxis:   SCDs,    DVT Pharmacologic  Prophylaxis   Medication    heparin (Porcine) 65273 units/250mL infusion CONTINUOUS                Code Status: Full Code  [place]: External urinary catheter in place    The 21st Century Cures Act makes medical notes like these available to patients in the interest of transparency. Please be advised this is a medical document. Medical documents are intended to carry relevant information, facts as evident, and the clinical opinion of the practitioner. The medical note is intended as peer to peer communication and may appear blunt or direct. It is written in medical language and may contain abbreviations or verbiage that are unfamiliar.     Addendum:    Agree with above note.  Pt. Seen and examined.    Gen: NAD  CVS: s1s2  Resp: CTA  Abd: soft    #avap at night, shock etiology unclear- resolved, on Midodrine for now to reeval in am  #anemia s/p transfusion  #confusion at baseline  #ac hypoxic resp failure resolved  #shock and diastolic dysfct on Midodrine  #PE on hep iv  #b/l pleural effusion  #FLORESITA  #Chronic afib hr controlled  #SLP reeval, DHT meanwhile, tx for dysphagia  #Chronic heel ulcers, chronic back wound POA  wound care eval  mepilex over spine      Pt seen an examined independently. Chart reviewed.  Labs and imaging over the last 24 hours have been reviewed.  Note has been reviewed by me and modified as needed.  Exam and Impression/ Recs as noted above.  D/w staff and with pt  More than 50% of clinical time and 100% of the medical decision making performed by me.    Brooke Tracy MD

## 2024-01-26 NOTE — PLAN OF CARE
Problem: SAFETY ADULT - FALL  Goal: Free from fall injury  Description: INTERVENTIONS:  - Assess pt frequently for physical needs  - Identify cognitive and physical deficits and behaviors that affect risk of falls.  - [place] fall precautions as indicated by assessment.  - Educate pt/family on patient safety including physical limitations  - Instruct pt to call for assistance with activity based on assessment  - Modify environment to reduce risk of injury  - Provide assistive devices as appropriate  - Consider OT/PT consult to assist with strengthening/mobility  - Encourage toileting schedule  Outcome: Progressing     Problem: Impaired Swallowing  Goal: Minimize aspiration risk  Description: Interventions:  - Patient should be alert and upright for all feedings (90 degrees preferred)  - Offer food and liquids at a slow rate  - No straws  - Encourage small bites of food and small sips of liquid  - Offer pills one at a time, crush or deliver with applesauce as needed  - Discontinue feeding and notify MD (or speech pathologist) if coughing or persistent throat clearing or wet/gurgly vocal quality is noted  Outcome: Progressing     Problem: NEUROLOGICAL - ADULT  Goal: Achieves stable or improved neurological status  Description: INTERVENTIONS  - Assess for and report changes in neurological status  - Initiate measures to prevent increased intracranial pressure  - Maintain blood pressure and fluid volume within ordered parameters to optimize cerebral perfusion and minimize risk of hemorrhage  - Monitor temperature, glucose, and sodium. Initiate appropriate interventions as ordered  Outcome: Progressing     Problem: METABOLIC/FLUID AND ELECTROLYTES - ADULT  Goal: Glucose maintained within prescribed range  Description: INTERVENTIONS:  - Monitor Blood Glucose as ordered  - Assess for signs and symptoms of hyperglycemia and hypoglycemia  - Administer ordered medications to maintain glucose within target range  - Assess  barriers to adequate nutritional intake and initiate nutrition consult as needed  - Instruct patient on self management of diabetes  Outcome: Progressing  Goal: Electrolytes maintained within normal limits  Description: INTERVENTIONS:  - Monitor labs and rhythm and assess patient for signs and symptoms of electrolyte imbalances  - Administer electrolyte replacement as ordered  - Monitor response to electrolyte replacements, including rhythm and repeat lab results as appropriate  - Fluid restriction as ordered  - Instruct patient on fluid and nutrition restrictions as appropriate  Outcome: Progressing     Problem: RESPIRATORY - ADULT  Goal: Achieves optimal ventilation and oxygenation  Description: INTERVENTIONS:  - Assess for changes in respiratory status  - Assess for changes in mentation and behavior  - Position to facilitate oxygenation and minimize respiratory effort  - Oxygen supplementation based on oxygen saturation or ABGs  - Provide Smoking Cessation handout, if applicable  - Encourage broncho-pulmonary hygiene including cough, deep breathe, Incentive Spirometry  - Assess the need for suctioning and perform as needed  - Assess and instruct to report SOB or any respiratory difficulty  - Respiratory Therapy support as indicated  - Manage/alleviate anxiety  - Monitor for signs/symptoms of CO2 retention  Outcome: Progressing     Problem: CARDIOVASCULAR - ADULT  Goal: Maintains optimal cardiac output and hemodynamic stability  Description: INTERVENTIONS:  - Monitor vital signs, rhythm, and trends  - Monitor for bleeding, hypotension and signs of decreased cardiac output  - Evaluate effectiveness of vasoactive medications to optimize hemodynamic stability  - Monitor arterial and/or venous puncture sites for bleeding and/or hematoma  - Assess quality of pulses, skin color and temperature  - Assess for signs of decreased coronary artery perfusion - ex. Angina  - Evaluate fluid balance, assess for edema, trend  weights  Outcome: Progressing     Problem: Patient/Family Goals  Goal: Patient/Family Long Term Goal  Description: Patient's Long Term Goal: return home    Interventions:  - cooperate with PT/OT/SLP as ordered  - ?learn how to give Lovenox injections  - See additional Care Plan goals for specific interventions  Outcome: Progressing  Goal: Patient/Family Short Term Goal  Description: Patient's Short Term Goal: wean off levophed    Interventions:   - antibiotics as prescribed  - IV fluid as prescribed  - PRBC as prescribed  - See additional Care Plan goals for specific interventions  Outcome: Progressing

## 2024-01-26 NOTE — CM/SW NOTE
SW received message from Palliative Care APRN stating pt's dtr is requesting referral be sent to [facility] for BRADLY since pt is off AVAPS. SW reopened referral and attached clinical updates to [facility] referral. Per previous notes, [facility] and [facility] are preferred facilities for BRADLY.     SW messaged [facility], [facility], and [facility] in [facility] to inquire if they are able to accept for BRADLY. NANDO will continue to follow.     LENNIE Hammonds  Discharge Planner

## 2024-01-26 NOTE — PROGRESS NOTES
Received pt this morning following shift report. Mental status waxes and wanes. When alert, pt is AOx1-2. Weaned to 1L O2. AFib on telemetry, beta blocker resumed this evening. DHT in place, TF started due to poor PO intake. Calorie count. PRN Miralax given this evening. Transfer orders in place. Daughter at bedside updated on plan of care. See flowsheets and MAR for further data.

## 2024-01-26 NOTE — PROGRESS NOTES
[facility]  Palliative Care Follow Up    Yin Butcher Patient Status:  Inpatient    1931 MRN [id number]   Location [facility] 3NE-A Attending Brooke Tracy MD   Hosp Day # 5 PCP Mina Walker MD   [id number]    Date of visit:  2024  Day 5 of hospitalization.        Summary:         Yin Butcher is a 89 year old female with Afib, recurrent UTI, HFpEF, recurrent pleural effusion, anemia, chronic back pain  who was admitted on 2024 for AMS and AOB. Work up in our hospital revealed Hypothermia, hypotension on pressors, FLORESITA, lactic acidosis, acute bilateral PE.    In brief, paula lives with her daughter Jada. Palliative service was consulted twice in 2022 and 10/2023, both times, Jada declined our services. This is the 4th hospitalization in the past year.   - Has required AVAPS in ICU and has been lethargic with poor PO intake.    Palliative care is following for support with [facility].    Interval Events: transferred to floor, TF per DHT    SUBJECTIVE  Review of Systems - Palliative Care Symptom Assessment       Hematology:   Lab Results   Component Value Date    WBC 8.8 2024    HGB 7.8 (L) 2024    HCT 25.6 (L) 2024    .0 2024       Chemistry:  Lab Results   Component Value Date    CREATSERUM 0.49 (L) 2024    BUN 21 2024     2024    K 4.0 2024     2024    CO2 34.0 (H) 2024    GLU 78 2024    CA 8.7 2024    ALB 2.6 (L) 2024    ALKPHO 80 2024    BILT 1.2 2024    TP 5.7 (L) 2024    AST 18 2024    ALT  2024      Comment:      Due to  backorder we are temporarily unable to offer hospital-based ALT testing at [facility].   If urgently needed, please order ALT test code [id number].   The new order will need a new venipuncture and will be sent to [facility] for testing.   The expected turnaround time will be within 24 hours.     DDIMER 2.38 (H) 2024    MG  2.2 01/22/2024    PHOS 6.1 (H) 01/21/2024    TROP <0.046 02/19/2015       Imaging:  CT ABDOMEN+PELVIS(CONTRAST ONLY)(CPT=74177)    Result Date: 1/25/2024  CONCLUSION:  1. There is a umbilical hernia containing a segment of the mid transverse colon.  There is copious stool within the colon is including within the hernia sac.  There is narrowing of the lumen of the transverse colon as it enters the hernia sac without significant narrowing as it exits the sac.  Surgical consultation recommended.  No bowel wall thickening is identified in the herniated segment of the transverse colon. 2. No evidence for bowel obstruction.  No free intraperitoneal air.  3. Large bilateral pleural effusions with bibasilar adjacent airspace disease.  The airspace disease could reflect atelectasis or pneumonia. 4.  Cardiomegaly.  Extensive coronary artery calcifications are seen.  Mitral annular calcifications and calcifications are seen at the aortic root. 5. Infiltration of the subcutaneous fat of the abdominal wall suggest anasarca.    LOCATION:  [facility]   Dictated by (CST): Jacques Diaz MD on 1/25/2024 at 3:03 AM     Finalized by (CST): Jacques Diaz MD on 1/25/2024 at 3:10 AM       XR CHEST AP PORTABLE  (CPT=71045)    Result Date: 1/24/2024  CONCLUSION:  The patient is rotated to the right of midline.  Dobbhoff tube has a tortuous course but appears to terminate in the region of the mid stomach.  Otherwise no significant interval change.    LOCATION:  [id number]      Dictated by (CST): Andrez Ho MD on 1/24/2024 at 3:52 PM     Finalized by (CST): Andrez Ho MD on 1/24/2024 at 3:53 PM       US VENOUS DOPPLER ARM LEFT - DIAG IMG (CPT=93971)    Result Date: 1/24/2024  CONCLUSION:  No evidence of deep venous thrombosis in the left upper extremity within the limitations of the exam as above.    LOCATION:  [id number]   Dictated by (CST): Andrez Ho MD on 1/24/2024 at 2:31 PM     Finalized by (CST): Andrez Ho MD on  1/24/2024 at 2:32 PM        Vital Signs:  Blood pressure (!) 140/96, pulse 95, temperature 98 °F (36.7 °C), temperature source Tympanic, resp. rate 24, height 5' 7\" (1.702 m), weight 120 lb (54.4 kg), SpO2 98%.  Body mass index is 18.79 kg/m².    Physical Exam:     GENERAL: Awake in bed. Appears chronically debilitated and acutely ill. Mild distress d/t increased WOB.  BODY HABITUS:  cachectic  HEENT: dry oral mucosa, dried blood at L side of mouth, + DHT  RESPIRATORY: increased effort at rest on supplemental O2 on 4L at 98%.  NEUROLOGIC: Extremely weak, trying to reply to me.   PSYCHIATRIC:  resting  SKIN: Warm and dry.     Palliative Performance Scale: 30%   Palliative Performance Scale   % Ambulation Activity Level Self-Care Intake Consciousness   100 Full Normal Full   Normal Full   90 Full No disease  Normal Full Normal Full   80 Full Some disease  Normal w/effort Full Normal or  Reduced Full   70 Reduced Some disease  Can't perform job Full Normal or   Reduced Full   60 Reduced Significant disease  Can't perform hobby Occasional  Assist Normal or   Reduced Full or confused   50 Mainly sit/lie Extensive Disease  Can't do any work Partial Assist Normal or Reduced Full or confused   40 Mainly in bed Extensive Disease  Can't do any work Mainly Assist Normal or Reduced Full or confused   30 Bedbound Extensive Disease  Can't do any work Max Assist  Total Care Reduced Drowsy/confused   20 Bedbound Extensive Disease  Can't do any work Max Assist  Total Care Minimal Drowsy/confused   10 Bedbound/coma Extensive Disease  Can't do any work  coma  Max Assist  Total Care Mouth care Drowsy or coma   0 Death       Palliative Care Assessment:     Goals of Care Discussion:     Maryam is unable to participate in discussions about her care.    I called her daughter Jada. She requested medical updated. I provided input as noted by pulm, cardiology and primary services as well as d/w nursing today.     She shared she was at hospital  until 230-330 this AM and that Prema ate \"very well,\" and that she just started on a feeding tube so she expects her to continue to improve. Overnight Prema was interacting with her close to her baseline - which is appropriate mentation and verbalizing. She shared frustration with historical hospital stay where she was not informed about an intubation that left her with difficulty speaking.     She reflected that last summer she was doing well until she needed \"a touch up thoracentesis.\" And that her baseline is doing her own taxes and math in her head, and is an otherwise \"normal person.\"      She confirms full treatment focus of care is to continue. She wants to pursue BRADLY at [place] and to have her return home with improved strength and function with ongoing nutrition.     Jada will benefit from ongoing education on the trajectory of dementia and pother life-limiting conditions.     Will likely need to address long-term nutrition this hospital stay.     Advance Care Planning Counseling / Discussion:    Code Status: Full Code.  Jada has been reviewing POLST w fiance. She is hoping her mom will be able to participate in a discussion about her wishes and limits, if any, in the coming days.    HCPOA: Daughter Jada Holcomb [phone]      Problem List:       Principal Problem:    Pleural effusion  Active Problems:    Acute respiratory failure (HCC)    Acute on chronic congestive heart failure, unspecified heart failure type (HCC)    Acute on chronic diastolic congestive heart failure (HCC)    Sepsis (HCC)    Acute respiratory failure with hypoxia (HCC)    Somnolence    Hyperkalemia    Respiratory acidosis    Multiple subsegmental pulmonary emboli without acute cor pulmonale (HCC)    Palliative care by specialist    Goals of care, counseling/discussion    Encephalopathy    Ventral hernia without obstruction or gangrene      Palliative Care Recommendations/Plan:         Goals of Care:    Jada requests ongoing full  treatment.  She is medically updated on POC for the day per primary, pulm, and cardiology notes so far and agrees w POC.  She hopes for her mom to be more wakeful with more nutrition, and for her to become stronger with more time  Agrees with BRADLY at [place] - wishes to revisit [facility] now that she no longer requires AVAPS. Will inform SW.  She will benefit from ongoing education about trajectory of Prema's multiple life-limiting conditions.    Code Status: Full Code.   Jada has POLST form and has been reviewing with her fiance. She is hoping Prema will be able back to her reported baseline where she is appropriate to review and provide input on her wishes.     HCPOA: Healthcare Agent's Name: Daughter, Jada Holcomb    Disposition:  Pending clinical course.      A total of 50 minutes were spent on this visit, which included all of the following:  Chart review, direct face to face contact, history taking, physical examination, counseling and coordinating care, and documentation.        Reviewed the above with patient's RN.    Thank you for inviting Palliative Care to participate in the care of Yin Butcher and family.       GOC are established for full treatment. Will follow peripherally.      MINDI Barone  Palliative Care    1/26/2024  3:54 PM    The 21st Century Cures Act makes medical notes like these available to patients in the interest of transparency. Please be advised this is a medical document. Medical documents are intended to carry relevant information, facts as evident, and the clinical opinion of the practitioner. The medical note is intended as a peer to peer communication, and may appear blunt or direct. It is written in medical language and may contain abbreviations or verbiage that are unfamiliar.

## 2024-01-26 NOTE — PROGRESS NOTES
Progress Note  Yin Butcher Patient Status:  Inpatient    1931 MRN [id number]   Location [facility] 4SW-A Attending Brooke Tracy MD   Hosp Day # 5 PCP Mina Walker MD     Subjective:  Drowsy, lethargic, does respond to name. No acute distress.     Objective:  /82 (BP Location: Right arm)   Pulse 103   Temp 98 °F (36.7 °C) (Tympanic)   Resp 24   Ht 5' 7\" (1.702 m)   Wt 120 lb (54.4 kg)   SpO2 94%   BMI 18.79 kg/m²     Telemetry: afib.      Intake/Output:    Intake/Output Summary (Last 24 hours) at 2024 1237  Last data filed at 2024 2100  Gross per 24 hour   Intake 264.2 ml   Output 100 ml   Net 164.2 ml       Last 3 Weights   24 0047 120 lb (54.4 kg)   24 0000 115 lb 11.9 oz (52.5 kg)   24 0000 113 lb 12.1 oz (51.6 kg)   24 0007 109 lb 2 oz (49.5 kg)   24 2143 105 lb 13.1 oz (48 kg)   10/29/23 0600 104 lb 11.5 oz (47.5 kg)   10/24/23 0457 114 lb 3.2 oz (51.8 kg)   10/23/23 0340 92 lb 9.5 oz (42 kg)   10/22/23 2033 92 lb 9.5 oz (42 kg)   09/10/23 1203 92 lb 14.4 oz (42.1 kg)   23 0746 105 lb 12.8 oz (48 kg)   23 2218 106 lb (48.1 kg)       Labs:  Recent Labs   Lab 24  0506 24  0434 24  0806   * 105* 78   BUN 30* 25* 21   CREATSERUM 0.86 0.48* 0.49*   EGFRCR 63 89 88   CA 8.3* 8.3* 8.7    134* 139   K 3.3*  3.3* 3.9  3.9 4.0    103 103   CO2 34.0* 34.0* 34.0*     Recent Labs   Lab 24  0506 24  1527 24  0434 24  0806   RBC 2.99*  --  3.16* 3.04*   HGB 7.2* 8.6* 8.1* 7.8*   HCT 24.7*  --  27.0* 25.6*   MCV 82.6  --  85.4 84.2   MCH 24.1*  --  25.6* 25.7*   MCHC 29.1*  --  30.0* 30.5*   RDW 18.4  --  18.3 19.2   NEPRELIM 11.87*  --  10.34* 7.19   WBC 14.0*  --  11.8* 8.8   .0  --  199.0 185.0         Recent Labs   Lab 24  2159   TROPHS 25   CK 32       Review of Systems   Constitutional: Positive for malaise/fatigue.   Cardiovascular:  Positive for irregular  heartbeat.   Respiratory: Negative.         Physical Exam:    Gen: drowsy, lethargic, nad, frail/cachectic   Heent: pupils equal, reactive. Mucous membranes moist.   Neck: no jvd  Cardiac: irregularly irregular, normal S1,S2, 2/6 hsm  Lungs: clear anteriorly.  Abd: soft, NT/ND +bs  Ext: no edema  Skin: Warm, dry  Neuro: drowsy, lethargic, underlying dementia.        Medications:     metoprolol tartrate  12.5 mg Oral 2x Daily(Beta Blocker)    digoxin  125 mcg Oral Q MWF    docusate sodium  100 mg Oral BID    midodrine  10 mg Oral TID    multivitamin  1 tablet Oral Daily      continuous dose heparin 1,100 Units/hr (01/26/24 9412)       Admitted after EMS contacted due to being unresponsive at home. Admitted on NIPPV with minimal responsiveness.     Assessment:  Admitted with acute respiratory failure, multifactorial with PE, effusions, ? Pna  Pulm following  Abx stopped. Nippv as needed.   Shock-resolved  Off pressors. On home midodrine.   Acute bilateral PE  LE US negative for DVT  Echo without evidence of RV strain  Heparin gtt-->transition to doac when no further procedures needed as per pulm.   Chronic Afib, rate controlled  Historically not on AC  Echo 1/21/24-LVEF 65-70%, normal wall motion. RV function normal. Mild-mod MR, mild-mod TR  Right pleural effusion  Recurrent, partially loculated. No plans for thora at this time.   AMS with underlying dementia  Hypotension-chronic on midodrine  Off pressors  Anemia, chronic--hgb 7.8   LORIE-venofer  CAD-calcifications noted on CT  Social issues--came from home with poor living conditions--SW following.  Profound weakness, poor po intake  Dobhoff in place  Palliative care following.    Plan:  BP stable on midodrine.   Rates improving with resumption of bb and digoxin. Will increase bb to tid dosing, if bp tolerates, will increase back to home dose tomorrow.  Remains on heparin gtt with plans to transition to doac once no further procedures planned per pulm  Resume home  lasix for effusions.     Plan of care discussed with patient, RN.    Leslie MINDI De La Cruz  1/26/2024  12:37 PM  [phone]  [facility] [phone]      Patient seen and examined independently.  Note reviewed and labs reviewed. Agree with above assessment and plan.  89-year-old female who presented with respiratory failure which appears to have been multifactorial in nature.  Blood pressure has been improving and she is tolerating beta-blocker therapy at this time.  Patient is on chronic midodrine therapy which has been resumed.  Recommend initiation of diuretics.  Heparin infusion until DOAC can be started.  Concern for failure to thrive.  Will continue to follow.        Edgard Pack DO  Cardiologist  [facility]  1/26/2024 3:19 PM      Note to the patient: The 21st Century Cures Act makes medical notes like these available to patients in the interest of transparency. However, be advised that this is a medical document. It is intended as peer to peer communication. It is written in medical language and may contain abbreviations or verbiage that are unfamiliar. It may appear blunt or direct. Medical documents are intended to carry relevant information, facts as evident, and clinical opinion of the practitioner.     Disclaimer: Components of this note were documented using voice recognition system and are subject to errors not corrected at proofreading. Contact the author of this note for any clarifications.

## 2024-01-26 NOTE — CONSULTS
HISTORY OF PRESENT ILLNESS     Yin Butcher is a 89 year old female with a history of atrial fibrillation who presented to the ER 1/20/24 with unresponsiveness, hypothermia, and shock. She was admitted to ICU, was started on BIPAP, vasopressors, and antibiotics. She was found to have acute bilateral pulmonary emboli for which she was started on heparin. She improved and was transferred to the floor. We've been consulted to assist in the patient's care.     The patient is currently being assisted with breakfast. She denies having any shortness of breath or cough at this time. She voices no complaints.     PAST MEDICAL HISTORY     Past Medical History:   Diagnosis Date    Arthritis     Atrial fibrillation (HCC)     Back problem     Cancer (HCC)     Cataract     Osteoporosis     Personal history of antineoplastic chemotherapy        PAST SURGICAL HISTORY      Past Surgical History:   Procedure Laterality Date    OTHER SURGICAL HISTORY  7/12/2014    Procedure: HIP HEMIARTHROPLASTY/ BIPOLAR;  Surgeon: Vasu Matamoros MD;  Location:  MAIN OR    REMOVAL OF TONSILS,12+ Y/O         HOME MEDICATIONS      Prior to Admission Medications   Prescriptions Last Dose Informant Patient Reported? Taking?   Calcium Carbonate-Vitamin D (CALCIUM-D) 600-400 MG-UNIT Oral Tab   Yes No   Sig: Take  by mouth.   Denosumab (PROLIA SC) More than a month  Yes No   Sig: Inject into the skin.   acetaminophen 500 MG Oral Tab Past Month  No Yes   Sig: Take 1 tablet (500 mg total) by mouth every 4 (four) hours as needed for Pain.   digoxin 0.125 MG Oral Tab 1/20/2024  Yes Yes   Sig: Take 1 tablet (125 mcg total) by mouth Every Monday, Wednesday, and Friday.   furosemide 20 MG Oral Tab 1/20/2024  No Yes   Sig: Take 1 tablet (20 mg total) by mouth daily.   lidocaine-menthol 4-1 % External Patch More than a month  No No   Sig: Place 1 patch onto the skin daily.   metoprolol tartrate 50 MG Oral Tab Past Week  Yes Yes   Sig: Take 0.5 tablets  (25 mg total) by mouth 2 (two) times daily.   midodrine 5 MG Oral Tab   Yes Yes   Sig: Take 1 tablet (5 mg total) by mouth as needed (as needed).   multivitamin Oral Chew Tab Past Month  No Yes   Sig: Chew 1 tablet by mouth daily.   polyethylene glycol, PEG 3350, 17 g Oral Powd Pack Not Taking  Yes No   Sig: Take 17 g by mouth daily as needed (constipation).   Patient not taking: Reported on 1/21/2024      Facility-Administered Medications: None       CURRENT MEDICATIONS       metoprolol tartrate  12.5 mg Oral 2x Daily(Beta Blocker)    digoxin  125 mcg Oral Q MWF    docusate sodium  100 mg Oral BID    midodrine  10 mg Oral TID    multivitamin  1 tablet Oral Daily       PRN meds:  polyethylene glycol (PEG 3350), magnesium hydroxide, bisacodyl, fleet enema, metoclopramide, ondansetron, glucose **OR** glucose **OR** glucose-vitamin C **OR** dextrose **OR** glucose **OR** glucose **OR** glucose-vitamin C, acetaminophen, acetaminophen, morphINE    Infusions:   continuous dose heparin 1,100 Units/hr (01/26/24 7790)         ALLERGIES      Allergies   Allergen Reactions    Erythromycin OTHER (SEE COMMENTS)    Oxycodone HALLUCINATION    Gabapentin NAUSEA ONLY and OTHER (SEE COMMENTS)     Pt stated she didn't feel like herself     Tizanidine ITCHING and OTHER (SEE COMMENTS)     Pt states burning sensation        SOCIAL HISTORY        Social History     Socioeconomic History    Marital status:      Spouse name: Not on file    Number of children: Not on file    Years of education: Not on file    Highest education level: Not on file   Occupational History    Not on file   Tobacco Use    Smoking status: Never    Smokeless tobacco: Never   Vaping Use    Vaping Use: Never used   Substance and Sexual Activity    Alcohol use: Not Currently    Drug use: Never    Sexual activity: Not on file   Other Topics Concern    Not on file   Social History Narrative    ** Merged History Encounter **          Social Determinants of Health      Financial Resource Strain: Not on file   Food Insecurity: Unknown (1/24/2024)    Food Insecurity     Food Insecurity: Patient unable to answer   Transportation Needs: Unknown (1/24/2024)    Transportation Needs     Lack of Transportation: Patient unable to answer   Physical Activity: Not on file   Stress: Not on file   Social Connections: Not on file   Housing Stability: Unknown (1/24/2024)    Housing Stability     Housing Instability: Patient unable to answer     Housing Instability Emergency: Not on file       FAMILY HISTORY      Family History   Family history unknown: Yes       OBJECTIVE      Vitals:    01/25/24 2300 01/26/24 0000 01/26/24 0047 01/26/24 0400   BP:  (!) 139/103 128/80 (!) 134/93   BP Location:   Left arm Right arm   Pulse: 114 100 97 98   Resp: (!) 28 (!) 33     Temp:   98.1 °F (36.7 °C) 98.1 °F (36.7 °C)   TempSrc:   Oral Oral   SpO2: 94% 91% 94% 94%   Weight:   120 lb (54.4 kg)    Height:         O2: 2LPM    Gen - Alert, in restraints, pleasant, somewhat confused but in no distress  HEENT - head normocephalic, atraumatic. Eyes-no scleral icterus or injection  Lungs - decreased breath sounds at both bases, otherwise clear  CV - regular rate & rhythm. Normal S1, S2. No murmurs, rubs, or gallops appreciated.  Abdomen - soft, nontender to palpation. Reducible umbilical hernia noted   Extremities - No cyanosis, clubbing, edema appreciated.      Labs:    Recent Labs   Lab 01/23/24  0506 01/23/24  1527 01/24/24  0434 01/25/24  0806   WBC 14.0*  --  11.8* 8.8   HGB 7.2* 8.6* 8.1* 7.8*   .0  --  199.0 185.0     Recent Labs   Lab 01/20/24  2159 01/21/24  0055 01/21/24  0441 01/22/24  0224 01/23/24  0506 01/24/24  0434 01/25/24  0806     --  135* 138 138 134* 139   K 5.0  --  4.6 3.7 3.3*  3.3* 3.9  3.9 4.0     --  99 102 103 103 103   CO2 30.0  --  27.0 32.0 34.0* 34.0* 34.0*   BUN 34*  --  40* 45* 30* 25* 21   CREATSERUM 1.16*  --  1.35* 1.28* 0.86 0.48* 0.49*   *  --   101* 111* 125* 105* 78   ANIONGAP 5  --  9 4 1 <0* 2   ALB 2.9*  --  2.7* 2.8* 2.6* 2.8* 2.6*   CA 8.5  --  8.2* 8.2* 8.3* 8.3* 8.7   MG  --   --  1.8 2.2  --   --   --    PHOS  --   --  6.1*  --   --   --   --    ALKPHO 84  --  77 68 79 77 80   AST 43*  --  86* 70* 34 17 18   BILT 1.5  --  1.4 1.4 1.1 1.5 1.2   TP 7.0  --  6.8 6.4 6.4 6.0* 5.7*   LACTI 6.3* 6.0* 4.1*  --   --   --   --      Recent Labs   Lab 01/22/24  0530 01/22/24  1151 01/22/24  1717 01/22/24 2037 01/25/24  1407   PGLU 136* 119* 101* 178* 105*     Recent Labs   Lab 01/21/24  0441 01/21/24  1933   INR 1.64* 1.84*     Recent Labs   Lab 01/20/24 2159 01/21/24  0441   CK 32  --    PBNP 15,224* 19,682*   TROPHS 25  --      Recent Labs   Lab 01/20/24 2159 01/21/24  0055 01/23/24  0506   DORIAN  --   --  24.1   DDIMER 2.38*  --   --    PCT <0.05  --   --    NH3  --  17  --      Recent Labs   Lab 01/23/24  1909 01/23/24  2359 01/24/24  0833   ABGPHT 7.31* 7.31* 7.39   YINIJR1J 69* 66* 58*   STHDI3O 220* 88 141*   ABGHCO3 30.5* 29.4* 31.9*   ABGBE 7.1* 5.8* 8.9*     Recent Labs   Lab 01/20/24 2247 01/21/24  0011 01/21/24  0024   COVID19 Not Detected Not Detected  --    INFAPCR Negative Not Detected  --    INFBPCR Negative Not Detected  --    RSV Negative  --   --    STREPPNEUMAG  --   --  Negative   LEGIONAG  --   --  Negative       Urinalysis:  Recent Labs   Lab 01/20/24  2217   COLORUR Yellow   CLARITY Clear   SPECGRAVITY 1.017   PHURINE 5.0   PROUR 50*   KETUR Negative   GLUUR Normal   BILUR Negative   BLOODURINE Negative   NITRITE Negative   UROBILINOGEN Normal   LEUUR Negative   EPIUR Few*   WBCUR 1-5   BACUR None Seen          Imaging reviewed.    ASSESSMENT AND PLAN     Acute hypoxic and hypercapnic respiratory failure- due to acute PE and bilateral pleural effusions. Pt was briefly treated with zosyn, but pneumonia is less likely  - wean O2 as able; AVAPS during sleep as tolerated  - diuretics per cardiology  Shock - hypovolemic vs cardiogenic.  TTE showed LVEF 65-70% with component of diastolic dysfunction. Morning cortisol level was wnl. Pt was able to be weaned off vasopressors.  - continue midodrine as pt has a component of chronic hypotension   Acute bilateral PE. LE doppler was negative for DVT. TTE was without right heart strain  - currently on heparin drip- transition to DOAC if no procedures planned.  Bilateral pleural effusions - Recurrent, likely due to diastolic dysfunction. R effusion is partially loculated. Previously pt has undergone thoracentesis ; showed transudative fluid consistent with CHF  - diuresis per cardiology  - hold off on thoracentesis, pt is at high risk for developing an ex-vacuo pneumothorax as the effusion is chronic, loculated and will likely recur   HFpEF - TTE 65-70%, likely diastolic dysfunction  - Diurese as able  - Cardiology following  Chronic Afib - Not on anticoagulation at home  - per cardiology  Acute Encephalopathy - possibly due to TME and underlying dementia. CT brain was negative   - supportive care  Proph:  - Heparin drip  Dispo  -full code   -inpatient     We will continue to follow with you     Arnulfo Sharma M.D.  Pulmonary/Critical Care

## 2024-01-27 LAB
ANION GAP SERPL CALC-SCNC: 2 MMOL/L (ref 0–18)
APTT PPP: 72.3 SECONDS (ref 23.3–35.6)
BUN BLD-MCNC: 23 MG/DL (ref 9–23)
CALCIUM BLD-MCNC: 8.8 MG/DL (ref 8.5–10.1)
CHLORIDE SERPL-SCNC: 102 MMOL/L (ref 98–112)
CO2 SERPL-SCNC: 37 MMOL/L (ref 21–32)
CREAT BLD-MCNC: 0.34 MG/DL
EGFRCR SERPLBLD CKD-EPI 2021: 97 ML/MIN/1.73M2 (ref 60–?)
ERYTHROCYTE [DISTWIDTH] IN BLOOD BY AUTOMATED COUNT: 21.3 %
GLUCOSE BLD-MCNC: 105 MG/DL (ref 70–99)
HCT VFR BLD AUTO: 29.1 %
HGB BLD-MCNC: 8.6 G/DL
MCH RBC QN AUTO: 26.2 PG (ref 26–34)
MCHC RBC AUTO-ENTMCNC: 29.6 G/DL (ref 31–37)
MCV RBC AUTO: 88.7 FL
OSMOLALITY SERPL CALC.SUM OF ELEC: 296 MOSM/KG (ref 275–295)
PLATELET # BLD AUTO: 171 10(3)UL (ref 150–450)
POTASSIUM SERPL-SCNC: 4.4 MMOL/L (ref 3.5–5.1)
RBC # BLD AUTO: 3.28 X10(6)UL
SODIUM SERPL-SCNC: 141 MMOL/L (ref 136–145)
WBC # BLD AUTO: 10.8 X10(3) UL (ref 4–11)

## 2024-01-27 PROCEDURE — 99232 SBSQ HOSP IP/OBS MODERATE 35: CPT | Performed by: STUDENT IN AN ORGANIZED HEALTH CARE EDUCATION/TRAINING PROGRAM

## 2024-01-27 RX ORDER — FUROSEMIDE 10 MG/ML
20 INJECTION INTRAMUSCULAR; INTRAVENOUS ONCE
Status: COMPLETED | OUTPATIENT
Start: 2024-01-27 | End: 2024-01-27

## 2024-01-27 RX ORDER — ALBUMIN (HUMAN) 12.5 G/50ML
25 SOLUTION INTRAVENOUS ONCE
Status: COMPLETED | OUTPATIENT
Start: 2024-01-27 | End: 2024-01-27

## 2024-01-27 NOTE — PROGRESS NOTES
S: She is sleeping this morning. She's in restraints.     Meds:   furosemide  20 mg Oral Daily    metoprolol tartrate  12.5 mg Oral TID Beta Blocker/Cardiac    digoxin  125 mcg Oral Q MWF    docusate sodium  100 mg Oral BID    midodrine  10 mg Oral TID    multivitamin  1 tablet Oral Daily       Prn Meds:  polyethylene glycol (PEG 3350), magnesium hydroxide, bisacodyl, fleet enema, metoclopramide, ondansetron, glucose **OR** glucose **OR** glucose-vitamin C **OR** dextrose **OR** glucose **OR** glucose **OR** glucose-vitamin C, acetaminophen, acetaminophen, morphINE    Infusions:   continuous dose heparin 1,100 Units/hr (01/26/24 1828)       OBJECTIVE:  Vitals:    01/26/24 1900 01/26/24 2357 01/27/24 0038 01/27/24 0541   BP: 109/71 136/74 130/76 94/53   Pulse: 91 92 94 77   Resp: 21 22 24 24   Temp: 98.8 °F (37.1 °C) 98.2 °F (36.8 °C) 99.7 °F (37.6 °C)    TempSrc: Axillary Axillary Axillary    SpO2: 98% 97% 97% 97%   Weight:       Height:         O2: 4L  Gen - sleeping, in restraints  Lungs - CTAB  CV - regular rate & rhythm. Normal S1, S2. No murmurs, rubs, or gallops appreciated.  Abdomen - soft, nontender to palpation   Extremities - 1+ pedal edema        Labs:  Recent Labs   Lab 01/24/24  0434 01/25/24  0806 01/27/24  0658   WBC 11.8* 8.8 10.8   HGB 8.1* 7.8* 8.6*   .0 185.0 171.0     Recent Labs   Lab 01/20/24  2159 01/21/24  0055 01/21/24  0441 01/22/24  0224 01/23/24  0506 01/24/24  0434 01/25/24  0806 01/27/24  0658     --  135* 138 138 134* 139 141   K 5.0  --  4.6 3.7 3.3*  3.3* 3.9  3.9 4.0 4.4     --  99 102 103 103 103 102   CO2 30.0  --  27.0 32.0 34.0* 34.0* 34.0* 37.0*   BUN 34*  --  40* 45* 30* 25* 21 23   CREATSERUM 1.16*  --  1.35* 1.28* 0.86 0.48* 0.49* 0.34*   *  --  101* 111* 125* 105* 78 105*   ANIONGAP 5  --  9 4 1 <0* 2 2   ALB 2.9*  --  2.7* 2.8* 2.6* 2.8* 2.6*  --    CA 8.5  --  8.2* 8.2* 8.3* 8.3* 8.7 8.8   MG  --   --  1.8 2.2  --   --   --   --     PHOS  --   --  6.1*  --   --   --   --   --    ALKPHO 84  --  77 68 79 77 80  --    AST 43*  --  86* 70* 34 17 18  --    BILT 1.5  --  1.4 1.4 1.1 1.5 1.2  --    TP 7.0  --  6.8 6.4 6.4 6.0* 5.7*  --    LACTI 6.3* 6.0* 4.1*  --   --   --   --   --      Recent Labs   Lab 01/21/24  0441 01/21/24  0945 01/21/24  1933 01/22/24  0224 01/26/24  0628 01/26/24  1350 01/27/24  0658   INR 1.64*  --  1.84*  --   --   --   --    PTT 27.5   < > 185.9*   < > 63.7* 79.5* 72.3*    < > = values in this interval not displayed.     Recent Labs   Lab 01/20/24 2159 01/21/24  0441   CK 32  --    PBNP 15,224* 19,682*   TROPHS 25  --      Recent Labs   Lab 01/20/24 2159 01/21/24  0055 01/23/24  0506   DORIAN  --   --  24.1   DDIMER 2.38*  --   --    PCT <0.05  --   --    NH3  --  17  --      Recent Labs   Lab 01/23/24  1909 01/23/24  2359 01/24/24  0833   ABGPHT 7.31* 7.31* 7.39   JDLLUW6L 69* 66* 58*   DZGLF7O 220* 88 141*   ABGHCO3 30.5* 29.4* 31.9*   ABGBE 7.1* 5.8* 8.9*       Imaging reviewed    ASSESSMENT AND PLAN      Acute hypoxic and hypercapnic respiratory failure- due to acute PE and bilateral pleural effusions. Pt was briefly treated with zosyn, but pneumonia is less likely  - wean O2 as able  - AVAPS during sleep as tolerated  - anticoagulation for PE as below  - diuretics per cardiology  Acute bilateral PE. LE doppler was negative for DVT. TTE was without right heart strain  - currently on heparin drip - ok to transition to DOAC from pulmonary standpoint  Bilateral pleural effusions - Recurrent, likely due to diastolic dysfunction. R effusion is partially loculated. Previously pt has undergone thoracentesis ; showed transudative fluid consistent with CHF  - diuresis per cardiology  - hold off on thoracentesis, pt is at high risk for developing an ex-vacuo pneumothorax as the effusion is chronic, and loculated. The fluid will likely recur as well unless she's euvolemic.  Shock - hypovolemic vs cardiogenic. Resolved.   -  continue midodrine for component of chronic hypotension   HFpEF - TTE 65-70%   - Diurese as able  - Cardiology following  Chronic Afib    - per cardiology  Acute Encephalopathy - possibly due to TME and underlying dementia. CT brain was negative   - supportive care  Proph:  - currently on Heparin drip  Dispo  -full code   -inpatient. Discharge planning.    We will continue to follow with you     Arnulfo Sharma M.D.  Pulmonary/Critical Care

## 2024-01-27 NOTE — PROGRESS NOTES
[facility]     Hospitalist Progress Note     Yin Butcher Patient Status:  Inpatient    1931 MRN [id number]   Location [facility] 4SW-A Attending Skyla Tinsley MD   Hosp Day # 6 PCP Mina Walker MD     Chief Complaint: Shock     Subjective:     Patient  woken up from sleep, tired   Objective:    Review of Systems:   A comprehensive review of systems was completed; pertinent positive and negatives stated in subjective.    Vital signs:  Temp:  [98.1 °F (36.7 °C)-99.7 °F (37.6 °C)] 98.4 °F (36.9 °C)  Pulse:  [] 106  Resp:  [20-24] 20  BP: ()/(53-96) 135/78  SpO2:  [96 %-98 %] 96 %    Physical Exam:    General: No acute distress, elderly lady   Respiratory: No wheezes, no rhonchi  Cardiovascular: S1, S2, regular rate and rhythm  Abdomen: Soft, Non-tender, non-distended, positive bowel sounds  Neuro: No new focal deficits.   Extremities: No edema      Diagnostic Data:    Labs:  Recent Labs   Lab 24  0441 24  1933 24  0224 24  0506 24  1527 24  0434 24  0806 24  0658   WBC 10.2 13.0* 15.5* 14.0*  --  11.8* 8.8 10.8   HGB 7.8* 7.0* 7.1* 7.2* 8.6* 8.1* 7.8* 8.6*   MCV 85.8 78.5* 80.2 82.6  --  85.4 84.2 88.7   .0 255.0 262.0 247.0  --  199.0 185.0 171.0   INR 1.64* 1.84*  --   --   --   --   --   --        Recent Labs   Lab 24  0506 24  0434 24  0806 24  0658   * 105* 78 105*   BUN 30* 25* 21 23   CREATSERUM 0.86 0.48* 0.49* 0.34*   CA 8.3* 8.3* 8.7 8.8   ALB 2.6* 2.8* 2.6*  --     134* 139 141   K 3.3*  3.3* 3.9  3.9 4.0 4.4    103 103 102   CO2 34.0* 34.0* 34.0* 37.0*   ALKPHO 79 77 80  --    AST 34 17 18  --    BILT 1.1 1.5 1.2  --    TP 6.4 6.0* 5.7*  --        Estimated Creatinine Clearance: 90.7 mL/min (A) (based on SCr of 0.34 mg/dL (L)).    Recent Labs   Lab 24  2159   TROPHS 25   CK 32       Recent Labs   Lab 24  0441 24  1933   PTP 19.6* 21.4*   INR 1.64* 1.84*                   Microbiology    Hospital Encounter on 01/20/24   1. Blood Culture     Status: None    Collection Time: 01/20/24  9:59 PM    Specimen: Blood,peripheral   Result Value Ref Range    Blood Culture Result No Growth 5 Days N/A         Imaging: Reviewed in Epic.    Medications:    docusate  100 mg Oral BID    furosemide  20 mg Intravenous Once    albumin human  25 g Intravenous Once    furosemide  20 mg Oral Daily    metoprolol tartrate  12.5 mg Oral TID Beta Blocker/Cardiac    digoxin  125 mcg Oral Q MWF    midodrine  10 mg Oral TID    multivitamin  1 tablet Oral Daily       Assessment & Plan:      #Shock, hypovolemic vs septic  vs cardiogenic  Pressors  IV Abx   Monitor BLood CX  Monitor I/O   #Bilateral pleural effusion  #Acute hypoxic resp failure due to acute PE with no RV strain on Echo and due to pleural effusion  Was on BIPAP in ICU  Diuresis   Hep gtt --> DOAC  Doppler LE   #Lactic acidosis due to above, trend  #Anemia  Iron panel, below baseline   #Hypothermia on admission  Coritsol level normal   #CHF with acute exacerbation - HFPEF  Echo   # + Blood CX: Propionibacterium 11/20, resulted 1/26   Repeat blood Cx      Skyla Tinsley MD    Supplementary Documentation:     Quality:  DVT Mechanical Prophylaxis:   SCDs,    DVT Pharmacologic Prophylaxis   Medication    heparin (Porcine) 30737 units/250mL infusion CONTINUOUS                Code Status: Full Code  Bowie: External urinary catheter in place  Bowie Duration (in days): 2  Central line:    ELMO:     Discharge is dependent on: clinical   At this point Ms. Butcher is expected to be discharge to: tbd     The 21st Century Cures Act makes medical notes like these available to patients in the interest of transparency. Please be advised this is a medical document. Medical documents are intended to carry relevant information, facts as evident, and the clinical opinion of the practitioner. The medical note is intended as peer to peer communication and  may appear blunt or direct. It is written in medical language and may contain abbreviations or verbiage that are unfamiliar.

## 2024-01-27 NOTE — PLAN OF CARE
Assumed care around 0100. Pt. Originally not verbally responsive but withdrawing from pain, as of the morning though was mouthing responses-voice very hoarse/weak. On AVAPs at start of care, transitioned to 5 L O2 NC when more awake/pulling at the mask. Afib on tele. VSS. AM Metoprolol held d/t BP. Heparin drip at 11/hr. Was on tube feeds, but lost NG access around 0530, attempted to fix but was unable to. Dr. Georges lerner, plan is to pull the current NG and watch calorie intake today, then proceed as needed. No signs of pain. Incontinent, did have BM this AM. Mepilex dressings clean, dry, intact. No restraints needed at this time. Fall and safety precautions in place. Will continue to monitor. Plan of care ongoing.

## 2024-01-27 NOTE — PLAN OF CARE
Assumed patient care at 1930  Aox0-pt unable to respond verbally  LUIS WEAVER  Tele-Afib controlled  Heparin gtt  Tube feeding-Dobbhoff 45mL/hr  Reg/general diet; nectar/mildly thick  Accu check q6hrs  Calorie count  Cardiac electrolyte protocol  Wound care daily  Restraints discontinued at 2000  Daughter to stay at bedside  Safety precautions in place  Bed in lowest position and call light within reach  Daughter helped feed pt's dinner  Pt's daughter updated with plan of care  All needs met at this time  Will continue plan of care              Problem: Patient/Family Goals  Goal: Patient/Family Long Term Goal  Description: Patient's Long Term Goal: return home    Interventions:  - cooperate with PT/OT/SLP as ordered  - ?learn how to give Lovenox injections  - See additional Care Plan goals for specific interventions  1/26/2024 2040 by Princess Oralia Fuchs RN  Outcome: Progressing  1/26/2024 2000 by Princess Oralia Fuchs RN  Outcome: Progressing  Goal: Patient/Family Short Term Goal  Description: Patient's Short Term Goal: wean off levophed    Interventions:   - antibiotics as prescribed  - IV fluid as prescribed  - PRBC as prescribed  - See additional Care Plan goals for specific interventions  1/26/2024 2040 by Princess Oralia Fuchs RN  Outcome: Progressing  1/26/2024 2000 by Princess Oralia Fuchs RN  Outcome: Progressing     Problem: CARDIOVASCULAR - ADULT  Goal: Maintains optimal cardiac output and hemodynamic stability  Description: INTERVENTIONS:  - Monitor vital signs, rhythm, and trends  - Monitor for bleeding, hypotension and signs of decreased cardiac output  - Evaluate effectiveness of vasoactive medications to optimize hemodynamic stability  - Monitor arterial and/or venous puncture sites for bleeding and/or hematoma  - Assess quality of pulses, skin color and temperature  - Assess for signs of decreased coronary artery perfusion - ex. Angina  - Evaluate fluid balance, assess for edema, trend weights  1/26/2024  2040 by Princess Oralia Fuchs RN  Outcome: Progressing  1/26/2024 2000 by Princess Oralia Fuchs RN  Outcome: Progressing     Problem: RESPIRATORY - ADULT  Goal: Achieves optimal ventilation and oxygenation  Description: INTERVENTIONS:  - Assess for changes in respiratory status  - Assess for changes in mentation and behavior  - Position to facilitate oxygenation and minimize respiratory effort  - Oxygen supplementation based on oxygen saturation or ABGs  - Provide Smoking Cessation handout, if applicable  - Encourage broncho-pulmonary hygiene including cough, deep breathe, Incentive Spirometry  - Assess the need for suctioning and perform as needed  - Assess and instruct to report SOB or any respiratory difficulty  - Respiratory Therapy support as indicated  - Manage/alleviate anxiety  - Monitor for signs/symptoms of CO2 retention  1/26/2024 2040 by Princess Oralia Fuchs RN  Outcome: Progressing  1/26/2024 2000 by Princess Oralia Fuchs RN  Outcome: Progressing     Problem: METABOLIC/FLUID AND ELECTROLYTES - ADULT  Goal: Glucose maintained within prescribed range  Description: INTERVENTIONS:  - Monitor Blood Glucose as ordered  - Assess for signs and symptoms of hyperglycemia and hypoglycemia  - Administer ordered medications to maintain glucose within target range  - Assess barriers to adequate nutritional intake and initiate nutrition consult as needed  - Instruct patient on self management of diabetes  1/26/2024 2040 by Princess Oralia Fuchs RN  Outcome: Progressing  1/26/2024 2000 by Princess Oralia Fuchs RN  Outcome: Progressing  Goal: Electrolytes maintained within normal limits  Description: INTERVENTIONS:  - Monitor labs and rhythm and assess patient for signs and symptoms of electrolyte imbalances  - Administer electrolyte replacement as ordered  - Monitor response to electrolyte replacements, including rhythm and repeat lab results as appropriate  - Fluid restriction as ordered  - Instruct patient on fluid and  nutrition restrictions as appropriate  1/26/2024 2040 by Princess Oralia Fuchs RN  Outcome: Progressing  1/26/2024 2000 by Princess Oralia Fuchs RN  Outcome: Progressing     Problem: NEUROLOGICAL - ADULT  Goal: Achieves stable or improved neurological status  Description: INTERVENTIONS  - Assess for and report changes in neurological status  - Initiate measures to prevent increased intracranial pressure  - Maintain blood pressure and fluid volume within ordered parameters to optimize cerebral perfusion and minimize risk of hemorrhage  - Monitor temperature, glucose, and sodium. Initiate appropriate interventions as ordered  1/26/2024 2040 by Princess Oralia Fuchs RN  Outcome: Progressing  1/26/2024 2000 by Princess Oralia Fuchs RN  Outcome: Progressing     Problem: Impaired Swallowing  Goal: Minimize aspiration risk  Description: Interventions:  - Patient should be alert and upright for all feedings (90 degrees preferred)  - Offer food and liquids at a slow rate  - No straws  - Encourage small bites of food and small sips of liquid  - Offer pills one at a time, crush or deliver with applesauce as needed  - Discontinue feeding and notify MD (or speech pathologist) if coughing or persistent throat clearing or wet/gurgly vocal quality is noted  1/26/2024 2040 by Princess Oralia Fuchs RN  Outcome: Progressing  1/26/2024 2000 by Princess Oralia Fuchs RN  Outcome: Progressing     Problem: SAFETY ADULT - FALL  Goal: Free from fall injury  Description: INTERVENTIONS:  - Assess pt frequently for physical needs  - Identify cognitive and physical deficits and behaviors that affect risk of falls.  - [place] fall precautions as indicated by assessment.  - Educate pt/family on patient safety including physical limitations  - Instruct pt to call for assistance with activity based on assessment  - Modify environment to reduce risk of injury  - Provide assistive devices as appropriate  - Consider OT/PT consult to assist with  strengthening/mobility  - Encourage toileting schedule  1/26/2024 2040 by Princess Oralia Fuchs RN  Outcome: Progressing  1/26/2024 2000 by Princess Oralia Fuchs RN  Outcome: Progressing     Problem: Delirium  Goal: Minimize duration of delirium  Description: Interventions:  - Encourage use of hearing aids, eye glasses  - Promote highest level of mobility daily  - Provide frequent reorientation  - Promote wakefulness i.e. lights on, blinds open  - Promote sleep, encourage patient's normal rest cycle i.e. lights off, TV off, minimize noise and interruptions  - Encourage family to assist in orientation and promotion of home routines  1/26/2024 2040 by Princess Oralia Fuchs RN  Outcome: Progressing  1/26/2024 2000 by Princess Oralia Fuchs RN  Outcome: Progressing     Problem: Diabetes/Glucose Control  Goal: Glucose maintained within prescribed range  Description: INTERVENTIONS:  - Monitor Blood Glucose as ordered  - Assess for signs and symptoms of hyperglycemia and hypoglycemia  - Administer ordered medications to maintain glucose within target range  - Assess barriers to adequate nutritional intake and initiate nutrition consult as needed  - Instruct patient on self management of diabetes  1/26/2024 2040 by Princess Oralia Fuchs, RN  Outcome: Progressing  1/26/2024 2000 by Princess Oralia Fuchs RN  Outcome: Progressing

## 2024-01-27 NOTE — PROGRESS NOTES
Progress Note  Yin Butcher Patient Status:  Inpatient    1931 MRN [id number]   Location [facility] 4SW-A Attending Brooke Tracy MD   Hosp Day # 6 PCP Mina Walker MD     Subjective:  Awake - no acute overnight events.     Objective:  /68 (BP Location: Left arm)   Pulse 91   Temp 98.6 °F (37 °C) (Axillary)   Resp 22   Ht 5' 7\" (1.702 m)   Wt 120 lb (54.4 kg)   SpO2 98%   BMI 18.79 kg/m²     Telemetry: afib.      Intake/Output:  No intake or output data in the 24 hours ending 24 0935      Last 3 Weights   24 0047 120 lb (54.4 kg)   24 0000 115 lb 11.9 oz (52.5 kg)   24 0000 113 lb 12.1 oz (51.6 kg)   24 0007 109 lb 2 oz (49.5 kg)   24 2143 105 lb 13.1 oz (48 kg)   10/29/23 0600 104 lb 11.5 oz (47.5 kg)   10/24/23 0457 114 lb 3.2 oz (51.8 kg)   10/23/23 0340 92 lb 9.5 oz (42 kg)   10/22/23 2033 92 lb 9.5 oz (42 kg)   09/10/23 1203 92 lb 14.4 oz (42.1 kg)   23 0746 105 lb 12.8 oz (48 kg)   23 2218 106 lb (48.1 kg)       Labs:  Recent Labs   Lab 24  0434 24  0806 24  0658   * 78 105*   BUN 25*  23   CREATSERUM 0.48* 0.49* 0.34*   EGFRCR 89 88 97   CA 8.3* 8.7 8.8   * 139 141   K 3.9  3.9 4.0 4.4    103 102   CO2 34.0* 34.0* 37.0*     Recent Labs   Lab 24  0506 24  1527 24  0434 24  0806 24  0658   RBC 2.99*  --  3.16* 3.04* 3.28*   HGB 7.2*   < > 8.1* 7.8* 8.6*   HCT 24.7*  --  27.0* 25.6* 29.1*   MCV 82.6  --  85.4 84.2 88.7   MCH 24.1*  --  25.6* 25.7* 26.2   MCHC 29.1*  --  30.0* 30.5* 29.6*   RDW 18.4  --  18.3 19.2 21.3   NEPRELIM 11.87*  --  10.34* 7.19  --    WBC 14.0*  --  11.8* 8.8 10.8   .0  --  199.0 185.0 171.0    < > = values in this interval not displayed.         Recent Labs   Lab 24  2159   TROPHS 25   CK 32       Review of Systems   Constitutional: Positive for malaise/fatigue.   Cardiovascular:  Positive for irregular heartbeat.    Respiratory: Negative.         Physical Exam:    Gen: drowsy, lethargic, nad, frail/cachectic   Heent: pupils equal, reactive. Mucous membranes moist.   Neck: no jvd  Cardiac: irregularly irregular, normal S1,S2, 2/6 hsm  Lungs: clear anteriorly.  Abd: soft, NT/ND +bs  Ext: no edema  Skin: Warm, dry  Neuro: drowsy, lethargic, underlying dementia.        Medications:     docusate  100 mg Oral BID    furosemide  20 mg Oral Daily    metoprolol tartrate  12.5 mg Oral TID Beta Blocker/Cardiac    digoxin  125 mcg Oral Q MWF    midodrine  10 mg Oral TID    multivitamin  1 tablet Oral Daily      continuous dose heparin 1,100 Units/hr (01/26/24 1828)       Admitted after EMS contacted due to being unresponsive at home. Admitted on NIPPV with minimal responsiveness.     Assessment:  Admitted with acute respiratory failure, multifactorial with PE, effusions, ? Pna  Pulm following  Abx stopped. Nippv as needed.   Shock-resolved  Off pressors. On home midodrine.   Acute bilateral PE  LE US negative for DVT  Echo without evidence of RV strain  Heparin gtt-->transition to doac when no further procedures needed as per pulm.   Chronic Afib, rate controlled  Historically not on AC  Echo 1/21/24-LVEF 65-70%, normal wall motion. RV function normal. Mild-mod MR, mild-mod TR  Right pleural effusion  Recurrent, partially loculated. No plans for thora at this time.   AMS with underlying dementia  Hypotension-chronic on midodrine  Off pressors  Anemia, chronic--hgb 7.8   LORIE-venofer  CAD-calcifications noted on CT  Social issues--came from home with poor living conditions--SW following.  Profound weakness, poor po intake  Dobhoff in place  Palliative care following.    Plan:  BP stable on midodrine.   Rates improving with resumption of bb and digoxin. Cont tid dosing today, if bp tolerates, will increase back to home dose tomorrow.  Remains on heparin gtt with plans to transition to doac once no further procedures planned per pulm  Cont  home lasix for effusions. Will give IV lasix 20 mg x1 with albumin. I/O's inaccurate.         Edgard Pack DO  Cardiologist  [facility]  1/27/2024 9:36 AM      Note to the patient: The 21st Century Cures Act makes medical notes like these available to patients in the interest of transparency. However, be advised that this is a medical document. It is intended as peer to peer communication. It is written in medical language and may contain abbreviations or verbiage that are unfamiliar. It may appear blunt or direct. Medical documents are intended to carry relevant information, facts as evident, and clinical opinion of the practitioner.     Disclaimer: Components of this note were documented using voice recognition system and are subject to errors not corrected at proofreading. Contact the author of this note for any clarifications.

## 2024-01-28 LAB
ALBUMIN SERPL-MCNC: 2.9 G/DL (ref 3.4–5)
ALBUMIN/GLOB SERPL: 0.8 {RATIO} (ref 1–2)
ALP LIVER SERPL-CCNC: 76 U/L
ANION GAP SERPL CALC-SCNC: 1 MMOL/L (ref 0–18)
APTT PPP: 60.2 SECONDS (ref 23.3–35.6)
APTT PPP: 65.4 SECONDS (ref 23.3–35.6)
ARTERIAL PATENCY WRIST A: POSITIVE
AST SERPL-CCNC: 8 U/L (ref 15–37)
BASE EXCESS BLDA CALC-SCNC: 17.6 MMOL/L (ref ?–2)
BILIRUB SERPL-MCNC: 1.5 MG/DL (ref 0.1–2)
BODY TEMPERATURE: 98.6 F
BUN BLD-MCNC: 25 MG/DL (ref 9–23)
CALCIUM BLD-MCNC: 8.8 MG/DL (ref 8.5–10.1)
CHLORIDE SERPL-SCNC: 100 MMOL/L (ref 98–112)
CO2 SERPL-SCNC: 40 MMOL/L (ref 21–32)
COHGB MFR BLD: 3.1 % SAT (ref 0–3)
CREAT BLD-MCNC: 0.51 MG/DL
EGFRCR SERPLBLD CKD-EPI 2021: 88 ML/MIN/1.73M2 (ref 60–?)
GLOBULIN PLAS-MCNC: 3.5 G/DL (ref 2.8–4.4)
GLUCOSE BLD-MCNC: 131 MG/DL (ref 70–99)
HCO3 BLDA-SCNC: 38.5 MEQ/L (ref 21–27)
HGB BLD-MCNC: 9.5 G/DL
L/M: 2 L/MIN
METHGB MFR BLD: 1.4 % SAT (ref 0.4–1.5)
OSMOLALITY SERPL CALC.SUM OF ELEC: 298 MOSM/KG (ref 275–295)
OXYHGB MFR BLDA: 89.1 % (ref 92–100)
PCO2 BLDA: 67 MM HG (ref 35–45)
PH BLDA: 7.43 [PH] (ref 7.35–7.45)
PO2 BLDA: 61 MM HG (ref 80–100)
POTASSIUM SERPL-SCNC: 4.3 MMOL/L (ref 3.5–5.1)
PROT SERPL-MCNC: 6.4 G/DL (ref 6.4–8.2)
SODIUM SERPL-SCNC: 141 MMOL/L (ref 136–145)

## 2024-01-28 PROCEDURE — 99232 SBSQ HOSP IP/OBS MODERATE 35: CPT | Performed by: STUDENT IN AN ORGANIZED HEALTH CARE EDUCATION/TRAINING PROGRAM

## 2024-01-28 NOTE — CM/SW NOTE
Script for xarelto electronically sent to [facility] (phone )--unable to provide cost as missing insurance ID, group BIN and PCN for member--open till 1800 today and not in stock--would need to be ordered--available Monday

## 2024-01-28 NOTE — PROGRESS NOTES
[facility]     Hospitalist Progress Note     Yin Butcher Patient Status:  Inpatient    1931 MRN [id number]   Location [facility] 4SW-A Attending Skyla Tinsley MD   Hosp Day # 7 PCP Mina Walker MD     Chief Complaint: Shock     Subjective:     Patient  has no complaints  Objective:    Review of Systems:   A comprehensive review of systems was completed; pertinent positive and negatives stated in subjective.    Vital signs:  Temp:  [97.4 °F (36.3 °C)-98 °F (36.7 °C)] 98 °F (36.7 °C)  Pulse:  [] 95  Resp:  [18-20] 18  BP: (114-138)/(63-75) 114/71  SpO2:  [87 %-98 %] 93 %    Physical Exam:    General: No acute distress, elderly lady   Respiratory: No wheezes, no rhonchi  Cardiovascular: S1, S2, regular rate and rhythm  Abdomen: Soft, Non-tender, non-distended, positive bowel sounds  Neuro: No new focal deficits.   Extremities: No edema      Diagnostic Data:    Labs:  Recent Labs   Lab 24  1933 24  0224 24  0506 24  1527 24  0434 24  0806 24  0658   WBC 13.0* 15.5* 14.0*  --  11.8* 8.8 10.8   HGB 7.0* 7.1* 7.2* 8.6* 8.1* 7.8* 8.6*   MCV 78.5* 80.2 82.6  --  85.4 84.2 88.7   .0 262.0 247.0  --  199.0 185.0 171.0   INR 1.84*  --   --   --   --   --   --        Recent Labs   Lab 24  0506 24  0434 24  0806 24  0658   * 105* 78 105*   BUN 30* 25* 21 23   CREATSERUM 0.86 0.48* 0.49* 0.34*   CA 8.3* 8.3* 8.7 8.8   ALB 2.6* 2.8* 2.6*  --     134* 139 141   K 3.3*  3.3* 3.9  3.9 4.0 4.4    103 103 102   CO2 34.0* 34.0* 34.0* 37.0*   ALKPHO 79 77 80  --    AST 34 17 18  --    BILT 1.1 1.5 1.2  --    TP 6.4 6.0* 5.7*  --        Estimated Creatinine Clearance: 90.7 mL/min (A) (based on SCr of 0.34 mg/dL (L)).    No results for input(s): \"TROP\", \"TROPHS\", \"CK\" in the last 168 hours.      Recent Labs   Lab 24  1933   PTP 21.4*   INR 1.84*                  Microbiology    Hospital Encounter on 24    1. Blood Culture     Status: None    Collection Time: 01/20/24  9:59 PM    Specimen: Blood,peripheral   Result Value Ref Range    Blood Culture Result No Growth 5 Days N/A         Imaging: Reviewed in Epic.    Medications:    metoprolol tartrate  25 mg Oral 2x Daily(Beta Blocker)    docusate  100 mg Oral BID    furosemide  20 mg Oral Daily    digoxin  125 mcg Oral Q MWF    midodrine  10 mg Oral TID    multivitamin  1 tablet Oral Daily       Assessment & Plan:      #Shock, hypovolemic vs septic  vs cardiogenic  Pressors  IV Abx   Monitor BLood CX  Monitor I/O   #Bilateral pleural effusion  #Acute hypoxic resp failure due to acute PE with no RV strain on Echo and due to pleural effusion  Was on BIPAP in ICU  Diuresis   Hep gtt --> DOAC: will see what cost of xarelto is prior to starting  Doppler LE   #Lactic acidosis due to above, trend  #Anemia  Iron panel, below baseline   #Hypothermia on admission  Coritsol level normal   #CHF with acute exacerbation - HFPEF  Echo   # + Blood CX: Propionibacterium 11/20, resulted 1/26   Repeat blood Cx 1/27/24 pending      Skyla Tinsley MD    Supplementary Documentation:     Quality:  DVT Mechanical Prophylaxis:   SCDs,    DVT Pharmacologic Prophylaxis   Medication    heparin (Porcine) 69848 units/250mL infusion CONTINUOUS                Code Status: Full Code  Bowie: External urinary catheter in place  [place] Duration (in days): 2  Central line:    ELMO:     Discharge is dependent on: clinical   At this point Ms. Butcher is expected to be discharge to: tbd     The 21st Century Cures Act makes medical notes like these available to patients in the interest of transparency. Please be advised this is a medical document. Medical documents are intended to carry relevant information, facts as evident, and the clinical opinion of the practitioner. The medical note is intended as peer to peer communication and may appear blunt or direct. It is written in medical language and may contain  abbreviations or verbiage that are unfamiliar.

## 2024-01-28 NOTE — PLAN OF CARE
Assumed care around 1930. Pt. Lethargic still, withdrawing from pain and mouthing words but mostly sleeping. On 5 L O2 NC, weaned to 3 L O2 NC during the shift. Afib on tele. VSS. Heparin drip at 11/hr. Calorie count, great appetite during the day. No signs of pain. Incontinent, now having BMs. Mepilex dressings clean, dry, intact. Fall and safety precautions in place. Will continue to monitor. Plan of care ongoing.      Problem: CARDIOVASCULAR - ADULT  Goal: Maintains optimal cardiac output and hemodynamic stability  Description: INTERVENTIONS:  - Monitor vital signs, rhythm, and trends  - Monitor for bleeding, hypotension and signs of decreased cardiac output  - Evaluate effectiveness of vasoactive medications to optimize hemodynamic stability  - Monitor arterial and/or venous puncture sites for bleeding and/or hematoma  - Assess quality of pulses, skin color and temperature  - Assess for signs of decreased coronary artery perfusion - ex. Angina  - Evaluate fluid balance, assess for edema, trend weights  Outcome: Progressing     Problem: RESPIRATORY - ADULT  Goal: Achieves optimal ventilation and oxygenation  Description: INTERVENTIONS:  - Assess for changes in respiratory status  - Assess for changes in mentation and behavior  - Position to facilitate oxygenation and minimize respiratory effort  - Oxygen supplementation based on oxygen saturation or ABGs  - Provide Smoking Cessation handout, if applicable  - Encourage broncho-pulmonary hygiene including cough, deep breathe, Incentive Spirometry  - Assess the need for suctioning and perform as needed  - Assess and instruct to report SOB or any respiratory difficulty  - Respiratory Therapy support as indicated  - Manage/alleviate anxiety  - Monitor for signs/symptoms of CO2 retention  Outcome: Progressing     Problem: METABOLIC/FLUID AND ELECTROLYTES - ADULT  Goal: Glucose maintained within prescribed range  Description: INTERVENTIONS:  - Monitor Blood Glucose as  ordered  - Assess for signs and symptoms of hyperglycemia and hypoglycemia  - Administer ordered medications to maintain glucose within target range  - Assess barriers to adequate nutritional intake and initiate nutrition consult as needed  - Instruct patient on self management of diabetes  Outcome: Progressing  Goal: Electrolytes maintained within normal limits  Description: INTERVENTIONS:  - Monitor labs and rhythm and assess patient for signs and symptoms of electrolyte imbalances  - Administer electrolyte replacement as ordered  - Monitor response to electrolyte replacements, including rhythm and repeat lab results as appropriate  - Fluid restriction as ordered  - Instruct patient on fluid and nutrition restrictions as appropriate  Outcome: Progressing     Problem: NEUROLOGICAL - ADULT  Goal: Achieves stable or improved neurological status  Description: INTERVENTIONS  - Assess for and report changes in neurological status  - Initiate measures to prevent increased intracranial pressure  - Maintain blood pressure and fluid volume within ordered parameters to optimize cerebral perfusion and minimize risk of hemorrhage  - Monitor temperature, glucose, and sodium. Initiate appropriate interventions as ordered  Outcome: Progressing     Problem: Impaired Swallowing  Goal: Minimize aspiration risk  Description: Interventions:  - Patient should be alert and upright for all feedings (90 degrees preferred)  - Offer food and liquids at a slow rate  - No straws  - Encourage small bites of food and small sips of liquid  - Offer pills one at a time, crush or deliver with applesauce as needed  - Discontinue feeding and notify MD (or speech pathologist) if coughing or persistent throat clearing or wet/gurgly vocal quality is noted  Outcome: Progressing     Problem: SAFETY ADULT - FALL  Goal: Free from fall injury  Description: INTERVENTIONS:  - Assess pt frequently for physical needs  - Identify cognitive and physical deficits  and behaviors that affect risk of falls.  - [place] fall precautions as indicated by assessment.  - Educate pt/family on patient safety including physical limitations  - Instruct pt to call for assistance with activity based on assessment  - Modify environment to reduce risk of injury  - Provide assistive devices as appropriate  - Consider OT/PT consult to assist with strengthening/mobility  - Encourage toileting schedule  Outcome: Progressing     Problem: Safety Risk - Non-Violent Restraints  Goal: Patient will remain free from self-harm  Description: INTERVENTIONS:  - Apply the least restrictive restraint to prevent harm  - Notify patient and family of reasons restraints applied  - Assess for any contributing factors to confusion (electrolyte disturbances, delirium, medications)  - Discontinue any unnecessary medical devices as soon as possible  - Assess the patient's physical comfort, circulation, skin condition, hydration, nutrition and elimination needs   - Reorient and redirection as needed  - Assess for the need to continue restraints  Outcome: Progressing     Problem: Delirium  Goal: Minimize duration of delirium  Description: Interventions:  - Encourage use of hearing aids, eye glasses  - Promote highest level of mobility daily  - Provide frequent reorientation  - Promote wakefulness i.e. lights on, blinds open  - Promote sleep, encourage patient's normal rest cycle i.e. lights off, TV off, minimize noise and interruptions  - Encourage family to assist in orientation and promotion of home routines  Outcome: Progressing     Problem: Diabetes/Glucose Control  Goal: Glucose maintained within prescribed range  Description: INTERVENTIONS:  - Monitor Blood Glucose as ordered  - Assess for signs and symptoms of hyperglycemia and hypoglycemia  - Administer ordered medications to maintain glucose within target range  - Assess barriers to adequate nutritional intake and initiate nutrition consult as needed  -  Instruct patient on self management of diabetes  Outcome: Progressing

## 2024-01-28 NOTE — PROGRESS NOTES
S: She is sleepy this morning. She nods her head to questions, denies shortness of breath.     Meds:   docusate  100 mg Oral BID    furosemide  20 mg Oral Daily    metoprolol tartrate  12.5 mg Oral TID Beta Blocker/Cardiac    digoxin  125 mcg Oral Q MWF    midodrine  10 mg Oral TID    multivitamin  1 tablet Oral Daily       Prn Meds:  polyethylene glycol (PEG 3350), magnesium hydroxide, bisacodyl, fleet enema, metoclopramide, ondansetron, glucose **OR** glucose **OR** glucose-vitamin C **OR** dextrose **OR** glucose **OR** glucose **OR** glucose-vitamin C, acetaminophen, acetaminophen, morphINE    Infusions:   continuous dose heparin 1,200 Units/hr (01/28/24 0720)       OBJECTIVE:  Vitals:    01/28/24 0431 01/28/24 0515 01/28/24 0553 01/28/24 0744   BP: 138/75   123/71   Pulse: 116 109 108 95   Resp: 19   18   Temp: 97.7 °F (36.5 °C)   97.7 °F (36.5 °C)   TempSrc: Axillary   Oral   SpO2: 93% (!) 87% 96% 97%   Weight:       Height:         O2: 3L  Gen - sleeping but arousable.  Lungs - CTAB  CV - regular rate & rhythm. Normal S1, S2. No murmurs, rubs, or gallops appreciated.  Abdomen - soft, nontender to palpation   Extremities - 1+ pedal edema        Labs:  Recent Labs   Lab 01/24/24  0434 01/25/24  0806 01/27/24  0658   WBC 11.8* 8.8 10.8   HGB 8.1* 7.8* 8.6*   .0 185.0 171.0     Recent Labs   Lab 01/22/24  0224 01/23/24  0506 01/24/24  0434 01/25/24  0806 01/27/24  0658    138 134* 139 141   K 3.7 3.3*  3.3* 3.9  3.9 4.0 4.4    103 103 103 102   CO2 32.0 34.0* 34.0* 34.0* 37.0*   BUN 45* 30* 25* 21 23   CREATSERUM 1.28* 0.86 0.48* 0.49* 0.34*   * 125* 105* 78 105*   ANIONGAP 4 1 <0* 2 2   ALB 2.8* 2.6* 2.8* 2.6*  --    CA 8.2* 8.3* 8.3* 8.7 8.8   MG 2.2  --   --   --   --    ALKPHO 68 79 77 80  --    AST 70* 34 17 18  --    BILT 1.4 1.1 1.5 1.2  --    TP 6.4 6.4 6.0* 5.7*  --      Recent Labs   Lab 01/21/24  1933 01/22/24  0224 01/26/24  1350 01/27/24  0658 01/28/24  0605    INR 1.84*  --   --   --   --    .9*   < > 79.5* 72.3* 60.2*    < > = values in this interval not displayed.       Recent Labs   Lab 01/23/24  0506   DORIAN 24.1     Recent Labs   Lab 01/23/24  1909 01/23/24  2359 01/24/24  0833   ABGPHT 7.31* 7.31* 7.39   AMFDXK8V 69* 66* 58*   WBXNG0Q 220* 88 141*   ABGHCO3 30.5* 29.4* 31.9*   ABGBE 7.1* 5.8* 8.9*       ASSESSMENT AND PLAN      Acute hypoxic and hypercapnic respiratory failure - due to acute PE and bilateral pleural effusions. Pt was briefly treated with zosyn, but pneumonia is less likely  - wean O2 as able  - AVAPS during sleep as tolerated  - anticoagulation for PE as below  - diuretics per cardiology  Acute bilateral PE. LE doppler was negative for DVT. TTE was without right heart strain  - currently on heparin drip - ok to transition to DOAC from pulmonary standpoint  Bilateral pleural effusions - Recurrent, likely due to diastolic dysfunction. R effusion is partially loculated. Previously pt has undergone thoracentesis ; showed transudative fluid consistent with CHF  - diuretics per cardiology  - hold off on thoracentesis, pt is at high risk for developing an ex-vacuo pneumothorax as the effusion is chronic, and loculated. The fluid will likely recur as well unless she's euvolemic.  Shock - hypovolemic vs cardiogenic. Resolved.   - continue midodrine for component of chronic hypotension   HFpEF - TTE 65-70%   - Diurese as able  - Cardiology following  Chronic Afib    - per cardiology  Acute Encephalopathy - possibly due to TME and underlying dementia. CT brain was negative   - supportive care  Proph:  - currently on Heparin drip  Dispo  -full code   -inpatient. Discharge planning.    We will continue to follow with you     Arnulfo Sharma M.D.  Pulmonary/Critical Care

## 2024-01-28 NOTE — PROGRESS NOTES
Progress Note  Yin Butcher Patient Status:  Inpatient    1931 MRN [id number]   Location [facility] 4SW-A Attending Brooke Tracy MD   Hosp Day # 7 PCP Mina Walker MD     Subjective:  Sleeping - awakens to verbal stimuli. No acute overnight events.    Objective:  /71 (BP Location: Right arm)   Pulse 95   Temp 98 °F (36.7 °C) (Oral)   Resp 18   Ht 5' 7\" (1.702 m)   Wt 120 lb (54.4 kg)   SpO2 93%   BMI 18.79 kg/m²     Telemetry: afib.      Intake/Output:    Intake/Output Summary (Last 24 hours) at 2024 1227  Last data filed at 2024 0515  Gross per 24 hour   Intake --   Output 1700 ml   Net -1700 ml         Last 3 Weights   24 0047 120 lb (54.4 kg)   24 0000 115 lb 11.9 oz (52.5 kg)   24 0000 113 lb 12.1 oz (51.6 kg)   24 0007 109 lb 2 oz (49.5 kg)   24 2143 105 lb 13.1 oz (48 kg)   10/29/23 0600 104 lb 11.5 oz (47.5 kg)   10/24/23 0457 114 lb 3.2 oz (51.8 kg)   10/23/23 0340 92 lb 9.5 oz (42 kg)   10/22/23 2033 92 lb 9.5 oz (42 kg)   09/10/23 1203 92 lb 14.4 oz (42.1 kg)   23 0746 105 lb 12.8 oz (48 kg)   23 2218 106 lb (48.1 kg)       Labs:  Recent Labs   Lab 24  0434 24  0806 24  0658   * 78 105*   BUN *    CREATSERUM 0.48* 0.49* 0.34*   EGFRCR 89 88 97   CA 8.3* 8.7 8.8   * 139 141   K 3.9  3.9 4.0 4.4    103 102   CO2 34.0* 34.0* 37.0*     Recent Labs   Lab 24  0506 24  1527 24  0434 24  0806 24  0658   RBC 2.99*  --  3.16* 3.04* 3.28*   HGB 7.2*   < > 8.1* 7.8* 8.6*   HCT 24.7*  --  27.0* 25.6* 29.1*   MCV 82.6  --  85.4 84.2 88.7   MCH 24.1*  --  25.6* 25.7* 26.2   MCHC 29.1*  --  30.0* 30.5* 29.6*   RDW 18.4  --  18.3 19.2 21.3   NEPRELIM 11.87*  --  10.34* 7.19  --    WBC 14.0*  --  11.8* 8.8 10.8   .0  --  199.0 185.0 171.0    < > = values in this interval not displayed.         No results for input(s): \"TROP\", \"TROPHS\", \"CK\" in the last 168  hours.      Review of Systems   Constitutional: Positive for malaise/fatigue.   Cardiovascular:  Positive for irregular heartbeat.   Respiratory: Negative.         Physical Exam:    Gen: drowsy, lethargic, nad, very frail/cachectic with severe muscle wasting   Heent: pupils equal, reactive. Mucous membranes moist.   Neck: no jvd  Cardiac: irregularly irregular, normal S1,S2, 2/6 hsm  Lungs: mildly dim - difficult to auscultate given rib prominence   Abd: soft, NT/ND +bs  Ext: no edema  Skin: Warm, dry  Neuro: drowsy, underlying dementia.        Medications:     metoprolol tartrate  25 mg Oral 2x Daily(Beta Blocker)    docusate  100 mg Oral BID    furosemide  20 mg Oral Daily    digoxin  125 mcg Oral Q MWF    midodrine  10 mg Oral TID    multivitamin  1 tablet Oral Daily      continuous dose heparin 1,200 Units/hr (01/28/24 0720)       Admitted after EMS contacted due to being unresponsive at home. Admitted on NIPPV with minimal responsiveness.     Assessment:  Admitted with acute respiratory failure, multifactorial with PE, effusions, ? Pna  Pulm following  Abx stopped.   Shock-resolved  Off pressors. On home midodrine.   Acute bilateral PE  LE US negative for DVT  Echo without evidence of RV strain  Heparin gtt-->transition to doac once cost has been determined    Chronic Afib, rate controlled  Historically not on AC  Echo 1/21/24-LVEF 65-70%, normal wall motion. RV function normal. Mild-mod MR, mild-mod TR  Right pleural effusion  Recurrent,   AMS with underlying dementia  Hypotension-chronic on midodrine  Off pressors  Anemia, chronic--hgb 7.8   LORIE-venofer  CAD-calcifications noted on CT  Social issues--came from home with poor living conditions--SW following.  Profound weakness, poor po intake  Dobhoff in place  Palliative care following.    Plan:  BP stable on midodrine.   Increase metoprolol tartrate to 25 mg bid - home dose   Okay to start DOAC once cost is determined  S/p IV lasix 20 mg x1 with albumin 1/27  with robust 1.7 L of urine output. Check CMP today. Cont home lasix.           Edgard Pack DO  Cardiologist  [facility]  1/28/2024   12:27 PM        Note to the patient: The 21st Century Cures Act makes medical notes like these available to patients in the interest of transparency. However, be advised that this is a medical document. It is intended as peer to peer communication. It is written in medical language and may contain abbreviations or verbiage that are unfamiliar. It may appear blunt or direct. Medical documents are intended to carry relevant information, facts as evident, and clinical opinion of the practitioner.     Disclaimer: Components of this note were documented using voice recognition system and are subject to errors not corrected at proofreading. Contact the author of this note for any clarifications.

## 2024-01-28 NOTE — PROGRESS NOTES
Assumed care around 0700. A/Ox4, R/A, NSR:ST on tele. Lethargic at times. Ate more than half of both breakfast and lunch. Dinner pending. IV albumin and lasix per mar. Heparin gtt theraputic@11. Redraw in AM. Daughter updated and coming in tonight. Staff will continue to monitor.

## 2024-01-28 NOTE — PLAN OF CARE
The patient is lethargic  On 2L NC  Tele - AFIB, -110's  Vitals Stable  Afebrile  On Heparin drip per protocol per MAR  Next aptt 01/29 AM  Decreased appetite  Critical CO2, pulm notified, ABG ordered  POC updates given to daughter  Safety precautions in place. Staff will continue to monitor.               Problem: Patient/Family Goals  Goal: Patient/Family Long Term Goal  Description: Patient's Long Term Goal: return home    Interventions:  - cooperate with PT/OT/SLP as ordered  - ?learn how to give Lovenox injections  - See additional Care Plan goals for specific interventions  Outcome: Progressing  Goal: Patient/Family Short Term Goal  Description: Patient's Short Term Goal: wean off levophed    Interventions:   - antibiotics as prescribed  - IV fluid as prescribed  - PRBC as prescribed  - See additional Care Plan goals for specific interventions  Outcome: Progressing     Problem: CARDIOVASCULAR - ADULT  Goal: Maintains optimal cardiac output and hemodynamic stability  Description: INTERVENTIONS:  - Monitor vital signs, rhythm, and trends  - Monitor for bleeding, hypotension and signs of decreased cardiac output  - Evaluate effectiveness of vasoactive medications to optimize hemodynamic stability  - Monitor arterial and/or venous puncture sites for bleeding and/or hematoma  - Assess quality of pulses, skin color and temperature  - Assess for signs of decreased coronary artery perfusion - ex. Angina  - Evaluate fluid balance, assess for edema, trend weights  Outcome: Progressing     Problem: RESPIRATORY - ADULT  Goal: Achieves optimal ventilation and oxygenation  Description: INTERVENTIONS:  - Assess for changes in respiratory status  - Assess for changes in mentation and behavior  - Position to facilitate oxygenation and minimize respiratory effort  - Oxygen supplementation based on oxygen saturation or ABGs  - Provide Smoking Cessation handout, if applicable  - Encourage broncho-pulmonary hygiene including  cough, deep breathe, Incentive Spirometry  - Assess the need for suctioning and perform as needed  - Assess and instruct to report SOB or any respiratory difficulty  - Respiratory Therapy support as indicated  - Manage/alleviate anxiety  - Monitor for signs/symptoms of CO2 retention  Outcome: Progressing     Problem: METABOLIC/FLUID AND ELECTROLYTES - ADULT  Goal: Glucose maintained within prescribed range  Description: INTERVENTIONS:  - Monitor Blood Glucose as ordered  - Assess for signs and symptoms of hyperglycemia and hypoglycemia  - Administer ordered medications to maintain glucose within target range  - Assess barriers to adequate nutritional intake and initiate nutrition consult as needed  - Instruct patient on self management of diabetes  Outcome: Progressing  Goal: Electrolytes maintained within normal limits  Description: INTERVENTIONS:  - Monitor labs and rhythm and assess patient for signs and symptoms of electrolyte imbalances  - Administer electrolyte replacement as ordered  - Monitor response to electrolyte replacements, including rhythm and repeat lab results as appropriate  - Fluid restriction as ordered  - Instruct patient on fluid and nutrition restrictions as appropriate  Outcome: Progressing     Problem: NEUROLOGICAL - ADULT  Goal: Achieves stable or improved neurological status  Description: INTERVENTIONS  - Assess for and report changes in neurological status  - Initiate measures to prevent increased intracranial pressure  - Maintain blood pressure and fluid volume within ordered parameters to optimize cerebral perfusion and minimize risk of hemorrhage  - Monitor temperature, glucose, and sodium. Initiate appropriate interventions as ordered  Outcome: Progressing     Problem: Impaired Swallowing  Goal: Minimize aspiration risk  Description: Interventions:  - Patient should be alert and upright for all feedings (90 degrees preferred)  - Offer food and liquids at a slow rate  - No straws  -  Encourage small bites of food and small sips of liquid  - Offer pills one at a time, crush or deliver with applesauce as needed  - Discontinue feeding and notify MD (or speech pathologist) if coughing or persistent throat clearing or wet/gurgly vocal quality is noted  Outcome: Progressing     Problem: SAFETY ADULT - FALL  Goal: Free from fall injury  Description: INTERVENTIONS:  - Assess pt frequently for physical needs  - Identify cognitive and physical deficits and behaviors that affect risk of falls.  - Ewing fall precautions as indicated by assessment.  - Educate pt/family on patient safety including physical limitations  - Instruct pt to call for assistance with activity based on assessment  - Modify environment to reduce risk of injury  - Provide assistive devices as appropriate  - Consider OT/PT consult to assist with strengthening/mobility  - Encourage toileting schedule  Outcome: Progressing     Problem: Safety Risk - Non-Violent Restraints  Goal: Patient will remain free from self-harm  Description: INTERVENTIONS:  - Apply the least restrictive restraint to prevent harm  - Notify patient and family of reasons restraints applied  - Assess for any contributing factors to confusion (electrolyte disturbances, delirium, medications)  - Discontinue any unnecessary medical devices as soon as possible  - Assess the patient's physical comfort, circulation, skin condition, hydration, nutrition and elimination needs   - Reorient and redirection as needed  - Assess for the need to continue restraints  Outcome: Progressing     Problem: Delirium  Goal: Minimize duration of delirium  Description: Interventions:  - Encourage use of hearing aids, eye glasses  - Promote highest level of mobility daily  - Provide frequent reorientation  - Promote wakefulness i.e. lights on, blinds open  - Promote sleep, encourage patient's normal rest cycle i.e. lights off, TV off, minimize noise and interruptions  - Encourage family to  assist in orientation and promotion of home routines  Outcome: Progressing     Problem: Diabetes/Glucose Control  Goal: Glucose maintained within prescribed range  Description: INTERVENTIONS:  - Monitor Blood Glucose as ordered  - Assess for signs and symptoms of hyperglycemia and hypoglycemia  - Administer ordered medications to maintain glucose within target range  - Assess barriers to adequate nutritional intake and initiate nutrition consult as needed  - Instruct patient on self management of diabetes  Outcome: Progressing

## 2024-01-29 LAB
ANION GAP SERPL CALC-SCNC: <0 MMOL/L (ref 0–18)
APTT PPP: 80.5 SECONDS (ref 23.3–35.6)
ARTERIAL PATENCY WRIST A: POSITIVE
BASE EXCESS BLDA CALC-SCNC: 20.7 MMOL/L (ref ?–2)
BODY TEMPERATURE: 98.6 F
BUN BLD-MCNC: 28 MG/DL (ref 9–23)
CA-I BLD-SCNC: 1.27 MMOL/L (ref 0.95–1.32)
CALCIUM BLD-MCNC: 9.1 MG/DL (ref 8.5–10.1)
CHLORIDE SERPL-SCNC: 100 MMOL/L (ref 98–112)
CO2 SERPL-SCNC: 45 MMOL/L (ref 21–32)
COHGB MFR BLD: 2.9 % SAT (ref 0–3)
CREAT BLD-MCNC: 0.44 MG/DL
EGFRCR SERPLBLD CKD-EPI 2021: 91 ML/MIN/1.73M2 (ref 60–?)
ERYTHROCYTE [DISTWIDTH] IN BLOOD BY AUTOMATED COUNT: 23.9 %
EXPIRATORY PRESSURE: 5 CM H2O
FIO2: 50 %
GLUCOSE BLD-MCNC: 104 MG/DL (ref 70–99)
HCO3 BLDA-SCNC: 41.1 MEQ/L (ref 21–27)
HCT VFR BLD AUTO: 30 %
HGB BLD-MCNC: 8.5 G/DL
HGB BLD-MCNC: 8.6 G/DL
INR BLD: 1.32 (ref 0.8–1.2)
IPAP MAX: 25 CM H2O
IPAP MIN: 15 CM H2O
LACTATE BLD-SCNC: 0.6 MMOL/L (ref 0.5–2)
MCH RBC QN AUTO: 26.4 PG (ref 26–34)
MCHC RBC AUTO-ENTMCNC: 28.7 G/DL (ref 31–37)
MCV RBC AUTO: 92 FL
METHGB MFR BLD: 0 % SAT (ref 0.4–1.5)
OSMOLALITY SERPL CALC.SUM OF ELEC: 300 MOSM/KG (ref 275–295)
OXYHGB MFR BLDA: 97.1 % (ref 92–100)
PCO2 BLDA: 70 MM HG (ref 35–45)
PH BLDA: 7.44 [PH] (ref 7.35–7.45)
PLATELET # BLD AUTO: 151 10(3)UL (ref 150–450)
PO2 BLDA: 156 MM HG (ref 80–100)
POTASSIUM BLD-SCNC: 4.1 MMOL/L (ref 3.6–5.1)
POTASSIUM SERPL-SCNC: 4.1 MMOL/L (ref 3.5–5.1)
PROTHROMBIN TIME: 16.4 SECONDS (ref 11.6–14.8)
RBC # BLD AUTO: 3.26 X10(6)UL
SODIUM BLD-SCNC: 139 MMOL/L (ref 135–145)
SODIUM SERPL-SCNC: 142 MMOL/L (ref 136–145)
TIDAL VOLUME: 450 ML
VENT RATE: 18 /MIN
WBC # BLD AUTO: 8 X10(3) UL (ref 4–11)

## 2024-01-29 PROCEDURE — 99232 SBSQ HOSP IP/OBS MODERATE 35: CPT | Performed by: HOSPITALIST

## 2024-01-29 NOTE — PHYSICAL THERAPY NOTE
Order received, chart reviewed. Pt with increasing oxygen requirements, orders to check ABGs. Communicated with RN. Will hold and follow as appropriate.     Janet Wolfe, PT  01/29/24

## 2024-01-29 NOTE — PROGRESS NOTES
[facility]    Yin Butcher Patient Status:  Inpatient    1931 MRN [id number]   Location [facility] 3NE-A Attending Brooke Tracy MD   Hosp Day # 8 PCP Mina Walker MD     SUBJECTIVE:overall doing about the same     OBJECTIVE:  /65 (BP Location: Left arm)   Pulse 91   Temp 97.6 °F (36.4 °C) (Axillary)   Resp 22   Ht 170.2 cm (5' 7\")   Wt 120 lb (54.4 kg)   SpO2 100%   BMI 18.79 kg/m²   O2 requirement: 3L nc     I/O last 3 completed shifts:  In: 50 [P.O.:50]  Out: 1900 [Urine:1900]  No intake/output data recorded.     Current Medications:   Current Facility-Administered Medications:     metoprolol tartrate (Lopressor) tab 25 mg, 25 mg, Oral, 2x Daily(Beta Blocker)    docusate (Colace) 50 MG/5ML oral solution 100 mg, 100 mg, Oral, BID    furosemide (Lasix) tab 20 mg, 20 mg, Oral, Daily    digoxin (Lanoxin) tab 125 mcg, 125 mcg, Oral, Q MWF    polyethylene glycol (PEG 3350) (Miralax) 17 g oral packet 17 g, 17 g, Oral, Daily PRN    magnesium hydroxide (Milk of Magnesia) 400 MG/5ML oral suspension 30 mL, 30 mL, Oral, Daily PRN    bisacodyl (Dulcolax) 10 MG rectal suppository 10 mg, 10 mg, Rectal, Daily PRN    fleet enema (Fleet) 7-19 GM/118ML rectal enema 133 mL, 1 enema, Rectal, Once PRN    midodrine (ProAmatine) tab 10 mg, 10 mg, Oral, TID    multivitamin (Centrum) chewable tab (Adult) 1 tablet, 1 tablet, Oral, Daily    metoclopramide (Reglan) 5 mg/mL injection 5 mg, 5 mg, Intravenous, Q8H PRN    ondansetron (Zofran) 4 MG/2ML injection 4 mg, 4 mg, Intravenous, Q6H PRN    glucose (Dex4) 15 GM/59ML oral liquid 15 g, 15 g, Oral, Q15 Min PRN **OR** glucose (Glutose) 40% oral gel 15 g, 15 g, Oral, Q15 Min PRN **OR** glucose-vitamin C (Dex-4) chewable tab 4 tablet, 4 tablet, Oral, Q15 Min PRN **OR** dextrose 50% injection 50 mL, 50 mL, Intravenous, Q15 Min PRN **OR** glucose (Dex4) 15 GM/59ML oral liquid 30 g, 30 g, Oral, Q15 Min PRN **OR** glucose (Glutose) 40% oral gel 30 g, 30 g, Oral,  Q15 Min PRN **OR** glucose-vitamin C (Dex-4) chewable tab 8 tablet, 8 tablet, Oral, Q15 Min PRN    acetaminophen (Tylenol Extra Strength) tab 500 mg, 500 mg, Oral, Q4H PRN    heparin (Porcine) 20063 units/250mL infusion CONTINUOUS, 200-3,000 Units/hr, Intravenous, Continuous    acetaminophen (Ofirmev) 10 mg/mL infusion premix 750 mg, 15 mg/kg, Intravenous, Q6H PRN    morphINE PF 2 MG/ML injection 0.5 mg, 0.5 mg, Intravenous, Q4H PRN     General appearance: appears stated age and slowed mentation  Lungs: diminished breath sounds bilaterally  Heart: regular rate and rhythm  Abdomen: soft, non-tender; bowel sounds normal; no masses,  no organomegaly  Extremities: extremities normal, atraumatic, no cyanosis or edema     Lab Results   Component Value Date    WBC 8.0 01/29/2024    RBC 3.26 01/29/2024    HGB 8.6 01/29/2024    HCT 30.0 01/29/2024    MCV 92.0 01/29/2024    MCH 26.4 01/29/2024    MCHC 28.7 01/29/2024    RDW 23.9 01/29/2024    .0 01/29/2024     Lab Results   Component Value Date     01/29/2024    K 4.1 01/29/2024     01/29/2024    CO2 45.0 01/29/2024    BUN 28 01/29/2024    CREATSERUM 0.44 01/29/2024     01/29/2024    CA 9.1 01/29/2024    ALKPHO 76 01/28/2024    ALT  01/28/2024      Comment:      Due to  backorder we are temporarily unable to offer hospital-based ALT testing at [facility].   If urgently needed, please order ALT test code [id number].   The new order will need a new venipuncture and will be sent to [facility] for testing.   The expected turnaround time will be within 24 hours.     AST 8 01/28/2024    BILT 1.5 01/28/2024    ALB 2.9 01/28/2024    TP 6.4 01/28/2024     Lab Results   Component Value Date    PT 15.5 (H) 07/13/2014    PT 14.4 (H) 07/11/2014    INR 1.84 (H) 01/21/2024    INR 1.64 (H) 01/21/2024    INR 1.22 (H) 10/27/2023        Imaging: reviewed. Per EMR     ASSESSMENT/PLAN:  Acute hypoxic and hypercapnic respiratory failure - due to acute PE and  bilateral pleural effusions. Pt was briefly treated with zosyn, but pneumonia is unlikely  - wean O2 as able  - AVAPS during sleep as tolerated  - anticoagulation for PE as below  - diuretics per cardiology  Acute bilateral PE. LE doppler was negative for DVT. TTE was without right heart strain  - currently on heparin drip - ok to transition to DOAC from pulmonary standpoint  Bilateral pleural effusions - Recurrent, likely due to diastolic dysfunction. R effusion is partially loculated. Previously pt has undergone thoracentesis; showed transudative fluid consistent with CHF  - diuretics per cardiology  - hold off on thoracentesis, pt is at high risk for developing an ex-vacuo pneumothorax as the effusion is chronic, and loculated. The fluid will likely recur as well unless she's euvolemic.  HFpEF - TTE 65-70%   - Diurese as able  - Cardiology following  Chronic Afib    - per cardiology  Proph:  - currently on Heparin drip  Dispo-full code   - dc planning    Teofilo Cordero MD  1/29/2024  12:33 PM

## 2024-01-29 NOTE — PROGRESS NOTES
Progress Note  Yin Butcher Patient Status:  Inpatient    1931 MRN [id number]   Location [facility] 3NE-A Attending Brooke Tracy MD   Hosp Day # 8 PCP Mina Walker MD     Subjective:  Patient awake, alert, restless at times. Had pulled off her tele monitoring and PIV.     Objective:  /67 (BP Location: Left arm)   Pulse 99   Temp 97.6 °F (36.4 °C) (Axillary)   Resp 22   Ht 5' 7\" (1.702 m)   Wt 120 lb (54.4 kg)   SpO2 93%   BMI 18.79 kg/m²     Telemetry: Afib    Intake/Output:    Intake/Output Summary (Last 24 hours) at 2024 0721  Last data filed at 2024 1400  Gross per 24 hour   Intake 50 ml   Output 200 ml   Net -150 ml       Last 3 Weights   24 0047 120 lb (54.4 kg)   24 0000 115 lb 11.9 oz (52.5 kg)   24 0000 113 lb 12.1 oz (51.6 kg)   24 0007 109 lb 2 oz (49.5 kg)   24 2143 105 lb 13.1 oz (48 kg)   10/29/23 0600 104 lb 11.5 oz (47.5 kg)   10/24/23 0457 114 lb 3.2 oz (51.8 kg)   10/23/23 0340 92 lb 9.5 oz (42 kg)   10/22/23 2033 92 lb 9.5 oz (42 kg)   09/10/23 1203 92 lb 14.4 oz (42.1 kg)   23 0746 105 lb 12.8 oz (48 kg)   23 2218 106 lb (48.1 kg)       Labs:  Recent Labs   Lab 24  0806 24  0658 24  1306   GLU 78 105* 131*   BUN *   CREATSERUM 0.49* 0.34* 0.51*   EGFRCR 88 97 88   CA 8.7 8.8 8.8    141 141   K 4.0 4.4 4.3    102 100   CO2 34.0* 37.0* 40.0*     Recent Labs   Lab 24  0506 24  1527 24  0434 24  0806 24  0658   RBC 2.99*  --  3.16* 3.04* 3.28*   HGB 7.2*   < > 8.1* 7.8* 8.6*   HCT 24.7*  --  27.0* 25.6* 29.1*   MCV 82.6  --  85.4 84.2 88.7   MCH 24.1*  --  25.6* 25.7* 26.2   MCHC 29.1*  --  30.0* 30.5* 29.6*   RDW 18.4  --  18.3 19.2 21.3   NEPRELIM 11.87*  --  10.34* 7.19  --    WBC 14.0*  --  11.8* 8.8 10.8   .0  --  199.0 185.0 171.0    < > = values in this interval not displayed.   ABG with pH 7.44, pCO2 70, pO2 156, HCO3 41.1.  On 50%  FiO2.  ABG yesterday on nasal cannula 2 L/min with pH 7.43, pCO2 67, pO2 61, HCO3 38.5 and base excess 17.6.  Carbon dioxide on metabolic panel 40.  Carbon dioxide on metabolic panel 45.      No results for input(s): \"TROP\", \"TROPHS\", \"CK\" in the last 168 hours.    Diagnostics:  No results found.   Review of Systems   Reason unable to perform ROS: patient disoriented.       Physical Exam:  Gen: alert, disoriented, ill-appearing, cachectic female  Heent: pupils equal, reactive. Mucous membranes moist.   Neck: no jvd  Cardiac: regular rate and rhythm, normal S1,S2, no murmur, clicks, rub or gallop  Lungs: CTA, hyperinflated.  Increased expiratory phase.  Abd: soft, NT/ND +bs  Ext: no edema  Skin: Warm, dry  Neuro: No focal deficits    Medications:     metoprolol tartrate  25 mg Oral 2x Daily(Beta Blocker)    docusate  100 mg Oral BID    furosemide  20 mg Oral Daily    digoxin  125 mcg Oral Q MWF    midodrine  10 mg Oral TID    multivitamin  1 tablet Oral Daily      continuous dose heparin 1,200 Units/hr (01/28/24 1516)       Assessment:  Acute Hypoxic/Hypercapnic Respiratory Failure, Multifactorial (Acute PE, Bilateral Pleural effusions, ?PNA).  Elevated pCO2 and low O2 on ABG with elevated bicarbonate levels indicates chronic respiratory acidosis with metabolic compensation consistent with underlying obstructive lung disease.  AVAPS as needed, currently on O2 per NC  Pulm following   Shock   Initially presented unresponsive from home via EMS  CT Brain without acute intracranial process   Now resolved - BP stable, off vasopressors, on home midodrine dosing  Acute Bilateral PE  CT Chest with bilateral lower lobe, RML pulm emboli  US BLE negative for DVT  TTE without evidence of RV strain  On IV Heparin gtt   Bilateral Pleural Effusions  CXR 1/24 Mod-Large R pleural effusion - partially loculated, Smaller L pleural effusion  No plans for thoracentesis at this time - pulm following  S/P IV Lasix, now on oral which was  held today d/t climbing Bicarb   Chronic Afib  HR controlled  On BB, digoxin - home dosing resumed  Historically not on AC  Anemia, Chronic   Hgb stable - 8.6 today  CAD  CT Chest with calcifications  Altered Mental Status, Dementia, Encephalopathy  Social issues - came from home with poor living conditions  SW following  Weakness, Malnutrition  Dobhoff in place    Plan:  Oral lasix held due to worsening alkalosis. Check BMP and CXR in am   Continue IV Heparin gtt - ok to transition to oral DOAC per primary; check pricing  Continue bb, digoxin, midodrine      Plan of care discussed with patient, RN.    Carla Espana, APRN  1/29/2024  7:21 AM  [phone] [facility]  [phone] [facility]        Seen and examined.  Agree with exam and assessment as amended.  Chronic atrial fibrillation with marked biatrial enlargement on prior testing.  Markedly abnormal ABG, chest x-ray and CT of the chest.  Multifactorial pleural effusions.  Candidate only for conservative medical management.  Long-term prognosis appears guarded at best.  Cardiac MDM reviewed in detail with MAXIM Espana.    DANI Mckeon MD  3

## 2024-01-29 NOTE — PLAN OF CARE
Assumed care at 1930  A/Ox1, on 3L oxygen, Afib on tele   Regular, nectar thick liquids - pills crushed  Bed bound  No complaints of pain   PIV infusing heparin gtt at 12 ml/hr  Patient updated with plan of care  All needs met at this time     Problem: Patient/Family Goals  Goal: Patient/Family Long Term Goal  Description: Patient's Long Term Goal: return home    Interventions:  - cooperate with PT/OT/SLP as ordered  - ?learn how to give Lovenox injections  - See additional Care Plan goals for specific interventions  Outcome: Progressing  Goal: Patient/Family Short Term Goal  Description: Patient's Short Term Goal: wean off levophed    Interventions:   - antibiotics as prescribed  - IV fluid as prescribed  - PRBC as prescribed  - See additional Care Plan goals for specific interventions  Outcome: Progressing     Problem: CARDIOVASCULAR - ADULT  Goal: Maintains optimal cardiac output and hemodynamic stability  Description: INTERVENTIONS:  - Monitor vital signs, rhythm, and trends  - Monitor for bleeding, hypotension and signs of decreased cardiac output  - Evaluate effectiveness of vasoactive medications to optimize hemodynamic stability  - Monitor arterial and/or venous puncture sites for bleeding and/or hematoma  - Assess quality of pulses, skin color and temperature  - Assess for signs of decreased coronary artery perfusion - ex. Angina  - Evaluate fluid balance, assess for edema, trend weights  Outcome: Progressing     Problem: RESPIRATORY - ADULT  Goal: Achieves optimal ventilation and oxygenation  Description: INTERVENTIONS:  - Assess for changes in respiratory status  - Assess for changes in mentation and behavior  - Position to facilitate oxygenation and minimize respiratory effort  - Oxygen supplementation based on oxygen saturation or ABGs  - Provide Smoking Cessation handout, if applicable  - Encourage broncho-pulmonary hygiene including cough, deep breathe, Incentive Spirometry  - Assess the need for  suctioning and perform as needed  - Assess and instruct to report SOB or any respiratory difficulty  - Respiratory Therapy support as indicated  - Manage/alleviate anxiety  - Monitor for signs/symptoms of CO2 retention  Outcome: Progressing     Problem: METABOLIC/FLUID AND ELECTROLYTES - ADULT  Goal: Glucose maintained within prescribed range  Description: INTERVENTIONS:  - Monitor Blood Glucose as ordered  - Assess for signs and symptoms of hyperglycemia and hypoglycemia  - Administer ordered medications to maintain glucose within target range  - Assess barriers to adequate nutritional intake and initiate nutrition consult as needed  - Instruct patient on self management of diabetes  Outcome: Progressing  Goal: Electrolytes maintained within normal limits  Description: INTERVENTIONS:  - Monitor labs and rhythm and assess patient for signs and symptoms of electrolyte imbalances  - Administer electrolyte replacement as ordered  - Monitor response to electrolyte replacements, including rhythm and repeat lab results as appropriate  - Fluid restriction as ordered  - Instruct patient on fluid and nutrition restrictions as appropriate  Outcome: Progressing     Problem: NEUROLOGICAL - ADULT  Goal: Achieves stable or improved neurological status  Description: INTERVENTIONS  - Assess for and report changes in neurological status  - Initiate measures to prevent increased intracranial pressure  - Maintain blood pressure and fluid volume within ordered parameters to optimize cerebral perfusion and minimize risk of hemorrhage  - Monitor temperature, glucose, and sodium. Initiate appropriate interventions as ordered  Outcome: Progressing     Problem: Impaired Swallowing  Goal: Minimize aspiration risk  Description: Interventions:  - Patient should be alert and upright for all feedings (90 degrees preferred)  - Offer food and liquids at a slow rate  - No straws  - Encourage small bites of food and small sips of liquid  - Offer  pills one at a time, crush or deliver with applesauce as needed  - Discontinue feeding and notify MD (or speech pathologist) if coughing or persistent throat clearing or wet/gurgly vocal quality is noted  Outcome: Progressing     Problem: SAFETY ADULT - FALL  Goal: Free from fall injury  Description: INTERVENTIONS:  - Assess pt frequently for physical needs  - Identify cognitive and physical deficits and behaviors that affect risk of falls.  - [place] fall precautions as indicated by assessment.  - Educate pt/family on patient safety including physical limitations  - Instruct pt to call for assistance with activity based on assessment  - Modify environment to reduce risk of injury  - Provide assistive devices as appropriate  - Consider OT/PT consult to assist with strengthening/mobility  - Encourage toileting schedule  Outcome: Progressing     Problem: Safety Risk - Non-Violent Restraints  Goal: Patient will remain free from self-harm  Description: INTERVENTIONS:  - Apply the least restrictive restraint to prevent harm  - Notify patient and family of reasons restraints applied  - Assess for any contributing factors to confusion (electrolyte disturbances, delirium, medications)  - Discontinue any unnecessary medical devices as soon as possible  - Assess the patient's physical comfort, circulation, skin condition, hydration, nutrition and elimination needs   - Reorient and redirection as needed  - Assess for the need to continue restraints  Outcome: Progressing     Problem: Delirium  Goal: Minimize duration of delirium  Description: Interventions:  - Encourage use of hearing aids, eye glasses  - Promote highest level of mobility daily  - Provide frequent reorientation  - Promote wakefulness i.e. lights on, blinds open  - Promote sleep, encourage patient's normal rest cycle i.e. lights off, TV off, minimize noise and interruptions  - Encourage family to assist in orientation and promotion of home routines  Outcome:  Progressing     Problem: Diabetes/Glucose Control  Goal: Glucose maintained within prescribed range  Description: INTERVENTIONS:  - Monitor Blood Glucose as ordered  - Assess for signs and symptoms of hyperglycemia and hypoglycemia  - Administer ordered medications to maintain glucose within target range  - Assess barriers to adequate nutritional intake and initiate nutrition consult as needed  - Instruct patient on self management of diabetes  Outcome: Progressing

## 2024-01-29 NOTE — PROGRESS NOTES
[facility]     Hospitalist Progress Note     Yin Butcher Patient Status:  Inpatient    1931 MRN [id number]   Location [facility] 4SW-A Attending Brooke Tracy MD   Hosp Day # 8 PCP Mina Walker MD     Chief Complaint: Shock     Subjective:  Patient drowsy and reaching up touch bipap mask.  RN reports pills crushed and given this am.         Objective:    Review of Systems:  comprehensive review of systems was completed; pertinent positive and negatives stated in subjective.    Vital signs:  Temp:  [97.5 °F (36.4 °C)-98 °F (36.7 °C)] 97.6 °F (36.4 °C)  Pulse:  [] 91  Resp:  [18-30] 22  BP: (101-134)/(65-76) 101/65  SpO2:  [90 %-100 %] 100 %  FiO2 (%):  [50 %] 50 %    Physical Exam:    General: ill/frail appearing, elderly lady 89 year old female   Respiratory: No wheezes, no rhonchi on bipap  Cardiovascular: S1, S2, irregular 80s +murmur  Abdomen: Soft, Non-tender, non-distended, +hernia reducible  Neuro: moves all extremities. Pt confused/fatigued  Extremities: No edema, LUE ecchymoses/swelling      Diagnostic Data:    Labs:  Recent Labs   Lab 24  0506 24  1527 24  0434 24  0806 24  0658 24  0840   WBC 14.0*  --  11.8* 8.8 10.8 8.0   HGB 7.2* 8.6* 8.1* 7.8* 8.6* 8.6*   MCV 82.6  --  85.4 84.2 88.7 92.0   .0  --  199.0 185.0 171.0 151.0       Recent Labs   Lab 24  0434 24  0806 24  0658 24  1306 24  0840   * 78 105* 131* 104*   BUN 25* 21 23 25* 28*   CREATSERUM 0.48* 0.49* 0.34* 0.51* 0.44*   CA 8.3* 8.7 8.8 8.8 9.1   ALB 2.8* 2.6*  --  2.9*  --    * 139 141 141 142   K 3.9  3.9 4.0 4.4 4.3 4.1    103 102 100 100   CO2 34.0* 34.0* 37.0* 40.0* 45.0*   ALKPHO 77 80  --  76  --    AST 17 18  --  8*  --    BILT 1.5 1.2  --  1.5  --    TP 6.0* 5.7*  --  6.4  --        Estimated Creatinine Clearance: 70.1 mL/min (A) (based on SCr of 0.44 mg/dL (L)).    No results for input(s): \"TROP\", \"TROPHS\", \"CK\" in  the last 168 hours.      No results for input(s): \"PTP\", \"INR\" in the last 168 hours.         Microbiology    Hospital Encounter on 01/20/24   1. Blood Culture     Status: None (Preliminary result)    Collection Time: 01/27/24  1:13 PM    Specimen: Blood,peripheral   Result Value Ref Range    Blood Culture Result No Growth 1 Day N/A         Imaging: Reviewed in Epic.    Medications:    metoprolol tartrate  25 mg Oral 2x Daily(Beta Blocker)    docusate  100 mg Oral BID    furosemide  20 mg Oral Daily    digoxin  125 mcg Oral Q MWF    midodrine  10 mg Oral TID    multivitamin  1 tablet Oral Daily       Assessment & Plan:    #Shock, hypovolemic and or cardiogenic, resolved  Off levophed, midodrine 10mg TID   S/p PRBCs  Zosyn IV discontinued, monitor off abx, NGTD on blood CX    #Acute on Chronic HFpEF, Bilateral pleural effusions  Lasix po daily - may need to hold as not eating/drinking with worsening alkalosis  Echo reviewed  CT reviewed  Prior bilateral thoras on in october    #Acute hypoxic/hypercapnic resp failure due to acute PE with possible RV strain on CT and pleural effusion  On bipap, recheck ABG today given AMS  Hep gtt   Doppler LE neg  RV strain on CT,  Echo normal RVSF    #Dementia w/ Delirium 2/2 shock/resp failure  #Deconditioning, FTT  Poor living conditions PTA  CT brain chronic  PT recs BRADLY    #Chronic Afib, controlled 80-90s - Digoxin/lopressor per cards  #Dysphagia -SLP following, consider DHT for TF  #Abdominal hernia w/ constipation improved - no surgical intervention per gen surg  #Anemia, folic/iron deficiency S/p venofer x3, PRBCs, no evidence of bleeding  #Chronic heel ulcers, chronic back wound POA -wound care eval, turn q 2hrs, mepilex over spine  #Lactic acidosis due to above, resolved  #Hypothermia on admission, resolved  #Hypokalemia resolved   #LUE swelling/ecchymoses, prior IVs removed and no DVT on doppler    Case d/w RN.  Check ABG.      SUSAN Wadsworth  10:13 AM      Supplementary Documentation:     Quality:  DVT Mechanical Prophylaxis:   SCDs,    DVT Pharmacologic Prophylaxis   Medication    heparin (Porcine) 65734 units/250mL infusion CONTINUOUS                Code Status: Full Code  [place]: External urinary catheter in place    The 21st Century Cures Act makes medical notes like these available to patients in the interest of transparency. Please be advised this is a medical document. Medical documents are intended to carry relevant information, facts as evident, and the clinical opinion of the practitioner. The medical note is intended as peer to peer communication and may appear blunt or direct. It is written in medical language and may contain abbreviations or verbiage that are unfamiliar.

## 2024-01-29 NOTE — DIETARY NOTE
[facility]  NUTRITION ASSESSMENT    Pt meets severe malnutrition criteria at this time.    CRITERIA FOR MALNUTRITION DIAGNOSIS:  Criteria for severe malnutrition diagnosis: chronic illness related to body fat severe depletion and muscle mass severe depletion    NUTRITION INTERVENTION:    Meals and Snacks - Encourage PO all meals  Medical Nutritional Supplements - Start Magic Cup BID  Enteral Nutrition - DHT removed.  If in line with GOC, Recommend starting Jevity 1.5 at 25 mL/hr advancing 10 mL/hr q 6 hours to goal rate of 45 mL/hr.   This will provide 1620 kcal, 69 grams protein, 820 mL total free water, and 100% of RDI's.   Recommend 140 mL water flush q 4 hours, TF+FWF provides 1660 mL total fluids.    PATIENT STATUS:   1/29 - Pt removed DHT on 1/27.  PO intake has varied since that time. Pt presently on Pureed, NTL diet. Was on a regular diet over the weeked, and noted to have improvement in oral intake.  At time of visit, pt sleeping quietly in bed, no family at bedside.  Per RN, family coming to discuss GOC and whether or not to resume TF.  Given variability in oral intake and mentation, recommend EN to meet nutritional needs if in line with GOC.    1/25: RD consulted for TF as pt unable to take adequate po. Recs as per above. Only bites noted on calorie count. AVAPs and HFNC. Pt lethargic. Will follow and support as able.     1/23: Pt weaned off Bipap yesterday. SLP evaluated and recommends regular solids and nectar thick liquids. Visited pt at bedside. Pt is awake but not answering questions appropriately. Per RN, pt ate some [place] toast and 100% of Magic Cup for bfast. RN reports pt taking liquids better than solids; will also add Nepro. No GI symptoms noted at this time but last BM PTA. Calorie count ordered. Palliative care involved for GOC discussions. Will continue to monitor and follow up as appropriate.     1/21: 89 year old female admitted on 1/20 presents with AMS, hypotensive, hypoxic and  hypothermic. Pt screened d/t MST score 2. Attempted to visit at bedside but pt is somnolent on AVAPS - unable to provide hx. Pt hypotensive requiring pressors. SLP consulted for swallow eval but currently not appropriate while on AVAPS. No GI symptoms noted and NKFA. No significant wt changes noted per chart review but pt with low BMI. Palliative care consulted for GOC discussion. RD to remain available for recommendations as warranted pending GOC.    PMH:  has a past medical history of Arthritis, Atrial fibrillation (HCC), Back problem, Cancer (HCC), Cataract, Osteoporosis, and Personal history of antineoplastic chemotherapy.    ANTHROPOMETRICS:  Ht:  5'7\"  Wt: 54.4 kg (120 lb).   BMI: Body mass index is 18.79 kg/m².  IBW: 61.4 kg    WEIGHT HISTORY: Per chart, pt with ~9 lb wt loss x 10 months (8%, not significant per standards).  Patient Weight(s) for the past 336 hrs:   Weight   01/26/24 0047 54.4 kg (120 lb)   01/23/24 0000 52.5 kg (115 lb 11.9 oz)   01/22/24 0000 51.6 kg (113 lb 12.1 oz)   01/21/24 0007 49.5 kg (109 lb 2 oz)   01/20/24 2143 48 kg (105 lb 13.1 oz)       Wt Readings from Last 10 Encounters:   01/26/24 54.4 kg (120 lb)   10/29/23 47.5 kg (104 lb 11.5 oz)   09/10/23 42.1 kg (92 lb 14.4 oz)   04/13/23 53.5 kg (118 lb)   04/10/23 53.5 kg (118 lb)   04/03/23 53.5 kg (118 lb)   03/30/23 53.5 kg (118 lb)   03/27/23 53.3 kg (117 lb 9.6 oz)   03/22/23 64 kg (141 lb)   12/05/22 63.5 kg (140 lb)        NUTRITION:  Diet:       Procedures    Regular/General diet Fluid Consistency: Nectar Thick / Mildly Thick Liquids; Texture Consistency: Pureed; Is Patient on Accuchecks? Yes; Is Patient on Suicide Precautions? No        Percent Meals Eaten (last 3 days)       Date/Time Percent Meals Eaten (%)    01/26/24 0930 15 %     Percent Meals Eaten (%): ate all of the yogurt, 22 grams at 01/26/24 0930    01/26/24 1600 0 %    01/28/24 1200 15 %            Food Allergies: No  Cultural/Ethnic/Rastafarian Preferences  Addressed: Yes    GI SYSTEM REVIEW: WNL Last BM 1/28  Skin/Wounds: stage II pressure injury to medial back    NUTRITION RELATED PHYSICAL FINDINGS:     1. Body Fat/Muscle Mass: severe depletion body fat Orbital fat pad, Buccal fat pad, and Triceps and severe muscle depletion Temple region, Clavicle region, Shoulder/Acromion process, Scapula region, Thigh region, and Calf region     2. Fluid Accumulation: lower extremity edema and b/l feet      NUTRITION PRESCRIPTION: 54.4 kg  Calories: 1624-2807 calories/day (30-35 kcal/kg)  Protein: 65-82 grams protein/day (1.2-1.5 gm/kg)  Fluid: ~1 ml/kcal or per MD discretion    NUTRITION DIAGNOSIS/PROBLEM:  Inadequate oral intake related to insufficient appetite resulting in inadequate nutrition intake as evidenced by documented/reported insufficient oral intake    MONITOR AND EVALUATE/NUTRITION GOALS:  PO intake of 75% of meals TID - Not met, Continues  PO intake of 75% of oral nutrition supplement/s - Not met, Continues  Weight stable within 1 to 2 lbs during admission - Ongoing  Start alternative nutrition in 24-48 hrs if diet is not able to advance- Ongoing  Tolerate alternative nutrition at 100% of goal - Ongoing      MEDICATIONS:  Colace, Lasix, MVI,     LABS:  reviewed    Pt is at High nutrition risk    Rosamaria Jalloh RD, LDN, CNSC  Clinical Dietitian  Phone [phone]

## 2024-01-29 NOTE — PROGRESS NOTES
Assumed care at 7:30 am  Alert and oriented to situation, hard to tell because she doesn't communicate too much  AVAPS on upon entering shift.   Pulm made aware of elevted CO2  She tries to remove CPAP- will take off to eat and put back on after lunch  All safety precautions in place. Will continue to monitor patient.

## 2024-01-29 NOTE — OCCUPATIONAL THERAPY NOTE
Attempted to see pt for skilled OT services this date. Pt with increased O2 needs. Orders to check ABGs. Will hold and OT continue to follow.

## 2024-01-30 ENCOUNTER — APPOINTMENT (OUTPATIENT)
Dept: GENERAL RADIOLOGY | Facility: HOSPITAL | Age: 89
End: 2024-01-30
Payer: MEDICARE

## 2024-01-30 LAB
ANION GAP SERPL CALC-SCNC: 1 MMOL/L (ref 0–18)
APTT PPP: 81.4 SECONDS (ref 23.3–35.6)
BUN BLD-MCNC: 26 MG/DL (ref 9–23)
CALCIUM BLD-MCNC: 9 MG/DL (ref 8.5–10.1)
CHLORIDE SERPL-SCNC: 103 MMOL/L (ref 98–112)
CO2 SERPL-SCNC: 41 MMOL/L (ref 21–32)
CREAT BLD-MCNC: 0.46 MG/DL
EGFRCR SERPLBLD CKD-EPI 2021: 90 ML/MIN/1.73M2 (ref 60–?)
ERYTHROCYTE [DISTWIDTH] IN BLOOD BY AUTOMATED COUNT: 25 %
GLUCOSE BLD-MCNC: 102 MG/DL (ref 70–99)
HCT VFR BLD AUTO: 27.7 %
HGB BLD-MCNC: 8.3 G/DL
MCH RBC QN AUTO: 26.8 PG (ref 26–34)
MCHC RBC AUTO-ENTMCNC: 30 G/DL (ref 31–37)
MCV RBC AUTO: 89.4 FL
OSMOLALITY SERPL CALC.SUM OF ELEC: 305 MOSM/KG (ref 275–295)
PLATELET # BLD AUTO: 141 10(3)UL (ref 150–450)
POTASSIUM SERPL-SCNC: 4.3 MMOL/L (ref 3.5–5.1)
RBC # BLD AUTO: 3.1 X10(6)UL
SODIUM SERPL-SCNC: 145 MMOL/L (ref 136–145)
WBC # BLD AUTO: 7.4 X10(3) UL (ref 4–11)

## 2024-01-30 PROCEDURE — 71045 X-RAY EXAM CHEST 1 VIEW: CPT

## 2024-01-30 PROCEDURE — 99233 SBSQ HOSP IP/OBS HIGH 50: CPT | Performed by: HOSPITALIST

## 2024-01-30 RX ORDER — FUROSEMIDE 10 MG/ML
20 INJECTION INTRAMUSCULAR; INTRAVENOUS DAILY
Status: DISCONTINUED | OUTPATIENT
Start: 2024-01-30 | End: 2024-02-01

## 2024-01-30 NOTE — PROGRESS NOTES
[facility]    Yin Butcher Patient Status:  Inpatient    1931 MRN [id number]   Location [facility] 3NE-A Attending Brooke Tracy MD   Hosp Day # 9 PCP Mina Walker MD     SUBJECTIVE: no new events.  Easily arousable but sleepy     OBJECTIVE:  /63 (BP Location: Left arm)   Pulse 108   Temp 97.2 °F (36.2 °C) (Axillary)   Resp 20   Ht 170.2 cm (5' 7\")   Wt 128 lb 6.4 oz (58.2 kg)   SpO2 100%   BMI 20.11 kg/m²   O2 requirement: on 2l nc     I/O last 3 completed shifts:  In: -   Out: 400 [Urine:400]  I/O this shift:  In: 118 [P.O.:118]  Out: -      Current Medications:   Current Facility-Administered Medications:     metoprolol tartrate (Lopressor) tab 25 mg, 25 mg, Oral, 2x Daily(Beta Blocker)    docusate (Colace) 50 MG/5ML oral solution 100 mg, 100 mg, Oral, BID    [Held by provider] furosemide (Lasix) tab 20 mg, 20 mg, Oral, Daily    digoxin (Lanoxin) tab 125 mcg, 125 mcg, Oral, Q MWF    polyethylene glycol (PEG 3350) (Miralax) 17 g oral packet 17 g, 17 g, Oral, Daily PRN    magnesium hydroxide (Milk of Magnesia) 400 MG/5ML oral suspension 30 mL, 30 mL, Oral, Daily PRN    bisacodyl (Dulcolax) 10 MG rectal suppository 10 mg, 10 mg, Rectal, Daily PRN    fleet enema (Fleet) 7-19 GM/118ML rectal enema 133 mL, 1 enema, Rectal, Once PRN    midodrine (ProAmatine) tab 10 mg, 10 mg, Oral, TID    multivitamin (Centrum) chewable tab (Adult) 1 tablet, 1 tablet, Oral, Daily    metoclopramide (Reglan) 5 mg/mL injection 5 mg, 5 mg, Intravenous, Q8H PRN    ondansetron (Zofran) 4 MG/2ML injection 4 mg, 4 mg, Intravenous, Q6H PRN    glucose (Dex4) 15 GM/59ML oral liquid 15 g, 15 g, Oral, Q15 Min PRN **OR** glucose (Glutose) 40% oral gel 15 g, 15 g, Oral, Q15 Min PRN **OR** glucose-vitamin C (Dex-4) chewable tab 4 tablet, 4 tablet, Oral, Q15 Min PRN **OR** dextrose 50% injection 50 mL, 50 mL, Intravenous, Q15 Min PRN **OR** glucose (Dex4) 15 GM/59ML oral liquid 30 g, 30 g, Oral, Q15 Min PRN **OR**  glucose (Glutose) 40% oral gel 30 g, 30 g, Oral, Q15 Min PRN **OR** glucose-vitamin C (Dex-4) chewable tab 8 tablet, 8 tablet, Oral, Q15 Min PRN    acetaminophen (Tylenol Extra Strength) tab 500 mg, 500 mg, Oral, Q4H PRN    heparin (Porcine) 48203 units/250mL infusion CONTINUOUS, 200-3,000 Units/hr, Intravenous, Continuous    morphINE PF 2 MG/ML injection 0.5 mg, 0.5 mg, Intravenous, Q4H PRN     General appearance: appears stated age and slowed mentation  Lungs: diminished breath sounds bibasilar  Heart: regular rate and rhythm  Abdomen: soft, non-tender; bowel sounds normal; no masses,  no organomegaly  Extremities: extremities normal, atraumatic, no cyanosis or edema     Lab Results   Component Value Date    WBC 7.4 01/30/2024    RBC 3.10 01/30/2024    HGB 8.3 01/30/2024    HCT 27.7 01/30/2024    MCV 89.4 01/30/2024    MCH 26.8 01/30/2024    MCHC 30.0 01/30/2024    RDW 25.0 01/30/2024    .0 01/30/2024     Lab Results   Component Value Date     01/30/2024    K 4.3 01/30/2024     01/30/2024    CO2 41.0 01/30/2024    BUN 26 01/30/2024    CREATSERUM 0.46 01/30/2024     01/30/2024    CA 9.0 01/30/2024     Lab Results   Component Value Date    PT 15.5 (H) 07/13/2014    PT 14.4 (H) 07/11/2014    INR 1.32 (H) 01/29/2024    INR 1.84 (H) 01/21/2024    INR 1.64 (H) 01/21/2024          Imaging: reviewed. Per EMR     ASSESSMENT/PLAN:  Acute hypoxic and hypercapnic respiratory failure - due to acute PE and bilateral pleural effusions. Pt was briefly treated with zosyn, but pneumonia is unlikely  - wean O2 as able- down to 2L nc; repeat CXR today shows persistent R moderate effusion  - AVAPS during sleep as tolerated  - bronchial hygiene  - diuretics per cardiology  Acute bilateral PE. LE doppler was negative for DVT. TTE was without right heart strain  - currently on heparin drip - ok to transition to DOAC from pulmonary standpoint- will ask pharmacy to start eliquis  Bilateral pleural effusions -  Recurrent, likely due to diastolic dysfunction. R effusion is partially loculated. Previously pt has undergone thoracentesis; showed transudative fluid consistent with CHF  - diuretics per cardiology  - hold off on thoracentesis, pt is at high risk for developing an ex-vacuo pneumothorax as the effusion is chronic, and loculated. The fluid will likely recur as well unless she's euvolemic.  HFpEF - TTE 65-70%   - Diurese as able  - Cardiology following  Chronic Afib    - per cardiology  Proph:  - currently on Heparin drip  Dispo-full code   - dc planning    Teofilo Cordero MD  1/30/2024  12:48 PM

## 2024-01-30 NOTE — PROGRESS NOTES
Progress Note  Yin Butcher Patient Status:  Inpatient    1931 MRN [id number]   Location [facility] 3NE-A Attending Brooke Tracy MD   Hosp Day # 9 PCP Mina Walker MD     Subjective:  Eating lunch with assistance from nursing staff.  Pt drowsy but responded to verbal stimuli; denies pain. RN reports that she ate breakfast this am.     Objective:  /63 (BP Location: Left arm)   Pulse 108   Temp 97.2 °F (36.2 °C) (Axillary)   Resp 20   Ht 5' 7\" (1.702 m)   Wt 128 lb 6.4 oz (58.2 kg)   SpO2 100%   BMI 20.11 kg/m²     Telemetry: Afib, rates controlled, occ PVC's    Intake/Output:    Intake/Output Summary (Last 24 hours) at 2024 1044  Last data filed at 2024 0951  Gross per 24 hour   Intake 118 ml   Output 400 ml   Net -282 ml       Last 3 Weights   24 0400 128 lb 6.4 oz (58.2 kg)   24 0047 120 lb (54.4 kg)   24 0000 115 lb 11.9 oz (52.5 kg)   24 0000 113 lb 12.1 oz (51.6 kg)   24 0007 109 lb 2 oz (49.5 kg)   24 2143 105 lb 13.1 oz (48 kg)   10/29/23 0600 104 lb 11.5 oz (47.5 kg)   10/24/23 0457 114 lb 3.2 oz (51.8 kg)   10/23/23 0340 92 lb 9.5 oz (42 kg)   10/22/23 2033 92 lb 9.5 oz (42 kg)   09/10/23 1203 92 lb 14.4 oz (42.1 kg)   23 0746 105 lb 12.8 oz (48 kg)   23 2218 106 lb (48.1 kg)       Labs:  Recent Labs   Lab 24  1306 24  0840 24  0503   * 104* 102*   BUN 25* 28* 26*   CREATSERUM 0.51* 0.44* 0.46*   EGFRCR 88 91 90   CA 8.8 9.1 9.0    142 145   K 4.3 4.1 4.3    100 103   CO2 40.0* 45.0* 41.0*     Recent Labs   Lab 24  0434 24  0806 24  0658 24  0840 24  0502   RBC 3.16* 3.04* 3.28* 3.26* 3.10*   HGB 8.1* 7.8* 8.6* 8.6* 8.3*   HCT 27.0* 25.6* 29.1* 30.0* 27.7*   MCV 85.4 84.2 88.7 92.0 89.4   MCH 25.6* 25.7* 26.2 26.4 26.8   MCHC 30.0* 30.5* 29.6* 28.7* 30.0*   RDW 18.3 19.2 21.3 23.9 25.0   NEPRELIM 10.34* 7.19  --   --   --    WBC 11.8* 8.8 10.8 8.0 7.4    .0 185.0 171.0 151.0 141.0*         No results for input(s): \"TROP\", \"TROPHS\", \"CK\" in the last 168 hours.    Diagnostics:  No results found.   Review of Systems   Reason unable to perform ROS: minimally interactive.       Physical Exam:    Gen: drowsy, disoriented, ill appearing, cachectic female  Heent: pupils equal, reactive. Mucous membranes moist.   Neck: no jvd  Cardiac: regular rate and rhythm, normal S1,S2, no murmur, clicks, rub or gallop  Lungs: CTA -anteriorly.  Abd: soft, NT/ND +bs  Ext: no edema  Skin: Warm, dry  Neuro: No focal deficits      Medications:     metoprolol tartrate  25 mg Oral 2x Daily(Beta Blocker)    docusate  100 mg Oral BID    [Held by provider] furosemide  20 mg Oral Daily    digoxin  125 mcg Oral Q MWF    midodrine  10 mg Oral TID    multivitamin  1 tablet Oral Daily      continuous dose heparin 1,200 Units/hr (01/30/24 0905)       Assessment:  Acute Hypoxic/Hypercapnic Respiratory Failure, Multifactorial (Acute PE, Bilateral Pleural effusions, ?PNA, underlying Copd).  Bipap overnight, currently on 3L O2 per NC  Pulm following   Shock   Initially presented unresponsive from home via EMS  CT Brain without acute intracranial process   Now resolved - BP stable, off vasopressors, on home midodrine dosing  Acute Bilateral PE  CT Chest with bilateral lower lobe, RML pulm emboli  US BLE negative for DVT  TTE without evidence of RV strain  On IV Heparin gtt   Bilateral Pleural Effusions  CXR 1/24 Mod-Large R pleural effusion - partially loculated, Smaller L pleural effusion.    No plans for thoracentesis at this time - pulm following  S/P IV Lasix, now on oral which was held yesterday d/t climbing Bicarb  Chronic Afib  HR controlled  On BB, digoxin - home dosing resumed  Historically not on AC  Anemia, Chronic   Hgb stable - 8.3 today  CAD  CT Chest with calcifications  Altered Mental Status, Dementia, Metabolic Encephalopathy  Social issues - came from home with poor living  conditions  SW following  Weakness, Malnutrition - dietary team following; variable oral intake    Plan:  Continue BB, digoxin, midodrine  CXR this afternoon reviewed with pleural effusion, as well as pulmonology input that pt is not a good candidate for further thoracentesis. Given patient's frail state, will discuss further diuresis options with rounding cardiologist.  These appear very limited.  I doubt that she will be able to tolerate the amount of diuretic that would be necessary to remove all of her pleural fluid.  Cardiac performance is definitely suboptimal in the setting of hyper-capnea and hypoxia.  On IV Heparin - can transition to DOAC when patient is consistently tolerating oral intake and no further procedures are planned.     Plan of care discussed with patient, RN.    Carla Espana, APRN  1/30/2024  10:44 AM  [phone] [facility]  [phone] [facility], [place]        Seen and examined.  Agree with exam and assessment as amended above.  Multiple chronic irreversible comorbidities.  Vigorous, normal LV systolic function.  Chronic atrial fibrillation with massive biatrial enlargement.  Rate control and systemic anticoagulation.  Diuresis as tolerated by vitals, exam and renal function.  Continue IV diuretic as I think her elevated bicarb is metabolic compensation due to respiratory acidosis rather than volume contraction from overdiuresis.  Cardiac MDM reviewed in detail with MAXIM Espana.    TRINI Mckeon MD  3

## 2024-01-30 NOTE — PROGRESS NOTES
Pt Aox1, L nasal cannula, Afib on monitor. VSS. Denies pain or discomfort.  Patient communicating this morning that she was hungry. Ate 50% of breakfast and approx 60% of lunch.    Voice hoarse and soft  Heparin at 12 gtt/ml  discontinued after starting Eliquis this afternoon.    1 bowel movement  Mepilex to buttocks (for redness) changed  Wound care to Right heel  Chest xray this afternoon.  Bilateral wrist restraints in place.  Lights on during the day.

## 2024-01-30 NOTE — OCCUPATIONAL THERAPY NOTE
OCCUPATIONAL THERAPY TREATMENT NOTE - INPATIENT     Room Number: 3620/3620-A  Session: 1   Number of Visits to Meet Established Goals: 5    Presenting Problem: pleural effusion, PE    History: Patient is a 89 year old female admitted on 1/20/2024 with Presenting Problem: pleural effusion, PE. Co-Morbidities : h/o Afib, chronic back pain. Pt was admitted from home on 1/20 with altered mental status. Admitted for pleural effusion and B PE.  B LE doppler negative for DVTs.       Chest CT 1/21  CONCLUSION:    1. There are acute bilateral lower lobe and right middle lobe pulmonary emboli.  The critical results were called to the patient's nurse by the vision radiologist at 0318 a.m..   2. Findings suggest right heart failure/right heart strain.   3. Markedly enlarged left atrium raises suspicion for mitral valve stenosis.   4. Large right and moderate left pleural effusions are noted.   5. Bilateral lower lobe and right middle lobe atelectasis.   6. Multiple other incidental findings as detailed above.       ASSESSMENT   Patient presents with the following performance deficits: strength, activity tolerance, endurance, sitting balance. These deficits impact the patient’s ability to participate in ADL, transfers, instrumental activities of daily living, rest and sleep, leisure and social participation.     Pt received semi supine in bed, pleasant and cooperative for OT session. Pt performs bed mobility at max A x 2 to sit EOB with max multimodal cues provided for hand placement. Max A required for static sitting. Pt engages in various activities in sitting including forward functional reaching and side to side elbow propping to improve sitting balance/core strength required for ADLs, functional transfers, and bed mobility. Max cues provided for pt to keep eyes open and stay alert and engaged in session. Multimodal cues provided for pt top self correct posture from L lateral leaning and retropulsion. Pt returns to supine at  max A x 2 and requires total assist x  to reposition to HOB.    The patient is functioning below her previous functional level and would benefit from skilled inpatient OT to address the above deficits, maximizing patient’s ability to return safely to her prior level of function.    OT Discharge Recommendations: Sub-acute rehabilitation       WEIGHT BEARING RESTRICTION                     TREATMENT SESSION:  Patient Start of Session: semi supine in bed  FUNCTIONAL TRANSFER ASSESSMENT  Sit to Stand: Edge of Bed  Edge of Bed: Not Tested    BED MOBILITY  Supine to Sit : Dependent (max A x 2)  Sit to Supine (OT): Dependent (max A x 2)  Scooting: Max A    BALANCE ASSESSMENT  Static Sitting: Maximum Assist    FUNCTIONAL ADL ASSESSMENT       ACTIVITY TOLERANCE: fair                         O2 SATURATIONS       EDUCATION PROVIDED  Patient : Role of Occupational Therapy; Plan of Care; Discharge Recommendations; Functional Transfer Techniques; Fall Prevention; Posture/Positioning; Energy Conservation; Proper Body Mechanics  Patient's Response to Education: Requires Further Education      Patient End of Session: In bed;Needs met;Call light within reach;RN aware of session/findings;All patient questions and concerns addressed;Alarm set;Restraints    SUBJECTIVE  Pt soft spoken. Pleasant and cooperative for OT session.   \"I feel at peace.\"    PAIN ASSESSMENT  Ratin           OBJECTIVE  Precautions: Bed/chair alarm    AM-PAC ‘6-Clicks’ Inpatient Daily Activity Short Form  -   Putting on and taking off regular lower body clothing?: A Lot  -   Bathing (including washing, rinsing, drying)?: A Lot  -   Toileting, which includes using toilet, bedpan or urinal? : A Lot  -   Putting on and taking off regular upper body clothing?: A Little  -   Taking care of personal grooming such as brushing teeth?: A Little  -   Eating meals?: A Little    AM-PAC Score:  Score: 15  Approx Degree of Impairment: 56.46%  Standardized Score (AM-PAC  Scale): 34.69    PLAN  OT Treatment Plan: Balance activities;Energy conservation/work simplification techniques;ADL training;UE strengthening/ROM;Functional transfer training;Patient/Family training;Cognitive reorientation;Patient/Family education;Equipment eval/education  Rehab Potential : Fair  Frequency: 3x/week    OT Goals:     All goals ongoing 01/30    ADL Goals   Patient will perform upper body dressing:  with setup  Patient will perform lower body dressing:  with mod assist  Patient will perform toileting: with mod assist     Functional Transfer Goals  Patient will transfer to bedside commode:  with mod assist     UE Exercise Program Goal  Patient will be supervision with bilateral AROM HEP (home exercise program).    OT Session Time: 25 minutes  Therapeutic Activity: 25 minutes

## 2024-01-30 NOTE — PROGRESS NOTES
[facility]     Hospitalist Progress Note     Yin Butcher Patient Status:  Inpatient    1931 MRN [id number]   Location [facility] 4SW-A Attending Brooke Tracy MD   Hosp Day # 9 PCP Mina Walker MD     Chief Complaint: Shock     Subjective:  Patient awake to stimuli, currently on AVAPS.     Objective:    Review of Systems:  comprehensive review of systems was completed; pertinent positive and negatives stated in subjective.    Vital signs:  Temp:  [97 °F (36.1 °C)-98.1 °F (36.7 °C)] 97.2 °F (36.2 °C)  Pulse:  [] 96  Resp:  [18-20] 20  BP: (100-127)/(63-83) 100/63  SpO2:  [92 %-100 %] 95 %    Physical Exam:    General: ill/frail appearing, elderly lady 89 year old female   Respiratory: No wheezes, no rhonchi on AVAPS  Cardiovascular: S1, S2, irregular 80-90s +murmur  Abdomen: Soft, Nontender, nondistended, +hernia reducible  Neuro: moves all extremities, restless to stimuli  Extremities: No edema, LUE ecchymoses/swelling improving      Diagnostic Data:    Labs:  Recent Labs   Lab 24  0434 24  0806 24  0658 24  0840 24  2202 24  0502   WBC 11.8* 8.8 10.8 8.0  --  7.4   HGB 8.1* 7.8* 8.6* 8.6*  --  8.3*   MCV 85.4 84.2 88.7 92.0  --  89.4   .0 185.0 171.0 151.0  --  141.0*   INR  --   --   --   --  1.32*  --        Recent Labs   Lab 24  0434 24  0806 24  0658 24  1306 24  0840 24  0503   * 78   < > 131* 104* 102*   BUN 25* 21   < > 25* 28* 26*   CREATSERUM 0.48* 0.49*   < > 0.51* 0.44* 0.46*   CA 8.3* 8.7   < > 8.8 9.1 9.0   ALB 2.8* 2.6*  --  2.9*  --   --    * 139   < > 141 142 145   K 3.9  3.9 4.0   < > 4.3 4.1 4.3    103   < > 100 100 103   CO2 34.0* 34.0*   < > 40.0* 45.0* 41.0*   ALKPHO 77 80  --  76  --   --    AST 17 18  --  8*  --   --    BILT 1.5 1.2  --  1.5  --   --    TP 6.0* 5.7*  --  6.4  --   --     < > = values in this interval not displayed.       Estimated Creatinine  Clearance: 71.7 mL/min (A) (based on SCr of 0.46 mg/dL (L)).    No results for input(s): \"TROP\", \"TROPHS\", \"CK\" in the last 168 hours.      Recent Labs   Lab 01/29/24  2202   PTP 16.4*   INR 1.32*            Microbiology    Hospital Encounter on 01/20/24   1. Blood Culture     Status: None (Preliminary result)    Collection Time: 01/27/24  1:13 PM    Specimen: Blood,peripheral   Result Value Ref Range    Blood Culture Result No Growth 2 Days N/A         Imaging: Reviewed in Epic.    Medications:    metoprolol tartrate  25 mg Oral 2x Daily(Beta Blocker)    docusate  100 mg Oral BID    [Held by provider] furosemide  20 mg Oral Daily    digoxin  125 mcg Oral Q MWF    midodrine  10 mg Oral TID    multivitamin  1 tablet Oral Daily       Assessment & Plan:    #Shock, hypovolemic and or cardiogenic, resolved  Off levophed, midodrine 10mg TID   S/p PRBCs on 1/23  Zosyn IV discontinued, monitor off abx, NGTD on blood CX    #Acute on Chronic HFpEF, Bilateral pleural effusions  Lasix po on hold until po intake improves  Echo reviewed  CT reviewed  Prior bilateral thoras on in october    #Acute hypoxic/hypercapnic resp failure due to acute PE with possible RV strain on CT and pleural effusion  On AVAPS  Hep gtt to DOAC  Doppler LE neg  RV strain on CT,  Echo normal RVSF    #Dementia w/ Delirium 2/2 shock/resp failure  #Deconditioning, FTT  Poor living conditions PTA  CT brain chronic  PT recs BRADLY    #Chronic Afib, controlled 80-90s - Digoxin/lopressor per cards  #Dysphagia -SLP following -regular/nectar tip  #Abdominal hernia w/ constipation improved - no surgical intervention per gen surg  #Anemia, folic/iron deficiency S/p venofer x3, PRBCs, no evidence of bleeding  #Chronic heel ulcers, chronic back wound POA -wound care eval, turn q 2hrs, mepilex over spine  #Lactic acidosis due to above, resolved  #Hypothermia on admission, resolved  #Hypokalemia resolved   #LUE swelling/ecchymoses, prior IVs removed and no DVT on  doppler    Case d/w RN.   Increase activity/po intake as able.     SUSAN Wadsworth  10:13 AM     Addendum: Updated pt dtr.  Who reports some issues with swallowing since her last intubation in September. Says she previously saw ENT and reports they offered steroid injection which pt had declined.  She is agreeable with rehab.  Picky eater at baseline per dtr and wants to make sure she gets regular diet.  Dtr reports h/o issues with AC in past and understands need for AC for PE. She has questions about AC cost - reviewing w/ SW/cards.  Dtr has questions about AVAPS plan also -reviewing with pulm. We reviewed code status and discussed outcome, likelihood of MSOF even if ROSC achieved - remains full code per dtr.  She is aware of dc planning for rehab - tomorrow?    SUSAN Wadsworth  12:51 PM     Supplementary Documentation:     Quality:  DVT Mechanical Prophylaxis:   SCDs,    DVT Pharmacologic Prophylaxis   Medication    heparin (Porcine) 94827 units/250mL infusion CONTINUOUS                Code Status: Full Code  [place]: External urinary catheter in place    The 21st Century Cures Act makes medical notes like these available to patients in the interest of transparency. Please be advised this is a medical document. Medical documents are intended to carry relevant information, facts as evident, and the clinical opinion of the practitioner. The medical note is intended as peer to peer communication and may appear blunt or direct. It is written in medical language and may contain abbreviations or verbiage that are unfamiliar.

## 2024-01-30 NOTE — SLP NOTE
SPEECH DAILY NOTE - INPATIENT    ASSESSMENT & PLAN   ASSESSMENT  Pt seen for dysphagia tx to assess tolerance with recommended diet, ensure proper utilization of aspiration precautions and provide pt/family education.  Patient drowsy, but arousable, in bed. She reported good tolerance of PO intake today. Vocal quality noted to be weak and breathy. Patient agreeable to PO trials of mildly thick liquids. Bolus acceptance was adequate without evidence of anterior bolus loss. No overt s/s of aspiration observed and patient denied odynophagia and globus sensation across consistencies. She politely refused solid consistencies during my visit. Recommend patient continue a regular diet and mildly thick liquids. SLP will continue to follow per POC.     Diet Recommendations - Solids: Regular  Diet Recommendations - Liquids: Nectar thick liquids/ Mildly thick    Compensatory Strategies Recommended: Small bites and sips  Aspiration Precautions: Upright position  Medication Administration Recommendations: One pill at a time                     Treatment Plan  Treatment Plan/Recommendations: Aspiration precautions    Interdisciplinary Communication: NA            GOALS  Goal #1 The patient will tolerate regular consistency and mildly thick liquids without overt signs or symptoms of aspiration with 95 % accuracy over 1-2 session(s).  In Progress   Goal #2 The patient/family/caregiver will demonstrate understanding and implementation of aspiration precautions and swallow strategies independently over 1-2 session(s).     In Progress   Goal #3       Goal #4       Goal #5       Goal #6       Goal #7       Goal #8            FOLLOW UP  Follow Up Needed (Documentation Required): Yes  SLP Follow-up Date: 02/01/24       Session: 3    If you have any questions, please contact ADELAIDE Womack

## 2024-01-30 NOTE — PHYSICAL THERAPY NOTE
PHYSICAL THERAPY TREATMENT NOTE - INPATIENT    Room Number: 3620/3620-A     Session: 2     Number of Visits to Meet Established Goals: 5    Presenting Problem: pleural effusion  Co-Morbidities : h/o Afib, chronic back pain    History related to current admission: Patient is a 89 year old female admitted on 2024 from home with AMS, pleural effusion, PE.     ASSESSMENT     Pt presents to therapy with decreased strength, functional mobility, endurance, balance, activity tolerance, trunk control. These impairments contribute to limitations in walking, transfers, bed mobility. Pt with poor activity tolerance, requires stimulation and cuing to maintain eyes open. Pt will continue to benefit from skilled IP PT to address impairments and functional limitations.       DISCHARGE RECOMMENDATIONS  PT Discharge Recommendations: Sub-acute rehabilitation     PLAN  PT Treatment Plan: Bed mobility;Body mechanics;Endurance;Energy conservation;Patient education;Family education;Gait training;Range of motion;Strengthening;Transfer training;Balance training  Rehab Potential : Good  Frequency (Obs):  (2-3x/week)    CURRENT GOALS   Goal #1 Patient is able to demonstrate supine - sit EOB @ level: supervision      Goal #2 Patient is able to demonstrate transfers EOB to/from Chair/Wheelchair at assistance level: min A      Goal #3        Goal #4     Goal #5     Goal #6     Goal Comments: Goals established on 2024 all goals ongoing      SUBJECTIVE  \"I feel at peace\"     OBJECTIVE  Precautions: Bed/chair alarm    WEIGHT BEARING RESTRICTION                   PAIN ASSESSMENT   Ratin  Location: R heel  Management Techniques: Relaxation;Repositioning;Activity promotion;Body mechanics    BALANCE                                                                                                                       Static Sitting: Poor  Dynamic Sitting: Poor           Static Standing: Not tested  Dynamic Standing: Not  tested    ACTIVITY TOLERANCE  Pulse: 107  Heart Rate Source: Monitor                   O2 WALK  Oxygen Therapy  SPO2% on Room Air at Rest: 2  SPO2% on Oxygen at Rest: 97      AM-PAC '6-Clicks' INPATIENT SHORT FORM - BASIC MOBILITY  How much difficulty does the patient currently have...  Patient Difficulty: Turning over in bed (including adjusting bedclothes, sheets and blankets)?: Unable   Patient Difficulty: Sitting down on and standing up from a chair with arms (e.g., wheelchair, bedside commode, etc.): Unable   Patient Difficulty: Moving from lying on back to sitting on the side of the bed?: Unable   How much help from another person does the patient currently need...   Help from Another: Moving to and from a bed to a chair (including a wheelchair)?: Total   Help from Another: Need to walk in hospital room?: Total   Help from Another: Climbing 3-5 steps with a railing?: Total       AM-PAC Score:  Raw Score: 6   Approx Degree of Impairment: 100%   Standardized Score (AM-PAC Scale): 23.55   CMS Modifier (G-Code): CN    FUNCTIONAL ABILITY STATUS  Gait Assessment   Functional Mobility/Gait Assessment  Gait Assistance: Not tested    Skilled Therapy Provided    Bed Mobility:  Rolling: NT   Supine<>Sit: maxA x2   Sit<>Supine: maxA x2     Transfer Mobility:  Sit<>Stand: NT   Stand<>Sit: NT   Gait: NT    Pt cued on usage of bed rail to facilitate supine to sit transfer and trunk rotation. Pt cued on finding static sitting balance, pt with increased retropulsion and lean to L, able to slightly correct when cued. Assisted with anterior scooting to improved contact with feet on floor for improved support. Participated in lateral leans onto forearms x5, req modA.     Therapist's Comments: RN cleared for session. Pt agreeable for therapy, received supine.      Patient End of Session: In bed;Needs met;Call light within reach;RN aware of session/findings;All patient questions and concerns addressed;Alarm set;Discussed  recommendations with /    PT Session Time: 25 minutes  Therapeutic Activity: 10 minutes  Neuromuscular re-ed: 14 minutes

## 2024-01-30 NOTE — CONSULTS
Pharmacy Consult Note: apixaban (Eliquis) dosing    Yin Butcher has been prescribed apixaban (Eliquis) for treatment of pulmonary embolism.  Pharmacy has been consulted to adjust dose by Dr. Cordero.    No dosage adjustment is recommended by the  of apixaban for any degree of reduced kidney function or age for treatment of deep vein thrombosis and/or pulmonary embolism. Therefore, start apixaban 10mg PO BID x7 days followed by apixaban 5mg PO BID for 3 months.    Heparin drip can be discontinued once apixaban is given.    Thank you,  Alexa Meek, PharmD  1/30/2024 2:07 PM

## 2024-01-30 NOTE — PLAN OF CARE
Assumed care at 1930   A/Ox1, on BiPAP, afib on tele   Regular, nectar thick liquids - pills crushed  Bed bound, purewick in place c/d/I   No complaints of pain   PIV infusing heparin gtt   Patient updated with plan of care  All needs met at this time    Problem: Patient/Family Goals  Goal: Patient/Family Long Term Goal  Description: Patient's Long Term Goal: return home    Interventions:  - cooperate with PT/OT/SLP as ordered  - ?learn how to give Lovenox injections  - See additional Care Plan goals for specific interventions  Outcome: Progressing  Goal: Patient/Family Short Term Goal  Description: Patient's Short Term Goal: wean off levophed    Interventions:   - antibiotics as prescribed  - IV fluid as prescribed  - PRBC as prescribed  - See additional Care Plan goals for specific interventions  Outcome: Progressing     Problem: CARDIOVASCULAR - ADULT  Goal: Maintains optimal cardiac output and hemodynamic stability  Description: INTERVENTIONS:  - Monitor vital signs, rhythm, and trends  - Monitor for bleeding, hypotension and signs of decreased cardiac output  - Evaluate effectiveness of vasoactive medications to optimize hemodynamic stability  - Monitor arterial and/or venous puncture sites for bleeding and/or hematoma  - Assess quality of pulses, skin color and temperature  - Assess for signs of decreased coronary artery perfusion - ex. Angina  - Evaluate fluid balance, assess for edema, trend weights  Outcome: Progressing     Problem: RESPIRATORY - ADULT  Goal: Achieves optimal ventilation and oxygenation  Description: INTERVENTIONS:  - Assess for changes in respiratory status  - Assess for changes in mentation and behavior  - Position to facilitate oxygenation and minimize respiratory effort  - Oxygen supplementation based on oxygen saturation or ABGs  - Provide Smoking Cessation handout, if applicable  - Encourage broncho-pulmonary hygiene including cough, deep breathe, Incentive Spirometry  - Assess the  need for suctioning and perform as needed  - Assess and instruct to report SOB or any respiratory difficulty  - Respiratory Therapy support as indicated  - Manage/alleviate anxiety  - Monitor for signs/symptoms of CO2 retention  Outcome: Progressing     Problem: METABOLIC/FLUID AND ELECTROLYTES - ADULT  Goal: Glucose maintained within prescribed range  Description: INTERVENTIONS:  - Monitor Blood Glucose as ordered  - Assess for signs and symptoms of hyperglycemia and hypoglycemia  - Administer ordered medications to maintain glucose within target range  - Assess barriers to adequate nutritional intake and initiate nutrition consult as needed  - Instruct patient on self management of diabetes  Outcome: Progressing  Goal: Electrolytes maintained within normal limits  Description: INTERVENTIONS:  - Monitor labs and rhythm and assess patient for signs and symptoms of electrolyte imbalances  - Administer electrolyte replacement as ordered  - Monitor response to electrolyte replacements, including rhythm and repeat lab results as appropriate  - Fluid restriction as ordered  - Instruct patient on fluid and nutrition restrictions as appropriate  Outcome: Progressing     Problem: NEUROLOGICAL - ADULT  Goal: Achieves stable or improved neurological status  Description: INTERVENTIONS  - Assess for and report changes in neurological status  - Initiate measures to prevent increased intracranial pressure  - Maintain blood pressure and fluid volume within ordered parameters to optimize cerebral perfusion and minimize risk of hemorrhage  - Monitor temperature, glucose, and sodium. Initiate appropriate interventions as ordered  Outcome: Progressing     Problem: Impaired Swallowing  Goal: Minimize aspiration risk  Description: Interventions:  - Patient should be alert and upright for all feedings (90 degrees preferred)  - Offer food and liquids at a slow rate  - No straws  - Encourage small bites of food and small sips of liquid  -  Offer pills one at a time, crush or deliver with applesauce as needed  - Discontinue feeding and notify MD (or speech pathologist) if coughing or persistent throat clearing or wet/gurgly vocal quality is noted  Outcome: Progressing     Problem: SAFETY ADULT - FALL  Goal: Free from fall injury  Description: INTERVENTIONS:  - Assess pt frequently for physical needs  - Identify cognitive and physical deficits and behaviors that affect risk of falls.  - [place] fall precautions as indicated by assessment.  - Educate pt/family on patient safety including physical limitations  - Instruct pt to call for assistance with activity based on assessment  - Modify environment to reduce risk of injury  - Provide assistive devices as appropriate  - Consider OT/PT consult to assist with strengthening/mobility  - Encourage toileting schedule  Outcome: Progressing     Problem: Safety Risk - Non-Violent Restraints  Goal: Patient will remain free from self-harm  Description: INTERVENTIONS:  - Apply the least restrictive restraint to prevent harm  - Notify patient and family of reasons restraints applied  - Assess for any contributing factors to confusion (electrolyte disturbances, delirium, medications)  - Discontinue any unnecessary medical devices as soon as possible  - Assess the patient's physical comfort, circulation, skin condition, hydration, nutrition and elimination needs   - Reorient and redirection as needed  - Assess for the need to continue restraints  Outcome: Progressing     Problem: Delirium  Goal: Minimize duration of delirium  Description: Interventions:  - Encourage use of hearing aids, eye glasses  - Promote highest level of mobility daily  - Provide frequent reorientation  - Promote wakefulness i.e. lights on, blinds open  - Promote sleep, encourage patient's normal rest cycle i.e. lights off, TV off, minimize noise and interruptions  - Encourage family to assist in orientation and promotion of home  routines  Outcome: Progressing     Problem: Diabetes/Glucose Control  Goal: Glucose maintained within prescribed range  Description: INTERVENTIONS:  - Monitor Blood Glucose as ordered  - Assess for signs and symptoms of hyperglycemia and hypoglycemia  - Administer ordered medications to maintain glucose within target range  - Assess barriers to adequate nutritional intake and initiate nutrition consult as needed  - Instruct patient on self management of diabetes  Outcome: Progressing

## 2024-01-30 NOTE — CM/SW NOTE
APS , Yareli Velazquez contacted to follow up on clearance for pt to return home, as pt's dtr considering return home based on limited [facility] availability. CW not in office today, will be available tomorrow, 1/31. VM left for CW to contact ALEXEI Winchester 1/31.     CM/NANDO will remain available for DC planning and/or support.     GOLDEN ProN, CMSRN    [id number]

## 2024-01-30 NOTE — CM/SW NOTE
Eliquis script sent electronically to [facility] [place] confirms inability to locate prescription insurance using their insurance verification process. They can apply the 30 day free coupon, however after that pt would be responsible for cash price of $1000/month. Will discuss further with dtrJada.    Spoke with dtrJada, via phone. She confirms pt has prescription insurance coverage and typically uses [facility] or [facility] pharmacies. She is unable to locate pt's prescription cards currently, but states she should have a [employer] plan. Pt fills her prescriptions at [facility] and they are typically little to no cost after insurance. She will look for card and CM will follow up tomorrow. She did inform that her name on her insurance is ADONISCHERELLE relljessica or ADONISAna Neetu. Will provide this information to the [facility] tomorrow. Dtr does state that she believes pt was previously on Eliquis, Xarelto, and Coumadin. Dtr is aware that the cash price of both Eliquis and Xarelto is close to $1000.    [facility] referral remains open and pending in [place] (sent to 33+ facilities). Pt with limited options for [facility] due to need for AVAPS. The facilities the dtr verbalized interest in are not able to accommodate ([facility]). Spoke with dtrJada, via phone regarding discharge planning. Informed of plans for potential medical clearance tomorrow, therefore need for securement of discharge plan. Dtr states that she is agreeable to [facility], but only at the facilities previously mentioned. Informed of barriers to acceptance due to need for AVAPS. Discussed that the referral remains open at this time and that facilities are currently reviewing to determine if they can accommodate her clinical needs. Dtr agreeable to having the list of accepting facilities emailed to her ([email or web address]) when available. Dtr will review and consider her options - she will not agree to discharge to any [facility] or  [facility] due to previous negative experiences. Discussed alternate plan if unable to agree to [facility] at any accepting facility and she states she will take pt home. Informed of today's therapy sessions and her current need for max assist x 2 for transfers. Dtr states that she was helping pt transfer to a chair at home when she was in worse states. Her fiance is also available to help intermittently. Dtr confirms that she has a RW, wheelchair, cane, and commode at home. She also has wedges that she uses to turn/reposition pt with, therefore she does not feel she needs a hospital bed.     CM/SW will send list of accepting [facility] facilities when available. Will begin outpatient AVAPS order as well in the event she needs to discharge home. Will need to follow up with pt's APS  prior to discharge home to confirm clearance.    [facility] requesting the script for Eliquis be clarified, as there were two doses entered by pulm/cards. Ordering pulmonologist is not currently on call, therefore will follow up tomorrow.     Annabella Fisher, GOLDENN, RN-BC    [phone]

## 2024-01-31 LAB
ANION GAP SERPL CALC-SCNC: 0 MMOL/L (ref 0–18)
APTT PPP: 34.6 SECONDS (ref 23.3–35.6)
BUN BLD-MCNC: 26 MG/DL (ref 9–23)
CALCIUM BLD-MCNC: 8.8 MG/DL (ref 8.5–10.1)
CHLORIDE SERPL-SCNC: 102 MMOL/L (ref 98–112)
CO2 SERPL-SCNC: 42 MMOL/L (ref 21–32)
CREAT BLD-MCNC: 0.48 MG/DL
EGFRCR SERPLBLD CKD-EPI 2021: 89 ML/MIN/1.73M2 (ref 60–?)
GLUCOSE BLD-MCNC: 103 MG/DL (ref 70–99)
OSMOLALITY SERPL CALC.SUM OF ELEC: 303 MOSM/KG (ref 275–295)
POTASSIUM SERPL-SCNC: 3.8 MMOL/L (ref 3.5–5.1)
SODIUM SERPL-SCNC: 144 MMOL/L (ref 136–145)

## 2024-01-31 PROCEDURE — 99232 SBSQ HOSP IP/OBS MODERATE 35: CPT | Performed by: STUDENT IN AN ORGANIZED HEALTH CARE EDUCATION/TRAINING PROGRAM

## 2024-01-31 RX ORDER — POTASSIUM CHLORIDE 20 MEQ/1
40 TABLET, EXTENDED RELEASE ORAL ONCE
Status: COMPLETED | OUTPATIENT
Start: 2024-01-31 | End: 2024-01-31

## 2024-01-31 NOTE — PROGRESS NOTES
Progress Note  Yin Butcher Patient Status:  Inpatient    1931 MRN [id number]   Location [facility] 3NE-A Attending Skyla Tinsley MD   Hosp Day # 10 PCP Mina Walker MD     Subjective:  In bed, lethargic. No acute distress.     Objective:  /80 (BP Location: Left arm)   Pulse 101   Temp 97.9 °F (36.6 °C) (Oral)   Resp 20   Ht 5' 7\" (1.702 m)   Wt 128 lb 6.4 oz (58.2 kg)   SpO2 93%   BMI 20.11 kg/m²     Telemetry: afib      Intake/Output:    Intake/Output Summary (Last 24 hours) at 2024 1030  Last data filed at 2024 2100  Gross per 24 hour   Intake 220 ml   Output --   Net 220 ml       Last 3 Weights   24 0400 128 lb 6.4 oz (58.2 kg)   24 0047 120 lb (54.4 kg)   24 0000 115 lb 11.9 oz (52.5 kg)   24 0000 113 lb 12.1 oz (51.6 kg)   24 0007 109 lb 2 oz (49.5 kg)   24 2143 105 lb 13.1 oz (48 kg)   10/29/23 0600 104 lb 11.5 oz (47.5 kg)   10/24/23 0457 114 lb 3.2 oz (51.8 kg)   10/23/23 0340 92 lb 9.5 oz (42 kg)   10/22/23 2033 92 lb 9.5 oz (42 kg)   09/10/23 1203 92 lb 14.4 oz (42.1 kg)   23 0746 105 lb 12.8 oz (48 kg)   23 2218 106 lb (48.1 kg)       Labs:  Recent Labs   Lab 24  0840 24  0503 24  0621   * 102* 103*   BUN 28* 26* 26*   CREATSERUM 0.44* 0.46* 0.48*   EGFRCR 91 90 89   CA 9.1 9.0 8.8    145 144   K 4.1 4.3 3.8    103 102   CO2 45.0* 41.0* 42.0*     Recent Labs   Lab 24  0806 24  0658 24  0840 24  0502   RBC 3.04* 3.28* 3.26* 3.10*   HGB 7.8* 8.6* 8.6* 8.3*   HCT 25.6* 29.1* 30.0* 27.7*   MCV 84.2 88.7 92.0 89.4   MCH 25.7* 26.2 26.4 26.8   MCHC 30.5* 29.6* 28.7* 30.0*   RDW 19.2 21.3 23.9 25.0   NEPRELIM 7.19  --   --   --    WBC 8.8 10.8 8.0 7.4   .0 171.0 151.0 141.0*         No results for input(s): \"TROP\", \"TROPHS\", \"CK\" in the last 168 hours.    Review of Systems   Constitutional: Positive for malaise/fatigue.   Cardiovascular:  Positive for  irregular heartbeat.   Respiratory: Negative.         Physical Exam:    Gen: lethargic, NAD, cachectic/frail  Heent: pupils equal, reactive. Mucous membranes moist.   Neck: no jvd  Cardiac: irregularly irregular, normal S1,S2  Lungs: CTA  Abd: soft, NT/ND +bs  Ext: no edema  Skin: Warm, dry  Neuro: No focal deficits        Medications:     potassium chloride  40 mEq Oral Once    apixaban  10 mg Oral BID    Followed by    [START ON 2/6/2024] apixaban  5 mg Oral BID    furosemide  20 mg Intravenous Daily    metoprolol tartrate  25 mg Oral 2x Daily(Beta Blocker)    docusate  100 mg Oral BID    digoxin  125 mcg Oral Q MWF    midodrine  10 mg Oral TID    multivitamin  1 tablet Oral Daily             Assessment:  Admitted with acute respiratory failure, multifactorial with PE, effusions, ? Pna and underlying COPD  Pulm following  Abx stopped. Nippv as needed.   Shock-resolved  Off pressors. On home midodrine.   Acute bilateral PE  LE US negative for DVT  Echo without evidence of RV strain  Heparin gtt-->transitioned to doac. Pharmacy consult noted 10mg po bid x7 days followed by 5mg po bid x3 months (no adjustment for renal function/age for tx of DVT and/or PE)  Chronic Afib, rate controlled  Historically not on AC  Echo 1/21/24-LVEF 65-70%, normal wall motion. RV function normal. Mild-mod MR, mild-mod TR  Right pleural effusion  Recurrent, partially loculated. No plans for thora at this time. Pulm following.  AMS with underlying dementia  Hypotension-chronic on midodrine  Off pressors  Anemia, chronic, stable  LORIE-venofer  CAD-calcifications noted on CT  Social issues--came from home with poor living conditions--SW following.  Profound weakness, poor po intake    Palliative care following.    Plan:  Afib rates with reasonable control on bb, dig.   Transitioned to doac, PE dosing per pulm.  Can likely switch to usual po lasix tomorrow.    Plan of care discussed with patient, RN.    Leslie Rogers,  APRN  1/31/2024  10:30 AM  [phone]  [phone]    Seen and examined. MDM er me. Hypoxic hypercapnic resp failure with compensatory met alkalosis, chronic affib, HFpEF and large effusions. AVAPS per pulm. Transition to NOAC. Multiple irrevesible conditions with malnutrition and poor living conditions.

## 2024-01-31 NOTE — CM/SW NOTE
Pt currently with 6 accepting [facility] facilities that can accommodate AVAPS. These options were sent to dtrJada, yesterday for review. Dtr unlikely to be happy with options.     Message sent to pulm to inquire about alternate options in place of AVAPs. Pulm confirming need for AVAPS at discharge. Also asked to send script for Eliquis to the [facility] - will inform [facility] of possible names pt's insurance coverage is listed under.     VM left for pt's APS , Yareli Velazquez #[phone], with request to call back to discuss discharge planning.    1130: Spoke with pt's dtr, Jada, via phone regarding discharge planning. Informed of 6 accepting [facility] facilities and that there are not any other facilities able to accommodate (referral was sent to 40+ facilities). Dtr states that she spoke with her fiance and they feel it would be best for pt to go to [facility] vs coming directly home, given her current mobility status and need for AVAPS. Informed her that of her options, [facility] has the highest Medicare rating. Dtr states she will review these options and update CM.     1500: Eliquis script sent to the [facility]. Sommer was able to confirm pt's [employer] prescription benefit from her preferred [facility] on [address]. The Eliquis will cost $581 for the starter pack and they can apply the 30 day free trial.     1515: Spoke with dtrJada, to follow up on discharge plans. Dtr informs that she has not yet reviewed the [facility] options but will and will notify CM this evening. She is aware of likely medical clearance for discharge tomorrow. Informed Jada of the Eliquis cost and she is agreeable. Will place the Eliquis savings cards on pt's chart to be sent home at discharge. Informed Jada that if pt discharges to [facility], the medication will not need to be filled until afterwards.     Await call back from pt's dtr regarding [facility] choice. SW started referral for home AVAPS in the event plans  change. Will update pt's APS  on discharge plans once confirmed.     GOLDEN GarcíaN, RN-BC    [phone]

## 2024-01-31 NOTE — PROGRESS NOTES
Pulmonary Progress Note     Assessment / Plan:  Acute hypoxic and hypercapnic respiratory failure - due to acute PE and bilateral pleural effusions. Pt was briefly treated with zosyn, but pneumonia is unlikely  - wean O2 as able- down to 1L; repeat CXR shows persistent R moderate effusion  - AVAPS during sleep as tolerated  - bronchial hygiene  - diuretics per cardiology  Acute bilateral PE - LE doppler was negative for DVT. TTE was without right heart strain  - apixaban  Bilateral pleural effusions - Recurrent, likely due to diastolic dysfunction. R effusion is partially loculated. Previously pt has undergone thoracentesis; showed transudative fluid consistent with CHF  - diuretics per cardiology  - hold off on thoracentesis, pt is at high risk for developing an ex-vacuo pneumothorax as the effusion is chronic, and loculated. The fluid will likely recur as well unless she's euvolemic  HFpEF - TTE 65-70%   - diurese as able  - cardiology following  Chronic atrial fibrillation    - per cardiology  Proph  - DOAC  Dispo - full code   - DC planning      Subjective:  No new complaints    Objective:  Vitals:    01/31/24 0719 01/31/24 0745 01/31/24 0818 01/31/24 0838   BP:  109/80     BP Location:  Left arm     Pulse: 114 101 111 101   Resp:  20     Temp:  97.9 °F (36.6 °C)     TempSrc:  Oral     SpO2: 100% 98%  93%   Weight:       Height:         Physical Exam:  General: no apparent distress  Skin: no rash, ulcers or subcutaneous nodules  Eyes: anicteric sclerae, moist conjunctivae  Head, ears, nose, throat: atraumatic, oropharynx clear with moist mucous membranes  Neck: trachea midline with no thyromegaly  Heart: regular rate and rhythm, no murmurs / rubs / gallops  Lungs: diminished at the bases, otherwise clear  Extremities: no edema or cyanosis  Psych: sleepy, but arousable    Medications:  Reviewed in EMR    Lab Data:  Reviewed in EMR    Imaging:  I independently visualized all relevant chest imaging in PACS and  agree with radiology interpretation except where noted.

## 2024-01-31 NOTE — DISCHARGE INSTRUCTIONS
Eliquis: for free trial offer or $10 savings coupons call 9-711-[phone] ([phone]). Patient Assistance program: Call 1-971.805.7188 or visit https://www.bmspaf.org/.      Continue avaps as directed by pulmonology  Continue PT/OT/ST  Fall/aspiration precautions  Repeat BMP/CBC in 3 days  Encourage po intake, supplements - magic cup BID

## 2024-01-31 NOTE — PROGRESS NOTES
[facility]     Hospitalist Progress Note     Yin Butcher Patient Status:  Inpatient    1931 MRN [id number]   Location [facility] 4SW-A Attending Skyla Tinsley MD   Hosp Day # 10 PCP Mina Walker MD     Chief Complaint: Shock     Subjective:     Patient   overall unchanged    Objective:    Review of Systems:   A comprehensive review of systems was completed; pertinent positive and negatives stated in subjective.    Vital signs:  Temp:  [97.8 °F (36.6 °C)-98.2 °F (36.8 °C)] 97.9 °F (36.6 °C)  Pulse:  [] 101  Resp:  [16-20] 20  BP: (106-143)/(65-83) 109/80  SpO2:  [93 %-100 %] 93 %    Physical Exam:    General: No acute distress, elderly lady   Respiratory: No wheezes, no rhonchi  Cardiovascular: S1, S2, regular rate and rhythm  Abdomen: Soft, Non-tender, non-distended, positive bowel sounds  Neuro: No new focal deficits.   Extremities: No edema      Diagnostic Data:    Labs:  Recent Labs   Lab 24  0806 24  0658 24  0840 24  2202 24  0502   WBC 8.8 10.8 8.0  --  7.4   HGB 7.8* 8.6* 8.6*  --  8.3*   MCV 84.2 88.7 92.0  --  89.4   .0 171.0 151.0  --  141.0*   INR  --   --   --  1.32*  --        Recent Labs   Lab 24  0806 24  0658 24  1306 24  0840 24  0503 24  0621   GLU 78   < > 131* 104* 102* 103*   BUN 21   < > 25* 28* 26* 26*   CREATSERUM 0.49*   < > 0.51* 0.44* 0.46* 0.48*   CA 8.7   < > 8.8 9.1 9.0 8.8   ALB 2.6*  --  2.9*  --   --   --       < > 141 142 145 144   K 4.0   < > 4.3 4.1 4.3 3.8      < > 100 100 103 102   CO2 34.0*   < > 40.0* 45.0* 41.0* 42.0*   ALKPHO 80  --  76  --   --   --    AST 18  --  8*  --   --   --    BILT 1.2  --  1.5  --   --   --    TP 5.7*  --  6.4  --   --   --     < > = values in this interval not displayed.       Estimated Creatinine Clearance: 68.7 mL/min (A) (based on SCr of 0.48 mg/dL (L)).    No results for input(s): \"TROP\", \"TROPHS\", \"CK\" in the last 168  hours.      Recent Labs   Lab 01/29/24  2202   PTP 16.4*   INR 1.32*                  Microbiology    Hospital Encounter on 01/20/24   1. Blood Culture     Status: None (Preliminary result)    Collection Time: 01/27/24  1:13 PM    Specimen: Blood,peripheral   Result Value Ref Range    Blood Culture Result No Growth 3 Days N/A         Imaging: Reviewed in Epic.    Medications:    potassium chloride  40 mEq Oral Once    apixaban  10 mg Oral BID    Followed by    [START ON 2/6/2024] apixaban  5 mg Oral BID    furosemide  20 mg Intravenous Daily    metoprolol tartrate  25 mg Oral 2x Daily(Beta Blocker)    docusate  100 mg Oral BID    digoxin  125 mcg Oral Q MWF    midodrine  10 mg Oral TID    multivitamin  1 tablet Oral Daily       Assessment & Plan:      #Shock, hypovolemic vs septic  vs cardiogenic  Pressors  IV Abx   Monitor BLood CX  Monitor I/O   #Bilateral pleural effusion  #Acute hypoxic resp failure due to acute PE with no RV strain on Echo and due to pleural effusion  Was on BIPAP in ICU  Diuresis   Hep gtt --> DOAC: will see what cost of xarelto is prior to starting  Doppler LE   #Lactic acidosis due to above, trend  #Anemia  Iron panel, below baseline   #Hypothermia on admission  Coritsol level normal   #CHF with acute exacerbation - HFPEF  Echo   # + Blood CX: Propionibacterium 11/20, resulted 1/26   Repeat blood Cx 1/27/24  NGTD  #DC planning remain challenging needs BRADLY, not sure home is best option for her given conditions she was found in       Skyla Tinsley MD    Supplementary Documentation:     Quality:  DVT Mechanical Prophylaxis:   SCDs,    DVT Pharmacologic Prophylaxis   Medication    apixaban (Eliquis) tab 10 mg    In followed-by linked group with    [START ON 2/6/2024] apixaban (Eliquis) tab 5 mg                Code Status: Full Code  [place]: No urinary catheter in place  [place] Duration (in days): 2  Central line:    ELMO:     Discharge is dependent on: clinical   At this point Ms. Felderrelljessica is  expected to be discharge to: tbd     The 21st Century Cures Act makes medical notes like these available to patients in the interest of transparency. Please be advised this is a medical document. Medical documents are intended to carry relevant information, facts as evident, and the clinical opinion of the practitioner. The medical note is intended as peer to peer communication and may appear blunt or direct. It is written in medical language and may contain abbreviations or verbiage that are unfamiliar.

## 2024-01-31 NOTE — PLAN OF CARE
Assumed care at 1930   A/Ox1, on 2L oxygen, Afib on tele   Regular, nectar thick liquids  Pills crushed in apple sauce  Bed bound - bed alarm on   No complaints of pain   Bunny boots in place  PIV saline locked   Patient/family updated with plan of care  All needs met at this time     Problem: Patient/Family Goals  Goal: Patient/Family Long Term Goal  Description: Patient's Long Term Goal: return home    Interventions:  - cooperate with PT/OT/SLP as ordered  - ?learn how to give Lovenox injections  - See additional Care Plan goals for specific interventions  Outcome: Progressing  Goal: Patient/Family Short Term Goal  Description: Patient's Short Term Goal: wean off levophed    Interventions:   - antibiotics as prescribed  - IV fluid as prescribed  - PRBC as prescribed  - See additional Care Plan goals for specific interventions  Outcome: Progressing     Problem: CARDIOVASCULAR - ADULT  Goal: Maintains optimal cardiac output and hemodynamic stability  Description: INTERVENTIONS:  - Monitor vital signs, rhythm, and trends  - Monitor for bleeding, hypotension and signs of decreased cardiac output  - Evaluate effectiveness of vasoactive medications to optimize hemodynamic stability  - Monitor arterial and/or venous puncture sites for bleeding and/or hematoma  - Assess quality of pulses, skin color and temperature  - Assess for signs of decreased coronary artery perfusion - ex. Angina  - Evaluate fluid balance, assess for edema, trend weights  Outcome: Progressing     Problem: RESPIRATORY - ADULT  Goal: Achieves optimal ventilation and oxygenation  Description: INTERVENTIONS:  - Assess for changes in respiratory status  - Assess for changes in mentation and behavior  - Position to facilitate oxygenation and minimize respiratory effort  - Oxygen supplementation based on oxygen saturation or ABGs  - Provide Smoking Cessation handout, if applicable  - Encourage broncho-pulmonary hygiene including cough, deep breathe,  Incentive Spirometry  - Assess the need for suctioning and perform as needed  - Assess and instruct to report SOB or any respiratory difficulty  - Respiratory Therapy support as indicated  - Manage/alleviate anxiety  - Monitor for signs/symptoms of CO2 retention  Outcome: Progressing     Problem: METABOLIC/FLUID AND ELECTROLYTES - ADULT  Goal: Glucose maintained within prescribed range  Description: INTERVENTIONS:  - Monitor Blood Glucose as ordered  - Assess for signs and symptoms of hyperglycemia and hypoglycemia  - Administer ordered medications to maintain glucose within target range  - Assess barriers to adequate nutritional intake and initiate nutrition consult as needed  - Instruct patient on self management of diabetes  Outcome: Progressing  Goal: Electrolytes maintained within normal limits  Description: INTERVENTIONS:  - Monitor labs and rhythm and assess patient for signs and symptoms of electrolyte imbalances  - Administer electrolyte replacement as ordered  - Monitor response to electrolyte replacements, including rhythm and repeat lab results as appropriate  - Fluid restriction as ordered  - Instruct patient on fluid and nutrition restrictions as appropriate  Outcome: Progressing     Problem: NEUROLOGICAL - ADULT  Goal: Achieves stable or improved neurological status  Description: INTERVENTIONS  - Assess for and report changes in neurological status  - Initiate measures to prevent increased intracranial pressure  - Maintain blood pressure and fluid volume within ordered parameters to optimize cerebral perfusion and minimize risk of hemorrhage  - Monitor temperature, glucose, and sodium. Initiate appropriate interventions as ordered  Outcome: Progressing     Problem: Impaired Swallowing  Goal: Minimize aspiration risk  Description: Interventions:  - Patient should be alert and upright for all feedings (90 degrees preferred)  - Offer food and liquids at a slow rate  - No straws  - Encourage small bites  of food and small sips of liquid  - Offer pills one at a time, crush or deliver with applesauce as needed  - Discontinue feeding and notify MD (or speech pathologist) if coughing or persistent throat clearing or wet/gurgly vocal quality is noted  Outcome: Progressing     Problem: SAFETY ADULT - FALL  Goal: Free from fall injury  Description: INTERVENTIONS:  - Assess pt frequently for physical needs  - Identify cognitive and physical deficits and behaviors that affect risk of falls.  - [place] fall precautions as indicated by assessment.  - Educate pt/family on patient safety including physical limitations  - Instruct pt to call for assistance with activity based on assessment  - Modify environment to reduce risk of injury  - Provide assistive devices as appropriate  - Consider OT/PT consult to assist with strengthening/mobility  - Encourage toileting schedule  Outcome: Progressing     Problem: Safety Risk - Non-Violent Restraints  Goal: Patient will remain free from self-harm  Description: INTERVENTIONS:  - Apply the least restrictive restraint to prevent harm  - Notify patient and family of reasons restraints applied  - Assess for any contributing factors to confusion (electrolyte disturbances, delirium, medications)  - Discontinue any unnecessary medical devices as soon as possible  - Assess the patient's physical comfort, circulation, skin condition, hydration, nutrition and elimination needs   - Reorient and redirection as needed  - Assess for the need to continue restraints  Outcome: Progressing     Problem: Delirium  Goal: Minimize duration of delirium  Description: Interventions:  - Encourage use of hearing aids, eye glasses  - Promote highest level of mobility daily  - Provide frequent reorientation  - Promote wakefulness i.e. lights on, blinds open  - Promote sleep, encourage patient's normal rest cycle i.e. lights off, TV off, minimize noise and interruptions  - Encourage family to assist in orientation  and promotion of home routines  Outcome: Progressing     Problem: Diabetes/Glucose Control  Goal: Glucose maintained within prescribed range  Description: INTERVENTIONS:  - Monitor Blood Glucose as ordered  - Assess for signs and symptoms of hyperglycemia and hypoglycemia  - Administer ordered medications to maintain glucose within target range  - Assess barriers to adequate nutritional intake and initiate nutrition consult as needed  - Instruct patient on self management of diabetes  Outcome: Progressing

## 2024-01-31 NOTE — PLAN OF CARE
Received patient laying in bed. A&Ox1, on O2 via NC. Modified diet, pills crushed in applesauce. Continuous cardiac and pulse ox in place. Safety precautions are in place.   Patient refused breakfast and lunch.  Daughter Jada at bedside, this RN updated family.  Awaiting family to select rehab facilty for placement.  Safety precautions are in place.        Problem: CARDIOVASCULAR - ADULT  Goal: Maintains optimal cardiac output and hemodynamic stability  Description: INTERVENTIONS:  - Monitor vital signs, rhythm, and trends  - Monitor for bleeding, hypotension and signs of decreased cardiac output  - Evaluate effectiveness of vasoactive medications to optimize hemodynamic stability  - Monitor arterial and/or venous puncture sites for bleeding and/or hematoma  - Assess quality of pulses, skin color and temperature  - Assess for signs of decreased coronary artery perfusion - ex. Angina  - Evaluate fluid balance, assess for edema, trend weights  Outcome: Progressing     Problem: RESPIRATORY - ADULT  Goal: Achieves optimal ventilation and oxygenation  Description: INTERVENTIONS:  - Assess for changes in respiratory status  - Assess for changes in mentation and behavior  - Position to facilitate oxygenation and minimize respiratory effort  - Oxygen supplementation based on oxygen saturation or ABGs  - Provide Smoking Cessation handout, if applicable  - Encourage broncho-pulmonary hygiene including cough, deep breathe, Incentive Spirometry  - Assess the need for suctioning and perform as needed  - Assess and instruct to report SOB or any respiratory difficulty  - Respiratory Therapy support as indicated  - Manage/alleviate anxiety  - Monitor for signs/symptoms of CO2 retention  Outcome: Progressing     Problem: METABOLIC/FLUID AND ELECTROLYTES - ADULT  Goal: Glucose maintained within prescribed range  Description: INTERVENTIONS:  - Monitor Blood Glucose as ordered  - Assess for signs and symptoms of hyperglycemia and  hypoglycemia  - Administer ordered medications to maintain glucose within target range  - Assess barriers to adequate nutritional intake and initiate nutrition consult as needed  - Instruct patient on self management of diabetes  Outcome: Progressing  Goal: Electrolytes maintained within normal limits  Description: INTERVENTIONS:  - Monitor labs and rhythm and assess patient for signs and symptoms of electrolyte imbalances  - Administer electrolyte replacement as ordered  - Monitor response to electrolyte replacements, including rhythm and repeat lab results as appropriate  - Fluid restriction as ordered  - Instruct patient on fluid and nutrition restrictions as appropriate  Outcome: Progressing     Problem: NEUROLOGICAL - ADULT  Goal: Achieves stable or improved neurological status  Description: INTERVENTIONS  - Assess for and report changes in neurological status  - Initiate measures to prevent increased intracranial pressure  - Maintain blood pressure and fluid volume within ordered parameters to optimize cerebral perfusion and minimize risk of hemorrhage  - Monitor temperature, glucose, and sodium. Initiate appropriate interventions as ordered  Outcome: Progressing     Problem: Impaired Swallowing  Goal: Minimize aspiration risk  Description: Interventions:  - Patient should be alert and upright for all feedings (90 degrees preferred)  - Offer food and liquids at a slow rate  - No straws  - Encourage small bites of food and small sips of liquid  - Offer pills one at a time, crush or deliver with applesauce as needed  - Discontinue feeding and notify MD (or speech pathologist) if coughing or persistent throat clearing or wet/gurgly vocal quality is noted  Outcome: Progressing     Problem: SAFETY ADULT - FALL  Goal: Free from fall injury  Description: INTERVENTIONS:  - Assess pt frequently for physical needs  - Identify cognitive and physical deficits and behaviors that affect risk of falls.  - [place] fall  precautions as indicated by assessment.  - Educate pt/family on patient safety including physical limitations  - Instruct pt to call for assistance with activity based on assessment  - Modify environment to reduce risk of injury  - Provide assistive devices as appropriate  - Consider OT/PT consult to assist with strengthening/mobility  - Encourage toileting schedule  Outcome: Progressing     Problem: Safety Risk - Non-Violent Restraints  Goal: Patient will remain free from self-harm  Description: INTERVENTIONS:  - Apply the least restrictive restraint to prevent harm  - Notify patient and family of reasons restraints applied  - Assess for any contributing factors to confusion (electrolyte disturbances, delirium, medications)  - Discontinue any unnecessary medical devices as soon as possible  - Assess the patient's physical comfort, circulation, skin condition, hydration, nutrition and elimination needs   - Reorient and redirection as needed  - Assess for the need to continue restraints  Outcome: Progressing     Problem: Delirium  Goal: Minimize duration of delirium  Description: Interventions:  - Encourage use of hearing aids, eye glasses  - Promote highest level of mobility daily  - Provide frequent reorientation  - Promote wakefulness i.e. lights on, blinds open  - Promote sleep, encourage patient's normal rest cycle i.e. lights off, TV off, minimize noise and interruptions  - Encourage family to assist in orientation and promotion of home routines  Outcome: Progressing     Problem: Diabetes/Glucose Control  Goal: Glucose maintained within prescribed range  Description: INTERVENTIONS:  - Monitor Blood Glucose as ordered  - Assess for signs and symptoms of hyperglycemia and hypoglycemia  - Administer ordered medications to maintain glucose within target range  - Assess barriers to adequate nutritional intake and initiate nutrition consult as needed  - Instruct patient on self management of diabetes  Outcome:  Progressing

## 2024-02-01 LAB — POTASSIUM SERPL-SCNC: 4.4 MMOL/L (ref 3.5–5.1)

## 2024-02-01 PROCEDURE — 99232 SBSQ HOSP IP/OBS MODERATE 35: CPT | Performed by: STUDENT IN AN ORGANIZED HEALTH CARE EDUCATION/TRAINING PROGRAM

## 2024-02-01 RX ORDER — MIDODRINE HYDROCHLORIDE 10 MG/1
10 TABLET ORAL 3 TIMES DAILY
Qty: 90 TABLET | Refills: 0 | Status: SHIPPED | OUTPATIENT
Start: 2024-02-01

## 2024-02-01 RX ORDER — BENZONATATE 100 MG/1
100 CAPSULE ORAL 3 TIMES DAILY PRN
Status: DISCONTINUED | OUTPATIENT
Start: 2024-02-01 | End: 2024-02-02

## 2024-02-01 RX ORDER — FUROSEMIDE 20 MG/1
20 TABLET ORAL DAILY
Status: DISCONTINUED | OUTPATIENT
Start: 2024-02-02 | End: 2024-02-02

## 2024-02-01 NOTE — CM/SW NOTE
02/01/24 1608   Discharge disposition   Expected discharge disposition subacute   Post Acute Care Provider   ([facility])   Discharge transportation [facility]     Pt will discharge to [facility] tomorrow 2/2/24. Bertha Beltran stated [facility] will order AVAPS for pt and AVAPS should be delivered tomorrow.     [facility] arranged for tomorrow 2/2/2024 to [facility]. Updated RN, Bertha Beltran, and pt's dtr who is in agreement with discharge plan. PCS form completed and available for RN to print.      [facility]  [phone]    [facility]  [phone]    LENNIE Hammonds  Discharge Planner

## 2024-02-01 NOTE — PROGRESS NOTES
Progress Note  Yin Butcher Patient Status:  Inpatient    1931 MRN [id number]   Location [facility] 3NE-A Attending Skyla Tinsley MD   Hosp Day # 11 PCP Mina Walker MD     Subjective:  Much more awake today, no acute distress.     Objective:  /74 (BP Location: Left arm)   Pulse 91   Temp 97.4 °F (36.3 °C) (Axillary)   Resp 16   Ht 5' 7\" (1.702 m)   Wt 125 lb 12.8 oz (57.1 kg)   SpO2 92%   BMI 19.70 kg/m²     Telemetry: afib      Intake/Output:  No intake or output data in the 24 hours ending 24 1034      Last 3 Weights   24 0430 125 lb 12.8 oz (57.1 kg)   24 0400 128 lb 6.4 oz (58.2 kg)   24 0047 120 lb (54.4 kg)   24 0000 115 lb 11.9 oz (52.5 kg)   24 0000 113 lb 12.1 oz (51.6 kg)   24 0007 109 lb 2 oz (49.5 kg)   24 2143 105 lb 13.1 oz (48 kg)   10/29/23 0600 104 lb 11.5 oz (47.5 kg)   10/24/23 0457 114 lb 3.2 oz (51.8 kg)   10/23/23 0340 92 lb 9.5 oz (42 kg)   10/22/23 2033 92 lb 9.5 oz (42 kg)   09/10/23 1203 92 lb 14.4 oz (42.1 kg)   23 0746 105 lb 12.8 oz (48 kg)   23 2218 106 lb (48.1 kg)       Labs:  Recent Labs   Lab 24  0840 24  0503 24  0621 24  0848   * 102* 103*  --    BUN 28* 26* 26*  --    CREATSERUM 0.44* 0.46* 0.48*  --    EGFRCR 91 90 89  --    CA 9.1 9.0 8.8  --     145 144  --    K 4.1 4.3 3.8 4.4    103 102  --    CO2 45.0* 41.0* 42.0*  --      Recent Labs   Lab 24  0658 24  0840 24  0502   RBC 3.28* 3.26* 3.10*   HGB 8.6* 8.6* 8.3*   HCT 29.1* 30.0* 27.7*   MCV 88.7 92.0 89.4   MCH 26.2 26.4 26.8   MCHC 29.6* 28.7* 30.0*   RDW 21.3 23.9 25.0   WBC 10.8 8.0 7.4   .0 151.0 141.0*         No results for input(s): \"TROP\", \"TROPHS\", \"CK\" in the last 168 hours.    Review of Systems   Cardiovascular:  Positive for irregular heartbeat.   Respiratory: Negative.         Physical Exam:    Gen: awake/alert, NAD, cachectic/frail  Heent: pupils  equal, reactive. Mucous membranes moist.   Neck: no jvd  Cardiac: irregularly irregular, normal S1,S2  Lungs: CTA  Abd: soft, NT/ND +bs  Ext: no edema  Skin: Warm, dry  Neuro: No focal deficits        Medications:     apixaban  10 mg Oral BID    Followed by    [START ON 2/6/2024] apixaban  5 mg Oral BID    furosemide  20 mg Intravenous Daily    metoprolol tartrate  25 mg Oral 2x Daily(Beta Blocker)    docusate  100 mg Oral BID    digoxin  125 mcg Oral Q MWF    midodrine  10 mg Oral TID    multivitamin  1 tablet Oral Daily             Assessment:  Admitted with acute respiratory failure, multifactorial with PE, effusions, ? Pna and underlying COPD.  Hypercapnic, hypoxic respiratory failure.  Markedly elevated CO2 and base excess suggests chronic respiratory acidosis.  Pulm following  Abx stopped. Nippv as needed.   Shock-resolved  Off pressors. On home midodrine.   Acute bilateral PE  LE US negative for DVT  Echo without evidence of RV strain  Heparin gtt-->transitioned to doac. Pharmacy consult noted 10mg po bid x7 days followed by 5mg po bid x3 months (no adjustment for renal function/age for tx of DVT and/or PE)  Chronic Afib, rate controlled  Historically not on AC  Echo 1/21/24-LVEF 65-70%, normal wall motion. RV function normal. Mild-mod MR, mild-mod TR  Right pleural effusion  Recurrent, partially loculated. No plans for thora at this time. Pulm following.  AMS with underlying dementia  Hypotension-chronic on midodrine  Off pressors  Anemia, chronic, stable  LORIE-venofer  CAD-calcifications noted on CT  Social issues--came from home with poor living conditions--SW following.  Profound weakness, poor po intake  Palliative care following.    Plan:  Afib rates with reasonable control on bb, dig.   Transitioned to doac, PE dosing per pulm.  Change to po diuretic.   Cardiology will sign off, please call with additional questions.  Prognosis appears guarded at best.    Plan of care discussed with patient,  RN.    Leslie Rogers, APRN  2/1/2024  10:34 AM  [phone]  [facility] [phone]      Seen and examined.  Agree with exam and assessment as amended above.  Cardiology will sign off at this time.  If outpatient cardiology follow-up felt appropriate, follow-up with Dr. Umana in office as needed.  Cardiac MDM reviewed in detail with MAXIM Rogers.    DANI Mckeon MD  1

## 2024-02-01 NOTE — PROGRESS NOTES
[facility]     Hospitalist Progress Note     Yin Butcher Patient Status:  Inpatient    1931 MRN [id number]   Location [facility] 4SW-A Attending Skyla Tinsley MD   Hosp Day # 11 PCP Mina Walker MD     Chief Complaint: Shock     Subjective:     Patient  awake.  Objective:    Review of Systems:   A comprehensive review of systems was completed; pertinent positive and negatives stated in subjective.    Vital signs:  Temp:  [97.4 °F (36.3 °C)-98.1 °F (36.7 °C)] 97.8 °F (36.6 °C)  Pulse:  [] 87  Resp:  [16-19] 19  BP: (103-124)/(71-84) 109/71  SpO2:  [92 %-100 %] 100 %    Physical Exam:    General: No acute distress, elderly lady   Respiratory: No wheezes, no rhonchi  Cardiovascular: S1, S2, regular rate and rhythm  Abdomen: Soft, Non-tender, non-distended, positive bowel sounds  Neuro: No new focal deficits.   Extremities: No edema      Diagnostic Data:    Labs:  Recent Labs   Lab 24  0658 24  0840 24  2202 24  0502   WBC 10.8 8.0  --  7.4   HGB 8.6* 8.6*  --  8.3*   MCV 88.7 92.0  --  89.4   .0 151.0  --  141.0*   INR  --   --  1.32*  --        Recent Labs   Lab 24  1306 24  0840 24  0503 24  0621 24  0848   * 104* 102* 103*  --    BUN 25* 28* 26* 26*  --    CREATSERUM 0.51* 0.44* 0.46* 0.48*  --    CA 8.8 9.1 9.0 8.8  --    ALB 2.9*  --   --   --   --     142 145 144  --    K 4.3 4.1 4.3 3.8 4.4    100 103 102  --    CO2 40.0* 45.0* 41.0* 42.0*  --    ALKPHO 76  --   --   --   --    AST 8*  --   --   --   --    BILT 1.5  --   --   --   --    TP 6.4  --   --   --   --        Estimated Creatinine Clearance: 67.4 mL/min (A) (based on SCr of 0.48 mg/dL (L)).    No results for input(s): \"TROP\", \"TROPHS\", \"CK\" in the last 168 hours.      Recent Labs   Lab 24  2202   PTP 16.4*   INR 1.32*                  Microbiology    Hospital Encounter on 24   1. Blood Culture     Status: None (Preliminary result)     Collection Time: 01/27/24  1:13 PM    Specimen: Blood,peripheral   Result Value Ref Range    Blood Culture Result No Growth 4 Days N/A         Imaging: Reviewed in Epic.    Medications:    [START ON 2/2/2024] furosemide  20 mg Oral Daily    apixaban  10 mg Oral BID    Followed by    [START ON 2/6/2024] apixaban  5 mg Oral BID    metoprolol tartrate  25 mg Oral 2x Daily(Beta Blocker)    docusate  100 mg Oral BID    digoxin  125 mcg Oral Q MWF    midodrine  10 mg Oral TID    multivitamin  1 tablet Oral Daily       Assessment & Plan:      #Shock, hypovolemic vs septic  vs cardiogenic  Pressors  IV Abx   Monitor BLood CX  Monitor I/O   #Bilateral pleural effusion  #Acute hypoxic resp failure due to acute PE with no RV strain on Echo and due to pleural effusion  Was on BIPAP in ICU  Diuresis   Hep gtt --> DOAC: will see what cost of xarelto is prior to starting  Doppler LE   #Lactic acidosis due to above, trend  #Anemia  Iron panel, below baseline   #Hypothermia on admission  Coritsol level normal   #CHF with acute exacerbation - HFPEF  Echo   # + Blood CX: Propionibacterium 11/20, resulted 1/26   Repeat blood Cx 1/27/24  NGTD  #DC planning remain challenging needs [facility], not sure home is best option for her given conditions she was found in     DC planning    Skyla Tinsley MD    Supplementary Documentation:     Quality:  DVT Mechanical Prophylaxis:   SCDs,    DVT Pharmacologic Prophylaxis   Medication    apixaban (Eliquis) tab 10 mg    In followed-by linked group with    [START ON 2/6/2024] apixaban (Eliquis) tab 5 mg                Code Status: Full Code  Bowie: No urinary catheter in place  Bowie Duration (in days): 2  Central line:    ELMO: 2/1/2024    Discharge is dependent on: clinical   At this point Ms. Butcher is expected to be discharge to: tbd     The 21st Century Cures Act makes medical notes like these available to patients in the interest of transparency. Please be advised this is a medical document.  Medical documents are intended to carry relevant information, facts as evident, and the clinical opinion of the practitioner. The medical note is intended as peer to peer communication and may appear blunt or direct. It is written in medical language and may contain abbreviations or verbiage that are unfamiliar.

## 2024-02-01 NOTE — CM/SW NOTE
VM left for pt's dtr, Jada, to confirm [facility] choice. Await call back.    1230: Call received from pt's APS , Yareli, who states she spoke with dtr and cannot visit the home until pt returns. She did speak with dtr regarding conditions in the home. APS cannot hold pt from returning to home, therefore she can discharge home from the hospital if dtr decides against [facility]. Dtr did share with Yareli that she wants pt to go to [facility] and will be contacting this CM shortly.    As of today, [facility] and [facility] are also able to accept/have beds available today. [facility] also confirms bed availability for today.     Pt will need BLS transport once choice facility is confirmed.     Annabella Fisher, GOLDENN, RN-BC    [phone]

## 2024-02-01 NOTE — DIETARY NOTE
[facility]  NUTRITION ASSESSMENT    Pt meets severe malnutrition criteria at this time.    CRITERIA FOR MALNUTRITION DIAGNOSIS:  Criteria for severe malnutrition diagnosis: chronic illness related to body fat severe depletion and muscle mass severe depletion    NUTRITION INTERVENTION:    Meals and Snacks - Encourage PO all meals  Medical Nutritional Supplements - Start Magic Cup BID and Nepro daily  Enteral Nutrition -remains discontinued    PATIENT STATUS:   2/1 - Oral intake remains variable.  Sometimes 50-60%, and sometimes refusing meals.  DHT remains out.  No n/v/d. Last BM 1/31. Wt trending up since admission.  Start appropriate ONS all all meals to maximize intake when pt does consume a meal.    1/29 - Pt removed DHT on 1/27.  PO intake has varied since that time. Pt presently on Pureed, NTL diet. Was on a regular diet over the weeked, and noted to have improvement in oral intake.  At time of visit, pt sleeping quietly in bed, no family at bedside.  Per RN, family coming to discuss GOC and whether or not to resume TF.  Given variability in oral intake and mentation, recommend EN to meet nutritional needs if in line with GOC.    1/25: RD consulted for TF as pt unable to take adequate po. Recs as per above. Only bites noted on calorie count. AVAPs and HFNC. Pt lethargic. Will follow and support as able.     1/23: Pt weaned off Bipap yesterday. SLP evaluated and recommends regular solids and nectar thick liquids. Visited pt at bedside. Pt is awake but not answering questions appropriately. Per RN, pt ate some Khmer toast and 100% of Magic Cup for bfast. RN reports pt taking liquids better than solids; will also add Nepro. No GI symptoms noted at this time but last BM PTA. Calorie count ordered. Palliative care involved for GOC discussions. Will continue to monitor and follow up as appropriate.     1/21: 89 year old female admitted on 1/20 presents with AMS, hypotensive, hypoxic and hypothermic. Pt screened  d/t MST score 2. Attempted to visit at bedside but pt is somnolent on AVAPS - unable to provide hx. Pt hypotensive requiring pressors. SLP consulted for swallow eval but currently not appropriate while on AVAPS. No GI symptoms noted and NKFA. No significant wt changes noted per chart review but pt with low BMI. Palliative care consulted for GOC discussion. RD to remain available for recommendations as warranted pending GOC.    PMH:  has a past medical history of Arthritis, Atrial fibrillation (HCC), Back problem, Cancer (HCC), Cataract, Osteoporosis, and Personal history of antineoplastic chemotherapy.    ANTHROPOMETRICS:  Ht:  5'7\"  Wt: 57.1 kg (125 lb 12.8 oz).   BMI: Body mass index is 19.7 kg/m².  IBW: 61.4 kg    WEIGHT HISTORY: Per chart, pt with ~9 lb wt loss x 10 months (8%, not significant per standards).  Patient Weight(s) for the past 336 hrs:   Weight   02/01/24 0430 57.1 kg (125 lb 12.8 oz)   01/30/24 0400 58.2 kg (128 lb 6.4 oz)   01/26/24 0047 54.4 kg (120 lb)   01/23/24 0000 52.5 kg (115 lb 11.9 oz)   01/22/24 0000 51.6 kg (113 lb 12.1 oz)   01/21/24 0007 49.5 kg (109 lb 2 oz)   01/20/24 2143 48 kg (105 lb 13.1 oz)       Wt Readings from Last 10 Encounters:   02/01/24 57.1 kg (125 lb 12.8 oz)   10/29/23 47.5 kg (104 lb 11.5 oz)   09/10/23 42.1 kg (92 lb 14.4 oz)   04/13/23 53.5 kg (118 lb)   04/10/23 53.5 kg (118 lb)   04/03/23 53.5 kg (118 lb)   03/30/23 53.5 kg (118 lb)   03/27/23 53.3 kg (117 lb 9.6 oz)   03/22/23 64 kg (141 lb)   12/05/22 63.5 kg (140 lb)        NUTRITION:  Diet:       Procedures    Regular/General diet Fluid Consistency: Nectar Thick / Mildly Thick Liquids; Texture Consistency: Regular; Is Patient on Accuchecks? Yes; Is Patient on Suicide Precautions? No        Percent Meals Eaten (last 3 days)       Date/Time Percent Meals Eaten (%)    01/30/24 0951 50 %    01/30/24 1330 60 %    01/30/24 2100 65 %    02/01/24 1007 40 %            Food Allergies: No  Cultural/Ethnic/Latter day  Preferences Addressed: Yes    GI SYSTEM REVIEW: WNL Last BM 1/31  Skin/Wounds: stage II pressure injury to medial back    NUTRITION RELATED PHYSICAL FINDINGS:     1. Body Fat/Muscle Mass: severe depletion body fat Orbital fat pad, Buccal fat pad, and Triceps and severe muscle depletion Temple region, Clavicle region, Shoulder/Acromion process, Scapula region, Thigh region, and Calf region     2. Fluid Accumulation: lower extremity edema and b/l feet      NUTRITION PRESCRIPTION: 61.4 kg (IBW)  Calories: 6303-6527 calories/day (30-35 kcal/kg)  Protein: 74-92 grams protein/day (1.2-1.5 gm/kg)  Fluid: ~1 ml/kcal or per MD discretion    NUTRITION DIAGNOSIS/PROBLEM:  Inadequate oral intake related to insufficient appetite resulting in inadequate nutrition intake as evidenced by documented/reported insufficient oral intake    MONITOR AND EVALUATE/NUTRITION GOALS:  PO intake of 75% of meals TID - Not met, Continues  PO intake of 75% of oral nutrition supplement/s - Not met, Continues  Weight stable within 1 to 2 lbs during admission - Ongoing  Start alternative nutrition in 24-48 hrs if diet is not able to advance- Resolved  Tolerate alternative nutrition at 100% of goal - Resolved      MEDICATIONS:  Colace, Lasix, MVI,     LABS:  reviewed    Pt is at High nutrition risk    Rosamaria Jalloh RD, LDN, [facility]  Clinical Dietitian  Phone [phone]

## 2024-02-01 NOTE — CM/SW NOTE
Case discussed with CM. Per previous notes, [facility] are able to accept and have beds available today. SW spoke with pt's dtr, Jada, via phone regarding [facility] choice.     SW informed pt's dtr [facility] have beds available today and are able to accept pt. Pt's dtr stated she spoke with a friend who recommended [facility] in [place]. SW informed pt's dtr this writer is uncertain if pt [facility] is able to accommodate AVAPS. Pt's dtr stated she visited pt last night and spoke with a respiratory therapist who informed her he would inquire if pt can transition to BIPAP. Pt's dtr stated if pt transitioned to a BIPAP she would have more options for [facility]. SW informed pt's dtr the CM reached out to pulmonology on 1/31/24 to inquire if pt had alterative options instead of AVAPS, but pulmonology confirmed need for AVAPS at discharge. Pt's dtr stated she reviewed [facility] online and she is not satisfied with the facility, stating pt will not discharge to [facility]. Pt's dtr also stated she is interested in [facility]. [facility] referral reviewed, referral was not sent to [facility]. SW informed pt's dtr this writer can inquire if [facility] can accept AVAPS. Pt's dtr requested for SW to reach out to [facility] if they can accept and accommodate AVAPS.    SW encouraged pt's dtr to review accepting [facility] choices and stated [facility] can accept today. Pt's dtr stated she will review [facility]. Pt's dtr inquired on [facility]. SW informed pt's dtr [facility] does not have a bed available and did not state they are able to accommodate AVAPS.     SW informed pt's dtr pt is anticipated to be medically cleared to discharge today and [facility] facility choice is needed. SW highly encouraged pt's dtr to review accepting [facility] choice list and select a [facility] facility.     SW called [facility] who confirmed [facility] is able to accommodate AVAPS.     SW called [facility]  in [place] and left  for admissions team requesting call back. NANDO also sent referral to [facility] and [facility] in [facility]. NANDO will update pt's dtr once response received from [facility]. NANDO will continue to follow.     Addendum (2:15pm) - SW left two  for admissions with no call back. NANDO called [facility] and requested to speak with supervisor regarding AVAPS. NANDO spoke with [facility] who [facility], but she will have DON reach out to SW regarding AVAPS.    SW received call back from Maryam at [facility] stating [facility] cannot accommodate AVAPS.     NANDO called pt's dtr, Jada, via phone and updated her on above information. NANDO informed [facility] can accommodate AVAPS at night, but [facility] cannot accommodate AVAPS at night. Pt's dtr stated she reviewed [facility] online and stated she did not want pt to discharge to [facility].     NANDO informed pt's dtr a discharge order has been placed, pt is medically cleared for discharge, and a [facility] facility choice is needed. Pt's dtr stated she is considering [facility], but also shared her concern if pt is discharged to a facility further away she would not be sent to  or [facility] if needed. Pt's dtr requested to follow up with NANDO in half an hour to review [facility] and make a [facility] decision. SW will f/u with pt's dtr at 3pm regarding [facility] choice.     Addendum (2:54pm) - NANDO received call back from pt's dtr, Jada, regarding [facility] choice. Pt's dtr selected [facility]. Pt's dtr stated pt has been to [facility] in the past and she is familiar with the facility. Pt's dtr shared on pt's last admission to [facility] she was told pt 'overstayed her welcome' and [facility] tried to give her a bill for pt's stay. SW informed pt's dtr that information was not disclosed to the care coordination team and [facility] accepted pt. NANDO encouraged pt's dtr to reach out to pt's care team to discuss ELOS and discharge plans once at the  [facility] facility. Pt's dtr thanked this writer and the CM team.     NANDO spoke with [facility] liaison, Ruby, to discuss pt's discharge today. Ruby stated she will reach out to [facility] regarding bed availability/AVAPS and reach out to .     Await call back from Ruby at [facility]. Pt will discharge to [facility]. NANDO will continue to follow.     Addendum (3:30pm) - NANDO received call from Ruby at [facility] stating [facility] cannot accept pt until tomorrow 2/2/24. Ruby stated [facility] will have to order the AVAPS and the AVAPS will not be delivered until tomorrow. Ruby stated she has sent pt's AVAPS information to the respiratory therapist at [facility]. Ruby stated transportation for pt can be set for tomorrow between 12-2pm.     NANDO updated pt's dtr, Jada, via phone. NANDO informed pt's dtr transport has been set for tomorrow 2/2/24 at 1pm to [facility]. NANDO informed pt's dtr [facility] will order AVAPS for pt. Pt's dtr verbalized understanding and is in agreement with plan. Updated RN.     LENNIE Hammonds  Discharge Planner

## 2024-02-01 NOTE — PHYSICAL THERAPY NOTE
Attempted to see Pt this Pm - RN aware of attempt.  Pt requesting to rest, \"I have done enough\".  Will f/u later today if time permits, after all other patients are attempted per tentative schedule.

## 2024-02-01 NOTE — DISCHARGE SUMMARY
[facility]     Yin Butcher Patient Status:  Inpatient    1931 MRN [id number]   Location [facility] 3NE-A Attending Skyla Tinsley MD   Hosp Day # 11 PCP Mina Walker MD     Date of Admission: 2024  Date of Discharge:  2024     Discharge Disposition: Subacute Rehab - [facility]    Discharge Diagnosis:  #Shock, hypovolemic and or cardiogenic, resolved  #Acute hypoxic/hypercapnic resp failure due to acute b/l PE and b/l chronic pleural effusion  #Lactic acidosis due to above, resolved  #Hypothermia on admission, resolved  #Acute on Chronic HFpEF, Bilateral pleural effusions  #Chronic Afib, controlled  #Dysphagia   #Abdominal hernia w/ constipation improved  #Anemia, folic/iron deficiency S/p venofer x3, PRBCs, no evidence of bleeding  #Chronic heel ulcers, chronic back wound POA  #Dementia w/ Delirium 2/2 shock/resp failure  #Deconditioning, FTT  #Hypokalemia resolved   #LUE swelling/ecchymoses, prior IVs removed and no DVT on doppler      History of Present Illness:   Yin Butcher is a 89 year old female with medical history of atrial fibrillation who was brought to the ER with complaints of being unresponsive.  Her family called EMS as she was noted to be unresponsive.  Unknown when she was last normal .  When EMS arrived they were not allowed in the house immediately to assess the patient as it was unsafe due to hoarding.  Patient was eventually brought to the ER and she was noted to have hypothermia and placed on a elizabeth hugger,  she was also hypoxic and placed on BIPAP.  Patient is currently unresponsive and unable to provide any history,  no family is present at bedside     Brief Synopsis: Patient is a 89 yo female who presented who presented to ED via EMS due to unresponsiveness.  Home appears unsafe due to hoarding per EMS.  Adult Protective Services involved.  In the ER she was found to be hypothermic and placed on Elizabeth hugger.  She had hypoxic  respiratory failure and was placed on BiPAP.  She was admitted to the ICU for further workup and pulm critical care was consulted.   CTA of the chest showing bilateral lower lobe and right middle lobe PE, chronic b/l effusions  Patient started on heparin drip.  Possible right heart strain on CT.  Cardiology was consulted.  Follow-up echo no evidence of right heart strain.  Dopplers negative for DVT.  Left lung thoracentesis per pulmonology as patient high risk for developing ex vacuo pneumothorax as the effusion is chronic and loculated.  No evidence of sepsis and antibiotics discontinued.  She required PRBCs, no evidence of bleeding.   Weaned off pressors and continues on midodrine.  CT abdomen pelvis obtained due to firm hernia.  She was found to have copious stool within the colon including hernia sac.  General surgery evaluated no surgical intervention needed.  Patient passing bowel movements.  Patient with recurrent altered mental status and follow-up CT of the brain no acute finding.  Repeat ABGs compensated w/ ongoing AVAPS with sleep. Palliative care assisted with GOC discussions.  Lasix resumed once blood pressure stabilized.  Po intake/mental status improved.  Heparin drip transition to oral anticoagulation.  Pt remains full code per dtr.  Social work assist with discharge planning and family has chosen [facility] subacute rehab.  Patient discharged once cleared by all consultants.    Lace+ Score: 80  59-90 High Risk  29-58 Medium Risk  0-28   Low Risk       TCM Follow-Up Recommendation:  LACE > 58: High Risk of readmission after discharge from the hospital.      Procedures during hospitalization: none    Consultants:  pulmCC, cardioogy, palliative care, wound care, general surgery         Discharge Medications        START taking these medications        Instructions Prescription details   apixaban 5 MG Tabs  Commonly known as: Eliquis      Take 2 tabs (10mg) by mouth twice daily for 7 days, then take 1 tab  (5mg) by mouth twice daily thereafter.   Quantity: 74 tablet  Refills: 0     apixaban 5 MG Tabs  Commonly known as: Eliquis  Start taking on: March 1, 2024      Take 1 tablet (5 mg total) by mouth 2 (two) times daily. Start this dosing after you complete the starter pack   Quantity: 60 tablet  Refills: 3            CHANGE how you take these medications        Instructions Prescription details   midodrine 10 MG Tabs  Commonly known as: ProAmatine  What changed:   medication strength  how much to take  when to take this  reasons to take this      Take 1 tablet (10 mg total) by mouth in the morning and 1 tablet (10 mg total) at noon and 1 tablet (10 mg total) in the evening.   Quantity: 90 tablet  Refills: 0            CONTINUE taking these medications        Instructions Prescription details   acetaminophen 500 MG Tabs  Commonly known as: Tylenol Extra Strength      Take 1 tablet (500 mg total) by mouth every 4 (four) hours as needed for Pain.   Quantity: 20 tablet  Refills: 0     Calcium-D 600-400 MG-UNIT Tabs      Take  by mouth.   Refills: 0     digoxin 0.125 MG Tabs  Commonly known as: Lanoxin      Take 1 tablet (125 mcg total) by mouth Every Monday, Wednesday, and Friday.   Refills: 0     furosemide 20 MG Tabs  Commonly known as: Lasix      Take 1 tablet (20 mg total) by mouth daily.   Quantity: 30 tablet  Refills: 0     lidocaine-menthol 4-1 % Ptch      Place 1 patch onto the skin daily.   Quantity: 30 patch  Refills: 0     metoprolol tartrate 50 MG Tabs  Commonly known as: Lopressor      Take 0.5 tablets (25 mg total) by mouth 2 (two) times daily.   Refills: 0     multivitamin Chew      Chew 1 tablet by mouth daily.   Quantity: 30 tablet  Refills: 0     polyethylene glycol (PEG 3350) 17 g Pack  Commonly known as: Miralax      Take 17 g by mouth daily as needed (constipation).   Refills: 0     PROLIA SC      Inject into the skin.   Refills: 0               Where to Get Your Medications        Please  your  prescriptions at the location directed by your doctor or nurse    Bring a paper prescription for each of these medications  apixaban 5 MG Tabs  apixaban 5 MG Tabs  midodrine 10 MG Tabs         [place]    Follow-up appointment:   Mina Walker MD  [address], [address] [facility] [address]  [phone]    Schedule an appointment as soon as possible for a visit in 1 week(s)      Appointments for Next 30 Days 2024 - 3/2/2024      None            Vital signs:  Temp:  [97.4 °F (36.3 °C)-98.1 °F (36.7 °C)] 97.8 °F (36.6 °C)  Pulse:  [] 87  Resp:  [16-19] 19  BP: (103-124)/(71-84) 109/71  SpO2:  [92 %-100 %] 100 %    Physical Exam:    General: No acute distress 89 year old female alert  Lungs: clear to auscultation  Cardiovascular: S1, S2  Abdomen: Soft, nondistended, stable/soft hernia  Extremities: moving spontaneous during exam       -----------------------------------------------------------------------------------------------  PATIENT DISCHARGE INSTRUCTIONS: See electronic chart    SUSAN Wadsworth    Total time spent on discharge plannin minutes     The  Century Cures Act makes medical notes like these available to patients in the interest of transparency. Please be advised this is a medical document. Medical documents are intended to carry relevant information, facts as evident, and the clinical opinion of the practitioner. The medical note is intended as peer to peer communication and may appear blunt or direct. It is written in medical language and may contain abbreviations or verbiage that are unfamiliar.

## 2024-02-01 NOTE — PLAN OF CARE
Assumed care at 1930   A/Ox1-2, on 1L oxygen, Afib on tele   Regular diet, nectar thick liquids - pills crushed  VTE with eliquis   Bed bound - bed alarm, bunny boots  No complaints of pain   PIV saline locked   Patient/family updated with plan of care  All needs met at this time     Problem: Patient/Family Goals  Goal: Patient/Family Long Term Goal  Description: Patient's Long Term Goal: return home    Interventions:  - cooperate with PT/OT/SLP as ordered  - ?learn how to give Lovenox injections  - See additional Care Plan goals for specific interventions  Outcome: Progressing  Goal: Patient/Family Short Term Goal  Description: Patient's Short Term Goal: wean off levophed    Interventions:   - antibiotics as prescribed  - IV fluid as prescribed  - PRBC as prescribed  - See additional Care Plan goals for specific interventions  Outcome: Progressing     Problem: CARDIOVASCULAR - ADULT  Goal: Maintains optimal cardiac output and hemodynamic stability  Description: INTERVENTIONS:  - Monitor vital signs, rhythm, and trends  - Monitor for bleeding, hypotension and signs of decreased cardiac output  - Evaluate effectiveness of vasoactive medications to optimize hemodynamic stability  - Monitor arterial and/or venous puncture sites for bleeding and/or hematoma  - Assess quality of pulses, skin color and temperature  - Assess for signs of decreased coronary artery perfusion - ex. Angina  - Evaluate fluid balance, assess for edema, trend weights  Outcome: Progressing     Problem: RESPIRATORY - ADULT  Goal: Achieves optimal ventilation and oxygenation  Description: INTERVENTIONS:  - Assess for changes in respiratory status  - Assess for changes in mentation and behavior  - Position to facilitate oxygenation and minimize respiratory effort  - Oxygen supplementation based on oxygen saturation or ABGs  - Provide Smoking Cessation handout, if applicable  - Encourage broncho-pulmonary hygiene including cough, deep breathe,  Incentive Spirometry  - Assess the need for suctioning and perform as needed  - Assess and instruct to report SOB or any respiratory difficulty  - Respiratory Therapy support as indicated  - Manage/alleviate anxiety  - Monitor for signs/symptoms of CO2 retention  Outcome: Progressing     Problem: METABOLIC/FLUID AND ELECTROLYTES - ADULT  Goal: Glucose maintained within prescribed range  Description: INTERVENTIONS:  - Monitor Blood Glucose as ordered  - Assess for signs and symptoms of hyperglycemia and hypoglycemia  - Administer ordered medications to maintain glucose within target range  - Assess barriers to adequate nutritional intake and initiate nutrition consult as needed  - Instruct patient on self management of diabetes  Outcome: Progressing  Goal: Electrolytes maintained within normal limits  Description: INTERVENTIONS:  - Monitor labs and rhythm and assess patient for signs and symptoms of electrolyte imbalances  - Administer electrolyte replacement as ordered  - Monitor response to electrolyte replacements, including rhythm and repeat lab results as appropriate  - Fluid restriction as ordered  - Instruct patient on fluid and nutrition restrictions as appropriate  Outcome: Progressing     Problem: NEUROLOGICAL - ADULT  Goal: Achieves stable or improved neurological status  Description: INTERVENTIONS  - Assess for and report changes in neurological status  - Initiate measures to prevent increased intracranial pressure  - Maintain blood pressure and fluid volume within ordered parameters to optimize cerebral perfusion and minimize risk of hemorrhage  - Monitor temperature, glucose, and sodium. Initiate appropriate interventions as ordered  Outcome: Progressing     Problem: Impaired Swallowing  Goal: Minimize aspiration risk  Description: Interventions:  - Patient should be alert and upright for all feedings (90 degrees preferred)  - Offer food and liquids at a slow rate  - No straws  - Encourage small bites  of food and small sips of liquid  - Offer pills one at a time, crush or deliver with applesauce as needed  - Discontinue feeding and notify MD (or speech pathologist) if coughing or persistent throat clearing or wet/gurgly vocal quality is noted  Outcome: Progressing     Problem: SAFETY ADULT - FALL  Goal: Free from fall injury  Description: INTERVENTIONS:  - Assess pt frequently for physical needs  - Identify cognitive and physical deficits and behaviors that affect risk of falls.  - [place] fall precautions as indicated by assessment.  - Educate pt/family on patient safety including physical limitations  - Instruct pt to call for assistance with activity based on assessment  - Modify environment to reduce risk of injury  - Provide assistive devices as appropriate  - Consider OT/PT consult to assist with strengthening/mobility  - Encourage toileting schedule  Outcome: Progressing     Problem: Safety Risk - Non-Violent Restraints  Goal: Patient will remain free from self-harm  Description: INTERVENTIONS:  - Apply the least restrictive restraint to prevent harm  - Notify patient and family of reasons restraints applied  - Assess for any contributing factors to confusion (electrolyte disturbances, delirium, medications)  - Discontinue any unnecessary medical devices as soon as possible  - Assess the patient's physical comfort, circulation, skin condition, hydration, nutrition and elimination needs   - Reorient and redirection as needed  - Assess for the need to continue restraints  Outcome: Progressing     Problem: Delirium  Goal: Minimize duration of delirium  Description: Interventions:  - Encourage use of hearing aids, eye glasses  - Promote highest level of mobility daily  - Provide frequent reorientation  - Promote wakefulness i.e. lights on, blinds open  - Promote sleep, encourage patient's normal rest cycle i.e. lights off, TV off, minimize noise and interruptions  - Encourage family to assist in orientation  and promotion of home routines  Outcome: Progressing     Problem: Diabetes/Glucose Control  Goal: Glucose maintained within prescribed range  Description: INTERVENTIONS:  - Monitor Blood Glucose as ordered  - Assess for signs and symptoms of hyperglycemia and hypoglycemia  - Administer ordered medications to maintain glucose within target range  - Assess barriers to adequate nutritional intake and initiate nutrition consult as needed  - Instruct patient on self management of diabetes  Outcome: Progressing

## 2024-02-01 NOTE — PROGRESS NOTES
[facility]    Yin Butcher Patient Status:  Inpatient    1931 MRN [id number]   Location [facility] 3NE-A Attending Skyla Tinsley MD   Hosp Day # 11 PCP Mina Walker MD     SUBJECTIVE: no new events. Dc plans being coordinated     OBJECTIVE:  /71 (BP Location: Left arm)   Pulse 87   Temp 97.8 °F (36.6 °C) (Axillary)   Resp 19   Ht 170.2 cm (5' 7\")   Wt 125 lb 12.8 oz (57.1 kg)   SpO2 100%   BMI 19.70 kg/m²   O2 requirement: on 1L nc     I/O last 3 completed shifts:  In: 120 [P.O.:120]  Out: -   No intake/output data recorded.     Current Medications:   Current Facility-Administered Medications:     [START ON 2024] furosemide (Lasix) tab 20 mg, 20 mg, Oral, Daily    apixaban (Eliquis) tab 10 mg, 10 mg, Oral, BID **FOLLOWED BY** [START ON 2024] apixaban (Eliquis) tab 5 mg, 5 mg, Oral, BID    metoprolol tartrate (Lopressor) tab 25 mg, 25 mg, Oral, 2x Daily(Beta Blocker)    docusate (Colace) 50 MG/5ML oral solution 100 mg, 100 mg, Oral, BID    digoxin (Lanoxin) tab 125 mcg, 125 mcg, Oral, Q MWF    polyethylene glycol (PEG 3350) (Miralax) 17 g oral packet 17 g, 17 g, Oral, Daily PRN    magnesium hydroxide (Milk of Magnesia) 400 MG/5ML oral suspension 30 mL, 30 mL, Oral, Daily PRN    bisacodyl (Dulcolax) 10 MG rectal suppository 10 mg, 10 mg, Rectal, Daily PRN    fleet enema (Fleet) 7-19 GM/118ML rectal enema 133 mL, 1 enema, Rectal, Once PRN    midodrine (ProAmatine) tab 10 mg, 10 mg, Oral, TID    multivitamin (Centrum) chewable tab (Adult) 1 tablet, 1 tablet, Oral, Daily    metoclopramide (Reglan) 5 mg/mL injection 5 mg, 5 mg, Intravenous, Q8H PRN    ondansetron (Zofran) 4 MG/2ML injection 4 mg, 4 mg, Intravenous, Q6H PRN    glucose (Dex4) 15 GM/59ML oral liquid 15 g, 15 g, Oral, Q15 Min PRN **OR** glucose (Glutose) 40% oral gel 15 g, 15 g, Oral, Q15 Min PRN **OR** glucose-vitamin C (Dex-4) chewable tab 4 tablet, 4 tablet, Oral, Q15 Min PRN **OR** dextrose 50% injection 50  mL, 50 mL, Intravenous, Q15 Min PRN **OR** glucose (Dex4) 15 GM/59ML oral liquid 30 g, 30 g, Oral, Q15 Min PRN **OR** glucose (Glutose) 40% oral gel 30 g, 30 g, Oral, Q15 Min PRN **OR** glucose-vitamin C (Dex-4) chewable tab 8 tablet, 8 tablet, Oral, Q15 Min PRN    acetaminophen (Tylenol Extra Strength) tab 500 mg, 500 mg, Oral, Q4H PRN    morphINE PF 2 MG/ML injection 0.5 mg, 0.5 mg, Intravenous, Q4H PRN     General appearance: alert, appears stated age, and cooperative  Lungs:  diminished on R, clear on L  Heart: regular rate and rhythm  Abdomen: soft, non-tender; bowel sounds normal; no masses,  no organomegaly  Extremities: extremities normal, atraumatic, no cyanosis or edema        Lab Results   Component Value Date    K 4.4 02/01/2024     Lab Results   Component Value Date    PT 15.5 (H) 07/13/2014    PT 14.4 (H) 07/11/2014    INR 1.32 (H) 01/29/2024    INR 1.84 (H) 01/21/2024    INR 1.64 (H) 01/21/2024        Imaging: CXR: personally reviewed. Mod to large L effusion     Assessment / Plan:  Acute hypoxic and hypercapnic respiratory failure - due to acute PE and CHF with bilateral pleural effusions. Pt was briefly treated with zosyn, but pneumonia is unlikely  - wean O2 as able- down to 1L; repeat CXR shows persistent R moderate effusion  - AVAPS during sleep as tolerated  - bronchial hygiene  - diuretics per cardiology  Acute bilateral PE - LE doppler was negative for DVT.   -TTE was without right heart strain  - apixaban  Bilateral pleural effusions - Recurrent, likely due to diastolic dysfunction. R effusion is partially loculated. Previously pt has undergone thoracentesis; showed transudative fluid consistent with CHF  - diuretics per cardiology  - hold off on thoracentesis, pt is at high risk for developing an ex-vacuo pneumothorax as the effusion is chronic, and loculated. The fluid will likely recur as well unless she's euvolemic  HFpEF - TTE 65-70%   - diurese as able  - cardiology following  Chronic  atrial fibrillation    - per cardiology  Encephalopathy- pt has had significant improvement in the last 24hrs.  She is very lucid today and communicative.  She claims to tolerate AVAPS with sleep well.  Will continue  Proph  - DOAC  Dispo - full code   - DC planning  - will follow peripherally, please call with questions    Teofilo Cordero MD  2/1/2024  1:58 PM

## 2024-02-02 VITALS
HEIGHT: 67 IN | HEART RATE: 73 BPM | TEMPERATURE: 98 F | SYSTOLIC BLOOD PRESSURE: 111 MMHG | BODY MASS INDEX: 19.62 KG/M2 | DIASTOLIC BLOOD PRESSURE: 76 MMHG | WEIGHT: 125 LBS | RESPIRATION RATE: 16 BRPM | OXYGEN SATURATION: 100 %

## 2024-02-02 NOTE — PLAN OF CARE
Assumed pt care this morning at 0730.  Pt is A&Ox2, following commands.   Pt on 1L  nasal canula, VSS.  A. fib on tele.   Pt denies pain.  Pt max assist.   Pt on regular diet. Tolerating diet.   R wrist  IV was pulled out by pt today.    Bed in lowest position, call light in reach.      Problem: CARDIOVASCULAR - ADULT  Goal: Maintains optimal cardiac output and hemodynamic stability  Description: INTERVENTIONS:  - Monitor vital signs, rhythm, and trends  - Monitor for bleeding, hypotension and signs of decreased cardiac output  - Evaluate effectiveness of vasoactive medications to optimize hemodynamic stability  - Monitor arterial and/or venous puncture sites for bleeding and/or hematoma  - Assess quality of pulses, skin color and temperature  - Assess for signs of decreased coronary artery perfusion - ex. Angina  - Evaluate fluid balance, assess for edema, trend weights  Outcome: Progressing     Problem: RESPIRATORY - ADULT  Goal: Achieves optimal ventilation and oxygenation  Description: INTERVENTIONS:  - Assess for changes in respiratory status  - Assess for changes in mentation and behavior  - Position to facilitate oxygenation and minimize respiratory effort  - Oxygen supplementation based on oxygen saturation or ABGs  - Provide Smoking Cessation handout, if applicable  - Encourage broncho-pulmonary hygiene including cough, deep breathe, Incentive Spirometry  - Assess the need for suctioning and perform as needed  - Assess and instruct to report SOB or any respiratory difficulty  - Respiratory Therapy support as indicated  - Manage/alleviate anxiety  - Monitor for signs/symptoms of CO2 retention  Outcome: Progressing

## 2024-02-02 NOTE — PROGRESS NOTES
NURSING DISCHARGE NOTE    Discharged Rehab facility via Ambulance.  Accompanied by Family member and Support staff  Belongings Taken by patient/family.    RN report provided to Soledad and copy of AVS and Eliquis coupon given to pt's daughter.

## 2024-02-02 NOTE — PLAN OF CARE
Pt alert and oriented x 2; follows commands  4L O2 per NC  Tele-A fib  NO IV access-pt removed   Discharge today at 3pm to [facility] Diet; nectar thick liquids  Denies pain    Problem: CARDIOVASCULAR - ADULT  Goal: Maintains optimal cardiac output and hemodynamic stability  Description: INTERVENTIONS:  - Monitor vital signs, rhythm, and trends  - Monitor for bleeding, hypotension and signs of decreased cardiac output  - Evaluate effectiveness of vasoactive medications to optimize hemodynamic stability  - Monitor arterial and/or venous puncture sites for bleeding and/or hematoma  - Assess quality of pulses, skin color and temperature  - Assess for signs of decreased coronary artery perfusion - ex. Angina  - Evaluate fluid balance, assess for edema, trend weights  Outcome: Progressing     Problem: RESPIRATORY - ADULT  Goal: Achieves optimal ventilation and oxygenation  Description: INTERVENTIONS:  - Assess for changes in respiratory status  - Assess for changes in mentation and behavior  - Position to facilitate oxygenation and minimize respiratory effort  - Oxygen supplementation based on oxygen saturation or ABGs  - Provide Smoking Cessation handout, if applicable  - Encourage broncho-pulmonary hygiene including cough, deep breathe, Incentive Spirometry  - Assess the need for suctioning and perform as needed  - Assess and instruct to report SOB or any respiratory difficulty  - Respiratory Therapy support as indicated  - Manage/alleviate anxiety  - Monitor for signs/symptoms of CO2 retention  Outcome: Progressing     Problem: METABOLIC/FLUID AND ELECTROLYTES - ADULT  Goal: Glucose maintained within prescribed range  Description: INTERVENTIONS:  - Monitor Blood Glucose as ordered  - Assess for signs and symptoms of hyperglycemia and hypoglycemia  - Administer ordered medications to maintain glucose within target range  - Assess barriers to adequate nutritional intake and initiate nutrition consult as  needed  - Instruct patient on self management of diabetes  Outcome: Progressing  Goal: Electrolytes maintained within normal limits  Description: INTERVENTIONS:  - Monitor labs and rhythm and assess patient for signs and symptoms of electrolyte imbalances  - Administer electrolyte replacement as ordered  - Monitor response to electrolyte replacements, including rhythm and repeat lab results as appropriate  - Fluid restriction as ordered  - Instruct patient on fluid and nutrition restrictions as appropriate  Outcome: Progressing

## 2024-02-02 NOTE — CM/SW NOTE
CM spoke to Ruby at [facility] to confirm AVAPS availability for discharge today; facility requested discharge moved to 3pm to accommodate patient's needs.  CM spoke to [facility] to change pickup time to 3pm.  RN updated.  CM called [facility] for discharge plan update and left message.      SW/CM to remain available for any further discharge planning needs.   Fabiana Contreras, RN Case Manager [phone]

## 2024-02-02 NOTE — PROGRESS NOTES
[facility]     Hospitalist Progress Note     Yin Butcher Patient Status:  Inpatient    1931 MRN [id number]   Location [facility] 4SW-A Attending Skyla Tinsley MD   Hosp Day # 12 PCP Mina Walker MD     Chief Complaint: Shock     Subjective:     Patient  overall unchanged.   Objective:    Review of Systems:   A comprehensive review of systems was completed; pertinent positive and negatives stated in subjective.    Vital signs:  Temp:  [97.1 °F (36.2 °C)-98.2 °F (36.8 °C)] 98 °F (36.7 °C)  Pulse:  [] 73  Resp:  [16-20] 16  BP: ()/(64-92) 111/76  SpO2:  [90 %-100 %] 100 %    Physical Exam:    General: No acute distress, elderly lady   Respiratory: No wheezes, no rhonchi  Cardiovascular: S1, S2, regular rate and rhythm  Abdomen: Soft, Non-tender, non-distended, positive bowel sounds  Neuro: No new focal deficits.   Extremities: No edema      Diagnostic Data:    Labs:  Recent Labs   Lab 24  0658 24  0840 24  2202 24  0502   WBC 10.8 8.0  --  7.4   HGB 8.6* 8.6*  --  8.3*   MCV 88.7 92.0  --  89.4   .0 151.0  --  141.0*   INR  --   --  1.32*  --        Recent Labs   Lab 24  1306 24  0840 24  0503 24  0621 24  0848   * 104* 102* 103*  --    BUN 25* 28* 26* 26*  --    CREATSERUM 0.51* 0.44* 0.46* 0.48*  --    CA 8.8 9.1 9.0 8.8  --    ALB 2.9*  --   --   --   --     142 145 144  --    K 4.3 4.1 4.3 3.8 4.4    100 103 102  --    CO2 40.0* 45.0* 41.0* 42.0*  --    ALKPHO 76  --   --   --   --    AST 8*  --   --   --   --    BILT 1.5  --   --   --   --    TP 6.4  --   --   --   --        Estimated Creatinine Clearance: 66.9 mL/min (A) (based on SCr of 0.48 mg/dL (L)).    No results for input(s): \"TROP\", \"TROPHS\", \"CK\" in the last 168 hours.      Recent Labs   Lab 24  2202   PTP 16.4*   INR 1.32*                  Microbiology    Hospital Encounter on 24   1. Blood Culture     Status: None     Collection Time: 01/27/24  1:13 PM    Specimen: Blood,peripheral   Result Value Ref Range    Blood Culture Result No Growth 5 Days N/A         Imaging: Reviewed in Epic.    Medications:    furosemide  20 mg Oral Daily    apixaban  10 mg Oral BID    Followed by    [START ON 2/6/2024] apixaban  5 mg Oral BID    metoprolol tartrate  25 mg Oral 2x Daily(Beta Blocker)    docusate  100 mg Oral BID    digoxin  125 mcg Oral Q MWF    midodrine  10 mg Oral TID    multivitamin  1 tablet Oral Daily       Assessment & Plan:      #Shock, hypovolemic vs septic  vs cardiogenic  Pressors  IV Abx   Monitor BLood CX  Monitor I/O   #Bilateral pleural effusion  #Acute hypoxic resp failure due to acute PE with no RV strain on Echo and due to pleural effusion  Was on BIPAP in ICU  Diuresis   Hep gtt --> DOAC: will see what cost of xarelto is prior to starting  Doppler LE   #Lactic acidosis due to above, trend  #Anemia  Iron panel, below baseline   #Hypothermia on admission  Coritsol level normal   #CHF with acute exacerbation - HFPEF  Echo   # + Blood CX: Propionibacterium 11/20, resulted 1/26   Repeat blood Cx 1/27/24  NGTD  #DC planning remain challenging needs BRADLY, not sure home is best option for her given conditions she was found in     DC planning    Skyla Tinsley MD    Supplementary Documentation:     Quality:  DVT Mechanical Prophylaxis:   SCDs,    DVT Pharmacologic Prophylaxis   Medication    apixaban (Eliquis) tab 10 mg    In followed-by linked group with    [START ON 2/6/2024] apixaban (Eliquis) tab 5 mg                Code Status: Full Code  [place]: No urinary catheter in place  [place] Duration (in days): 2  Central line:    LEMO: 2/2/2024    Discharge is dependent on: clinical   At this point Ms. Butcher is expected to be discharge to: tbd     The 21st Century Cures Act makes medical notes like these available to patients in the interest of transparency. Please be advised this is a medical document. Medical documents are  intended to carry relevant information, facts as evident, and the clinical opinion of the practitioner. The medical note is intended as peer to peer communication and may appear blunt or direct. It is written in medical language and may contain abbreviations or verbiage that are unfamiliar.

## 2024-02-03 ENCOUNTER — HOSPITAL ENCOUNTER (EMERGENCY)
Age: 89
Discharge: NURSING, REHAB OR LONG-TERM CARE | DRG: 871 | End: 2024-02-03
Attending: EMERGENCY MEDICINE

## 2024-02-03 ENCOUNTER — APPOINTMENT (OUTPATIENT)
Dept: CT IMAGING | Age: 89
DRG: 871 | End: 2024-02-03
Attending: EMERGENCY MEDICINE

## 2024-02-03 ENCOUNTER — APPOINTMENT (OUTPATIENT)
Dept: GENERAL RADIOLOGY | Age: 89
DRG: 871 | End: 2024-02-03
Attending: EMERGENCY MEDICINE

## 2024-02-03 VITALS
HEART RATE: 96 BPM | DIASTOLIC BLOOD PRESSURE: 70 MMHG | SYSTOLIC BLOOD PRESSURE: 119 MMHG | RESPIRATION RATE: 16 BRPM | OXYGEN SATURATION: 98 %

## 2024-02-03 DIAGNOSIS — S81.801A AVULSION OF SKIN OF LOWER LEG, RIGHT, INITIAL ENCOUNTER: ICD-10-CM

## 2024-02-03 DIAGNOSIS — S81.802A AVULSION OF SKIN OF LOWER LEG, LEFT, INITIAL ENCOUNTER: Primary | ICD-10-CM
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
## 2024-02-03 PROCEDURE — 99285 EMERGENCY DEPT VISIT HI MDM: CPT

## 2024-02-03 PROCEDURE — 73590 X-RAY EXAM OF LOWER LEG: CPT

## 2024-02-03 PROCEDURE — 72125 CT NECK SPINE W/O DYE: CPT

## 2024-02-03 PROCEDURE — 70450 CT HEAD/BRAIN W/O DYE: CPT

## 2024-02-04 ENCOUNTER — HOSPITAL ENCOUNTER (INPATIENT)
Age: 89
Discharge: NURSING, REHAB OR LONG-TERM CARE | DRG: 871 | End: 2024-02-04
Attending: EMERGENCY MEDICINE | Admitting: STUDENT IN AN ORGANIZED HEALTH CARE EDUCATION/TRAINING PROGRAM

## 2024-02-04 ENCOUNTER — APPOINTMENT (OUTPATIENT)
Dept: GENERAL RADIOLOGY | Age: 89
DRG: 871 | End: 2024-02-04
Attending: EMERGENCY MEDICINE

## 2024-02-04 DIAGNOSIS — I50.33 ACUTE ON CHRONIC DIASTOLIC CONGESTIVE HEART FAILURE (CMD): ICD-10-CM

## 2024-02-04 DIAGNOSIS — J96.01 ACUTE RESPIRATORY FAILURE WITH HYPOXIA AND HYPERCAPNIA (CMD): Primary | ICD-10-CM

## 2024-02-04 DIAGNOSIS — J96.02 ACUTE RESPIRATORY FAILURE WITH HYPOXIA AND HYPERCAPNIA (CMD): Primary | ICD-10-CM

## 2024-02-04 LAB
ALBUMIN SERPL-MCNC: 3.1 G/DL (ref 3.6–5.1)
ALBUMIN/GLOB SERPL: 0.8 {RATIO} (ref 1–2.4)
ALP SERPL-CCNC: 80 UNITS/L (ref 45–117)
ALT SERPL-CCNC: 31 UNITS/L
ANION GAP SERPL CALC-SCNC: 7 MMOL/L (ref 7–19)
APTT PPP: 29 SEC (ref 22–32)
AST SERPL-CCNC: 31 UNITS/L
BASOPHILS # BLD: 0 K/MCL (ref 0–0.3)
BASOPHILS NFR BLD: 0 %
BILIRUB CONJ SERPL-MCNC: 0.5 MG/DL (ref 0–0.2)
BILIRUB SERPL-MCNC: 1.8 MG/DL (ref 0.2–1)
BUN SERPL-MCNC: 22 MG/DL (ref 6–20)
BUN/CREAT SERPL: 43 (ref 7–25)
CALCIUM SERPL-MCNC: 9.5 MG/DL (ref 8.4–10.2)
CHLORIDE SERPL-SCNC: 98 MMOL/L (ref 97–110)
CO2 SERPL-SCNC: 39 MMOL/L (ref 21–32)
CREAT SERPL-MCNC: 0.51 MG/DL (ref 0.51–0.95)
DEPRECATED RDW RBC: 93.5 FL (ref 39–50)
EGFRCR SERPLBLD CKD-EPI 2021: 88 ML/MIN/{1.73_M2}
EOSINOPHIL # BLD: 0 K/MCL (ref 0–0.5)
EOSINOPHIL NFR BLD: 0 %
ERYTHROCYTE [DISTWIDTH] IN BLOOD: 27.1 % (ref 11–15)
FASTING DURATION TIME PATIENT: ABNORMAL H
GLOBULIN SER-MCNC: 4 G/DL (ref 2–4)
GLUCOSE BLDC GLUCOMTR-MCNC: 155 MG/DL (ref 70–99)
GLUCOSE SERPL-MCNC: 158 MG/DL (ref 70–99)
HCT VFR BLD CALC: 35.8 % (ref 36–46.5)
HGB BLD-MCNC: 10.3 G/DL (ref 12–15.5)
IMM GRANULOCYTES # BLD AUTO: 0.1 K/MCL (ref 0–0.2)
IMM GRANULOCYTES # BLD: 1 %
INR PPP: 1.7
LACTATE BLDV-SCNC: 2.4 MMOL/L (ref 0–2)
LYMPHOCYTES # BLD: 0.3 K/MCL (ref 1–4)
LYMPHOCYTES NFR BLD: 2 %
MCH RBC QN AUTO: 27.5 PG (ref 26–34)
MCHC RBC AUTO-ENTMCNC: 28.8 G/DL (ref 32–36.5)
MCV RBC AUTO: 95.5 FL (ref 78–100)
MONOCYTES # BLD: 0.6 K/MCL (ref 0.3–0.9)
MONOCYTES NFR BLD: 4 %
NEUTROPHILS # BLD: 15.5 K/MCL (ref 1.8–7.7)
NEUTROPHILS NFR BLD: 93 %
NRBC BLD MANUAL-RTO: 0 /100 WBC
NT-PROBNP SERPL-MCNC: 7676 PG/ML
PLATELET # BLD AUTO: 234 K/MCL (ref 140–450)
POTASSIUM SERPL-SCNC: 5 MMOL/L (ref 3.4–5.1)
PROCALCITONIN SERPL IA-MCNC: 0.08 NG/ML
PROT SERPL-MCNC: 7.1 G/DL (ref 6.4–8.2)
PROTHROMBIN TIME: 17.9 SEC (ref 9.7–11.8)
RBC # BLD: 3.75 MIL/MCL (ref 4–5.2)
SODIUM SERPL-SCNC: 139 MMOL/L (ref 135–145)
WBC # BLD: 16.5 K/MCL (ref 4.2–11)

## 2024-02-04 PROCEDURE — 93010 ELECTROCARDIOGRAM REPORT: CPT | Performed by: INTERNAL MEDICINE

## 2024-02-04 PROCEDURE — 82248 BILIRUBIN DIRECT: CPT | Performed by: EMERGENCY MEDICINE

## 2024-02-04 PROCEDURE — 36415 COLL VENOUS BLD VENIPUNCTURE: CPT

## 2024-02-04 PROCEDURE — 94660 CPAP INITIATION&MGMT: CPT

## 2024-02-04 PROCEDURE — 10004180 HB COUNTER-TRANSPORT

## 2024-02-04 PROCEDURE — 96375 TX/PRO/DX INJ NEW DRUG ADDON: CPT

## 2024-02-04 PROCEDURE — 96365 THER/PROPH/DIAG IV INF INIT: CPT

## 2024-02-04 PROCEDURE — 96361 HYDRATE IV INFUSION ADD-ON: CPT

## 2024-02-04 PROCEDURE — 87040 BLOOD CULTURE FOR BACTERIA: CPT | Performed by: EMERGENCY MEDICINE

## 2024-02-04 PROCEDURE — 10003585 HB ROOM CHARGE INTERMEDIATE CARE

## 2024-02-04 PROCEDURE — 83605 ASSAY OF LACTIC ACID: CPT | Performed by: EMERGENCY MEDICINE

## 2024-02-04 PROCEDURE — 85025 COMPLETE CBC W/AUTO DIFF WBC: CPT | Performed by: EMERGENCY MEDICINE

## 2024-02-04 PROCEDURE — 85610 PROTHROMBIN TIME: CPT | Performed by: EMERGENCY MEDICINE

## 2024-02-04 PROCEDURE — 80053 COMPREHEN METABOLIC PANEL: CPT | Performed by: EMERGENCY MEDICINE

## 2024-02-04 PROCEDURE — 0202U NFCT DS 22 TRGT SARS-COV-2: CPT | Performed by: EMERGENCY MEDICINE

## 2024-02-04 PROCEDURE — 99291 CRITICAL CARE FIRST HOUR: CPT

## 2024-02-04 PROCEDURE — 83880 ASSAY OF NATRIURETIC PEPTIDE: CPT | Performed by: EMERGENCY MEDICINE

## 2024-02-04 PROCEDURE — 84145 PROCALCITONIN (PCT): CPT | Performed by: EMERGENCY MEDICINE

## 2024-02-04 PROCEDURE — 85730 THROMBOPLASTIN TIME PARTIAL: CPT | Performed by: EMERGENCY MEDICINE

## 2024-02-04 PROCEDURE — 10002807 HB RX 258: Performed by: EMERGENCY MEDICINE

## 2024-02-04 PROCEDURE — 10002800 HB RX 250 W HCPCS: Performed by: EMERGENCY MEDICINE

## 2024-02-04 PROCEDURE — 99285 EMERGENCY DEPT VISIT HI MDM: CPT

## 2024-02-04 PROCEDURE — 93005 ELECTROCARDIOGRAM TRACING: CPT | Performed by: EMERGENCY MEDICINE

## 2024-02-04 PROCEDURE — 71045 X-RAY EXAM CHEST 1 VIEW: CPT

## 2024-02-04 RX ORDER — LIDOCAINE HYDROCHLORIDE 20 MG/ML
10 JELLY TOPICAL
Status: DISCONTINUED | OUTPATIENT
Start: 2024-02-04 | End: 2024-02-18

## 2024-02-04 RX ORDER — BUMETANIDE 0.25 MG/ML
1 INJECTION INTRAMUSCULAR; INTRAVENOUS ONCE
Status: COMPLETED | OUTPATIENT
Start: 2024-02-04 | End: 2024-02-04

## 2024-02-04 RX ORDER — IPRATROPIUM BROMIDE AND ALBUTEROL SULFATE 2.5; .5 MG/3ML; MG/3ML
9 SOLUTION RESPIRATORY (INHALATION) ONCE
Status: COMPLETED | OUTPATIENT
Start: 2024-02-04 | End: 2024-02-05

## 2024-02-04 RX ORDER — MAGNESIUM SULFATE 4 G/50ML
4 INJECTION INTRAVENOUS ONCE
Status: COMPLETED | OUTPATIENT
Start: 2024-02-04 | End: 2024-02-04

## 2024-02-04 RX ADMIN — PIPERACILLIN AND TAZOBACTAM 4.5 G: 4; .5 INJECTION, POWDER, FOR SOLUTION INTRAVENOUS at 23:45

## 2024-02-04 RX ADMIN — BUMETANIDE 1 MG: 0.25 INJECTION INTRAMUSCULAR; INTRAVENOUS at 22:20

## 2024-02-04 RX ADMIN — SODIUM CHLORIDE 1000 ML: 9 INJECTION, SOLUTION INTRAVENOUS at 22:45

## 2024-02-04 RX ADMIN — SODIUM CHLORIDE 656 ML: 9 INJECTION, SOLUTION INTRAVENOUS at 23:46

## 2024-02-04 RX ADMIN — MAGNESIUM SULFATE HEPTAHYDRATE 4 G: 80 INJECTION, SOLUTION INTRAVENOUS at 22:17

## 2024-02-04 ASSESSMENT — LIFESTYLE VARIABLES
AUDIT-C TOTAL SCORE: 0
HOW MANY STANDARD DRINKS CONTAINING ALCOHOL DO YOU HAVE ON A TYPICAL DAY: 0,1 OR 2
HOW OFTEN DO YOU HAVE A DRINK CONTAINING ALCOHOL: NEVER
ALCOHOL_USE_STATUS: NO OR LOW RISK WITH VALIDATED TOOL
HOW OFTEN DO YOU HAVE 6 OR MORE DRINKS ON ONE OCCASION: NEVER

## 2024-02-05 LAB
ALBUMIN SERPL-MCNC: 2.8 G/DL (ref 3.6–5.1)
ALBUMIN/GLOB SERPL: 0.8 {RATIO} (ref 1–2.4)
ALP SERPL-CCNC: 74 UNITS/L (ref 45–117)
ALT SERPL-CCNC: 26 UNITS/L
ANION GAP SERPL CALC-SCNC: 4 MMOL/L (ref 7–19)
APPEARANCE UR: CLEAR
AST SERPL-CCNC: 20 UNITS/L
B PARAPERT DNA SPEC QL NAA+PROBE: NOT DETECTED
B PERT.PT PRMT NPH QL NAA+NON-PROBE: NOT DETECTED
BACTERIA #/AREA URNS HPF: ABNORMAL /HPF
BASOPHILS # BLD: 0 K/MCL (ref 0–0.3)
BASOPHILS NFR BLD: 0 %
BILIRUB SERPL-MCNC: 1.6 MG/DL (ref 0.2–1)
BILIRUB UR QL STRIP: NEGATIVE
BUN SERPL-MCNC: 20 MG/DL (ref 6–20)
BUN/CREAT SERPL: 35 (ref 7–25)
C PNEUM DNA NPH QL NAA+NON-PROBE: NOT DETECTED
CALCIUM SERPL-MCNC: 9.1 MG/DL (ref 8.4–10.2)
CHLORIDE SERPL-SCNC: 100 MMOL/L (ref 97–110)
CO2 SERPL-SCNC: 39 MMOL/L (ref 21–32)
COLOR UR: ABNORMAL
CREAT SERPL-MCNC: 0.57 MG/DL (ref 0.51–0.95)
DEPRECATED RDW RBC: 95.5 FL (ref 39–50)
EGFRCR SERPLBLD CKD-EPI 2021: 85 ML/MIN/{1.73_M2}
EOSINOPHIL # BLD: 0 K/MCL (ref 0–0.5)
EOSINOPHIL NFR BLD: 0 %
ERYTHROCYTE [DISTWIDTH] IN BLOOD: 27.5 % (ref 11–15)
FASTING DURATION TIME PATIENT: ABNORMAL H
FLUAV RNA NPH QL NAA+NON-PROBE: NOT DETECTED
FLUBV RNA NPH QL NAA+NON-PROBE: NOT DETECTED
GLOBULIN SER-MCNC: 3.7 G/DL (ref 2–4)
GLUCOSE BLDC GLUCOMTR-MCNC: 124 MG/DL (ref 70–99)
GLUCOSE BLDC GLUCOMTR-MCNC: 89 MG/DL (ref 70–99)
GLUCOSE SERPL-MCNC: 115 MG/DL (ref 70–99)
GLUCOSE UR STRIP-MCNC: NEGATIVE MG/DL
HADV DNA NPH QL NAA+NON-PROBE: NOT DETECTED
HCOV 229E RNA NPH QL NAA+NON-PROBE: NOT DETECTED
HCOV HKU1 RNA NPH QL NAA+NON-PROBE: NOT DETECTED
HCOV NL63 RNA NPH QL NAA+NON-PROBE: NOT DETECTED
HCOV OC43 RNA NPH QL NAA+NON-PROBE: NOT DETECTED
HCT VFR BLD CALC: 33.9 % (ref 36–46.5)
HGB BLD-MCNC: 9.5 G/DL (ref 12–15.5)
HGB UR QL STRIP: ABNORMAL
HMPV RNA NPH QL NAA+NON-PROBE: NOT DETECTED
HPIV1 RNA NPH QL NAA+NON-PROBE: NOT DETECTED
HPIV2 RNA NPH QL NAA+NON-PROBE: NOT DETECTED
HPIV3 RNA NPH QL NAA+NON-PROBE: NOT DETECTED
HPIV4 RNA NPH QL NAA+NON-PROBE: NOT DETECTED
HYALINE CASTS #/AREA URNS LPF: ABNORMAL /LPF
IMM GRANULOCYTES # BLD AUTO: 0.1 K/MCL (ref 0–0.2)
IMM GRANULOCYTES # BLD: 1 %
KETONES UR STRIP-MCNC: NEGATIVE MG/DL
LACTATE BLDV-SCNC: 1.8 MMOL/L (ref 0–2)
LEUKOCYTE ESTERASE UR QL STRIP: ABNORMAL
LYMPHOCYTES # BLD: 0.5 K/MCL (ref 1–4)
LYMPHOCYTES NFR BLD: 4 %
M PNEUMO DNA NPH QL NAA+NON-PROBE: NOT DETECTED
MCH RBC QN AUTO: 27 PG (ref 26–34)
MCHC RBC AUTO-ENTMCNC: 28 G/DL (ref 32–36.5)
MCV RBC AUTO: 96.3 FL (ref 78–100)
MONOCYTES # BLD: 0.4 K/MCL (ref 0.3–0.9)
MONOCYTES NFR BLD: 3 %
NEUTROPHILS # BLD: 11.5 K/MCL (ref 1.8–7.7)
NEUTROPHILS NFR BLD: 92 %
NITRITE UR QL STRIP: NEGATIVE
NRBC BLD MANUAL-RTO: 0 /100 WBC
PH UR STRIP: 5 [PH] (ref 5–7)
PLATELET # BLD AUTO: 202 K/MCL (ref 140–450)
POTASSIUM SERPL-SCNC: 4.3 MMOL/L (ref 3.4–5.1)
PROT SERPL-MCNC: 6.5 G/DL (ref 6.4–8.2)
PROT UR STRIP-MCNC: NEGATIVE MG/DL
QRS-INTERVAL (MSEC): 70
QT-INTERVAL (MSEC): 316
QTC: 401
R AXIS (DEGREES): 67
RAINBOW EXTRA TUBES HOLD SPECIMEN: NORMAL
RBC # BLD: 3.52 MIL/MCL (ref 4–5.2)
RBC #/AREA URNS HPF: ABNORMAL /HPF
REPORT TEXT: NORMAL
RSV RNA NPH QL NAA+NON-PROBE: NOT DETECTED
RV+EV RNA NPH QL NAA+NON-PROBE: NOT DETECTED
SARS-COV-2 RNA RESP QL NAA+PROBE: NOT DETECTED
SODIUM SERPL-SCNC: 139 MMOL/L (ref 135–145)
SP GR UR STRIP: 1.01 (ref 1–1.03)
SQUAMOUS #/AREA URNS HPF: ABNORMAL /HPF
T AXIS (DEGREES): -12
UROBILINOGEN UR STRIP-MCNC: 0.2 MG/DL
VENTRICULAR RATE EKG/MIN (BPM): 97
WBC # BLD: 12.5 K/MCL (ref 4.2–11)
WBC #/AREA URNS HPF: ABNORMAL /HPF

## 2024-02-05 PROCEDURE — 96372 THER/PROPH/DIAG INJ SC/IM: CPT | Performed by: STUDENT IN AN ORGANIZED HEALTH CARE EDUCATION/TRAINING PROGRAM

## 2024-02-05 PROCEDURE — 10003585 HB ROOM CHARGE INTERMEDIATE CARE

## 2024-02-05 PROCEDURE — 87086 URINE CULTURE/COLONY COUNT: CPT | Performed by: EMERGENCY MEDICINE

## 2024-02-05 PROCEDURE — 10004180 HB COUNTER-TRANSPORT

## 2024-02-05 PROCEDURE — 83605 ASSAY OF LACTIC ACID: CPT | Performed by: EMERGENCY MEDICINE

## 2024-02-05 PROCEDURE — 80053 COMPREHEN METABOLIC PANEL: CPT | Performed by: STUDENT IN AN ORGANIZED HEALTH CARE EDUCATION/TRAINING PROGRAM

## 2024-02-05 PROCEDURE — 10002801 HB RX 250 W/O HCPCS: Performed by: EMERGENCY MEDICINE

## 2024-02-05 PROCEDURE — 10002019 HB COUNTER RESP ASSESSMENT

## 2024-02-05 PROCEDURE — 10002807 HB RX 258: Performed by: EMERGENCY MEDICINE

## 2024-02-05 PROCEDURE — 81001 URINALYSIS AUTO W/SCOPE: CPT | Performed by: EMERGENCY MEDICINE

## 2024-02-05 PROCEDURE — 10004281 HB COUNTER-STAFF TIME PER 15 MIN

## 2024-02-05 PROCEDURE — 10004651 HB RX, NO CHARGE ITEM: Performed by: STUDENT IN AN ORGANIZED HEALTH CARE EDUCATION/TRAINING PROGRAM

## 2024-02-05 PROCEDURE — 94640 AIRWAY INHALATION TREATMENT: CPT

## 2024-02-05 PROCEDURE — 10002801 HB RX 250 W/O HCPCS: Performed by: STUDENT IN AN ORGANIZED HEALTH CARE EDUCATION/TRAINING PROGRAM

## 2024-02-05 PROCEDURE — 13003291 HB INS MIDLINE W/GUIDE INCL CATH

## 2024-02-05 PROCEDURE — 10002807 HB RX 258: Performed by: STUDENT IN AN ORGANIZED HEALTH CARE EDUCATION/TRAINING PROGRAM

## 2024-02-05 PROCEDURE — 99223 1ST HOSP IP/OBS HIGH 75: CPT | Performed by: INTERNAL MEDICINE

## 2024-02-05 PROCEDURE — 10002800 HB RX 250 W HCPCS: Performed by: STUDENT IN AN ORGANIZED HEALTH CARE EDUCATION/TRAINING PROGRAM

## 2024-02-05 PROCEDURE — 85025 COMPLETE CBC W/AUTO DIFF WBC: CPT | Performed by: STUDENT IN AN ORGANIZED HEALTH CARE EDUCATION/TRAINING PROGRAM

## 2024-02-05 PROCEDURE — 36415 COLL VENOUS BLD VENIPUNCTURE: CPT | Performed by: STUDENT IN AN ORGANIZED HEALTH CARE EDUCATION/TRAINING PROGRAM

## 2024-02-05 RX ORDER — AMOXICILLIN 250 MG
2 CAPSULE ORAL 2 TIMES DAILY PRN
Status: DISCONTINUED | OUTPATIENT
Start: 2024-02-05 | End: 2024-02-20 | Stop reason: HOSPADM

## 2024-02-05 RX ORDER — ACETAMINOPHEN 650 MG/1
650 SUPPOSITORY RECTAL EVERY 4 HOURS PRN
Status: DISCONTINUED | OUTPATIENT
Start: 2024-02-05 | End: 2024-02-20 | Stop reason: HOSPADM

## 2024-02-05 RX ORDER — ENOXAPARIN SODIUM 100 MG/ML
1 INJECTION SUBCUTANEOUS EVERY 12 HOURS SCHEDULED
Status: DISCONTINUED | OUTPATIENT
Start: 2024-02-05 | End: 2024-02-07

## 2024-02-05 RX ORDER — ONDANSETRON 4 MG/1
4 TABLET, ORALLY DISINTEGRATING ORAL EVERY 12 HOURS PRN
Status: DISCONTINUED | OUTPATIENT
Start: 2024-02-05 | End: 2024-02-20 | Stop reason: HOSPADM

## 2024-02-05 RX ORDER — MIDODRINE HYDROCHLORIDE 10 MG/1
10 TABLET ORAL
COMMUNITY

## 2024-02-05 RX ORDER — MIDODRINE HYDROCHLORIDE 5 MG/1
10 TABLET ORAL
Status: DISCONTINUED | OUTPATIENT
Start: 2024-02-05 | End: 2024-02-20 | Stop reason: HOSPADM

## 2024-02-05 RX ORDER — DIGOXIN 125 MCG
125 TABLET ORAL
Status: DISCONTINUED | OUTPATIENT
Start: 2024-02-05 | End: 2024-02-20 | Stop reason: HOSPADM

## 2024-02-05 RX ORDER — POLYETHYLENE GLYCOL 3350 17 G/17G
17 POWDER, FOR SOLUTION ORAL DAILY
Status: DISCONTINUED | OUTPATIENT
Start: 2024-02-05 | End: 2024-02-20 | Stop reason: HOSPADM

## 2024-02-05 RX ORDER — 0.9 % SODIUM CHLORIDE 0.9 %
2 VIAL (ML) INJECTION EVERY 12 HOURS SCHEDULED
Status: DISCONTINUED | OUTPATIENT
Start: 2024-02-05 | End: 2024-02-20 | Stop reason: HOSPADM

## 2024-02-05 RX ORDER — IPRATROPIUM BROMIDE AND ALBUTEROL SULFATE 2.5; .5 MG/3ML; MG/3ML
3 SOLUTION RESPIRATORY (INHALATION)
Status: DISCONTINUED | OUTPATIENT
Start: 2024-02-05 | End: 2024-02-07

## 2024-02-05 RX ORDER — POLYETHYLENE GLYCOL 3350 17 G/17G
17 POWDER, FOR SOLUTION ORAL DAILY PRN
Status: DISCONTINUED | OUTPATIENT
Start: 2024-02-05 | End: 2024-02-20 | Stop reason: HOSPADM

## 2024-02-05 RX ORDER — 0.9 % SODIUM CHLORIDE 0.9 %
10 VIAL (ML) INJECTION PRN
Status: DISCONTINUED | OUTPATIENT
Start: 2024-02-05 | End: 2024-02-20 | Stop reason: HOSPADM

## 2024-02-05 RX ORDER — ONDANSETRON 2 MG/ML
4 INJECTION INTRAMUSCULAR; INTRAVENOUS EVERY 12 HOURS PRN
Status: DISCONTINUED | OUTPATIENT
Start: 2024-02-05 | End: 2024-02-20 | Stop reason: HOSPADM

## 2024-02-05 RX ORDER — FUROSEMIDE 10 MG/ML
20 INJECTION INTRAMUSCULAR; INTRAVENOUS DAILY
Status: DISCONTINUED | OUTPATIENT
Start: 2024-02-05 | End: 2024-02-11

## 2024-02-05 RX ORDER — METOPROLOL TARTRATE 1 MG/ML
5 INJECTION, SOLUTION INTRAVENOUS EVERY 6 HOURS PRN
Status: DISCONTINUED | OUTPATIENT
Start: 2024-02-05 | End: 2024-02-06 | Stop reason: SDUPTHER

## 2024-02-05 RX ORDER — 0.9 % SODIUM CHLORIDE 0.9 %
10 VIAL (ML) INJECTION EVERY 12 HOURS SCHEDULED
Status: DISCONTINUED | OUTPATIENT
Start: 2024-02-05 | End: 2024-02-20 | Stop reason: HOSPADM

## 2024-02-05 RX ORDER — ENOXAPARIN SODIUM 100 MG/ML
40 INJECTION SUBCUTANEOUS DAILY
Status: DISCONTINUED | OUTPATIENT
Start: 2024-02-05 | End: 2024-02-05

## 2024-02-05 RX ORDER — ACETAMINOPHEN 325 MG/1
650 TABLET ORAL EVERY 4 HOURS PRN
Status: DISCONTINUED | OUTPATIENT
Start: 2024-02-05 | End: 2024-02-20 | Stop reason: HOSPADM

## 2024-02-05 RX ORDER — BISACODYL 10 MG
10 SUPPOSITORY, RECTAL RECTAL DAILY PRN
Status: DISCONTINUED | OUTPATIENT
Start: 2024-02-05 | End: 2024-02-20 | Stop reason: HOSPADM

## 2024-02-05 RX ADMIN — IPRATROPIUM BROMIDE AND ALBUTEROL SULFATE 9 ML: 2.5; .5 SOLUTION RESPIRATORY (INHALATION) at 00:14

## 2024-02-05 RX ADMIN — METOPROLOL TARTRATE 5 MG: 1 INJECTION, SOLUTION INTRAVENOUS at 21:15

## 2024-02-05 RX ADMIN — PIPERACILLIN SODIUM AND TAZOBACTAM SODIUM 3.38 G: 3; .375 INJECTION, POWDER, FOR SOLUTION INTRAVENOUS at 23:30

## 2024-02-05 RX ADMIN — IPRATROPIUM BROMIDE AND ALBUTEROL SULFATE 3 ML: 2.5; .5 SOLUTION RESPIRATORY (INHALATION) at 12:03

## 2024-02-05 RX ADMIN — ENOXAPARIN SODIUM 50 MG: 100 INJECTION SUBCUTANEOUS at 21:14

## 2024-02-05 RX ADMIN — IPRATROPIUM BROMIDE AND ALBUTEROL SULFATE 3 ML: 2.5; .5 SOLUTION RESPIRATORY (INHALATION) at 20:30

## 2024-02-05 RX ADMIN — SODIUM CHLORIDE, PRESERVATIVE FREE 2 ML: 5 INJECTION INTRAVENOUS at 12:12

## 2024-02-05 RX ADMIN — SODIUM CHLORIDE, PRESERVATIVE FREE 2 ML: 5 INJECTION INTRAVENOUS at 22:17

## 2024-02-05 RX ADMIN — DOXYCYCLINE 100 MG: 100 INJECTION, POWDER, LYOPHILIZED, FOR SOLUTION INTRAVENOUS at 22:16

## 2024-02-05 RX ADMIN — SODIUM CHLORIDE, PRESERVATIVE FREE 10 ML: 5 INJECTION INTRAVENOUS at 22:17

## 2024-02-05 RX ADMIN — ENOXAPARIN SODIUM 50 MG: 100 INJECTION SUBCUTANEOUS at 12:14

## 2024-02-05 RX ADMIN — IPRATROPIUM BROMIDE AND ALBUTEROL SULFATE 3 ML: 2.5; .5 SOLUTION RESPIRATORY (INHALATION) at 16:45

## 2024-02-05 RX ADMIN — IPRATROPIUM BROMIDE AND ALBUTEROL SULFATE 3 ML: 2.5; .5 SOLUTION RESPIRATORY (INHALATION) at 08:08

## 2024-02-05 RX ADMIN — DOXYCYCLINE 100 MG: 100 INJECTION, POWDER, LYOPHILIZED, FOR SOLUTION INTRAVENOUS at 12:12

## 2024-02-05 RX ADMIN — PIPERACILLIN SODIUM AND TAZOBACTAM SODIUM 3.38 G: 3; .375 INJECTION, POWDER, FOR SOLUTION INTRAVENOUS at 18:09

## 2024-02-05 RX ADMIN — FUROSEMIDE 20 MG: 10 INJECTION, SOLUTION INTRAMUSCULAR; INTRAVENOUS at 18:04

## 2024-02-05 RX ADMIN — IPRATROPIUM BROMIDE AND ALBUTEROL SULFATE 3 ML: 2.5; .5 SOLUTION RESPIRATORY (INHALATION) at 03:57

## 2024-02-05 RX ADMIN — DOXYCYCLINE 100 MG: 100 INJECTION, POWDER, LYOPHILIZED, FOR SOLUTION INTRAVENOUS at 00:53

## 2024-02-05 SDOH — ECONOMIC STABILITY: TRANSPORTATION INSECURITY
IN THE PAST 12 MONTHS, HAS LACK OF RELIABLE TRANSPORTATION KEPT YOU FROM MEDICAL APPOINTMENTS, MEETINGS, WORK OR FROM GETTING THINGS NEEDED FOR DAILY LIVING?: NO

## 2024-02-05 SDOH — ECONOMIC STABILITY: HOUSING INSECURITY: DO YOU HAVE PROBLEMS WITH ANY OF THE FOLLOWING?: NONE OF THE ABOVE

## 2024-02-05 SDOH — SOCIAL STABILITY: SOCIAL INSECURITY: HOW OFTEN DOES ANYONE, INCLUDING FAMILY AND FRIENDS, INSULT OR TALK DOWN TO YOU?: NEVER

## 2024-02-05 SDOH — ECONOMIC STABILITY: FOOD INSECURITY: WITHIN THE PAST 12 MONTHS, THE FOOD YOU BOUGHT JUST DIDN'T LAST AND YOU DIDN'T HAVE MONEY TO GET MORE.: NEVER TRUE

## 2024-02-05 SDOH — HEALTH STABILITY: PHYSICAL HEALTH: DO YOU HAVE DIFFICULTY DRESSING OR BATHING?: YES

## 2024-02-05 SDOH — ECONOMIC STABILITY: GENERAL

## 2024-02-05 SDOH — HEALTH STABILITY: PHYSICAL HEALTH: DO YOU HAVE SERIOUS DIFFICULTY WALKING OR CLIMBING STAIRS?: YES

## 2024-02-05 SDOH — ECONOMIC STABILITY: GENERAL: WOULD YOU LIKE HELP WITH ANY OF THE FOLLOWING NEEDS?: I DON'T WANT HELP WITH ANY OF THESE

## 2024-02-05 SDOH — ECONOMIC STABILITY: HOUSING INSECURITY: WHAT IS YOUR LIVING SITUATION TODAY?: HOUSE

## 2024-02-05 SDOH — SOCIAL STABILITY: SOCIAL NETWORK: SUPPORT SYSTEMS: OTHER (COMMENT)

## 2024-02-05 SDOH — HEALTH STABILITY: GENERAL: BECAUSE OF A PHYSICAL, MENTAL, OR EMOTIONAL CONDITION, DO YOU HAVE DIFFICULTY DOING ERRANDS ALONE?: YES

## 2024-02-05 SDOH — ECONOMIC STABILITY: INCOME INSECURITY: IN THE PAST 12 MONTHS, HAS THE ELECTRIC, GAS, OIL, OR WATER COMPANY THREATENED TO SHUT OFF SERVICE IN YOUR HOME?: NO

## 2024-02-05 SDOH — HEALTH STABILITY: GENERAL
BECAUSE OF A PHYSICAL, MENTAL, OR EMOTIONAL CONDITION, DO YOU HAVE SERIOUS DIFFICULTY CONCENTRATING, REMEMBERING OR MAKING DECISIONS?: YES

## 2024-02-05 SDOH — ECONOMIC STABILITY: HOUSING INSECURITY: WHAT IS YOUR LIVING SITUATION TODAY?: I HAVE A STEADY PLACE TO LIVE

## 2024-02-05 SDOH — SOCIAL STABILITY: SOCIAL INSECURITY: HOW OFTEN DOES ANYONE, INCLUDING FAMILY AND FRIENDS, PHYSICALLY HURT YOU?: NEVER

## 2024-02-05 SDOH — ECONOMIC STABILITY: HOUSING INSECURITY: WHAT IS YOUR LIVING SITUATION TODAY?: SUPERVISED LIVING

## 2024-02-05 SDOH — SOCIAL STABILITY: SOCIAL INSECURITY: HOW OFTEN DOES ANYONE, INCLUDING FAMILY AND FRIENDS, SCREAM OR CURSE AT YOU?: NEVER

## 2024-02-05 SDOH — SOCIAL STABILITY: SOCIAL INSECURITY: HOW OFTEN DOES ANYONE, INCLUDING FAMILY AND FRIENDS, THREATEN YOU WITH HARM?: NEVER

## 2024-02-05 SDOH — SOCIAL STABILITY: SOCIAL NETWORK
HOW OFTEN DO YOU SEE OR TALK TO PEOPLE THAT YOU CARE ABOUT AND FEEL CLOSE TO? (FOR EXAMPLE: TALKING TO FRIENDS ON THE PHONE, VISITING FRIENDS OR FAMILY, GOING TO CHURCH OR CLUB MEETINGS): 5 OR MORE TIMES A WEEK

## 2024-02-05 ASSESSMENT — LIFESTYLE VARIABLES
HOW OFTEN DO YOU HAVE 6 OR MORE DRINKS ON ONE OCCASION: NEVER
HOW OFTEN DO YOU HAVE A DRINK CONTAINING ALCOHOL: NEVER
HOW OFTEN DO YOU HAVE 6 OR MORE DRINKS ON ONE OCCASION: NEVER
AUDIT-C TOTAL SCORE: 0
ALCOHOL_USE_STATUS: NO OR LOW RISK WITH VALIDATED TOOL
HOW MANY STANDARD DRINKS CONTAINING ALCOHOL DO YOU HAVE ON A TYPICAL DAY: 0,1 OR 2
HOW MANY STANDARD DRINKS CONTAINING ALCOHOL DO YOU HAVE ON A TYPICAL DAY: 0,1 OR 2
AUDIT-C TOTAL SCORE: 0
HOW OFTEN DO YOU HAVE A DRINK CONTAINING ALCOHOL: NEVER

## 2024-02-05 ASSESSMENT — ACTIVITIES OF DAILY LIVING (ADL)
ADL_SCORE: 0
ADL_SHORT_OF_BREATH: YES
ADL_BEFORE_ADMISSION: NEEDS/REQUIRES ASSISTANCE
DRESSING: DEPENDENT
BATHING: DEPENDENT
RECENT_DECLINE_ADL: YES, ACUTE ILLNESS WITHOUT THERAPY NEEDS
FEEDING: DEPENDENT
TOILETING: DEPENDENT
ADL_SHORT_OF_BREATH: YES

## 2024-02-05 ASSESSMENT — PAIN SCALES - GENERAL: PAINLEVEL_OUTOF10: 0

## 2024-02-05 ASSESSMENT — PATIENT HEALTH QUESTIONNAIRE - PHQ9
SUM OF ALL RESPONSES TO PHQ9 QUESTIONS 1 AND 2: 0
1. LITTLE INTEREST OR PLEASURE IN DOING THINGS: NOT AT ALL
2. FEELING DOWN, DEPRESSED OR HOPELESS: NOT AT ALL
CLINICAL INTERPRETATION OF PHQ2 SCORE: NO FURTHER SCREENING NEEDED
IS PATIENT ABLE TO COMPLETE PHQ2 OR PHQ9: YES
IS PATIENT ABLE TO COMPLETE PHQ2 OR PHQ9: NO, DEFER TO LATER TIME
SUM OF ALL RESPONSES TO PHQ9 QUESTIONS 1 AND 2: 0

## 2024-02-05 ASSESSMENT — PULMONARY FUNCTION TESTS: FEV1/FVC: UNABLE TO OBTAIN, OR GREATER THAN 70%

## 2024-02-05 ASSESSMENT — COLUMBIA-SUICIDE SEVERITY RATING SCALE - C-SSRS: IS THE PATIENT ABLE TO COMPLETE C-SSRS: NO, DEFER FOR CLINICAL INSTABILITY

## 2024-02-06 ENCOUNTER — APPOINTMENT (OUTPATIENT)
Dept: GENERAL RADIOLOGY | Age: 89
DRG: 871 | End: 2024-02-06
Attending: INTERNAL MEDICINE

## 2024-02-06 LAB
ALBUMIN SERPL-MCNC: 2.7 G/DL (ref 3.6–5.1)
ALBUMIN/GLOB SERPL: 0.8 {RATIO} (ref 1–2.4)
ALP SERPL-CCNC: 61 UNITS/L (ref 45–117)
ALT SERPL-CCNC: 21 UNITS/L
ANION GAP SERPL CALC-SCNC: 5 MMOL/L (ref 7–19)
AST SERPL-CCNC: 12 UNITS/L
BACTERIA UR CULT: NORMAL
BASOPHILS # BLD: 0 K/MCL (ref 0–0.3)
BASOPHILS NFR BLD: 1 %
BILIRUB SERPL-MCNC: 1.6 MG/DL (ref 0.2–1)
BUN SERPL-MCNC: 19 MG/DL (ref 6–20)
BUN/CREAT SERPL: 38 (ref 7–25)
CALCIUM SERPL-MCNC: 8.8 MG/DL (ref 8.4–10.2)
CHLORIDE SERPL-SCNC: 101 MMOL/L (ref 97–110)
CO2 SERPL-SCNC: 39 MMOL/L (ref 21–32)
CREAT SERPL-MCNC: 0.5 MG/DL (ref 0.51–0.95)
DEPRECATED RDW RBC: 94.2 FL (ref 39–50)
EGFRCR SERPLBLD CKD-EPI 2021: 88 ML/MIN/{1.73_M2}
EOSINOPHIL # BLD: 0 K/MCL (ref 0–0.5)
EOSINOPHIL NFR BLD: 0 %
ERYTHROCYTE [DISTWIDTH] IN BLOOD: 28.1 % (ref 11–15)
FASTING DURATION TIME PATIENT: ABNORMAL H
GLOBULIN SER-MCNC: 3.3 G/DL (ref 2–4)
GLUCOSE SERPL-MCNC: 80 MG/DL (ref 70–99)
HCT VFR BLD CALC: 31 % (ref 36–46.5)
HGB BLD-MCNC: 9.1 G/DL (ref 12–15.5)
IMM GRANULOCYTES # BLD AUTO: 0 K/MCL (ref 0–0.2)
IMM GRANULOCYTES # BLD: 1 %
LYMPHOCYTES # BLD: 0.4 K/MCL (ref 1–4)
LYMPHOCYTES NFR BLD: 5 %
MCH RBC QN AUTO: 27.7 PG (ref 26–34)
MCHC RBC AUTO-ENTMCNC: 29.4 G/DL (ref 32–36.5)
MCV RBC AUTO: 94.5 FL (ref 78–100)
MONOCYTES # BLD: 0.3 K/MCL (ref 0.3–0.9)
MONOCYTES NFR BLD: 4 %
NEUTROPHILS # BLD: 7.2 K/MCL (ref 1.8–7.7)
NEUTROPHILS NFR BLD: 89 %
NRBC BLD MANUAL-RTO: 0 /100 WBC
PLATELET # BLD AUTO: 247 K/MCL (ref 140–450)
POTASSIUM SERPL-SCNC: 3.4 MMOL/L (ref 3.4–5.1)
POTASSIUM SERPL-SCNC: 3.8 MMOL/L (ref 3.4–5.1)
PROT SERPL-MCNC: 6 G/DL (ref 6.4–8.2)
RBC # BLD: 3.28 MIL/MCL (ref 4–5.2)
SODIUM SERPL-SCNC: 142 MMOL/L (ref 135–145)
WBC # BLD: 8 K/MCL (ref 4.2–11)

## 2024-02-06 PROCEDURE — 10002800 HB RX 250 W HCPCS: Performed by: INTERNAL MEDICINE

## 2024-02-06 PROCEDURE — 10002807 HB RX 258: Performed by: STUDENT IN AN ORGANIZED HEALTH CARE EDUCATION/TRAINING PROGRAM

## 2024-02-06 PROCEDURE — 10002800 HB RX 250 W HCPCS: Performed by: STUDENT IN AN ORGANIZED HEALTH CARE EDUCATION/TRAINING PROGRAM

## 2024-02-06 PROCEDURE — 99497 ADVNCD CARE PLAN 30 MIN: CPT | Performed by: NURSE PRACTITIONER

## 2024-02-06 PROCEDURE — 80053 COMPREHEN METABOLIC PANEL: CPT | Performed by: STUDENT IN AN ORGANIZED HEALTH CARE EDUCATION/TRAINING PROGRAM

## 2024-02-06 PROCEDURE — G0316 PROLONGED IP OR OBS CARE EM SERVICE BEYOND PRIM SERVICE EA ADD 15 MIN: HCPCS | Performed by: NURSE PRACTITIONER

## 2024-02-06 PROCEDURE — 97530 THERAPEUTIC ACTIVITIES: CPT

## 2024-02-06 PROCEDURE — 10004651 HB RX, NO CHARGE ITEM: Performed by: STUDENT IN AN ORGANIZED HEALTH CARE EDUCATION/TRAINING PROGRAM

## 2024-02-06 PROCEDURE — 10002801 HB RX 250 W/O HCPCS: Performed by: INTERNAL MEDICINE

## 2024-02-06 PROCEDURE — 99233 SBSQ HOSP IP/OBS HIGH 50: CPT | Performed by: INTERNAL MEDICINE

## 2024-02-06 PROCEDURE — 99223 1ST HOSP IP/OBS HIGH 75: CPT | Performed by: NURSE PRACTITIONER

## 2024-02-06 PROCEDURE — 10002803 HB RX 637: Performed by: STUDENT IN AN ORGANIZED HEALTH CARE EDUCATION/TRAINING PROGRAM

## 2024-02-06 PROCEDURE — 92610 EVALUATE SWALLOWING FUNCTION: CPT | Performed by: SPEECH-LANGUAGE PATHOLOGIST

## 2024-02-06 PROCEDURE — 10003585 HB ROOM CHARGE INTERMEDIATE CARE

## 2024-02-06 PROCEDURE — 74230 X-RAY XM SWLNG FUNCJ C+: CPT

## 2024-02-06 PROCEDURE — 85025 COMPLETE CBC W/AUTO DIFF WBC: CPT | Performed by: STUDENT IN AN ORGANIZED HEALTH CARE EDUCATION/TRAINING PROGRAM

## 2024-02-06 PROCEDURE — 94640 AIRWAY INHALATION TREATMENT: CPT

## 2024-02-06 PROCEDURE — 10002801 HB RX 250 W/O HCPCS: Performed by: STUDENT IN AN ORGANIZED HEALTH CARE EDUCATION/TRAINING PROGRAM

## 2024-02-06 PROCEDURE — 92611 MOTION FLUOROSCOPY/SWALLOW: CPT | Performed by: SPEECH-LANGUAGE PATHOLOGIST

## 2024-02-06 PROCEDURE — 10002801 HB RX 250 W/O HCPCS: Performed by: NURSE PRACTITIONER

## 2024-02-06 PROCEDURE — 96372 THER/PROPH/DIAG INJ SC/IM: CPT | Performed by: STUDENT IN AN ORGANIZED HEALTH CARE EDUCATION/TRAINING PROGRAM

## 2024-02-06 PROCEDURE — 10004180 HB COUNTER-TRANSPORT

## 2024-02-06 PROCEDURE — 97161 PT EVAL LOW COMPLEX 20 MIN: CPT

## 2024-02-06 PROCEDURE — 84132 ASSAY OF SERUM POTASSIUM: CPT | Performed by: STUDENT IN AN ORGANIZED HEALTH CARE EDUCATION/TRAINING PROGRAM

## 2024-02-06 RX ORDER — METOPROLOL TARTRATE 1 MG/ML
2.5 INJECTION, SOLUTION INTRAVENOUS ONCE
Status: COMPLETED | OUTPATIENT
Start: 2024-02-06 | End: 2024-02-06

## 2024-02-06 RX ORDER — POTASSIUM CHLORIDE 14.9 MG/ML
20 INJECTION INTRAVENOUS ONCE
Status: COMPLETED | OUTPATIENT
Start: 2024-02-06 | End: 2024-02-06

## 2024-02-06 RX ORDER — METOPROLOL TARTRATE 1 MG/ML
5 INJECTION, SOLUTION INTRAVENOUS EVERY 6 HOURS SCHEDULED
Status: DISCONTINUED | OUTPATIENT
Start: 2024-02-06 | End: 2024-02-11

## 2024-02-06 RX ORDER — METOPROLOL TARTRATE 1 MG/ML
2.5 INJECTION, SOLUTION INTRAVENOUS EVERY 6 HOURS SCHEDULED
Status: DISCONTINUED | OUTPATIENT
Start: 2024-02-06 | End: 2024-02-06

## 2024-02-06 RX ORDER — ACETAZOLAMIDE 500 MG/5ML
250 INJECTION, POWDER, LYOPHILIZED, FOR SOLUTION INTRAVENOUS EVERY 12 HOURS SCHEDULED
Status: DISCONTINUED | OUTPATIENT
Start: 2024-02-06 | End: 2024-02-11

## 2024-02-06 RX ADMIN — ACETAZOLAMIDE 250 MG: 500 INJECTION, POWDER, LYOPHILIZED, FOR SOLUTION INTRAVENOUS at 10:57

## 2024-02-06 RX ADMIN — METOPROLOL TARTRATE 2.5 MG: 1 INJECTION, SOLUTION INTRAVENOUS at 16:56

## 2024-02-06 RX ADMIN — METOPROLOL TARTRATE 2.5 MG: 1 INJECTION, SOLUTION INTRAVENOUS at 11:16

## 2024-02-06 RX ADMIN — METOPROLOL TARTRATE 25 MG: 25 TABLET, FILM COATED ORAL at 08:20

## 2024-02-06 RX ADMIN — PIPERACILLIN SODIUM AND TAZOBACTAM SODIUM 3.38 G: 3; .375 INJECTION, POWDER, FOR SOLUTION INTRAVENOUS at 13:15

## 2024-02-06 RX ADMIN — IPRATROPIUM BROMIDE AND ALBUTEROL SULFATE 3 ML: 2.5; .5 SOLUTION RESPIRATORY (INHALATION) at 08:16

## 2024-02-06 RX ADMIN — IPRATROPIUM BROMIDE AND ALBUTEROL SULFATE 3 ML: 2.5; .5 SOLUTION RESPIRATORY (INHALATION) at 00:20

## 2024-02-06 RX ADMIN — PIPERACILLIN SODIUM AND TAZOBACTAM SODIUM 3.38 G: 3; .375 INJECTION, POWDER, FOR SOLUTION INTRAVENOUS at 06:18

## 2024-02-06 RX ADMIN — METOROPROLOL TARTRATE 5 MG: 5 INJECTION, SOLUTION INTRAVENOUS at 18:41

## 2024-02-06 RX ADMIN — SODIUM CHLORIDE, PRESERVATIVE FREE 10 ML: 5 INJECTION INTRAVENOUS at 08:26

## 2024-02-06 RX ADMIN — DOXYCYCLINE 100 MG: 100 INJECTION, POWDER, LYOPHILIZED, FOR SOLUTION INTRAVENOUS at 10:55

## 2024-02-06 RX ADMIN — SODIUM CHLORIDE, PRESERVATIVE FREE 2 ML: 5 INJECTION INTRAVENOUS at 08:26

## 2024-02-06 RX ADMIN — METOPROLOL TARTRATE 5 MG: 1 INJECTION, SOLUTION INTRAVENOUS at 03:17

## 2024-02-06 RX ADMIN — POTASSIUM CHLORIDE 20 MEQ: 14.9 INJECTION, SOLUTION INTRAVENOUS at 11:00

## 2024-02-06 RX ADMIN — IPRATROPIUM BROMIDE AND ALBUTEROL SULFATE 3 ML: 2.5; .5 SOLUTION RESPIRATORY (INHALATION) at 19:56

## 2024-02-06 RX ADMIN — MIDODRINE HYDROCHLORIDE 10 MG: 5 TABLET ORAL at 12:26

## 2024-02-06 RX ADMIN — SODIUM CHLORIDE, PRESERVATIVE FREE 2 ML: 5 INJECTION INTRAVENOUS at 21:30

## 2024-02-06 RX ADMIN — FUROSEMIDE 20 MG: 10 INJECTION, SOLUTION INTRAMUSCULAR; INTRAVENOUS at 08:19

## 2024-02-06 RX ADMIN — DOXYCYCLINE 100 MG: 100 INJECTION, POWDER, LYOPHILIZED, FOR SOLUTION INTRAVENOUS at 20:16

## 2024-02-06 RX ADMIN — POTASSIUM CHLORIDE 20 MEQ: 14.9 INJECTION, SOLUTION INTRAVENOUS at 08:14

## 2024-02-06 RX ADMIN — ACETAZOLAMIDE 250 MG: 500 INJECTION, POWDER, LYOPHILIZED, FOR SOLUTION INTRAVENOUS at 21:39

## 2024-02-06 RX ADMIN — ENOXAPARIN SODIUM 50 MG: 100 INJECTION SUBCUTANEOUS at 08:14

## 2024-02-06 RX ADMIN — PIPERACILLIN SODIUM AND TAZOBACTAM SODIUM 3.38 G: 3; .375 INJECTION, POWDER, FOR SOLUTION INTRAVENOUS at 21:39

## 2024-02-06 RX ADMIN — IPRATROPIUM BROMIDE AND ALBUTEROL SULFATE 3 ML: 2.5; .5 SOLUTION RESPIRATORY (INHALATION) at 11:49

## 2024-02-06 RX ADMIN — IPRATROPIUM BROMIDE AND ALBUTEROL SULFATE 3 ML: 2.5; .5 SOLUTION RESPIRATORY (INHALATION) at 04:10

## 2024-02-06 RX ADMIN — IPRATROPIUM BROMIDE AND ALBUTEROL SULFATE 3 ML: 2.5; .5 SOLUTION RESPIRATORY (INHALATION) at 16:24

## 2024-02-06 ASSESSMENT — COGNITIVE AND FUNCTIONAL STATUS - GENERAL
APPLIED_COGNITIVE_CONVERTED_SCORE: 15.17
BASIC_MOBILITY_RAW_SCORE: 6
UNDERSTANDING 10 TO 15 MIN SPEECH: UNABLE
REMEMBERING WHERE THINGS ARE: UNABLE
REMEMBERING 5 ERRANDS WITH NO LIST: UNABLE
TAKING CARE OF COMPLICATED TASKS: UNABLE
FOLLOWS FAMILIAR CONVERSATION: A LOT
BASIC_MOBILITY_CONVERTED_SCORE: 16.59
APPLIED_COGNITIVE_RAW_SCORE: 7
REMEMBERING TO TAKE MEDICATION: UNABLE

## 2024-02-06 ASSESSMENT — PAIN SCALES - GENERAL
PAINLEVEL_OUTOF10: 0

## 2024-02-06 ASSESSMENT — ENCOUNTER SYMPTOMS
COUGH: 1
SHORTNESS OF BREATH: 0

## 2024-02-07 ENCOUNTER — APPOINTMENT (OUTPATIENT)
Dept: GENERAL RADIOLOGY | Age: 89
DRG: 871 | End: 2024-02-07
Attending: STUDENT IN AN ORGANIZED HEALTH CARE EDUCATION/TRAINING PROGRAM

## 2024-02-07 LAB
ALBUMIN SERPL-MCNC: 2.6 G/DL (ref 3.6–5.1)
ALBUMIN/GLOB SERPL: 0.8 {RATIO} (ref 1–2.4)
ALP SERPL-CCNC: 60 UNITS/L (ref 45–117)
ALT SERPL-CCNC: 16 UNITS/L
ANION GAP SERPL CALC-SCNC: 7 MMOL/L (ref 7–19)
APPEARANCE FLD: CLEAR
AST SERPL-CCNC: 9 UNITS/L
BASE EXCESS / DEFICIT, ARTERIAL - RESPIRATORY: 8 MMOL/L (ref -2–3)
BASOPHILS # BLD: 0 K/MCL (ref 0–0.3)
BASOPHILS NFR BLD: 1 %
BDY SITE: ABNORMAL
BILIRUB SERPL-MCNC: 1.5 MG/DL (ref 0.2–1)
BODY TEMPERATURE: 37 DEGREES C
BUN SERPL-MCNC: 18 MG/DL (ref 6–20)
BUN/CREAT SERPL: 25 (ref 7–25)
CALCIUM SERPL-MCNC: 8.7 MG/DL (ref 8.4–10.2)
CHLORIDE SERPL-SCNC: 103 MMOL/L (ref 97–110)
CO2 SERPL-SCNC: 36 MMOL/L (ref 21–32)
COLOR FLD: YELLOW
CONDITION: ABNORMAL
CREAT SERPL-MCNC: 0.72 MG/DL (ref 0.51–0.95)
DEPRECATED RDW RBC: 98.8 FL (ref 39–50)
DIGOXIN SERPL-MCNC: 0.3 NG/ML (ref 0.8–2.1)
EGFRCR SERPLBLD CKD-EPI 2021: 78 ML/MIN/{1.73_M2}
EOSINOPHIL # BLD: 0 K/MCL (ref 0–0.5)
EOSINOPHIL NFR BLD: 0 %
ERYTHROCYTE [DISTWIDTH] IN BLOOD: 28.8 % (ref 11–15)
FASTING DURATION TIME PATIENT: ABNORMAL H
GLOBULIN SER-MCNC: 3.3 G/DL (ref 2–4)
GLUCOSE FLD-MCNC: 91 MG/DL (ref 80–120)
GLUCOSE SERPL-MCNC: 94 MG/DL (ref 70–99)
HCO3 BLDA-SCNC: 36 MMOL/L (ref 22–28)
HCT VFR BLD CALC: 30.9 % (ref 36–46.5)
HGB BLD-MCNC: 8.8 G/DL (ref 12–15.5)
IMM GRANULOCYTES # BLD AUTO: 0 K/MCL (ref 0–0.2)
IMM GRANULOCYTES # BLD: 1 %
LDH FLD L TO P-CCNC: 65 UNITS/L (ref 40–120)
LYMPHOCYTES # BLD: 0.7 K/MCL (ref 1–4)
LYMPHOCYTES NFR BLD: 10 %
LYMPHOCYTES NFR FLD: 52 %
MCH RBC QN AUTO: 27.3 PG (ref 26–34)
MCHC RBC AUTO-ENTMCNC: 28.5 G/DL (ref 32–36.5)
MCV RBC AUTO: 96 FL (ref 78–100)
MESOTHL CELL NFR FLD: 3 %
MONOCYTES # BLD: 0.4 K/MCL (ref 0.3–0.9)
MONOCYTES NFR BLD: 6 %
MONOCYTES NFR FLD: 20 %
NEUTROPHILS # BLD: 5.3 K/MCL (ref 1.8–7.7)
NEUTROPHILS NFR BLD: 82 %
NEUTS SEG NFR FLD: 25 %
NRBC BLD MANUAL-RTO: 0 /100 WBC
PCO2 BLDA: 65 MM HG (ref 32–45)
PH BLDA: 7.35 UNITS (ref 7.35–7.45)
PLATELET # BLD AUTO: 269 K/MCL (ref 140–450)
PO2 BLDA: 211 MM HG (ref 83–108)
POTASSIUM SERPL-SCNC: 3.5 MMOL/L (ref 3.4–5.1)
POTASSIUM SERPL-SCNC: 3.9 MMOL/L (ref 3.4–5.1)
PROT FLD-MCNC: 2.3 G/DL (ref 1–2.5)
PROT SERPL-MCNC: 5.9 G/DL (ref 6.4–8.2)
RBC # BLD: 3.22 MIL/MCL (ref 4–5.2)
SAO2 % BLDA: 100 % (ref 95–99)
SODIUM SERPL-SCNC: 142 MMOL/L (ref 135–145)
TOTAL CELLS COUNTED FLD: 100
WBC # BLD: 6.4 K/MCL (ref 4.2–11)
WBC # FLD: 23 /MCL (ref 0–1000)

## 2024-02-07 PROCEDURE — 80053 COMPREHEN METABOLIC PANEL: CPT | Performed by: STUDENT IN AN ORGANIZED HEALTH CARE EDUCATION/TRAINING PROGRAM

## 2024-02-07 PROCEDURE — 87070 CULTURE OTHR SPECIMN AEROBIC: CPT | Performed by: INTERNAL MEDICINE

## 2024-02-07 PROCEDURE — 10004651 HB RX, NO CHARGE ITEM: Performed by: STUDENT IN AN ORGANIZED HEALTH CARE EDUCATION/TRAINING PROGRAM

## 2024-02-07 PROCEDURE — 89051 BODY FLUID CELL COUNT: CPT | Performed by: INTERNAL MEDICINE

## 2024-02-07 PROCEDURE — 85025 COMPLETE CBC W/AUTO DIFF WBC: CPT | Performed by: STUDENT IN AN ORGANIZED HEALTH CARE EDUCATION/TRAINING PROGRAM

## 2024-02-07 PROCEDURE — 10002801 HB RX 250 W/O HCPCS: Performed by: NURSE PRACTITIONER

## 2024-02-07 PROCEDURE — 84132 ASSAY OF SERUM POTASSIUM: CPT | Performed by: STUDENT IN AN ORGANIZED HEALTH CARE EDUCATION/TRAINING PROGRAM

## 2024-02-07 PROCEDURE — 10002807 HB RX 258: Performed by: STUDENT IN AN ORGANIZED HEALTH CARE EDUCATION/TRAINING PROGRAM

## 2024-02-07 PROCEDURE — 83615 LACTATE (LD) (LDH) ENZYME: CPT | Performed by: INTERNAL MEDICINE

## 2024-02-07 PROCEDURE — 10002800 HB RX 250 W HCPCS: Performed by: INTERNAL MEDICINE

## 2024-02-07 PROCEDURE — 71045 X-RAY EXAM CHEST 1 VIEW: CPT

## 2024-02-07 PROCEDURE — 0W993ZZ DRAINAGE OF RIGHT PLEURAL CAVITY, PERCUTANEOUS APPROACH: ICD-10-PCS | Performed by: INTERNAL MEDICINE

## 2024-02-07 PROCEDURE — 82803 BLOOD GASES ANY COMBINATION: CPT

## 2024-02-07 PROCEDURE — 96372 THER/PROPH/DIAG INJ SC/IM: CPT | Performed by: INTERNAL MEDICINE

## 2024-02-07 PROCEDURE — 94640 AIRWAY INHALATION TREATMENT: CPT

## 2024-02-07 PROCEDURE — 80162 ASSAY OF DIGOXIN TOTAL: CPT | Performed by: NURSE PRACTITIONER

## 2024-02-07 PROCEDURE — 94660 CPAP INITIATION&MGMT: CPT

## 2024-02-07 PROCEDURE — 99233 SBSQ HOSP IP/OBS HIGH 50: CPT | Performed by: SPECIALIST

## 2024-02-07 PROCEDURE — 82945 GLUCOSE OTHER FLUID: CPT | Performed by: INTERNAL MEDICINE

## 2024-02-07 PROCEDURE — 36600 WITHDRAWAL OF ARTERIAL BLOOD: CPT

## 2024-02-07 PROCEDURE — 10003585 HB ROOM CHARGE INTERMEDIATE CARE

## 2024-02-07 PROCEDURE — 88112 CYTOPATH CELL ENHANCE TECH: CPT | Performed by: INTERNAL MEDICINE

## 2024-02-07 PROCEDURE — 10004180 HB COUNTER-TRANSPORT

## 2024-02-07 PROCEDURE — 84157 ASSAY OF PROTEIN OTHER: CPT | Performed by: INTERNAL MEDICINE

## 2024-02-07 PROCEDURE — 10002800 HB RX 250 W HCPCS: Performed by: STUDENT IN AN ORGANIZED HEALTH CARE EDUCATION/TRAINING PROGRAM

## 2024-02-07 PROCEDURE — 10002801 HB RX 250 W/O HCPCS: Performed by: STUDENT IN AN ORGANIZED HEALTH CARE EDUCATION/TRAINING PROGRAM

## 2024-02-07 RX ORDER — POTASSIUM CHLORIDE 14.9 MG/ML
20 INJECTION INTRAVENOUS ONCE
Status: COMPLETED | OUTPATIENT
Start: 2024-02-07 | End: 2024-02-07

## 2024-02-07 RX ORDER — POTASSIUM CHLORIDE 20 MEQ/1
40 TABLET, EXTENDED RELEASE ORAL ONCE
Status: DISCONTINUED | OUTPATIENT
Start: 2024-02-07 | End: 2024-02-07

## 2024-02-07 RX ORDER — ENOXAPARIN SODIUM 100 MG/ML
1 INJECTION SUBCUTANEOUS EVERY 12 HOURS
Status: DISCONTINUED | OUTPATIENT
Start: 2024-02-07 | End: 2024-02-16

## 2024-02-07 RX ORDER — IPRATROPIUM BROMIDE AND ALBUTEROL SULFATE 2.5; .5 MG/3ML; MG/3ML
3 SOLUTION RESPIRATORY (INHALATION)
Status: DISCONTINUED | OUTPATIENT
Start: 2024-02-07 | End: 2024-02-20 | Stop reason: HOSPADM

## 2024-02-07 RX ORDER — IPRATROPIUM BROMIDE AND ALBUTEROL SULFATE 2.5; .5 MG/3ML; MG/3ML
3 SOLUTION RESPIRATORY (INHALATION)
Status: DISCONTINUED | OUTPATIENT
Start: 2024-02-07 | End: 2024-02-09

## 2024-02-07 RX ADMIN — METOROPROLOL TARTRATE 5 MG: 5 INJECTION, SOLUTION INTRAVENOUS at 00:23

## 2024-02-07 RX ADMIN — PIPERACILLIN SODIUM AND TAZOBACTAM SODIUM 3.38 G: 3; .375 INJECTION, POWDER, FOR SOLUTION INTRAVENOUS at 15:47

## 2024-02-07 RX ADMIN — ENOXAPARIN SODIUM 50 MG: 100 INJECTION SUBCUTANEOUS at 18:18

## 2024-02-07 RX ADMIN — DOXYCYCLINE 100 MG: 100 INJECTION, POWDER, LYOPHILIZED, FOR SOLUTION INTRAVENOUS at 21:01

## 2024-02-07 RX ADMIN — PIPERACILLIN SODIUM AND TAZOBACTAM SODIUM 3.38 G: 3; .375 INJECTION, POWDER, FOR SOLUTION INTRAVENOUS at 06:04

## 2024-02-07 RX ADMIN — IPRATROPIUM BROMIDE AND ALBUTEROL SULFATE 3 ML: 2.5; .5 SOLUTION RESPIRATORY (INHALATION) at 16:01

## 2024-02-07 RX ADMIN — FUROSEMIDE 20 MG: 10 INJECTION, SOLUTION INTRAMUSCULAR; INTRAVENOUS at 09:56

## 2024-02-07 RX ADMIN — METOROPROLOL TARTRATE 5 MG: 5 INJECTION, SOLUTION INTRAVENOUS at 18:18

## 2024-02-07 RX ADMIN — SODIUM CHLORIDE, PRESERVATIVE FREE 2 ML: 5 INJECTION INTRAVENOUS at 10:11

## 2024-02-07 RX ADMIN — SODIUM CHLORIDE, PRESERVATIVE FREE 10 ML: 5 INJECTION INTRAVENOUS at 00:24

## 2024-02-07 RX ADMIN — PIPERACILLIN SODIUM AND TAZOBACTAM SODIUM 3.38 G: 3; .375 INJECTION, POWDER, FOR SOLUTION INTRAVENOUS at 22:48

## 2024-02-07 RX ADMIN — DOXYCYCLINE 100 MG: 100 INJECTION, POWDER, LYOPHILIZED, FOR SOLUTION INTRAVENOUS at 11:47

## 2024-02-07 RX ADMIN — IPRATROPIUM BROMIDE AND ALBUTEROL SULFATE 3 ML: 2.5; .5 SOLUTION RESPIRATORY (INHALATION) at 00:41

## 2024-02-07 RX ADMIN — SODIUM CHLORIDE, PRESERVATIVE FREE 10 ML: 5 INJECTION INTRAVENOUS at 10:11

## 2024-02-07 RX ADMIN — POTASSIUM CHLORIDE 20 MEQ: 14.9 INJECTION, SOLUTION INTRAVENOUS at 09:55

## 2024-02-07 RX ADMIN — IPRATROPIUM BROMIDE AND ALBUTEROL SULFATE 3 ML: 2.5; .5 SOLUTION RESPIRATORY (INHALATION) at 19:48

## 2024-02-07 RX ADMIN — POTASSIUM CHLORIDE 20 MEQ: 14.9 INJECTION, SOLUTION INTRAVENOUS at 13:33

## 2024-02-07 RX ADMIN — IPRATROPIUM BROMIDE AND ALBUTEROL SULFATE 3 ML: 2.5; .5 SOLUTION RESPIRATORY (INHALATION) at 07:59

## 2024-02-07 RX ADMIN — IPRATROPIUM BROMIDE AND ALBUTEROL SULFATE 3 ML: 2.5; .5 SOLUTION RESPIRATORY (INHALATION) at 11:59

## 2024-02-07 ASSESSMENT — PAIN SCALES - GENERAL
PAINLEVEL_OUTOF10: 0

## 2024-02-08 LAB
ALBUMIN SERPL-MCNC: 2.7 G/DL (ref 3.6–5.1)
ALBUMIN/GLOB SERPL: 0.8 {RATIO} (ref 1–2.4)
ALP SERPL-CCNC: 62 UNITS/L (ref 45–117)
ALT SERPL-CCNC: 16 UNITS/L
ANION GAP SERPL CALC-SCNC: 7 MMOL/L (ref 7–19)
AST SERPL-CCNC: 9 UNITS/L
BASOPHILS # BLD: 0 K/MCL (ref 0–0.3)
BASOPHILS NFR BLD: 1 %
BILIRUB SERPL-MCNC: 1.6 MG/DL (ref 0.2–1)
BUN SERPL-MCNC: 19 MG/DL (ref 6–20)
BUN/CREAT SERPL: 32 (ref 7–25)
CALCIUM SERPL-MCNC: 8.9 MG/DL (ref 8.4–10.2)
CHLORIDE SERPL-SCNC: 104 MMOL/L (ref 97–110)
CO2 SERPL-SCNC: 35 MMOL/L (ref 21–32)
CREAT SERPL-MCNC: 0.6 MG/DL (ref 0.51–0.95)
DEPRECATED RDW RBC: 95.8 FL (ref 39–50)
EGFRCR SERPLBLD CKD-EPI 2021: 84 ML/MIN/{1.73_M2}
EOSINOPHIL # BLD: 0 K/MCL (ref 0–0.5)
EOSINOPHIL NFR BLD: 0 %
ERYTHROCYTE [DISTWIDTH] IN BLOOD: 27.9 % (ref 11–15)
FASTING DURATION TIME PATIENT: ABNORMAL H
GLOBULIN SER-MCNC: 3.3 G/DL (ref 2–4)
GLUCOSE BLDC GLUCOMTR-MCNC: 109 MG/DL (ref 70–99)
GLUCOSE BLDC GLUCOMTR-MCNC: 69 MG/DL (ref 70–99)
GLUCOSE SERPL-MCNC: 74 MG/DL (ref 70–99)
HCT VFR BLD CALC: 34.8 % (ref 36–46.5)
HGB BLD-MCNC: 9.9 G/DL (ref 12–15.5)
IMM GRANULOCYTES # BLD AUTO: 0 K/MCL (ref 0–0.2)
IMM GRANULOCYTES # BLD: 0 %
LYMPHOCYTES # BLD: 0.5 K/MCL (ref 1–4)
LYMPHOCYTES NFR BLD: 8 %
MCH RBC QN AUTO: 27.7 PG (ref 26–34)
MCHC RBC AUTO-ENTMCNC: 28.4 G/DL (ref 32–36.5)
MCV RBC AUTO: 97.5 FL (ref 78–100)
MONOCYTES # BLD: 0.3 K/MCL (ref 0.3–0.9)
MONOCYTES NFR BLD: 5 %
NEUTROPHILS # BLD: 5.4 K/MCL (ref 1.8–7.7)
NEUTROPHILS NFR BLD: 86 %
NRBC BLD MANUAL-RTO: 0 /100 WBC
PLATELET # BLD AUTO: 296 K/MCL (ref 140–450)
POTASSIUM SERPL-SCNC: 4 MMOL/L (ref 3.4–5.1)
PROT SERPL-MCNC: 6 G/DL (ref 6.4–8.2)
RBC # BLD: 3.57 MIL/MCL (ref 4–5.2)
SODIUM SERPL-SCNC: 142 MMOL/L (ref 135–145)
WBC # BLD: 6.3 K/MCL (ref 4.2–11)

## 2024-02-08 PROCEDURE — 94640 AIRWAY INHALATION TREATMENT: CPT

## 2024-02-08 PROCEDURE — 10002801 HB RX 250 W/O HCPCS: Performed by: INTERNAL MEDICINE

## 2024-02-08 PROCEDURE — 10002800 HB RX 250 W HCPCS: Performed by: NURSE PRACTITIONER

## 2024-02-08 PROCEDURE — 96372 THER/PROPH/DIAG INJ SC/IM: CPT | Performed by: INTERNAL MEDICINE

## 2024-02-08 PROCEDURE — 10002801 HB RX 250 W/O HCPCS: Performed by: STUDENT IN AN ORGANIZED HEALTH CARE EDUCATION/TRAINING PROGRAM

## 2024-02-08 PROCEDURE — 10002800 HB RX 250 W HCPCS: Performed by: INTERNAL MEDICINE

## 2024-02-08 PROCEDURE — 10002800 HB RX 250 W HCPCS: Performed by: STUDENT IN AN ORGANIZED HEALTH CARE EDUCATION/TRAINING PROGRAM

## 2024-02-08 PROCEDURE — 10002807 HB RX 258: Performed by: STUDENT IN AN ORGANIZED HEALTH CARE EDUCATION/TRAINING PROGRAM

## 2024-02-08 PROCEDURE — 80053 COMPREHEN METABOLIC PANEL: CPT | Performed by: STUDENT IN AN ORGANIZED HEALTH CARE EDUCATION/TRAINING PROGRAM

## 2024-02-08 PROCEDURE — 99497 ADVNCD CARE PLAN 30 MIN: CPT | Performed by: NURSE PRACTITIONER

## 2024-02-08 PROCEDURE — 99233 SBSQ HOSP IP/OBS HIGH 50: CPT | Performed by: NURSE PRACTITIONER

## 2024-02-08 PROCEDURE — 99233 SBSQ HOSP IP/OBS HIGH 50: CPT | Performed by: SPECIALIST

## 2024-02-08 PROCEDURE — 10002801 HB RX 250 W/O HCPCS: Performed by: NURSE PRACTITIONER

## 2024-02-08 PROCEDURE — 10004651 HB RX, NO CHARGE ITEM: Performed by: STUDENT IN AN ORGANIZED HEALTH CARE EDUCATION/TRAINING PROGRAM

## 2024-02-08 PROCEDURE — 10003585 HB ROOM CHARGE INTERMEDIATE CARE

## 2024-02-08 PROCEDURE — 94660 CPAP INITIATION&MGMT: CPT

## 2024-02-08 PROCEDURE — 92526 ORAL FUNCTION THERAPY: CPT

## 2024-02-08 PROCEDURE — 85025 COMPLETE CBC W/AUTO DIFF WBC: CPT | Performed by: STUDENT IN AN ORGANIZED HEALTH CARE EDUCATION/TRAINING PROGRAM

## 2024-02-08 PROCEDURE — 10004180 HB COUNTER-TRANSPORT

## 2024-02-08 PROCEDURE — G0316 PROLONGED IP OR OBS CARE EM SERVICE BEYOND PRIM SERVICE EA ADD 15 MIN: HCPCS | Performed by: NURSE PRACTITIONER

## 2024-02-08 PROCEDURE — 94664 DEMO&/EVAL PT USE INHALER: CPT

## 2024-02-08 PROCEDURE — 99498 ADVNCD CARE PLAN ADDL 30 MIN: CPT | Performed by: NURSE PRACTITIONER

## 2024-02-08 RX ORDER — DEXTROSE MONOHYDRATE 25 G/50ML
INJECTION, SOLUTION INTRAVENOUS
Status: DISPENSED
Start: 2024-02-08 | End: 2024-02-09

## 2024-02-08 RX ORDER — NICOTINE POLACRILEX 4 MG
15 LOZENGE BUCCAL PRN
Status: DISCONTINUED | OUTPATIENT
Start: 2024-02-08 | End: 2024-02-20 | Stop reason: HOSPADM

## 2024-02-08 RX ORDER — DIGOXIN 0.25 MG/ML
250 INJECTION INTRAMUSCULAR; INTRAVENOUS ONCE
Status: COMPLETED | OUTPATIENT
Start: 2024-02-08 | End: 2024-02-08

## 2024-02-08 RX ORDER — DEXTROSE MONOHYDRATE 25 G/50ML
25 INJECTION, SOLUTION INTRAVENOUS PRN
Status: DISCONTINUED | OUTPATIENT
Start: 2024-02-08 | End: 2024-02-20 | Stop reason: HOSPADM

## 2024-02-08 RX ORDER — DEXTROSE MONOHYDRATE 25 G/50ML
12.5 INJECTION, SOLUTION INTRAVENOUS PRN
Status: DISCONTINUED | OUTPATIENT
Start: 2024-02-08 | End: 2024-02-20 | Stop reason: HOSPADM

## 2024-02-08 RX ORDER — NICOTINE POLACRILEX 4 MG
30 LOZENGE BUCCAL PRN
Status: DISCONTINUED | OUTPATIENT
Start: 2024-02-08 | End: 2024-02-20 | Stop reason: HOSPADM

## 2024-02-08 RX ADMIN — IPRATROPIUM BROMIDE AND ALBUTEROL SULFATE 3 ML: 2.5; .5 SOLUTION RESPIRATORY (INHALATION) at 08:29

## 2024-02-08 RX ADMIN — METOROPROLOL TARTRATE 5 MG: 5 INJECTION, SOLUTION INTRAVENOUS at 16:44

## 2024-02-08 RX ADMIN — FUROSEMIDE 20 MG: 10 INJECTION, SOLUTION INTRAMUSCULAR; INTRAVENOUS at 08:47

## 2024-02-08 RX ADMIN — ACETAZOLAMIDE 250 MG: 500 INJECTION, POWDER, LYOPHILIZED, FOR SOLUTION INTRAVENOUS at 22:36

## 2024-02-08 RX ADMIN — SODIUM CHLORIDE, PRESERVATIVE FREE 2 ML: 5 INJECTION INTRAVENOUS at 22:35

## 2024-02-08 RX ADMIN — ENOXAPARIN SODIUM 50 MG: 100 INJECTION SUBCUTANEOUS at 06:21

## 2024-02-08 RX ADMIN — ENOXAPARIN SODIUM 50 MG: 100 INJECTION SUBCUTANEOUS at 16:27

## 2024-02-08 RX ADMIN — DIGOXIN 250 MCG: 0.25 INJECTION INTRAMUSCULAR; INTRAVENOUS at 16:23

## 2024-02-08 RX ADMIN — SODIUM CHLORIDE, PRESERVATIVE FREE 10 ML: 5 INJECTION INTRAVENOUS at 08:48

## 2024-02-08 RX ADMIN — PIPERACILLIN SODIUM AND TAZOBACTAM SODIUM 3.38 G: 3; .375 INJECTION, POWDER, FOR SOLUTION INTRAVENOUS at 05:38

## 2024-02-08 RX ADMIN — PIPERACILLIN SODIUM AND TAZOBACTAM SODIUM 3.38 G: 3; .375 INJECTION, POWDER, FOR SOLUTION INTRAVENOUS at 16:02

## 2024-02-08 RX ADMIN — DOXYCYCLINE 100 MG: 100 INJECTION, POWDER, LYOPHILIZED, FOR SOLUTION INTRAVENOUS at 20:50

## 2024-02-08 RX ADMIN — SODIUM CHLORIDE, PRESERVATIVE FREE 2 ML: 5 INJECTION INTRAVENOUS at 08:48

## 2024-02-08 RX ADMIN — DEXTROSE MONOHYDRATE 12.5 G: 25 INJECTION, SOLUTION INTRAVENOUS at 21:20

## 2024-02-08 RX ADMIN — IPRATROPIUM BROMIDE AND ALBUTEROL SULFATE 3 ML: 2.5; .5 SOLUTION RESPIRATORY (INHALATION) at 11:30

## 2024-02-08 RX ADMIN — PIPERACILLIN SODIUM AND TAZOBACTAM SODIUM 3.38 G: 3; .375 INJECTION, POWDER, FOR SOLUTION INTRAVENOUS at 22:34

## 2024-02-08 RX ADMIN — IPRATROPIUM BROMIDE AND ALBUTEROL SULFATE 3 ML: 2.5; .5 SOLUTION RESPIRATORY (INHALATION) at 16:15

## 2024-02-08 RX ADMIN — METOROPROLOL TARTRATE 5 MG: 5 INJECTION, SOLUTION INTRAVENOUS at 00:52

## 2024-02-08 RX ADMIN — SODIUM CHLORIDE, PRESERVATIVE FREE 10 ML: 5 INJECTION INTRAVENOUS at 22:35

## 2024-02-08 RX ADMIN — DIGOXIN 250 MCG: 0.25 INJECTION INTRAMUSCULAR; INTRAVENOUS at 09:29

## 2024-02-08 RX ADMIN — IPRATROPIUM BROMIDE AND ALBUTEROL SULFATE 3 ML: 2.5; .5 SOLUTION RESPIRATORY (INHALATION) at 21:39

## 2024-02-08 RX ADMIN — DOXYCYCLINE 100 MG: 100 INJECTION, POWDER, LYOPHILIZED, FOR SOLUTION INTRAVENOUS at 10:26

## 2024-02-08 ASSESSMENT — PAIN SCALES - GENERAL
PAINLEVEL_OUTOF10: 0

## 2024-02-08 ASSESSMENT — ENCOUNTER SYMPTOMS
SHORTNESS OF BREATH: 1
COUGH: 1

## 2024-02-09 ENCOUNTER — APPOINTMENT (OUTPATIENT)
Dept: GENERAL RADIOLOGY | Age: 89
DRG: 871 | End: 2024-02-09
Attending: STUDENT IN AN ORGANIZED HEALTH CARE EDUCATION/TRAINING PROGRAM

## 2024-02-09 LAB
ALBUMIN SERPL-MCNC: 2.5 G/DL (ref 3.6–5.1)
ALBUMIN/GLOB SERPL: 0.8 {RATIO} (ref 1–2.4)
ALP SERPL-CCNC: 51 UNITS/L (ref 45–117)
ALT SERPL-CCNC: 13 UNITS/L
ANION GAP SERPL CALC-SCNC: 5 MMOL/L (ref 7–19)
ASR DISCLAIMER: NORMAL
AST SERPL-CCNC: 15 UNITS/L
BACTERIA SPEC ANAEROBE+AEROBE CULT: NORMAL
BASOPHILS # BLD: 0 K/MCL (ref 0–0.3)
BASOPHILS NFR BLD: 1 %
BILIRUB SERPL-MCNC: 1.5 MG/DL (ref 0.2–1)
BUN SERPL-MCNC: 20 MG/DL (ref 6–20)
BUN/CREAT SERPL: 33 (ref 7–25)
CALCIUM SERPL-MCNC: 8.8 MG/DL (ref 8.4–10.2)
CASE RPRT: NORMAL
CHLORIDE SERPL-SCNC: 106 MMOL/L (ref 97–110)
CLINICAL INFO: NORMAL
CO2 SERPL-SCNC: 35 MMOL/L (ref 21–32)
CREAT SERPL-MCNC: 0.6 MG/DL (ref 0.51–0.95)
DEPRECATED RDW RBC: 97.1 FL (ref 39–50)
EGFRCR SERPLBLD CKD-EPI 2021: 84 ML/MIN/{1.73_M2}
EOSINOPHIL # BLD: 0 K/MCL (ref 0–0.5)
EOSINOPHIL NFR BLD: 0 %
ERYTHROCYTE [DISTWIDTH] IN BLOOD: 27.9 % (ref 11–15)
FASTING DURATION TIME PATIENT: ABNORMAL H
GLOBULIN SER-MCNC: 3.1 G/DL (ref 2–4)
GLUCOSE BLDC GLUCOMTR-MCNC: 69 MG/DL (ref 70–99)
GLUCOSE BLDC GLUCOMTR-MCNC: 75 MG/DL (ref 70–99)
GLUCOSE BLDC GLUCOMTR-MCNC: 95 MG/DL (ref 70–99)
GLUCOSE BLDC GLUCOMTR-MCNC: 96 MG/DL (ref 70–99)
GLUCOSE SERPL-MCNC: 75 MG/DL (ref 70–99)
GRAM STN SPEC: NORMAL
HCT VFR BLD CALC: 34.3 % (ref 36–46.5)
HGB BLD-MCNC: 9.9 G/DL (ref 12–15.5)
IMM GRANULOCYTES # BLD AUTO: 0 K/MCL (ref 0–0.2)
IMM GRANULOCYTES # BLD: 0 %
LYMPHOCYTES # BLD: 0.6 K/MCL (ref 1–4)
LYMPHOCYTES NFR BLD: 9 %
MCH RBC QN AUTO: 28 PG (ref 26–34)
MCHC RBC AUTO-ENTMCNC: 28.9 G/DL (ref 32–36.5)
MCV RBC AUTO: 96.9 FL (ref 78–100)
MONOCYTES # BLD: 0.3 K/MCL (ref 0.3–0.9)
MONOCYTES NFR BLD: 5 %
NEUTROPHILS # BLD: 5 K/MCL (ref 1.8–7.7)
NEUTROPHILS NFR BLD: 85 %
NRBC BLD MANUAL-RTO: 0 /100 WBC
PATH REPORT.FINAL DX SPEC: NORMAL
PATH REPORT.GROSS SPEC: NORMAL
PLATELET # BLD AUTO: 313 K/MCL (ref 140–450)
POTASSIUM SERPL-SCNC: 3.6 MMOL/L (ref 3.4–5.1)
PROT SERPL-MCNC: 5.6 G/DL (ref 6.4–8.2)
RBC # BLD: 3.54 MIL/MCL (ref 4–5.2)
SODIUM SERPL-SCNC: 142 MMOL/L (ref 135–145)
WBC # BLD: 5.9 K/MCL (ref 4.2–11)

## 2024-02-09 PROCEDURE — 10002800 HB RX 250 W HCPCS: Performed by: INTERNAL MEDICINE

## 2024-02-09 PROCEDURE — 94660 CPAP INITIATION&MGMT: CPT

## 2024-02-09 PROCEDURE — 10002801 HB RX 250 W/O HCPCS: Performed by: NURSE PRACTITIONER

## 2024-02-09 PROCEDURE — 97167 OT EVAL HIGH COMPLEX 60 MIN: CPT

## 2024-02-09 PROCEDURE — 10002019 HB COUNTER RESP ASSESSMENT

## 2024-02-09 PROCEDURE — 71045 X-RAY EXAM CHEST 1 VIEW: CPT

## 2024-02-09 PROCEDURE — 10002800 HB RX 250 W HCPCS: Performed by: STUDENT IN AN ORGANIZED HEALTH CARE EDUCATION/TRAINING PROGRAM

## 2024-02-09 PROCEDURE — 10003585 HB ROOM CHARGE INTERMEDIATE CARE

## 2024-02-09 PROCEDURE — 99497 ADVNCD CARE PLAN 30 MIN: CPT | Performed by: NURSE PRACTITIONER

## 2024-02-09 PROCEDURE — G0316 PROLONGED IP OR OBS CARE EM SERVICE BEYOND PRIM SERVICE EA ADD 15 MIN: HCPCS | Performed by: NURSE PRACTITIONER

## 2024-02-09 PROCEDURE — 10002803 HB RX 637: Performed by: STUDENT IN AN ORGANIZED HEALTH CARE EDUCATION/TRAINING PROGRAM

## 2024-02-09 PROCEDURE — 85025 COMPLETE CBC W/AUTO DIFF WBC: CPT | Performed by: STUDENT IN AN ORGANIZED HEALTH CARE EDUCATION/TRAINING PROGRAM

## 2024-02-09 PROCEDURE — 96372 THER/PROPH/DIAG INJ SC/IM: CPT | Performed by: INTERNAL MEDICINE

## 2024-02-09 PROCEDURE — 99233 SBSQ HOSP IP/OBS HIGH 50: CPT | Performed by: NURSE PRACTITIONER

## 2024-02-09 PROCEDURE — 10004180 HB COUNTER-TRANSPORT

## 2024-02-09 PROCEDURE — 92526 ORAL FUNCTION THERAPY: CPT

## 2024-02-09 PROCEDURE — 97110 THERAPEUTIC EXERCISES: CPT

## 2024-02-09 PROCEDURE — 10002807 HB RX 258: Performed by: STUDENT IN AN ORGANIZED HEALTH CARE EDUCATION/TRAINING PROGRAM

## 2024-02-09 PROCEDURE — 10002801 HB RX 250 W/O HCPCS: Performed by: STUDENT IN AN ORGANIZED HEALTH CARE EDUCATION/TRAINING PROGRAM

## 2024-02-09 PROCEDURE — 10002801 HB RX 250 W/O HCPCS: Performed by: INTERNAL MEDICINE

## 2024-02-09 PROCEDURE — 10004651 HB RX, NO CHARGE ITEM: Performed by: STUDENT IN AN ORGANIZED HEALTH CARE EDUCATION/TRAINING PROGRAM

## 2024-02-09 PROCEDURE — 99233 SBSQ HOSP IP/OBS HIGH 50: CPT | Performed by: SPECIALIST

## 2024-02-09 PROCEDURE — 94640 AIRWAY INHALATION TREATMENT: CPT

## 2024-02-09 PROCEDURE — 80053 COMPREHEN METABOLIC PANEL: CPT | Performed by: STUDENT IN AN ORGANIZED HEALTH CARE EDUCATION/TRAINING PROGRAM

## 2024-02-09 RX ADMIN — SODIUM CHLORIDE, PRESERVATIVE FREE 10 ML: 5 INJECTION INTRAVENOUS at 21:37

## 2024-02-09 RX ADMIN — SODIUM CHLORIDE, PRESERVATIVE FREE 10 ML: 5 INJECTION INTRAVENOUS at 09:30

## 2024-02-09 RX ADMIN — DOXYCYCLINE 100 MG: 100 INJECTION, POWDER, LYOPHILIZED, FOR SOLUTION INTRAVENOUS at 21:53

## 2024-02-09 RX ADMIN — SODIUM CHLORIDE, PRESERVATIVE FREE 2 ML: 5 INJECTION INTRAVENOUS at 09:37

## 2024-02-09 RX ADMIN — DOXYCYCLINE 100 MG: 100 INJECTION, POWDER, LYOPHILIZED, FOR SOLUTION INTRAVENOUS at 10:33

## 2024-02-09 RX ADMIN — PIPERACILLIN SODIUM AND TAZOBACTAM SODIUM 3.38 G: 3; .375 INJECTION, POWDER, FOR SOLUTION INTRAVENOUS at 23:00

## 2024-02-09 RX ADMIN — METOROPROLOL TARTRATE 5 MG: 5 INJECTION, SOLUTION INTRAVENOUS at 00:23

## 2024-02-09 RX ADMIN — ACETAZOLAMIDE 250 MG: 500 INJECTION, POWDER, LYOPHILIZED, FOR SOLUTION INTRAVENOUS at 21:56

## 2024-02-09 RX ADMIN — IPRATROPIUM BROMIDE AND ALBUTEROL SULFATE 3 ML: 2.5; .5 SOLUTION RESPIRATORY (INHALATION) at 07:33

## 2024-02-09 RX ADMIN — ENOXAPARIN SODIUM 50 MG: 100 INJECTION SUBCUTANEOUS at 17:56

## 2024-02-09 RX ADMIN — METOROPROLOL TARTRATE 5 MG: 5 INJECTION, SOLUTION INTRAVENOUS at 14:49

## 2024-02-09 RX ADMIN — ENOXAPARIN SODIUM 50 MG: 100 INJECTION SUBCUTANEOUS at 05:56

## 2024-02-09 RX ADMIN — METOROPROLOL TARTRATE 5 MG: 5 INJECTION, SOLUTION INTRAVENOUS at 17:56

## 2024-02-09 RX ADMIN — PIPERACILLIN SODIUM AND TAZOBACTAM SODIUM 3.38 G: 3; .375 INJECTION, POWDER, FOR SOLUTION INTRAVENOUS at 06:04

## 2024-02-09 RX ADMIN — DEXTROSE MONOHYDRATE 12.5 G: 25 INJECTION, SOLUTION INTRAVENOUS at 14:52

## 2024-02-09 RX ADMIN — MIDODRINE HYDROCHLORIDE 10 MG: 5 TABLET ORAL at 15:42

## 2024-02-09 RX ADMIN — PIPERACILLIN SODIUM AND TAZOBACTAM SODIUM 3.38 G: 3; .375 INJECTION, POWDER, FOR SOLUTION INTRAVENOUS at 14:54

## 2024-02-09 RX ADMIN — METOROPROLOL TARTRATE 5 MG: 5 INJECTION, SOLUTION INTRAVENOUS at 06:01

## 2024-02-09 RX ADMIN — ACETAZOLAMIDE 250 MG: 500 INJECTION, POWDER, LYOPHILIZED, FOR SOLUTION INTRAVENOUS at 09:29

## 2024-02-09 RX ADMIN — SODIUM CHLORIDE, PRESERVATIVE FREE 2 ML: 5 INJECTION INTRAVENOUS at 21:57

## 2024-02-09 RX ADMIN — FUROSEMIDE 20 MG: 10 INJECTION, SOLUTION INTRAMUSCULAR; INTRAVENOUS at 09:30

## 2024-02-09 ASSESSMENT — PAIN SCALES - GENERAL
PAINLEVEL_OUTOF10: 0

## 2024-02-09 ASSESSMENT — COGNITIVE AND FUNCTIONAL STATUS - GENERAL
HELP NEEDED DRESSING REGULAR LOWER BODY CLOTHING: TOTAL
HELP NEEDED FOR TOILETING: TOTAL
HELP NEEDED FOR BATHING: TOTAL
DAILY_ACTIVITY_CONVERTED_SCORE: 22.86
DAILY_ACTIVITY_RAW_SCORE: 8
HELP NEEDED FOR PERSONAL GROOMING: A LITTLE
HELP NEEDED DRESSING REGULAR UPPER BODY CLOTHING: TOTAL

## 2024-02-09 ASSESSMENT — ENCOUNTER SYMPTOMS
SHORTNESS OF BREATH: 1
COUGH: 1

## 2024-02-09 ASSESSMENT — PULMONARY FUNCTION TESTS: FEV1/FVC: UNABLE TO OBTAIN, OR GREATER THAN 70%

## 2024-02-10 LAB
ANION GAP SERPL CALC-SCNC: 3 MMOL/L (ref 7–19)
BACTERIA BLD CULT: NORMAL
BACTERIA BLD CULT: NORMAL
BASE EXCESS / DEFICIT, ARTERIAL - RESPIRATORY: 10 MMOL/L (ref -2–3)
BDY SITE: ABNORMAL
BODY TEMPERATURE: 37 DEGREES C
BUN SERPL-MCNC: 19 MG/DL (ref 6–20)
BUN/CREAT SERPL: 32 (ref 7–25)
CALCIUM SERPL-MCNC: 8.7 MG/DL (ref 8.4–10.2)
CHLORIDE SERPL-SCNC: 107 MMOL/L (ref 97–110)
CO2 SERPL-SCNC: 37 MMOL/L (ref 21–32)
CONDITION: ABNORMAL
CREAT SERPL-MCNC: 0.6 MG/DL (ref 0.51–0.95)
EGFRCR SERPLBLD CKD-EPI 2021: 84 ML/MIN/{1.73_M2}
FASTING DURATION TIME PATIENT: ABNORMAL H
FIO2 ON VENT: 30 %
GLUCOSE BLDC GLUCOMTR-MCNC: 105 MG/DL (ref 70–99)
GLUCOSE BLDC GLUCOMTR-MCNC: 107 MG/DL (ref 70–99)
GLUCOSE BLDC GLUCOMTR-MCNC: 120 MG/DL (ref 70–99)
GLUCOSE BLDC GLUCOMTR-MCNC: 127 MG/DL (ref 70–99)
GLUCOSE SERPL-MCNC: 116 MG/DL (ref 70–99)
HCO3 BLDA-SCNC: 37 MMOL/L (ref 22–28)
HOROWITZ INDEX BLDA+IHG-RTO: 343 MM[HG] (ref 300–500)
MAGNESIUM SERPL-MCNC: 1.9 MG/DL (ref 1.7–2.4)
PCO2 BLDA: 59 MM HG (ref 32–45)
PH BLDA: 7.41 UNITS (ref 7.35–7.45)
PHOSPHATE SERPL-MCNC: 2.8 MG/DL (ref 2.4–4.7)
PO2 BLDA: 103 MM HG (ref 83–108)
POTASSIUM SERPL-SCNC: 3.3 MMOL/L (ref 3.4–5.1)
POTASSIUM SERPL-SCNC: 3.9 MMOL/L (ref 3.4–5.1)
SAO2 % BLDA: 98 % (ref 95–99)
SODIUM SERPL-SCNC: 144 MMOL/L (ref 135–145)

## 2024-02-10 PROCEDURE — 10002801 HB RX 250 W/O HCPCS: Performed by: STUDENT IN AN ORGANIZED HEALTH CARE EDUCATION/TRAINING PROGRAM

## 2024-02-10 PROCEDURE — 10004651 HB RX, NO CHARGE ITEM: Performed by: STUDENT IN AN ORGANIZED HEALTH CARE EDUCATION/TRAINING PROGRAM

## 2024-02-10 PROCEDURE — 82803 BLOOD GASES ANY COMBINATION: CPT

## 2024-02-10 PROCEDURE — 99233 SBSQ HOSP IP/OBS HIGH 50: CPT | Performed by: INTERNAL MEDICINE

## 2024-02-10 PROCEDURE — 84100 ASSAY OF PHOSPHORUS: CPT

## 2024-02-10 PROCEDURE — 36600 WITHDRAWAL OF ARTERIAL BLOOD: CPT

## 2024-02-10 PROCEDURE — 80048 BASIC METABOLIC PNL TOTAL CA: CPT

## 2024-02-10 PROCEDURE — 94660 CPAP INITIATION&MGMT: CPT

## 2024-02-10 PROCEDURE — 10002800 HB RX 250 W HCPCS: Performed by: INTERNAL MEDICINE

## 2024-02-10 PROCEDURE — 10002803 HB RX 637: Performed by: STUDENT IN AN ORGANIZED HEALTH CARE EDUCATION/TRAINING PROGRAM

## 2024-02-10 PROCEDURE — 10004180 HB COUNTER-TRANSPORT

## 2024-02-10 PROCEDURE — 10002801 HB RX 250 W/O HCPCS: Performed by: NURSE PRACTITIONER

## 2024-02-10 PROCEDURE — 96372 THER/PROPH/DIAG INJ SC/IM: CPT | Performed by: INTERNAL MEDICINE

## 2024-02-10 PROCEDURE — 10002807 HB RX 258: Performed by: STUDENT IN AN ORGANIZED HEALTH CARE EDUCATION/TRAINING PROGRAM

## 2024-02-10 PROCEDURE — 84132 ASSAY OF SERUM POTASSIUM: CPT | Performed by: STUDENT IN AN ORGANIZED HEALTH CARE EDUCATION/TRAINING PROGRAM

## 2024-02-10 PROCEDURE — 10003585 HB ROOM CHARGE INTERMEDIATE CARE

## 2024-02-10 PROCEDURE — 10002800 HB RX 250 W HCPCS: Performed by: STUDENT IN AN ORGANIZED HEALTH CARE EDUCATION/TRAINING PROGRAM

## 2024-02-10 PROCEDURE — 83735 ASSAY OF MAGNESIUM: CPT

## 2024-02-10 RX ORDER — POTASSIUM CHLORIDE 1.5 G/1.58G
40 POWDER, FOR SOLUTION ORAL ONCE
Status: COMPLETED | OUTPATIENT
Start: 2024-02-10 | End: 2024-02-10

## 2024-02-10 RX ADMIN — SODIUM CHLORIDE, PRESERVATIVE FREE 2 ML: 5 INJECTION INTRAVENOUS at 21:57

## 2024-02-10 RX ADMIN — METOROPROLOL TARTRATE 5 MG: 5 INJECTION, SOLUTION INTRAVENOUS at 00:44

## 2024-02-10 RX ADMIN — SODIUM CHLORIDE, PRESERVATIVE FREE 10 ML: 5 INJECTION INTRAVENOUS at 10:10

## 2024-02-10 RX ADMIN — SODIUM CHLORIDE, PRESERVATIVE FREE 2 ML: 5 INJECTION INTRAVENOUS at 10:10

## 2024-02-10 RX ADMIN — ACETAZOLAMIDE 250 MG: 500 INJECTION, POWDER, LYOPHILIZED, FOR SOLUTION INTRAVENOUS at 10:04

## 2024-02-10 RX ADMIN — ENOXAPARIN SODIUM 50 MG: 100 INJECTION SUBCUTANEOUS at 16:57

## 2024-02-10 RX ADMIN — MIDODRINE HYDROCHLORIDE 10 MG: 5 TABLET ORAL at 06:18

## 2024-02-10 RX ADMIN — ENOXAPARIN SODIUM 50 MG: 100 INJECTION SUBCUTANEOUS at 05:48

## 2024-02-10 RX ADMIN — MIDODRINE HYDROCHLORIDE 10 MG: 5 TABLET ORAL at 16:58

## 2024-02-10 RX ADMIN — DOXYCYCLINE 100 MG: 100 INJECTION, POWDER, LYOPHILIZED, FOR SOLUTION INTRAVENOUS at 10:03

## 2024-02-10 RX ADMIN — PIPERACILLIN SODIUM AND TAZOBACTAM SODIUM 3.38 G: 3; .375 INJECTION, POWDER, FOR SOLUTION INTRAVENOUS at 05:49

## 2024-02-10 RX ADMIN — POTASSIUM CHLORIDE 40 MEQ: 1.5 POWDER, FOR SOLUTION ORAL at 11:37

## 2024-02-10 RX ADMIN — METOROPROLOL TARTRATE 5 MG: 5 INJECTION, SOLUTION INTRAVENOUS at 06:18

## 2024-02-10 RX ADMIN — DOXYCYCLINE 100 MG: 100 INJECTION, POWDER, LYOPHILIZED, FOR SOLUTION INTRAVENOUS at 21:52

## 2024-02-10 RX ADMIN — MIDODRINE HYDROCHLORIDE 10 MG: 5 TABLET ORAL at 10:15

## 2024-02-10 RX ADMIN — ACETAZOLAMIDE 250 MG: 500 INJECTION, POWDER, LYOPHILIZED, FOR SOLUTION INTRAVENOUS at 21:54

## 2024-02-10 RX ADMIN — SODIUM CHLORIDE, PRESERVATIVE FREE 10 ML: 5 INJECTION INTRAVENOUS at 21:57

## 2024-02-10 RX ADMIN — PIPERACILLIN SODIUM AND TAZOBACTAM SODIUM 3.38 G: 3; .375 INJECTION, POWDER, FOR SOLUTION INTRAVENOUS at 23:39

## 2024-02-10 RX ADMIN — POLYETHYLENE GLYCOL (3350) 17 G: 17 POWDER, FOR SOLUTION ORAL at 10:04

## 2024-02-10 RX ADMIN — FUROSEMIDE 20 MG: 10 INJECTION, SOLUTION INTRAMUSCULAR; INTRAVENOUS at 10:04

## 2024-02-10 RX ADMIN — ACETAMINOPHEN 650 MG: 325 TABLET ORAL at 23:52

## 2024-02-10 RX ADMIN — PIPERACILLIN SODIUM AND TAZOBACTAM SODIUM 3.38 G: 3; .375 INJECTION, POWDER, FOR SOLUTION INTRAVENOUS at 13:47

## 2024-02-10 ASSESSMENT — PAIN SCALES - GENERAL: PAINLEVEL_OUTOF10: 0

## 2024-02-11 ENCOUNTER — APPOINTMENT (OUTPATIENT)
Dept: GENERAL RADIOLOGY | Age: 89
DRG: 871 | End: 2024-02-11
Attending: FAMILY MEDICINE

## 2024-02-11 VITALS
SYSTOLIC BLOOD PRESSURE: 89 MMHG | RESPIRATION RATE: 10 BRPM | HEIGHT: 66 IN | DIASTOLIC BLOOD PRESSURE: 58 MMHG | OXYGEN SATURATION: 100 % | WEIGHT: 115.08 LBS | HEART RATE: 58 BPM | TEMPERATURE: 97.3 F | BODY MASS INDEX: 18.49 KG/M2

## 2024-02-11 PROBLEM — R13.10 DYSPHAGIA: Status: ACTIVE | Noted: 2023-10-31

## 2024-02-11 LAB
ANION GAP SERPL CALC-SCNC: 6 MMOL/L (ref 7–19)
BUN SERPL-MCNC: 23 MG/DL (ref 6–20)
BUN/CREAT SERPL: 36 (ref 7–25)
CALCIUM SERPL-MCNC: 8.8 MG/DL (ref 8.4–10.2)
CHLORIDE SERPL-SCNC: 107 MMOL/L (ref 97–110)
CO2 SERPL-SCNC: 34 MMOL/L (ref 21–32)
CREAT SERPL-MCNC: 0.64 MG/DL (ref 0.51–0.95)
DEPRECATED RDW RBC: 94.4 FL (ref 39–50)
EGFRCR SERPLBLD CKD-EPI 2021: 83 ML/MIN/{1.73_M2}
ERYTHROCYTE [DISTWIDTH] IN BLOOD: 27.7 % (ref 11–15)
FASTING DURATION TIME PATIENT: ABNORMAL H
GLUCOSE BLDC GLUCOMTR-MCNC: 109 MG/DL (ref 70–99)
GLUCOSE BLDC GLUCOMTR-MCNC: 130 MG/DL (ref 70–99)
GLUCOSE BLDC GLUCOMTR-MCNC: 138 MG/DL (ref 70–99)
GLUCOSE BLDC GLUCOMTR-MCNC: 73 MG/DL (ref 70–99)
GLUCOSE SERPL-MCNC: 124 MG/DL (ref 70–99)
HCT VFR BLD CALC: 32.8 % (ref 36–46.5)
HGB BLD-MCNC: 9.6 G/DL (ref 12–15.5)
MAGNESIUM SERPL-MCNC: 1.7 MG/DL (ref 1.7–2.4)
MCH RBC QN AUTO: 28.1 PG (ref 26–34)
MCHC RBC AUTO-ENTMCNC: 29.3 G/DL (ref 32–36.5)
MCV RBC AUTO: 95.9 FL (ref 78–100)
NRBC BLD MANUAL-RTO: 0 /100 WBC
NT-PROBNP SERPL-MCNC: 3913 PG/ML
PHOSPHATE SERPL-MCNC: 2.8 MG/DL (ref 2.4–4.7)
PLATELET # BLD AUTO: 259 K/MCL (ref 140–450)
POTASSIUM SERPL-SCNC: 3.5 MMOL/L (ref 3.4–5.1)
RBC # BLD: 3.42 MIL/MCL (ref 4–5.2)
SODIUM SERPL-SCNC: 143 MMOL/L (ref 135–145)
WBC # BLD: 5.8 K/MCL (ref 4.2–11)

## 2024-02-11 PROCEDURE — 10002800 HB RX 250 W HCPCS: Performed by: STUDENT IN AN ORGANIZED HEALTH CARE EDUCATION/TRAINING PROGRAM

## 2024-02-11 PROCEDURE — 80048 BASIC METABOLIC PNL TOTAL CA: CPT

## 2024-02-11 PROCEDURE — 10002801 HB RX 250 W/O HCPCS: Performed by: STUDENT IN AN ORGANIZED HEALTH CARE EDUCATION/TRAINING PROGRAM

## 2024-02-11 PROCEDURE — 10002807 HB RX 258: Performed by: STUDENT IN AN ORGANIZED HEALTH CARE EDUCATION/TRAINING PROGRAM

## 2024-02-11 PROCEDURE — 84100 ASSAY OF PHOSPHORUS: CPT

## 2024-02-11 PROCEDURE — 71045 X-RAY EXAM CHEST 1 VIEW: CPT

## 2024-02-11 PROCEDURE — 83735 ASSAY OF MAGNESIUM: CPT

## 2024-02-11 PROCEDURE — 10003585 HB ROOM CHARGE INTERMEDIATE CARE

## 2024-02-11 PROCEDURE — 10004180 HB COUNTER-TRANSPORT

## 2024-02-11 PROCEDURE — 85027 COMPLETE CBC AUTOMATED: CPT | Performed by: FAMILY MEDICINE

## 2024-02-11 PROCEDURE — 92526 ORAL FUNCTION THERAPY: CPT

## 2024-02-11 PROCEDURE — 94660 CPAP INITIATION&MGMT: CPT

## 2024-02-11 PROCEDURE — 99233 SBSQ HOSP IP/OBS HIGH 50: CPT | Performed by: INTERNAL MEDICINE

## 2024-02-11 PROCEDURE — 10002803 HB RX 637: Performed by: STUDENT IN AN ORGANIZED HEALTH CARE EDUCATION/TRAINING PROGRAM

## 2024-02-11 PROCEDURE — 10002800 HB RX 250 W HCPCS: Performed by: FAMILY MEDICINE

## 2024-02-11 PROCEDURE — 96372 THER/PROPH/DIAG INJ SC/IM: CPT | Performed by: INTERNAL MEDICINE

## 2024-02-11 PROCEDURE — 10002803 HB RX 637: Performed by: FAMILY MEDICINE

## 2024-02-11 PROCEDURE — 10004651 HB RX, NO CHARGE ITEM: Performed by: STUDENT IN AN ORGANIZED HEALTH CARE EDUCATION/TRAINING PROGRAM

## 2024-02-11 PROCEDURE — 83880 ASSAY OF NATRIURETIC PEPTIDE: CPT | Performed by: NURSE PRACTITIONER

## 2024-02-11 PROCEDURE — 10002800 HB RX 250 W HCPCS: Performed by: INTERNAL MEDICINE

## 2024-02-11 RX ORDER — POTASSIUM CHLORIDE 1.5 G/1.58G
40 POWDER, FOR SOLUTION ORAL ONCE
Status: COMPLETED | OUTPATIENT
Start: 2024-02-11 | End: 2024-02-11

## 2024-02-11 RX ADMIN — MAGNESIUM SULFATE HEPTAHYDRATE 2 G: 40 INJECTION, SOLUTION INTRAVENOUS at 06:29

## 2024-02-11 RX ADMIN — POLYETHYLENE GLYCOL (3350) 17 G: 17 POWDER, FOR SOLUTION ORAL at 08:47

## 2024-02-11 RX ADMIN — PIPERACILLIN SODIUM AND TAZOBACTAM SODIUM 3.38 G: 3; .375 INJECTION, POWDER, FOR SOLUTION INTRAVENOUS at 06:18

## 2024-02-11 RX ADMIN — ENOXAPARIN SODIUM 50 MG: 100 INJECTION SUBCUTANEOUS at 06:19

## 2024-02-11 RX ADMIN — SODIUM CHLORIDE, PRESERVATIVE FREE 2 ML: 5 INJECTION INTRAVENOUS at 22:56

## 2024-02-11 RX ADMIN — SODIUM CHLORIDE, PRESERVATIVE FREE 2 ML: 5 INJECTION INTRAVENOUS at 08:47

## 2024-02-11 RX ADMIN — PIPERACILLIN SODIUM AND TAZOBACTAM SODIUM 3.38 G: 3; .375 INJECTION, POWDER, FOR SOLUTION INTRAVENOUS at 13:09

## 2024-02-11 RX ADMIN — MIDODRINE HYDROCHLORIDE 10 MG: 5 TABLET ORAL at 06:19

## 2024-02-11 RX ADMIN — MIDODRINE HYDROCHLORIDE 10 MG: 5 TABLET ORAL at 13:09

## 2024-02-11 RX ADMIN — POTASSIUM CHLORIDE 40 MEQ: 1.5 POWDER, FOR SOLUTION ORAL at 06:19

## 2024-02-11 RX ADMIN — DOXYCYCLINE 100 MG: 100 INJECTION, POWDER, LYOPHILIZED, FOR SOLUTION INTRAVENOUS at 10:49

## 2024-02-11 RX ADMIN — ENOXAPARIN SODIUM 50 MG: 100 INJECTION SUBCUTANEOUS at 17:21

## 2024-02-11 RX ADMIN — PIPERACILLIN SODIUM AND TAZOBACTAM SODIUM 3.38 G: 3; .375 INJECTION, POWDER, FOR SOLUTION INTRAVENOUS at 22:56

## 2024-02-11 RX ADMIN — SODIUM CHLORIDE, PRESERVATIVE FREE 10 ML: 5 INJECTION INTRAVENOUS at 21:10

## 2024-02-11 RX ADMIN — MIDODRINE HYDROCHLORIDE 10 MG: 5 TABLET ORAL at 17:21

## 2024-02-11 RX ADMIN — DOXYCYCLINE 100 MG: 100 INJECTION, POWDER, LYOPHILIZED, FOR SOLUTION INTRAVENOUS at 21:10

## 2024-02-11 RX ADMIN — SODIUM CHLORIDE, PRESERVATIVE FREE 10 ML: 5 INJECTION INTRAVENOUS at 08:47

## 2024-02-11 ASSESSMENT — PAIN SCALES - GENERAL: PAINLEVEL_OUTOF10: 0

## 2024-02-12 ENCOUNTER — APPOINTMENT (OUTPATIENT)
Dept: CT IMAGING | Age: 89
DRG: 871 | End: 2024-02-12
Attending: INTERNAL MEDICINE

## 2024-02-12 LAB
ANION GAP SERPL CALC-SCNC: 2 MMOL/L (ref 7–19)
BACTERIA SPEC ANAEROBE+AEROBE CULT: NORMAL
BASE EXCESS / DEFICIT, ARTERIAL - RESPIRATORY: 6 MMOL/L (ref -2–3)
BDY SITE: ABNORMAL
BODY TEMPERATURE: 36.2 DEGREES C
BUN SERPL-MCNC: 24 MG/DL (ref 6–20)
BUN/CREAT SERPL: 44 (ref 7–25)
CALCIUM SERPL-MCNC: 8.7 MG/DL (ref 8.4–10.2)
CHLORIDE SERPL-SCNC: 110 MMOL/L (ref 97–110)
CO2 SERPL-SCNC: 33 MMOL/L (ref 21–32)
COHGB MFR BLDV: 2.2 %
CONDITION: ABNORMAL
CREAT SERPL-MCNC: 0.55 MG/DL (ref 0.51–0.95)
EGFRCR SERPLBLD CKD-EPI 2021: 86 ML/MIN/{1.73_M2}
FASTING DURATION TIME PATIENT: ABNORMAL H
GLUCOSE BLDC GLUCOMTR-MCNC: 101 MG/DL (ref 70–99)
GLUCOSE BLDC GLUCOMTR-MCNC: 111 MG/DL (ref 70–99)
GLUCOSE BLDC GLUCOMTR-MCNC: 93 MG/DL (ref 70–99)
GLUCOSE SERPL-MCNC: 105 MG/DL (ref 70–99)
GRAM STN SPEC: NORMAL
HCO3 BLDA-SCNC: 32 MMOL/L (ref 22–28)
HCT VFR BLD CALC: 31 % (ref 36–46.5)
HGB BLD-MCNC: 10.3 G/DL (ref 12–15.5)
MAGNESIUM SERPL-MCNC: 2.2 MG/DL (ref 1.7–2.4)
METHGB MFR BLDMV: 1.2 %
OXYHGB MFR BLDA: 95.8 % (ref 94–98)
PCO2 BLDA: 47 MM HG (ref 32–45)
PH BLDA: 7.43 UNITS (ref 7.35–7.45)
PHOSPHATE SERPL-MCNC: 2.7 MG/DL (ref 2.4–4.7)
PO2 BLDA: 120 MM HG (ref 83–108)
POTASSIUM SERPL-SCNC: 4.2 MMOL/L (ref 3.4–5.1)
POTASSIUM SERPL-SCNC: 4.2 MMOL/L (ref 3.4–5.1)
SAO2 % BLDA: 14 % (ref 15–23)
SAO2 % BLDA: 99 % (ref 95–99)
SODIUM SERPL-SCNC: 141 MMOL/L (ref 135–145)

## 2024-02-12 PROCEDURE — 84100 ASSAY OF PHOSPHORUS: CPT

## 2024-02-12 PROCEDURE — 99233 SBSQ HOSP IP/OBS HIGH 50: CPT | Performed by: INTERNAL MEDICINE

## 2024-02-12 PROCEDURE — 99233 SBSQ HOSP IP/OBS HIGH 50: CPT | Performed by: SPECIALIST

## 2024-02-12 PROCEDURE — 85018 HEMOGLOBIN: CPT

## 2024-02-12 PROCEDURE — 10004651 HB RX, NO CHARGE ITEM: Performed by: STUDENT IN AN ORGANIZED HEALTH CARE EDUCATION/TRAINING PROGRAM

## 2024-02-12 PROCEDURE — 10002800 HB RX 250 W HCPCS: Performed by: STUDENT IN AN ORGANIZED HEALTH CARE EDUCATION/TRAINING PROGRAM

## 2024-02-12 PROCEDURE — 36600 WITHDRAWAL OF ARTERIAL BLOOD: CPT

## 2024-02-12 PROCEDURE — G0316 PROLONGED IP OR OBS CARE EM SERVICE BEYOND PRIM SERVICE EA ADD 15 MIN: HCPCS | Performed by: NURSE PRACTITIONER

## 2024-02-12 PROCEDURE — 94660 CPAP INITIATION&MGMT: CPT

## 2024-02-12 PROCEDURE — 10002803 HB RX 637: Performed by: STUDENT IN AN ORGANIZED HEALTH CARE EDUCATION/TRAINING PROGRAM

## 2024-02-12 PROCEDURE — 84132 ASSAY OF SERUM POTASSIUM: CPT | Performed by: FAMILY MEDICINE

## 2024-02-12 PROCEDURE — 92526 ORAL FUNCTION THERAPY: CPT

## 2024-02-12 PROCEDURE — 10002800 HB RX 250 W HCPCS: Performed by: INTERNAL MEDICINE

## 2024-02-12 PROCEDURE — 10004180 HB COUNTER-TRANSPORT

## 2024-02-12 PROCEDURE — 10002801 HB RX 250 W/O HCPCS: Performed by: STUDENT IN AN ORGANIZED HEALTH CARE EDUCATION/TRAINING PROGRAM

## 2024-02-12 PROCEDURE — 10002807 HB RX 258: Performed by: STUDENT IN AN ORGANIZED HEALTH CARE EDUCATION/TRAINING PROGRAM

## 2024-02-12 PROCEDURE — 80048 BASIC METABOLIC PNL TOTAL CA: CPT

## 2024-02-12 PROCEDURE — 99233 SBSQ HOSP IP/OBS HIGH 50: CPT | Performed by: NURSE PRACTITIONER

## 2024-02-12 PROCEDURE — 70450 CT HEAD/BRAIN W/O DYE: CPT

## 2024-02-12 PROCEDURE — 96372 THER/PROPH/DIAG INJ SC/IM: CPT | Performed by: INTERNAL MEDICINE

## 2024-02-12 PROCEDURE — 10003585 HB ROOM CHARGE INTERMEDIATE CARE

## 2024-02-12 PROCEDURE — 83735 ASSAY OF MAGNESIUM: CPT | Performed by: FAMILY MEDICINE

## 2024-02-12 RX ADMIN — MIDODRINE HYDROCHLORIDE 10 MG: 5 TABLET ORAL at 17:00

## 2024-02-12 RX ADMIN — DIGOXIN 125 MCG: 125 TABLET ORAL at 08:11

## 2024-02-12 RX ADMIN — PIPERACILLIN SODIUM AND TAZOBACTAM SODIUM 3.38 G: 3; .375 INJECTION, POWDER, FOR SOLUTION INTRAVENOUS at 22:30

## 2024-02-12 RX ADMIN — ENOXAPARIN SODIUM 50 MG: 100 INJECTION SUBCUTANEOUS at 05:57

## 2024-02-12 RX ADMIN — POLYETHYLENE GLYCOL (3350) 17 G: 17 POWDER, FOR SOLUTION ORAL at 08:11

## 2024-02-12 RX ADMIN — MIDODRINE HYDROCHLORIDE 10 MG: 5 TABLET ORAL at 05:59

## 2024-02-12 RX ADMIN — MIDODRINE HYDROCHLORIDE 10 MG: 5 TABLET ORAL at 10:44

## 2024-02-12 RX ADMIN — PIPERACILLIN SODIUM AND TAZOBACTAM SODIUM 3.38 G: 3; .375 INJECTION, POWDER, FOR SOLUTION INTRAVENOUS at 13:54

## 2024-02-12 RX ADMIN — DOXYCYCLINE 100 MG: 100 INJECTION, POWDER, LYOPHILIZED, FOR SOLUTION INTRAVENOUS at 08:09

## 2024-02-12 RX ADMIN — PIPERACILLIN SODIUM AND TAZOBACTAM SODIUM 3.38 G: 3; .375 INJECTION, POWDER, FOR SOLUTION INTRAVENOUS at 05:58

## 2024-02-12 RX ADMIN — DOXYCYCLINE 100 MG: 100 INJECTION, POWDER, LYOPHILIZED, FOR SOLUTION INTRAVENOUS at 20:44

## 2024-02-12 RX ADMIN — SODIUM CHLORIDE, PRESERVATIVE FREE 2 ML: 5 INJECTION INTRAVENOUS at 20:43

## 2024-02-12 RX ADMIN — SODIUM CHLORIDE, PRESERVATIVE FREE 10 ML: 5 INJECTION INTRAVENOUS at 08:22

## 2024-02-12 RX ADMIN — SODIUM CHLORIDE, PRESERVATIVE FREE 2 ML: 5 INJECTION INTRAVENOUS at 08:22

## 2024-02-12 RX ADMIN — ENOXAPARIN SODIUM 50 MG: 100 INJECTION SUBCUTANEOUS at 17:08

## 2024-02-12 RX ADMIN — SODIUM CHLORIDE, PRESERVATIVE FREE 10 ML: 5 INJECTION INTRAVENOUS at 20:43

## 2024-02-12 ASSESSMENT — PAIN SCALES - GENERAL
PAINLEVEL_OUTOF10: 0
PAINLEVEL_OUTOF10: 0

## 2024-02-13 LAB
ALBUMIN SERPL-MCNC: 2.4 G/DL (ref 3.6–5.1)
ALBUMIN/GLOB SERPL: 0.7 {RATIO} (ref 1–2.4)
ALP SERPL-CCNC: 77 UNITS/L (ref 45–117)
ALT SERPL-CCNC: 11 UNITS/L
ANION GAP SERPL CALC-SCNC: 4 MMOL/L (ref 7–19)
AST SERPL-CCNC: 13 UNITS/L
BASOPHILS # BLD: 0 K/MCL (ref 0–0.3)
BASOPHILS NFR BLD: 0 %
BILIRUB SERPL-MCNC: 0.9 MG/DL (ref 0.2–1)
BUN SERPL-MCNC: 22 MG/DL (ref 6–20)
BUN/CREAT SERPL: 45 (ref 7–25)
CALCIUM SERPL-MCNC: 8.6 MG/DL (ref 8.4–10.2)
CHLORIDE SERPL-SCNC: 108 MMOL/L (ref 97–110)
CO2 SERPL-SCNC: 32 MMOL/L (ref 21–32)
CREAT SERPL-MCNC: 0.49 MG/DL (ref 0.51–0.95)
DEPRECATED RDW RBC: 93.1 FL (ref 39–50)
EGFRCR SERPLBLD CKD-EPI 2021: 88 ML/MIN/{1.73_M2}
EOSINOPHIL # BLD: 0 K/MCL (ref 0–0.5)
EOSINOPHIL NFR BLD: 1 %
ERYTHROCYTE [DISTWIDTH] IN BLOOD: 27.3 % (ref 11–15)
FASTING DURATION TIME PATIENT: ABNORMAL H
GLOBULIN SER-MCNC: 3.4 G/DL (ref 2–4)
GLUCOSE BLDC GLUCOMTR-MCNC: 114 MG/DL (ref 70–99)
GLUCOSE BLDC GLUCOMTR-MCNC: 120 MG/DL (ref 70–99)
GLUCOSE BLDC GLUCOMTR-MCNC: 130 MG/DL (ref 70–99)
GLUCOSE SERPL-MCNC: 110 MG/DL (ref 70–99)
HCT VFR BLD CALC: 32.2 % (ref 36–46.5)
HGB BLD-MCNC: 9.4 G/DL (ref 12–15.5)
IMM GRANULOCYTES # BLD AUTO: 0 K/MCL (ref 0–0.2)
IMM GRANULOCYTES # BLD: 1 %
LYMPHOCYTES # BLD: 0.6 K/MCL (ref 1–4)
LYMPHOCYTES NFR BLD: 10 %
MAGNESIUM SERPL-MCNC: 1.9 MG/DL (ref 1.7–2.4)
MCH RBC QN AUTO: 27.7 PG (ref 26–34)
MCHC RBC AUTO-ENTMCNC: 29.2 G/DL (ref 32–36.5)
MCV RBC AUTO: 95 FL (ref 78–100)
MONOCYTES # BLD: 0.3 K/MCL (ref 0.3–0.9)
MONOCYTES NFR BLD: 6 %
NEUTROPHILS # BLD: 4.9 K/MCL (ref 1.8–7.7)
NEUTROPHILS NFR BLD: 82 %
NRBC BLD MANUAL-RTO: 0 /100 WBC
PLATELET # BLD AUTO: 217 K/MCL (ref 140–450)
POTASSIUM SERPL-SCNC: 4.5 MMOL/L (ref 3.4–5.1)
PROT SERPL-MCNC: 5.8 G/DL (ref 6.4–8.2)
RBC # BLD: 3.39 MIL/MCL (ref 4–5.2)
SODIUM SERPL-SCNC: 139 MMOL/L (ref 135–145)
WBC # BLD: 5.9 K/MCL (ref 4.2–11)

## 2024-02-13 PROCEDURE — 10002807 HB RX 258: Performed by: STUDENT IN AN ORGANIZED HEALTH CARE EDUCATION/TRAINING PROGRAM

## 2024-02-13 PROCEDURE — 97530 THERAPEUTIC ACTIVITIES: CPT

## 2024-02-13 PROCEDURE — 10003585 HB ROOM CHARGE INTERMEDIATE CARE

## 2024-02-13 PROCEDURE — G0316 PROLONGED IP OR OBS CARE EM SERVICE BEYOND PRIM SERVICE EA ADD 15 MIN: HCPCS | Performed by: NURSE PRACTITIONER

## 2024-02-13 PROCEDURE — 96372 THER/PROPH/DIAG INJ SC/IM: CPT | Performed by: INTERNAL MEDICINE

## 2024-02-13 PROCEDURE — 99233 SBSQ HOSP IP/OBS HIGH 50: CPT | Performed by: NURSE PRACTITIONER

## 2024-02-13 PROCEDURE — 10002800 HB RX 250 W HCPCS: Performed by: STUDENT IN AN ORGANIZED HEALTH CARE EDUCATION/TRAINING PROGRAM

## 2024-02-13 PROCEDURE — 10004651 HB RX, NO CHARGE ITEM: Performed by: STUDENT IN AN ORGANIZED HEALTH CARE EDUCATION/TRAINING PROGRAM

## 2024-02-13 PROCEDURE — 10004180 HB COUNTER-TRANSPORT

## 2024-02-13 PROCEDURE — 94660 CPAP INITIATION&MGMT: CPT

## 2024-02-13 PROCEDURE — 92526 ORAL FUNCTION THERAPY: CPT

## 2024-02-13 PROCEDURE — 10002800 HB RX 250 W HCPCS: Performed by: INTERNAL MEDICINE

## 2024-02-13 PROCEDURE — 97110 THERAPEUTIC EXERCISES: CPT

## 2024-02-13 PROCEDURE — 80053 COMPREHEN METABOLIC PANEL: CPT | Performed by: INTERNAL MEDICINE

## 2024-02-13 PROCEDURE — 83735 ASSAY OF MAGNESIUM: CPT | Performed by: STUDENT IN AN ORGANIZED HEALTH CARE EDUCATION/TRAINING PROGRAM

## 2024-02-13 PROCEDURE — 85025 COMPLETE CBC W/AUTO DIFF WBC: CPT | Performed by: INTERNAL MEDICINE

## 2024-02-13 PROCEDURE — 99232 SBSQ HOSP IP/OBS MODERATE 35: CPT | Performed by: SPECIALIST

## 2024-02-13 PROCEDURE — 10002803 HB RX 637: Performed by: STUDENT IN AN ORGANIZED HEALTH CARE EDUCATION/TRAINING PROGRAM

## 2024-02-13 PROCEDURE — 10002801 HB RX 250 W/O HCPCS: Performed by: STUDENT IN AN ORGANIZED HEALTH CARE EDUCATION/TRAINING PROGRAM

## 2024-02-13 RX ORDER — TRAZODONE HYDROCHLORIDE 50 MG/1
50 TABLET ORAL NIGHTLY
Status: DISCONTINUED | OUTPATIENT
Start: 2024-02-14 | End: 2024-02-20 | Stop reason: HOSPADM

## 2024-02-13 RX ADMIN — PIPERACILLIN SODIUM AND TAZOBACTAM SODIUM 3.38 G: 3; .375 INJECTION, POWDER, FOR SOLUTION INTRAVENOUS at 21:46

## 2024-02-13 RX ADMIN — DOXYCYCLINE 100 MG: 100 INJECTION, POWDER, LYOPHILIZED, FOR SOLUTION INTRAVENOUS at 20:14

## 2024-02-13 RX ADMIN — MIDODRINE HYDROCHLORIDE 10 MG: 5 TABLET ORAL at 12:30

## 2024-02-13 RX ADMIN — PIPERACILLIN SODIUM AND TAZOBACTAM SODIUM 3.38 G: 3; .375 INJECTION, POWDER, FOR SOLUTION INTRAVENOUS at 05:37

## 2024-02-13 RX ADMIN — SODIUM CHLORIDE, PRESERVATIVE FREE 10 ML: 5 INJECTION INTRAVENOUS at 21:00

## 2024-02-13 RX ADMIN — ENOXAPARIN SODIUM 50 MG: 100 INJECTION SUBCUTANEOUS at 05:39

## 2024-02-13 RX ADMIN — SODIUM CHLORIDE, PRESERVATIVE FREE 2 ML: 5 INJECTION INTRAVENOUS at 08:45

## 2024-02-13 RX ADMIN — MIDODRINE HYDROCHLORIDE 10 MG: 5 TABLET ORAL at 05:30

## 2024-02-13 RX ADMIN — ACETAMINOPHEN 650 MG: 325 TABLET ORAL at 16:01

## 2024-02-13 RX ADMIN — ENOXAPARIN SODIUM 50 MG: 100 INJECTION SUBCUTANEOUS at 16:40

## 2024-02-13 RX ADMIN — ACETAMINOPHEN 650 MG: 325 TABLET ORAL at 21:43

## 2024-02-13 RX ADMIN — SODIUM CHLORIDE, PRESERVATIVE FREE 2 ML: 5 INJECTION INTRAVENOUS at 20:14

## 2024-02-13 RX ADMIN — PIPERACILLIN SODIUM AND TAZOBACTAM SODIUM 3.38 G: 3; .375 INJECTION, POWDER, FOR SOLUTION INTRAVENOUS at 16:02

## 2024-02-13 RX ADMIN — DOXYCYCLINE 100 MG: 100 INJECTION, POWDER, LYOPHILIZED, FOR SOLUTION INTRAVENOUS at 08:32

## 2024-02-13 RX ADMIN — MIDODRINE HYDROCHLORIDE 10 MG: 5 TABLET ORAL at 16:00

## 2024-02-13 RX ADMIN — POLYETHYLENE GLYCOL (3350) 17 G: 17 POWDER, FOR SOLUTION ORAL at 08:31

## 2024-02-13 ASSESSMENT — PAIN SCALES - GENERAL
PAINLEVEL_OUTOF10: 0
PAINLEVEL_OUTOF10: 4
PAINLEVEL_OUTOF10: 3
PAINLEVEL_OUTOF10: 0

## 2024-02-13 ASSESSMENT — COGNITIVE AND FUNCTIONAL STATUS - GENERAL
HELP NEEDED DRESSING REGULAR LOWER BODY CLOTHING: TOTAL
HELP NEEDED FOR BATHING: TOTAL
HELP NEEDED FOR PERSONAL GROOMING: A LITTLE
HELP NEEDED FOR TOILETING: TOTAL
HELP NEEDED DRESSING REGULAR UPPER BODY CLOTHING: TOTAL
DAILY_ACTIVITY_RAW_SCORE: 8
DAILY_ACTIVITY_CONVERTED_SCORE: 22.86

## 2024-02-14 ENCOUNTER — APPOINTMENT (OUTPATIENT)
Dept: GENERAL RADIOLOGY | Age: 89
DRG: 871 | End: 2024-02-14
Attending: INTERNAL MEDICINE

## 2024-02-14 LAB
ANION GAP SERPL CALC-SCNC: 3 MMOL/L (ref 7–19)
BUN SERPL-MCNC: 21 MG/DL (ref 6–20)
BUN/CREAT SERPL: 50 (ref 7–25)
CALCIUM SERPL-MCNC: 8.8 MG/DL (ref 8.4–10.2)
CHLORIDE SERPL-SCNC: 106 MMOL/L (ref 97–110)
CO2 SERPL-SCNC: 33 MMOL/L (ref 21–32)
CREAT SERPL-MCNC: 0.42 MG/DL (ref 0.51–0.95)
EGFRCR SERPLBLD CKD-EPI 2021: >90 ML/MIN/{1.73_M2}
FASTING DURATION TIME PATIENT: ABNORMAL H
GLUCOSE BLDC GLUCOMTR-MCNC: 104 MG/DL (ref 70–99)
GLUCOSE BLDC GLUCOMTR-MCNC: 106 MG/DL (ref 70–99)
GLUCOSE BLDC GLUCOMTR-MCNC: 106 MG/DL (ref 70–99)
GLUCOSE BLDC GLUCOMTR-MCNC: 110 MG/DL (ref 70–99)
GLUCOSE SERPL-MCNC: 102 MG/DL (ref 70–99)
MAGNESIUM SERPL-MCNC: 1.9 MG/DL (ref 1.7–2.4)
PHOSPHATE SERPL-MCNC: 2.4 MG/DL (ref 2.4–4.7)
POTASSIUM SERPL-SCNC: 4.4 MMOL/L (ref 3.4–5.1)
SODIUM SERPL-SCNC: 138 MMOL/L (ref 135–145)

## 2024-02-14 PROCEDURE — 92611 MOTION FLUOROSCOPY/SWALLOW: CPT

## 2024-02-14 PROCEDURE — 10004180 HB COUNTER-TRANSPORT

## 2024-02-14 PROCEDURE — 10004651 HB RX, NO CHARGE ITEM: Performed by: STUDENT IN AN ORGANIZED HEALTH CARE EDUCATION/TRAINING PROGRAM

## 2024-02-14 PROCEDURE — 84100 ASSAY OF PHOSPHORUS: CPT

## 2024-02-14 PROCEDURE — 97110 THERAPEUTIC EXERCISES: CPT

## 2024-02-14 PROCEDURE — 10002801 HB RX 250 W/O HCPCS: Performed by: STUDENT IN AN ORGANIZED HEALTH CARE EDUCATION/TRAINING PROGRAM

## 2024-02-14 PROCEDURE — 10002807 HB RX 258: Performed by: STUDENT IN AN ORGANIZED HEALTH CARE EDUCATION/TRAINING PROGRAM

## 2024-02-14 PROCEDURE — 10003585 HB ROOM CHARGE INTERMEDIATE CARE

## 2024-02-14 PROCEDURE — 97535 SELF CARE MNGMENT TRAINING: CPT

## 2024-02-14 PROCEDURE — 10002800 HB RX 250 W HCPCS: Performed by: STUDENT IN AN ORGANIZED HEALTH CARE EDUCATION/TRAINING PROGRAM

## 2024-02-14 PROCEDURE — 80048 BASIC METABOLIC PNL TOTAL CA: CPT

## 2024-02-14 PROCEDURE — 83735 ASSAY OF MAGNESIUM: CPT

## 2024-02-14 PROCEDURE — 96372 THER/PROPH/DIAG INJ SC/IM: CPT | Performed by: INTERNAL MEDICINE

## 2024-02-14 PROCEDURE — 10002803 HB RX 637: Performed by: STUDENT IN AN ORGANIZED HEALTH CARE EDUCATION/TRAINING PROGRAM

## 2024-02-14 PROCEDURE — 10002800 HB RX 250 W HCPCS: Performed by: INTERNAL MEDICINE

## 2024-02-14 PROCEDURE — 74230 X-RAY XM SWLNG FUNCJ C+: CPT

## 2024-02-14 PROCEDURE — 99232 SBSQ HOSP IP/OBS MODERATE 35: CPT | Performed by: NURSE PRACTITIONER

## 2024-02-14 PROCEDURE — 94660 CPAP INITIATION&MGMT: CPT

## 2024-02-14 RX ADMIN — SODIUM CHLORIDE, PRESERVATIVE FREE 10 ML: 5 INJECTION INTRAVENOUS at 20:14

## 2024-02-14 RX ADMIN — ENOXAPARIN SODIUM 50 MG: 100 INJECTION SUBCUTANEOUS at 18:00

## 2024-02-14 RX ADMIN — ENOXAPARIN SODIUM 50 MG: 100 INJECTION SUBCUTANEOUS at 05:48

## 2024-02-14 RX ADMIN — DOXYCYCLINE 100 MG: 100 INJECTION, POWDER, LYOPHILIZED, FOR SOLUTION INTRAVENOUS at 20:14

## 2024-02-14 RX ADMIN — MIDODRINE HYDROCHLORIDE 10 MG: 5 TABLET ORAL at 10:40

## 2024-02-14 RX ADMIN — DOXYCYCLINE 100 MG: 100 INJECTION, POWDER, LYOPHILIZED, FOR SOLUTION INTRAVENOUS at 10:39

## 2024-02-14 RX ADMIN — PIPERACILLIN SODIUM AND TAZOBACTAM SODIUM 3.38 G: 3; .375 INJECTION, POWDER, FOR SOLUTION INTRAVENOUS at 05:50

## 2024-02-14 RX ADMIN — DIGOXIN 125 MCG: 125 TABLET ORAL at 09:04

## 2024-02-14 RX ADMIN — PIPERACILLIN SODIUM AND TAZOBACTAM SODIUM 3.38 G: 3; .375 INJECTION, POWDER, FOR SOLUTION INTRAVENOUS at 22:00

## 2024-02-14 RX ADMIN — SODIUM CHLORIDE, PRESERVATIVE FREE 2 ML: 5 INJECTION INTRAVENOUS at 20:14

## 2024-02-14 RX ADMIN — PIPERACILLIN SODIUM AND TAZOBACTAM SODIUM 3.38 G: 3; .375 INJECTION, POWDER, FOR SOLUTION INTRAVENOUS at 14:52

## 2024-02-14 RX ADMIN — MIDODRINE HYDROCHLORIDE 10 MG: 5 TABLET ORAL at 05:57

## 2024-02-14 ASSESSMENT — COGNITIVE AND FUNCTIONAL STATUS - GENERAL
DAILY_ACTIVITY_CONVERTED_SCORE: 22.86
HELP NEEDED FOR PERSONAL GROOMING: A LITTLE
HELP NEEDED FOR TOILETING: TOTAL
HELP NEEDED FOR BATHING: TOTAL
DAILY_ACTIVITY_RAW_SCORE: 8
HELP NEEDED DRESSING REGULAR UPPER BODY CLOTHING: TOTAL
HELP NEEDED DRESSING REGULAR LOWER BODY CLOTHING: TOTAL

## 2024-02-14 ASSESSMENT — PAIN SCALES - GENERAL
PAINLEVEL_OUTOF10: 0

## 2024-02-14 ASSESSMENT — ACTIVITIES OF DAILY LIVING (ADL): HOME_MANAGEMENT_TIME_ENTRY: 14

## 2024-02-15 LAB
GLUCOSE BLDC GLUCOMTR-MCNC: 104 MG/DL (ref 70–99)
GLUCOSE BLDC GLUCOMTR-MCNC: 109 MG/DL (ref 70–99)
GLUCOSE BLDC GLUCOMTR-MCNC: 129 MG/DL (ref 70–99)
GLUCOSE BLDC GLUCOMTR-MCNC: 136 MG/DL (ref 70–99)
GLUCOSE BLDC GLUCOMTR-MCNC: 137 MG/DL (ref 70–99)

## 2024-02-15 PROCEDURE — 10004651 HB RX, NO CHARGE ITEM: Performed by: STUDENT IN AN ORGANIZED HEALTH CARE EDUCATION/TRAINING PROGRAM

## 2024-02-15 PROCEDURE — 99232 SBSQ HOSP IP/OBS MODERATE 35: CPT | Performed by: NURSE PRACTITIONER

## 2024-02-15 PROCEDURE — 10004180 HB COUNTER-TRANSPORT

## 2024-02-15 PROCEDURE — 10002803 HB RX 637: Performed by: STUDENT IN AN ORGANIZED HEALTH CARE EDUCATION/TRAINING PROGRAM

## 2024-02-15 PROCEDURE — 10003585 HB ROOM CHARGE INTERMEDIATE CARE

## 2024-02-15 PROCEDURE — 96372 THER/PROPH/DIAG INJ SC/IM: CPT | Performed by: INTERNAL MEDICINE

## 2024-02-15 PROCEDURE — 94660 CPAP INITIATION&MGMT: CPT

## 2024-02-15 PROCEDURE — 10002807 HB RX 258: Performed by: STUDENT IN AN ORGANIZED HEALTH CARE EDUCATION/TRAINING PROGRAM

## 2024-02-15 PROCEDURE — 92526 ORAL FUNCTION THERAPY: CPT

## 2024-02-15 PROCEDURE — 10002800 HB RX 250 W HCPCS: Performed by: STUDENT IN AN ORGANIZED HEALTH CARE EDUCATION/TRAINING PROGRAM

## 2024-02-15 PROCEDURE — 10002800 HB RX 250 W HCPCS: Performed by: INTERNAL MEDICINE

## 2024-02-15 RX ADMIN — ENOXAPARIN SODIUM 50 MG: 100 INJECTION SUBCUTANEOUS at 18:14

## 2024-02-15 RX ADMIN — SODIUM CHLORIDE, PRESERVATIVE FREE 2 ML: 5 INJECTION INTRAVENOUS at 21:00

## 2024-02-15 RX ADMIN — SODIUM CHLORIDE, PRESERVATIVE FREE 10 ML: 5 INJECTION INTRAVENOUS at 09:54

## 2024-02-15 RX ADMIN — PIPERACILLIN SODIUM AND TAZOBACTAM SODIUM 3.38 G: 3; .375 INJECTION, POWDER, FOR SOLUTION INTRAVENOUS at 06:09

## 2024-02-15 RX ADMIN — MIDODRINE HYDROCHLORIDE 10 MG: 5 TABLET ORAL at 09:53

## 2024-02-15 RX ADMIN — POLYETHYLENE GLYCOL (3350) 17 G: 17 POWDER, FOR SOLUTION ORAL at 09:53

## 2024-02-15 RX ADMIN — MIDODRINE HYDROCHLORIDE 10 MG: 5 TABLET ORAL at 18:14

## 2024-02-15 RX ADMIN — MIDODRINE HYDROCHLORIDE 10 MG: 5 TABLET ORAL at 06:16

## 2024-02-15 RX ADMIN — ENOXAPARIN SODIUM 50 MG: 100 INJECTION SUBCUTANEOUS at 06:07

## 2024-02-15 RX ADMIN — SODIUM CHLORIDE, PRESERVATIVE FREE 10 ML: 5 INJECTION INTRAVENOUS at 21:00

## 2024-02-15 RX ADMIN — SODIUM CHLORIDE, PRESERVATIVE FREE 2 ML: 5 INJECTION INTRAVENOUS at 09:54

## 2024-02-15 ASSESSMENT — PAIN SCALES - GENERAL
PAINLEVEL_OUTOF10: 0

## 2024-02-16 LAB
ALBUMIN SERPL-MCNC: 2.3 G/DL (ref 3.6–5.1)
ALBUMIN/GLOB SERPL: 0.6 {RATIO} (ref 1–2.4)
ALP SERPL-CCNC: 94 UNITS/L (ref 45–117)
ALT SERPL-CCNC: 12 UNITS/L
ANION GAP SERPL CALC-SCNC: 4 MMOL/L (ref 7–19)
AST SERPL-CCNC: 20 UNITS/L
BASOPHILS # BLD: 0 K/MCL (ref 0–0.3)
BASOPHILS NFR BLD: 0 %
BILIRUB SERPL-MCNC: 1.1 MG/DL (ref 0.2–1)
BUN SERPL-MCNC: 19 MG/DL (ref 6–20)
BUN/CREAT SERPL: 49 (ref 7–25)
CALCIUM SERPL-MCNC: 9.2 MG/DL (ref 8.4–10.2)
CHLORIDE SERPL-SCNC: 103 MMOL/L (ref 97–110)
CO2 SERPL-SCNC: 33 MMOL/L (ref 21–32)
CREAT SERPL-MCNC: 0.39 MG/DL (ref 0.51–0.95)
DEPRECATED RDW RBC: 89 FL (ref 39–50)
EGFRCR SERPLBLD CKD-EPI 2021: >90 ML/MIN/{1.73_M2}
EOSINOPHIL # BLD: 0 K/MCL (ref 0–0.5)
EOSINOPHIL NFR BLD: 0 %
ERYTHROCYTE [DISTWIDTH] IN BLOOD: 26.6 % (ref 11–15)
FASTING DURATION TIME PATIENT: 0 HOURS (ref 0–999)
GLOBULIN SER-MCNC: 3.8 G/DL (ref 2–4)
GLUCOSE BLDC GLUCOMTR-MCNC: 103 MG/DL (ref 70–99)
GLUCOSE BLDC GLUCOMTR-MCNC: 120 MG/DL (ref 70–99)
GLUCOSE BLDC GLUCOMTR-MCNC: 120 MG/DL (ref 70–99)
GLUCOSE SERPL-MCNC: 109 MG/DL (ref 70–99)
HCT VFR BLD CALC: 30.6 % (ref 36–46.5)
HGB BLD-MCNC: 9.4 G/DL (ref 12–15.5)
IMM GRANULOCYTES # BLD AUTO: 0 K/MCL (ref 0–0.2)
IMM GRANULOCYTES # BLD: 0 %
LYMPHOCYTES # BLD: 0.6 K/MCL (ref 1–4)
LYMPHOCYTES NFR BLD: 6 %
MAGNESIUM SERPL-MCNC: 1.7 MG/DL (ref 1.7–2.4)
MCH RBC QN AUTO: 28.3 PG (ref 26–34)
MCHC RBC AUTO-ENTMCNC: 30.7 G/DL (ref 32–36.5)
MCV RBC AUTO: 92.2 FL (ref 78–100)
MONOCYTES # BLD: 0.5 K/MCL (ref 0.3–0.9)
MONOCYTES NFR BLD: 5 %
NEUTROPHILS # BLD: 9.4 K/MCL (ref 1.8–7.7)
NEUTROPHILS NFR BLD: 89 %
NRBC BLD MANUAL-RTO: 0 /100 WBC
PHOSPHATE SERPL-MCNC: 2.6 MG/DL (ref 2.4–4.7)
PLATELET # BLD AUTO: 173 K/MCL (ref 140–450)
POTASSIUM SERPL-SCNC: 4.5 MMOL/L (ref 3.4–5.1)
PROT SERPL-MCNC: 6.1 G/DL (ref 6.4–8.2)
RBC # BLD: 3.32 MIL/MCL (ref 4–5.2)
SODIUM SERPL-SCNC: 135 MMOL/L (ref 135–145)
WBC # BLD: 10.6 K/MCL (ref 4.2–11)

## 2024-02-16 PROCEDURE — 84100 ASSAY OF PHOSPHORUS: CPT

## 2024-02-16 PROCEDURE — 92526 ORAL FUNCTION THERAPY: CPT

## 2024-02-16 PROCEDURE — 10003585 HB ROOM CHARGE INTERMEDIATE CARE

## 2024-02-16 PROCEDURE — 97530 THERAPEUTIC ACTIVITIES: CPT

## 2024-02-16 PROCEDURE — 10002800 HB RX 250 W HCPCS: Performed by: INTERNAL MEDICINE

## 2024-02-16 PROCEDURE — 99232 SBSQ HOSP IP/OBS MODERATE 35: CPT | Performed by: NURSE PRACTITIONER

## 2024-02-16 PROCEDURE — 10004180 HB COUNTER-TRANSPORT

## 2024-02-16 PROCEDURE — 85025 COMPLETE CBC W/AUTO DIFF WBC: CPT | Performed by: STUDENT IN AN ORGANIZED HEALTH CARE EDUCATION/TRAINING PROGRAM

## 2024-02-16 PROCEDURE — 80053 COMPREHEN METABOLIC PANEL: CPT | Performed by: STUDENT IN AN ORGANIZED HEALTH CARE EDUCATION/TRAINING PROGRAM

## 2024-02-16 PROCEDURE — 10002803 HB RX 637: Performed by: STUDENT IN AN ORGANIZED HEALTH CARE EDUCATION/TRAINING PROGRAM

## 2024-02-16 PROCEDURE — 94660 CPAP INITIATION&MGMT: CPT

## 2024-02-16 PROCEDURE — 83735 ASSAY OF MAGNESIUM: CPT

## 2024-02-16 PROCEDURE — 10004651 HB RX, NO CHARGE ITEM: Performed by: STUDENT IN AN ORGANIZED HEALTH CARE EDUCATION/TRAINING PROGRAM

## 2024-02-16 PROCEDURE — 96372 THER/PROPH/DIAG INJ SC/IM: CPT | Performed by: INTERNAL MEDICINE

## 2024-02-16 PROCEDURE — 97110 THERAPEUTIC EXERCISES: CPT

## 2024-02-16 RX ORDER — LANOLIN ALCOHOL/MO/W.PET/CERES
400 CREAM (GRAM) TOPICAL ONCE
Status: COMPLETED | OUTPATIENT
Start: 2024-02-16 | End: 2024-02-16

## 2024-02-16 RX ORDER — BALSAM PERU/CASTOR OIL
OINTMENT (GRAM) TOPICAL 2 TIMES DAILY
Status: DISCONTINUED | OUTPATIENT
Start: 2024-02-16 | End: 2024-02-20 | Stop reason: HOSPADM

## 2024-02-16 RX ADMIN — Medication 400 MG: at 21:41

## 2024-02-16 RX ADMIN — Medication: at 21:44

## 2024-02-16 RX ADMIN — APIXABAN 5 MG: 5 TABLET, FILM COATED ORAL at 20:07

## 2024-02-16 RX ADMIN — Medication 400 MG: at 15:34

## 2024-02-16 RX ADMIN — POLYETHYLENE GLYCOL (3350) 17 G: 17 POWDER, FOR SOLUTION ORAL at 08:21

## 2024-02-16 RX ADMIN — SODIUM CHLORIDE, PRESERVATIVE FREE 10 ML: 5 INJECTION INTRAVENOUS at 08:22

## 2024-02-16 RX ADMIN — SODIUM CHLORIDE, PRESERVATIVE FREE 10 ML: 5 INJECTION INTRAVENOUS at 20:03

## 2024-02-16 RX ADMIN — DIGOXIN 125 MCG: 125 TABLET ORAL at 08:22

## 2024-02-16 RX ADMIN — MIDODRINE HYDROCHLORIDE 10 MG: 5 TABLET ORAL at 11:41

## 2024-02-16 RX ADMIN — SODIUM CHLORIDE, PRESERVATIVE FREE 2 ML: 5 INJECTION INTRAVENOUS at 08:22

## 2024-02-16 RX ADMIN — ENOXAPARIN SODIUM 50 MG: 100 INJECTION SUBCUTANEOUS at 06:31

## 2024-02-16 RX ADMIN — ACETAMINOPHEN 650 MG: 325 TABLET ORAL at 15:34

## 2024-02-16 RX ADMIN — MIDODRINE HYDROCHLORIDE 10 MG: 5 TABLET ORAL at 06:31

## 2024-02-16 RX ADMIN — SODIUM CHLORIDE, PRESERVATIVE FREE 2 ML: 5 INJECTION INTRAVENOUS at 20:03

## 2024-02-16 RX ADMIN — MIDODRINE HYDROCHLORIDE 10 MG: 5 TABLET ORAL at 15:34

## 2024-02-16 ASSESSMENT — PAIN SCALES - GENERAL
PAINLEVEL_OUTOF10: 0

## 2024-02-16 ASSESSMENT — PATIENT HEALTH QUESTIONNAIRE - PHQ9: IS PATIENT ABLE TO COMPLETE PHQ2 OR PHQ9: NO, PATIENT WILL NEVER BE ABLE TO COMPLETE

## 2024-02-17 LAB
ALBUMIN SERPL-MCNC: 2.4 G/DL (ref 3.6–5.1)
ALBUMIN/GLOB SERPL: 0.6 {RATIO} (ref 1–2.4)
ALP SERPL-CCNC: 95 UNITS/L (ref 45–117)
ALT SERPL-CCNC: 11 UNITS/L
ANION GAP SERPL CALC-SCNC: 5 MMOL/L (ref 7–19)
AST SERPL-CCNC: 10 UNITS/L
BASOPHILS # BLD: 0 K/MCL (ref 0–0.3)
BASOPHILS NFR BLD: 0 %
BILIRUB SERPL-MCNC: 1 MG/DL (ref 0.2–1)
BUN SERPL-MCNC: 16 MG/DL (ref 6–20)
BUN/CREAT SERPL: 48 (ref 7–25)
CALCIUM SERPL-MCNC: 9.6 MG/DL (ref 8.4–10.2)
CHLORIDE SERPL-SCNC: 105 MMOL/L (ref 97–110)
CO2 SERPL-SCNC: 31 MMOL/L (ref 21–32)
CREAT SERPL-MCNC: 0.33 MG/DL (ref 0.51–0.95)
DEPRECATED RDW RBC: 91.2 FL (ref 39–50)
EGFRCR SERPLBLD CKD-EPI 2021: >90 ML/MIN/{1.73_M2}
EOSINOPHIL # BLD: 0.1 K/MCL (ref 0–0.5)
EOSINOPHIL NFR BLD: 1 %
ERYTHROCYTE [DISTWIDTH] IN BLOOD: 26.7 % (ref 11–15)
FASTING DURATION TIME PATIENT: ABNORMAL H
GLOBULIN SER-MCNC: 3.9 G/DL (ref 2–4)
GLUCOSE BLDC GLUCOMTR-MCNC: 109 MG/DL (ref 70–99)
GLUCOSE BLDC GLUCOMTR-MCNC: 111 MG/DL (ref 70–99)
GLUCOSE SERPL-MCNC: 92 MG/DL (ref 70–99)
HCT VFR BLD CALC: 35 % (ref 36–46.5)
HGB BLD-MCNC: 10.4 G/DL (ref 12–15.5)
IMM GRANULOCYTES # BLD AUTO: 0 K/MCL (ref 0–0.2)
IMM GRANULOCYTES # BLD: 0 %
LYMPHOCYTES # BLD: 1.1 K/MCL (ref 1–4)
LYMPHOCYTES NFR BLD: 11 %
MAGNESIUM SERPL-MCNC: 1.8 MG/DL (ref 1.7–2.4)
MCH RBC QN AUTO: 28.2 PG (ref 26–34)
MCHC RBC AUTO-ENTMCNC: 29.7 G/DL (ref 32–36.5)
MCV RBC AUTO: 94.9 FL (ref 78–100)
MONOCYTES # BLD: 0.5 K/MCL (ref 0.3–0.9)
MONOCYTES NFR BLD: 6 %
NEUTROPHILS # BLD: 7.6 K/MCL (ref 1.8–7.7)
NEUTROPHILS NFR BLD: 82 %
NRBC BLD MANUAL-RTO: 0 /100 WBC
PHOSPHATE SERPL-MCNC: 3.3 MG/DL (ref 2.4–4.7)
PLATELET # BLD AUTO: 167 K/MCL (ref 140–450)
POTASSIUM SERPL-SCNC: 4.3 MMOL/L (ref 3.4–5.1)
PROT SERPL-MCNC: 6.3 G/DL (ref 6.4–8.2)
RBC # BLD: 3.69 MIL/MCL (ref 4–5.2)
SODIUM SERPL-SCNC: 137 MMOL/L (ref 135–145)
WBC # BLD: 9.3 K/MCL (ref 4.2–11)

## 2024-02-17 PROCEDURE — 10002807 HB RX 258: Performed by: STUDENT IN AN ORGANIZED HEALTH CARE EDUCATION/TRAINING PROGRAM

## 2024-02-17 PROCEDURE — 10002803 HB RX 637: Performed by: INTERNAL MEDICINE

## 2024-02-17 PROCEDURE — 10002801 HB RX 250 W/O HCPCS

## 2024-02-17 PROCEDURE — 85025 COMPLETE CBC W/AUTO DIFF WBC: CPT | Performed by: STUDENT IN AN ORGANIZED HEALTH CARE EDUCATION/TRAINING PROGRAM

## 2024-02-17 PROCEDURE — 83735 ASSAY OF MAGNESIUM: CPT | Performed by: STUDENT IN AN ORGANIZED HEALTH CARE EDUCATION/TRAINING PROGRAM

## 2024-02-17 PROCEDURE — 36415 COLL VENOUS BLD VENIPUNCTURE: CPT | Performed by: STUDENT IN AN ORGANIZED HEALTH CARE EDUCATION/TRAINING PROGRAM

## 2024-02-17 PROCEDURE — 94660 CPAP INITIATION&MGMT: CPT

## 2024-02-17 PROCEDURE — 10004180 HB COUNTER-TRANSPORT

## 2024-02-17 PROCEDURE — 10006031 HB ROOM CHARGE TELEMETRY

## 2024-02-17 PROCEDURE — 10004651 HB RX, NO CHARGE ITEM: Performed by: STUDENT IN AN ORGANIZED HEALTH CARE EDUCATION/TRAINING PROGRAM

## 2024-02-17 PROCEDURE — 84100 ASSAY OF PHOSPHORUS: CPT | Performed by: STUDENT IN AN ORGANIZED HEALTH CARE EDUCATION/TRAINING PROGRAM

## 2024-02-17 PROCEDURE — 10002803 HB RX 637: Performed by: STUDENT IN AN ORGANIZED HEALTH CARE EDUCATION/TRAINING PROGRAM

## 2024-02-17 PROCEDURE — 80053 COMPREHEN METABOLIC PANEL: CPT | Performed by: STUDENT IN AN ORGANIZED HEALTH CARE EDUCATION/TRAINING PROGRAM

## 2024-02-17 RX ORDER — METOPROLOL TARTRATE 1 MG/ML
5 INJECTION, SOLUTION INTRAVENOUS EVERY 6 HOURS PRN
Status: DISCONTINUED | OUTPATIENT
Start: 2024-02-17 | End: 2024-02-20 | Stop reason: HOSPADM

## 2024-02-17 RX ORDER — METOPROLOL TARTRATE 50 MG/1
50 TABLET, FILM COATED ORAL EVERY 12 HOURS SCHEDULED
Status: DISCONTINUED | OUTPATIENT
Start: 2024-02-17 | End: 2024-02-20 | Stop reason: HOSPADM

## 2024-02-17 RX ORDER — MAGNESIUM SULFATE 1 G/100ML
1 INJECTION INTRAVENOUS ONCE
Status: COMPLETED | OUTPATIENT
Start: 2024-02-17 | End: 2024-02-18

## 2024-02-17 RX ORDER — TRAZODONE HYDROCHLORIDE 50 MG/1
50 TABLET ORAL NIGHTLY
Qty: 30 TABLET | Refills: 0 | Status: SHIPPED | OUTPATIENT
Start: 2024-02-17

## 2024-02-17 RX ORDER — METOPROLOL TARTRATE 1 MG/ML
INJECTION, SOLUTION INTRAVENOUS
Status: COMPLETED
Start: 2024-02-17 | End: 2024-02-17

## 2024-02-17 RX ADMIN — METOPROLOL TARTRATE 5 MG: 1 INJECTION, SOLUTION INTRAVENOUS at 22:20

## 2024-02-17 RX ADMIN — MIDODRINE HYDROCHLORIDE 10 MG: 5 TABLET ORAL at 12:22

## 2024-02-17 RX ADMIN — SODIUM CHLORIDE, PRESERVATIVE FREE 2 ML: 5 INJECTION INTRAVENOUS at 09:24

## 2024-02-17 RX ADMIN — Medication: at 09:18

## 2024-02-17 RX ADMIN — APIXABAN 5 MG: 5 TABLET, FILM COATED ORAL at 20:52

## 2024-02-17 RX ADMIN — MIDODRINE HYDROCHLORIDE 10 MG: 5 TABLET ORAL at 06:08

## 2024-02-17 RX ADMIN — APIXABAN 5 MG: 5 TABLET, FILM COATED ORAL at 09:18

## 2024-02-17 RX ADMIN — SODIUM CHLORIDE, PRESERVATIVE FREE 10 ML: 5 INJECTION INTRAVENOUS at 21:09

## 2024-02-17 RX ADMIN — MAGNESIUM SULFATE IN DEXTROSE 1 G: 10 INJECTION, SOLUTION INTRAVENOUS at 23:33

## 2024-02-17 RX ADMIN — SODIUM CHLORIDE, PRESERVATIVE FREE 10 ML: 5 INJECTION INTRAVENOUS at 09:24

## 2024-02-17 RX ADMIN — POLYETHYLENE GLYCOL (3350) 17 G: 17 POWDER, FOR SOLUTION ORAL at 09:18

## 2024-02-17 RX ADMIN — SODIUM CHLORIDE, PRESERVATIVE FREE 2 ML: 5 INJECTION INTRAVENOUS at 21:09

## 2024-02-17 RX ADMIN — ACETAMINOPHEN 650 MG: 325 TABLET ORAL at 01:18

## 2024-02-17 RX ADMIN — TRAZODONE HYDROCHLORIDE 50 MG: 50 TABLET ORAL at 20:52

## 2024-02-17 RX ADMIN — Medication: at 21:09

## 2024-02-17 ASSESSMENT — PAIN SCALES - GENERAL: PAINLEVEL_OUTOF10: 0

## 2024-02-18 LAB
ALBUMIN SERPL-MCNC: 2.2 G/DL (ref 3.6–5.1)
ALBUMIN/GLOB SERPL: 0.6 {RATIO} (ref 1–2.4)
ALP SERPL-CCNC: 87 UNITS/L (ref 45–117)
ALT SERPL-CCNC: 14 UNITS/L
ANION GAP SERPL CALC-SCNC: 6 MMOL/L (ref 7–19)
AST SERPL-CCNC: 37 UNITS/L
BASOPHILS # BLD: 0 K/MCL (ref 0–0.3)
BASOPHILS NFR BLD: 0 %
BILIRUB SERPL-MCNC: 0.9 MG/DL (ref 0.2–1)
BUN SERPL-MCNC: 18 MG/DL (ref 6–20)
BUN/CREAT SERPL: 35 (ref 7–25)
CALCIUM SERPL-MCNC: 9.1 MG/DL (ref 8.4–10.2)
CHLORIDE SERPL-SCNC: 103 MMOL/L (ref 97–110)
CO2 SERPL-SCNC: 32 MMOL/L (ref 21–32)
CREAT SERPL-MCNC: 0.52 MG/DL (ref 0.51–0.95)
DEPRECATED RDW RBC: 88.9 FL (ref 39–50)
EGFRCR SERPLBLD CKD-EPI 2021: 87 ML/MIN/{1.73_M2}
EOSINOPHIL # BLD: 0.1 K/MCL (ref 0–0.5)
EOSINOPHIL NFR BLD: 1 %
ERYTHROCYTE [DISTWIDTH] IN BLOOD: 27.2 % (ref 11–15)
FASTING DURATION TIME PATIENT: ABNORMAL H
GLOBULIN SER-MCNC: 3.9 G/DL (ref 2–4)
GLUCOSE BLDC GLUCOMTR-MCNC: 172 MG/DL (ref 70–99)
GLUCOSE SERPL-MCNC: 109 MG/DL (ref 70–99)
HCT VFR BLD CALC: 31.1 % (ref 36–46.5)
HGB BLD-MCNC: 9.7 G/DL (ref 12–15.5)
IMM GRANULOCYTES # BLD AUTO: 0 K/MCL (ref 0–0.2)
IMM GRANULOCYTES # BLD: 0 %
LYMPHOCYTES # BLD: 0.9 K/MCL (ref 1–4)
LYMPHOCYTES NFR BLD: 13 %
MCH RBC QN AUTO: 28.5 PG (ref 26–34)
MCHC RBC AUTO-ENTMCNC: 31.2 G/DL (ref 32–36.5)
MCV RBC AUTO: 91.5 FL (ref 78–100)
MONOCYTES # BLD: 0.5 K/MCL (ref 0.3–0.9)
MONOCYTES NFR BLD: 7 %
NEUTROPHILS # BLD: 5.7 K/MCL (ref 1.8–7.7)
NEUTROPHILS NFR BLD: 79 %
NRBC BLD MANUAL-RTO: 0 /100 WBC
PLATELET # BLD AUTO: 173 K/MCL (ref 140–450)
POTASSIUM SERPL-SCNC: 4.6 MMOL/L (ref 3.4–5.1)
PROT SERPL-MCNC: 6.1 G/DL (ref 6.4–8.2)
RBC # BLD: 3.4 MIL/MCL (ref 4–5.2)
SODIUM SERPL-SCNC: 136 MMOL/L (ref 135–145)
WBC # BLD: 7.2 K/MCL (ref 4.2–11)

## 2024-02-18 PROCEDURE — 10000002 HB ROOM CHARGE MED SURG

## 2024-02-18 PROCEDURE — 80053 COMPREHEN METABOLIC PANEL: CPT | Performed by: STUDENT IN AN ORGANIZED HEALTH CARE EDUCATION/TRAINING PROGRAM

## 2024-02-18 PROCEDURE — 10004651 HB RX, NO CHARGE ITEM: Performed by: STUDENT IN AN ORGANIZED HEALTH CARE EDUCATION/TRAINING PROGRAM

## 2024-02-18 PROCEDURE — 10002803 HB RX 637: Performed by: STUDENT IN AN ORGANIZED HEALTH CARE EDUCATION/TRAINING PROGRAM

## 2024-02-18 PROCEDURE — 94660 CPAP INITIATION&MGMT: CPT

## 2024-02-18 PROCEDURE — 36415 COLL VENOUS BLD VENIPUNCTURE: CPT | Performed by: STUDENT IN AN ORGANIZED HEALTH CARE EDUCATION/TRAINING PROGRAM

## 2024-02-18 PROCEDURE — 85025 COMPLETE CBC W/AUTO DIFF WBC: CPT | Performed by: STUDENT IN AN ORGANIZED HEALTH CARE EDUCATION/TRAINING PROGRAM

## 2024-02-18 PROCEDURE — 10004180 HB COUNTER-TRANSPORT

## 2024-02-18 RX ADMIN — METOPROLOL TARTRATE 50 MG: 50 TABLET, FILM COATED ORAL at 22:32

## 2024-02-18 RX ADMIN — METOPROLOL TARTRATE 50 MG: 50 TABLET, FILM COATED ORAL at 09:09

## 2024-02-18 RX ADMIN — METOPROLOL TARTRATE 50 MG: 50 TABLET, FILM COATED ORAL at 02:14

## 2024-02-18 RX ADMIN — POLYETHYLENE GLYCOL (3350) 17 G: 17 POWDER, FOR SOLUTION ORAL at 09:09

## 2024-02-18 RX ADMIN — Medication: at 21:30

## 2024-02-18 RX ADMIN — SODIUM CHLORIDE, PRESERVATIVE FREE 10 ML: 5 INJECTION INTRAVENOUS at 22:33

## 2024-02-18 RX ADMIN — SODIUM CHLORIDE, PRESERVATIVE FREE 10 ML: 5 INJECTION INTRAVENOUS at 09:09

## 2024-02-18 RX ADMIN — MIDODRINE HYDROCHLORIDE 10 MG: 5 TABLET ORAL at 10:28

## 2024-02-18 RX ADMIN — APIXABAN 5 MG: 5 TABLET, FILM COATED ORAL at 22:33

## 2024-02-18 RX ADMIN — SODIUM CHLORIDE, PRESERVATIVE FREE 2 ML: 5 INJECTION INTRAVENOUS at 22:36

## 2024-02-18 RX ADMIN — APIXABAN 5 MG: 5 TABLET, FILM COATED ORAL at 09:09

## 2024-02-18 RX ADMIN — MIDODRINE HYDROCHLORIDE 10 MG: 5 TABLET ORAL at 15:27

## 2024-02-18 RX ADMIN — MIDODRINE HYDROCHLORIDE 10 MG: 5 TABLET ORAL at 06:22

## 2024-02-18 RX ADMIN — Medication: at 09:10

## 2024-02-18 RX ADMIN — SODIUM CHLORIDE, PRESERVATIVE FREE 2 ML: 5 INJECTION INTRAVENOUS at 09:09

## 2024-02-18 ASSESSMENT — PAIN SCALES - PAIN ASSESSMENT IN ADVANCED DEMENTIA (PAINAD)
BREATHING: NORMAL
BODYLANGUAGE: RELAXED
CONSOLABILITY: NO NEED TO CONSOLE
BREATHING: NORMAL
TOTALSCORE: 0
CONSOLABILITY: NO NEED TO CONSOLE
BREATHING: NORMAL
BODYLANGUAGE: RELAXED
TOTALSCORE: 0
FACIALEXPRESSION: SMILING OR INEXPRESSIVE
TOTALSCORE: 0
BODYLANGUAGE: RELAXED
BODYLANGUAGE: RELAXED
BREATHING: NORMAL
TOTALSCORE: 0
CONSOLABILITY: NO NEED TO CONSOLE
BREATHING: NORMAL
FACIALEXPRESSION: SMILING OR INEXPRESSIVE
FACIALEXPRESSION: SMILING OR INEXPRESSIVE
TOTALSCORE: 0
BODYLANGUAGE: RELAXED

## 2024-02-19 LAB
ALBUMIN SERPL-MCNC: 2.6 G/DL (ref 3.6–5.1)
ALBUMIN/GLOB SERPL: 0.7 {RATIO} (ref 1–2.4)
ALP SERPL-CCNC: 91 UNITS/L (ref 45–117)
ALT SERPL-CCNC: 14 UNITS/L
ANION GAP SERPL CALC-SCNC: 8 MMOL/L (ref 7–19)
AST SERPL-CCNC: 16 UNITS/L
BASOPHILS # BLD: 0 K/MCL (ref 0–0.3)
BASOPHILS NFR BLD: 1 %
BILIRUB SERPL-MCNC: 1.1 MG/DL (ref 0.2–1)
BUN SERPL-MCNC: 16 MG/DL (ref 6–20)
BUN/CREAT SERPL: 34 (ref 7–25)
CALCIUM SERPL-MCNC: 9.3 MG/DL (ref 8.4–10.2)
CHLORIDE SERPL-SCNC: 105 MMOL/L (ref 97–110)
CO2 SERPL-SCNC: 29 MMOL/L (ref 21–32)
CREAT SERPL-MCNC: 0.47 MG/DL (ref 0.51–0.95)
DEPRECATED RDW RBC: 90.3 FL (ref 39–50)
EGFRCR SERPLBLD CKD-EPI 2021: 89 ML/MIN/{1.73_M2}
EOSINOPHIL # BLD: 0.1 K/MCL (ref 0–0.5)
EOSINOPHIL NFR BLD: 1 %
ERYTHROCYTE [DISTWIDTH] IN BLOOD: 26.9 % (ref 11–15)
FASTING DURATION TIME PATIENT: ABNORMAL H
GLOBULIN SER-MCNC: 3.8 G/DL (ref 2–4)
GLUCOSE BLDC GLUCOMTR-MCNC: 111 MG/DL (ref 70–99)
GLUCOSE SERPL-MCNC: 102 MG/DL (ref 70–99)
HCT VFR BLD CALC: 34.8 % (ref 36–46.5)
HGB BLD-MCNC: 10.6 G/DL (ref 12–15.5)
IMM GRANULOCYTES # BLD AUTO: 0 K/MCL (ref 0–0.2)
IMM GRANULOCYTES # BLD: 0 %
LYMPHOCYTES # BLD: 1.1 K/MCL (ref 1–4)
LYMPHOCYTES NFR BLD: 18 %
MCH RBC QN AUTO: 28.4 PG (ref 26–34)
MCHC RBC AUTO-ENTMCNC: 30.5 G/DL (ref 32–36.5)
MCV RBC AUTO: 93.3 FL (ref 78–100)
MONOCYTES # BLD: 0.5 K/MCL (ref 0.3–0.9)
MONOCYTES NFR BLD: 8 %
NEUTROPHILS # BLD: 4.6 K/MCL (ref 1.8–7.7)
NEUTROPHILS NFR BLD: 72 %
NRBC BLD MANUAL-RTO: 0 /100 WBC
PLATELET # BLD AUTO: 194 K/MCL (ref 140–450)
POTASSIUM SERPL-SCNC: 4 MMOL/L (ref 3.4–5.1)
PROT SERPL-MCNC: 6.4 G/DL (ref 6.4–8.2)
RBC # BLD: 3.73 MIL/MCL (ref 4–5.2)
SODIUM SERPL-SCNC: 138 MMOL/L (ref 135–145)
WBC # BLD: 6.3 K/MCL (ref 4.2–11)

## 2024-02-19 PROCEDURE — 92526 ORAL FUNCTION THERAPY: CPT

## 2024-02-19 PROCEDURE — 99222 1ST HOSP IP/OBS MODERATE 55: CPT | Performed by: HOSPITALIST

## 2024-02-19 PROCEDURE — 80053 COMPREHEN METABOLIC PANEL: CPT | Performed by: STUDENT IN AN ORGANIZED HEALTH CARE EDUCATION/TRAINING PROGRAM

## 2024-02-19 PROCEDURE — 10004651 HB RX, NO CHARGE ITEM: Performed by: STUDENT IN AN ORGANIZED HEALTH CARE EDUCATION/TRAINING PROGRAM

## 2024-02-19 PROCEDURE — 10004180 HB COUNTER-TRANSPORT

## 2024-02-19 PROCEDURE — 94660 CPAP INITIATION&MGMT: CPT

## 2024-02-19 PROCEDURE — 97530 THERAPEUTIC ACTIVITIES: CPT

## 2024-02-19 PROCEDURE — 85025 COMPLETE CBC W/AUTO DIFF WBC: CPT | Performed by: STUDENT IN AN ORGANIZED HEALTH CARE EDUCATION/TRAINING PROGRAM

## 2024-02-19 PROCEDURE — 10002803 HB RX 637: Performed by: STUDENT IN AN ORGANIZED HEALTH CARE EDUCATION/TRAINING PROGRAM

## 2024-02-19 PROCEDURE — 36415 COLL VENOUS BLD VENIPUNCTURE: CPT | Performed by: STUDENT IN AN ORGANIZED HEALTH CARE EDUCATION/TRAINING PROGRAM

## 2024-02-19 PROCEDURE — 99222 1ST HOSP IP/OBS MODERATE 55: CPT | Performed by: SURGERY

## 2024-02-19 PROCEDURE — 97164 PT RE-EVAL EST PLAN CARE: CPT

## 2024-02-19 PROCEDURE — 97535 SELF CARE MNGMENT TRAINING: CPT

## 2024-02-19 PROCEDURE — 10006031 HB ROOM CHARGE TELEMETRY

## 2024-02-19 RX ADMIN — SODIUM CHLORIDE, PRESERVATIVE FREE 2 ML: 5 INJECTION INTRAVENOUS at 08:50

## 2024-02-19 RX ADMIN — SODIUM CHLORIDE, PRESERVATIVE FREE 10 ML: 5 INJECTION INTRAVENOUS at 08:50

## 2024-02-19 RX ADMIN — METOPROLOL TARTRATE 50 MG: 50 TABLET, FILM COATED ORAL at 08:52

## 2024-02-19 RX ADMIN — METOPROLOL TARTRATE 50 MG: 50 TABLET, FILM COATED ORAL at 21:39

## 2024-02-19 RX ADMIN — APIXABAN 5 MG: 5 TABLET, FILM COATED ORAL at 08:52

## 2024-02-19 RX ADMIN — MIDODRINE HYDROCHLORIDE 10 MG: 5 TABLET ORAL at 06:22

## 2024-02-19 RX ADMIN — DIGOXIN 125 MCG: 125 TABLET ORAL at 08:52

## 2024-02-19 RX ADMIN — MIDODRINE HYDROCHLORIDE 10 MG: 5 TABLET ORAL at 12:05

## 2024-02-19 RX ADMIN — MIDODRINE HYDROCHLORIDE 10 MG: 5 TABLET ORAL at 16:18

## 2024-02-19 RX ADMIN — Medication: at 21:39

## 2024-02-19 RX ADMIN — SODIUM CHLORIDE, PRESERVATIVE FREE 10 ML: 5 INJECTION INTRAVENOUS at 21:45

## 2024-02-19 RX ADMIN — APIXABAN 5 MG: 5 TABLET, FILM COATED ORAL at 21:39

## 2024-02-19 RX ADMIN — POLYETHYLENE GLYCOL (3350) 17 G: 17 POWDER, FOR SOLUTION ORAL at 08:54

## 2024-02-19 RX ADMIN — SODIUM CHLORIDE, PRESERVATIVE FREE 2 ML: 5 INJECTION INTRAVENOUS at 21:39

## 2024-02-19 ASSESSMENT — COGNITIVE AND FUNCTIONAL STATUS - GENERAL
HELP NEEDED DRESSING REGULAR LOWER BODY CLOTHING: TOTAL
BASIC_MOBILITY_CONVERTED_SCORE: 22.61
DAILY_ACTIVITY_CONVERTED_SCORE: 30.60
HELP NEEDED FOR PERSONAL GROOMING: A LITTLE
HELP NEEDED FOR BATHING: A LOT
BASIC_MOBILITY_RAW_SCORE: 8
HELP NEEDED DRESSING REGULAR UPPER BODY CLOTHING: A LOT
DAILY_ACTIVITY_RAW_SCORE: 12
HELP NEEDED FOR TOILETING: TOTAL

## 2024-02-19 ASSESSMENT — PAIN SCALES - PAIN ASSESSMENT IN ADVANCED DEMENTIA (PAINAD)
CONSOLABILITY: NO NEED TO CONSOLE
BREATHING: NORMAL
FACIALEXPRESSION: SMILING OR INEXPRESSIVE
TOTALSCORE: 1
BREATHING: NORMAL
NEGVOCALIZATION: OCCASIONAL MOAN OR GROAN, LOW LEVELS OF SPEECH WITH A NEGATIVE OR DISAPPROVING QUALITY
FACIALEXPRESSION: SMILING OR INEXPRESSIVE
TOTALSCORE: 1
CONSOLABILITY: NO NEED TO CONSOLE
NEGVOCALIZATION: OCCASIONAL MOAN OR GROAN, LOW LEVELS OF SPEECH WITH A NEGATIVE OR DISAPPROVING QUALITY
BODYLANGUAGE: RELAXED
BODYLANGUAGE: RELAXED

## 2024-02-19 ASSESSMENT — ACTIVITIES OF DAILY LIVING (ADL): HOME_MANAGEMENT_TIME_ENTRY: 39

## 2024-02-20 VITALS
RESPIRATION RATE: 18 BRPM | DIASTOLIC BLOOD PRESSURE: 78 MMHG | SYSTOLIC BLOOD PRESSURE: 114 MMHG | WEIGHT: 104.5 LBS | OXYGEN SATURATION: 92 % | HEIGHT: 66 IN | TEMPERATURE: 97.5 F | HEART RATE: 106 BPM | BODY MASS INDEX: 16.79 KG/M2

## 2024-02-20 LAB
ALBUMIN SERPL-MCNC: 2.6 G/DL (ref 3.6–5.1)
ALBUMIN/GLOB SERPL: 0.7 {RATIO} (ref 1–2.4)
ALP SERPL-CCNC: 106 UNITS/L (ref 45–117)
ALT SERPL-CCNC: 16 UNITS/L
ANION GAP SERPL CALC-SCNC: 6 MMOL/L (ref 7–19)
AST SERPL-CCNC: 18 UNITS/L
BASOPHILS # BLD: 0 K/MCL (ref 0–0.3)
BASOPHILS NFR BLD: 1 %
BILIRUB SERPL-MCNC: 1 MG/DL (ref 0.2–1)
BUN SERPL-MCNC: 17 MG/DL (ref 6–20)
BUN/CREAT SERPL: 40 (ref 7–25)
CALCIUM SERPL-MCNC: 9.3 MG/DL (ref 8.4–10.2)
CHLORIDE SERPL-SCNC: 104 MMOL/L (ref 97–110)
CO2 SERPL-SCNC: 32 MMOL/L (ref 21–32)
CREAT SERPL-MCNC: 0.42 MG/DL (ref 0.51–0.95)
DEPRECATED RDW RBC: 89 FL (ref 39–50)
EGFRCR SERPLBLD CKD-EPI 2021: >90 ML/MIN/{1.73_M2}
EOSINOPHIL # BLD: 0.1 K/MCL (ref 0–0.5)
EOSINOPHIL NFR BLD: 1 %
ERYTHROCYTE [DISTWIDTH] IN BLOOD: 26.5 % (ref 11–15)
FASTING DURATION TIME PATIENT: ABNORMAL H
GLOBULIN SER-MCNC: 4 G/DL (ref 2–4)
GLUCOSE SERPL-MCNC: 103 MG/DL (ref 70–99)
HCT VFR BLD CALC: 36.5 % (ref 36–46.5)
HGB BLD-MCNC: 11.1 G/DL (ref 12–15.5)
IMM GRANULOCYTES # BLD AUTO: 0 K/MCL (ref 0–0.2)
IMM GRANULOCYTES # BLD: 0 %
LYMPHOCYTES # BLD: 1 K/MCL (ref 1–4)
LYMPHOCYTES NFR BLD: 14 %
MCH RBC QN AUTO: 28.1 PG (ref 26–34)
MCHC RBC AUTO-ENTMCNC: 30.4 G/DL (ref 32–36.5)
MCV RBC AUTO: 92.4 FL (ref 78–100)
MONOCYTES # BLD: 0.6 K/MCL (ref 0.3–0.9)
MONOCYTES NFR BLD: 9 %
NEUTROPHILS # BLD: 5.1 K/MCL (ref 1.8–7.7)
NEUTROPHILS NFR BLD: 75 %
NRBC BLD MANUAL-RTO: 0 /100 WBC
PLATELET # BLD AUTO: 198 K/MCL (ref 140–450)
POTASSIUM SERPL-SCNC: 3.5 MMOL/L (ref 3.4–5.1)
PROT SERPL-MCNC: 6.6 G/DL (ref 6.4–8.2)
RBC # BLD: 3.95 MIL/MCL (ref 4–5.2)
SODIUM SERPL-SCNC: 138 MMOL/L (ref 135–145)
WBC # BLD: 6.7 K/MCL (ref 4.2–11)

## 2024-02-20 PROCEDURE — 80053 COMPREHEN METABOLIC PANEL: CPT | Performed by: STUDENT IN AN ORGANIZED HEALTH CARE EDUCATION/TRAINING PROGRAM

## 2024-02-20 PROCEDURE — 99232 SBSQ HOSP IP/OBS MODERATE 35: CPT | Performed by: NURSE PRACTITIONER

## 2024-02-20 PROCEDURE — 94660 CPAP INITIATION&MGMT: CPT

## 2024-02-20 PROCEDURE — 85025 COMPLETE CBC W/AUTO DIFF WBC: CPT | Performed by: STUDENT IN AN ORGANIZED HEALTH CARE EDUCATION/TRAINING PROGRAM

## 2024-02-20 PROCEDURE — 10004180 HB COUNTER-TRANSPORT

## 2024-02-20 PROCEDURE — 97535 SELF CARE MNGMENT TRAINING: CPT

## 2024-02-20 PROCEDURE — 36415 COLL VENOUS BLD VENIPUNCTURE: CPT | Performed by: STUDENT IN AN ORGANIZED HEALTH CARE EDUCATION/TRAINING PROGRAM

## 2024-02-20 PROCEDURE — 10002803 HB RX 637: Performed by: STUDENT IN AN ORGANIZED HEALTH CARE EDUCATION/TRAINING PROGRAM

## 2024-02-20 RX ORDER — POTASSIUM CHLORIDE 20 MEQ/1
40 TABLET, EXTENDED RELEASE ORAL ONCE
Status: COMPLETED | OUTPATIENT
Start: 2024-02-20 | End: 2024-02-20

## 2024-02-20 RX ADMIN — MIDODRINE HYDROCHLORIDE 10 MG: 5 TABLET ORAL at 11:38

## 2024-02-20 RX ADMIN — Medication: at 09:38

## 2024-02-20 RX ADMIN — METOPROLOL TARTRATE 50 MG: 50 TABLET, FILM COATED ORAL at 09:25

## 2024-02-20 RX ADMIN — MIDODRINE HYDROCHLORIDE 10 MG: 5 TABLET ORAL at 06:06

## 2024-02-20 RX ADMIN — APIXABAN 5 MG: 5 TABLET, FILM COATED ORAL at 09:25

## 2024-02-20 RX ADMIN — POLYETHYLENE GLYCOL (3350) 17 G: 17 POWDER, FOR SOLUTION ORAL at 09:37

## 2024-02-20 RX ADMIN — POTASSIUM CHLORIDE 40 MEQ: 1500 TABLET, EXTENDED RELEASE ORAL at 09:25

## 2024-02-20 ASSESSMENT — PAIN SCALES - PAIN ASSESSMENT IN ADVANCED DEMENTIA (PAINAD)
CONSOLABILITY: NO NEED TO CONSOLE
BODYLANGUAGE: TENSE, DISTRESSED, FIDGETING
TOTALSCORE: 3
BREATHING: NORMAL
FACIALEXPRESSION: SAD. FRIGHTENED. FROWNING
NEGVOCALIZATION: OCCASIONAL MOAN OR GROAN, LOW LEVELS OF SPEECH WITH A NEGATIVE OR DISAPPROVING QUALITY

## 2024-02-20 ASSESSMENT — COGNITIVE AND FUNCTIONAL STATUS - GENERAL
HELP NEEDED FOR PERSONAL GROOMING: A LITTLE
HELP NEEDED FOR TOILETING: TOTAL
HELP NEEDED DRESSING REGULAR UPPER BODY CLOTHING: A LOT
DAILY_ACTIVITY_RAW_SCORE: 12
HELP NEEDED FOR BATHING: A LOT
DAILY_ACTIVITY_CONVERTED_SCORE: 30.60
HELP NEEDED DRESSING REGULAR LOWER BODY CLOTHING: TOTAL

## 2024-02-20 ASSESSMENT — ACTIVITIES OF DAILY LIVING (ADL): HOME_MANAGEMENT_TIME_ENTRY: 26

## 2024-02-20 ASSESSMENT — ENCOUNTER SYMPTOMS: FATIGUE: 1

## 2024-02-29 ENCOUNTER — CASE MANAGEMENT (OUTPATIENT)
Dept: CARE COORDINATION | Age: 89
End: 2024-02-29

## 2024-03-07 ENCOUNTER — CASE MANAGEMENT (OUTPATIENT)
Dept: CARE COORDINATION | Age: 89
End: 2024-03-07

## 2024-03-15 ENCOUNTER — CASE MANAGEMENT (OUTPATIENT)
Dept: CARE COORDINATION | Age: 89
End: 2024-03-15

## 2024-03-22 ENCOUNTER — CASE MANAGEMENT (OUTPATIENT)
Dept: CARE COORDINATION | Age: 89
End: 2024-03-22

## 2024-05-08 DIAGNOSIS — G47.33 OSA (OBSTRUCTIVE SLEEP APNEA): Primary | ICD-10-CM

## 2024-07-24 NOTE — PLAN OF CARE
Alert and oriented to self. No apparent distress noted. Pt made Strict NPO d/t not being able to tolerate PO. De sats/ choking post feeding. A fib on tele, rates irregular. Incontinent of bowel and bladder. Total assist. Fall precautions in place. Problem: Safety Risk - Non-Violent Restraints  Goal: Patient will remain free from self-harm  Description: INTERVENTIONS:  - Apply the least restrictive restraint to prevent harm  - Notify patient and family of reasons restraints applied  - Assess for any contributing factors to confusion (electrolyte disturbances, delirium, medications)  - Discontinue any unnecessary medical devices as soon as possible  - Assess the patient's physical comfort, circulation, skin condition, hydration, nutrition and elimination needs   - Reorient and redirection as needed  - Assess for the need to continue restraints  Outcome: Progressing     Problem: CARDIOVASCULAR - ADULT  Goal: Maintains optimal cardiac output and hemodynamic stability  Description: INTERVENTIONS:  - Monitor vital signs, rhythm, and trends  - Monitor for bleeding, hypotension and signs of decreased cardiac output  - Evaluate effectiveness of vasoactive medications to optimize hemodynamic stability  - Monitor arterial and/or venous puncture sites for bleeding and/or hematoma  - Assess quality of pulses, skin color and temperature  - Assess for signs of decreased coronary artery perfusion - ex.  Angina  - Evaluate fluid balance, assess for edema, trend weights  Outcome: Progressing  Goal: Absence of cardiac arrhythmias or at baseline  Description: INTERVENTIONS:  - Continuous cardiac monitoring, monitor vital signs, obtain 12 lead EKG if indicated  - Evaluate effectiveness of antiarrhythmic and heart rate control medications as ordered  - Initiate emergency measures for life threatening arrhythmias  - Monitor electrolytes and administer replacement therapy as ordered  Outcome: Progressing no

## 2024-08-15 ENCOUNTER — HOSPITAL ENCOUNTER (INPATIENT)
Age: 89
Discharge: HOME | DRG: 308 | End: 2024-08-15
Attending: EMERGENCY MEDICINE | Admitting: INTERNAL MEDICINE

## 2024-08-15 ENCOUNTER — APPOINTMENT (OUTPATIENT)
Dept: GENERAL RADIOLOGY | Age: 89
DRG: 308 | End: 2024-08-15
Attending: EMERGENCY MEDICINE

## 2024-08-15 DIAGNOSIS — J96.21 ACUTE ON CHRONIC RESPIRATORY FAILURE WITH HYPOXIA  (CMD): Primary | ICD-10-CM

## 2024-08-15 PROCEDURE — 71045 X-RAY EXAM CHEST 1 VIEW: CPT

## 2024-08-15 PROCEDURE — 99285 EMERGENCY DEPT VISIT HI MDM: CPT

## 2024-08-15 PROCEDURE — 93010 ELECTROCARDIOGRAM REPORT: CPT | Performed by: INTERNAL MEDICINE

## 2024-08-15 PROCEDURE — 93005 ELECTROCARDIOGRAM TRACING: CPT | Performed by: EMERGENCY MEDICINE

## 2024-08-15 PROCEDURE — 36415 COLL VENOUS BLD VENIPUNCTURE: CPT

## 2024-08-15 RX ORDER — 0.9 % SODIUM CHLORIDE 0.9 %
2 VIAL (ML) INJECTION EVERY 12 HOURS SCHEDULED
Status: DISCONTINUED | OUTPATIENT
Start: 2024-08-16 | End: 2024-08-21 | Stop reason: HOSPADM

## 2024-08-15 RX ORDER — FUROSEMIDE 10 MG/ML
40 INJECTION INTRAMUSCULAR; INTRAVENOUS ONCE
Status: COMPLETED | OUTPATIENT
Start: 2024-08-16 | End: 2024-08-16

## 2024-08-15 ASSESSMENT — PAIN SCALES - GENERAL: PAINLEVEL_OUTOF10: 0

## 2024-08-16 ENCOUNTER — APPOINTMENT (OUTPATIENT)
Dept: CARDIOLOGY | Age: 89
DRG: 308 | End: 2024-08-16
Attending: SPECIALIST

## 2024-08-16 PROBLEM — J96.91 HYPOXIC RESPIRATORY FAILURE  (CMD): Status: ACTIVE | Noted: 2024-08-16

## 2024-08-16 LAB
ALBUMIN SERPL-MCNC: 3 G/DL (ref 3.6–5.1)
ALBUMIN/GLOB SERPL: 0.6 {RATIO} (ref 1–2.4)
ALP SERPL-CCNC: 84 UNITS/L (ref 45–117)
ALT SERPL-CCNC: 15 UNITS/L
ANION GAP SERPL CALC-SCNC: 4 MMOL/L (ref 7–19)
ANION GAP SERPL CALC-SCNC: 9 MMOL/L (ref 7–19)
AORTIC VALVE AREA (AVA): 1.37
AORTIC VALVE AREA: 1.28
APTT PPP: 29 SEC (ref 22–32)
ASCENDING AORTA (AAD): 8
AST SERPL-CCNC: 21 UNITS/L
ATRIAL RATE (BPM): 388
AV MEAN GRADIENT (AVMG): 7
AV MEAN VELOCITY (AVMV): 1.2
AV PEAK GRADIENT (AVPG): 15
AV PEAK VELOCITY (AVPV): 1.96
AV STENOSIS SEVERITY TEXT: NORMAL
BASOPHILS # BLD: 0 K/MCL (ref 0–0.3)
BASOPHILS NFR BLD: 0 %
BILIRUB SERPL-MCNC: 1.5 MG/DL (ref 0.2–1)
BUN SERPL-MCNC: 14 MG/DL (ref 6–20)
BUN/CREAT SERPL: 30 (ref 7–25)
CALCIUM SERPL-MCNC: 9.1 MG/DL (ref 8.4–10.2)
CHLORIDE SERPL-SCNC: 93 MMOL/L (ref 97–110)
CHLORIDE SERPL-SCNC: 95 MMOL/L (ref 97–110)
CO2 SERPL-SCNC: 32 MMOL/L (ref 21–32)
CO2 SERPL-SCNC: 39 MMOL/L (ref 21–32)
CREAT SERPL-MCNC: 0.46 MG/DL (ref 0.51–0.95)
DEPRECATED RDW RBC: 55 FL (ref 39–50)
EGFRCR SERPLBLD CKD-EPI 2021: 90 ML/MIN/{1.73_M2}
EOSINOPHIL # BLD: 0 K/MCL (ref 0–0.5)
EOSINOPHIL NFR BLD: 0 %
ERYTHROCYTE [DISTWIDTH] IN BLOOD: 16.7 % (ref 11–15)
FASTING DURATION TIME PATIENT: ABNORMAL H
FLUAV RNA RESP QL NAA+PROBE: NOT DETECTED
FLUBV RNA RESP QL NAA+PROBE: NOT DETECTED
GLOBULIN SER-MCNC: 4.9 G/DL (ref 2–4)
GLUCOSE BLDC GLUCOMTR-MCNC: 111 MG/DL (ref 70–99)
GLUCOSE SERPL-MCNC: 123 MG/DL (ref 70–99)
HCT VFR BLD CALC: 35.6 % (ref 36–46.5)
HGB BLD-MCNC: 11.1 G/DL (ref 12–15.5)
IMM GRANULOCYTES # BLD AUTO: 0 K/MCL (ref 0–0.2)
IMM GRANULOCYTES # BLD: 0 %
INR PPP: 1.3
LACTATE BLDV-SCNC: 1.3 MMOL/L (ref 0–2)
LACTATE BLDV-SCNC: 2.3 MMOL/L (ref 0–2)
LV EF: NORMAL %
LVOT 2D (LVOTD): 14.8
LVOT VTI (LVOTVTI): 0.82
LYMPHOCYTES # BLD: 0.4 K/MCL (ref 1–4)
LYMPHOCYTES NFR BLD: 6 %
MAGNESIUM SERPL-MCNC: 1.7 MG/DL (ref 1.7–2.4)
MCH RBC QN AUTO: 28.2 PG (ref 26–34)
MCHC RBC AUTO-ENTMCNC: 31.2 G/DL (ref 32–36.5)
MCV RBC AUTO: 90.4 FL (ref 78–100)
MONOCYTES # BLD: 0.3 K/MCL (ref 0.3–0.9)
MONOCYTES NFR BLD: 4 %
MV PEAK A VELOCITY (MVPAV): 190
NEUTROPHILS # BLD: 6.9 K/MCL (ref 1.8–7.7)
NEUTROPHILS NFR BLD: 90 %
NRBC BLD MANUAL-RTO: 0 /100 WBC
NT-PROBNP SERPL-MCNC: 4303 PG/ML
PLATELET # BLD AUTO: 242 K/MCL (ref 140–450)
POTASSIUM SERPL-SCNC: 4 MMOL/L (ref 3.4–5.1)
POTASSIUM SERPL-SCNC: 4.5 MMOL/L (ref 3.4–5.1)
PROCALCITONIN SERPL IA-MCNC: <0.05 NG/ML
PROT SERPL-MCNC: 7.9 G/DL (ref 6.4–8.2)
PROTHROMBIN TIME: 13.4 SEC (ref 9.7–11.8)
QRS-INTERVAL (MSEC): 70
QT-INTERVAL (MSEC): 304
QTC: 411
R AXIS (DEGREES): 53
RAINBOW EXTRA TUBES HOLD SPECIMEN: NORMAL
RBC # BLD: 3.94 MIL/MCL (ref 4–5.2)
REPORT TEXT: NORMAL
RSV AG NPH QL IA.RAPID: NOT DETECTED
RV END SYSTOLIC LONGITUDINAL STRAIN FREE WALL (RVGS): 1.9
SARS-COV-2 RNA RESP QL NAA+PROBE: NOT DETECTED
SERVICE CMNT-IMP: NORMAL
SERVICE CMNT-IMP: NORMAL
SODIUM SERPL-SCNC: 129 MMOL/L (ref 135–145)
SODIUM SERPL-SCNC: 134 MMOL/L (ref 135–145)
T AXIS (DEGREES): -20
TROPONIN I SERPL DL<=0.01 NG/ML-MCNC: 10 NG/L
VENTRICULAR RATE EKG/MIN (BPM): 110
WBC # BLD: 7.7 K/MCL (ref 4.2–11)

## 2024-08-16 PROCEDURE — 80053 COMPREHEN METABOLIC PANEL: CPT | Performed by: EMERGENCY MEDICINE

## 2024-08-16 PROCEDURE — 10003585 HB ROOM CHARGE INTERMEDIATE CARE

## 2024-08-16 PROCEDURE — 93306 TTE W/DOPPLER COMPLETE: CPT

## 2024-08-16 PROCEDURE — 0241U COVID/FLU/RSV PANEL: CPT | Performed by: EMERGENCY MEDICINE

## 2024-08-16 PROCEDURE — 83735 ASSAY OF MAGNESIUM: CPT | Performed by: EMERGENCY MEDICINE

## 2024-08-16 PROCEDURE — 83880 ASSAY OF NATRIURETIC PEPTIDE: CPT | Performed by: EMERGENCY MEDICINE

## 2024-08-16 PROCEDURE — 97530 THERAPEUTIC ACTIVITIES: CPT

## 2024-08-16 PROCEDURE — 85610 PROTHROMBIN TIME: CPT | Performed by: EMERGENCY MEDICINE

## 2024-08-16 PROCEDURE — 10002803 HB RX 637: Performed by: INTERNAL MEDICINE

## 2024-08-16 PROCEDURE — 85025 COMPLETE CBC W/AUTO DIFF WBC: CPT | Performed by: EMERGENCY MEDICINE

## 2024-08-16 PROCEDURE — 10002801 HB RX 250 W/O HCPCS: Performed by: STUDENT IN AN ORGANIZED HEALTH CARE EDUCATION/TRAINING PROGRAM

## 2024-08-16 PROCEDURE — 87040 BLOOD CULTURE FOR BACTERIA: CPT | Performed by: EMERGENCY MEDICINE

## 2024-08-16 PROCEDURE — 84145 PROCALCITONIN (PCT): CPT | Performed by: EMERGENCY MEDICINE

## 2024-08-16 PROCEDURE — 10002800 HB RX 250 W HCPCS: Performed by: INTERNAL MEDICINE

## 2024-08-16 PROCEDURE — 97165 OT EVAL LOW COMPLEX 30 MIN: CPT

## 2024-08-16 PROCEDURE — 83605 ASSAY OF LACTIC ACID: CPT | Performed by: EMERGENCY MEDICINE

## 2024-08-16 PROCEDURE — 93306 TTE W/DOPPLER COMPLETE: CPT | Performed by: INTERNAL MEDICINE

## 2024-08-16 PROCEDURE — 80051 ELECTROLYTE PANEL: CPT | Performed by: INTERNAL MEDICINE

## 2024-08-16 PROCEDURE — 10002800 HB RX 250 W HCPCS: Performed by: EMERGENCY MEDICINE

## 2024-08-16 PROCEDURE — 99222 1ST HOSP IP/OBS MODERATE 55: CPT | Performed by: SPECIALIST

## 2024-08-16 PROCEDURE — 84484 ASSAY OF TROPONIN QUANT: CPT | Performed by: EMERGENCY MEDICINE

## 2024-08-16 PROCEDURE — 97535 SELF CARE MNGMENT TRAINING: CPT

## 2024-08-16 PROCEDURE — 96374 THER/PROPH/DIAG INJ IV PUSH: CPT

## 2024-08-16 PROCEDURE — 10004651 HB RX, NO CHARGE ITEM: Performed by: EMERGENCY MEDICINE

## 2024-08-16 PROCEDURE — 85730 THROMBOPLASTIN TIME PARTIAL: CPT | Performed by: EMERGENCY MEDICINE

## 2024-08-16 PROCEDURE — 36415 COLL VENOUS BLD VENIPUNCTURE: CPT | Performed by: EMERGENCY MEDICINE

## 2024-08-16 PROCEDURE — 10002803 HB RX 637: Performed by: STUDENT IN AN ORGANIZED HEALTH CARE EDUCATION/TRAINING PROGRAM

## 2024-08-16 PROCEDURE — 10002803 HB RX 637: Performed by: SPECIALIST

## 2024-08-16 RX ORDER — ACETAMINOPHEN 325 MG/1
650 TABLET ORAL EVERY 4 HOURS PRN
Status: DISCONTINUED | OUTPATIENT
Start: 2024-08-16 | End: 2024-08-21 | Stop reason: HOSPADM

## 2024-08-16 RX ORDER — DIGOXIN 125 MCG
125 TABLET ORAL
Status: DISCONTINUED | OUTPATIENT
Start: 2024-08-16 | End: 2024-08-21 | Stop reason: HOSPADM

## 2024-08-16 RX ORDER — MIDODRINE HYDROCHLORIDE 5 MG/1
10 TABLET ORAL
Status: DISCONTINUED | OUTPATIENT
Start: 2024-08-16 | End: 2024-08-20

## 2024-08-16 RX ORDER — TRAZODONE HYDROCHLORIDE 50 MG/1
50 TABLET, FILM COATED ORAL NIGHTLY
Status: DISCONTINUED | OUTPATIENT
Start: 2024-08-16 | End: 2024-08-16

## 2024-08-16 RX ORDER — AMOXICILLIN 250 MG
2 CAPSULE ORAL 2 TIMES DAILY PRN
Status: DISCONTINUED | OUTPATIENT
Start: 2024-08-16 | End: 2024-08-21 | Stop reason: HOSPADM

## 2024-08-16 RX ORDER — ONDANSETRON 4 MG/1
4 TABLET, ORALLY DISINTEGRATING ORAL EVERY 12 HOURS PRN
Status: DISCONTINUED | OUTPATIENT
Start: 2024-08-16 | End: 2024-08-21 | Stop reason: HOSPADM

## 2024-08-16 RX ORDER — TOLVAPTAN 15 MG/1
15 TABLET ORAL ONCE
Status: COMPLETED | OUTPATIENT
Start: 2024-08-16 | End: 2024-08-16

## 2024-08-16 RX ORDER — HEPARIN SODIUM 5000 [USP'U]/ML
5000 INJECTION, SOLUTION INTRAVENOUS; SUBCUTANEOUS EVERY 8 HOURS SCHEDULED
Status: DISCONTINUED | OUTPATIENT
Start: 2024-08-16 | End: 2024-08-16

## 2024-08-16 RX ORDER — FUROSEMIDE 10 MG/ML
40 INJECTION INTRAMUSCULAR; INTRAVENOUS DAILY
Status: DISCONTINUED | OUTPATIENT
Start: 2024-08-16 | End: 2024-08-16

## 2024-08-16 RX ORDER — ACETAMINOPHEN 650 MG/1
650 SUPPOSITORY RECTAL EVERY 4 HOURS PRN
Status: DISCONTINUED | OUTPATIENT
Start: 2024-08-16 | End: 2024-08-21 | Stop reason: HOSPADM

## 2024-08-16 RX ORDER — FUROSEMIDE 10 MG/ML
20 INJECTION INTRAMUSCULAR; INTRAVENOUS
Status: DISCONTINUED | OUTPATIENT
Start: 2024-08-16 | End: 2024-08-17

## 2024-08-16 RX ORDER — LANOLIN ALCOHOL/MO/W.PET/CERES
400 CREAM (GRAM) TOPICAL ONCE
Status: COMPLETED | OUTPATIENT
Start: 2024-08-16 | End: 2024-08-16

## 2024-08-16 RX ORDER — BISACODYL 10 MG
10 SUPPOSITORY, RECTAL RECTAL DAILY PRN
Status: DISCONTINUED | OUTPATIENT
Start: 2024-08-16 | End: 2024-08-21 | Stop reason: HOSPADM

## 2024-08-16 RX ORDER — ONDANSETRON 2 MG/ML
4 INJECTION INTRAMUSCULAR; INTRAVENOUS EVERY 12 HOURS PRN
Status: DISCONTINUED | OUTPATIENT
Start: 2024-08-16 | End: 2024-08-21 | Stop reason: HOSPADM

## 2024-08-16 RX ORDER — POLYETHYLENE GLYCOL 3350 17 G/17G
17 POWDER, FOR SOLUTION ORAL DAILY PRN
Status: DISCONTINUED | OUTPATIENT
Start: 2024-08-16 | End: 2024-08-21 | Stop reason: HOSPADM

## 2024-08-16 RX ORDER — METOPROLOL SUCCINATE 25 MG/1
12.5 TABLET, EXTENDED RELEASE ORAL 2 TIMES DAILY
Status: DISCONTINUED | OUTPATIENT
Start: 2024-08-16 | End: 2024-08-18

## 2024-08-16 RX ADMIN — METOPROLOL SUCCINATE 12.5 MG: 25 TABLET, EXTENDED RELEASE ORAL at 05:17

## 2024-08-16 RX ADMIN — SODIUM CHLORIDE, PRESERVATIVE FREE 2 ML: 5 INJECTION INTRAVENOUS at 03:41

## 2024-08-16 RX ADMIN — APIXABAN 2.5 MG: 2.5 TABLET, FILM COATED ORAL at 20:39

## 2024-08-16 RX ADMIN — METOPROLOL SUCCINATE 12.5 MG: 25 TABLET, EXTENDED RELEASE ORAL at 20:39

## 2024-08-16 RX ADMIN — Medication 400 MG: at 20:39

## 2024-08-16 RX ADMIN — SODIUM CHLORIDE, PRESERVATIVE FREE 2 ML: 5 INJECTION INTRAVENOUS at 10:00

## 2024-08-16 RX ADMIN — TOLVAPTAN 15 MG: 15 TABLET ORAL at 12:36

## 2024-08-16 RX ADMIN — FUROSEMIDE 40 MG: 10 INJECTION, SOLUTION INTRAVENOUS at 01:11

## 2024-08-16 RX ADMIN — Medication 400 MG: at 12:36

## 2024-08-16 RX ADMIN — MIDODRINE HYDROCHLORIDE 10 MG: 5 TABLET ORAL at 12:35

## 2024-08-16 RX ADMIN — Medication 5 MG/HR: at 10:52

## 2024-08-16 RX ADMIN — FUROSEMIDE 20 MG: 10 INJECTION, SOLUTION INTRAMUSCULAR; INTRAVENOUS at 12:36

## 2024-08-16 RX ADMIN — APIXABAN 5 MG: 5 TABLET, FILM COATED ORAL at 12:36

## 2024-08-16 RX ADMIN — DIGOXIN 125 MCG: 125 TABLET ORAL at 12:35

## 2024-08-16 SDOH — ECONOMIC STABILITY: HOUSING INSECURITY: WHAT IS YOUR LIVING SITUATION TODAY?: ADULT CHILDREN

## 2024-08-16 SDOH — SOCIAL STABILITY: SOCIAL INSECURITY: HOW OFTEN DOES ANYONE, INCLUDING FAMILY AND FRIENDS, THREATEN YOU WITH HARM?: NEVER

## 2024-08-16 SDOH — ECONOMIC STABILITY: INCOME INSECURITY: IN THE PAST 12 MONTHS, HAS THE ELECTRIC, GAS, OIL, OR WATER COMPANY THREATENED TO SHUT OFF SERVICE IN YOUR HOME?: NO

## 2024-08-16 SDOH — ECONOMIC STABILITY: GENERAL

## 2024-08-16 SDOH — ECONOMIC STABILITY: GENERAL: WOULD YOU LIKE HELP WITH ANY OF THE FOLLOWING NEEDS?: I DON'T WANT HELP WITH ANY OF THESE

## 2024-08-16 SDOH — ECONOMIC STABILITY: FOOD INSECURITY: WITHIN THE PAST 12 MONTHS, THE FOOD YOU BOUGHT JUST DIDN'T LAST AND YOU DIDN'T HAVE MONEY TO GET MORE.: NEVER TRUE

## 2024-08-16 SDOH — ECONOMIC STABILITY: HOUSING INSECURITY: DO YOU HAVE PROBLEMS WITH ANY OF THE FOLLOWING?: NONE OF THE ABOVE

## 2024-08-16 SDOH — HEALTH STABILITY: PHYSICAL HEALTH: DO YOU HAVE DIFFICULTY DRESSING OR BATHING?: YES

## 2024-08-16 SDOH — ECONOMIC STABILITY: HOUSING INSECURITY: WHAT IS YOUR LIVING SITUATION TODAY?: I HAVE A STEADY PLACE TO LIVE

## 2024-08-16 SDOH — HEALTH STABILITY: GENERAL
BECAUSE OF A PHYSICAL, MENTAL, OR EMOTIONAL CONDITION, DO YOU HAVE SERIOUS DIFFICULTY CONCENTRATING, REMEMBERING OR MAKING DECISIONS?: NO

## 2024-08-16 SDOH — SOCIAL STABILITY: SOCIAL NETWORK: SUPPORT SYSTEMS: FAMILY MEMBERS;CHILDREN

## 2024-08-16 SDOH — SOCIAL STABILITY: SOCIAL INSECURITY: HOW OFTEN DOES ANYONE, INCLUDING FAMILY AND FRIENDS, SCREAM OR CURSE AT YOU?: NEVER

## 2024-08-16 SDOH — HEALTH STABILITY: GENERAL: BECAUSE OF A PHYSICAL, MENTAL, OR EMOTIONAL CONDITION, DO YOU HAVE DIFFICULTY DOING ERRANDS ALONE?: YES

## 2024-08-16 SDOH — HEALTH STABILITY: PHYSICAL HEALTH: DO YOU HAVE SERIOUS DIFFICULTY WALKING OR CLIMBING STAIRS?: YES

## 2024-08-16 SDOH — SOCIAL STABILITY: SOCIAL INSECURITY: HOW OFTEN DOES ANYONE, INCLUDING FAMILY AND FRIENDS, PHYSICALLY HURT YOU?: NEVER

## 2024-08-16 SDOH — SOCIAL STABILITY: SOCIAL INSECURITY: HOW OFTEN DOES ANYONE, INCLUDING FAMILY AND FRIENDS, INSULT OR TALK DOWN TO YOU?: NEVER

## 2024-08-16 SDOH — ECONOMIC STABILITY: HOUSING INSECURITY: WHAT IS YOUR LIVING SITUATION TODAY?: HOUSE

## 2024-08-16 ASSESSMENT — LIFESTYLE VARIABLES
HOW OFTEN DO YOU HAVE 6 OR MORE DRINKS ON ONE OCCASION: NEVER
HOW OFTEN DO YOU HAVE A DRINK CONTAINING ALCOHOL: NEVER
HOW MANY STANDARD DRINKS CONTAINING ALCOHOL DO YOU HAVE ON A TYPICAL DAY: 0,1 OR 2
ALCOHOL_USE_STATUS: NO OR LOW RISK WITH VALIDATED TOOL
AUDIT-C TOTAL SCORE: 0

## 2024-08-16 ASSESSMENT — ORIENTATION MEMORY CONCENTRATION TEST (OMCT)
REPEAT THE NAME AND ADDRESS I ASKED YOU TO REMEMBER: 1 ERROR
OMCT SCORE: 2
COUNT BACKWARDS FROM 20 TO 1: CORRECT
WHAT TIME IS IT (NO WATCH OR CLOCK): CORRECT
SAY THE MONTHS IN REVERSE ORDER STARTING WITH LAST MONTH: CORRECT
OMCT INTERPRETATION: 0-6: NO SIGNIFICANT IMPAIRMENT
WHAT YEAR IS IT NOW (MUST BE EXACT): CORRECT
WHAT MONTH IS IT NOW: CORRECT

## 2024-08-16 ASSESSMENT — ENCOUNTER SYMPTOMS
FEVER: 0
VOMITING: 0
DIARRHEA: 0
DIZZINESS: 0
NAUSEA: 0
SHORTNESS OF BREATH: 1
LIGHT-HEADEDNESS: 0
CHILLS: 0
ABDOMINAL PAIN: 0

## 2024-08-16 ASSESSMENT — COGNITIVE AND FUNCTIONAL STATUS - GENERAL
DO YOU HAVE SERIOUS DIFFICULTY WALKING OR CLIMBING STAIRS: YES
BECAUSE OF A PHYSICAL, MENTAL, OR EMOTIONAL CONDITION, DO YOU HAVE DIFFICULTY DOING ERRANDS ALONE: YES
HELP NEEDED FOR TOILETING: A LOT
BECAUSE OF A PHYSICAL, MENTAL, OR EMOTIONAL CONDITION, DO YOU HAVE SERIOUS DIFFICULTY CONCENTRATING, REMEMBERING OR MAKING DECISIONS: NO
DO YOU HAVE DIFFICULTY DRESSING OR BATHING: YES
HELP NEEDED FOR PERSONAL GROOMING: A LITTLE
HELP NEEDED FOR BATHING: A LOT
DAILY_ACTIVITY_RAW_SCORE: 15
DAILY_ACTIVITY_CONVERTED_SCORE: 34.69
HELP NEEDED DRESSING REGULAR UPPER BODY CLOTHING: A LOT
HELP NEEDED DRESSING REGULAR LOWER BODY CLOTHING: A LOT

## 2024-08-16 ASSESSMENT — ACTIVITIES OF DAILY LIVING (ADL)
TOILETING: NEEDS ASSISTANCE
RECENT_DECLINE_ADL: YES, DECLINE IN BATHING/DRESSING/FEEDING, COLLABORATE WITH PROVIDER (T);YES, DECLINE IN AMBULATION/TRANSFERRING, COLLABORATE WITH PROVIDER (T)
ADL_SCORE: 7
BATHING: NEEDS ASSISTANCE
FEEDING: INDEPENDENT
ADL_SHORT_OF_BREATH: YES
DRESSING: NEEDS ASSISTANCE
ADL_BEFORE_ADMISSION: NEEDS/REQUIRES ASSISTANCE
HOME_MANAGEMENT_TIME_ENTRY: 12

## 2024-08-16 ASSESSMENT — PATIENT HEALTH QUESTIONNAIRE - PHQ9
IS PATIENT ABLE TO COMPLETE PHQ2 OR PHQ9: YES
2. FEELING DOWN, DEPRESSED OR HOPELESS: NOT AT ALL
1. LITTLE INTEREST OR PLEASURE IN DOING THINGS: NOT AT ALL
SUM OF ALL RESPONSES TO PHQ9 QUESTIONS 1 AND 2: 0
SUM OF ALL RESPONSES TO PHQ9 QUESTIONS 1 AND 2: 0
CLINICAL INTERPRETATION OF PHQ2 SCORE: NO FURTHER SCREENING NEEDED

## 2024-08-16 ASSESSMENT — COLUMBIA-SUICIDE SEVERITY RATING SCALE - C-SSRS
1. WITHIN THE PAST MONTH, HAVE YOU WISHED YOU WERE DEAD OR WISHED YOU COULD GO TO SLEEP AND NOT WAKE UP?: NO
6. HAVE YOU EVER DONE ANYTHING, STARTED TO DO ANYTHING, OR PREPARED TO DO ANYTHING TO END YOUR LIFE?: NO
IS THE PATIENT ABLE TO COMPLETE C-SSRS: YES
2. HAVE YOU ACTUALLY HAD ANY THOUGHTS OF KILLING YOURSELF?: NO

## 2024-08-16 ASSESSMENT — PAIN SCALES - GENERAL
PAINLEVEL_OUTOF10: 0

## 2024-08-17 VITALS
OXYGEN SATURATION: 100 % | HEART RATE: 101 BPM | DIASTOLIC BLOOD PRESSURE: 74 MMHG | TEMPERATURE: 96.3 F | HEIGHT: 66 IN | BODY MASS INDEX: 18.64 KG/M2 | SYSTOLIC BLOOD PRESSURE: 94 MMHG | RESPIRATION RATE: 16 BRPM | WEIGHT: 115.96 LBS

## 2024-08-17 LAB
ALBUMIN SERPL-MCNC: 2.7 G/DL (ref 3.6–5.1)
ALBUMIN/GLOB SERPL: 0.7 {RATIO} (ref 1–2.4)
ALP SERPL-CCNC: 76 UNITS/L (ref 45–117)
ALT SERPL-CCNC: 14 UNITS/L
ANION GAP SERPL CALC-SCNC: 5 MMOL/L (ref 7–19)
AST SERPL-CCNC: 12 UNITS/L
BACTERIA BLD CULT: NORMAL
BACTERIA BLD CULT: NORMAL
BASOPHILS # BLD: 0 K/MCL (ref 0–0.3)
BASOPHILS NFR BLD: 0 %
BILIRUB SERPL-MCNC: 0.8 MG/DL (ref 0.2–1)
BUN SERPL-MCNC: 19 MG/DL (ref 6–20)
BUN/CREAT SERPL: 26 (ref 7–25)
CALCIUM SERPL-MCNC: 9 MG/DL (ref 8.4–10.2)
CHLORIDE SERPL-SCNC: 94 MMOL/L (ref 97–110)
CO2 SERPL-SCNC: 39 MMOL/L (ref 21–32)
CREAT SERPL-MCNC: 0.73 MG/DL (ref 0.51–0.95)
DEPRECATED RDW RBC: 56.3 FL (ref 39–50)
EGFRCR SERPLBLD CKD-EPI 2021: 77 ML/MIN/{1.73_M2}
EOSINOPHIL # BLD: 0.1 K/MCL (ref 0–0.5)
EOSINOPHIL NFR BLD: 2 %
ERYTHROCYTE [DISTWIDTH] IN BLOOD: 16.3 % (ref 11–15)
FASTING DURATION TIME PATIENT: ABNORMAL H
GLOBULIN SER-MCNC: 4 G/DL (ref 2–4)
GLUCOSE SERPL-MCNC: 100 MG/DL (ref 70–99)
HCT VFR BLD CALC: 33.7 % (ref 36–46.5)
HGB BLD-MCNC: 10.1 G/DL (ref 12–15.5)
IMM GRANULOCYTES # BLD AUTO: 0 K/MCL (ref 0–0.2)
IMM GRANULOCYTES # BLD: 0 %
LYMPHOCYTES # BLD: 0.7 K/MCL (ref 1–4)
LYMPHOCYTES NFR BLD: 9 %
MAGNESIUM SERPL-MCNC: 1.8 MG/DL (ref 1.7–2.4)
MCH RBC QN AUTO: 28.3 PG (ref 26–34)
MCHC RBC AUTO-ENTMCNC: 30 G/DL (ref 32–36.5)
MCV RBC AUTO: 94.4 FL (ref 78–100)
MONOCYTES # BLD: 0.7 K/MCL (ref 0.3–0.9)
MONOCYTES NFR BLD: 9 %
NEUTROPHILS # BLD: 5.6 K/MCL (ref 1.8–7.7)
NEUTROPHILS NFR BLD: 80 %
NRBC BLD MANUAL-RTO: 0 /100 WBC
PLATELET # BLD AUTO: 206 K/MCL (ref 140–450)
POTASSIUM SERPL-SCNC: 4.2 MMOL/L (ref 3.4–5.1)
PROT SERPL-MCNC: 6.7 G/DL (ref 6.4–8.2)
RBC # BLD: 3.57 MIL/MCL (ref 4–5.2)
SODIUM SERPL-SCNC: 134 MMOL/L (ref 135–145)
WBC # BLD: 7.1 K/MCL (ref 4.2–11)

## 2024-08-17 PROCEDURE — 10002803 HB RX 637: Performed by: SPECIALIST

## 2024-08-17 PROCEDURE — 36415 COLL VENOUS BLD VENIPUNCTURE: CPT | Performed by: STUDENT IN AN ORGANIZED HEALTH CARE EDUCATION/TRAINING PROGRAM

## 2024-08-17 PROCEDURE — 10002803 HB RX 637: Performed by: INTERNAL MEDICINE

## 2024-08-17 PROCEDURE — 10003585 HB ROOM CHARGE INTERMEDIATE CARE

## 2024-08-17 PROCEDURE — 10004651 HB RX, NO CHARGE ITEM: Performed by: EMERGENCY MEDICINE

## 2024-08-17 PROCEDURE — 85025 COMPLETE CBC W/AUTO DIFF WBC: CPT | Performed by: STUDENT IN AN ORGANIZED HEALTH CARE EDUCATION/TRAINING PROGRAM

## 2024-08-17 PROCEDURE — 83735 ASSAY OF MAGNESIUM: CPT | Performed by: STUDENT IN AN ORGANIZED HEALTH CARE EDUCATION/TRAINING PROGRAM

## 2024-08-17 PROCEDURE — 80053 COMPREHEN METABOLIC PANEL: CPT | Performed by: STUDENT IN AN ORGANIZED HEALTH CARE EDUCATION/TRAINING PROGRAM

## 2024-08-17 PROCEDURE — 10002800 HB RX 250 W HCPCS: Performed by: INTERNAL MEDICINE

## 2024-08-17 PROCEDURE — 99233 SBSQ HOSP IP/OBS HIGH 50: CPT | Performed by: INTERNAL MEDICINE

## 2024-08-17 PROCEDURE — 10002801 HB RX 250 W/O HCPCS: Performed by: STUDENT IN AN ORGANIZED HEALTH CARE EDUCATION/TRAINING PROGRAM

## 2024-08-17 RX ORDER — FUROSEMIDE 10 MG/ML
20 INJECTION INTRAMUSCULAR; INTRAVENOUS
Status: DISCONTINUED | OUTPATIENT
Start: 2024-08-17 | End: 2024-08-21 | Stop reason: HOSPADM

## 2024-08-17 RX ORDER — TOLVAPTAN 15 MG/1
15 TABLET ORAL ONCE
Status: COMPLETED | OUTPATIENT
Start: 2024-08-17 | End: 2024-08-17

## 2024-08-17 RX ORDER — FUROSEMIDE 10 MG/ML
20 INJECTION INTRAMUSCULAR; INTRAVENOUS DAILY
Status: DISCONTINUED | OUTPATIENT
Start: 2024-08-18 | End: 2024-08-17

## 2024-08-17 RX ADMIN — SODIUM CHLORIDE, PRESERVATIVE FREE 2 ML: 5 INJECTION INTRAVENOUS at 08:19

## 2024-08-17 RX ADMIN — FUROSEMIDE 20 MG: 10 INJECTION, SOLUTION INTRAMUSCULAR; INTRAVENOUS at 16:54

## 2024-08-17 RX ADMIN — SODIUM CHLORIDE, PRESERVATIVE FREE 2 ML: 5 INJECTION INTRAVENOUS at 20:04

## 2024-08-17 RX ADMIN — Medication 5 MG/HR: at 08:35

## 2024-08-17 RX ADMIN — METOPROLOL SUCCINATE 12.5 MG: 25 TABLET, EXTENDED RELEASE ORAL at 20:04

## 2024-08-17 RX ADMIN — APIXABAN 2.5 MG: 2.5 TABLET, FILM COATED ORAL at 08:19

## 2024-08-17 RX ADMIN — FUROSEMIDE 20 MG: 10 INJECTION, SOLUTION INTRAMUSCULAR; INTRAVENOUS at 08:19

## 2024-08-17 RX ADMIN — METOPROLOL SUCCINATE 12.5 MG: 25 TABLET, EXTENDED RELEASE ORAL at 08:19

## 2024-08-17 RX ADMIN — APIXABAN 2.5 MG: 2.5 TABLET, FILM COATED ORAL at 20:04

## 2024-08-17 RX ADMIN — TOLVAPTAN 15 MG: 15 TABLET ORAL at 15:11

## 2024-08-17 ASSESSMENT — PAIN SCALES - GENERAL
PAINLEVEL_OUTOF10: 0

## 2024-08-18 ENCOUNTER — APPOINTMENT (OUTPATIENT)
Dept: GENERAL RADIOLOGY | Age: 89
DRG: 308 | End: 2024-08-18
Attending: INTERNAL MEDICINE

## 2024-08-18 LAB
ALBUMIN SERPL-MCNC: 2.6 G/DL (ref 3.6–5.1)
ALBUMIN/GLOB SERPL: 0.7 {RATIO} (ref 1–2.4)
ALP SERPL-CCNC: 63 UNITS/L (ref 45–117)
ALT SERPL-CCNC: 11 UNITS/L
ANION GAP SERPL CALC-SCNC: 5 MMOL/L (ref 7–19)
AST SERPL-CCNC: 12 UNITS/L
BASOPHILS # BLD: 0 K/MCL (ref 0–0.3)
BASOPHILS NFR BLD: 0 %
BILIRUB SERPL-MCNC: 0.9 MG/DL (ref 0.2–1)
BUN SERPL-MCNC: 15 MG/DL (ref 6–20)
BUN/CREAT SERPL: 23 (ref 7–25)
CALCIUM SERPL-MCNC: 8.8 MG/DL (ref 8.4–10.2)
CHLORIDE SERPL-SCNC: 96 MMOL/L (ref 97–110)
CO2 SERPL-SCNC: 41 MMOL/L (ref 21–32)
CREAT SERPL-MCNC: 0.66 MG/DL (ref 0.51–0.95)
DEPRECATED RDW RBC: 56.4 FL (ref 39–50)
DIGOXIN SERPL-MCNC: 0.7 NG/ML (ref 0.8–2.1)
EGFRCR SERPLBLD CKD-EPI 2021: 82 ML/MIN/{1.73_M2}
EOSINOPHIL # BLD: 0.1 K/MCL (ref 0–0.5)
EOSINOPHIL NFR BLD: 1 %
ERYTHROCYTE [DISTWIDTH] IN BLOOD: 16.4 % (ref 11–15)
FASTING DURATION TIME PATIENT: ABNORMAL H
GLOBULIN SER-MCNC: 3.8 G/DL (ref 2–4)
GLUCOSE SERPL-MCNC: 98 MG/DL (ref 70–99)
HCT VFR BLD CALC: 32.4 % (ref 36–46.5)
HGB BLD-MCNC: 9.6 G/DL (ref 12–15.5)
IMM GRANULOCYTES # BLD AUTO: 0 K/MCL (ref 0–0.2)
IMM GRANULOCYTES # BLD: 0 %
LYMPHOCYTES # BLD: 0.6 K/MCL (ref 1–4)
LYMPHOCYTES NFR BLD: 9 %
MCH RBC QN AUTO: 27.8 PG (ref 26–34)
MCHC RBC AUTO-ENTMCNC: 29.6 G/DL (ref 32–36.5)
MCV RBC AUTO: 93.9 FL (ref 78–100)
MONOCYTES # BLD: 0.5 K/MCL (ref 0.3–0.9)
MONOCYTES NFR BLD: 8 %
NEUTROPHILS # BLD: 5.4 K/MCL (ref 1.8–7.7)
NEUTROPHILS NFR BLD: 82 %
NRBC BLD MANUAL-RTO: 0 /100 WBC
PLATELET # BLD AUTO: 226 K/MCL (ref 140–450)
POTASSIUM SERPL-SCNC: 3.7 MMOL/L (ref 3.4–5.1)
PROT SERPL-MCNC: 6.4 G/DL (ref 6.4–8.2)
RBC # BLD: 3.45 MIL/MCL (ref 4–5.2)
SODIUM SERPL-SCNC: 138 MMOL/L (ref 135–145)
WBC # BLD: 6.6 K/MCL (ref 4.2–11)

## 2024-08-18 PROCEDURE — 10002803 HB RX 637: Performed by: INTERNAL MEDICINE

## 2024-08-18 PROCEDURE — 99223 1ST HOSP IP/OBS HIGH 75: CPT | Performed by: INTERNAL MEDICINE

## 2024-08-18 PROCEDURE — 10002803 HB RX 637: Performed by: SPECIALIST

## 2024-08-18 PROCEDURE — 80053 COMPREHEN METABOLIC PANEL: CPT | Performed by: INTERNAL MEDICINE

## 2024-08-18 PROCEDURE — 71045 X-RAY EXAM CHEST 1 VIEW: CPT

## 2024-08-18 PROCEDURE — 10002801 HB RX 250 W/O HCPCS: Performed by: STUDENT IN AN ORGANIZED HEALTH CARE EDUCATION/TRAINING PROGRAM

## 2024-08-18 PROCEDURE — 10003585 HB ROOM CHARGE INTERMEDIATE CARE

## 2024-08-18 PROCEDURE — 85025 COMPLETE CBC W/AUTO DIFF WBC: CPT | Performed by: STUDENT IN AN ORGANIZED HEALTH CARE EDUCATION/TRAINING PROGRAM

## 2024-08-18 PROCEDURE — 10004651 HB RX, NO CHARGE ITEM: Performed by: EMERGENCY MEDICINE

## 2024-08-18 PROCEDURE — 36415 COLL VENOUS BLD VENIPUNCTURE: CPT | Performed by: INTERNAL MEDICINE

## 2024-08-18 PROCEDURE — 80162 ASSAY OF DIGOXIN TOTAL: CPT | Performed by: INTERNAL MEDICINE

## 2024-08-18 PROCEDURE — 99233 SBSQ HOSP IP/OBS HIGH 50: CPT | Performed by: INTERNAL MEDICINE

## 2024-08-18 PROCEDURE — 10002800 HB RX 250 W HCPCS: Performed by: INTERNAL MEDICINE

## 2024-08-18 RX ORDER — TOLVAPTAN 15 MG/1
15 TABLET ORAL ONCE
Status: COMPLETED | OUTPATIENT
Start: 2024-08-18 | End: 2024-08-18

## 2024-08-18 RX ORDER — METOPROLOL SUCCINATE 25 MG/1
12.5 TABLET, EXTENDED RELEASE ORAL 2 TIMES DAILY
Status: DISCONTINUED | OUTPATIENT
Start: 2024-08-18 | End: 2024-08-18

## 2024-08-18 RX ORDER — METOPROLOL SUCCINATE 25 MG/1
25 TABLET, EXTENDED RELEASE ORAL 2 TIMES DAILY
Status: DISCONTINUED | OUTPATIENT
Start: 2024-08-18 | End: 2024-08-18

## 2024-08-18 RX ORDER — DIGOXIN 0.25 MG/ML
125 INJECTION INTRAMUSCULAR; INTRAVENOUS ONCE
Status: COMPLETED | OUTPATIENT
Start: 2024-08-18 | End: 2024-08-18

## 2024-08-18 RX ORDER — METOPROLOL SUCCINATE 25 MG/1
25 TABLET, EXTENDED RELEASE ORAL 2 TIMES DAILY
Status: DISCONTINUED | OUTPATIENT
Start: 2024-08-18 | End: 2024-08-21 | Stop reason: HOSPADM

## 2024-08-18 RX ADMIN — TOLVAPTAN 15 MG: 15 TABLET ORAL at 13:48

## 2024-08-18 RX ADMIN — SODIUM CHLORIDE, PRESERVATIVE FREE 2 ML: 5 INJECTION INTRAVENOUS at 21:44

## 2024-08-18 RX ADMIN — SODIUM CHLORIDE, PRESERVATIVE FREE 2 ML: 5 INJECTION INTRAVENOUS at 09:11

## 2024-08-18 RX ADMIN — FUROSEMIDE 20 MG: 10 INJECTION, SOLUTION INTRAMUSCULAR; INTRAVENOUS at 17:45

## 2024-08-18 RX ADMIN — FUROSEMIDE 20 MG: 10 INJECTION, SOLUTION INTRAMUSCULAR; INTRAVENOUS at 09:12

## 2024-08-18 RX ADMIN — METOPROLOL SUCCINATE 12.5 MG: 25 TABLET, EXTENDED RELEASE ORAL at 09:12

## 2024-08-18 RX ADMIN — METOPROLOL SUCCINATE 25 MG: 25 TABLET, EXTENDED RELEASE ORAL at 21:44

## 2024-08-18 RX ADMIN — APIXABAN 2.5 MG: 2.5 TABLET, FILM COATED ORAL at 09:12

## 2024-08-18 RX ADMIN — DIGOXIN 125 MCG: 250 INJECTION, SOLUTION INTRAMUSCULAR; INTRAVENOUS; PARENTERAL at 15:37

## 2024-08-18 ASSESSMENT — PAIN SCALES - GENERAL
PAINLEVEL_OUTOF10: 0

## 2024-08-19 LAB
ALBUMIN SERPL-MCNC: 2.7 G/DL (ref 3.6–5.1)
ALBUMIN/GLOB SERPL: 0.7 {RATIO} (ref 1–2.4)
ALP SERPL-CCNC: 75 UNITS/L (ref 45–117)
ALT SERPL-CCNC: 20 UNITS/L
ANION GAP SERPL CALC-SCNC: 3 MMOL/L (ref 7–19)
APPEARANCE FLD: NORMAL
AST SERPL-CCNC: 22 UNITS/L
BILIRUB SERPL-MCNC: 0.8 MG/DL (ref 0.2–1)
BUN SERPL-MCNC: 16 MG/DL (ref 6–20)
BUN/CREAT SERPL: 28 (ref 7–25)
CALCIUM SERPL-MCNC: 9.1 MG/DL (ref 8.4–10.2)
CHLORIDE SERPL-SCNC: 94 MMOL/L (ref 97–110)
CO2 SERPL-SCNC: 45 MMOL/L (ref 21–32)
COLOR FLD: YELLOW
CREAT SERPL-MCNC: 0.57 MG/DL (ref 0.51–0.95)
DEPRECATED RDW RBC: 58.2 FL (ref 39–50)
EGFRCR SERPLBLD CKD-EPI 2021: 85 ML/MIN/{1.73_M2}
ERYTHROCYTE [DISTWIDTH] IN BLOOD: 16.5 % (ref 11–15)
FASTING DURATION TIME PATIENT: ABNORMAL H
GLOBULIN SER-MCNC: 4.1 G/DL (ref 2–4)
GLUCOSE FLD-MCNC: 108 MG/DL (ref 80–120)
GLUCOSE SERPL-MCNC: 106 MG/DL (ref 70–99)
HCT VFR BLD CALC: 34.4 % (ref 36–46.5)
HGB BLD-MCNC: 10.2 G/DL (ref 12–15.5)
LDH FLD L TO P-CCNC: 51 UNITS/L (ref 40–120)
LYMPHOCYTES NFR FLD: 81 %
MCH RBC QN AUTO: 28.6 PG (ref 26–34)
MCHC RBC AUTO-ENTMCNC: 29.7 G/DL (ref 32–36.5)
MCV RBC AUTO: 96.4 FL (ref 78–100)
MESOTHL CELL NFR FLD: 2 %
MONOCYTES NFR FLD: 11 %
NEUTS SEG NFR FLD: 6 %
NRBC BLD MANUAL-RTO: 0 /100 WBC
PLATELET # BLD AUTO: 234 K/MCL (ref 140–450)
POTASSIUM SERPL-SCNC: 4.1 MMOL/L (ref 3.4–5.1)
PROT FLD-MCNC: 2.2 G/DL (ref 1–2.5)
PROT SERPL-MCNC: 6.8 G/DL (ref 6.4–8.2)
RAINBOW EXTRA TUBES HOLD SPECIMEN: NORMAL
RBC # BLD: 3.57 MIL/MCL (ref 4–5.2)
SODIUM SERPL-SCNC: 138 MMOL/L (ref 135–145)
TOTAL CELLS COUNTED FLD: 100
WBC # BLD: 7.7 K/MCL (ref 4.2–11)
WBC # FLD: 278 /MCL (ref 0–1000)

## 2024-08-19 PROCEDURE — 80053 COMPREHEN METABOLIC PANEL: CPT | Performed by: INTERNAL MEDICINE

## 2024-08-19 PROCEDURE — 10002803 HB RX 637: Performed by: STUDENT IN AN ORGANIZED HEALTH CARE EDUCATION/TRAINING PROGRAM

## 2024-08-19 PROCEDURE — 32555 ASPIRATE PLEURA W/ IMAGING: CPT | Performed by: INTERNAL MEDICINE

## 2024-08-19 PROCEDURE — 87075 CULTR BACTERIA EXCEPT BLOOD: CPT | Performed by: INTERNAL MEDICINE

## 2024-08-19 PROCEDURE — 10002800 HB RX 250 W HCPCS: Performed by: INTERNAL MEDICINE

## 2024-08-19 PROCEDURE — 99233 SBSQ HOSP IP/OBS HIGH 50: CPT | Performed by: INTERNAL MEDICINE

## 2024-08-19 PROCEDURE — 10003585 HB ROOM CHARGE INTERMEDIATE CARE

## 2024-08-19 PROCEDURE — 82945 GLUCOSE OTHER FLUID: CPT | Performed by: INTERNAL MEDICINE

## 2024-08-19 PROCEDURE — 10002803 HB RX 637: Performed by: INTERNAL MEDICINE

## 2024-08-19 PROCEDURE — 83615 LACTATE (LD) (LDH) ENZYME: CPT | Performed by: INTERNAL MEDICINE

## 2024-08-19 PROCEDURE — 89051 BODY FLUID CELL COUNT: CPT | Performed by: INTERNAL MEDICINE

## 2024-08-19 PROCEDURE — 97162 PT EVAL MOD COMPLEX 30 MIN: CPT

## 2024-08-19 PROCEDURE — 36415 COLL VENOUS BLD VENIPUNCTURE: CPT | Performed by: STUDENT IN AN ORGANIZED HEALTH CARE EDUCATION/TRAINING PROGRAM

## 2024-08-19 PROCEDURE — 84157 ASSAY OF PROTEIN OTHER: CPT | Performed by: INTERNAL MEDICINE

## 2024-08-19 PROCEDURE — 0W9G3ZZ DRAINAGE OF PERITONEAL CAVITY, PERCUTANEOUS APPROACH: ICD-10-PCS | Performed by: INTERNAL MEDICINE

## 2024-08-19 PROCEDURE — 85027 COMPLETE CBC AUTOMATED: CPT | Performed by: STUDENT IN AN ORGANIZED HEALTH CARE EDUCATION/TRAINING PROGRAM

## 2024-08-19 PROCEDURE — 97530 THERAPEUTIC ACTIVITIES: CPT

## 2024-08-19 PROCEDURE — 88112 CYTOPATH CELL ENHANCE TECH: CPT | Performed by: INTERNAL MEDICINE

## 2024-08-19 PROCEDURE — 96372 THER/PROPH/DIAG INJ SC/IM: CPT | Performed by: INTERNAL MEDICINE

## 2024-08-19 PROCEDURE — 10004651 HB RX, NO CHARGE ITEM: Performed by: EMERGENCY MEDICINE

## 2024-08-19 PROCEDURE — 0W993ZZ DRAINAGE OF RIGHT PLEURAL CAVITY, PERCUTANEOUS APPROACH: ICD-10-PCS | Performed by: INTERNAL MEDICINE

## 2024-08-19 RX ORDER — ENOXAPARIN SODIUM 100 MG/ML
1 INJECTION SUBCUTANEOUS ONCE
Status: COMPLETED | OUTPATIENT
Start: 2024-08-19 | End: 2024-08-19

## 2024-08-19 RX ORDER — ACETAZOLAMIDE 250 MG/1
250 TABLET ORAL 2 TIMES DAILY
Status: COMPLETED | OUTPATIENT
Start: 2024-08-19 | End: 2024-08-20

## 2024-08-19 RX ADMIN — SODIUM CHLORIDE, PRESERVATIVE FREE 2 ML: 5 INJECTION INTRAVENOUS at 22:01

## 2024-08-19 RX ADMIN — METOPROLOL SUCCINATE 25 MG: 25 TABLET, EXTENDED RELEASE ORAL at 22:00

## 2024-08-19 RX ADMIN — SODIUM CHLORIDE, PRESERVATIVE FREE 2 ML: 5 INJECTION INTRAVENOUS at 10:15

## 2024-08-19 RX ADMIN — DIGOXIN 125 MCG: 125 TABLET ORAL at 10:10

## 2024-08-19 RX ADMIN — ENOXAPARIN SODIUM 50 MG: 100 INJECTION SUBCUTANEOUS at 15:06

## 2024-08-19 RX ADMIN — METOPROLOL SUCCINATE 25 MG: 25 TABLET, EXTENDED RELEASE ORAL at 10:10

## 2024-08-19 RX ADMIN — ACETAZOLAMIDE 250 MG: 250 TABLET ORAL at 10:10

## 2024-08-19 RX ADMIN — ACETAZOLAMIDE 250 MG: 250 TABLET ORAL at 22:01

## 2024-08-19 ASSESSMENT — PAIN SCALES - GENERAL
PAINLEVEL_OUTOF10: 0

## 2024-08-19 ASSESSMENT — COGNITIVE AND FUNCTIONAL STATUS - GENERAL
BASIC_MOBILITY_RAW_SCORE: 7
BASIC_MOBILITY_CONVERTED_SCORE: 19.39

## 2024-08-19 ASSESSMENT — ENCOUNTER SYMPTOMS: PAIN SEVERITY NOW: 4

## 2024-08-20 ENCOUNTER — APPOINTMENT (OUTPATIENT)
Dept: GENERAL RADIOLOGY | Age: 89
DRG: 308 | End: 2024-08-20
Attending: RADIOLOGY

## 2024-08-20 ENCOUNTER — APPOINTMENT (OUTPATIENT)
Dept: INTERVENTIONAL RADIOLOGY/VASCULAR | Age: 89
DRG: 308 | End: 2024-08-20
Attending: INTERNAL MEDICINE

## 2024-08-20 LAB
ALBUMIN SERPL-MCNC: 2.4 G/DL (ref 3.6–5.1)
ALBUMIN/GLOB SERPL: 0.6 {RATIO} (ref 1–2.4)
ALP SERPL-CCNC: 69 UNITS/L (ref 45–117)
ALT SERPL-CCNC: 16 UNITS/L
ANION GAP SERPL CALC-SCNC: 5 MMOL/L (ref 7–19)
AST SERPL-CCNC: 14 UNITS/L
BASOPHILS # BLD: 0 K/MCL (ref 0–0.3)
BASOPHILS NFR BLD: 0 %
BILIRUB SERPL-MCNC: 1.2 MG/DL (ref 0.2–1)
BUN SERPL-MCNC: 12 MG/DL (ref 6–20)
BUN/CREAT SERPL: 25 (ref 7–25)
CALCIUM SERPL-MCNC: 8.9 MG/DL (ref 8.4–10.2)
CHLORIDE SERPL-SCNC: 98 MMOL/L (ref 97–110)
CO2 SERPL-SCNC: 38 MMOL/L (ref 21–32)
CREAT SERPL-MCNC: 0.48 MG/DL (ref 0.51–0.95)
DEPRECATED RDW RBC: 57.2 FL (ref 39–50)
EGFRCR SERPLBLD CKD-EPI 2021: 89 ML/MIN/{1.73_M2}
EOSINOPHIL # BLD: 0 K/MCL (ref 0–0.5)
EOSINOPHIL NFR BLD: 0 %
ERYTHROCYTE [DISTWIDTH] IN BLOOD: 16.4 % (ref 11–15)
FASTING DURATION TIME PATIENT: ABNORMAL H
GLOBULIN SER-MCNC: 4 G/DL (ref 2–4)
GLUCOSE SERPL-MCNC: 108 MG/DL (ref 70–99)
HCT VFR BLD CALC: 35.4 % (ref 36–46.5)
HGB BLD-MCNC: 10.4 G/DL (ref 12–15.5)
IMM GRANULOCYTES # BLD AUTO: 0 K/MCL (ref 0–0.2)
IMM GRANULOCYTES # BLD: 0 %
LYMPHOCYTES # BLD: 0.5 K/MCL (ref 1–4)
LYMPHOCYTES NFR BLD: 8 %
MCH RBC QN AUTO: 28 PG (ref 26–34)
MCHC RBC AUTO-ENTMCNC: 29.4 G/DL (ref 32–36.5)
MCV RBC AUTO: 95.4 FL (ref 78–100)
MONOCYTES # BLD: 0.4 K/MCL (ref 0.3–0.9)
MONOCYTES NFR BLD: 6 %
NEUTROPHILS # BLD: 5.8 K/MCL (ref 1.8–7.7)
NEUTROPHILS NFR BLD: 86 %
NRBC BLD MANUAL-RTO: 0 /100 WBC
PLATELET # BLD AUTO: 230 K/MCL (ref 140–450)
POTASSIUM SERPL-SCNC: 3.8 MMOL/L (ref 3.4–5.1)
PROT SERPL-MCNC: 6.4 G/DL (ref 6.4–8.2)
RBC # BLD: 3.71 MIL/MCL (ref 4–5.2)
SODIUM SERPL-SCNC: 137 MMOL/L (ref 135–145)
WBC # BLD: 6.8 K/MCL (ref 4.2–11)

## 2024-08-20 PROCEDURE — 10004651 HB RX, NO CHARGE ITEM: Performed by: EMERGENCY MEDICINE

## 2024-08-20 PROCEDURE — 10002801 HB RX 250 W/O HCPCS: Performed by: RADIOLOGY

## 2024-08-20 PROCEDURE — 10002803 HB RX 637: Performed by: INTERNAL MEDICINE

## 2024-08-20 PROCEDURE — 80053 COMPREHEN METABOLIC PANEL: CPT | Performed by: STUDENT IN AN ORGANIZED HEALTH CARE EDUCATION/TRAINING PROGRAM

## 2024-08-20 PROCEDURE — 99233 SBSQ HOSP IP/OBS HIGH 50: CPT | Performed by: INTERNAL MEDICINE

## 2024-08-20 PROCEDURE — 32555 ASPIRATE PLEURA W/ IMAGING: CPT

## 2024-08-20 PROCEDURE — 97530 THERAPEUTIC ACTIVITIES: CPT

## 2024-08-20 PROCEDURE — 71045 X-RAY EXAM CHEST 1 VIEW: CPT

## 2024-08-20 PROCEDURE — 85025 COMPLETE CBC W/AUTO DIFF WBC: CPT | Performed by: STUDENT IN AN ORGANIZED HEALTH CARE EDUCATION/TRAINING PROGRAM

## 2024-08-20 PROCEDURE — 99232 SBSQ HOSP IP/OBS MODERATE 35: CPT | Performed by: NURSE PRACTITIONER

## 2024-08-20 PROCEDURE — 36415 COLL VENOUS BLD VENIPUNCTURE: CPT | Performed by: STUDENT IN AN ORGANIZED HEALTH CARE EDUCATION/TRAINING PROGRAM

## 2024-08-20 PROCEDURE — 10006031 HB ROOM CHARGE TELEMETRY

## 2024-08-20 PROCEDURE — 0W9B3ZZ DRAINAGE OF LEFT PLEURAL CAVITY, PERCUTANEOUS APPROACH: ICD-10-PCS | Performed by: RADIOLOGY

## 2024-08-20 RX ORDER — LIDOCAINE HYDROCHLORIDE 10 MG/ML
INJECTION, SOLUTION INFILTRATION; PERINEURAL PRN
Status: COMPLETED | OUTPATIENT
Start: 2024-08-20 | End: 2024-08-20

## 2024-08-20 RX ADMIN — ACETAZOLAMIDE 250 MG: 250 TABLET ORAL at 08:59

## 2024-08-20 RX ADMIN — LIDOCAINE HYDROCHLORIDE 10 ML: 10 INJECTION, SOLUTION INFILTRATION; PERINEURAL at 14:05

## 2024-08-20 RX ADMIN — SODIUM CHLORIDE, PRESERVATIVE FREE 2 ML: 5 INJECTION INTRAVENOUS at 09:19

## 2024-08-20 RX ADMIN — METOPROLOL SUCCINATE 25 MG: 25 TABLET, EXTENDED RELEASE ORAL at 08:59

## 2024-08-20 RX ADMIN — METOPROLOL SUCCINATE 25 MG: 25 TABLET, EXTENDED RELEASE ORAL at 22:52

## 2024-08-20 RX ADMIN — ACETAZOLAMIDE 250 MG: 250 TABLET ORAL at 22:52

## 2024-08-20 RX ADMIN — SODIUM CHLORIDE, PRESERVATIVE FREE 2 ML: 5 INJECTION INTRAVENOUS at 22:52

## 2024-08-20 ASSESSMENT — COGNITIVE AND FUNCTIONAL STATUS - GENERAL
BASIC_MOBILITY_CONVERTED_SCORE: 30.25
HELP NEEDED FOR PERSONAL GROOMING: A LOT
DAILY_ACTIVITY_RAW_SCORE: 11
HELP NEEDED DRESSING REGULAR LOWER BODY CLOTHING: TOTAL
HELP NEEDED FOR TOILETING: TOTAL
HELP NEEDED FOR BATHING: A LOT
HELP NEEDED DRESSING REGULAR UPPER BODY CLOTHING: A LOT
DAILY_ACTIVITY_CONVERTED_SCORE: 29.04
BASIC_MOBILITY_RAW_SCORE: 11

## 2024-08-20 ASSESSMENT — PAIN SCALES - GENERAL
PAINLEVEL_OUTOF10: 0

## 2024-08-20 ASSESSMENT — ENCOUNTER SYMPTOMS: PAIN SEVERITY NOW: 3

## 2024-08-21 VITALS
TEMPERATURE: 97 F | WEIGHT: 102.51 LBS | HEIGHT: 66 IN | RESPIRATION RATE: 18 BRPM | DIASTOLIC BLOOD PRESSURE: 67 MMHG | BODY MASS INDEX: 16.48 KG/M2 | OXYGEN SATURATION: 97 % | SYSTOLIC BLOOD PRESSURE: 102 MMHG | HEART RATE: 98 BPM

## 2024-08-21 LAB
ALBUMIN SERPL-MCNC: 2.4 G/DL (ref 3.6–5.1)
ALBUMIN/GLOB SERPL: 0.6 {RATIO} (ref 1–2.4)
ALP SERPL-CCNC: 81 UNITS/L (ref 45–117)
ALT SERPL-CCNC: 14 UNITS/L
ANION GAP SERPL CALC-SCNC: 5 MMOL/L (ref 7–19)
ASR DISCLAIMER: NORMAL
AST SERPL-CCNC: 11 UNITS/L
BACTERIA BLD CULT: NORMAL
BACTERIA BLD CULT: NORMAL
BASOPHILS # BLD: 0 K/MCL (ref 0–0.3)
BASOPHILS NFR BLD: 0 %
BILIRUB SERPL-MCNC: 0.8 MG/DL (ref 0.2–1)
BUN SERPL-MCNC: 18 MG/DL (ref 6–20)
BUN/CREAT SERPL: 29 (ref 7–25)
CALCIUM SERPL-MCNC: 9 MG/DL (ref 8.4–10.2)
CASE RPRT: NORMAL
CHLORIDE SERPL-SCNC: 99 MMOL/L (ref 97–110)
CLINICAL INFO: NORMAL
CO2 SERPL-SCNC: 36 MMOL/L (ref 21–32)
CREAT SERPL-MCNC: 0.62 MG/DL (ref 0.51–0.95)
DEPRECATED RDW RBC: 56.8 FL (ref 39–50)
EGFRCR SERPLBLD CKD-EPI 2021: 83 ML/MIN/{1.73_M2}
EOSINOPHIL # BLD: 0.1 K/MCL (ref 0–0.5)
EOSINOPHIL NFR BLD: 1 %
ERYTHROCYTE [DISTWIDTH] IN BLOOD: 16.5 % (ref 11–15)
FASTING DURATION TIME PATIENT: ABNORMAL H
GLOBULIN SER-MCNC: 4.2 G/DL (ref 2–4)
GLUCOSE SERPL-MCNC: 139 MG/DL (ref 70–99)
HCT VFR BLD CALC: 35.5 % (ref 36–46.5)
HGB BLD-MCNC: 10.7 G/DL (ref 12–15.5)
IMM GRANULOCYTES # BLD AUTO: 0 K/MCL (ref 0–0.2)
IMM GRANULOCYTES # BLD: 0 %
LYMPHOCYTES # BLD: 0.5 K/MCL (ref 1–4)
LYMPHOCYTES NFR BLD: 7 %
MCH RBC QN AUTO: 28.5 PG (ref 26–34)
MCHC RBC AUTO-ENTMCNC: 30.1 G/DL (ref 32–36.5)
MCV RBC AUTO: 94.4 FL (ref 78–100)
MONOCYTES # BLD: 0.6 K/MCL (ref 0.3–0.9)
MONOCYTES NFR BLD: 7 %
NEUTROPHILS # BLD: 6.4 K/MCL (ref 1.8–7.7)
NEUTROPHILS NFR BLD: 85 %
NRBC BLD MANUAL-RTO: 0 /100 WBC
PATH REPORT.FINAL DX SPEC: NORMAL
PATH REPORT.GROSS SPEC: NORMAL
PLATELET # BLD AUTO: 241 K/MCL (ref 140–450)
POTASSIUM SERPL-SCNC: 3.4 MMOL/L (ref 3.4–5.1)
POTASSIUM SERPL-SCNC: 3.7 MMOL/L (ref 3.4–5.1)
PROT SERPL-MCNC: 6.6 G/DL (ref 6.4–8.2)
RBC # BLD: 3.76 MIL/MCL (ref 4–5.2)
SODIUM SERPL-SCNC: 137 MMOL/L (ref 135–145)
WBC # BLD: 7.5 K/MCL (ref 4.2–11)

## 2024-08-21 PROCEDURE — 99233 SBSQ HOSP IP/OBS HIGH 50: CPT | Performed by: INTERNAL MEDICINE

## 2024-08-21 PROCEDURE — 10004651 HB RX, NO CHARGE ITEM: Performed by: EMERGENCY MEDICINE

## 2024-08-21 PROCEDURE — 10002803 HB RX 637: Performed by: INTERNAL MEDICINE

## 2024-08-21 PROCEDURE — 36415 COLL VENOUS BLD VENIPUNCTURE: CPT | Performed by: STUDENT IN AN ORGANIZED HEALTH CARE EDUCATION/TRAINING PROGRAM

## 2024-08-21 PROCEDURE — 10002803 HB RX 637: Performed by: STUDENT IN AN ORGANIZED HEALTH CARE EDUCATION/TRAINING PROGRAM

## 2024-08-21 PROCEDURE — 80053 COMPREHEN METABOLIC PANEL: CPT | Performed by: STUDENT IN AN ORGANIZED HEALTH CARE EDUCATION/TRAINING PROGRAM

## 2024-08-21 PROCEDURE — 84132 ASSAY OF SERUM POTASSIUM: CPT | Performed by: STUDENT IN AN ORGANIZED HEALTH CARE EDUCATION/TRAINING PROGRAM

## 2024-08-21 PROCEDURE — 85025 COMPLETE CBC W/AUTO DIFF WBC: CPT | Performed by: STUDENT IN AN ORGANIZED HEALTH CARE EDUCATION/TRAINING PROGRAM

## 2024-08-21 RX ORDER — METOPROLOL TARTRATE 50 MG
25 TABLET ORAL 2 TIMES DAILY
Qty: 60 TABLET | Refills: 0 | Status: ACTIVE | OUTPATIENT
Start: 2024-08-21

## 2024-08-21 RX ORDER — POTASSIUM CHLORIDE 1500 MG/1
40 TABLET, EXTENDED RELEASE ORAL ONCE
Status: COMPLETED | OUTPATIENT
Start: 2024-08-21 | End: 2024-08-21

## 2024-08-21 RX ADMIN — DIGOXIN 125 MCG: 125 TABLET ORAL at 09:58

## 2024-08-21 RX ADMIN — METOPROLOL SUCCINATE 25 MG: 25 TABLET, EXTENDED RELEASE ORAL at 09:58

## 2024-08-21 RX ADMIN — SODIUM CHLORIDE, PRESERVATIVE FREE 2 ML: 5 INJECTION INTRAVENOUS at 09:58

## 2024-08-21 RX ADMIN — POTASSIUM CHLORIDE 40 MEQ: 1500 TABLET, EXTENDED RELEASE ORAL at 12:55

## 2024-08-21 RX ADMIN — APIXABAN 2.5 MG: 2.5 TABLET, FILM COATED ORAL at 10:09

## 2024-08-21 ASSESSMENT — PAIN SCALES - GENERAL: PAINLEVEL_OUTOF10: 0

## 2024-08-23 LAB
BACTERIA SPEC ANAEROBE+AEROBE CULT: NORMAL
GRAM STN SPEC: NORMAL

## 2024-08-27 ENCOUNTER — TELEPHONE (OUTPATIENT)
Dept: CARE COORDINATION | Age: 89
End: 2024-08-27

## 2024-08-28 ENCOUNTER — TELEPHONE (OUTPATIENT)
Dept: CARE COORDINATION | Age: 89
End: 2024-08-28

## 2024-09-03 ENCOUNTER — TELEPHONE (OUTPATIENT)
Dept: CARE COORDINATION | Age: 89
End: 2024-09-03

## 2024-09-04 ENCOUNTER — TELEPHONE (OUTPATIENT)
Dept: CARE COORDINATION | Age: 89
End: 2024-09-04

## 2024-09-10 ENCOUNTER — TELEPHONE (OUTPATIENT)
Dept: CARE COORDINATION | Age: 89
End: 2024-09-10

## 2024-11-04 ENCOUNTER — HOSPITAL ENCOUNTER (INPATIENT)
Age: 89
Discharge: STILL ADMITTED OR ADMINISTRATIVE | DRG: 871 | End: 2024-11-04
Attending: EMERGENCY MEDICINE | Admitting: FAMILY MEDICINE

## 2024-11-04 ENCOUNTER — APPOINTMENT (OUTPATIENT)
Dept: GENERAL RADIOLOGY | Age: 89
DRG: 871 | End: 2024-11-04
Attending: EMERGENCY MEDICINE

## 2024-11-04 DIAGNOSIS — G93.41 ACUTE METABOLIC ENCEPHALOPATHY: Primary | ICD-10-CM

## 2024-11-04 DIAGNOSIS — J96.01 ACUTE HYPOXEMIC RESPIRATORY FAILURE  (CMD): ICD-10-CM

## 2024-11-04 DIAGNOSIS — N39.0 ACUTE UTI: ICD-10-CM

## 2024-11-04 LAB
ALBUMIN SERPL-MCNC: 2.8 G/DL (ref 3.4–5)
ALBUMIN/GLOB SERPL: 0.7 {RATIO} (ref 1–2.4)
ALP SERPL-CCNC: 86 UNITS/L (ref 45–117)
ALT SERPL-CCNC: 26 UNITS/L
ANION GAP SERPL CALC-SCNC: 11 MMOL/L (ref 7–19)
APPEARANCE UR: ABNORMAL
APTT PPP: 30 SEC (ref 22–32)
AST SERPL-CCNC: 17 UNITS/L
BACTERIA #/AREA URNS HPF: ABNORMAL /HPF
BASOPHILS # BLD: 0 K/MCL (ref 0–0.3)
BASOPHILS NFR BLD: 0 %
BILIRUB SERPL-MCNC: 2.2 MG/DL (ref 0.2–1)
BILIRUB UR QL STRIP: NEGATIVE
BUN SERPL-MCNC: 29 MG/DL (ref 6–20)
BUN/CREAT SERPL: 41 (ref 7–25)
CALCIUM SERPL-MCNC: 9 MG/DL (ref 8.4–10.2)
CHLORIDE SERPL-SCNC: 97 MMOL/L (ref 97–110)
CO2 SERPL-SCNC: 38 MMOL/L (ref 21–32)
COLOR UR: YELLOW
CREAT SERPL-MCNC: 0.7 MG/DL (ref 0.51–0.95)
DEPRECATED RDW RBC: 62.2 FL (ref 39–50)
EGFRCR SERPLBLD CKD-EPI 2021: 81 ML/MIN/{1.73_M2}
EOSINOPHIL # BLD: 0 K/MCL (ref 0–0.5)
EOSINOPHIL NFR BLD: 0 %
ERYTHROCYTE [DISTWIDTH] IN BLOOD: 18.6 % (ref 11–15)
FASTING DURATION TIME PATIENT: ABNORMAL H
FLUAV RNA RESP QL NAA+PROBE: NOT DETECTED
FLUBV RNA RESP QL NAA+PROBE: NOT DETECTED
GLOBULIN SER-MCNC: 4.3 G/DL (ref 2–4)
GLUCOSE BLDC GLUCOMTR-MCNC: 99 MG/DL (ref 70–99)
GLUCOSE SERPL-MCNC: 122 MG/DL (ref 70–99)
GLUCOSE UR STRIP-MCNC: NEGATIVE MG/DL
HCT VFR BLD CALC: 38.5 % (ref 36–46.5)
HGB BLD-MCNC: 11.3 G/DL (ref 12–15.5)
HGB UR QL STRIP: NEGATIVE
HYALINE CASTS #/AREA URNS LPF: ABNORMAL /LPF
IMM GRANULOCYTES # BLD AUTO: 0 K/MCL (ref 0–0.2)
IMM GRANULOCYTES # BLD: 0 %
INR PPP: 1.5
KETONES UR STRIP-MCNC: NEGATIVE MG/DL
LACTATE BLDV-SCNC: 1.7 MMOL/L (ref 0–2)
LACTATE BLDV-SCNC: 3.2 MMOL/L (ref 0–2)
LEUKOCYTE ESTERASE UR QL STRIP: ABNORMAL
LYMPHOCYTES # BLD: 0.5 K/MCL (ref 1–4)
LYMPHOCYTES NFR BLD: 7 %
MCH RBC QN AUTO: 26.8 PG (ref 26–34)
MCHC RBC AUTO-ENTMCNC: 29.4 G/DL (ref 32–36.5)
MCV RBC AUTO: 91.2 FL (ref 78–100)
MONOCYTES # BLD: 0.4 K/MCL (ref 0.3–0.9)
MONOCYTES NFR BLD: 6 %
MUCOUS THREADS URNS QL MICRO: PRESENT
NEUTROPHILS # BLD: 5.8 K/MCL (ref 1.8–7.7)
NEUTROPHILS NFR BLD: 87 %
NITRITE UR QL STRIP: NEGATIVE
NRBC BLD MANUAL-RTO: 0 /100 WBC
PH UR STRIP: 6 [PH] (ref 5–7)
PLATELET # BLD AUTO: 240 K/MCL (ref 140–450)
POTASSIUM SERPL-SCNC: 3.8 MMOL/L (ref 3.4–5.1)
PROCALCITONIN SERPL IA-MCNC: <0.05 NG/ML
PROT SERPL-MCNC: 7.1 G/DL (ref 6.4–8.2)
PROT UR STRIP-MCNC: 30 MG/DL
PROTHROMBIN TIME: 15.6 SEC (ref 9.7–11.8)
RBC # BLD: 4.22 MIL/MCL (ref 4–5.2)
RBC #/AREA URNS HPF: ABNORMAL /HPF
RSV AG NPH QL IA.RAPID: NOT DETECTED
SARS-COV-2 RNA RESP QL NAA+PROBE: NOT DETECTED
SERVICE CMNT-IMP: NORMAL
SERVICE CMNT-IMP: NORMAL
SODIUM SERPL-SCNC: 142 MMOL/L (ref 135–145)
SP GR UR STRIP: 1.03 (ref 1–1.03)
SQUAMOUS #/AREA URNS HPF: ABNORMAL /HPF
TROPONIN I SERPL DL<=0.01 NG/ML-MCNC: 42 NG/L
UROBILINOGEN UR STRIP-MCNC: 0.2 MG/DL
WBC # BLD: 6.8 K/MCL (ref 4.2–11)
WBC #/AREA URNS HPF: ABNORMAL /HPF

## 2024-11-04 PROCEDURE — 93005 ELECTROCARDIOGRAM TRACING: CPT | Performed by: EMERGENCY MEDICINE

## 2024-11-04 PROCEDURE — 83605 ASSAY OF LACTIC ACID: CPT | Performed by: EMERGENCY MEDICINE

## 2024-11-04 PROCEDURE — 10003585 HB ROOM CHARGE INTERMEDIATE CARE

## 2024-11-04 PROCEDURE — 10002800 HB RX 250 W HCPCS: Performed by: EMERGENCY MEDICINE

## 2024-11-04 PROCEDURE — 10002807 HB RX 258: Performed by: FAMILY MEDICINE

## 2024-11-04 PROCEDURE — 36415 COLL VENOUS BLD VENIPUNCTURE: CPT | Performed by: EMERGENCY MEDICINE

## 2024-11-04 PROCEDURE — 0241U COVID/FLU/RSV PANEL: CPT | Performed by: EMERGENCY MEDICINE

## 2024-11-04 PROCEDURE — 99292 CRITICAL CARE ADDL 30 MIN: CPT

## 2024-11-04 PROCEDURE — 96361 HYDRATE IV INFUSION ADD-ON: CPT

## 2024-11-04 PROCEDURE — 81001 URINALYSIS AUTO W/SCOPE: CPT | Performed by: EMERGENCY MEDICINE

## 2024-11-04 PROCEDURE — 10002800 HB RX 250 W HCPCS: Performed by: FAMILY MEDICINE

## 2024-11-04 PROCEDURE — 10002803 HB RX 637: Performed by: FAMILY MEDICINE

## 2024-11-04 PROCEDURE — 85025 COMPLETE CBC W/AUTO DIFF WBC: CPT | Performed by: EMERGENCY MEDICINE

## 2024-11-04 PROCEDURE — 84484 ASSAY OF TROPONIN QUANT: CPT | Performed by: EMERGENCY MEDICINE

## 2024-11-04 PROCEDURE — 96365 THER/PROPH/DIAG IV INF INIT: CPT

## 2024-11-04 PROCEDURE — 85610 PROTHROMBIN TIME: CPT | Performed by: EMERGENCY MEDICINE

## 2024-11-04 PROCEDURE — 10003585 HB ROOM CHARGE INTERMEDIATE

## 2024-11-04 PROCEDURE — 10002801 HB RX 250 W/O HCPCS: Performed by: EMERGENCY MEDICINE

## 2024-11-04 PROCEDURE — 99291 CRITICAL CARE FIRST HOUR: CPT

## 2024-11-04 PROCEDURE — 93010 ELECTROCARDIOGRAM REPORT: CPT | Performed by: INTERNAL MEDICINE

## 2024-11-04 PROCEDURE — 96375 TX/PRO/DX INJ NEW DRUG ADDON: CPT

## 2024-11-04 PROCEDURE — 10002807 HB RX 258: Performed by: EMERGENCY MEDICINE

## 2024-11-04 PROCEDURE — 71045 X-RAY EXAM CHEST 1 VIEW: CPT

## 2024-11-04 PROCEDURE — 87040 BLOOD CULTURE FOR BACTERIA: CPT | Performed by: EMERGENCY MEDICINE

## 2024-11-04 PROCEDURE — 84145 PROCALCITONIN (PCT): CPT | Performed by: EMERGENCY MEDICINE

## 2024-11-04 PROCEDURE — 85730 THROMBOPLASTIN TIME PARTIAL: CPT | Performed by: EMERGENCY MEDICINE

## 2024-11-04 PROCEDURE — 80053 COMPREHEN METABOLIC PANEL: CPT | Performed by: EMERGENCY MEDICINE

## 2024-11-04 RX ORDER — ACETAMINOPHEN 325 MG/1
650 TABLET ORAL EVERY 4 HOURS PRN
Status: DISCONTINUED | OUTPATIENT
Start: 2024-11-04 | End: 2024-11-05

## 2024-11-04 RX ORDER — ACETAMINOPHEN 650 MG/1
650 SUPPOSITORY RECTAL EVERY 4 HOURS PRN
Status: DISCONTINUED | OUTPATIENT
Start: 2024-11-04 | End: 2024-11-05

## 2024-11-04 RX ORDER — METOPROLOL TARTRATE 25 MG/1
25 TABLET, FILM COATED ORAL 2 TIMES DAILY
Status: DISCONTINUED | OUTPATIENT
Start: 2024-11-04 | End: 2024-11-06

## 2024-11-04 RX ORDER — ONDANSETRON 4 MG/1
4 TABLET, ORALLY DISINTEGRATING ORAL EVERY 12 HOURS PRN
Status: DISCONTINUED | OUTPATIENT
Start: 2024-11-04 | End: 2024-11-24 | Stop reason: HOSPADM

## 2024-11-04 RX ORDER — 0.9 % SODIUM CHLORIDE 0.9 %
2 VIAL (ML) INJECTION EVERY 12 HOURS SCHEDULED
Status: DISCONTINUED | OUTPATIENT
Start: 2024-11-04 | End: 2024-11-24 | Stop reason: HOSPADM

## 2024-11-04 RX ORDER — FUROSEMIDE 20 MG/1
20 TABLET ORAL DAILY
Status: DISCONTINUED | OUTPATIENT
Start: 2024-11-05 | End: 2024-11-06

## 2024-11-04 RX ORDER — DIGOXIN 125 MCG
125 TABLET ORAL
Status: DISCONTINUED | OUTPATIENT
Start: 2024-11-06 | End: 2024-11-24 | Stop reason: HOSPADM

## 2024-11-04 RX ORDER — ONDANSETRON 2 MG/ML
4 INJECTION INTRAMUSCULAR; INTRAVENOUS EVERY 12 HOURS PRN
Status: DISCONTINUED | OUTPATIENT
Start: 2024-11-04 | End: 2024-11-24 | Stop reason: HOSPADM

## 2024-11-04 RX ORDER — SODIUM CHLORIDE 9 MG/ML
INJECTION, SOLUTION INTRAVENOUS CONTINUOUS
Status: ACTIVE | OUTPATIENT
Start: 2024-11-04 | End: 2024-11-05

## 2024-11-04 RX ORDER — MAGNESIUM HYDROXIDE/ALUMINUM HYDROXICE/SIMETHICONE 120; 1200; 1200 MG/30ML; MG/30ML; MG/30ML
30 SUSPENSION ORAL EVERY 4 HOURS PRN
Status: DISCONTINUED | OUTPATIENT
Start: 2024-11-04 | End: 2024-11-24 | Stop reason: HOSPADM

## 2024-11-04 RX ORDER — MIDODRINE HYDROCHLORIDE 5 MG/1
10 TABLET ORAL
Status: DISCONTINUED | OUTPATIENT
Start: 2024-11-05 | End: 2024-11-06

## 2024-11-04 RX ORDER — 0.9 % SODIUM CHLORIDE 0.9 %
2 VIAL (ML) INJECTION EVERY 12 HOURS SCHEDULED
Status: DISCONTINUED | OUTPATIENT
Start: 2024-11-04 | End: 2024-11-22 | Stop reason: SDUPTHER

## 2024-11-04 RX ORDER — 0.9 % SODIUM CHLORIDE 0.9 %
10 VIAL (ML) INJECTION PRN
Status: DISCONTINUED | OUTPATIENT
Start: 2024-11-04 | End: 2024-11-22 | Stop reason: SDUPTHER

## 2024-11-04 RX ORDER — POTASSIUM CHLORIDE 14.9 MG/ML
20 INJECTION INTRAVENOUS ONCE
Status: COMPLETED | OUTPATIENT
Start: 2024-11-05 | End: 2024-11-05

## 2024-11-04 RX ORDER — LEVOFLOXACIN 5 MG/ML
500 INJECTION, SOLUTION INTRAVENOUS ONCE
Status: DISCONTINUED | OUTPATIENT
Start: 2024-11-04 | End: 2024-11-04 | Stop reason: ALTCHOICE

## 2024-11-04 RX ORDER — POTASSIUM CHLORIDE 1500 MG/1
40 TABLET, EXTENDED RELEASE ORAL ONCE
Status: DISCONTINUED | OUTPATIENT
Start: 2024-11-04 | End: 2024-11-04

## 2024-11-04 RX ORDER — DIGOXIN 125 MCG
125 TABLET ORAL ONCE
Status: COMPLETED | OUTPATIENT
Start: 2024-11-05 | End: 2024-11-04

## 2024-11-04 RX ORDER — POLYETHYLENE GLYCOL 3350 17 G/17G
17 POWDER, FOR SOLUTION ORAL DAILY PRN
Status: DISCONTINUED | OUTPATIENT
Start: 2024-11-04 | End: 2024-11-24 | Stop reason: HOSPADM

## 2024-11-04 RX ORDER — DOXYCYCLINE 100 MG/1
100 CAPSULE ORAL EVERY 12 HOURS SCHEDULED
Status: DISCONTINUED | OUTPATIENT
Start: 2024-11-05 | End: 2024-11-05

## 2024-11-04 RX ORDER — 0.9 % SODIUM CHLORIDE 0.9 %
10 VIAL (ML) INJECTION PRN
Status: DISCONTINUED | OUTPATIENT
Start: 2024-11-04 | End: 2024-11-24 | Stop reason: HOSPADM

## 2024-11-04 RX ORDER — BISACODYL 10 MG
10 SUPPOSITORY, RECTAL RECTAL DAILY PRN
Status: DISCONTINUED | OUTPATIENT
Start: 2024-11-04 | End: 2024-11-24 | Stop reason: HOSPADM

## 2024-11-04 RX ORDER — AMOXICILLIN 250 MG
2 CAPSULE ORAL 2 TIMES DAILY PRN
Status: DISCONTINUED | OUTPATIENT
Start: 2024-11-04 | End: 2024-11-24 | Stop reason: HOSPADM

## 2024-11-04 RX ADMIN — POTASSIUM CHLORIDE 20 MEQ: 14.9 INJECTION, SOLUTION INTRAVENOUS at 23:15

## 2024-11-04 RX ADMIN — DIGOXIN 125 MCG: 125 TABLET ORAL at 23:54

## 2024-11-04 RX ADMIN — WATER 2000 MG: 1 INJECTION INTRAMUSCULAR; INTRAVENOUS; SUBCUTANEOUS at 18:35

## 2024-11-04 RX ADMIN — SODIUM CHLORIDE 1000 ML: 9 INJECTION, SOLUTION INTRAVENOUS at 18:06

## 2024-11-04 RX ADMIN — DOXYCYCLINE 100 MG: 100 INJECTION, POWDER, LYOPHILIZED, FOR SOLUTION INTRAVENOUS at 18:44

## 2024-11-04 RX ADMIN — SODIUM CHLORIDE: 9 INJECTION, SOLUTION INTRAVENOUS at 21:43

## 2024-11-04 SDOH — ECONOMIC STABILITY: GENERAL: WOULD YOU LIKE HELP WITH ANY OF THE FOLLOWING NEEDS?: I DON'T WANT HELP WITH ANY OF THESE

## 2024-11-04 SDOH — ECONOMIC STABILITY: HOUSING INSECURITY: WHAT IS YOUR LIVING SITUATION TODAY?: I HAVE A STEADY PLACE TO LIVE

## 2024-11-04 SDOH — ECONOMIC STABILITY: GENERAL

## 2024-11-04 SDOH — ECONOMIC STABILITY: INCOME INSECURITY: IN THE PAST 12 MONTHS, HAS THE ELECTRIC, GAS, OIL, OR WATER COMPANY THREATENED TO SHUT OFF SERVICE IN YOUR HOME?: NO

## 2024-11-04 SDOH — ECONOMIC STABILITY: FOOD INSECURITY: WITHIN THE PAST 12 MONTHS, THE FOOD YOU BOUGHT JUST DIDN'T LAST AND YOU DIDN'T HAVE MONEY TO GET MORE.: NEVER TRUE

## 2024-11-04 SDOH — HEALTH STABILITY: GENERAL: BECAUSE OF A PHYSICAL, MENTAL, OR EMOTIONAL CONDITION, DO YOU HAVE DIFFICULTY DOING ERRANDS ALONE?: YES

## 2024-11-04 SDOH — SOCIAL STABILITY: SOCIAL NETWORK: SUPPORT SYSTEMS: CHILDREN;FAMILY MEMBERS

## 2024-11-04 SDOH — ECONOMIC STABILITY: HOUSING INSECURITY: DO YOU HAVE PROBLEMS WITH ANY OF THE FOLLOWING?: NONE OF THE ABOVE

## 2024-11-04 SDOH — HEALTH STABILITY: PHYSICAL HEALTH: DO YOU HAVE DIFFICULTY DRESSING OR BATHING?: YES

## 2024-11-04 SDOH — ECONOMIC STABILITY: HOUSING INSECURITY: WHAT IS YOUR LIVING SITUATION TODAY?: HOUSE

## 2024-11-04 SDOH — HEALTH STABILITY: PHYSICAL HEALTH: DO YOU HAVE SERIOUS DIFFICULTY WALKING OR CLIMBING STAIRS?: YES

## 2024-11-04 SDOH — ECONOMIC STABILITY: HOUSING INSECURITY: WHAT IS YOUR LIVING SITUATION TODAY?: ADULT CHILDREN;FAMILY MEMBERS

## 2024-11-04 ASSESSMENT — ACTIVITIES OF DAILY LIVING (ADL)
DRESSING: NEEDS ASSISTANCE
FEEDING: INDEPENDENT
BATHING: NEEDS ASSISTANCE
TOILETING: INDEPENDENT
ADL_SHORT_OF_BREATH: YES
ADL_SCORE: 9
ADL_BEFORE_ADMISSION: NEEDS/REQUIRES ASSISTANCE
RECENT_DECLINE_ADL: YES, ACUTE ILLNESS WITHOUT THERAPY NEEDS

## 2024-11-04 ASSESSMENT — LIFESTYLE VARIABLES
HOW OFTEN DO YOU HAVE 6 OR MORE DRINKS ON ONE OCCASION: NEVER
HOW OFTEN DO YOU HAVE A DRINK CONTAINING ALCOHOL: NEVER
AUDIT-C TOTAL SCORE: 0
HOW MANY STANDARD DRINKS CONTAINING ALCOHOL DO YOU HAVE ON A TYPICAL DAY: 0,1 OR 2
ALCOHOL_USE_STATUS: NO OR LOW RISK WITH VALIDATED TOOL

## 2024-11-04 ASSESSMENT — PATIENT HEALTH QUESTIONNAIRE - PHQ9: IS PATIENT ABLE TO COMPLETE PHQ2 OR PHQ9: NO, DEFER TO LATER TIME

## 2024-11-05 ENCOUNTER — APPOINTMENT (OUTPATIENT)
Dept: GENERAL RADIOLOGY | Age: 89
DRG: 871 | End: 2024-11-05
Attending: FAMILY MEDICINE

## 2024-11-05 LAB
ANION GAP SERPL CALC-SCNC: 8 MMOL/L (ref 7–19)
BUN SERPL-MCNC: 26 MG/DL (ref 6–20)
BUN/CREAT SERPL: 36 (ref 7–25)
CALCIUM SERPL-MCNC: 8.7 MG/DL (ref 8.4–10.2)
CHLORIDE SERPL-SCNC: 102 MMOL/L (ref 97–110)
CO2 SERPL-SCNC: 38 MMOL/L (ref 21–32)
CREAT SERPL-MCNC: 0.73 MG/DL (ref 0.51–0.95)
DEPRECATED RDW RBC: 62.4 FL (ref 39–50)
EGFRCR SERPLBLD CKD-EPI 2021: 77 ML/MIN/{1.73_M2}
ERYTHROCYTE [DISTWIDTH] IN BLOOD: 18.6 % (ref 11–15)
FASTING DURATION TIME PATIENT: ABNORMAL H
GLUCOSE SERPL-MCNC: 116 MG/DL (ref 70–99)
HCT VFR BLD CALC: 36.7 % (ref 36–46.5)
HGB BLD-MCNC: 10.5 G/DL (ref 12–15.5)
MCH RBC QN AUTO: 26.3 PG (ref 26–34)
MCHC RBC AUTO-ENTMCNC: 28.6 G/DL (ref 32–36.5)
MCV RBC AUTO: 91.8 FL (ref 78–100)
NRBC BLD MANUAL-RTO: 0 /100 WBC
PHOSPHATE SERPL-MCNC: 3.6 MG/DL (ref 2.4–4.7)
PLATELET # BLD AUTO: 221 K/MCL (ref 140–450)
POTASSIUM SERPL-SCNC: 5.1 MMOL/L (ref 3.4–5.1)
RAINBOW EXTRA TUBES HOLD SPECIMEN: NORMAL
RBC # BLD: 4 MIL/MCL (ref 4–5.2)
SODIUM SERPL-SCNC: 143 MMOL/L (ref 135–145)
TSH SERPL-ACNC: 1.48 MCUNITS/ML (ref 0.35–5)
WBC # BLD: 18.8 K/MCL (ref 4.2–11)

## 2024-11-05 PROCEDURE — 10002803 HB RX 637: Performed by: FAMILY MEDICINE

## 2024-11-05 PROCEDURE — 36415 COLL VENOUS BLD VENIPUNCTURE: CPT | Performed by: FAMILY MEDICINE

## 2024-11-05 PROCEDURE — 85027 COMPLETE CBC AUTOMATED: CPT | Performed by: FAMILY MEDICINE

## 2024-11-05 PROCEDURE — 10004180 HB COUNTER-TRANSPORT

## 2024-11-05 PROCEDURE — 10003585 HB ROOM CHARGE INTERMEDIATE

## 2024-11-05 PROCEDURE — 10002800 HB RX 250 W HCPCS: Performed by: FAMILY MEDICINE

## 2024-11-05 PROCEDURE — 10002801 HB RX 250 W/O HCPCS: Performed by: FAMILY MEDICINE

## 2024-11-05 PROCEDURE — 92610 EVALUATE SWALLOWING FUNCTION: CPT

## 2024-11-05 PROCEDURE — 10004651 HB RX, NO CHARGE ITEM: Performed by: EMERGENCY MEDICINE

## 2024-11-05 PROCEDURE — 99223 1ST HOSP IP/OBS HIGH 75: CPT | Performed by: NURSE PRACTITIONER

## 2024-11-05 PROCEDURE — 10003585 HB ROOM CHARGE INTERMEDIATE CARE

## 2024-11-05 PROCEDURE — 84100 ASSAY OF PHOSPHORUS: CPT | Performed by: FAMILY MEDICINE

## 2024-11-05 PROCEDURE — 10002807 HB RX 258: Performed by: FAMILY MEDICINE

## 2024-11-05 PROCEDURE — 0DH67UZ INSERTION OF FEEDING DEVICE INTO STOMACH, VIA NATURAL OR ARTIFICIAL OPENING: ICD-10-PCS | Performed by: FAMILY MEDICINE

## 2024-11-05 PROCEDURE — 84443 ASSAY THYROID STIM HORMONE: CPT | Performed by: FAMILY MEDICINE

## 2024-11-05 PROCEDURE — 99223 1ST HOSP IP/OBS HIGH 75: CPT | Performed by: FAMILY MEDICINE

## 2024-11-05 PROCEDURE — 96372 THER/PROPH/DIAG INJ SC/IM: CPT | Performed by: FAMILY MEDICINE

## 2024-11-05 PROCEDURE — 80048 BASIC METABOLIC PNL TOTAL CA: CPT | Performed by: FAMILY MEDICINE

## 2024-11-05 PROCEDURE — 99497 ADVNCD CARE PLAN 30 MIN: CPT | Performed by: NURSE PRACTITIONER

## 2024-11-05 PROCEDURE — 10004651 HB RX, NO CHARGE ITEM: Performed by: FAMILY MEDICINE

## 2024-11-05 PROCEDURE — 71045 X-RAY EXAM CHEST 1 VIEW: CPT

## 2024-11-05 PROCEDURE — 3E0G76Z INTRODUCTION OF NUTRITIONAL SUBSTANCE INTO UPPER GI, VIA NATURAL OR ARTIFICIAL OPENING: ICD-10-PCS | Performed by: FAMILY MEDICINE

## 2024-11-05 RX ORDER — ACETAMINOPHEN 650 MG/1
650 SUPPOSITORY RECTAL EVERY 6 HOURS PRN
Status: DISCONTINUED | OUTPATIENT
Start: 2024-11-05 | End: 2024-11-18

## 2024-11-05 RX ORDER — ENOXAPARIN SODIUM 100 MG/ML
1 INJECTION SUBCUTANEOUS EVERY 12 HOURS
Status: DISCONTINUED | OUTPATIENT
Start: 2024-11-05 | End: 2024-11-06

## 2024-11-05 RX ORDER — DOXYCYCLINE 25 MG/5ML
100 POWDER, FOR SUSPENSION ORAL 2 TIMES DAILY
Status: DISCONTINUED | OUTPATIENT
Start: 2024-11-05 | End: 2024-11-06

## 2024-11-05 RX ORDER — MIDODRINE HYDROCHLORIDE 5 MG/1
10 TABLET ORAL ONCE
Status: COMPLETED | OUTPATIENT
Start: 2024-11-05 | End: 2024-11-05

## 2024-11-05 RX ADMIN — SODIUM CHLORIDE, PRESERVATIVE FREE 2 ML: 5 INJECTION INTRAVENOUS at 21:06

## 2024-11-05 RX ADMIN — CALCIUM CARBONATE 600 MG (1,500 MG)-VITAMIN D3 400 UNIT TABLET 1 TABLET: at 08:25

## 2024-11-05 RX ADMIN — MIDODRINE HYDROCHLORIDE 5 MG: 5 TABLET ORAL at 06:29

## 2024-11-05 RX ADMIN — APIXABAN 2.5 MG: 2.5 TABLET, FILM COATED ORAL at 08:25

## 2024-11-05 RX ADMIN — APIXABAN 2.5 MG: 2.5 TABLET, FILM COATED ORAL at 00:44

## 2024-11-05 RX ADMIN — SODIUM CHLORIDE: 9 INJECTION, SOLUTION INTRAVENOUS at 10:13

## 2024-11-05 RX ADMIN — SODIUM CHLORIDE, PRESERVATIVE FREE 2 ML: 5 INJECTION INTRAVENOUS at 08:27

## 2024-11-05 RX ADMIN — SODIUM CHLORIDE: 9 INJECTION, SOLUTION INTRAVENOUS at 17:57

## 2024-11-05 RX ADMIN — WATER 2000 MG: 1 INJECTION INTRAMUSCULAR; INTRAVENOUS; SUBCUTANEOUS at 08:26

## 2024-11-05 RX ADMIN — SODIUM CHLORIDE, PRESERVATIVE FREE 2 ML: 5 INJECTION INTRAVENOUS at 21:08

## 2024-11-05 RX ADMIN — SODIUM CHLORIDE 500 ML: 9 INJECTION, SOLUTION INTRAVENOUS at 15:13

## 2024-11-05 RX ADMIN — POTASSIUM CHLORIDE 20 MEQ: 14.9 INJECTION, SOLUTION INTRAVENOUS at 01:00

## 2024-11-05 RX ADMIN — ENOXAPARIN SODIUM 50 MG: 100 INJECTION SUBCUTANEOUS at 21:06

## 2024-11-05 RX ADMIN — DOXYCYCLINE 100 MG: 100 CAPSULE ORAL at 06:29

## 2024-11-05 RX ADMIN — MIDODRINE HYDROCHLORIDE 10 MG: 5 TABLET ORAL at 10:13

## 2024-11-05 RX ADMIN — SODIUM CHLORIDE, PRESERVATIVE FREE 2 ML: 5 INJECTION INTRAVENOUS at 08:28

## 2024-11-05 SDOH — ECONOMIC STABILITY: HOUSING INSECURITY: WHAT IS YOUR LIVING SITUATION TODAY?: I HAVE A STEADY PLACE TO LIVE

## 2024-11-05 ASSESSMENT — COGNITIVE AND FUNCTIONAL STATUS - GENERAL
TAKING CARE OF COMPLICATED TASKS: UNABLE
REMEMBERING WHERE THINGS ARE: UNABLE
FOLLOWS FAMILIAR CONVERSATION: A LOT
APPLIED_COGNITIVE_CONVERTED_SCORE: 15.17
UNDERSTANDING 10 TO 15 MIN SPEECH: UNABLE
REMEMBERING 5 ERRANDS WITH NO LIST: UNABLE
REMEMBERING TO TAKE MEDICATION: UNABLE
APPLIED_COGNITIVE_RAW_SCORE: 7

## 2024-11-05 ASSESSMENT — PAIN SCALES - GENERAL
PAINLEVEL_OUTOF10: 0
PAINLEVEL_OUTOF10: 0

## 2024-11-05 ASSESSMENT — PAIN SCALES - WONG BAKER
WONGBAKER_NUMERICALRESPONSE: 0
WONGBAKER_NUMERICALRESPONSE: 0

## 2024-11-06 LAB
ANION GAP SERPL CALC-SCNC: 4 MMOL/L (ref 7–19)
BASOPHILS # BLD: 0 K/MCL (ref 0–0.3)
BASOPHILS NFR BLD: 0 %
BUN SERPL-MCNC: 29 MG/DL (ref 6–20)
BUN/CREAT SERPL: 42 (ref 7–25)
CALCIUM SERPL-MCNC: 9.1 MG/DL (ref 8.4–10.2)
CHLORIDE SERPL-SCNC: 106 MMOL/L (ref 97–110)
CO2 SERPL-SCNC: 38 MMOL/L (ref 21–32)
CREAT SERPL-MCNC: 0.69 MG/DL (ref 0.51–0.95)
DEPRECATED RDW RBC: 64.1 FL (ref 39–50)
EGFRCR SERPLBLD CKD-EPI 2021: 81 ML/MIN/{1.73_M2}
EOSINOPHIL # BLD: 0.1 K/MCL (ref 0–0.5)
EOSINOPHIL NFR BLD: 1 %
ERYTHROCYTE [DISTWIDTH] IN BLOOD: 18.6 % (ref 11–15)
FASTING DURATION TIME PATIENT: ABNORMAL H
GLUCOSE SERPL-MCNC: 123 MG/DL (ref 70–99)
HCT VFR BLD CALC: 36 % (ref 36–46.5)
HGB BLD-MCNC: 10.2 G/DL (ref 12–15.5)
IMM GRANULOCYTES # BLD AUTO: 0 K/MCL (ref 0–0.2)
IMM GRANULOCYTES # BLD: 0 %
LYMPHOCYTES # BLD: 0.2 K/MCL (ref 1–4)
LYMPHOCYTES NFR BLD: 3 %
MAGNESIUM SERPL-MCNC: 1.8 MG/DL (ref 1.7–2.4)
MCH RBC QN AUTO: 26.4 PG (ref 26–34)
MCHC RBC AUTO-ENTMCNC: 28.3 G/DL (ref 32–36.5)
MCV RBC AUTO: 93 FL (ref 78–100)
MONOCYTES # BLD: 0.4 K/MCL (ref 0.3–0.9)
MONOCYTES NFR BLD: 4 %
NEUTROPHILS # BLD: 8.5 K/MCL (ref 1.8–7.7)
NEUTROPHILS NFR BLD: 92 %
NRBC BLD MANUAL-RTO: 0 /100 WBC
PHOSPHATE SERPL-MCNC: 2.8 MG/DL (ref 2.4–4.7)
PLATELET # BLD AUTO: 194 K/MCL (ref 140–450)
POTASSIUM SERPL-SCNC: 4.4 MMOL/L (ref 3.4–5.1)
QRS-INTERVAL (MSEC): 62
QT-INTERVAL (MSEC): 300
QTC: 424
R AXIS (DEGREES): 45
RAINBOW EXTRA TUBES HOLD SPECIMEN: NORMAL
RBC # BLD: 3.87 MIL/MCL (ref 4–5.2)
REPORT TEXT: NORMAL
SODIUM SERPL-SCNC: 144 MMOL/L (ref 135–145)
T AXIS (DEGREES): -58
VENTRICULAR RATE EKG/MIN (BPM): 120
WBC # BLD: 9.2 K/MCL (ref 4.2–11)

## 2024-11-06 PROCEDURE — 10002803 HB RX 637: Performed by: FAMILY MEDICINE

## 2024-11-06 PROCEDURE — 84100 ASSAY OF PHOSPHORUS: CPT | Performed by: FAMILY MEDICINE

## 2024-11-06 PROCEDURE — 10002801 HB RX 250 W/O HCPCS: Performed by: FAMILY MEDICINE

## 2024-11-06 PROCEDURE — 10004651 HB RX, NO CHARGE ITEM: Performed by: EMERGENCY MEDICINE

## 2024-11-06 PROCEDURE — X1166 CONTRACTED PHYSICIAN CHARGE NOT BILLED BY AAH: HCPCS | Performed by: INTERNAL MEDICINE

## 2024-11-06 PROCEDURE — 83735 ASSAY OF MAGNESIUM: CPT | Performed by: FAMILY MEDICINE

## 2024-11-06 PROCEDURE — 10002800 HB RX 250 W HCPCS: Performed by: INTERNAL MEDICINE

## 2024-11-06 PROCEDURE — 10003585 HB ROOM CHARGE INTERMEDIATE

## 2024-11-06 PROCEDURE — 10002807 HB RX 258: Performed by: INTERNAL MEDICINE

## 2024-11-06 PROCEDURE — 10004180 HB COUNTER-TRANSPORT

## 2024-11-06 PROCEDURE — 10004651 HB RX, NO CHARGE ITEM: Performed by: FAMILY MEDICINE

## 2024-11-06 PROCEDURE — 36415 COLL VENOUS BLD VENIPUNCTURE: CPT | Performed by: FAMILY MEDICINE

## 2024-11-06 PROCEDURE — 10002800 HB RX 250 W HCPCS: Performed by: FAMILY MEDICINE

## 2024-11-06 PROCEDURE — 10003585 HB ROOM CHARGE INTERMEDIATE CARE

## 2024-11-06 PROCEDURE — 80048 BASIC METABOLIC PNL TOTAL CA: CPT | Performed by: FAMILY MEDICINE

## 2024-11-06 PROCEDURE — 92526 ORAL FUNCTION THERAPY: CPT

## 2024-11-06 PROCEDURE — 99232 SBSQ HOSP IP/OBS MODERATE 35: CPT | Performed by: NURSE PRACTITIONER

## 2024-11-06 PROCEDURE — 85025 COMPLETE CBC W/AUTO DIFF WBC: CPT | Performed by: FAMILY MEDICINE

## 2024-11-06 PROCEDURE — 99233 SBSQ HOSP IP/OBS HIGH 50: CPT | Performed by: FAMILY MEDICINE

## 2024-11-06 RX ORDER — MIDODRINE HYDROCHLORIDE 5 MG/1
10 TABLET ORAL
Status: DISCONTINUED | OUTPATIENT
Start: 2024-11-06 | End: 2024-11-16

## 2024-11-06 RX ORDER — CALCIUM CARBONATE/VITAMIN D3 600 MG-20
1 TABLET,CHEWABLE ORAL DAILY
COMMUNITY

## 2024-11-06 RX ORDER — DOXYCYCLINE 25 MG/5ML
100 POWDER, FOR SUSPENSION ORAL 2 TIMES DAILY
Status: DISCONTINUED | OUTPATIENT
Start: 2024-11-06 | End: 2024-11-10

## 2024-11-06 RX ORDER — FUROSEMIDE 10 MG/ML
20 INJECTION INTRAMUSCULAR; INTRAVENOUS
Status: COMPLETED | OUTPATIENT
Start: 2024-11-06 | End: 2024-11-08

## 2024-11-06 RX ORDER — FUROSEMIDE 20 MG/1
20 TABLET ORAL DAILY
Status: DISCONTINUED | OUTPATIENT
Start: 2024-11-07 | End: 2024-11-06

## 2024-11-06 RX ORDER — METOPROLOL TARTRATE 25 MG/1
25 TABLET, FILM COATED ORAL 2 TIMES DAILY
Status: DISCONTINUED | OUTPATIENT
Start: 2024-11-06 | End: 2024-11-24 | Stop reason: HOSPADM

## 2024-11-06 RX ADMIN — DOXYCYCLINE 100 MG: 25 FOR SUSPENSION ORAL at 09:40

## 2024-11-06 RX ADMIN — MIDODRINE HYDROCHLORIDE 10 MG: 5 TABLET ORAL at 15:31

## 2024-11-06 RX ADMIN — METOPROLOL TARTRATE 25 MG: 25 TABLET, FILM COATED ORAL at 09:34

## 2024-11-06 RX ADMIN — SODIUM CHLORIDE, PRESERVATIVE FREE 2 ML: 5 INJECTION INTRAVENOUS at 20:54

## 2024-11-06 RX ADMIN — PIPERACILLIN SODIUM AND TAZOBACTAM SODIUM 3.38 G: 3; .375 INJECTION, POWDER, FOR SOLUTION INTRAVENOUS at 15:29

## 2024-11-06 RX ADMIN — FUROSEMIDE 20 MG: 10 INJECTION, SOLUTION INTRAMUSCULAR; INTRAVENOUS at 18:54

## 2024-11-06 RX ADMIN — DOXYCYCLINE 100 MG: 25 FOR SUSPENSION ORAL at 00:02

## 2024-11-06 RX ADMIN — DOXYCYCLINE 100 MG: 25 FOR SUSPENSION ORAL at 20:52

## 2024-11-06 RX ADMIN — CALCIUM CARBONATE 600 MG (1,500 MG)-VITAMIN D3 400 UNIT TABLET 1 TABLET: at 09:40

## 2024-11-06 RX ADMIN — FUROSEMIDE 20 MG: 20 TABLET ORAL at 09:34

## 2024-11-06 RX ADMIN — SODIUM CHLORIDE, PRESERVATIVE FREE 2 ML: 5 INJECTION INTRAVENOUS at 20:53

## 2024-11-06 RX ADMIN — APIXABAN 2.5 MG: 2.5 TABLET, FILM COATED ORAL at 20:52

## 2024-11-06 RX ADMIN — MIDODRINE HYDROCHLORIDE 5 MG: 5 TABLET ORAL at 06:22

## 2024-11-06 RX ADMIN — MIDODRINE HYDROCHLORIDE 10 MG: 5 TABLET ORAL at 13:34

## 2024-11-06 RX ADMIN — METOPROLOL TARTRATE 25 MG: 25 TABLET, FILM COATED ORAL at 20:52

## 2024-11-06 RX ADMIN — SODIUM CHLORIDE, PRESERVATIVE FREE 2 ML: 5 INJECTION INTRAVENOUS at 09:35

## 2024-11-06 RX ADMIN — APIXABAN 2.5 MG: 2.5 TABLET, FILM COATED ORAL at 09:34

## 2024-11-06 RX ADMIN — PIPERACILLIN SODIUM AND TAZOBACTAM SODIUM 3.38 G: 3; .375 INJECTION, POWDER, FOR SOLUTION INTRAVENOUS at 22:45

## 2024-11-06 RX ADMIN — SODIUM CHLORIDE, PRESERVATIVE FREE 2 ML: 5 INJECTION INTRAVENOUS at 09:34

## 2024-11-06 RX ADMIN — WATER 2000 MG: 1 INJECTION INTRAMUSCULAR; INTRAVENOUS; SUBCUTANEOUS at 09:35

## 2024-11-06 RX ADMIN — DIGOXIN 125 MCG: 125 TABLET ORAL at 09:34

## 2024-11-06 ASSESSMENT — PAIN SCALES - GENERAL: PAINLEVEL_OUTOF10: 0

## 2024-11-07 ENCOUNTER — APPOINTMENT (OUTPATIENT)
Dept: GENERAL RADIOLOGY | Age: 89
DRG: 871 | End: 2024-11-07
Attending: INTERNAL MEDICINE

## 2024-11-07 ENCOUNTER — APPOINTMENT (OUTPATIENT)
Dept: GENERAL RADIOLOGY | Age: 89
DRG: 871 | End: 2024-11-07
Attending: EMERGENCY MEDICINE

## 2024-11-07 LAB
ANION GAP SERPL CALC-SCNC: 4 MMOL/L (ref 7–19)
ANION GAP SERPL CALC-SCNC: 6 MMOL/L (ref 7–19)
BASE EXCESS / DEFICIT, ARTERIAL - RESPIRATORY: 23 MMOL/L (ref -2–3)
BASOPHILS # BLD: 0 K/MCL (ref 0–0.3)
BASOPHILS NFR BLD: 0 %
BDY SITE: ABNORMAL
BODY TEMPERATURE: 37 DEGREES C
BUN SERPL-MCNC: 25 MG/DL (ref 6–20)
BUN SERPL-MCNC: 27 MG/DL (ref 6–20)
BUN/CREAT SERPL: 36 (ref 7–25)
BUN/CREAT SERPL: 37 (ref 7–25)
CALCIUM SERPL-MCNC: 8.8 MG/DL (ref 8.4–10.2)
CALCIUM SERPL-MCNC: 8.8 MG/DL (ref 8.4–10.2)
CHLORIDE SERPL-SCNC: 105 MMOL/L (ref 97–110)
CHLORIDE SERPL-SCNC: 105 MMOL/L (ref 97–110)
CO2 SERPL-SCNC: 40 MMOL/L (ref 21–32)
CO2 SERPL-SCNC: 40 MMOL/L (ref 21–32)
CONDITION: ABNORMAL
CREAT SERPL-MCNC: 0.67 MG/DL (ref 0.51–0.95)
CREAT SERPL-MCNC: 0.75 MG/DL (ref 0.51–0.95)
DEPRECATED RDW RBC: 61.4 FL (ref 39–50)
EGFRCR SERPLBLD CKD-EPI 2021: 75 ML/MIN/{1.73_M2}
EGFRCR SERPLBLD CKD-EPI 2021: 82 ML/MIN/{1.73_M2}
EOSINOPHIL # BLD: 0.2 K/MCL (ref 0–0.5)
EOSINOPHIL NFR BLD: 2 %
ERYTHROCYTE [DISTWIDTH] IN BLOOD: 18.6 % (ref 11–15)
FASTING DURATION TIME PATIENT: ABNORMAL H
FASTING DURATION TIME PATIENT: ABNORMAL H
FIO2 ON VENT: 45 %
GLUCOSE SERPL-MCNC: 133 MG/DL (ref 70–99)
GLUCOSE SERPL-MCNC: 142 MG/DL (ref 70–99)
HCO3 BLDA-SCNC: 52 MMOL/L (ref 22–28)
HCT VFR BLD CALC: 33.6 % (ref 36–46.5)
HGB BLD-MCNC: 9.7 G/DL (ref 12–15.5)
HOROWITZ INDEX BLDA+IHG-RTO: 224 MM[HG] (ref 300–500)
IMM GRANULOCYTES # BLD AUTO: 0 K/MCL (ref 0–0.2)
IMM GRANULOCYTES # BLD: 0 %
LYMPHOCYTES # BLD: 0.2 K/MCL (ref 1–4)
LYMPHOCYTES NFR BLD: 2 %
MAGNESIUM SERPL-MCNC: 1.6 MG/DL (ref 1.7–2.4)
MCH RBC QN AUTO: 26.1 PG (ref 26–34)
MCHC RBC AUTO-ENTMCNC: 28.9 G/DL (ref 32–36.5)
MCV RBC AUTO: 90.6 FL (ref 78–100)
MONOCYTES # BLD: 0.4 K/MCL (ref 0.3–0.9)
MONOCYTES NFR BLD: 4 %
NEUTROPHILS # BLD: 9.2 K/MCL (ref 1.8–7.7)
NEUTROPHILS NFR BLD: 92 %
NRBC BLD MANUAL-RTO: 0 /100 WBC
PCO2 BLDA: 75 MM HG (ref 32–45)
PH BLDA: 7.45 UNITS (ref 7.35–7.45)
PHOSPHATE SERPL-MCNC: 1.8 MG/DL (ref 2.4–4.7)
PLATELET # BLD AUTO: 190 K/MCL (ref 140–450)
PO2 BLDA: 101 MM HG (ref 83–108)
POTASSIUM SERPL-SCNC: 3.4 MMOL/L (ref 3.4–5.1)
POTASSIUM SERPL-SCNC: 3.7 MMOL/L (ref 3.4–5.1)
RAINBOW EXTRA TUBES HOLD SPECIMEN: NORMAL
RBC # BLD: 3.71 MIL/MCL (ref 4–5.2)
SAO2 % BLDA: 98 % (ref 95–99)
SODIUM SERPL-SCNC: 146 MMOL/L (ref 135–145)
SODIUM SERPL-SCNC: 147 MMOL/L (ref 135–145)
WBC # BLD: 10 K/MCL (ref 4.2–11)

## 2024-11-07 PROCEDURE — 10002807 HB RX 258: Performed by: INTERNAL MEDICINE

## 2024-11-07 PROCEDURE — 10002807 HB RX 258: Performed by: FAMILY MEDICINE

## 2024-11-07 PROCEDURE — X1166 CONTRACTED PHYSICIAN CHARGE NOT BILLED BY AAH: HCPCS | Performed by: INTERNAL MEDICINE

## 2024-11-07 PROCEDURE — 84100 ASSAY OF PHOSPHORUS: CPT

## 2024-11-07 PROCEDURE — 10003585 HB ROOM CHARGE INTERMEDIATE

## 2024-11-07 PROCEDURE — 83735 ASSAY OF MAGNESIUM: CPT

## 2024-11-07 PROCEDURE — 10004180 HB COUNTER-TRANSPORT

## 2024-11-07 PROCEDURE — 85025 COMPLETE CBC W/AUTO DIFF WBC: CPT | Performed by: FAMILY MEDICINE

## 2024-11-07 PROCEDURE — 10002803 HB RX 637: Performed by: FAMILY MEDICINE

## 2024-11-07 PROCEDURE — 99232 SBSQ HOSP IP/OBS MODERATE 35: CPT | Performed by: NURSE PRACTITIONER

## 2024-11-07 PROCEDURE — 82803 BLOOD GASES ANY COMBINATION: CPT

## 2024-11-07 PROCEDURE — 71045 X-RAY EXAM CHEST 1 VIEW: CPT

## 2024-11-07 PROCEDURE — 10002801 HB RX 250 W/O HCPCS: Performed by: FAMILY MEDICINE

## 2024-11-07 PROCEDURE — 10004651 HB RX, NO CHARGE ITEM: Performed by: EMERGENCY MEDICINE

## 2024-11-07 PROCEDURE — 99233 SBSQ HOSP IP/OBS HIGH 50: CPT | Performed by: FAMILY MEDICINE

## 2024-11-07 PROCEDURE — 80048 BASIC METABOLIC PNL TOTAL CA: CPT | Performed by: FAMILY MEDICINE

## 2024-11-07 PROCEDURE — 80048 BASIC METABOLIC PNL TOTAL CA: CPT

## 2024-11-07 PROCEDURE — 93005 ELECTROCARDIOGRAM TRACING: CPT | Performed by: FAMILY MEDICINE

## 2024-11-07 PROCEDURE — 36415 COLL VENOUS BLD VENIPUNCTURE: CPT | Performed by: FAMILY MEDICINE

## 2024-11-07 PROCEDURE — 36600 WITHDRAWAL OF ARTERIAL BLOOD: CPT

## 2024-11-07 PROCEDURE — 10004651 HB RX, NO CHARGE ITEM: Performed by: FAMILY MEDICINE

## 2024-11-07 PROCEDURE — 10003585 HB ROOM CHARGE INTERMEDIATE CARE

## 2024-11-07 PROCEDURE — 10002800 HB RX 250 W HCPCS: Performed by: INTERNAL MEDICINE

## 2024-11-07 PROCEDURE — 10002800 HB RX 250 W HCPCS: Performed by: FAMILY MEDICINE

## 2024-11-07 RX ORDER — LANOLIN ALCOHOL/MO/W.PET/CERES
400 CREAM (GRAM) TOPICAL ONCE
Status: DISCONTINUED | OUTPATIENT
Start: 2024-11-07 | End: 2024-11-07

## 2024-11-07 RX ORDER — POTASSIUM CHLORIDE 1.5 G/1.58G
40 POWDER, FOR SOLUTION ORAL ONCE
Status: DISCONTINUED | OUTPATIENT
Start: 2024-11-07 | End: 2024-11-07

## 2024-11-07 RX ADMIN — PIPERACILLIN SODIUM AND TAZOBACTAM SODIUM 3.38 G: 3; .375 INJECTION, POWDER, FOR SOLUTION INTRAVENOUS at 22:14

## 2024-11-07 RX ADMIN — POLYETHYLENE GLYCOL (3350) 17 G: 17 POWDER, FOR SOLUTION ORAL at 17:23

## 2024-11-07 RX ADMIN — SODIUM CHLORIDE, PRESERVATIVE FREE 2 ML: 5 INJECTION INTRAVENOUS at 21:59

## 2024-11-07 RX ADMIN — MIDODRINE HYDROCHLORIDE 10 MG: 5 TABLET ORAL at 06:37

## 2024-11-07 RX ADMIN — DOXYCYCLINE 100 MG: 25 FOR SUSPENSION ORAL at 09:16

## 2024-11-07 RX ADMIN — PIPERACILLIN SODIUM AND TAZOBACTAM SODIUM 3.38 G: 3; .375 INJECTION, POWDER, FOR SOLUTION INTRAVENOUS at 14:32

## 2024-11-07 RX ADMIN — FUROSEMIDE 20 MG: 10 INJECTION, SOLUTION INTRAMUSCULAR; INTRAVENOUS at 09:13

## 2024-11-07 RX ADMIN — DOXYCYCLINE 100 MG: 25 FOR SUSPENSION ORAL at 21:59

## 2024-11-07 RX ADMIN — CALCIUM CARBONATE 600 MG (1,500 MG)-VITAMIN D3 400 UNIT TABLET 1 TABLET: at 09:13

## 2024-11-07 RX ADMIN — SODIUM CHLORIDE, PRESERVATIVE FREE 2 ML: 5 INJECTION INTRAVENOUS at 09:28

## 2024-11-07 RX ADMIN — APIXABAN 2.5 MG: 2.5 TABLET, FILM COATED ORAL at 21:59

## 2024-11-07 RX ADMIN — MIDODRINE HYDROCHLORIDE 10 MG: 5 TABLET ORAL at 12:00

## 2024-11-07 RX ADMIN — MAGNESIUM SULFATE HEPTAHYDRATE 2 G: 40 INJECTION, SOLUTION INTRAVENOUS at 09:19

## 2024-11-07 RX ADMIN — SODIUM CHLORIDE 500 ML: 9 INJECTION, SOLUTION INTRAVENOUS at 09:35

## 2024-11-07 RX ADMIN — FUROSEMIDE 20 MG: 10 INJECTION, SOLUTION INTRAMUSCULAR; INTRAVENOUS at 17:22

## 2024-11-07 RX ADMIN — APIXABAN 2.5 MG: 2.5 TABLET, FILM COATED ORAL at 09:13

## 2024-11-07 RX ADMIN — POTASSIUM PHOSPHATE, MONOBASIC AND POTASSIUM PHOSPHATE, DIBASIC: 224; 236 INJECTION, SOLUTION, CONCENTRATE INTRAVENOUS at 11:27

## 2024-11-07 RX ADMIN — MIDODRINE HYDROCHLORIDE 10 MG: 5 TABLET ORAL at 17:00

## 2024-11-07 RX ADMIN — PIPERACILLIN SODIUM AND TAZOBACTAM SODIUM 3.38 G: 3; .375 INJECTION, POWDER, FOR SOLUTION INTRAVENOUS at 05:14

## 2024-11-07 ASSESSMENT — PAIN SCALES - PAIN ASSESSMENT IN ADVANCED DEMENTIA (PAINAD)
TOTALSCORE: 0
BREATHING: NORMAL
BODYLANGUAGE: RELAXED
BREATHING: NORMAL
TOTALSCORE: 0
FACIALEXPRESSION: SMILING OR INEXPRESSIVE
BODYLANGUAGE: RELAXED
TOTALSCORE: 0
BREATHING: NORMAL
CONSOLABILITY: NO NEED TO CONSOLE
BODYLANGUAGE: RELAXED
BREATHING: NORMAL
CONSOLABILITY: NO NEED TO CONSOLE
FACIALEXPRESSION: SMILING OR INEXPRESSIVE
FACIALEXPRESSION: SMILING OR INEXPRESSIVE
CONSOLABILITY: NO NEED TO CONSOLE
BODYLANGUAGE: RELAXED
FACIALEXPRESSION: SMILING OR INEXPRESSIVE
CONSOLABILITY: NO NEED TO CONSOLE
TOTALSCORE: 0

## 2024-11-08 ENCOUNTER — APPOINTMENT (OUTPATIENT)
Dept: GENERAL RADIOLOGY | Age: 89
DRG: 871 | End: 2024-11-08
Attending: FAMILY MEDICINE

## 2024-11-08 LAB
ANION GAP SERPL CALC-SCNC: 6 MMOL/L (ref 7–19)
ANION GAP SERPL CALC-SCNC: 7 MMOL/L (ref 7–19)
BASOPHILS # BLD: 0 K/MCL (ref 0–0.3)
BASOPHILS NFR BLD: 0 %
BUN SERPL-MCNC: 20 MG/DL (ref 6–20)
BUN SERPL-MCNC: 21 MG/DL (ref 6–20)
BUN/CREAT SERPL: 38 (ref 7–25)
BUN/CREAT SERPL: 38 (ref 7–25)
CALCIUM SERPL-MCNC: 8.5 MG/DL (ref 8.4–10.2)
CALCIUM SERPL-MCNC: 8.7 MG/DL (ref 8.4–10.2)
CHLORIDE SERPL-SCNC: 95 MMOL/L (ref 97–110)
CHLORIDE SERPL-SCNC: 97 MMOL/L (ref 97–110)
CO2 SERPL-SCNC: 42 MMOL/L (ref 21–32)
CO2 SERPL-SCNC: 43 MMOL/L (ref 21–32)
CREAT SERPL-MCNC: 0.53 MG/DL (ref 0.51–0.95)
CREAT SERPL-MCNC: 0.56 MG/DL (ref 0.51–0.95)
DEPRECATED RDW RBC: 62.4 FL (ref 39–50)
EGFRCR SERPLBLD CKD-EPI 2021: 86 ML/MIN/{1.73_M2}
EGFRCR SERPLBLD CKD-EPI 2021: 87 ML/MIN/{1.73_M2}
EOSINOPHIL # BLD: 0.3 K/MCL (ref 0–0.5)
EOSINOPHIL NFR BLD: 4 %
ERYTHROCYTE [DISTWIDTH] IN BLOOD: 18.7 % (ref 11–15)
FASTING DURATION TIME PATIENT: ABNORMAL H
FASTING DURATION TIME PATIENT: ABNORMAL H
GLUCOSE BLDC GLUCOMTR-MCNC: 121 MG/DL (ref 70–99)
GLUCOSE SERPL-MCNC: 112 MG/DL (ref 70–99)
GLUCOSE SERPL-MCNC: 112 MG/DL (ref 70–99)
HCT VFR BLD CALC: 34.2 % (ref 36–46.5)
HGB BLD-MCNC: 9.9 G/DL (ref 12–15.5)
IMM GRANULOCYTES # BLD AUTO: 0 K/MCL (ref 0–0.2)
IMM GRANULOCYTES # BLD: 0 %
LYMPHOCYTES # BLD: 0.3 K/MCL (ref 1–4)
LYMPHOCYTES NFR BLD: 3 %
MAGNESIUM SERPL-MCNC: 1.7 MG/DL (ref 1.7–2.4)
MCH RBC QN AUTO: 26.2 PG (ref 26–34)
MCHC RBC AUTO-ENTMCNC: 28.9 G/DL (ref 32–36.5)
MCV RBC AUTO: 90.5 FL (ref 78–100)
MONOCYTES # BLD: 0.4 K/MCL (ref 0.3–0.9)
MONOCYTES NFR BLD: 4 %
NEUTROPHILS # BLD: 8.5 K/MCL (ref 1.8–7.7)
NEUTROPHILS NFR BLD: 89 %
NRBC BLD MANUAL-RTO: 0 /100 WBC
PHOSPHATE SERPL-MCNC: 2.8 MG/DL (ref 2.4–4.7)
PLATELET # BLD AUTO: 172 K/MCL (ref 140–450)
POTASSIUM SERPL-SCNC: 3.6 MMOL/L (ref 3.4–5.1)
POTASSIUM SERPL-SCNC: 3.7 MMOL/L (ref 3.4–5.1)
RAINBOW EXTRA TUBES HOLD SPECIMEN: NORMAL
RBC # BLD: 3.78 MIL/MCL (ref 4–5.2)
SODIUM SERPL-SCNC: 140 MMOL/L (ref 135–145)
SODIUM SERPL-SCNC: 142 MMOL/L (ref 135–145)
WBC # BLD: 9.5 K/MCL (ref 4.2–11)

## 2024-11-08 PROCEDURE — 10004651 HB RX, NO CHARGE ITEM: Performed by: EMERGENCY MEDICINE

## 2024-11-08 PROCEDURE — 10002803 HB RX 637: Performed by: FAMILY MEDICINE

## 2024-11-08 PROCEDURE — 10002803 HB RX 637: Performed by: INTERNAL MEDICINE

## 2024-11-08 PROCEDURE — 99233 SBSQ HOSP IP/OBS HIGH 50: CPT | Performed by: FAMILY MEDICINE

## 2024-11-08 PROCEDURE — 71045 X-RAY EXAM CHEST 1 VIEW: CPT

## 2024-11-08 PROCEDURE — 10000002 HB ROOM CHARGE MED SURG

## 2024-11-08 PROCEDURE — 10002800 HB RX 250 W HCPCS: Performed by: INTERNAL MEDICINE

## 2024-11-08 PROCEDURE — 97530 THERAPEUTIC ACTIVITIES: CPT

## 2024-11-08 PROCEDURE — 10002800 HB RX 250 W HCPCS: Performed by: FAMILY MEDICINE

## 2024-11-08 PROCEDURE — 85025 COMPLETE CBC W/AUTO DIFF WBC: CPT | Performed by: FAMILY MEDICINE

## 2024-11-08 PROCEDURE — 83735 ASSAY OF MAGNESIUM: CPT | Performed by: FAMILY MEDICINE

## 2024-11-08 PROCEDURE — 36415 COLL VENOUS BLD VENIPUNCTURE: CPT | Performed by: FAMILY MEDICINE

## 2024-11-08 PROCEDURE — 80048 BASIC METABOLIC PNL TOTAL CA: CPT | Performed by: FAMILY MEDICINE

## 2024-11-08 PROCEDURE — 10004651 HB RX, NO CHARGE ITEM: Performed by: FAMILY MEDICINE

## 2024-11-08 PROCEDURE — 80048 BASIC METABOLIC PNL TOTAL CA: CPT

## 2024-11-08 PROCEDURE — 92526 ORAL FUNCTION THERAPY: CPT

## 2024-11-08 PROCEDURE — 84100 ASSAY OF PHOSPHORUS: CPT | Performed by: FAMILY MEDICINE

## 2024-11-08 PROCEDURE — 97162 PT EVAL MOD COMPLEX 30 MIN: CPT

## 2024-11-08 PROCEDURE — X1166 CONTRACTED PHYSICIAN CHARGE NOT BILLED BY AAH: HCPCS | Performed by: INTERNAL MEDICINE

## 2024-11-08 PROCEDURE — 10002807 HB RX 258: Performed by: INTERNAL MEDICINE

## 2024-11-08 PROCEDURE — 10004180 HB COUNTER-TRANSPORT

## 2024-11-08 RX ORDER — POTASSIUM CHLORIDE 14.9 MG/ML
20 INJECTION INTRAVENOUS ONCE
Status: COMPLETED | OUTPATIENT
Start: 2024-11-08 | End: 2024-11-16

## 2024-11-08 RX ORDER — ACETAZOLAMIDE 250 MG/1
250 TABLET ORAL 2 TIMES DAILY
Status: COMPLETED | OUTPATIENT
Start: 2024-11-08 | End: 2024-11-10

## 2024-11-08 RX ADMIN — MAGNESIUM SULFATE HEPTAHYDRATE 2 G: 40 INJECTION, SOLUTION INTRAVENOUS at 08:39

## 2024-11-08 RX ADMIN — SODIUM CHLORIDE, PRESERVATIVE FREE 2 ML: 5 INJECTION INTRAVENOUS at 20:25

## 2024-11-08 RX ADMIN — POTASSIUM CHLORIDE 20 MEQ: 14.9 INJECTION, SOLUTION INTRAVENOUS at 11:47

## 2024-11-08 RX ADMIN — APIXABAN 2.5 MG: 2.5 TABLET, FILM COATED ORAL at 08:26

## 2024-11-08 RX ADMIN — MIDODRINE HYDROCHLORIDE 10 MG: 5 TABLET ORAL at 06:39

## 2024-11-08 RX ADMIN — POTASSIUM CHLORIDE 20 MEQ: 14.9 INJECTION, SOLUTION INTRAVENOUS at 10:00

## 2024-11-08 RX ADMIN — DOXYCYCLINE 100 MG: 25 FOR SUSPENSION ORAL at 20:25

## 2024-11-08 RX ADMIN — METOPROLOL TARTRATE 25 MG: 25 TABLET, FILM COATED ORAL at 20:25

## 2024-11-08 RX ADMIN — APIXABAN 2.5 MG: 2.5 TABLET, FILM COATED ORAL at 20:25

## 2024-11-08 RX ADMIN — ACETAZOLAMIDE 250 MG: 250 TABLET ORAL at 12:02

## 2024-11-08 RX ADMIN — SODIUM CHLORIDE, PRESERVATIVE FREE 2 ML: 5 INJECTION INTRAVENOUS at 20:24

## 2024-11-08 RX ADMIN — DIGOXIN 125 MCG: 125 TABLET ORAL at 08:26

## 2024-11-08 RX ADMIN — PIPERACILLIN SODIUM AND TAZOBACTAM SODIUM 3.38 G: 3; .375 INJECTION, POWDER, FOR SOLUTION INTRAVENOUS at 14:15

## 2024-11-08 RX ADMIN — FUROSEMIDE 20 MG: 10 INJECTION, SOLUTION INTRAMUSCULAR; INTRAVENOUS at 08:26

## 2024-11-08 RX ADMIN — DOXYCYCLINE 100 MG: 25 FOR SUSPENSION ORAL at 08:26

## 2024-11-08 RX ADMIN — ACETAZOLAMIDE 250 MG: 250 TABLET ORAL at 20:25

## 2024-11-08 RX ADMIN — MIDODRINE HYDROCHLORIDE 10 MG: 5 TABLET ORAL at 16:07

## 2024-11-08 RX ADMIN — PIPERACILLIN SODIUM AND TAZOBACTAM SODIUM 3.38 G: 3; .375 INJECTION, POWDER, FOR SOLUTION INTRAVENOUS at 06:45

## 2024-11-08 RX ADMIN — CALCIUM CARBONATE 600 MG (1,500 MG)-VITAMIN D3 400 UNIT TABLET 1 TABLET: at 08:26

## 2024-11-08 RX ADMIN — MIDODRINE HYDROCHLORIDE 10 MG: 5 TABLET ORAL at 12:02

## 2024-11-08 RX ADMIN — PIPERACILLIN SODIUM AND TAZOBACTAM SODIUM 3.38 G: 3; .375 INJECTION, POWDER, FOR SOLUTION INTRAVENOUS at 21:31

## 2024-11-08 ASSESSMENT — PAIN SCALES - PAIN ASSESSMENT IN ADVANCED DEMENTIA (PAINAD)
FACIALEXPRESSION: SMILING OR INEXPRESSIVE
TOTALSCORE: 0
BODYLANGUAGE: RELAXED
BODYLANGUAGE: RELAXED
CONSOLABILITY: NO NEED TO CONSOLE
BREATHING: NORMAL
BODYLANGUAGE: RELAXED
BODYLANGUAGE: RELAXED
TOTALSCORE: 0
TOTALSCORE: 0
FACIALEXPRESSION: SMILING OR INEXPRESSIVE
CONSOLABILITY: NO NEED TO CONSOLE
CONSOLABILITY: NO NEED TO CONSOLE
BODYLANGUAGE: RELAXED
TOTALSCORE: 0
FACIALEXPRESSION: SMILING OR INEXPRESSIVE
CONSOLABILITY: NO NEED TO CONSOLE
TOTALSCORE: 0
FACIALEXPRESSION: SMILING OR INEXPRESSIVE
FACIALEXPRESSION: SMILING OR INEXPRESSIVE
BREATHING: NORMAL
CONSOLABILITY: NO NEED TO CONSOLE
BREATHING: NORMAL

## 2024-11-08 ASSESSMENT — COGNITIVE AND FUNCTIONAL STATUS - GENERAL
UNDERSTANDING 10 TO 15 MIN SPEECH: UNABLE
REMEMBERING WHERE THINGS ARE: UNABLE
BASIC_MOBILITY_RAW_SCORE: 7
BASIC_MOBILITY_CONVERTED_SCORE: 19.39
APPLIED_COGNITIVE_CONVERTED_SCORE: 19.32
TAKING CARE OF COMPLICATED TASKS: UNABLE
REMEMBERING TO TAKE MEDICATION: UNABLE
APPLIED_COGNITIVE_RAW_SCORE: 8
FOLLOWS FAMILIAR CONVERSATION: A LITTLE
REMEMBERING 5 ERRANDS WITH NO LIST: UNABLE

## 2024-11-09 ENCOUNTER — APPOINTMENT (OUTPATIENT)
Dept: GENERAL RADIOLOGY | Age: 89
DRG: 871 | End: 2024-11-09
Attending: FAMILY MEDICINE

## 2024-11-09 VITALS
SYSTOLIC BLOOD PRESSURE: 110 MMHG | HEART RATE: 86 BPM | BODY MASS INDEX: 18.81 KG/M2 | OXYGEN SATURATION: 98 % | TEMPERATURE: 97.7 F | HEIGHT: 66 IN | WEIGHT: 117.06 LBS | DIASTOLIC BLOOD PRESSURE: 77 MMHG | RESPIRATION RATE: 17 BRPM

## 2024-11-09 LAB
ANION GAP SERPL CALC-SCNC: 8 MMOL/L (ref 7–19)
BACTERIA BLD CULT: NORMAL
BACTERIA BLD CULT: NORMAL
BASOPHILS # BLD: 0 K/MCL (ref 0–0.3)
BASOPHILS NFR BLD: 0 %
BUN SERPL-MCNC: 19 MG/DL (ref 6–20)
BUN/CREAT SERPL: 32 (ref 7–25)
CALCIUM SERPL-MCNC: 8.7 MG/DL (ref 8.4–10.2)
CHLORIDE SERPL-SCNC: 93 MMOL/L (ref 97–110)
CO2 SERPL-SCNC: 38 MMOL/L (ref 21–32)
CREAT SERPL-MCNC: 0.6 MG/DL (ref 0.51–0.95)
DEPRECATED RDW RBC: 61.6 FL (ref 39–50)
EGFRCR SERPLBLD CKD-EPI 2021: 84 ML/MIN/{1.73_M2}
EOSINOPHIL # BLD: 0.2 K/MCL (ref 0–0.5)
EOSINOPHIL NFR BLD: 3 %
ERYTHROCYTE [DISTWIDTH] IN BLOOD: 18.6 % (ref 11–15)
FASTING DURATION TIME PATIENT: ABNORMAL H
GLUCOSE BLDC GLUCOMTR-MCNC: 121 MG/DL (ref 70–99)
GLUCOSE BLDC GLUCOMTR-MCNC: 121 MG/DL (ref 70–99)
GLUCOSE BLDC GLUCOMTR-MCNC: 147 MG/DL (ref 70–99)
GLUCOSE SERPL-MCNC: 117 MG/DL (ref 70–99)
HCT VFR BLD CALC: 33.3 % (ref 36–46.5)
HGB BLD-MCNC: 9.6 G/DL (ref 12–15.5)
IMM GRANULOCYTES # BLD AUTO: 0 K/MCL (ref 0–0.2)
IMM GRANULOCYTES # BLD: 1 %
LYMPHOCYTES # BLD: 0.5 K/MCL (ref 1–4)
LYMPHOCYTES NFR BLD: 6 %
MAGNESIUM SERPL-MCNC: 1.8 MG/DL (ref 1.7–2.4)
MCH RBC QN AUTO: 26.2 PG (ref 26–34)
MCHC RBC AUTO-ENTMCNC: 28.8 G/DL (ref 32–36.5)
MCV RBC AUTO: 91 FL (ref 78–100)
MONOCYTES # BLD: 0.4 K/MCL (ref 0.3–0.9)
MONOCYTES NFR BLD: 5 %
NEUTROPHILS # BLD: 7 K/MCL (ref 1.8–7.7)
NEUTROPHILS NFR BLD: 85 %
NRBC BLD MANUAL-RTO: 0 /100 WBC
PHOSPHATE SERPL-MCNC: 2.3 MG/DL (ref 2.4–4.7)
PLATELET # BLD AUTO: 154 K/MCL (ref 140–450)
POTASSIUM SERPL-SCNC: 3.9 MMOL/L (ref 3.4–5.1)
POTASSIUM SERPL-SCNC: 4.2 MMOL/L (ref 3.4–5.1)
RBC # BLD: 3.66 MIL/MCL (ref 4–5.2)
SODIUM SERPL-SCNC: 135 MMOL/L (ref 135–145)
WBC # BLD: 8.1 K/MCL (ref 4.2–11)

## 2024-11-09 PROCEDURE — 10006031 HB ROOM CHARGE TELEMETRY

## 2024-11-09 PROCEDURE — 10004651 HB RX, NO CHARGE ITEM: Performed by: FAMILY MEDICINE

## 2024-11-09 PROCEDURE — 74230 X-RAY XM SWLNG FUNCJ C+: CPT

## 2024-11-09 PROCEDURE — 84132 ASSAY OF SERUM POTASSIUM: CPT | Performed by: FAMILY MEDICINE

## 2024-11-09 PROCEDURE — 80048 BASIC METABOLIC PNL TOTAL CA: CPT | Performed by: FAMILY MEDICINE

## 2024-11-09 PROCEDURE — 10002800 HB RX 250 W HCPCS: Performed by: INTERNAL MEDICINE

## 2024-11-09 PROCEDURE — 10004651 HB RX, NO CHARGE ITEM: Performed by: EMERGENCY MEDICINE

## 2024-11-09 PROCEDURE — 36415 COLL VENOUS BLD VENIPUNCTURE: CPT | Performed by: FAMILY MEDICINE

## 2024-11-09 PROCEDURE — 84100 ASSAY OF PHOSPHORUS: CPT | Performed by: FAMILY MEDICINE

## 2024-11-09 PROCEDURE — 99232 SBSQ HOSP IP/OBS MODERATE 35: CPT | Performed by: FAMILY MEDICINE

## 2024-11-09 PROCEDURE — 92611 MOTION FLUOROSCOPY/SWALLOW: CPT

## 2024-11-09 PROCEDURE — 10002803 HB RX 637: Performed by: FAMILY MEDICINE

## 2024-11-09 PROCEDURE — 83735 ASSAY OF MAGNESIUM: CPT | Performed by: FAMILY MEDICINE

## 2024-11-09 PROCEDURE — 10002807 HB RX 258: Performed by: INTERNAL MEDICINE

## 2024-11-09 PROCEDURE — 92526 ORAL FUNCTION THERAPY: CPT

## 2024-11-09 PROCEDURE — 10002803 HB RX 637: Performed by: INTERNAL MEDICINE

## 2024-11-09 PROCEDURE — 99233 SBSQ HOSP IP/OBS HIGH 50: CPT | Performed by: INTERNAL MEDICINE

## 2024-11-09 PROCEDURE — 85025 COMPLETE CBC W/AUTO DIFF WBC: CPT | Performed by: FAMILY MEDICINE

## 2024-11-09 RX ORDER — POTASSIUM CHLORIDE 1500 MG/1
40 TABLET, EXTENDED RELEASE ORAL ONCE
Status: COMPLETED | OUTPATIENT
Start: 2024-11-09 | End: 2024-11-09

## 2024-11-09 RX ADMIN — ACETAZOLAMIDE 250 MG: 250 TABLET ORAL at 20:28

## 2024-11-09 RX ADMIN — DOXYCYCLINE 100 MG: 25 FOR SUSPENSION ORAL at 10:58

## 2024-11-09 RX ADMIN — APIXABAN 2.5 MG: 2.5 TABLET, FILM COATED ORAL at 10:58

## 2024-11-09 RX ADMIN — POTASSIUM & SODIUM PHOSPHATES POWDER PACK 280-160-250 MG 1 PACKET: 280-160-250 PACK at 11:01

## 2024-11-09 RX ADMIN — CALCIUM CARBONATE 600 MG (1,500 MG)-VITAMIN D3 400 UNIT TABLET 1 TABLET: at 10:58

## 2024-11-09 RX ADMIN — MIDODRINE HYDROCHLORIDE 10 MG: 5 TABLET ORAL at 10:57

## 2024-11-09 RX ADMIN — POTASSIUM & SODIUM PHOSPHATES POWDER PACK 280-160-250 MG 1 PACKET: 280-160-250 PACK at 06:41

## 2024-11-09 RX ADMIN — ACETAZOLAMIDE 250 MG: 250 TABLET ORAL at 10:57

## 2024-11-09 RX ADMIN — METOPROLOL TARTRATE 25 MG: 25 TABLET, FILM COATED ORAL at 20:28

## 2024-11-09 RX ADMIN — SODIUM CHLORIDE, PRESERVATIVE FREE 2 ML: 5 INJECTION INTRAVENOUS at 10:59

## 2024-11-09 RX ADMIN — SODIUM CHLORIDE, PRESERVATIVE FREE 2 ML: 5 INJECTION INTRAVENOUS at 20:31

## 2024-11-09 RX ADMIN — MIDODRINE HYDROCHLORIDE 10 MG: 5 TABLET ORAL at 06:41

## 2024-11-09 RX ADMIN — PIPERACILLIN SODIUM AND TAZOBACTAM SODIUM 3.38 G: 3; .375 INJECTION, POWDER, FOR SOLUTION INTRAVENOUS at 06:40

## 2024-11-09 RX ADMIN — MIDODRINE HYDROCHLORIDE 10 MG: 5 TABLET ORAL at 15:41

## 2024-11-09 RX ADMIN — DOXYCYCLINE 100 MG: 25 FOR SUSPENSION ORAL at 21:00

## 2024-11-09 RX ADMIN — APIXABAN 2.5 MG: 2.5 TABLET, FILM COATED ORAL at 20:28

## 2024-11-09 RX ADMIN — POTASSIUM CHLORIDE 40 MEQ: 1500 TABLET, EXTENDED RELEASE ORAL at 15:41

## 2024-11-09 ASSESSMENT — PAIN SCALES - PAIN ASSESSMENT IN ADVANCED DEMENTIA (PAINAD)
BREATHING: NORMAL
TOTALSCORE: 0
CONSOLABILITY: NO NEED TO CONSOLE
FACIALEXPRESSION: SMILING OR INEXPRESSIVE
BREATHING: NORMAL
BODYLANGUAGE: RELAXED
FACIALEXPRESSION: SMILING OR INEXPRESSIVE
BREATHING: NORMAL
TOTALSCORE: 0
BREATHING: NORMAL
TOTALSCORE: 0
TOTALSCORE: 0
BODYLANGUAGE: RELAXED
CONSOLABILITY: NO NEED TO CONSOLE
BODYLANGUAGE: RELAXED
FACIALEXPRESSION: SMILING OR INEXPRESSIVE
CONSOLABILITY: NO NEED TO CONSOLE
BODYLANGUAGE: RELAXED
FACIALEXPRESSION: SMILING OR INEXPRESSIVE
CONSOLABILITY: NO NEED TO CONSOLE

## 2024-11-09 ASSESSMENT — PAIN SCALES - WONG BAKER: WONGBAKER_NUMERICALRESPONSE: 0

## 2024-11-09 ASSESSMENT — PAIN SCALES - GENERAL: PAINLEVEL_OUTOF10: 0

## 2024-11-10 LAB
ANION GAP SERPL CALC-SCNC: 7 MMOL/L (ref 7–19)
BASOPHILS # BLD: 0 K/MCL (ref 0–0.3)
BASOPHILS NFR BLD: 1 %
BUN SERPL-MCNC: 19 MG/DL (ref 6–20)
BUN/CREAT SERPL: 30 (ref 7–25)
CALCIUM SERPL-MCNC: 8.7 MG/DL (ref 8.4–10.2)
CHLORIDE SERPL-SCNC: 94 MMOL/L (ref 97–110)
CO2 SERPL-SCNC: 36 MMOL/L (ref 21–32)
CREAT SERPL-MCNC: 0.63 MG/DL (ref 0.51–0.95)
DEPRECATED RDW RBC: 60.3 FL (ref 39–50)
EGFRCR SERPLBLD CKD-EPI 2021: 83 ML/MIN/{1.73_M2}
EOSINOPHIL # BLD: 0.2 K/MCL (ref 0–0.5)
EOSINOPHIL NFR BLD: 3 %
ERYTHROCYTE [DISTWIDTH] IN BLOOD: 18.6 % (ref 11–15)
FASTING DURATION TIME PATIENT: ABNORMAL H
GLUCOSE SERPL-MCNC: 92 MG/DL (ref 70–99)
HCT VFR BLD CALC: 34.1 % (ref 36–46.5)
HGB BLD-MCNC: 10.1 G/DL (ref 12–15.5)
IMM GRANULOCYTES # BLD AUTO: 0 K/MCL (ref 0–0.2)
IMM GRANULOCYTES # BLD: 0 %
LYMPHOCYTES # BLD: 0.5 K/MCL (ref 1–4)
LYMPHOCYTES NFR BLD: 9 %
MCH RBC QN AUTO: 26.3 PG (ref 26–34)
MCHC RBC AUTO-ENTMCNC: 29.6 G/DL (ref 32–36.5)
MCV RBC AUTO: 88.8 FL (ref 78–100)
MONOCYTES # BLD: 0.2 K/MCL (ref 0.3–0.9)
MONOCYTES NFR BLD: 4 %
NEUTROPHILS # BLD: 5.2 K/MCL (ref 1.8–7.7)
NEUTROPHILS NFR BLD: 83 %
NRBC BLD MANUAL-RTO: 0 /100 WBC
PHOSPHATE SERPL-MCNC: 2.4 MG/DL (ref 2.4–4.7)
PLATELET # BLD AUTO: 155 K/MCL (ref 140–450)
POTASSIUM SERPL-SCNC: 4.1 MMOL/L (ref 3.4–5.1)
RBC # BLD: 3.84 MIL/MCL (ref 4–5.2)
SODIUM SERPL-SCNC: 133 MMOL/L (ref 135–145)
WBC # BLD: 6.1 K/MCL (ref 4.2–11)

## 2024-11-10 PROCEDURE — 36415 COLL VENOUS BLD VENIPUNCTURE: CPT | Performed by: FAMILY MEDICINE

## 2024-11-10 PROCEDURE — 99233 SBSQ HOSP IP/OBS HIGH 50: CPT | Performed by: INTERNAL MEDICINE

## 2024-11-10 PROCEDURE — 10002803 HB RX 637: Performed by: FAMILY MEDICINE

## 2024-11-10 PROCEDURE — 10006031 HB ROOM CHARGE TELEMETRY

## 2024-11-10 PROCEDURE — 84100 ASSAY OF PHOSPHORUS: CPT | Performed by: FAMILY MEDICINE

## 2024-11-10 PROCEDURE — 99232 SBSQ HOSP IP/OBS MODERATE 35: CPT | Performed by: FAMILY MEDICINE

## 2024-11-10 PROCEDURE — 80048 BASIC METABOLIC PNL TOTAL CA: CPT | Performed by: FAMILY MEDICINE

## 2024-11-10 PROCEDURE — 10004651 HB RX, NO CHARGE ITEM: Performed by: EMERGENCY MEDICINE

## 2024-11-10 PROCEDURE — 10002803 HB RX 637: Performed by: INTERNAL MEDICINE

## 2024-11-10 PROCEDURE — 10004651 HB RX, NO CHARGE ITEM: Performed by: FAMILY MEDICINE

## 2024-11-10 PROCEDURE — 85025 COMPLETE CBC W/AUTO DIFF WBC: CPT | Performed by: FAMILY MEDICINE

## 2024-11-10 PROCEDURE — 92526 ORAL FUNCTION THERAPY: CPT

## 2024-11-10 RX ORDER — DOXYCYCLINE 25 MG/5ML
100 POWDER, FOR SUSPENSION ORAL 2 TIMES DAILY
Status: DISCONTINUED | OUTPATIENT
Start: 2024-11-10 | End: 2024-11-10

## 2024-11-10 RX ORDER — DOXYCYCLINE 100 MG/1
100 CAPSULE ORAL EVERY 12 HOURS SCHEDULED
Status: DISCONTINUED | OUTPATIENT
Start: 2024-11-10 | End: 2024-11-11

## 2024-11-10 RX ADMIN — APIXABAN 2.5 MG: 2.5 TABLET, FILM COATED ORAL at 20:53

## 2024-11-10 RX ADMIN — Medication 250 MG: at 08:53

## 2024-11-10 RX ADMIN — ACETAZOLAMIDE 250 MG: 250 TABLET ORAL at 08:53

## 2024-11-10 RX ADMIN — MIDODRINE HYDROCHLORIDE 10 MG: 5 TABLET ORAL at 11:57

## 2024-11-10 RX ADMIN — SODIUM CHLORIDE, PRESERVATIVE FREE 2 ML: 5 INJECTION INTRAVENOUS at 20:52

## 2024-11-10 RX ADMIN — CALCIUM CARBONATE 600 MG (1,500 MG)-VITAMIN D3 400 UNIT TABLET 1 TABLET: at 08:53

## 2024-11-10 RX ADMIN — DOXYCYCLINE 100 MG: 100 CAPSULE ORAL at 11:57

## 2024-11-10 RX ADMIN — MIDODRINE HYDROCHLORIDE 10 MG: 5 TABLET ORAL at 18:37

## 2024-11-10 RX ADMIN — METOPROLOL TARTRATE 25 MG: 25 TABLET, FILM COATED ORAL at 20:53

## 2024-11-10 RX ADMIN — METOPROLOL TARTRATE 25 MG: 25 TABLET, FILM COATED ORAL at 08:53

## 2024-11-10 RX ADMIN — SODIUM CHLORIDE, PRESERVATIVE FREE 2 ML: 5 INJECTION INTRAVENOUS at 08:54

## 2024-11-10 RX ADMIN — APIXABAN 2.5 MG: 2.5 TABLET, FILM COATED ORAL at 08:53

## 2024-11-10 RX ADMIN — Medication 250 MG: at 11:57

## 2024-11-10 RX ADMIN — ACETAZOLAMIDE 250 MG: 250 TABLET ORAL at 20:54

## 2024-11-10 RX ADMIN — DOXYCYCLINE 100 MG: 100 CAPSULE ORAL at 20:54

## 2024-11-10 RX ADMIN — SODIUM CHLORIDE, PRESERVATIVE FREE 2 ML: 5 INJECTION INTRAVENOUS at 20:53

## 2024-11-10 RX ADMIN — MIDODRINE HYDROCHLORIDE 10 MG: 5 TABLET ORAL at 06:26

## 2024-11-10 ASSESSMENT — PAIN SCALES - PAIN ASSESSMENT IN ADVANCED DEMENTIA (PAINAD)
TOTALSCORE: 0
BODYLANGUAGE: RELAXED
CONSOLABILITY: NO NEED TO CONSOLE
BREATHING: NORMAL
FACIALEXPRESSION: SMILING OR INEXPRESSIVE
BODYLANGUAGE: RELAXED
BREATHING: NORMAL
CONSOLABILITY: NO NEED TO CONSOLE
TOTALSCORE: 0
FACIALEXPRESSION: SMILING OR INEXPRESSIVE

## 2024-11-10 ASSESSMENT — PATIENT HEALTH QUESTIONNAIRE - PHQ9: IS PATIENT ABLE TO COMPLETE PHQ2 OR PHQ9: NO, PATIENT WILL NEVER BE ABLE TO COMPLETE

## 2024-11-10 ASSESSMENT — PAIN SCALES - WONG BAKER: WONGBAKER_NUMERICALRESPONSE: 0

## 2024-11-10 ASSESSMENT — PAIN SCALES - GENERAL: PAINLEVEL_OUTOF10: 0

## 2024-11-11 ENCOUNTER — APPOINTMENT (OUTPATIENT)
Dept: GENERAL RADIOLOGY | Age: 89
DRG: 871 | End: 2024-11-11
Attending: FAMILY MEDICINE

## 2024-11-11 LAB
ANION GAP SERPL CALC-SCNC: 7 MMOL/L (ref 7–19)
BASOPHILS # BLD: 0 K/MCL (ref 0–0.3)
BASOPHILS NFR BLD: 1 %
BUN SERPL-MCNC: 24 MG/DL (ref 6–20)
BUN/CREAT SERPL: 40 (ref 7–25)
CALCIUM SERPL-MCNC: 8.8 MG/DL (ref 8.4–10.2)
CHLORIDE SERPL-SCNC: 97 MMOL/L (ref 97–110)
CO2 SERPL-SCNC: 36 MMOL/L (ref 21–32)
CREAT SERPL-MCNC: 0.6 MG/DL (ref 0.51–0.95)
DEPRECATED RDW RBC: 61.4 FL (ref 39–50)
EGFRCR SERPLBLD CKD-EPI 2021: 84 ML/MIN/{1.73_M2}
EOSINOPHIL # BLD: 0 K/MCL (ref 0–0.5)
EOSINOPHIL NFR BLD: 1 %
ERYTHROCYTE [DISTWIDTH] IN BLOOD: 18.7 % (ref 11–15)
FASTING DURATION TIME PATIENT: ABNORMAL H
GLUCOSE BLDC GLUCOMTR-MCNC: 78 MG/DL (ref 70–99)
GLUCOSE SERPL-MCNC: 98 MG/DL (ref 70–99)
HCT VFR BLD CALC: 33.6 % (ref 36–46.5)
HGB BLD-MCNC: 9.9 G/DL (ref 12–15.5)
IMM GRANULOCYTES # BLD AUTO: 0 K/MCL (ref 0–0.2)
IMM GRANULOCYTES # BLD: 0 %
LYMPHOCYTES # BLD: 0.7 K/MCL (ref 1–4)
LYMPHOCYTES NFR BLD: 11 %
MCH RBC QN AUTO: 26.1 PG (ref 26–34)
MCHC RBC AUTO-ENTMCNC: 29.5 G/DL (ref 32–36.5)
MCV RBC AUTO: 88.4 FL (ref 78–100)
MONOCYTES # BLD: 0.4 K/MCL (ref 0.3–0.9)
MONOCYTES NFR BLD: 6 %
NEUTROPHILS # BLD: 5.4 K/MCL (ref 1.8–7.7)
NEUTROPHILS NFR BLD: 81 %
NRBC BLD MANUAL-RTO: 0 /100 WBC
PHOSPHATE SERPL-MCNC: 2.6 MG/DL (ref 2.4–4.7)
PLATELET # BLD AUTO: 153 K/MCL (ref 140–450)
POTASSIUM SERPL-SCNC: 3.9 MMOL/L (ref 3.4–5.1)
RBC # BLD: 3.8 MIL/MCL (ref 4–5.2)
SODIUM SERPL-SCNC: 136 MMOL/L (ref 135–145)
WBC # BLD: 6.5 K/MCL (ref 4.2–11)

## 2024-11-11 PROCEDURE — 10002800 HB RX 250 W HCPCS: Performed by: INTERNAL MEDICINE

## 2024-11-11 PROCEDURE — 99232 SBSQ HOSP IP/OBS MODERATE 35: CPT | Performed by: NURSE PRACTITIONER

## 2024-11-11 PROCEDURE — 10002803 HB RX 637: Performed by: FAMILY MEDICINE

## 2024-11-11 PROCEDURE — 99233 SBSQ HOSP IP/OBS HIGH 50: CPT | Performed by: INTERNAL MEDICINE

## 2024-11-11 PROCEDURE — 71045 X-RAY EXAM CHEST 1 VIEW: CPT

## 2024-11-11 PROCEDURE — 36415 COLL VENOUS BLD VENIPUNCTURE: CPT | Performed by: FAMILY MEDICINE

## 2024-11-11 PROCEDURE — 10006031 HB ROOM CHARGE TELEMETRY

## 2024-11-11 PROCEDURE — 10004651 HB RX, NO CHARGE ITEM: Performed by: FAMILY MEDICINE

## 2024-11-11 PROCEDURE — 10002803 HB RX 637: Performed by: INTERNAL MEDICINE

## 2024-11-11 PROCEDURE — 85025 COMPLETE CBC W/AUTO DIFF WBC: CPT | Performed by: FAMILY MEDICINE

## 2024-11-11 PROCEDURE — 84100 ASSAY OF PHOSPHORUS: CPT | Performed by: FAMILY MEDICINE

## 2024-11-11 PROCEDURE — 99232 SBSQ HOSP IP/OBS MODERATE 35: CPT | Performed by: FAMILY MEDICINE

## 2024-11-11 PROCEDURE — 10004651 HB RX, NO CHARGE ITEM: Performed by: EMERGENCY MEDICINE

## 2024-11-11 PROCEDURE — 80048 BASIC METABOLIC PNL TOTAL CA: CPT | Performed by: FAMILY MEDICINE

## 2024-11-11 PROCEDURE — 92526 ORAL FUNCTION THERAPY: CPT

## 2024-11-11 PROCEDURE — 10002807 HB RX 258: Performed by: FAMILY MEDICINE

## 2024-11-11 RX ORDER — POTASSIUM CHLORIDE 1500 MG/1
40 TABLET, EXTENDED RELEASE ORAL ONCE
Status: COMPLETED | OUTPATIENT
Start: 2024-11-11 | End: 2024-11-11

## 2024-11-11 RX ORDER — SODIUM CHLORIDE 9 MG/ML
INJECTION, SOLUTION INTRAVENOUS ONCE
Status: COMPLETED | OUTPATIENT
Start: 2024-11-11 | End: 2024-11-16

## 2024-11-11 RX ORDER — LIDOCAINE 4 G/G
1 PATCH TOPICAL DAILY
Status: DISCONTINUED | OUTPATIENT
Start: 2024-11-11 | End: 2024-11-24 | Stop reason: HOSPADM

## 2024-11-11 RX ORDER — FUROSEMIDE 10 MG/ML
20 INJECTION INTRAMUSCULAR; INTRAVENOUS ONCE
Status: COMPLETED | OUTPATIENT
Start: 2024-11-11 | End: 2024-11-11

## 2024-11-11 RX ORDER — DOXYCYCLINE 25 MG/5ML
100 POWDER, FOR SUSPENSION ORAL 2 TIMES DAILY
Status: DISCONTINUED | OUTPATIENT
Start: 2024-11-12 | End: 2024-11-14

## 2024-11-11 RX ADMIN — APIXABAN 2.5 MG: 2.5 TABLET, FILM COATED ORAL at 08:28

## 2024-11-11 RX ADMIN — SODIUM CHLORIDE, PRESERVATIVE FREE 2 ML: 5 INJECTION INTRAVENOUS at 20:12

## 2024-11-11 RX ADMIN — DOXYCYCLINE 100 MG: 100 CAPSULE ORAL at 08:27

## 2024-11-11 RX ADMIN — SODIUM CHLORIDE, PRESERVATIVE FREE 2 ML: 5 INJECTION INTRAVENOUS at 08:28

## 2024-11-11 RX ADMIN — POTASSIUM CHLORIDE 40 MEQ: 1500 TABLET, EXTENDED RELEASE ORAL at 08:27

## 2024-11-11 RX ADMIN — SODIUM CHLORIDE: 9 INJECTION, SOLUTION INTRAVENOUS at 19:46

## 2024-11-11 RX ADMIN — CALCIUM CARBONATE 600 MG (1,500 MG)-VITAMIN D3 400 UNIT TABLET 1 TABLET: at 06:47

## 2024-11-11 RX ADMIN — FUROSEMIDE 20 MG: 10 INJECTION, SOLUTION INTRAMUSCULAR; INTRAVENOUS at 23:46

## 2024-11-11 RX ADMIN — LIDOCAINE 1 PATCH: 4 PATCH TOPICAL at 00:46

## 2024-11-11 RX ADMIN — LIDOCAINE 1 PATCH: 4 PATCH TOPICAL at 22:13

## 2024-11-11 RX ADMIN — METOPROLOL TARTRATE 25 MG: 25 TABLET, FILM COATED ORAL at 08:27

## 2024-11-11 RX ADMIN — MIDODRINE HYDROCHLORIDE 10 MG: 5 TABLET ORAL at 10:34

## 2024-11-11 RX ADMIN — DIGOXIN 125 MCG: 125 TABLET ORAL at 08:27

## 2024-11-11 RX ADMIN — MIDODRINE HYDROCHLORIDE 10 MG: 5 TABLET ORAL at 06:47

## 2024-11-11 ASSESSMENT — PAIN SCALES - PAIN ASSESSMENT IN ADVANCED DEMENTIA (PAINAD)
BODYLANGUAGE: RELAXED
FACIALEXPRESSION: SMILING OR INEXPRESSIVE
CONSOLABILITY: NO NEED TO CONSOLE
BREATHING: NORMAL
FACIALEXPRESSION: SMILING OR INEXPRESSIVE
TOTALSCORE: 0
BODYLANGUAGE: RELAXED
TOTALSCORE: 4
BREATHING: NORMAL
CONSOLABILITY: NO NEED TO CONSOLE
TOTALSCORE: 0
CONSOLABILITY: DISTRACTED OR REASSURED BY VOICE OR TOUCH
NEGVOCALIZATION: OCCASIONAL MOAN OR GROAN, LOW LEVELS OF SPEECH WITH A NEGATIVE OR DISAPPROVING QUALITY
BREATHING: NORMAL
BODYLANGUAGE: TENSE, DISTRESSED, FIDGETING
FACIALEXPRESSION: SAD. FRIGHTENED. FROWNING

## 2024-11-12 ENCOUNTER — APPOINTMENT (OUTPATIENT)
Dept: GENERAL RADIOLOGY | Age: 89
DRG: 871 | End: 2024-11-12
Attending: FAMILY MEDICINE

## 2024-11-12 LAB
ANION GAP SERPL CALC-SCNC: 7 MMOL/L (ref 7–19)
BASOPHILS # BLD: 0 K/MCL (ref 0–0.3)
BASOPHILS NFR BLD: 0 %
BUN SERPL-MCNC: 23 MG/DL (ref 6–20)
BUN/CREAT SERPL: 43 (ref 7–25)
CALCIUM SERPL-MCNC: 8.9 MG/DL (ref 8.4–10.2)
CHLORIDE SERPL-SCNC: 99 MMOL/L (ref 97–110)
CO2 SERPL-SCNC: 36 MMOL/L (ref 21–32)
CREAT SERPL-MCNC: 0.54 MG/DL (ref 0.51–0.95)
DEPRECATED RDW RBC: 59.1 FL (ref 39–50)
EGFRCR SERPLBLD CKD-EPI 2021: 86 ML/MIN/{1.73_M2}
EOSINOPHIL # BLD: 0 K/MCL (ref 0–0.5)
EOSINOPHIL NFR BLD: 1 %
ERYTHROCYTE [DISTWIDTH] IN BLOOD: 18.7 % (ref 11–15)
FASTING DURATION TIME PATIENT: ABNORMAL H
GLUCOSE BLDC GLUCOMTR-MCNC: 117 MG/DL (ref 70–99)
GLUCOSE BLDC GLUCOMTR-MCNC: 77 MG/DL (ref 70–99)
GLUCOSE SERPL-MCNC: 94 MG/DL (ref 70–99)
HCT VFR BLD CALC: 33.4 % (ref 36–46.5)
HGB BLD-MCNC: 10 G/DL (ref 12–15.5)
IMM GRANULOCYTES # BLD AUTO: 0 K/MCL (ref 0–0.2)
IMM GRANULOCYTES # BLD: 0 %
LYMPHOCYTES # BLD: 0.6 K/MCL (ref 1–4)
LYMPHOCYTES NFR BLD: 9 %
MCH RBC QN AUTO: 26.3 PG (ref 26–34)
MCHC RBC AUTO-ENTMCNC: 29.9 G/DL (ref 32–36.5)
MCV RBC AUTO: 87.9 FL (ref 78–100)
MONOCYTES # BLD: 0.3 K/MCL (ref 0.3–0.9)
MONOCYTES NFR BLD: 4 %
NEUTROPHILS # BLD: 6.2 K/MCL (ref 1.8–7.7)
NEUTROPHILS NFR BLD: 86 %
NRBC BLD MANUAL-RTO: 0 /100 WBC
PLATELET # BLD AUTO: 146 K/MCL (ref 140–450)
POTASSIUM SERPL-SCNC: 4 MMOL/L (ref 3.4–5.1)
RBC # BLD: 3.8 MIL/MCL (ref 4–5.2)
SODIUM SERPL-SCNC: 138 MMOL/L (ref 135–145)
WBC # BLD: 7.3 K/MCL (ref 4.2–11)

## 2024-11-12 PROCEDURE — 97535 SELF CARE MNGMENT TRAINING: CPT

## 2024-11-12 PROCEDURE — 10002803 HB RX 637: Performed by: FAMILY MEDICINE

## 2024-11-12 PROCEDURE — 80048 BASIC METABOLIC PNL TOTAL CA: CPT | Performed by: FAMILY MEDICINE

## 2024-11-12 PROCEDURE — 99233 SBSQ HOSP IP/OBS HIGH 50: CPT | Performed by: INTERNAL MEDICINE

## 2024-11-12 PROCEDURE — 97167 OT EVAL HIGH COMPLEX 60 MIN: CPT

## 2024-11-12 PROCEDURE — 36415 COLL VENOUS BLD VENIPUNCTURE: CPT | Performed by: FAMILY MEDICINE

## 2024-11-12 PROCEDURE — 71045 X-RAY EXAM CHEST 1 VIEW: CPT

## 2024-11-12 PROCEDURE — 85025 COMPLETE CBC W/AUTO DIFF WBC: CPT | Performed by: FAMILY MEDICINE

## 2024-11-12 PROCEDURE — 10006031 HB ROOM CHARGE TELEMETRY

## 2024-11-12 PROCEDURE — 97110 THERAPEUTIC EXERCISES: CPT

## 2024-11-12 PROCEDURE — 97530 THERAPEUTIC ACTIVITIES: CPT

## 2024-11-12 PROCEDURE — 92526 ORAL FUNCTION THERAPY: CPT

## 2024-11-12 PROCEDURE — 10004651 HB RX, NO CHARGE ITEM: Performed by: FAMILY MEDICINE

## 2024-11-12 RX ORDER — POTASSIUM CHLORIDE 1.5 G/1.58G
40 POWDER, FOR SOLUTION ORAL ONCE
Status: COMPLETED | OUTPATIENT
Start: 2024-11-12 | End: 2024-11-12

## 2024-11-12 RX ADMIN — METOPROLOL TARTRATE 25 MG: 25 TABLET, FILM COATED ORAL at 20:51

## 2024-11-12 RX ADMIN — POTASSIUM CHLORIDE 40 MEQ: 1.5 POWDER, FOR SOLUTION ORAL at 09:21

## 2024-11-12 RX ADMIN — APIXABAN 2.5 MG: 2.5 TABLET, FILM COATED ORAL at 01:43

## 2024-11-12 RX ADMIN — DOXYCYCLINE 100 MG: 25 FOR SUSPENSION ORAL at 21:42

## 2024-11-12 RX ADMIN — DOXYCYCLINE 100 MG: 25 FOR SUSPENSION ORAL at 09:22

## 2024-11-12 RX ADMIN — MIDODRINE HYDROCHLORIDE 10 MG: 5 TABLET ORAL at 15:37

## 2024-11-12 RX ADMIN — METOPROLOL TARTRATE 25 MG: 25 TABLET, FILM COATED ORAL at 09:21

## 2024-11-12 RX ADMIN — DOXYCYCLINE 100 MG: 25 FOR SUSPENSION ORAL at 01:43

## 2024-11-12 RX ADMIN — CALCIUM CARBONATE 600 MG (1,500 MG)-VITAMIN D3 400 UNIT TABLET 1 TABLET: at 06:12

## 2024-11-12 RX ADMIN — MIDODRINE HYDROCHLORIDE 10 MG: 5 TABLET ORAL at 11:31

## 2024-11-12 RX ADMIN — APIXABAN 2.5 MG: 2.5 TABLET, FILM COATED ORAL at 09:21

## 2024-11-12 RX ADMIN — MIDODRINE HYDROCHLORIDE 10 MG: 5 TABLET ORAL at 06:12

## 2024-11-12 RX ADMIN — SODIUM CHLORIDE, PRESERVATIVE FREE 2 ML: 5 INJECTION INTRAVENOUS at 09:22

## 2024-11-12 RX ADMIN — APIXABAN 2.5 MG: 2.5 TABLET, FILM COATED ORAL at 20:51

## 2024-11-12 RX ADMIN — METOPROLOL TARTRATE 25 MG: 25 TABLET, FILM COATED ORAL at 01:43

## 2024-11-12 ASSESSMENT — COGNITIVE AND FUNCTIONAL STATUS - GENERAL
DAILY_ACTIVITY_CONVERTED_SCORE: 29.04
HELP NEEDED FOR BATHING: A LOT
HELP NEEDED FOR TOILETING: A LOT
BASIC_MOBILITY_RAW_SCORE: 7
HELP NEEDED FOR PERSONAL GROOMING: A LOT
DAILY_ACTIVITY_RAW_SCORE: 11
BASIC_MOBILITY_CONVERTED_SCORE: 19.39
HELP NEEDED DRESSING REGULAR LOWER BODY CLOTHING: TOTAL
HELP NEEDED DRESSING REGULAR UPPER BODY CLOTHING: A LOT

## 2024-11-12 ASSESSMENT — PAIN SCALES - PAIN ASSESSMENT IN ADVANCED DEMENTIA (PAINAD)
TOTALSCORE: 2
FACIALEXPRESSION: SMILING OR INEXPRESSIVE
NEGVOCALIZATION: OCCASIONAL MOAN OR GROAN, LOW LEVELS OF SPEECH WITH A NEGATIVE OR DISAPPROVING QUALITY
BREATHING: NORMAL
BODYLANGUAGE: RELAXED
CONSOLABILITY: DISTRACTED OR REASSURED BY VOICE OR TOUCH

## 2024-11-12 ASSESSMENT — ACTIVITIES OF DAILY LIVING (ADL): HOME_MANAGEMENT_TIME_ENTRY: 14

## 2024-11-13 LAB
ANION GAP SERPL CALC-SCNC: 8 MMOL/L (ref 7–19)
BASOPHILS # BLD: 0 K/MCL (ref 0–0.3)
BASOPHILS NFR BLD: 0 %
BUN SERPL-MCNC: 20 MG/DL (ref 6–20)
BUN/CREAT SERPL: 34 (ref 7–25)
CALCIUM SERPL-MCNC: 8.8 MG/DL (ref 8.4–10.2)
CHLORIDE SERPL-SCNC: 98 MMOL/L (ref 97–110)
CO2 SERPL-SCNC: 33 MMOL/L (ref 21–32)
CREAT SERPL-MCNC: 0.59 MG/DL (ref 0.51–0.95)
DEPRECATED RDW RBC: 58.1 FL (ref 39–50)
EGFRCR SERPLBLD CKD-EPI 2021: 85 ML/MIN/{1.73_M2}
EOSINOPHIL # BLD: 0.1 K/MCL (ref 0–0.5)
EOSINOPHIL NFR BLD: 1 %
ERYTHROCYTE [DISTWIDTH] IN BLOOD: 18.9 % (ref 11–15)
FASTING DURATION TIME PATIENT: ABNORMAL H
GLUCOSE BLDC GLUCOMTR-MCNC: 119 MG/DL (ref 70–99)
GLUCOSE BLDC GLUCOMTR-MCNC: 129 MG/DL (ref 70–99)
GLUCOSE BLDC GLUCOMTR-MCNC: 134 MG/DL (ref 70–99)
GLUCOSE BLDC GLUCOMTR-MCNC: 164 MG/DL (ref 70–99)
GLUCOSE SERPL-MCNC: 151 MG/DL (ref 70–99)
HCT VFR BLD CALC: 32.6 % (ref 36–46.5)
HGB BLD-MCNC: 10.2 G/DL (ref 12–15.5)
IMM GRANULOCYTES # BLD AUTO: 0 K/MCL (ref 0–0.2)
IMM GRANULOCYTES # BLD: 0 %
LYMPHOCYTES # BLD: 0.7 K/MCL (ref 1–4)
LYMPHOCYTES NFR BLD: 10 %
MCH RBC QN AUTO: 26.7 PG (ref 26–34)
MCHC RBC AUTO-ENTMCNC: 31.3 G/DL (ref 32–36.5)
MCV RBC AUTO: 85.3 FL (ref 78–100)
MONOCYTES # BLD: 0.5 K/MCL (ref 0.3–0.9)
MONOCYTES NFR BLD: 6 %
NEUTROPHILS # BLD: 6.3 K/MCL (ref 1.8–7.7)
NEUTROPHILS NFR BLD: 83 %
NRBC BLD MANUAL-RTO: 0 /100 WBC
PLATELET # BLD AUTO: 155 K/MCL (ref 140–450)
POTASSIUM SERPL-SCNC: 4.2 MMOL/L (ref 3.4–5.1)
RBC # BLD: 3.82 MIL/MCL (ref 4–5.2)
SODIUM SERPL-SCNC: 135 MMOL/L (ref 135–145)
WBC # BLD: 7.6 K/MCL (ref 4.2–11)

## 2024-11-13 PROCEDURE — 85025 COMPLETE CBC W/AUTO DIFF WBC: CPT | Performed by: INTERNAL MEDICINE

## 2024-11-13 PROCEDURE — 99233 SBSQ HOSP IP/OBS HIGH 50: CPT | Performed by: INTERNAL MEDICINE

## 2024-11-13 PROCEDURE — 80048 BASIC METABOLIC PNL TOTAL CA: CPT | Performed by: INTERNAL MEDICINE

## 2024-11-13 PROCEDURE — 10002803 HB RX 637: Performed by: FAMILY MEDICINE

## 2024-11-13 PROCEDURE — 10002803 HB RX 637: Performed by: INTERNAL MEDICINE

## 2024-11-13 PROCEDURE — 36415 COLL VENOUS BLD VENIPUNCTURE: CPT | Performed by: INTERNAL MEDICINE

## 2024-11-13 PROCEDURE — 10004651 HB RX, NO CHARGE ITEM: Performed by: EMERGENCY MEDICINE

## 2024-11-13 PROCEDURE — 10004651 HB RX, NO CHARGE ITEM: Performed by: FAMILY MEDICINE

## 2024-11-13 PROCEDURE — 10006031 HB ROOM CHARGE TELEMETRY

## 2024-11-13 PROCEDURE — 92526 ORAL FUNCTION THERAPY: CPT

## 2024-11-13 RX ADMIN — MIDODRINE HYDROCHLORIDE 10 MG: 5 TABLET ORAL at 11:59

## 2024-11-13 RX ADMIN — SODIUM CHLORIDE, PRESERVATIVE FREE 2 ML: 5 INJECTION INTRAVENOUS at 21:45

## 2024-11-13 RX ADMIN — MIDODRINE HYDROCHLORIDE 10 MG: 5 TABLET ORAL at 06:47

## 2024-11-13 RX ADMIN — LIDOCAINE 1 PATCH: 4 PATCH TOPICAL at 21:45

## 2024-11-13 RX ADMIN — CALCIUM CARBONATE 600 MG (1,500 MG)-VITAMIN D3 400 UNIT TABLET 1 TABLET: at 06:40

## 2024-11-13 RX ADMIN — APIXABAN 2.5 MG: 2.5 TABLET, FILM COATED ORAL at 09:06

## 2024-11-13 RX ADMIN — DIGOXIN 125 MCG: 125 TABLET ORAL at 09:06

## 2024-11-13 RX ADMIN — MIDODRINE HYDROCHLORIDE 10 MG: 5 TABLET ORAL at 16:24

## 2024-11-13 RX ADMIN — SODIUM CHLORIDE, PRESERVATIVE FREE 2 ML: 5 INJECTION INTRAVENOUS at 21:57

## 2024-11-13 RX ADMIN — APIXABAN 2.5 MG: 2.5 TABLET, FILM COATED ORAL at 21:44

## 2024-11-13 RX ADMIN — METOPROLOL TARTRATE 25 MG: 25 TABLET, FILM COATED ORAL at 21:45

## 2024-11-13 RX ADMIN — METOPROLOL TARTRATE 25 MG: 25 TABLET, FILM COATED ORAL at 09:06

## 2024-11-13 RX ADMIN — DOXYCYCLINE 100 MG: 25 FOR SUSPENSION ORAL at 11:01

## 2024-11-13 RX ADMIN — DOXYCYCLINE 100 MG: 25 FOR SUSPENSION ORAL at 21:45

## 2024-11-13 ASSESSMENT — PAIN SCALES - PAIN ASSESSMENT IN ADVANCED DEMENTIA (PAINAD)
FACIALEXPRESSION: SMILING OR INEXPRESSIVE
BODYLANGUAGE: RELAXED
TOTALSCORE: 1
FACIALEXPRESSION: SMILING OR INEXPRESSIVE
CONSOLABILITY: DISTRACTED OR REASSURED BY VOICE OR TOUCH
BODYLANGUAGE: RELAXED
CONSOLABILITY: NO NEED TO CONSOLE
BREATHING: NORMAL
NEGVOCALIZATION: OCCASIONAL MOAN OR GROAN, LOW LEVELS OF SPEECH WITH A NEGATIVE OR DISAPPROVING QUALITY
NEGVOCALIZATION: OCCASIONAL MOAN OR GROAN, LOW LEVELS OF SPEECH WITH A NEGATIVE OR DISAPPROVING QUALITY
TOTALSCORE: 2
BREATHING: NORMAL

## 2024-11-13 ASSESSMENT — PAIN SCALES - GENERAL: PAINLEVEL_OUTOF10: 0

## 2024-11-13 ASSESSMENT — PAIN SCALES - WONG BAKER: WONGBAKER_NUMERICALRESPONSE: 0

## 2024-11-14 LAB
ANION GAP SERPL CALC-SCNC: 7 MMOL/L (ref 7–19)
BASOPHILS # BLD: 0 K/MCL (ref 0–0.3)
BASOPHILS NFR BLD: 0 %
BUN SERPL-MCNC: 16 MG/DL (ref 6–20)
BUN/CREAT SERPL: 33 (ref 7–25)
CALCIUM SERPL-MCNC: 9 MG/DL (ref 8.4–10.2)
CHLORIDE SERPL-SCNC: 100 MMOL/L (ref 97–110)
CO2 SERPL-SCNC: 33 MMOL/L (ref 21–32)
CREAT SERPL-MCNC: 0.49 MG/DL (ref 0.51–0.95)
DEPRECATED RDW RBC: 58.4 FL (ref 39–50)
EGFRCR SERPLBLD CKD-EPI 2021: 88 ML/MIN/{1.73_M2}
EOSINOPHIL # BLD: 0.1 K/MCL (ref 0–0.5)
EOSINOPHIL NFR BLD: 1 %
ERYTHROCYTE [DISTWIDTH] IN BLOOD: 19.3 % (ref 11–15)
FASTING DURATION TIME PATIENT: ABNORMAL H
GLUCOSE BLDC GLUCOMTR-MCNC: 104 MG/DL (ref 70–99)
GLUCOSE BLDC GLUCOMTR-MCNC: 124 MG/DL (ref 70–99)
GLUCOSE BLDC GLUCOMTR-MCNC: 126 MG/DL (ref 70–99)
GLUCOSE BLDC GLUCOMTR-MCNC: 132 MG/DL (ref 70–99)
GLUCOSE SERPL-MCNC: 144 MG/DL (ref 70–99)
HCT VFR BLD CALC: 33.5 % (ref 36–46.5)
HGB BLD-MCNC: 10.3 G/DL (ref 12–15.5)
IMM GRANULOCYTES # BLD AUTO: 0.1 K/MCL (ref 0–0.2)
IMM GRANULOCYTES # BLD: 1 %
LYMPHOCYTES # BLD: 0.7 K/MCL (ref 1–4)
LYMPHOCYTES NFR BLD: 8 %
MCH RBC QN AUTO: 26.1 PG (ref 26–34)
MCHC RBC AUTO-ENTMCNC: 30.7 G/DL (ref 32–36.5)
MCV RBC AUTO: 85 FL (ref 78–100)
MONOCYTES # BLD: 0.5 K/MCL (ref 0.3–0.9)
MONOCYTES NFR BLD: 6 %
NEUTROPHILS # BLD: 7.4 K/MCL (ref 1.8–7.7)
NEUTROPHILS NFR BLD: 84 %
NRBC BLD MANUAL-RTO: 0 /100 WBC
PLATELET # BLD AUTO: 142 K/MCL (ref 140–450)
POTASSIUM SERPL-SCNC: 4 MMOL/L (ref 3.4–5.1)
RBC # BLD: 3.94 MIL/MCL (ref 4–5.2)
SODIUM SERPL-SCNC: 136 MMOL/L (ref 135–145)
WBC # BLD: 8.7 K/MCL (ref 4.2–11)

## 2024-11-14 PROCEDURE — 99233 SBSQ HOSP IP/OBS HIGH 50: CPT | Performed by: INTERNAL MEDICINE

## 2024-11-14 PROCEDURE — 10006031 HB ROOM CHARGE TELEMETRY

## 2024-11-14 PROCEDURE — 10004651 HB RX, NO CHARGE ITEM: Performed by: FAMILY MEDICINE

## 2024-11-14 PROCEDURE — 85025 COMPLETE CBC W/AUTO DIFF WBC: CPT | Performed by: INTERNAL MEDICINE

## 2024-11-14 PROCEDURE — 10002803 HB RX 637: Performed by: FAMILY MEDICINE

## 2024-11-14 PROCEDURE — 10004651 HB RX, NO CHARGE ITEM: Performed by: EMERGENCY MEDICINE

## 2024-11-14 PROCEDURE — 10002803 HB RX 637: Performed by: INTERNAL MEDICINE

## 2024-11-14 PROCEDURE — 36415 COLL VENOUS BLD VENIPUNCTURE: CPT | Performed by: INTERNAL MEDICINE

## 2024-11-14 PROCEDURE — 92526 ORAL FUNCTION THERAPY: CPT

## 2024-11-14 PROCEDURE — 80048 BASIC METABOLIC PNL TOTAL CA: CPT | Performed by: INTERNAL MEDICINE

## 2024-11-14 RX ORDER — POTASSIUM CHLORIDE 1.5 G/1.58G
40 POWDER, FOR SOLUTION ORAL ONCE
Status: COMPLETED | OUTPATIENT
Start: 2024-11-14 | End: 2024-11-14

## 2024-11-14 RX ADMIN — APIXABAN 2.5 MG: 2.5 TABLET, FILM COATED ORAL at 09:49

## 2024-11-14 RX ADMIN — APIXABAN 2.5 MG: 2.5 TABLET, FILM COATED ORAL at 21:45

## 2024-11-14 RX ADMIN — METOPROLOL TARTRATE 25 MG: 25 TABLET, FILM COATED ORAL at 21:45

## 2024-11-14 RX ADMIN — SODIUM CHLORIDE, PRESERVATIVE FREE 2 ML: 5 INJECTION INTRAVENOUS at 09:54

## 2024-11-14 RX ADMIN — CALCIUM CARBONATE 600 MG (1,500 MG)-VITAMIN D3 400 UNIT TABLET 1 TABLET: at 05:45

## 2024-11-14 RX ADMIN — SODIUM CHLORIDE, PRESERVATIVE FREE 2 ML: 5 INJECTION INTRAVENOUS at 21:52

## 2024-11-14 RX ADMIN — MIDODRINE HYDROCHLORIDE 10 MG: 5 TABLET ORAL at 12:42

## 2024-11-14 RX ADMIN — METOPROLOL TARTRATE 25 MG: 25 TABLET, FILM COATED ORAL at 09:49

## 2024-11-14 RX ADMIN — MIDODRINE HYDROCHLORIDE 10 MG: 5 TABLET ORAL at 16:32

## 2024-11-14 RX ADMIN — MIDODRINE HYDROCHLORIDE 10 MG: 5 TABLET ORAL at 05:45

## 2024-11-14 RX ADMIN — POTASSIUM CHLORIDE 40 MEQ: 1.5 POWDER, FOR SOLUTION ORAL at 09:49

## 2024-11-14 RX ADMIN — SODIUM CHLORIDE, PRESERVATIVE FREE 2 ML: 5 INJECTION INTRAVENOUS at 21:46

## 2024-11-14 ASSESSMENT — PAIN SCALES - PAIN ASSESSMENT IN ADVANCED DEMENTIA (PAINAD)
TOTALSCORE: 1
BREATHING: NORMAL
BODYLANGUAGE: RELAXED
BREATHING: NORMAL
BODYLANGUAGE: RELAXED
TOTALSCORE: 0
CONSOLABILITY: NO NEED TO CONSOLE
CONSOLABILITY: NO NEED TO CONSOLE
FACIALEXPRESSION: SMILING OR INEXPRESSIVE
NEGVOCALIZATION: OCCASIONAL MOAN OR GROAN, LOW LEVELS OF SPEECH WITH A NEGATIVE OR DISAPPROVING QUALITY
FACIALEXPRESSION: SMILING OR INEXPRESSIVE

## 2024-11-14 ASSESSMENT — PAIN SCALES - WONG BAKER: WONGBAKER_NUMERICALRESPONSE: 0

## 2024-11-14 ASSESSMENT — PAIN SCALES - GENERAL: PAINLEVEL_OUTOF10: 0

## 2024-11-15 ENCOUNTER — APPOINTMENT (OUTPATIENT)
Dept: GENERAL RADIOLOGY | Age: 89
DRG: 871 | End: 2024-11-15
Attending: INTERNAL MEDICINE

## 2024-11-15 LAB
ANION GAP SERPL CALC-SCNC: 8 MMOL/L (ref 7–19)
BASE EXCESS / DEFICIT, ARTERIAL - RESPIRATORY: 10 MMOL/L (ref -2–3)
BASOPHILS # BLD: 0 K/MCL (ref 0–0.3)
BASOPHILS NFR BLD: 0 %
BDY SITE: ABNORMAL
BODY TEMPERATURE: 37 DEGREES C
BUN SERPL-MCNC: 14 MG/DL (ref 6–20)
BUN/CREAT SERPL: 32 (ref 7–25)
CALCIUM SERPL-MCNC: 8.9 MG/DL (ref 8.4–10.2)
CHLORIDE SERPL-SCNC: 102 MMOL/L (ref 97–110)
CO2 SERPL-SCNC: 30 MMOL/L (ref 21–32)
CONDITION: ABNORMAL
CREAT SERPL-MCNC: 0.44 MG/DL (ref 0.51–0.95)
DEPRECATED RDW RBC: 59 FL (ref 39–50)
EGFRCR SERPLBLD CKD-EPI 2021: >90 ML/MIN/{1.73_M2}
EOSINOPHIL # BLD: 0.1 K/MCL (ref 0–0.5)
EOSINOPHIL NFR BLD: 1 %
ERYTHROCYTE [DISTWIDTH] IN BLOOD: 19.6 % (ref 11–15)
FASTING DURATION TIME PATIENT: ABNORMAL H
GLUCOSE BLDC GLUCOMTR-MCNC: 119 MG/DL (ref 70–99)
GLUCOSE BLDC GLUCOMTR-MCNC: 120 MG/DL (ref 70–99)
GLUCOSE BLDC GLUCOMTR-MCNC: 124 MG/DL (ref 70–99)
GLUCOSE BLDC GLUCOMTR-MCNC: 125 MG/DL (ref 70–99)
GLUCOSE SERPL-MCNC: 133 MG/DL (ref 70–99)
HCO3 BLDA-SCNC: 36 MMOL/L (ref 22–28)
HCT VFR BLD CALC: 34.2 % (ref 36–46.5)
HGB BLD-MCNC: 10.5 G/DL (ref 12–15.5)
IMM GRANULOCYTES # BLD AUTO: 0 K/MCL (ref 0–0.2)
IMM GRANULOCYTES # BLD: 0 %
LYMPHOCYTES # BLD: 0.6 K/MCL (ref 1–4)
LYMPHOCYTES NFR BLD: 6 %
MCH RBC QN AUTO: 26.2 PG (ref 26–34)
MCHC RBC AUTO-ENTMCNC: 30.7 G/DL (ref 32–36.5)
MCV RBC AUTO: 85.3 FL (ref 78–100)
MONOCYTES # BLD: 0.5 K/MCL (ref 0.3–0.9)
MONOCYTES NFR BLD: 5 %
NEUTROPHILS # BLD: 8.6 K/MCL (ref 1.8–7.7)
NEUTROPHILS NFR BLD: 88 %
NRBC BLD MANUAL-RTO: 0 /100 WBC
PCO2 BLDA: 53 MM HG (ref 32–45)
PH BLDA: 7.44 UNITS (ref 7.35–7.45)
PLATELET # BLD AUTO: 145 K/MCL (ref 140–450)
PO2 BLDA: 73 MM HG (ref 83–108)
POTASSIUM SERPL-SCNC: 4.6 MMOL/L (ref 3.4–5.1)
RBC # BLD: 4.01 MIL/MCL (ref 4–5.2)
SAO2 % BLDA: 95 % (ref 95–99)
SODIUM SERPL-SCNC: 135 MMOL/L (ref 135–145)
WBC # BLD: 9.8 K/MCL (ref 4.2–11)

## 2024-11-15 PROCEDURE — 10006031 HB ROOM CHARGE TELEMETRY

## 2024-11-15 PROCEDURE — 10002803 HB RX 637: Performed by: FAMILY MEDICINE

## 2024-11-15 PROCEDURE — 71045 X-RAY EXAM CHEST 1 VIEW: CPT

## 2024-11-15 PROCEDURE — 10004651 HB RX, NO CHARGE ITEM: Performed by: EMERGENCY MEDICINE

## 2024-11-15 PROCEDURE — 36415 COLL VENOUS BLD VENIPUNCTURE: CPT | Performed by: FAMILY MEDICINE

## 2024-11-15 PROCEDURE — 74230 X-RAY XM SWLNG FUNCJ C+: CPT

## 2024-11-15 PROCEDURE — 92611 MOTION FLUOROSCOPY/SWALLOW: CPT

## 2024-11-15 PROCEDURE — 10004180 HB COUNTER-TRANSPORT

## 2024-11-15 PROCEDURE — 80048 BASIC METABOLIC PNL TOTAL CA: CPT | Performed by: FAMILY MEDICINE

## 2024-11-15 PROCEDURE — 99233 SBSQ HOSP IP/OBS HIGH 50: CPT | Performed by: INTERNAL MEDICINE

## 2024-11-15 PROCEDURE — 85025 COMPLETE CBC W/AUTO DIFF WBC: CPT | Performed by: FAMILY MEDICINE

## 2024-11-15 PROCEDURE — 10004651 HB RX, NO CHARGE ITEM: Performed by: FAMILY MEDICINE

## 2024-11-15 PROCEDURE — 82803 BLOOD GASES ANY COMBINATION: CPT

## 2024-11-15 PROCEDURE — 36600 WITHDRAWAL OF ARTERIAL BLOOD: CPT

## 2024-11-15 RX ADMIN — METOPROLOL TARTRATE 25 MG: 25 TABLET, FILM COATED ORAL at 09:25

## 2024-11-15 RX ADMIN — MIDODRINE HYDROCHLORIDE 10 MG: 5 TABLET ORAL at 12:28

## 2024-11-15 RX ADMIN — MIDODRINE HYDROCHLORIDE 10 MG: 5 TABLET ORAL at 16:31

## 2024-11-15 RX ADMIN — SODIUM CHLORIDE, PRESERVATIVE FREE 2 ML: 5 INJECTION INTRAVENOUS at 09:26

## 2024-11-15 RX ADMIN — CALCIUM CARBONATE 600 MG (1,500 MG)-VITAMIN D3 400 UNIT TABLET 1 TABLET: at 05:35

## 2024-11-15 RX ADMIN — METOPROLOL TARTRATE 25 MG: 25 TABLET, FILM COATED ORAL at 21:38

## 2024-11-15 RX ADMIN — APIXABAN 2.5 MG: 2.5 TABLET, FILM COATED ORAL at 21:38

## 2024-11-15 RX ADMIN — MIDODRINE HYDROCHLORIDE 10 MG: 5 TABLET ORAL at 05:35

## 2024-11-15 RX ADMIN — DIGOXIN 125 MCG: 125 TABLET ORAL at 09:25

## 2024-11-15 RX ADMIN — APIXABAN 2.5 MG: 2.5 TABLET, FILM COATED ORAL at 09:25

## 2024-11-15 RX ADMIN — SODIUM CHLORIDE, PRESERVATIVE FREE 2 ML: 5 INJECTION INTRAVENOUS at 21:39

## 2024-11-15 RX ADMIN — SODIUM CHLORIDE, PRESERVATIVE FREE 2 ML: 5 INJECTION INTRAVENOUS at 09:25

## 2024-11-15 ASSESSMENT — PAIN SCALES - PAIN ASSESSMENT IN ADVANCED DEMENTIA (PAINAD)
NEGVOCALIZATION: REPEATED TROUBLED CALLING OUT. LOUD MOANING OR GROANING. CRYING
TOTALSCORE: 4
CONSOLABILITY: NO NEED TO CONSOLE
TOTALSCORE: 0
CONSOLABILITY: NO NEED TO CONSOLE
FACIALEXPRESSION: SMILING OR INEXPRESSIVE
BODYLANGUAGE: RELAXED
BREATHING: NORMAL
FACIALEXPRESSION: SAD. FRIGHTENED. FROWNING
BODYLANGUAGE: TENSE, DISTRESSED, FIDGETING
BREATHING: NORMAL

## 2024-11-15 ASSESSMENT — COGNITIVE AND FUNCTIONAL STATUS - GENERAL
REMEMBERING WHERE THINGS ARE: UNABLE
APPLIED_COGNITIVE_CONVERTED_SCORE: 7.69
REMEMBERING 5 ERRANDS WITH NO LIST: UNABLE
REMEMBERING TO TAKE MEDICATION: UNABLE
APPLIED_COGNITIVE_RAW_SCORE: 6
TAKING CARE OF COMPLICATED TASKS: UNABLE
FOLLOWS FAMILIAR CONVERSATION: UNABLE
UNDERSTANDING 10 TO 15 MIN SPEECH: UNABLE

## 2024-11-16 ENCOUNTER — APPOINTMENT (OUTPATIENT)
Dept: GENERAL RADIOLOGY | Age: 89
DRG: 871 | End: 2024-11-16
Attending: INTERNAL MEDICINE

## 2024-11-16 ENCOUNTER — APPOINTMENT (OUTPATIENT)
Dept: CT IMAGING | Age: 89
DRG: 871 | End: 2024-11-16
Attending: INTERNAL MEDICINE

## 2024-11-16 LAB
ANION GAP SERPL CALC-SCNC: 3 MMOL/L (ref 7–19)
BACTERIA BLD CULT: NORMAL
BACTERIA BLD CULT: NORMAL
BASE EXCESS / DEFICIT, ARTERIAL - RESPIRATORY: 7 MMOL/L (ref -2–3)
BASE EXCESS / DEFICIT, ARTERIAL - RESPIRATORY: 8 MMOL/L (ref -2–3)
BASE EXCESS / DEFICIT, ARTERIAL - RESPIRATORY: 8 MMOL/L (ref -2–3)
BASOPHILS # BLD: 0 K/MCL (ref 0–0.3)
BASOPHILS NFR BLD: 0 %
BDY SITE: ABNORMAL
BODY TEMPERATURE: 37 DEGREES C
BUN SERPL-MCNC: 17 MG/DL (ref 6–20)
BUN/CREAT SERPL: 40 (ref 7–25)
CALCIUM SERPL-MCNC: 8.8 MG/DL (ref 8.4–10.2)
CHLORIDE SERPL-SCNC: 98 MMOL/L (ref 97–110)
CO2 SERPL-SCNC: 38 MMOL/L (ref 21–32)
CONDITION: ABNORMAL
CREAT SERPL-MCNC: 0.43 MG/DL (ref 0.51–0.95)
DEPRECATED RDW RBC: 61 FL (ref 39–50)
EGFRCR SERPLBLD CKD-EPI 2021: >90 ML/MIN/{1.73_M2}
EOSINOPHIL # BLD: 0 K/MCL (ref 0–0.5)
EOSINOPHIL NFR BLD: 0 %
ERYTHROCYTE [DISTWIDTH] IN BLOOD: 19.3 % (ref 11–15)
FASTING DURATION TIME PATIENT: ABNORMAL H
FIO2 ON VENT: 30 %
FIO2 ON VENT: 35 %
FIO2 ON VENT: 35 %
GLUCOSE BLDC GLUCOMTR-MCNC: 113 MG/DL (ref 70–99)
GLUCOSE BLDC GLUCOMTR-MCNC: 129 MG/DL (ref 70–99)
GLUCOSE BLDC GLUCOMTR-MCNC: 131 MG/DL (ref 70–99)
GLUCOSE BLDC GLUCOMTR-MCNC: 86 MG/DL (ref 70–99)
GLUCOSE BLDC GLUCOMTR-MCNC: 87 MG/DL (ref 70–99)
GLUCOSE SERPL-MCNC: 137 MG/DL (ref 70–99)
HCO3 BLDA-SCNC: 35 MMOL/L (ref 22–28)
HCO3 BLDA-SCNC: 37 MMOL/L (ref 22–28)
HCO3 BLDA-SCNC: 37 MMOL/L (ref 22–28)
HCT VFR BLD CALC: 33.6 % (ref 36–46.5)
HGB BLD-MCNC: 10 G/DL (ref 12–15.5)
HOROWITZ INDEX BLDA+IHG-RTO: 290 MM[HG] (ref 300–500)
HOROWITZ INDEX BLDA+IHG-RTO: 506 MM[HG] (ref 300–500)
HOROWITZ INDEX BLDA+IHG-RTO: 723 MM[HG] (ref 300–500)
IMM GRANULOCYTES # BLD AUTO: 0 K/MCL (ref 0–0.2)
IMM GRANULOCYTES # BLD: 0 %
LACTATE BLDA-SCNC: 0.4 MMOL/L
LYMPHOCYTES # BLD: 0.4 K/MCL (ref 1–4)
LYMPHOCYTES NFR BLD: 4 %
MCH RBC QN AUTO: 26.2 PG (ref 26–34)
MCHC RBC AUTO-ENTMCNC: 29.8 G/DL (ref 32–36.5)
MCV RBC AUTO: 88 FL (ref 78–100)
MONOCYTES # BLD: 0.5 K/MCL (ref 0.3–0.9)
MONOCYTES NFR BLD: 5 %
MRSA DNA SPEC QL NAA+PROBE: DETECTED
NEUTROPHILS # BLD: 9.2 K/MCL (ref 1.8–7.7)
NEUTROPHILS NFR BLD: 91 %
NRBC BLD MANUAL-RTO: 0 /100 WBC
PCO2 BLDA: 61 MM HG (ref 32–45)
PCO2 BLDA: 77 MM HG (ref 32–45)
PCO2 BLDA: 78 MM HG (ref 32–45)
PH BLDA: 7.28 UNITS (ref 7.35–7.45)
PH BLDA: 7.29 UNITS (ref 7.35–7.45)
PH BLDA: 7.37 UNITS (ref 7.35–7.45)
PLATELET # BLD AUTO: 139 K/MCL (ref 140–450)
PO2 BLDA: 177 MM HG (ref 83–108)
PO2 BLDA: 253 MM HG (ref 83–108)
PO2 BLDA: 87 MM HG (ref 83–108)
POTASSIUM SERPL-SCNC: 4.7 MMOL/L (ref 3.4–5.1)
PROCALCITONIN SERPL IA-MCNC: 0.05 NG/ML
RBC # BLD: 3.82 MIL/MCL (ref 4–5.2)
SAO2 % BLDA: 100 % (ref 95–99)
SAO2 % BLDA: 100 % (ref 95–99)
SAO2 % BLDA: 96 % (ref 95–99)
SODIUM SERPL-SCNC: 134 MMOL/L (ref 135–145)
WBC # BLD: 10.2 K/MCL (ref 4.2–11)

## 2024-11-16 PROCEDURE — 10002800 HB RX 250 W HCPCS: Performed by: INTERNAL MEDICINE

## 2024-11-16 PROCEDURE — 71045 X-RAY EXAM CHEST 1 VIEW: CPT

## 2024-11-16 PROCEDURE — 84145 PROCALCITONIN (PCT): CPT | Performed by: INTERNAL MEDICINE

## 2024-11-16 PROCEDURE — 87040 BLOOD CULTURE FOR BACTERIA: CPT | Performed by: INTERNAL MEDICINE

## 2024-11-16 PROCEDURE — 82803 BLOOD GASES ANY COMBINATION: CPT

## 2024-11-16 PROCEDURE — 10002801 HB RX 250 W/O HCPCS: Performed by: INTERNAL MEDICINE

## 2024-11-16 PROCEDURE — 10004180 HB COUNTER-TRANSPORT

## 2024-11-16 PROCEDURE — 94667 MNPJ CHEST WALL 1ST: CPT

## 2024-11-16 PROCEDURE — 36600 WITHDRAWAL OF ARTERIAL BLOOD: CPT

## 2024-11-16 PROCEDURE — 99291 CRITICAL CARE FIRST HOUR: CPT | Performed by: INTERNAL MEDICINE

## 2024-11-16 PROCEDURE — 10004651 HB RX, NO CHARGE ITEM: Performed by: FAMILY MEDICINE

## 2024-11-16 PROCEDURE — 10002803 HB RX 637: Performed by: FAMILY MEDICINE

## 2024-11-16 PROCEDURE — 94640 AIRWAY INHALATION TREATMENT: CPT

## 2024-11-16 PROCEDURE — 36415 COLL VENOUS BLD VENIPUNCTURE: CPT | Performed by: FAMILY MEDICINE

## 2024-11-16 PROCEDURE — 70450 CT HEAD/BRAIN W/O DYE: CPT

## 2024-11-16 PROCEDURE — 85025 COMPLETE CBC W/AUTO DIFF WBC: CPT | Performed by: FAMILY MEDICINE

## 2024-11-16 PROCEDURE — 10002807 HB RX 258: Performed by: INTERNAL MEDICINE

## 2024-11-16 PROCEDURE — 94660 CPAP INITIATION&MGMT: CPT

## 2024-11-16 PROCEDURE — 10004651 HB RX, NO CHARGE ITEM: Performed by: EMERGENCY MEDICINE

## 2024-11-16 PROCEDURE — 10003585 HB ROOM CHARGE INTERMEDIATE

## 2024-11-16 PROCEDURE — 80048 BASIC METABOLIC PNL TOTAL CA: CPT | Performed by: FAMILY MEDICINE

## 2024-11-16 PROCEDURE — 94668 MNPJ CHEST WALL SBSQ: CPT

## 2024-11-16 PROCEDURE — 87641 MR-STAPH DNA AMP PROBE: CPT | Performed by: INTERNAL MEDICINE

## 2024-11-16 PROCEDURE — 99233 SBSQ HOSP IP/OBS HIGH 50: CPT | Performed by: INTERNAL MEDICINE

## 2024-11-16 PROCEDURE — 83605 ASSAY OF LACTIC ACID: CPT

## 2024-11-16 PROCEDURE — 10002019 HB COUNTER RESP ASSESSMENT

## 2024-11-16 RX ORDER — MIDODRINE HYDROCHLORIDE 5 MG/1
10 TABLET ORAL 3 TIMES DAILY
Status: DISCONTINUED | OUTPATIENT
Start: 2024-11-17 | End: 2024-11-16

## 2024-11-16 RX ORDER — ALBUTEROL SULFATE 0.83 MG/ML
2.5 SOLUTION RESPIRATORY (INHALATION) EVERY 6 HOURS
Status: DISCONTINUED | OUTPATIENT
Start: 2024-11-16 | End: 2024-11-16

## 2024-11-16 RX ORDER — ACETYLCYSTEINE 200 MG/ML
400 SOLUTION ORAL; RESPIRATORY (INHALATION)
Status: DISCONTINUED | OUTPATIENT
Start: 2024-11-16 | End: 2024-11-18

## 2024-11-16 RX ORDER — MIDODRINE HYDROCHLORIDE 5 MG/1
10 TABLET ORAL EVERY 8 HOURS
Status: DISCONTINUED | OUTPATIENT
Start: 2024-11-17 | End: 2024-11-17

## 2024-11-16 RX ORDER — ACETYLCYSTEINE 200 MG/ML
400 SOLUTION ORAL; RESPIRATORY (INHALATION)
Status: DISCONTINUED | OUTPATIENT
Start: 2024-11-16 | End: 2024-11-16

## 2024-11-16 RX ORDER — ALBUTEROL SULFATE 5 MG/ML
2.5 SOLUTION RESPIRATORY (INHALATION)
Status: DISCONTINUED | OUTPATIENT
Start: 2024-11-16 | End: 2024-11-16

## 2024-11-16 RX ORDER — DEXTROSE MONOHYDRATE AND SODIUM CHLORIDE 5; .9 G/100ML; G/100ML
INJECTION, SOLUTION INTRAVENOUS CONTINUOUS
Status: DISCONTINUED | OUTPATIENT
Start: 2024-11-16 | End: 2024-11-17

## 2024-11-16 RX ORDER — ALBUTEROL SULFATE 5 MG/ML
2.5 SOLUTION RESPIRATORY (INHALATION)
Status: DISCONTINUED | OUTPATIENT
Start: 2024-11-16 | End: 2024-11-18

## 2024-11-16 RX ORDER — IPRATROPIUM BROMIDE AND ALBUTEROL SULFATE 2.5; .5 MG/3ML; MG/3ML
3 SOLUTION RESPIRATORY (INHALATION)
Status: DISCONTINUED | OUTPATIENT
Start: 2024-11-16 | End: 2024-11-16

## 2024-11-16 RX ADMIN — APIXABAN 2.5 MG: 2.5 TABLET, FILM COATED ORAL at 09:08

## 2024-11-16 RX ADMIN — SODIUM CHLORIDE, PRESERVATIVE FREE 2 ML: 5 INJECTION INTRAVENOUS at 20:17

## 2024-11-16 RX ADMIN — SODIUM CHLORIDE, PRESERVATIVE FREE 2 ML: 5 INJECTION INTRAVENOUS at 09:08

## 2024-11-16 RX ADMIN — CALCIUM CARBONATE 600 MG (1,500 MG)-VITAMIN D3 400 UNIT TABLET 1 TABLET: at 05:56

## 2024-11-16 RX ADMIN — PIPERACILLIN SODIUM AND TAZOBACTAM SODIUM 3.38 G: 3; .375 INJECTION, POWDER, FOR SOLUTION INTRAVENOUS at 20:13

## 2024-11-16 RX ADMIN — SODIUM CHLORIDE, POTASSIUM CHLORIDE, SODIUM LACTATE AND CALCIUM CHLORIDE 250 ML: 600; 310; 30; 20 INJECTION, SOLUTION INTRAVENOUS at 18:12

## 2024-11-16 RX ADMIN — SODIUM CHLORIDE, PRESERVATIVE FREE 2 ML: 5 INJECTION INTRAVENOUS at 20:16

## 2024-11-16 RX ADMIN — DEXTROSE AND SODIUM CHLORIDE: 5; 900 INJECTION, SOLUTION INTRAVENOUS at 19:26

## 2024-11-16 RX ADMIN — APIXABAN 2.5 MG: 2.5 TABLET, FILM COATED ORAL at 20:14

## 2024-11-16 RX ADMIN — ALBUTEROL SULFATE 2.5 MG: 2.5 SOLUTION RESPIRATORY (INHALATION) at 20:52

## 2024-11-16 RX ADMIN — PIPERACILLIN SODIUM AND TAZOBACTAM SODIUM 4.5 G: 4; .5 INJECTION, POWDER, LYOPHILIZED, FOR SOLUTION INTRAVENOUS at 13:43

## 2024-11-16 RX ADMIN — ACETYLCYSTEINE 400 MG: 200 SOLUTION ORAL; RESPIRATORY (INHALATION) at 12:14

## 2024-11-16 RX ADMIN — MIDODRINE HYDROCHLORIDE 10 MG: 5 TABLET ORAL at 16:58

## 2024-11-16 RX ADMIN — ACETYLCYSTEINE 400 MG: 200 SOLUTION ORAL; RESPIRATORY (INHALATION) at 20:52

## 2024-11-16 RX ADMIN — METOPROLOL TARTRATE 25 MG: 25 TABLET, FILM COATED ORAL at 09:08

## 2024-11-16 RX ADMIN — SODIUM CHLORIDE, PRESERVATIVE FREE 2 ML: 5 INJECTION INTRAVENOUS at 09:11

## 2024-11-16 RX ADMIN — IPRATROPIUM BROMIDE AND ALBUTEROL SULFATE 3 ML: 2.5; .5 SOLUTION RESPIRATORY (INHALATION) at 12:14

## 2024-11-16 RX ADMIN — MIDODRINE HYDROCHLORIDE 10 MG: 5 TABLET ORAL at 05:56

## 2024-11-16 ASSESSMENT — PAIN SCALES - PAIN ASSESSMENT IN ADVANCED DEMENTIA (PAINAD)
CONSOLABILITY: NO NEED TO CONSOLE
TOTALSCORE: 2
CONSOLABILITY: NO NEED TO CONSOLE
BREATHING: NORMAL
FACIALEXPRESSION: FACIAL GRIMACING
CONSOLABILITY: NO NEED TO CONSOLE
NEGVOCALIZATION: OCCASIONAL MOAN OR GROAN, LOW LEVELS OF SPEECH WITH A NEGATIVE OR DISAPPROVING QUALITY
FACIALEXPRESSION: SMILING OR INEXPRESSIVE
BODYLANGUAGE: TENSE, DISTRESSED, FIDGETING
BODYLANGUAGE: TENSE, DISTRESSED, FIDGETING
FACIALEXPRESSION: SMILING OR INEXPRESSIVE
BODYLANGUAGE: TENSE, DISTRESSED, FIDGETING
TOTALSCORE: 4
TOTALSCORE: 2
BREATHING: NORMAL
NEGVOCALIZATION: OCCASIONAL MOAN OR GROAN, LOW LEVELS OF SPEECH WITH A NEGATIVE OR DISAPPROVING QUALITY
NEGVOCALIZATION: OCCASIONAL MOAN OR GROAN, LOW LEVELS OF SPEECH WITH A NEGATIVE OR DISAPPROVING QUALITY
BREATHING: NORMAL

## 2024-11-16 ASSESSMENT — PULMONARY FUNCTION TESTS
FEV1_PREDICTED: 0
FEV1/FVC: UNABLE TO OBTAIN, OR GREATER THAN 70%
FEV1: 0

## 2024-11-16 ASSESSMENT — PAIN SCALES - GENERAL: PAINLEVEL_OUTOF10: 0

## 2024-11-17 ENCOUNTER — APPOINTMENT (OUTPATIENT)
Dept: CT IMAGING | Age: 89
DRG: 871 | End: 2024-11-17
Attending: INTERNAL MEDICINE

## 2024-11-17 LAB
ALBUMIN SERPL-MCNC: 2 G/DL (ref 3.4–5)
ALBUMIN/GLOB SERPL: 0.5 {RATIO} (ref 1–2.4)
ALP SERPL-CCNC: 58 UNITS/L (ref 45–117)
ALT SERPL-CCNC: 11 UNITS/L
ANION GAP SERPL CALC-SCNC: 5 MMOL/L (ref 7–19)
ANION GAP SERPL CALC-SCNC: 5 MMOL/L (ref 7–19)
ANION GAP SERPL CALC-SCNC: 8 MMOL/L (ref 7–19)
APTT PPP: 32 SEC (ref 22–32)
AST SERPL-CCNC: 10 UNITS/L
BASOPHILS # BLD: 0 K/MCL (ref 0–0.3)
BASOPHILS NFR BLD: 0 %
BILIRUB SERPL-MCNC: 1.7 MG/DL (ref 0.2–1)
BUN SERPL-MCNC: 14 MG/DL (ref 6–20)
BUN SERPL-MCNC: 14 MG/DL (ref 6–20)
BUN SERPL-MCNC: 15 MG/DL (ref 6–20)
BUN/CREAT SERPL: 28 (ref 7–25)
BUN/CREAT SERPL: 29 (ref 7–25)
BUN/CREAT SERPL: 30 (ref 7–25)
CALCIUM SERPL-MCNC: 8.5 MG/DL (ref 8.4–10.2)
CALCIUM SERPL-MCNC: 8.6 MG/DL (ref 8.4–10.2)
CALCIUM SERPL-MCNC: 8.7 MG/DL (ref 8.4–10.2)
CHLORIDE SERPL-SCNC: 100 MMOL/L (ref 97–110)
CHLORIDE SERPL-SCNC: 100 MMOL/L (ref 97–110)
CHLORIDE SERPL-SCNC: 99 MMOL/L (ref 97–110)
CO2 SERPL-SCNC: 33 MMOL/L (ref 21–32)
CO2 SERPL-SCNC: 35 MMOL/L (ref 21–32)
CO2 SERPL-SCNC: 36 MMOL/L (ref 21–32)
CREAT SERPL-MCNC: 0.48 MG/DL (ref 0.51–0.95)
CREAT SERPL-MCNC: 0.5 MG/DL (ref 0.51–0.95)
CREAT SERPL-MCNC: 0.5 MG/DL (ref 0.51–0.95)
DEPRECATED RDW RBC: 61.5 FL (ref 39–50)
DEPRECATED RDW RBC: 62.3 FL (ref 39–50)
DEPRECATED RDW RBC: 65.4 FL (ref 39–50)
EGFRCR SERPLBLD CKD-EPI 2021: 88 ML/MIN/{1.73_M2}
EGFRCR SERPLBLD CKD-EPI 2021: 88 ML/MIN/{1.73_M2}
EGFRCR SERPLBLD CKD-EPI 2021: 89 ML/MIN/{1.73_M2}
EOSINOPHIL # BLD: 0.1 K/MCL (ref 0–0.5)
EOSINOPHIL NFR BLD: 1 %
ERYTHROCYTE [DISTWIDTH] IN BLOOD: 19.3 % (ref 11–15)
ERYTHROCYTE [DISTWIDTH] IN BLOOD: 19.3 % (ref 11–15)
ERYTHROCYTE [DISTWIDTH] IN BLOOD: 19.7 % (ref 11–15)
FASTING DURATION TIME PATIENT: ABNORMAL H
GLOBULIN SER-MCNC: 3.7 G/DL (ref 2–4)
GLUCOSE BLDC GLUCOMTR-MCNC: 72 MG/DL (ref 70–99)
GLUCOSE BLDC GLUCOMTR-MCNC: 79 MG/DL (ref 70–99)
GLUCOSE BLDC GLUCOMTR-MCNC: 91 MG/DL (ref 70–99)
GLUCOSE BLDC GLUCOMTR-MCNC: 95 MG/DL (ref 70–99)
GLUCOSE SERPL-MCNC: 84 MG/DL (ref 70–99)
GLUCOSE SERPL-MCNC: 96 MG/DL (ref 70–99)
GLUCOSE SERPL-MCNC: 99 MG/DL (ref 70–99)
HCT VFR BLD CALC: 30.7 % (ref 36–46.5)
HCT VFR BLD CALC: 31.3 % (ref 36–46.5)
HCT VFR BLD CALC: 32.1 % (ref 36–46.5)
HGB BLD-MCNC: 8.8 G/DL (ref 12–15.5)
HGB BLD-MCNC: 9.1 G/DL (ref 12–15.5)
HGB BLD-MCNC: 9.4 G/DL (ref 12–15.5)
IMM GRANULOCYTES # BLD AUTO: 0 K/MCL (ref 0–0.2)
IMM GRANULOCYTES # BLD AUTO: 0 K/MCL (ref 0–0.2)
IMM GRANULOCYTES # BLD AUTO: 0.1 K/MCL (ref 0–0.2)
IMM GRANULOCYTES # BLD: 0 %
IMM GRANULOCYTES # BLD: 0 %
IMM GRANULOCYTES # BLD: 1 %
INR PPP: 1.2
LACTATE BLDV-SCNC: 1.3 MMOL/L (ref 0–2)
LYMPHOCYTES # BLD: 0.4 K/MCL (ref 1–4)
LYMPHOCYTES # BLD: 0.4 K/MCL (ref 1–4)
LYMPHOCYTES # BLD: 0.5 K/MCL (ref 1–4)
LYMPHOCYTES NFR BLD: 4 %
LYMPHOCYTES NFR BLD: 5 %
LYMPHOCYTES NFR BLD: 5 %
MCH RBC QN AUTO: 25.8 PG (ref 26–34)
MCH RBC QN AUTO: 26.1 PG (ref 26–34)
MCH RBC QN AUTO: 26.6 PG (ref 26–34)
MCHC RBC AUTO-ENTMCNC: 28.7 G/DL (ref 32–36.5)
MCHC RBC AUTO-ENTMCNC: 29.1 G/DL (ref 32–36.5)
MCHC RBC AUTO-ENTMCNC: 29.3 G/DL (ref 32–36.5)
MCV RBC AUTO: 88.7 FL (ref 78–100)
MCV RBC AUTO: 90.9 FL (ref 78–100)
MCV RBC AUTO: 91.1 FL (ref 78–100)
MONOCYTES # BLD: 0.4 K/MCL (ref 0.3–0.9)
MONOCYTES NFR BLD: 4 %
MONOCYTES NFR BLD: 4 %
MONOCYTES NFR BLD: 5 %
NEUTROPHILS # BLD: 8.1 K/MCL (ref 1.8–7.7)
NEUTROPHILS # BLD: 8.4 K/MCL (ref 1.8–7.7)
NEUTROPHILS # BLD: 8.5 K/MCL (ref 1.8–7.7)
NEUTROPHILS NFR BLD: 89 %
NEUTROPHILS NFR BLD: 90 %
NEUTROPHILS NFR BLD: 90 %
NRBC BLD MANUAL-RTO: 0 /100 WBC
PLATELET # BLD AUTO: 123 K/MCL (ref 140–450)
PLATELET # BLD AUTO: 126 K/MCL (ref 140–450)
PLATELET # BLD AUTO: 130 K/MCL (ref 140–450)
POTASSIUM SERPL-SCNC: 4.2 MMOL/L (ref 3.4–5.1)
POTASSIUM SERPL-SCNC: 4.3 MMOL/L (ref 3.4–5.1)
POTASSIUM SERPL-SCNC: 4.5 MMOL/L (ref 3.4–5.1)
PROT SERPL-MCNC: 5.7 G/DL (ref 6.4–8.2)
PROTHROMBIN TIME: 13 SEC (ref 9.7–11.8)
RBC # BLD: 3.37 MIL/MCL (ref 4–5.2)
RBC # BLD: 3.53 MIL/MCL (ref 4–5.2)
RBC # BLD: 3.53 MIL/MCL (ref 4–5.2)
SODIUM SERPL-SCNC: 136 MMOL/L (ref 135–145)
WBC # BLD: 9 K/MCL (ref 4.2–11)
WBC # BLD: 9.4 K/MCL (ref 4.2–11)
WBC # BLD: 9.4 K/MCL (ref 4.2–11)

## 2024-11-17 PROCEDURE — 80048 BASIC METABOLIC PNL TOTAL CA: CPT | Performed by: FAMILY MEDICINE

## 2024-11-17 PROCEDURE — 10002803 HB RX 637: Performed by: INTERNAL MEDICINE

## 2024-11-17 PROCEDURE — 83605 ASSAY OF LACTIC ACID: CPT | Performed by: INTERNAL MEDICINE

## 2024-11-17 PROCEDURE — 80048 BASIC METABOLIC PNL TOTAL CA: CPT | Performed by: INTERNAL MEDICINE

## 2024-11-17 PROCEDURE — 85730 THROMBOPLASTIN TIME PARTIAL: CPT | Performed by: INTERNAL MEDICINE

## 2024-11-17 PROCEDURE — 99233 SBSQ HOSP IP/OBS HIGH 50: CPT | Performed by: INTERNAL MEDICINE

## 2024-11-17 PROCEDURE — 10002803 HB RX 637: Performed by: FAMILY MEDICINE

## 2024-11-17 PROCEDURE — 10002801 HB RX 250 W/O HCPCS: Performed by: INTERNAL MEDICINE

## 2024-11-17 PROCEDURE — 10004651 HB RX, NO CHARGE ITEM: Performed by: EMERGENCY MEDICINE

## 2024-11-17 PROCEDURE — 36415 COLL VENOUS BLD VENIPUNCTURE: CPT | Performed by: INTERNAL MEDICINE

## 2024-11-17 PROCEDURE — 99291 CRITICAL CARE FIRST HOUR: CPT | Performed by: INTERNAL MEDICINE

## 2024-11-17 PROCEDURE — 10003585 HB ROOM CHARGE INTERMEDIATE

## 2024-11-17 PROCEDURE — 10004651 HB RX, NO CHARGE ITEM: Performed by: FAMILY MEDICINE

## 2024-11-17 PROCEDURE — 10002800 HB RX 250 W HCPCS: Performed by: INTERNAL MEDICINE

## 2024-11-17 PROCEDURE — 10004180 HB COUNTER-TRANSPORT

## 2024-11-17 PROCEDURE — 85025 COMPLETE CBC W/AUTO DIFF WBC: CPT | Performed by: FAMILY MEDICINE

## 2024-11-17 PROCEDURE — 94640 AIRWAY INHALATION TREATMENT: CPT

## 2024-11-17 PROCEDURE — 85610 PROTHROMBIN TIME: CPT | Performed by: INTERNAL MEDICINE

## 2024-11-17 PROCEDURE — 85025 COMPLETE CBC W/AUTO DIFF WBC: CPT | Performed by: INTERNAL MEDICINE

## 2024-11-17 PROCEDURE — 10002807 HB RX 258: Performed by: INTERNAL MEDICINE

## 2024-11-17 PROCEDURE — 94668 MNPJ CHEST WALL SBSQ: CPT

## 2024-11-17 PROCEDURE — 71250 CT THORAX DX C-: CPT

## 2024-11-17 PROCEDURE — 94660 CPAP INITIATION&MGMT: CPT

## 2024-11-17 PROCEDURE — 80053 COMPREHEN METABOLIC PANEL: CPT | Performed by: INTERNAL MEDICINE

## 2024-11-17 RX ORDER — MIDODRINE HYDROCHLORIDE 5 MG/1
5 TABLET ORAL ONCE
Status: COMPLETED | OUTPATIENT
Start: 2024-11-17 | End: 2024-11-17

## 2024-11-17 RX ORDER — MIDODRINE HYDROCHLORIDE 5 MG/1
15 TABLET ORAL EVERY 8 HOURS
Status: DISCONTINUED | OUTPATIENT
Start: 2024-11-18 | End: 2024-11-24 | Stop reason: HOSPADM

## 2024-11-17 RX ADMIN — CALCIUM CARBONATE 600 MG (1,500 MG)-VITAMIN D3 400 UNIT TABLET 1 TABLET: at 05:45

## 2024-11-17 RX ADMIN — PIPERACILLIN SODIUM AND TAZOBACTAM SODIUM 3.38 G: 3; .375 INJECTION, POWDER, FOR SOLUTION INTRAVENOUS at 22:19

## 2024-11-17 RX ADMIN — SODIUM CHLORIDE, PRESERVATIVE FREE 2 ML: 5 INJECTION INTRAVENOUS at 20:45

## 2024-11-17 RX ADMIN — ACETYLCYSTEINE 400 MG: 200 SOLUTION ORAL; RESPIRATORY (INHALATION) at 13:41

## 2024-11-17 RX ADMIN — ALBUTEROL SULFATE 2.5 MG: 2.5 SOLUTION RESPIRATORY (INHALATION) at 13:41

## 2024-11-17 RX ADMIN — VANCOMYCIN HYDROCHLORIDE 1000 MG: 1 INJECTION, POWDER, LYOPHILIZED, FOR SOLUTION INTRAVENOUS at 13:35

## 2024-11-17 RX ADMIN — MIDODRINE HYDROCHLORIDE 10 MG: 5 TABLET ORAL at 15:54

## 2024-11-17 RX ADMIN — PIPERACILLIN SODIUM AND TAZOBACTAM SODIUM 3.38 G: 3; .375 INJECTION, POWDER, FOR SOLUTION INTRAVENOUS at 05:48

## 2024-11-17 RX ADMIN — SODIUM CHLORIDE, PRESERVATIVE FREE 2 ML: 5 INJECTION INTRAVENOUS at 08:27

## 2024-11-17 RX ADMIN — METOPROLOL TARTRATE 25 MG: 25 TABLET, FILM COATED ORAL at 22:11

## 2024-11-17 RX ADMIN — ALBUTEROL SULFATE 2.5 MG: 2.5 SOLUTION RESPIRATORY (INHALATION) at 08:39

## 2024-11-17 RX ADMIN — APIXABAN 2.5 MG: 2.5 TABLET, FILM COATED ORAL at 20:44

## 2024-11-17 RX ADMIN — MIDODRINE HYDROCHLORIDE 10 MG: 5 TABLET ORAL at 08:17

## 2024-11-17 RX ADMIN — APIXABAN 2.5 MG: 2.5 TABLET, FILM COATED ORAL at 08:17

## 2024-11-17 RX ADMIN — ACETYLCYSTEINE 400 MG: 200 SOLUTION ORAL; RESPIRATORY (INHALATION) at 01:35

## 2024-11-17 RX ADMIN — ACETYLCYSTEINE 400 MG: 200 SOLUTION ORAL; RESPIRATORY (INHALATION) at 08:38

## 2024-11-17 RX ADMIN — MIDODRINE HYDROCHLORIDE 5 MG: 5 TABLET ORAL at 17:52

## 2024-11-17 RX ADMIN — MIDODRINE HYDROCHLORIDE 10 MG: 5 TABLET ORAL at 00:55

## 2024-11-17 RX ADMIN — PIPERACILLIN SODIUM AND TAZOBACTAM SODIUM 3.38 G: 3; .375 INJECTION, POWDER, FOR SOLUTION INTRAVENOUS at 15:41

## 2024-11-17 RX ADMIN — ACETYLCYSTEINE 400 MG: 200 SOLUTION ORAL; RESPIRATORY (INHALATION) at 21:12

## 2024-11-17 RX ADMIN — ALBUTEROL SULFATE 2.5 MG: 2.5 SOLUTION RESPIRATORY (INHALATION) at 01:35

## 2024-11-17 RX ADMIN — ALBUTEROL SULFATE 2.5 MG: 2.5 SOLUTION RESPIRATORY (INHALATION) at 21:06

## 2024-11-17 ASSESSMENT — PAIN SCALES - GENERAL
PAINLEVEL_OUTOF10: 0

## 2024-11-17 ASSESSMENT — PAIN SCALES - PAIN ASSESSMENT IN ADVANCED DEMENTIA (PAINAD)
FACIALEXPRESSION: SMILING OR INEXPRESSIVE
CONSOLABILITY: NO NEED TO CONSOLE
BREATHING: NORMAL
NEGVOCALIZATION: OCCASIONAL MOAN OR GROAN, LOW LEVELS OF SPEECH WITH A NEGATIVE OR DISAPPROVING QUALITY
TOTALSCORE: 2
BODYLANGUAGE: TENSE, DISTRESSED, FIDGETING

## 2024-11-18 LAB
ANION GAP SERPL CALC-SCNC: 6 MMOL/L (ref 7–19)
BASOPHILS # BLD: 0 K/MCL (ref 0–0.3)
BASOPHILS NFR BLD: 0 %
BUN SERPL-MCNC: 15 MG/DL (ref 6–20)
BUN/CREAT SERPL: 29 (ref 7–25)
CALCIUM SERPL-MCNC: 9 MG/DL (ref 8.4–10.2)
CHLORIDE SERPL-SCNC: 100 MMOL/L (ref 97–110)
CO2 SERPL-SCNC: 35 MMOL/L (ref 21–32)
CORTIS AM PEAK SERPL-MCNC: 21 MCG/DL (ref 5.2–22.5)
CREAT SERPL-MCNC: 0.52 MG/DL (ref 0.51–0.95)
DEPRECATED RDW RBC: 63.9 FL (ref 39–50)
EGFRCR SERPLBLD CKD-EPI 2021: 87 ML/MIN/{1.73_M2}
EOSINOPHIL # BLD: 0.1 K/MCL (ref 0–0.5)
EOSINOPHIL NFR BLD: 1 %
ERYTHROCYTE [DISTWIDTH] IN BLOOD: 19.4 % (ref 11–15)
FASTING DURATION TIME PATIENT: ABNORMAL H
GLUCOSE BLDC GLUCOMTR-MCNC: 61 MG/DL (ref 70–99)
GLUCOSE BLDC GLUCOMTR-MCNC: 77 MG/DL (ref 70–99)
GLUCOSE BLDC GLUCOMTR-MCNC: 82 MG/DL (ref 70–99)
GLUCOSE BLDC GLUCOMTR-MCNC: 91 MG/DL (ref 70–99)
GLUCOSE SERPL-MCNC: 77 MG/DL (ref 70–99)
HCT VFR BLD CALC: 32.3 % (ref 36–46.5)
HGB BLD-MCNC: 9.2 G/DL (ref 12–15.5)
IMM GRANULOCYTES # BLD AUTO: 0 K/MCL (ref 0–0.2)
IMM GRANULOCYTES # BLD: 0 %
LYMPHOCYTES # BLD: 0.5 K/MCL (ref 1–4)
LYMPHOCYTES NFR BLD: 6 %
MCH RBC QN AUTO: 25.8 PG (ref 26–34)
MCHC RBC AUTO-ENTMCNC: 28.5 G/DL (ref 32–36.5)
MCV RBC AUTO: 90.7 FL (ref 78–100)
MONOCYTES # BLD: 0.3 K/MCL (ref 0.3–0.9)
MONOCYTES NFR BLD: 4 %
NEUTROPHILS # BLD: 6.2 K/MCL (ref 1.8–7.7)
NEUTROPHILS NFR BLD: 89 %
NRBC BLD MANUAL-RTO: 0 /100 WBC
PLATELET # BLD AUTO: 151 K/MCL (ref 140–450)
POTASSIUM SERPL-SCNC: 4.6 MMOL/L (ref 3.4–5.1)
RBC # BLD: 3.56 MIL/MCL (ref 4–5.2)
SODIUM SERPL-SCNC: 136 MMOL/L (ref 135–145)
WBC # BLD: 7.1 K/MCL (ref 4.2–11)

## 2024-11-18 PROCEDURE — 80048 BASIC METABOLIC PNL TOTAL CA: CPT | Performed by: FAMILY MEDICINE

## 2024-11-18 PROCEDURE — 10004180 HB COUNTER-TRANSPORT

## 2024-11-18 PROCEDURE — 97164 PT RE-EVAL EST PLAN CARE: CPT

## 2024-11-18 PROCEDURE — 10002807 HB RX 258: Performed by: INTERNAL MEDICINE

## 2024-11-18 PROCEDURE — 10002800 HB RX 250 W HCPCS: Performed by: INTERNAL MEDICINE

## 2024-11-18 PROCEDURE — 10003585 HB ROOM CHARGE INTERMEDIATE

## 2024-11-18 PROCEDURE — 94668 MNPJ CHEST WALL SBSQ: CPT

## 2024-11-18 PROCEDURE — 99233 SBSQ HOSP IP/OBS HIGH 50: CPT | Performed by: INTERNAL MEDICINE

## 2024-11-18 PROCEDURE — 10002803 HB RX 637: Performed by: FAMILY MEDICINE

## 2024-11-18 PROCEDURE — 36415 COLL VENOUS BLD VENIPUNCTURE: CPT | Performed by: FAMILY MEDICINE

## 2024-11-18 PROCEDURE — 82533 TOTAL CORTISOL: CPT | Performed by: INTERNAL MEDICINE

## 2024-11-18 PROCEDURE — 94640 AIRWAY INHALATION TREATMENT: CPT

## 2024-11-18 PROCEDURE — X1166 CONTRACTED PHYSICIAN CHARGE NOT BILLED BY AAH: HCPCS | Performed by: INTERNAL MEDICINE

## 2024-11-18 PROCEDURE — 10004651 HB RX, NO CHARGE ITEM: Performed by: EMERGENCY MEDICINE

## 2024-11-18 PROCEDURE — 10002803 HB RX 637: Performed by: INTERNAL MEDICINE

## 2024-11-18 PROCEDURE — 10004651 HB RX, NO CHARGE ITEM: Performed by: FAMILY MEDICINE

## 2024-11-18 PROCEDURE — 85025 COMPLETE CBC W/AUTO DIFF WBC: CPT | Performed by: FAMILY MEDICINE

## 2024-11-18 PROCEDURE — 97530 THERAPEUTIC ACTIVITIES: CPT

## 2024-11-18 PROCEDURE — 10002801 HB RX 250 W/O HCPCS: Performed by: INTERNAL MEDICINE

## 2024-11-18 PROCEDURE — 97535 SELF CARE MNGMENT TRAINING: CPT

## 2024-11-18 PROCEDURE — 10002803 HB RX 637

## 2024-11-18 RX ORDER — ALBUTEROL SULFATE 5 MG/ML
2.5 SOLUTION RESPIRATORY (INHALATION)
Status: DISCONTINUED | OUTPATIENT
Start: 2024-11-18 | End: 2024-11-18

## 2024-11-18 RX ORDER — IPRATROPIUM BROMIDE AND ALBUTEROL SULFATE 2.5; .5 MG/3ML; MG/3ML
3 SOLUTION RESPIRATORY (INHALATION)
Status: DISCONTINUED | OUTPATIENT
Start: 2024-11-18 | End: 2024-11-20

## 2024-11-18 RX ORDER — ACETAMINOPHEN 160 MG/5ML
325 LIQUID ORAL EVERY 4 HOURS PRN
Status: DISCONTINUED | OUTPATIENT
Start: 2024-11-18 | End: 2024-11-24 | Stop reason: HOSPADM

## 2024-11-18 RX ORDER — ACETAMINOPHEN 160 MG/5ML
LIQUID ORAL
Status: COMPLETED
Start: 2024-11-18 | End: 2024-11-18

## 2024-11-18 RX ORDER — ALBUTEROL SULFATE 0.83 MG/ML
2.5 SOLUTION RESPIRATORY (INHALATION)
Status: DISCONTINUED | OUTPATIENT
Start: 2024-11-18 | End: 2024-11-24 | Stop reason: HOSPADM

## 2024-11-18 RX ORDER — FUROSEMIDE 10 MG/ML
20 INJECTION INTRAMUSCULAR; INTRAVENOUS
Status: COMPLETED | OUTPATIENT
Start: 2024-11-18 | End: 2024-11-19

## 2024-11-18 RX ADMIN — ALBUTEROL SULFATE 2.5 MG: 2.5 SOLUTION RESPIRATORY (INHALATION) at 08:07

## 2024-11-18 RX ADMIN — PIPERACILLIN SODIUM AND TAZOBACTAM SODIUM 3.38 G: 3; .375 INJECTION, POWDER, FOR SOLUTION INTRAVENOUS at 22:32

## 2024-11-18 RX ADMIN — MIDODRINE HYDROCHLORIDE 15 MG: 5 TABLET ORAL at 09:05

## 2024-11-18 RX ADMIN — VANCOMYCIN HYDROCHLORIDE 750 MG: 750 INJECTION, POWDER, LYOPHILIZED, FOR SOLUTION INTRAVENOUS at 06:51

## 2024-11-18 RX ADMIN — METOPROLOL TARTRATE 25 MG: 25 TABLET, FILM COATED ORAL at 09:05

## 2024-11-18 RX ADMIN — CALCIUM CARBONATE 600 MG (1,500 MG)-VITAMIN D3 400 UNIT TABLET 1 TABLET: at 06:42

## 2024-11-18 RX ADMIN — PIPERACILLIN SODIUM AND TAZOBACTAM SODIUM 3.38 G: 3; .375 INJECTION, POWDER, FOR SOLUTION INTRAVENOUS at 06:55

## 2024-11-18 RX ADMIN — IPRATROPIUM BROMIDE AND ALBUTEROL SULFATE 3 ML: 2.5; .5 SOLUTION RESPIRATORY (INHALATION) at 21:31

## 2024-11-18 RX ADMIN — APIXABAN 2.5 MG: 2.5 TABLET, FILM COATED ORAL at 20:36

## 2024-11-18 RX ADMIN — ACETAMINOPHEN 325 MG: 650 SOLUTION ORAL at 20:34

## 2024-11-18 RX ADMIN — SODIUM CHLORIDE, PRESERVATIVE FREE 2 ML: 5 INJECTION INTRAVENOUS at 20:36

## 2024-11-18 RX ADMIN — MIDODRINE HYDROCHLORIDE 15 MG: 5 TABLET ORAL at 17:08

## 2024-11-18 RX ADMIN — MIDODRINE HYDROCHLORIDE 15 MG: 5 TABLET ORAL at 00:12

## 2024-11-18 RX ADMIN — DIGOXIN 125 MCG: 125 TABLET ORAL at 09:07

## 2024-11-18 RX ADMIN — FUROSEMIDE 20 MG: 10 INJECTION, SOLUTION INTRAMUSCULAR; INTRAVENOUS at 17:08

## 2024-11-18 RX ADMIN — SODIUM CHLORIDE, PRESERVATIVE FREE 2 ML: 5 INJECTION INTRAVENOUS at 09:10

## 2024-11-18 RX ADMIN — PIPERACILLIN SODIUM AND TAZOBACTAM SODIUM 3.38 G: 3; .375 INJECTION, POWDER, FOR SOLUTION INTRAVENOUS at 13:16

## 2024-11-18 RX ADMIN — FUROSEMIDE 20 MG: 10 INJECTION, SOLUTION INTRAMUSCULAR; INTRAVENOUS at 11:07

## 2024-11-18 RX ADMIN — APIXABAN 2.5 MG: 2.5 TABLET, FILM COATED ORAL at 09:05

## 2024-11-18 RX ADMIN — SODIUM CHLORIDE, PRESERVATIVE FREE 2 ML: 5 INJECTION INTRAVENOUS at 09:11

## 2024-11-18 RX ADMIN — IPRATROPIUM BROMIDE AND ALBUTEROL SULFATE 3 ML: 2.5; .5 SOLUTION RESPIRATORY (INHALATION) at 13:06

## 2024-11-18 RX ADMIN — METOPROLOL TARTRATE 25 MG: 25 TABLET, FILM COATED ORAL at 20:35

## 2024-11-18 RX ADMIN — MIDODRINE HYDROCHLORIDE 15 MG: 5 TABLET ORAL at 23:28

## 2024-11-18 ASSESSMENT — PAIN SCALES - PAIN ASSESSMENT IN ADVANCED DEMENTIA (PAINAD)
FACIALEXPRESSION: SMILING OR INEXPRESSIVE
TOTALSCORE: 2
NEGVOCALIZATION: OCCASIONAL MOAN OR GROAN, LOW LEVELS OF SPEECH WITH A NEGATIVE OR DISAPPROVING QUALITY
CONSOLABILITY: NO NEED TO CONSOLE
BREATHING: NORMAL
BODYLANGUAGE: TENSE, DISTRESSED, FIDGETING

## 2024-11-18 ASSESSMENT — COGNITIVE AND FUNCTIONAL STATUS - GENERAL
DAILY_ACTIVITY_CONVERTED_SCORE: 25.33
BASIC_MOBILITY_RAW_SCORE: 6
HELP NEEDED FOR TOILETING: TOTAL
BASIC_MOBILITY_CONVERTED_SCORE: 16.59
HELP NEEDED DRESSING REGULAR LOWER BODY CLOTHING: TOTAL
DAILY_ACTIVITY_RAW_SCORE: 9
HELP NEEDED FOR PERSONAL GROOMING: A LOT
HELP NEEDED FOR BATHING: A LOT
HELP NEEDED DRESSING REGULAR UPPER BODY CLOTHING: A LOT

## 2024-11-18 ASSESSMENT — PULMONARY FUNCTION TESTS
FEV1/FVC: UNABLE TO OBTAIN, OR GREATER THAN 70%
FEV1: 0
FEV1_PREDICTED: 0
FEV1: 0
FEV1: 0

## 2024-11-18 ASSESSMENT — ACTIVITIES OF DAILY LIVING (ADL): HOME_MANAGEMENT_TIME_ENTRY: 28

## 2024-11-19 ENCOUNTER — APPOINTMENT (OUTPATIENT)
Dept: GENERAL RADIOLOGY | Age: 89
DRG: 871 | End: 2024-11-19
Attending: INTERNAL MEDICINE

## 2024-11-19 LAB
ANION GAP SERPL CALC-SCNC: 9 MMOL/L (ref 7–19)
BASE EXCESS / DEFICIT, ARTERIAL - RESPIRATORY: 18 MMOL/L (ref -2–3)
BASOPHILS # BLD: 0 K/MCL (ref 0–0.3)
BASOPHILS NFR BLD: 1 %
BDY SITE: ABNORMAL
BODY TEMPERATURE: 37 DEGREES C
BUN SERPL-MCNC: 15 MG/DL (ref 6–20)
BUN/CREAT SERPL: 28 (ref 7–25)
CALCIUM SERPL-MCNC: 8.8 MG/DL (ref 8.4–10.2)
CHLORIDE SERPL-SCNC: 95 MMOL/L (ref 97–110)
CO2 SERPL-SCNC: 36 MMOL/L (ref 21–32)
CONDITION: ABNORMAL
CREAT SERPL-MCNC: 0.54 MG/DL (ref 0.51–0.95)
DEPRECATED RDW RBC: 62.8 FL (ref 39–50)
EGFRCR SERPLBLD CKD-EPI 2021: 86 ML/MIN/{1.73_M2}
EOSINOPHIL # BLD: 0.1 K/MCL (ref 0–0.5)
EOSINOPHIL NFR BLD: 1 %
ERYTHROCYTE [DISTWIDTH] IN BLOOD: 19.7 % (ref 11–15)
FASTING DURATION TIME PATIENT: ABNORMAL H
GLUCOSE BLDC GLUCOMTR-MCNC: 136 MG/DL (ref 70–99)
GLUCOSE BLDC GLUCOMTR-MCNC: 99 MG/DL (ref 70–99)
GLUCOSE SERPL-MCNC: 104 MG/DL (ref 70–99)
HCO3 BLDA-SCNC: 45 MMOL/L (ref 22–28)
HCT VFR BLD CALC: 32.1 % (ref 36–46.5)
HGB BLD-MCNC: 9.4 G/DL (ref 12–15.5)
IMM GRANULOCYTES # BLD AUTO: 0 K/MCL (ref 0–0.2)
IMM GRANULOCYTES # BLD: 1 %
LYMPHOCYTES # BLD: 0.5 K/MCL (ref 1–4)
LYMPHOCYTES NFR BLD: 8 %
MCH RBC QN AUTO: 26 PG (ref 26–34)
MCHC RBC AUTO-ENTMCNC: 29.3 G/DL (ref 32–36.5)
MCV RBC AUTO: 88.9 FL (ref 78–100)
MONOCYTES # BLD: 0.4 K/MCL (ref 0.3–0.9)
MONOCYTES NFR BLD: 6 %
NEUTROPHILS # BLD: 5.1 K/MCL (ref 1.8–7.7)
NEUTROPHILS NFR BLD: 83 %
NRBC BLD MANUAL-RTO: 0 /100 WBC
PCO2 BLDA: 62 MM HG (ref 32–45)
PH BLDA: 7.47 UNITS (ref 7.35–7.45)
PLATELET # BLD AUTO: 182 K/MCL (ref 140–450)
PO2 BLDA: 81 MM HG (ref 83–108)
POTASSIUM SERPL-SCNC: 3.5 MMOL/L (ref 3.4–5.1)
POTASSIUM SERPL-SCNC: 4.3 MMOL/L (ref 3.4–5.1)
PROCALCITONIN SERPL IA-MCNC: <0.05 NG/ML
RBC # BLD: 3.61 MIL/MCL (ref 4–5.2)
SAO2 % BLDA: 97 % (ref 95–99)
SODIUM SERPL-SCNC: 136 MMOL/L (ref 135–145)
WBC # BLD: 6.1 K/MCL (ref 4.2–11)

## 2024-11-19 PROCEDURE — 10002800 HB RX 250 W HCPCS: Performed by: INTERNAL MEDICINE

## 2024-11-19 PROCEDURE — 94664 DEMO&/EVAL PT USE INHALER: CPT

## 2024-11-19 PROCEDURE — 80048 BASIC METABOLIC PNL TOTAL CA: CPT

## 2024-11-19 PROCEDURE — 36600 WITHDRAWAL OF ARTERIAL BLOOD: CPT

## 2024-11-19 PROCEDURE — 84145 PROCALCITONIN (PCT): CPT | Performed by: INTERNAL MEDICINE

## 2024-11-19 PROCEDURE — 10006031 HB ROOM CHARGE TELEMETRY

## 2024-11-19 PROCEDURE — 99233 SBSQ HOSP IP/OBS HIGH 50: CPT | Performed by: FAMILY MEDICINE

## 2024-11-19 PROCEDURE — 82803 BLOOD GASES ANY COMBINATION: CPT

## 2024-11-19 PROCEDURE — 85025 COMPLETE CBC W/AUTO DIFF WBC: CPT | Performed by: INTERNAL MEDICINE

## 2024-11-19 PROCEDURE — 84132 ASSAY OF SERUM POTASSIUM: CPT | Performed by: INTERNAL MEDICINE

## 2024-11-19 PROCEDURE — 36415 COLL VENOUS BLD VENIPUNCTURE: CPT | Performed by: INTERNAL MEDICINE

## 2024-11-19 PROCEDURE — 10004651 HB RX, NO CHARGE ITEM: Performed by: EMERGENCY MEDICINE

## 2024-11-19 PROCEDURE — 10002807 HB RX 258: Performed by: INTERNAL MEDICINE

## 2024-11-19 PROCEDURE — 10002803 HB RX 637: Performed by: INTERNAL MEDICINE

## 2024-11-19 PROCEDURE — 71045 X-RAY EXAM CHEST 1 VIEW: CPT

## 2024-11-19 PROCEDURE — 10004651 HB RX, NO CHARGE ITEM: Performed by: FAMILY MEDICINE

## 2024-11-19 PROCEDURE — 94640 AIRWAY INHALATION TREATMENT: CPT

## 2024-11-19 PROCEDURE — 10002803 HB RX 637: Performed by: FAMILY MEDICINE

## 2024-11-19 PROCEDURE — 10004180 HB COUNTER-TRANSPORT

## 2024-11-19 PROCEDURE — 99291 CRITICAL CARE FIRST HOUR: CPT | Performed by: INTERNAL MEDICINE

## 2024-11-19 PROCEDURE — 10002801 HB RX 250 W/O HCPCS: Performed by: INTERNAL MEDICINE

## 2024-11-19 RX ORDER — POTASSIUM CHLORIDE 1.5 G/1.58G
40 POWDER, FOR SOLUTION ORAL ONCE
Status: COMPLETED | OUTPATIENT
Start: 2024-11-19 | End: 2024-11-19

## 2024-11-19 RX ADMIN — PIPERACILLIN SODIUM AND TAZOBACTAM SODIUM 3.38 G: 3; .375 INJECTION, POWDER, FOR SOLUTION INTRAVENOUS at 14:04

## 2024-11-19 RX ADMIN — METOPROLOL TARTRATE 25 MG: 25 TABLET, FILM COATED ORAL at 20:49

## 2024-11-19 RX ADMIN — SODIUM CHLORIDE, PRESERVATIVE FREE 2 ML: 5 INJECTION INTRAVENOUS at 20:55

## 2024-11-19 RX ADMIN — SODIUM CHLORIDE, PRESERVATIVE FREE 2 ML: 5 INJECTION INTRAVENOUS at 09:21

## 2024-11-19 RX ADMIN — FUROSEMIDE 20 MG: 10 INJECTION, SOLUTION INTRAMUSCULAR; INTRAVENOUS at 16:10

## 2024-11-19 RX ADMIN — IPRATROPIUM BROMIDE AND ALBUTEROL SULFATE 3 ML: 2.5; .5 SOLUTION RESPIRATORY (INHALATION) at 07:59

## 2024-11-19 RX ADMIN — PIPERACILLIN SODIUM AND TAZOBACTAM SODIUM 3.38 G: 3; .375 INJECTION, POWDER, FOR SOLUTION INTRAVENOUS at 06:27

## 2024-11-19 RX ADMIN — MIDODRINE HYDROCHLORIDE 15 MG: 5 TABLET ORAL at 09:20

## 2024-11-19 RX ADMIN — ACETAMINOPHEN 325 MG: 650 SOLUTION ORAL at 09:22

## 2024-11-19 RX ADMIN — CALCIUM CARBONATE 600 MG (1,500 MG)-VITAMIN D3 400 UNIT TABLET 1 TABLET: at 06:20

## 2024-11-19 RX ADMIN — VANCOMYCIN HYDROCHLORIDE 750 MG: 750 INJECTION, POWDER, LYOPHILIZED, FOR SOLUTION INTRAVENOUS at 06:24

## 2024-11-19 RX ADMIN — MIDODRINE HYDROCHLORIDE 15 MG: 5 TABLET ORAL at 16:10

## 2024-11-19 RX ADMIN — IPRATROPIUM BROMIDE AND ALBUTEROL SULFATE 3 ML: 2.5; .5 SOLUTION RESPIRATORY (INHALATION) at 13:45

## 2024-11-19 RX ADMIN — APIXABAN 2.5 MG: 2.5 TABLET, FILM COATED ORAL at 09:19

## 2024-11-19 RX ADMIN — POTASSIUM CHLORIDE 40 MEQ: 1.5 POWDER, FOR SOLUTION ORAL at 09:20

## 2024-11-19 RX ADMIN — IPRATROPIUM BROMIDE AND ALBUTEROL SULFATE 3 ML: 2.5; .5 SOLUTION RESPIRATORY (INHALATION) at 20:03

## 2024-11-19 RX ADMIN — SODIUM CHLORIDE, PRESERVATIVE FREE 2 ML: 5 INJECTION INTRAVENOUS at 09:20

## 2024-11-19 RX ADMIN — FUROSEMIDE 20 MG: 10 INJECTION, SOLUTION INTRAMUSCULAR; INTRAVENOUS at 09:20

## 2024-11-19 RX ADMIN — METOPROLOL TARTRATE 25 MG: 25 TABLET, FILM COATED ORAL at 09:19

## 2024-11-19 RX ADMIN — PIPERACILLIN SODIUM AND TAZOBACTAM SODIUM 3.38 G: 3; .375 INJECTION, POWDER, FOR SOLUTION INTRAVENOUS at 20:54

## 2024-11-19 ASSESSMENT — PAIN SCALES - PAIN ASSESSMENT IN ADVANCED DEMENTIA (PAINAD)
BREATHING: NORMAL
BODYLANGUAGE: TENSE, DISTRESSED, FIDGETING
BODYLANGUAGE: TENSE, DISTRESSED, FIDGETING
NEGVOCALIZATION: OCCASIONAL MOAN OR GROAN, LOW LEVELS OF SPEECH WITH A NEGATIVE OR DISAPPROVING QUALITY
FACIALEXPRESSION: SMILING OR INEXPRESSIVE
TOTALSCORE: 2
BREATHING: NORMAL
BODYLANGUAGE: TENSE, DISTRESSED, FIDGETING
TOTALSCORE: 2
CONSOLABILITY: NO NEED TO CONSOLE
NEGVOCALIZATION: OCCASIONAL MOAN OR GROAN, LOW LEVELS OF SPEECH WITH A NEGATIVE OR DISAPPROVING QUALITY
NEGVOCALIZATION: OCCASIONAL MOAN OR GROAN, LOW LEVELS OF SPEECH WITH A NEGATIVE OR DISAPPROVING QUALITY
TOTALSCORE: 2
CONSOLABILITY: NO NEED TO CONSOLE
FACIALEXPRESSION: SMILING OR INEXPRESSIVE
CONSOLABILITY: NO NEED TO CONSOLE
BREATHING: NORMAL
FACIALEXPRESSION: SMILING OR INEXPRESSIVE

## 2024-11-19 ASSESSMENT — PAIN SCALES - GENERAL
PAINLEVEL_OUTOF10: 0
PAINLEVEL_OUTOF10: 0

## 2024-11-19 ASSESSMENT — PAIN SCALES - WONG BAKER: WONGBAKER_NUMERICALRESPONSE: 0

## 2024-11-20 ENCOUNTER — APPOINTMENT (OUTPATIENT)
Dept: CT IMAGING | Age: 89
DRG: 871 | End: 2024-11-20
Attending: RADIOLOGY

## 2024-11-20 LAB
ANION GAP SERPL CALC-SCNC: 8 MMOL/L (ref 7–19)
BASOPHILS # BLD: 0 K/MCL (ref 0–0.3)
BASOPHILS NFR BLD: 0 %
BUN SERPL-MCNC: 12 MG/DL (ref 6–20)
BUN/CREAT SERPL: 24 (ref 7–25)
CALCIUM SERPL-MCNC: 8.6 MG/DL (ref 8.4–10.2)
CHLORIDE SERPL-SCNC: 91 MMOL/L (ref 97–110)
CO2 SERPL-SCNC: 41 MMOL/L (ref 21–32)
CREAT SERPL-MCNC: 0.51 MG/DL (ref 0.51–0.95)
DEPRECATED RDW RBC: 63.3 FL (ref 39–50)
EGFRCR SERPLBLD CKD-EPI 2021: 88 ML/MIN/{1.73_M2}
EOSINOPHIL # BLD: 0.1 K/MCL (ref 0–0.5)
EOSINOPHIL NFR BLD: 2 %
ERYTHROCYTE [DISTWIDTH] IN BLOOD: 20.1 % (ref 11–15)
FASTING DURATION TIME PATIENT: ABNORMAL H
GLUCOSE BLDC GLUCOMTR-MCNC: 121 MG/DL (ref 70–99)
GLUCOSE BLDC GLUCOMTR-MCNC: 132 MG/DL (ref 70–99)
GLUCOSE BLDC GLUCOMTR-MCNC: 149 MG/DL (ref 70–99)
GLUCOSE SERPL-MCNC: 119 MG/DL (ref 70–99)
HCT VFR BLD CALC: 32.5 % (ref 36–46.5)
HGB BLD-MCNC: 9.9 G/DL (ref 12–15.5)
IMM GRANULOCYTES # BLD AUTO: 0 K/MCL (ref 0–0.2)
IMM GRANULOCYTES # BLD: 0 %
LYMPHOCYTES # BLD: 0.6 K/MCL (ref 1–4)
LYMPHOCYTES NFR BLD: 8 %
MCH RBC QN AUTO: 26.7 PG (ref 26–34)
MCHC RBC AUTO-ENTMCNC: 30.5 G/DL (ref 32–36.5)
MCV RBC AUTO: 87.6 FL (ref 78–100)
MONOCYTES # BLD: 0.4 K/MCL (ref 0.3–0.9)
MONOCYTES NFR BLD: 6 %
NEUTROPHILS # BLD: 6 K/MCL (ref 1.8–7.7)
NEUTROPHILS NFR BLD: 84 %
NRBC BLD MANUAL-RTO: 0 /100 WBC
PLATELET # BLD AUTO: 202 K/MCL (ref 140–450)
POTASSIUM SERPL-SCNC: 3.4 MMOL/L (ref 3.4–5.1)
POTASSIUM SERPL-SCNC: 4.5 MMOL/L (ref 3.4–5.1)
RBC # BLD: 3.71 MIL/MCL (ref 4–5.2)
SODIUM SERPL-SCNC: 137 MMOL/L (ref 135–145)
VANCOMYCIN TROUGH SERPL-MCNC: 11.2 MCG/ML (ref 10–20)
WBC # BLD: 7.2 K/MCL (ref 4.2–11)

## 2024-11-20 PROCEDURE — 74176 CT ABD & PELVIS W/O CONTRAST: CPT

## 2024-11-20 PROCEDURE — 10002019 HB COUNTER RESP ASSESSMENT

## 2024-11-20 PROCEDURE — 10002807 HB RX 258: Performed by: INTERNAL MEDICINE

## 2024-11-20 PROCEDURE — 36415 COLL VENOUS BLD VENIPUNCTURE: CPT | Performed by: INTERNAL MEDICINE

## 2024-11-20 PROCEDURE — 80048 BASIC METABOLIC PNL TOTAL CA: CPT | Performed by: FAMILY MEDICINE

## 2024-11-20 PROCEDURE — 10002803 HB RX 637: Performed by: FAMILY MEDICINE

## 2024-11-20 PROCEDURE — 10004180 HB COUNTER-TRANSPORT

## 2024-11-20 PROCEDURE — 99233 SBSQ HOSP IP/OBS HIGH 50: CPT | Performed by: INTERNAL MEDICINE

## 2024-11-20 PROCEDURE — 10002800 HB RX 250 W HCPCS: Performed by: INTERNAL MEDICINE

## 2024-11-20 PROCEDURE — 97530 THERAPEUTIC ACTIVITIES: CPT

## 2024-11-20 PROCEDURE — 10002803 HB RX 637: Performed by: INTERNAL MEDICINE

## 2024-11-20 PROCEDURE — 10004651 HB RX, NO CHARGE ITEM: Performed by: FAMILY MEDICINE

## 2024-11-20 PROCEDURE — 94660 CPAP INITIATION&MGMT: CPT

## 2024-11-20 PROCEDURE — 80202 ASSAY OF VANCOMYCIN: CPT | Performed by: INTERNAL MEDICINE

## 2024-11-20 PROCEDURE — 10006031 HB ROOM CHARGE TELEMETRY

## 2024-11-20 PROCEDURE — 85025 COMPLETE CBC W/AUTO DIFF WBC: CPT | Performed by: FAMILY MEDICINE

## 2024-11-20 PROCEDURE — 99232 SBSQ HOSP IP/OBS MODERATE 35: CPT | Performed by: FAMILY MEDICINE

## 2024-11-20 PROCEDURE — 84132 ASSAY OF SERUM POTASSIUM: CPT | Performed by: FAMILY MEDICINE

## 2024-11-20 PROCEDURE — 10004651 HB RX, NO CHARGE ITEM: Performed by: EMERGENCY MEDICINE

## 2024-11-20 RX ORDER — ACETAZOLAMIDE 250 MG/1
250 TABLET ORAL DAILY
Status: DISCONTINUED | OUTPATIENT
Start: 2024-11-20 | End: 2024-11-24 | Stop reason: HOSPADM

## 2024-11-20 RX ORDER — POTASSIUM CHLORIDE 1.5 G/1.58G
40 POWDER, FOR SOLUTION ORAL ONCE
Status: COMPLETED | OUTPATIENT
Start: 2024-11-20 | End: 2024-11-20

## 2024-11-20 RX ADMIN — METOPROLOL TARTRATE 25 MG: 25 TABLET, FILM COATED ORAL at 10:00

## 2024-11-20 RX ADMIN — MIDODRINE HYDROCHLORIDE 15 MG: 5 TABLET ORAL at 23:41

## 2024-11-20 RX ADMIN — DIGOXIN 125 MCG: 125 TABLET ORAL at 10:00

## 2024-11-20 RX ADMIN — METOPROLOL TARTRATE 25 MG: 25 TABLET, FILM COATED ORAL at 20:45

## 2024-11-20 RX ADMIN — MIDODRINE HYDROCHLORIDE 15 MG: 5 TABLET ORAL at 00:19

## 2024-11-20 RX ADMIN — SODIUM CHLORIDE, PRESERVATIVE FREE 2 ML: 5 INJECTION INTRAVENOUS at 10:00

## 2024-11-20 RX ADMIN — MIDODRINE HYDROCHLORIDE 15 MG: 5 TABLET ORAL at 09:00

## 2024-11-20 RX ADMIN — LIDOCAINE 1 PATCH: 4 PATCH TOPICAL at 20:45

## 2024-11-20 RX ADMIN — PIPERACILLIN SODIUM AND TAZOBACTAM SODIUM 3.38 G: 3; .375 INJECTION, POWDER, FOR SOLUTION INTRAVENOUS at 21:39

## 2024-11-20 RX ADMIN — POTASSIUM CHLORIDE 40 MEQ: 1.5 POWDER, FOR SOLUTION ORAL at 09:00

## 2024-11-20 RX ADMIN — VANCOMYCIN HYDROCHLORIDE 750 MG: 750 INJECTION, POWDER, LYOPHILIZED, FOR SOLUTION INTRAVENOUS at 06:05

## 2024-11-20 RX ADMIN — PIPERACILLIN SODIUM AND TAZOBACTAM SODIUM 3.38 G: 3; .375 INJECTION, POWDER, FOR SOLUTION INTRAVENOUS at 14:32

## 2024-11-20 RX ADMIN — CALCIUM CARBONATE 600 MG (1,500 MG)-VITAMIN D3 400 UNIT TABLET 1 TABLET: at 06:04

## 2024-11-20 RX ADMIN — MIDODRINE HYDROCHLORIDE 15 MG: 5 TABLET ORAL at 17:40

## 2024-11-20 RX ADMIN — ACETAZOLAMIDE 250 MG: 250 TABLET ORAL at 17:40

## 2024-11-20 RX ADMIN — PIPERACILLIN SODIUM AND TAZOBACTAM SODIUM 3.38 G: 3; .375 INJECTION, POWDER, FOR SOLUTION INTRAVENOUS at 06:31

## 2024-11-20 ASSESSMENT — PULMONARY FUNCTION TESTS
FEV1: 0
FEV1: 0
FEV1/FVC: UNABLE TO OBTAIN, OR GREATER THAN 70%
FEV1: 0
FEV1_PREDICTED: 0

## 2024-11-20 ASSESSMENT — PAIN SCALES - PAIN ASSESSMENT IN ADVANCED DEMENTIA (PAINAD)
BREATHING: NORMAL
NEGVOCALIZATION: OCCASIONAL MOAN OR GROAN, LOW LEVELS OF SPEECH WITH A NEGATIVE OR DISAPPROVING QUALITY
BODYLANGUAGE: TENSE, DISTRESSED, FIDGETING
FACIALEXPRESSION: SMILING OR INEXPRESSIVE
BODYLANGUAGE: RELAXED
TOTALSCORE: 0
FACIALEXPRESSION: SMILING OR INEXPRESSIVE
BREATHING: NORMAL
CONSOLABILITY: NO NEED TO CONSOLE
CONSOLABILITY: NO NEED TO CONSOLE
TOTALSCORE: 2

## 2024-11-20 ASSESSMENT — COGNITIVE AND FUNCTIONAL STATUS - GENERAL
DAILY_ACTIVITY_CONVERTED_SCORE: 25.33
HELP NEEDED FOR BATHING: A LOT
HELP NEEDED FOR PERSONAL GROOMING: A LOT
BASIC_MOBILITY_CONVERTED_SCORE: 22.61
DAILY_ACTIVITY_RAW_SCORE: 9
HELP NEEDED DRESSING REGULAR LOWER BODY CLOTHING: TOTAL
HELP NEEDED DRESSING REGULAR UPPER BODY CLOTHING: A LOT
BASIC_MOBILITY_RAW_SCORE: 8
HELP NEEDED FOR TOILETING: TOTAL

## 2024-11-20 ASSESSMENT — PAIN SCALES - WONG BAKER: WONGBAKER_NUMERICALRESPONSE: 0

## 2024-11-21 LAB
ANION GAP SERPL CALC-SCNC: 7 MMOL/L (ref 7–19)
BACTERIA BLD CULT: NORMAL
BACTERIA BLD CULT: NORMAL
BASE EXCESS / DEFICIT, ARTERIAL - RESPIRATORY: 13 MMOL/L (ref -2–3)
BASOPHILS # BLD: 0 K/MCL (ref 0–0.3)
BASOPHILS NFR BLD: 1 %
BDY SITE: ABNORMAL
BODY TEMPERATURE: 37 DEGREES C
BUN SERPL-MCNC: 12 MG/DL (ref 6–20)
BUN/CREAT SERPL: 23 (ref 7–25)
CALCIUM SERPL-MCNC: 8.8 MG/DL (ref 8.4–10.2)
CHLORIDE SERPL-SCNC: 96 MMOL/L (ref 97–110)
CO2 SERPL-SCNC: 37 MMOL/L (ref 21–32)
CONDITION: ABNORMAL
CREAT SERPL-MCNC: 0.53 MG/DL (ref 0.51–0.95)
DEPRECATED RDW RBC: 65.5 FL (ref 39–50)
EGFRCR SERPLBLD CKD-EPI 2021: 87 ML/MIN/{1.73_M2}
EOSINOPHIL # BLD: 0.2 K/MCL (ref 0–0.5)
EOSINOPHIL NFR BLD: 3 %
ERYTHROCYTE [DISTWIDTH] IN BLOOD: 20.4 % (ref 11–15)
FASTING DURATION TIME PATIENT: ABNORMAL H
GLUCOSE BLDC GLUCOMTR-MCNC: 116 MG/DL (ref 70–99)
GLUCOSE BLDC GLUCOMTR-MCNC: 121 MG/DL (ref 70–99)
GLUCOSE SERPL-MCNC: 113 MG/DL (ref 70–99)
HCO3 BLDA-SCNC: 41 MMOL/L (ref 22–28)
HCT VFR BLD CALC: 34.4 % (ref 36–46.5)
HGB BLD-MCNC: 10 G/DL (ref 12–15.5)
IMM GRANULOCYTES # BLD AUTO: 0 K/MCL (ref 0–0.2)
IMM GRANULOCYTES # BLD: 1 %
LYMPHOCYTES # BLD: 0.6 K/MCL (ref 1–4)
LYMPHOCYTES NFR BLD: 9 %
MAGNESIUM SERPL-MCNC: 1.7 MG/DL (ref 1.7–2.4)
MCH RBC QN AUTO: 25.8 PG (ref 26–34)
MCHC RBC AUTO-ENTMCNC: 29.1 G/DL (ref 32–36.5)
MCV RBC AUTO: 88.9 FL (ref 78–100)
MONOCYTES # BLD: 0.4 K/MCL (ref 0.3–0.9)
MONOCYTES NFR BLD: 7 %
NEUTROPHILS # BLD: 5.1 K/MCL (ref 1.8–7.7)
NEUTROPHILS NFR BLD: 79 %
NRBC BLD MANUAL-RTO: 0 /100 WBC
PCO2 BLDA: 64 MM HG (ref 32–45)
PH BLDA: 7.41 UNITS (ref 7.35–7.45)
PHOSPHATE SERPL-MCNC: 2.9 MG/DL (ref 2.4–4.7)
PLATELET # BLD AUTO: 219 K/MCL (ref 140–450)
PO2 BLDA: 112 MM HG (ref 83–108)
POTASSIUM SERPL-SCNC: 3.9 MMOL/L (ref 3.4–5.1)
POTASSIUM SERPL-SCNC: 4.8 MMOL/L (ref 3.4–5.1)
RBC # BLD: 3.87 MIL/MCL (ref 4–5.2)
SAO2 % BLDA: 98 % (ref 95–99)
SODIUM SERPL-SCNC: 136 MMOL/L (ref 135–145)
WBC # BLD: 6.3 K/MCL (ref 4.2–11)

## 2024-11-21 PROCEDURE — 96372 THER/PROPH/DIAG INJ SC/IM: CPT | Performed by: FAMILY MEDICINE

## 2024-11-21 PROCEDURE — 10002800 HB RX 250 W HCPCS: Performed by: INTERNAL MEDICINE

## 2024-11-21 PROCEDURE — 80048 BASIC METABOLIC PNL TOTAL CA: CPT | Performed by: FAMILY MEDICINE

## 2024-11-21 PROCEDURE — 10004651 HB RX, NO CHARGE ITEM: Performed by: FAMILY MEDICINE

## 2024-11-21 PROCEDURE — 10002807 HB RX 258: Performed by: INTERNAL MEDICINE

## 2024-11-21 PROCEDURE — 10002803 HB RX 637: Performed by: INTERNAL MEDICINE

## 2024-11-21 PROCEDURE — 36415 COLL VENOUS BLD VENIPUNCTURE: CPT | Performed by: FAMILY MEDICINE

## 2024-11-21 PROCEDURE — 10006031 HB ROOM CHARGE TELEMETRY

## 2024-11-21 PROCEDURE — 10004180 HB COUNTER-TRANSPORT

## 2024-11-21 PROCEDURE — 10002800 HB RX 250 W HCPCS: Performed by: FAMILY MEDICINE

## 2024-11-21 PROCEDURE — 82803 BLOOD GASES ANY COMBINATION: CPT

## 2024-11-21 PROCEDURE — 84132 ASSAY OF SERUM POTASSIUM: CPT | Performed by: FAMILY MEDICINE

## 2024-11-21 PROCEDURE — 10002803 HB RX 637: Performed by: FAMILY MEDICINE

## 2024-11-21 PROCEDURE — 84100 ASSAY OF PHOSPHORUS: CPT

## 2024-11-21 PROCEDURE — 85025 COMPLETE CBC W/AUTO DIFF WBC: CPT | Performed by: FAMILY MEDICINE

## 2024-11-21 PROCEDURE — 10004651 HB RX, NO CHARGE ITEM: Performed by: EMERGENCY MEDICINE

## 2024-11-21 PROCEDURE — 94660 CPAP INITIATION&MGMT: CPT

## 2024-11-21 PROCEDURE — 83735 ASSAY OF MAGNESIUM: CPT

## 2024-11-21 PROCEDURE — 99233 SBSQ HOSP IP/OBS HIGH 50: CPT | Performed by: FAMILY MEDICINE

## 2024-11-21 PROCEDURE — 99233 SBSQ HOSP IP/OBS HIGH 50: CPT | Performed by: INTERNAL MEDICINE

## 2024-11-21 PROCEDURE — 36600 WITHDRAWAL OF ARTERIAL BLOOD: CPT

## 2024-11-21 RX ORDER — ENOXAPARIN SODIUM 100 MG/ML
40 INJECTION SUBCUTANEOUS EVERY 24 HOURS
Status: DISCONTINUED | OUTPATIENT
Start: 2024-11-21 | End: 2024-11-21

## 2024-11-21 RX ORDER — HEPARIN SODIUM 5000 [USP'U]/ML
5000 INJECTION, SOLUTION INTRAVENOUS; SUBCUTANEOUS EVERY 8 HOURS SCHEDULED
Status: DISCONTINUED | OUTPATIENT
Start: 2024-11-21 | End: 2024-11-22

## 2024-11-21 RX ORDER — POTASSIUM CHLORIDE 1.5 G/1.58G
40 POWDER, FOR SOLUTION ORAL ONCE
Status: COMPLETED | OUTPATIENT
Start: 2024-11-21 | End: 2024-11-21

## 2024-11-21 RX ADMIN — POTASSIUM CHLORIDE 40 MEQ: 1.5 POWDER, FOR SOLUTION ORAL at 11:11

## 2024-11-21 RX ADMIN — MIDODRINE HYDROCHLORIDE 15 MG: 5 TABLET ORAL at 15:33

## 2024-11-21 RX ADMIN — Medication 3 MG: at 21:10

## 2024-11-21 RX ADMIN — MIDODRINE HYDROCHLORIDE 15 MG: 5 TABLET ORAL at 23:38

## 2024-11-21 RX ADMIN — ACETAZOLAMIDE 250 MG: 250 TABLET ORAL at 11:11

## 2024-11-21 RX ADMIN — SODIUM CHLORIDE, PRESERVATIVE FREE 2 ML: 5 INJECTION INTRAVENOUS at 11:31

## 2024-11-21 RX ADMIN — PIPERACILLIN SODIUM AND TAZOBACTAM SODIUM 3.38 G: 3; .375 INJECTION, POWDER, FOR SOLUTION INTRAVENOUS at 06:43

## 2024-11-21 RX ADMIN — SODIUM CHLORIDE, PRESERVATIVE FREE 2 ML: 5 INJECTION INTRAVENOUS at 11:30

## 2024-11-21 RX ADMIN — MAGNESIUM SULFATE HEPTAHYDRATE 2 G: 40 INJECTION, SOLUTION INTRAVENOUS at 17:53

## 2024-11-21 RX ADMIN — SODIUM CHLORIDE, PRESERVATIVE FREE 2 ML: 5 INJECTION INTRAVENOUS at 21:11

## 2024-11-21 RX ADMIN — PIPERACILLIN SODIUM AND TAZOBACTAM SODIUM 3.38 G: 3; .375 INJECTION, POWDER, FOR SOLUTION INTRAVENOUS at 15:36

## 2024-11-21 RX ADMIN — CALCIUM CARBONATE 600 MG (1,500 MG)-VITAMIN D3 400 UNIT TABLET 1 TABLET: at 06:36

## 2024-11-21 RX ADMIN — METOPROLOL TARTRATE 25 MG: 25 TABLET, FILM COATED ORAL at 21:10

## 2024-11-21 RX ADMIN — PIPERACILLIN SODIUM AND TAZOBACTAM SODIUM 3.38 G: 3; .375 INJECTION, POWDER, FOR SOLUTION INTRAVENOUS at 21:30

## 2024-11-21 RX ADMIN — METOPROLOL TARTRATE 25 MG: 25 TABLET, FILM COATED ORAL at 11:11

## 2024-11-21 RX ADMIN — LIDOCAINE 1 PATCH: 4 PATCH TOPICAL at 21:10

## 2024-11-21 RX ADMIN — MIDODRINE HYDROCHLORIDE 15 MG: 5 TABLET ORAL at 11:11

## 2024-11-21 RX ADMIN — VANCOMYCIN HYDROCHLORIDE 750 MG: 750 INJECTION, POWDER, LYOPHILIZED, FOR SOLUTION INTRAVENOUS at 08:08

## 2024-11-21 RX ADMIN — SODIUM CHLORIDE, PRESERVATIVE FREE 2 ML: 5 INJECTION INTRAVENOUS at 21:13

## 2024-11-21 RX ADMIN — HEPARIN SODIUM 5000 UNITS: 5000 INJECTION INTRAVENOUS; SUBCUTANEOUS at 23:38

## 2024-11-21 ASSESSMENT — PAIN SCALES - PAIN ASSESSMENT IN ADVANCED DEMENTIA (PAINAD)
BREATHING: NORMAL
FACIALEXPRESSION: SMILING OR INEXPRESSIVE
BODYLANGUAGE: RELAXED
TOTALSCORE: 3
CONSOLABILITY: NO NEED TO CONSOLE
TOTALSCORE: 0
BREATHING: NORMAL
NEGVOCALIZATION: OCCASIONAL MOAN OR GROAN, LOW LEVELS OF SPEECH WITH A NEGATIVE OR DISAPPROVING QUALITY
CONSOLABILITY: NO NEED TO CONSOLE
BODYLANGUAGE: TENSE, DISTRESSED, FIDGETING
FACIALEXPRESSION: SAD. FRIGHTENED. FROWNING

## 2024-11-22 ENCOUNTER — ANESTHESIA (OUTPATIENT)
Dept: SURGERY | Age: 89
End: 2024-11-22

## 2024-11-22 ENCOUNTER — ANESTHESIA EVENT (OUTPATIENT)
Dept: SURGERY | Age: 89
End: 2024-11-22

## 2024-11-22 LAB
ANION GAP SERPL CALC-SCNC: 6 MMOL/L (ref 7–19)
BASOPHILS # BLD: 0 K/MCL (ref 0–0.3)
BASOPHILS NFR BLD: 1 %
BUN SERPL-MCNC: 14 MG/DL (ref 6–20)
BUN/CREAT SERPL: 27 (ref 7–25)
CALCIUM SERPL-MCNC: 9.3 MG/DL (ref 8.4–10.2)
CHLORIDE SERPL-SCNC: 99 MMOL/L (ref 97–110)
CO2 SERPL-SCNC: 36 MMOL/L (ref 21–32)
CREAT SERPL-MCNC: 0.51 MG/DL (ref 0.51–0.95)
DEPRECATED RDW RBC: 66.4 FL (ref 39–50)
EGFRCR SERPLBLD CKD-EPI 2021: 88 ML/MIN/{1.73_M2}
EOSINOPHIL # BLD: 0.2 K/MCL (ref 0–0.5)
EOSINOPHIL NFR BLD: 2 %
ERYTHROCYTE [DISTWIDTH] IN BLOOD: 20.2 % (ref 11–15)
FASTING DURATION TIME PATIENT: ABNORMAL H
GLUCOSE BLDC GLUCOMTR-MCNC: 122 MG/DL (ref 70–99)
GLUCOSE BLDC GLUCOMTR-MCNC: 124 MG/DL (ref 70–99)
GLUCOSE BLDC GLUCOMTR-MCNC: 83 MG/DL (ref 70–99)
GLUCOSE SERPL-MCNC: 96 MG/DL (ref 70–99)
HCT VFR BLD CALC: 37.5 % (ref 36–46.5)
HGB BLD-MCNC: 10.9 G/DL (ref 12–15.5)
IMM GRANULOCYTES # BLD AUTO: 0 K/MCL (ref 0–0.2)
IMM GRANULOCYTES # BLD: 0 %
LYMPHOCYTES # BLD: 0.7 K/MCL (ref 1–4)
LYMPHOCYTES NFR BLD: 10 %
MAGNESIUM SERPL-MCNC: 2.3 MG/DL (ref 1.7–2.4)
MCH RBC QN AUTO: 26.4 PG (ref 26–34)
MCHC RBC AUTO-ENTMCNC: 29.1 G/DL (ref 32–36.5)
MCV RBC AUTO: 90.8 FL (ref 78–100)
MONOCYTES # BLD: 0.4 K/MCL (ref 0.3–0.9)
MONOCYTES NFR BLD: 5 %
NEUTROPHILS # BLD: 6.2 K/MCL (ref 1.8–7.7)
NEUTROPHILS NFR BLD: 82 %
NRBC BLD MANUAL-RTO: 0 /100 WBC
PHOSPHATE SERPL-MCNC: 2.8 MG/DL (ref 2.4–4.7)
PLATELET # BLD AUTO: 231 K/MCL (ref 140–450)
POTASSIUM SERPL-SCNC: 4.6 MMOL/L (ref 3.4–5.1)
RBC # BLD: 4.13 MIL/MCL (ref 4–5.2)
SODIUM SERPL-SCNC: 136 MMOL/L (ref 135–145)
WBC # BLD: 7.5 K/MCL (ref 4.2–11)

## 2024-11-22 PROCEDURE — 36415 COLL VENOUS BLD VENIPUNCTURE: CPT

## 2024-11-22 PROCEDURE — 10006023 HB SUPPLY 272: Performed by: SURGERY

## 2024-11-22 PROCEDURE — 10002800 HB RX 250 W HCPCS: Performed by: FAMILY MEDICINE

## 2024-11-22 PROCEDURE — 10002803 HB RX 637: Performed by: INTERNAL MEDICINE

## 2024-11-22 PROCEDURE — 99233 SBSQ HOSP IP/OBS HIGH 50: CPT | Performed by: INTERNAL MEDICINE

## 2024-11-22 PROCEDURE — 10004452 HB PACU ADDL 30 MINUTES: Performed by: SURGERY

## 2024-11-22 PROCEDURE — 13000034 HB BASIC CASE  S/U +1ST 15 MIN: Performed by: SURGERY

## 2024-11-22 PROCEDURE — 94660 CPAP INITIATION&MGMT: CPT

## 2024-11-22 PROCEDURE — 10004180 HB COUNTER-TRANSPORT

## 2024-11-22 PROCEDURE — 10004451 HB PACU RECOVERY 1ST 30 MINUTES: Performed by: SURGERY

## 2024-11-22 PROCEDURE — 10002801 HB RX 250 W/O HCPCS: Performed by: SURGERY

## 2024-11-22 PROCEDURE — 80048 BASIC METABOLIC PNL TOTAL CA: CPT

## 2024-11-22 PROCEDURE — 10002803 HB RX 637: Performed by: FAMILY MEDICINE

## 2024-11-22 PROCEDURE — 13000035 HB BASIC CASE EA ADD MINUTE: Performed by: SURGERY

## 2024-11-22 PROCEDURE — 0DH63UZ INSERTION OF FEEDING DEVICE INTO STOMACH, PERCUTANEOUS APPROACH: ICD-10-PCS | Performed by: SURGERY

## 2024-11-22 PROCEDURE — 96372 THER/PROPH/DIAG INJ SC/IM: CPT | Performed by: INTERNAL MEDICINE

## 2024-11-22 PROCEDURE — 10002801 HB RX 250 W/O HCPCS: Performed by: NURSE ANESTHETIST, CERTIFIED REGISTERED

## 2024-11-22 PROCEDURE — 13000007 HB ANESTHESIA MAC EA ADD MINUTE: Performed by: SURGERY

## 2024-11-22 PROCEDURE — 10002800 HB RX 250 W HCPCS: Performed by: INTERNAL MEDICINE

## 2024-11-22 PROCEDURE — 96372 THER/PROPH/DIAG INJ SC/IM: CPT | Performed by: FAMILY MEDICINE

## 2024-11-22 PROCEDURE — 85025 COMPLETE CBC W/AUTO DIFF WBC: CPT | Performed by: FAMILY MEDICINE

## 2024-11-22 PROCEDURE — 10006031 HB ROOM CHARGE TELEMETRY

## 2024-11-22 PROCEDURE — 84100 ASSAY OF PHOSPHORUS: CPT

## 2024-11-22 PROCEDURE — 10002807 HB RX 258: Performed by: NURSE ANESTHETIST, CERTIFIED REGISTERED

## 2024-11-22 PROCEDURE — 83735 ASSAY OF MAGNESIUM: CPT

## 2024-11-22 PROCEDURE — 13000006 HB ANESTHESIA MAC S/U + 1ST 15 MIN: Performed by: SURGERY

## 2024-11-22 PROCEDURE — 10004651 HB RX, NO CHARGE ITEM: Performed by: SURGERY

## 2024-11-22 PROCEDURE — 10002807 HB RX 258: Performed by: INTERNAL MEDICINE

## 2024-11-22 RX ORDER — LIDOCAINE HYDROCHLORIDE 10 MG/ML
INJECTION, SOLUTION INFILTRATION; PERINEURAL PRN
Status: DISCONTINUED | OUTPATIENT
Start: 2024-11-22 | End: 2024-11-22 | Stop reason: HOSPADM

## 2024-11-22 RX ORDER — KETAMINE HYDROCHLORIDE 50 MG/ML
INJECTION, SOLUTION INTRAMUSCULAR; INTRAVENOUS PRN
Status: DISCONTINUED | OUTPATIENT
Start: 2024-11-22 | End: 2024-11-22

## 2024-11-22 RX ORDER — SODIUM CHLORIDE 9 MG/ML
INJECTION, SOLUTION INTRAVENOUS CONTINUOUS PRN
Status: DISCONTINUED | OUTPATIENT
Start: 2024-11-22 | End: 2024-11-22

## 2024-11-22 RX ORDER — HEPARIN SODIUM 5000 [USP'U]/ML
5000 INJECTION, SOLUTION INTRAVENOUS; SUBCUTANEOUS EVERY 8 HOURS SCHEDULED
Status: DISCONTINUED | OUTPATIENT
Start: 2024-11-22 | End: 2024-11-23 | Stop reason: CLARIF

## 2024-11-22 RX ADMIN — ACETAZOLAMIDE 250 MG: 250 TABLET ORAL at 12:28

## 2024-11-22 RX ADMIN — PIPERACILLIN SODIUM AND TAZOBACTAM SODIUM 3.38 G: 3; .375 INJECTION, POWDER, FOR SOLUTION INTRAVENOUS at 06:01

## 2024-11-22 RX ADMIN — KETAMINE HYDROCHLORIDE 10 MG: 50 INJECTION INTRAMUSCULAR; INTRAVENOUS at 08:58

## 2024-11-22 RX ADMIN — MIDODRINE HYDROCHLORIDE 15 MG: 5 TABLET ORAL at 12:29

## 2024-11-22 RX ADMIN — METOPROLOL TARTRATE 25 MG: 25 TABLET, FILM COATED ORAL at 21:29

## 2024-11-22 RX ADMIN — METOPROLOL TARTRATE 25 MG: 25 TABLET, FILM COATED ORAL at 07:37

## 2024-11-22 RX ADMIN — PIPERACILLIN SODIUM AND TAZOBACTAM SODIUM 3.38 G: 3; .375 INJECTION, POWDER, FOR SOLUTION INTRAVENOUS at 15:59

## 2024-11-22 RX ADMIN — SODIUM CHLORIDE, PRESERVATIVE FREE 2 ML: 5 INJECTION INTRAVENOUS at 21:28

## 2024-11-22 RX ADMIN — HEPARIN SODIUM 5000 UNITS: 5000 INJECTION INTRAVENOUS; SUBCUTANEOUS at 21:27

## 2024-11-22 RX ADMIN — SODIUM CHLORIDE: 9 INJECTION, SOLUTION INTRAVENOUS at 08:41

## 2024-11-22 RX ADMIN — HEPARIN SODIUM 5000 UNITS: 5000 INJECTION INTRAVENOUS; SUBCUTANEOUS at 14:35

## 2024-11-22 RX ADMIN — CALCIUM CARBONATE 600 MG (1,500 MG)-VITAMIN D3 400 UNIT TABLET 1 TABLET: at 05:47

## 2024-11-22 RX ADMIN — KETAMINE HYDROCHLORIDE 10 MG: 50 INJECTION INTRAMUSCULAR; INTRAVENOUS at 08:49

## 2024-11-22 RX ADMIN — DIGOXIN 125 MCG: 125 TABLET ORAL at 12:29

## 2024-11-22 RX ADMIN — LIDOCAINE 1 PATCH: 4 PATCH TOPICAL at 21:29

## 2024-11-22 ASSESSMENT — PAIN SCALES - PAIN ASSESSMENT IN ADVANCED DEMENTIA (PAINAD)
BODYLANGUAGE: RELAXED
TOTALSCORE: 2
FACIALEXPRESSION: SMILING OR INEXPRESSIVE
BREATHING: OCCASIONAL LABORED BREATHING, SHORT PERIOD OF HYPERVENTILATION
BODYLANGUAGE: TENSE, DISTRESSED, FIDGETING
TOTALSCORE: 2
CONSOLABILITY: NO NEED TO CONSOLE
TOTALSCORE: 0
FACIALEXPRESSION: SMILING OR INEXPRESSIVE
BODYLANGUAGE: TENSE, DISTRESSED, FIDGETING
BREATHING: NORMAL
BREATHING: OCCASIONAL LABORED BREATHING, SHORT PERIOD OF HYPERVENTILATION
FACIALEXPRESSION: SMILING OR INEXPRESSIVE

## 2024-11-22 ASSESSMENT — PAIN SCALES - WONG BAKER
WONGBAKER_NUMERICALRESPONSE: 0

## 2024-11-23 LAB
ANION GAP SERPL CALC-SCNC: 6 MMOL/L (ref 7–19)
BASOPHILS # BLD: 0 K/MCL (ref 0–0.3)
BASOPHILS NFR BLD: 0 %
BUN SERPL-MCNC: 17 MG/DL (ref 6–20)
BUN/CREAT SERPL: 31 (ref 7–25)
CALCIUM SERPL-MCNC: 8.7 MG/DL (ref 8.4–10.2)
CHLORIDE SERPL-SCNC: 103 MMOL/L (ref 97–110)
CO2 SERPL-SCNC: 33 MMOL/L (ref 21–32)
CREAT SERPL-MCNC: 0.54 MG/DL (ref 0.51–0.95)
DEPRECATED RDW RBC: 67.9 FL (ref 39–50)
EGFRCR SERPLBLD CKD-EPI 2021: 86 ML/MIN/{1.73_M2}
EOSINOPHIL # BLD: 0.1 K/MCL (ref 0–0.5)
EOSINOPHIL NFR BLD: 1 %
ERYTHROCYTE [DISTWIDTH] IN BLOOD: 20.5 % (ref 11–15)
FASTING DURATION TIME PATIENT: ABNORMAL H
GLUCOSE BLDC GLUCOMTR-MCNC: 108 MG/DL (ref 70–99)
GLUCOSE BLDC GLUCOMTR-MCNC: 110 MG/DL (ref 70–99)
GLUCOSE BLDC GLUCOMTR-MCNC: 119 MG/DL (ref 70–99)
GLUCOSE BLDC GLUCOMTR-MCNC: 143 MG/DL (ref 70–99)
GLUCOSE SERPL-MCNC: 132 MG/DL (ref 70–99)
HCT VFR BLD CALC: 33.3 % (ref 36–46.5)
HGB BLD-MCNC: 9.7 G/DL (ref 12–15.5)
IMM GRANULOCYTES # BLD AUTO: 0 K/MCL (ref 0–0.2)
IMM GRANULOCYTES # BLD: 0 %
LYMPHOCYTES # BLD: 0.6 K/MCL (ref 1–4)
LYMPHOCYTES NFR BLD: 6 %
MCH RBC QN AUTO: 26.6 PG (ref 26–34)
MCHC RBC AUTO-ENTMCNC: 29.1 G/DL (ref 32–36.5)
MCV RBC AUTO: 91.5 FL (ref 78–100)
MONOCYTES # BLD: 0.5 K/MCL (ref 0.3–0.9)
MONOCYTES NFR BLD: 5 %
NEUTROPHILS # BLD: 7.9 K/MCL (ref 1.8–7.7)
NEUTROPHILS NFR BLD: 88 %
NRBC BLD MANUAL-RTO: 0 /100 WBC
PLATELET # BLD AUTO: 240 K/MCL (ref 140–450)
POTASSIUM SERPL-SCNC: 4.1 MMOL/L (ref 3.4–5.1)
RBC # BLD: 3.64 MIL/MCL (ref 4–5.2)
SODIUM SERPL-SCNC: 138 MMOL/L (ref 135–145)
WBC # BLD: 9.1 K/MCL (ref 4.2–11)

## 2024-11-23 PROCEDURE — 80048 BASIC METABOLIC PNL TOTAL CA: CPT | Performed by: FAMILY MEDICINE

## 2024-11-23 PROCEDURE — 10006031 HB ROOM CHARGE TELEMETRY

## 2024-11-23 PROCEDURE — 99233 SBSQ HOSP IP/OBS HIGH 50: CPT | Performed by: INTERNAL MEDICINE

## 2024-11-23 PROCEDURE — 85025 COMPLETE CBC W/AUTO DIFF WBC: CPT | Performed by: FAMILY MEDICINE

## 2024-11-23 PROCEDURE — 10002800 HB RX 250 W HCPCS: Performed by: INTERNAL MEDICINE

## 2024-11-23 PROCEDURE — 10002803 HB RX 637: Performed by: INTERNAL MEDICINE

## 2024-11-23 PROCEDURE — 10002803 HB RX 637: Performed by: FAMILY MEDICINE

## 2024-11-23 PROCEDURE — 10004651 HB RX, NO CHARGE ITEM: Performed by: SURGERY

## 2024-11-23 PROCEDURE — 96372 THER/PROPH/DIAG INJ SC/IM: CPT | Performed by: INTERNAL MEDICINE

## 2024-11-23 PROCEDURE — 99233 SBSQ HOSP IP/OBS HIGH 50: CPT | Performed by: FAMILY MEDICINE

## 2024-11-23 PROCEDURE — 36415 COLL VENOUS BLD VENIPUNCTURE: CPT | Performed by: FAMILY MEDICINE

## 2024-11-23 RX ORDER — FUROSEMIDE 20 MG/1
20 TABLET ORAL DAILY
Status: DISCONTINUED | OUTPATIENT
Start: 2024-11-23 | End: 2024-11-24 | Stop reason: HOSPADM

## 2024-11-23 RX ADMIN — SODIUM CHLORIDE, PRESERVATIVE FREE 2 ML: 5 INJECTION INTRAVENOUS at 21:03

## 2024-11-23 RX ADMIN — MIDODRINE HYDROCHLORIDE 15 MG: 5 TABLET ORAL at 17:39

## 2024-11-23 RX ADMIN — METOPROLOL TARTRATE 25 MG: 25 TABLET, FILM COATED ORAL at 09:13

## 2024-11-23 RX ADMIN — MIDODRINE HYDROCHLORIDE 15 MG: 5 TABLET ORAL at 23:15

## 2024-11-23 RX ADMIN — ACETAZOLAMIDE 250 MG: 250 TABLET ORAL at 09:13

## 2024-11-23 RX ADMIN — LIDOCAINE 1 PATCH: 4 PATCH TOPICAL at 20:55

## 2024-11-23 RX ADMIN — SODIUM CHLORIDE, PRESERVATIVE FREE 2 ML: 5 INJECTION INTRAVENOUS at 09:14

## 2024-11-23 RX ADMIN — MIDODRINE HYDROCHLORIDE 15 MG: 5 TABLET ORAL at 01:11

## 2024-11-23 RX ADMIN — MIDODRINE HYDROCHLORIDE 15 MG: 5 TABLET ORAL at 09:13

## 2024-11-23 RX ADMIN — METOPROLOL TARTRATE 25 MG: 25 TABLET, FILM COATED ORAL at 20:54

## 2024-11-23 RX ADMIN — HEPARIN SODIUM 5000 UNITS: 5000 INJECTION INTRAVENOUS; SUBCUTANEOUS at 05:58

## 2024-11-23 RX ADMIN — CALCIUM CARBONATE 600 MG (1,500 MG)-VITAMIN D3 400 UNIT TABLET 1 TABLET: at 05:58

## 2024-11-23 RX ADMIN — APIXABAN 2.5 MG: 2.5 TABLET, FILM COATED ORAL at 20:54

## 2024-11-23 RX ADMIN — FUROSEMIDE 20 MG: 20 TABLET ORAL at 20:55

## 2024-11-23 ASSESSMENT — PAIN SCALES - PAIN ASSESSMENT IN ADVANCED DEMENTIA (PAINAD)
FACIALEXPRESSION: SMILING OR INEXPRESSIVE
TOTALSCORE: 0
BREATHING: NORMAL
TOTALSCORE: 0
BREATHING: NORMAL
BODYLANGUAGE: RELAXED
FACIALEXPRESSION: SMILING OR INEXPRESSIVE
CONSOLABILITY: NO NEED TO CONSOLE
CONSOLABILITY: NO NEED TO CONSOLE
BODYLANGUAGE: RELAXED

## 2024-11-23 ASSESSMENT — PAIN SCALES - WONG BAKER
WONGBAKER_NUMERICALRESPONSE: 0
WONGBAKER_NUMERICALRESPONSE: 0

## 2024-11-24 VITALS
WEIGHT: 115.74 LBS | BODY MASS INDEX: 18.6 KG/M2 | HEIGHT: 66 IN | TEMPERATURE: 97.9 F | RESPIRATION RATE: 14 BRPM | DIASTOLIC BLOOD PRESSURE: 67 MMHG | OXYGEN SATURATION: 99 % | HEART RATE: 75 BPM | SYSTOLIC BLOOD PRESSURE: 103 MMHG

## 2024-11-24 LAB
ANION GAP SERPL CALC-SCNC: 4 MMOL/L (ref 7–19)
BASOPHILS # BLD: 0 K/MCL (ref 0–0.3)
BASOPHILS NFR BLD: 0 %
BUN SERPL-MCNC: 16 MG/DL (ref 6–20)
BUN/CREAT SERPL: 40 (ref 7–25)
CALCIUM SERPL-MCNC: 8.7 MG/DL (ref 8.4–10.2)
CHLORIDE SERPL-SCNC: 101 MMOL/L (ref 97–110)
CO2 SERPL-SCNC: 36 MMOL/L (ref 21–32)
CREAT SERPL-MCNC: 0.4 MG/DL (ref 0.51–0.95)
DEPRECATED RDW RBC: 67.5 FL (ref 39–50)
EGFRCR SERPLBLD CKD-EPI 2021: >90 ML/MIN/{1.73_M2}
EOSINOPHIL # BLD: 0.1 K/MCL (ref 0–0.5)
EOSINOPHIL NFR BLD: 1 %
ERYTHROCYTE [DISTWIDTH] IN BLOOD: 20.2 % (ref 11–15)
FASTING DURATION TIME PATIENT: ABNORMAL H
GLUCOSE BLDC GLUCOMTR-MCNC: 131 MG/DL (ref 70–99)
GLUCOSE BLDC GLUCOMTR-MCNC: 97 MG/DL (ref 70–99)
GLUCOSE SERPL-MCNC: 127 MG/DL (ref 70–99)
HCT VFR BLD CALC: 33.6 % (ref 36–46.5)
HGB BLD-MCNC: 9.8 G/DL (ref 12–15.5)
IMM GRANULOCYTES # BLD AUTO: 0 K/MCL (ref 0–0.2)
IMM GRANULOCYTES # BLD: 0 %
LYMPHOCYTES # BLD: 0.6 K/MCL (ref 1–4)
LYMPHOCYTES NFR BLD: 8 %
MCH RBC QN AUTO: 26.8 PG (ref 26–34)
MCHC RBC AUTO-ENTMCNC: 29.2 G/DL (ref 32–36.5)
MCV RBC AUTO: 91.8 FL (ref 78–100)
MONOCYTES # BLD: 0.4 K/MCL (ref 0.3–0.9)
MONOCYTES NFR BLD: 6 %
NEUTROPHILS # BLD: 5.9 K/MCL (ref 1.8–7.7)
NEUTROPHILS NFR BLD: 85 %
NRBC BLD MANUAL-RTO: 0 /100 WBC
PLATELET # BLD AUTO: 256 K/MCL (ref 140–450)
POTASSIUM SERPL-SCNC: 4.2 MMOL/L (ref 3.4–5.1)
RBC # BLD: 3.66 MIL/MCL (ref 4–5.2)
SODIUM SERPL-SCNC: 137 MMOL/L (ref 135–145)
WBC # BLD: 6.9 K/MCL (ref 4.2–11)

## 2024-11-24 PROCEDURE — 10002803 HB RX 637: Performed by: FAMILY MEDICINE

## 2024-11-24 PROCEDURE — 80048 BASIC METABOLIC PNL TOTAL CA: CPT | Performed by: FAMILY MEDICINE

## 2024-11-24 PROCEDURE — 10004180 HB COUNTER-TRANSPORT

## 2024-11-24 PROCEDURE — 99233 SBSQ HOSP IP/OBS HIGH 50: CPT | Performed by: INTERNAL MEDICINE

## 2024-11-24 PROCEDURE — 99239 HOSP IP/OBS DSCHRG MGMT >30: CPT | Performed by: FAMILY MEDICINE

## 2024-11-24 PROCEDURE — 85025 COMPLETE CBC W/AUTO DIFF WBC: CPT | Performed by: FAMILY MEDICINE

## 2024-11-24 PROCEDURE — 36415 COLL VENOUS BLD VENIPUNCTURE: CPT | Performed by: FAMILY MEDICINE

## 2024-11-24 PROCEDURE — 94660 CPAP INITIATION&MGMT: CPT

## 2024-11-24 PROCEDURE — 10004651 HB RX, NO CHARGE ITEM: Performed by: SURGERY

## 2024-11-24 PROCEDURE — 10002803 HB RX 637: Performed by: INTERNAL MEDICINE

## 2024-11-24 RX ORDER — ACETAZOLAMIDE 250 MG/1
250 TABLET ORAL DAILY
Qty: 30 TABLET | Refills: 0 | Status: SHIPPED | OUTPATIENT
Start: 2024-11-25 | End: 2024-12-25

## 2024-11-24 RX ORDER — MIDODRINE HYDROCHLORIDE 5 MG/1
15 TABLET ORAL 3 TIMES DAILY
Status: SHIPPED | COMMUNITY
Start: 2024-11-24

## 2024-11-24 RX ADMIN — CALCIUM CARBONATE 600 MG (1,500 MG)-VITAMIN D3 400 UNIT TABLET 1 TABLET: at 06:12

## 2024-11-24 RX ADMIN — SODIUM CHLORIDE, PRESERVATIVE FREE 2 ML: 5 INJECTION INTRAVENOUS at 09:34

## 2024-11-24 RX ADMIN — APIXABAN 2.5 MG: 2.5 TABLET, FILM COATED ORAL at 09:34

## 2024-11-24 RX ADMIN — METOPROLOL TARTRATE 25 MG: 25 TABLET, FILM COATED ORAL at 09:34

## 2024-11-24 RX ADMIN — ACETAZOLAMIDE 250 MG: 250 TABLET ORAL at 09:34

## 2024-11-24 RX ADMIN — FUROSEMIDE 20 MG: 20 TABLET ORAL at 09:34

## 2024-11-24 RX ADMIN — MIDODRINE HYDROCHLORIDE 15 MG: 5 TABLET ORAL at 09:34

## 2024-11-24 ASSESSMENT — PAIN SCALES - PAIN ASSESSMENT IN ADVANCED DEMENTIA (PAINAD)
BODYLANGUAGE: RELAXED
TOTALSCORE: 0
FACIALEXPRESSION: SMILING OR INEXPRESSIVE
CONSOLABILITY: NO NEED TO CONSOLE
BREATHING: NORMAL

## 2024-11-24 ASSESSMENT — PAIN SCALES - WONG BAKER: WONGBAKER_NUMERICALRESPONSE: 0

## 2024-11-24 ASSESSMENT — PAIN SCALES - GENERAL: PAINLEVEL_OUTOF10: 0

## 2024-12-11 PROBLEM — A41.9 SEPSIS, DUE TO UNSPECIFIED ORGANISM, UNSPECIFIED WHETHER ACUTE ORGAN DYSFUNCTION PRESENT (HCC): Status: ACTIVE | Noted: 2024-01-01

## 2024-12-11 NOTE — ED INITIAL ASSESSMENT (HPI)
[facility] for [facility] that staff noted an hour ago. Pt unresponsive upon arrival, MD at bedside for eval. RT called to place pt on bipap. Dr. Wagner calling family to discuss patient status

## 2024-12-11 NOTE — ED PROVIDER NOTES
Patient Seen in: [facility] Emergency Department      History     Chief Complaint   Patient presents with    Fever     Stated Complaint: fever    Subjective:   Yin Butcher  is a 89 year old female with PMH of Afib, CHF, Colon Cancer, hx of PE, SIADH coming from rehab facility for fever, tachycardia and tachypnea.  Patient unable to give any history.  Potentially more somnolent than baseline.    I did speak to the daughter on the phone soon after arrival.  She states the patient was \"comatose\" for at least 10 days when she got to the rehab facility.  She said she did wake up a little bit a day or 2 ago.  She understands that putting her on the ventilator could be a drastic decision.  She will be thinking about this but says she is full code.  I also discussed with Dr. Elmore the physician who knows her from the rehab facility.  She informing that the patient has not really been awake or eating as far as she knows.              Objective:     Past Medical History:    Arthritis    Atrial fibrillation (HCC)    Back problem    Cancer (HCC)    Cataract    Osteoporosis    Personal history of antineoplastic chemotherapy              Past Surgical History:   Procedure Laterality Date    Other surgical history  7/12/2014    Procedure: HIP HEMIARTHROPLASTY/ BIPOLAR;  Surgeon: Vasu Matamoros MD;  Location:  MAIN OR    Removal of tonsils,12+ y/o                  Social History     Socioeconomic History    Marital status:    Tobacco Use    Smoking status: Never    Smokeless tobacco: Never   Vaping Use    Vaping status: Never Used   Substance and Sexual Activity    Alcohol use: Not Currently    Drug use: Never   Social History Narrative    ** Merged History Encounter **          Social Drivers of Health     Financial Resource Strain: Low Risk  (11/4/2024)    Received from [facility]    Financial Resource Strain     In the past year, have you or any family members you live with been unable to  get any of the following when it was really needed? Check all that apply.: None   Food Insecurity: Unknown (12/12/2024)    Food Insecurity     Food Insecurity: Patient unable to answer   Transportation Needs: Unknown (12/12/2024)    Transportation Needs     Lack of Transportation: Patient unable to answer   Physical Activity: High Risk (6/14/2023)    Received from [facility], [facility], [facility]    Exercise Vital Sign     On average, how many days per week do you engage in moderate to strenuous exercise (like a brisk walk)?: 0 days     On average, how many minutes do you engage in exercise at this level?: 0 min   Stress: Low Risk  (6/14/2023)    Received from [facility], [facility]    Stress     Stress is when someone feels tense, nervous, anxious, or can't sleep at night because their mind is troubled. How stressed are you? : Not at all   Social Connections: Low Risk  (11/4/2024)    Received from [facility]    Social Connections     How often do you see or talk to people that you care about and feel close to? (For example: talking to friends on the phone, visiting friends or family, going to [employer] or club meetings): 5 or more times a week   Housing Stability: Unknown (12/12/2024)    Housing Stability     Housing Instability: Patient unable to answer                  Physical Exam     ED Triage Vitals [12/11/24 1610]   BP (!) 134/100   Pulse (!) 124   Resp (!) 42   Temp (!) 100.9 °F (38.3 °C)   Temp src Rectal   SpO2 97 %   O2 Device High flow nasal cannula       Current Vitals:   Vital Signs  BP: 111/77  Pulse: 120  Resp: 20  Temp: (!) 96.3 °F (35.7 °C)  Temp src: Temporal  MAP (mmHg): 89    Oxygen Therapy  SpO2: 100 %  O2 Device: Ventilator  Mode: Spontaneous/Timed  FiO2 (%): 30 %        Physical Exam  Constitutional:       Comments: Somnolent, tachpnic   HENT:      Head: Normocephalic.      Right Ear: External ear normal.      Left Ear:  External ear normal.      Nose: Nose normal.      Mouth/Throat:      Mouth: Mucous membranes are dry.   Cardiovascular:      Rate and Rhythm: Regular rhythm. Tachycardia present.      Pulses: Normal pulses.   Pulmonary:      Effort: Pulmonary effort is normal.      Breath sounds: Normal breath sounds.   Abdominal:      Palpations: Abdomen is soft.      Tenderness: There is no abdominal tenderness.   Musculoskeletal:         General: Normal range of motion.      Cervical back: Normal range of motion.      Right lower leg: No edema.      Left lower leg: No edema.   Skin:     General: Skin is warm.      Capillary Refill: Capillary refill takes less than 2 seconds.             ED Course     Labs Reviewed   URINALYSIS WITH CULTURE REFLEX - Abnormal; Notable for the following components:       Result Value    Protein Urine 50 (*)     Urobilinogen Urine 2 (*)     Leukocyte Esterase Urine 25 (*)     Squamous Epi. Cells Few (*)     Hyaline Casts Present (*)     All other components within normal limits   CBC WITH DIFFERENTIAL WITH PLATELET - Abnormal; Notable for the following components:    WBC 12.1 (*)     HGB 11.9 (*)     MCHC 29.4 (*)     RDW-SD 75.4 (*)     RDW 21.2 (*)     Neutrophil Absolute Prelim 10.98 (*)     Neutrophil Absolute 10.98 (*)     Lymphocyte Absolute 0.48 (*)     All other components within normal limits   HEPATIC FUNCTION PANEL (7) - Abnormal; Notable for the following components:    Bilirubin, Total 1.0 (*)     Bilirubin, Direct 0.5 (*)     All other components within normal limits   BASIC METABOLIC PANEL (8) - Abnormal; Notable for the following components:    Glucose 143 (*)     CO2 >40.0 (*)     BUN 50 (*)     Creatinine 0.40 (*)     BUN/CREA Ratio >100.0 (*)     Calculated Osmolality 314 (*)     All other components within normal limits   PROCALCITONIN - Abnormal; Notable for the following components:    Procalcitonin 0.10 (*)     All other components within normal limits   PRO BETA NATRIURETIC  PEPTIDE - Abnormal; Notable for the following components:    Pro-Beta Natriuretic Peptide 7,642 (*)     All other components within normal limits   ARTERIAL BLOOD GAS - Abnormal; Notable for the following components:    ABG pH 7.32 (*)     ABG pCO2 97 (*)     ABG PO2 183 (*)     ABG HCO3 40.3 (*)     Blood Gas Base Excess 19.7 (*)     All other components within normal limits   RBC MORPHOLOGY SCAN - Abnormal; Notable for the following components:    RBC Morphology See morphology below (*)     All other components within normal limits   DIGOXIN (LANOXIN) - Abnormal; Notable for the following components:    Digoxin <0.14 (*)     All other components within normal limits   CBC WITH DIFFERENTIAL WITH PLATELET - Abnormal; Notable for the following components:    HGB 10.9 (*)     MCHC 28.4 (*)     RDW-SD 74.8 (*)     RDW 20.9 (*)     Neutrophil Absolute Prelim 9.47 (*)     Neutrophil Absolute 9.47 (*)     Lymphocyte Absolute 0.62 (*)     All other components within normal limits   BASIC METABOLIC PANEL (8) - Abnormal; Notable for the following components:    Glucose 123 (*)     Sodium 147 (*)     CO2 >40.0 (*)     BUN 49 (*)     BUN/CREA Ratio 80.3 (*)     Calculated Osmolality 318 (*)     All other components within normal limits   ARTERIAL BLOOD GAS - Abnormal; Notable for the following components:    ABG pH 7.19 (*)     ABG pCO2 119 (*)     ABG PO2 199 (*)     All other components within normal limits   ARTERIAL BLOOD GAS - Abnormal; Notable for the following components:    ABG pH 7.50 (*)     ABG pCO2 49 (*)     ABG PO2 427 (*)     ABG HCO3 35.3 (*)     Blood Gas Base Excess 13.3 (*)     All other components within normal limits   BASIC METABOLIC PANEL (8) - Abnormal; Notable for the following components:    Potassium 3.4 (*)     BUN 38 (*)     BUN/CREA Ratio 64.4 (*)     Calculated Osmolality 309 (*)     All other components within normal limits   CBC WITH DIFFERENTIAL WITH PLATELET - Abnormal; Notable for the  following components:    WBC 15.8 (*)     HGB 10.7 (*)     MCHC 28.5 (*)     RDW-SD 76.9 (*)     RDW 21.7 (*)     Neutrophil Absolute Prelim 13.67 (*)     Neutrophil Absolute 13.67 (*)     Lymphocyte Absolute 0.71 (*)     Monocyte Absolute 1.28 (*)     All other components within normal limits   ED/MRSA SCREEN BY PCR-CC - Abnormal; Notable for the following components:    MRSA Screen By PCR Positive (*)     All other components within normal limits    Narrative:     A positive result does not necessarily indicate the presence of viable organisms. For confirmation, epidemiological typing and susceptibility testing, appropriate culture is needed.    PLEASE REFER TO MRSA SCREENING PROTOCOL FOR TREATMENT.     LACTIC ACID, PLASMA - Normal   LIPASE - Normal   TSH W REFLEX TO FREE T4 - Normal   TRIGLYCERIDES - Normal   SARS-COV-2/FLU A AND B/RSV BY PCR (GENEXPERT) - Normal    Narrative:     This test is intended for the qualitative detection and differentiation of SARS-CoV-2, influenza A, influenza B, and respiratory syncytial virus (RSV) viral RNA in nasopharyngeal or nares swabs from individuals suspected of respiratory viral infection consistent with COVID-19 by their healthcare provider. Signs and symptoms of respiratory viral infection due to SARS-CoV-2, influenza, and RSV can be similar.    Test performed using the Xpert Xpress SARS-CoV-2/FLU/RSV (real time RT-PCR)  assay on the Gene[employer]pert instrument, [facility], [place] [address].   This test is being used under the Food and Drug Administration's Emergency Use Authorization.    The authorized Fact Sheet for Healthcare Providers for this assay is available upon request from the laboratory.   MAGNESIUM   PHOSPHORUS   RAINBOW DRAW LAVENDER   RAINBOW DRAW LIGHT GREEN   RAINBOW DRAW BLUE   RAINBOW DRAW GOLD   BLOOD CULTURE   BLOOD CULTURE   URINE CULTURE, ROUTINE       ED Course as of 12/13/24 1503  ------------------------------------------------------------  Time: 12/11  1841  Comment: I did speak to the daughter for a second time just now to update her on her critical state.  I been informed by nursing staff that the blood pressure has dropped.  I will reassess and likely start pressors peripherally.  Labs independently interpreted by me.  She has a leukocytosis mild anemia.  proBNP elevated so only a small fluid bolus was given.  TSH normal lipase normal  ------------------------------------------------------------  Time: 12/11 1854  Comment: Patient seen by Dr. Nelson in the room.  Will start pressors.  I once again spoke with the daughter about the fact that if she was placed on a ventilator she would have very difficult time coming off.  She appears to be protecting her airway at this time.  Oxygenating well.  ------------------------------------------------------------  Time: 12/11 1902  Comment: CTs pending.     EKG shows A-fib with RVR at 126.  No ST elevation.  Nonspecific findings.  QTc 362.  Abnormal EKG read by myself         MDM      XR CHEST AP PORTABLE  (CPT=71045)    Result Date: 12/13/2024  CONCLUSION:  1. Cardiomegaly.  Tortuous thoracic aorta. 2. ET tube in satisfactory position. 3. Right basilar pleural parenchymal abnormality with slight progression.  Developing atelectatic changes left basilar region. .     Dictated by (CST): Melecio Jackson MD on 12/13/2024 at 9:21 AM     Finalized by (CST): Melecio Jackson MD on 12/13/2024 at 9:24 AM             Admission disposition: 12/13/2024  3:02 PM           Medical Decision Making  Patient coming in with fever, respiratory distress, failure to thrive.  She was transferred from [facility] about 17 days ago after a long admission for sepsis and fluid overload.  She also had a G-tube placed.  I was informed that the patient was fairly somnolent throughout her stay at the skilled nursing facility.  Medication list was reviewed thoroughly.  I reviewed discharge summaries from [facility] and procedure notes.    Amount  and/or Complexity of Data Reviewed  External Data Reviewed: notes.     Details: Procedure 11/22:     Pre-Op Diagnosis: dysphagia     Post-Op Diagnosis: dysphagia, hiatal hernia     Procedure:   Esophagogastroduodenoscopy  Percutaneous endoscopic gastrostomy    Labs: ordered. Decision-making details documented in ED Course.  Radiology: ordered and independent interpretation performed.    Risk  Decision regarding hospitalization.  Risk Details: Yin Butcher  is a 89 year old female with PMH of Afib, CHF, Colon Cancer, hx of PE, SIADH      I spent a total of 35 minutes of critical care time in obtaining history, performing a physical exam, bedside monitoring of interventions, collecting and interpreting tests and discussion with consultants but not including time spent performing procedures.   Disposition and Plan     Clinical Impression:  1. Sepsis, due to unspecified organism, unspecified whether acute organ dysfunction present (HCC)    2. Acute respiratory failure with hypercapnia (HCC)    3. Atrial fibrillation with rapid ventricular response (HCC)    4. Pneumonia due to infectious organism, unspecified laterality, unspecified part of lung         Disposition:  Admit  12/13/2024  3:02 pm    Follow-up:  No follow-up provider specified.  We recommend that you schedule follow up care with a primary care provider within the next three months to obtain basic health screening including reassessment of your blood pressure.      Medications Prescribed:  Current Discharge Medication List              Supplementary Documentation:         Hospital Problems       Present on Admission  Date Reviewed: 4/30/2023            ICD-10-CM Noted POA    * (Principal) Sepsis, due to unspecified organism, unspecified whether acute organ dysfunction present (HCC) A41.9 12/11/2024 Unknown    Acute respiratory failure with hypercapnia (HCC) J96.02 12/12/2024 Unknown    Atrial fibrillation with rapid ventricular response (HCC) I48.91  12/12/2024 Unknown

## 2024-12-12 ENCOUNTER — TELEPHONE (OUTPATIENT)
Dept: SURGERY | Age: 89
End: 2024-12-12

## 2024-12-12 PROBLEM — I48.91 ATRIAL FIBRILLATION WITH RAPID VENTRICULAR RESPONSE (HCC): Status: ACTIVE | Noted: 2024-01-01

## 2024-12-12 PROBLEM — J96.02 ACUTE RESPIRATORY FAILURE WITH HYPERCAPNIA (HCC): Status: ACTIVE | Noted: 2024-01-01

## 2024-12-12 NOTE — PROGRESS NOTES
PCCU  Critical Care APRN Progress Note    NAME: Yin Butcher - [facility] - MRN: [id number] - Age: 89 year old - :1931    Thick tan secretions noted during intubation, needing sedation/analgesia as pt biting ETT.  Some hypotension, noted echo results, will add gentle IVF for BP support and to loosen secretions.       OBJECTIVE  Vitals:  BP (!) 89/65 (BP Location: Right arm)   Pulse 100   Temp 97.7 °F (36.5 °C) (Temporal)   Resp 14   Wt 92 lb 9.5 oz (42 kg)   SpO2 100%   BMI 14.50 kg/m²                Romina Martin APRN/CNP  Critical Care  2024   12:53 PM

## 2024-12-12 NOTE — PROGRESS NOTES
[facility]  part of [facility]     Progress Note    Yin Butcher Patient Status:  Inpatient    1931 MRN [id number]   Location [facility] 2W/SW Attending Vika Elmore MD   Hosp Day # 0 PCP Mina Walker MD       Subjective:   Patient on BiPAP overnight.  Minimally responsive to noxious stimuli.  Nonverbal.    Objective:   Blood pressure 121/78, pulse 103, temperature 97 °F (36.1 °C), temperature source Temporal, resp. rate 24, weight 92 lb 9.5 oz (42 kg), SpO2 100%.  Intake/Output:   Last 3 shifts: I/O last 3 completed shifts:  In: -   Out: 10 [Urine:10]   This shift: No intake/output data recorded.     Vent Settings: FiO2 (%):  [50 %] 50 %    Hemodynamic parameters (last 24 hours):      Scheduled Meds:   Current Facility-Administered Medications   Medication Dose Route Frequency    apixaban (Eliquis) tab 2.5 mg  2.5 mg Oral BID    piperacillin-tazobactam (Zosyn) 3.375 g in dextrose 5% 100 mL IVPB-ADDV  3.375 g Intravenous Q8H    norepinephrine (Levophed) 4 mg/250mL infusion premix  0.5-50 mcg/min Intravenous Continuous    acetaminophen (Tylenol Extra Strength) tab 500 mg  500 mg Oral Q4H PRN    ibuprofen (Motrin) tab 200 mg  200 mg Oral Q4H PRN    Or    ibuprofen (Motrin) tab 400 mg  400 mg Oral Q4H PRN    Or    ibuprofen (Motrin) tab 600 mg  600 mg Oral Q4H PRN    morphINE PF 2 MG/ML injection 1 mg  1 mg Intravenous Q2H PRN    Or    morphINE PF 2 MG/ML injection 2 mg  2 mg Intravenous Q2H PRN    Or    morphINE PF 4 MG/ML injection 4 mg  4 mg Intravenous Q2H PRN    ondansetron (Zofran) 4 MG/2ML injection 4 mg  4 mg Intravenous Q6H PRN    metoclopramide (Reglan) 5 mg/mL injection 10 mg  10 mg Intravenous Q8H PRN    vancomycin (Vancocin) 750 mg in sodium chloride 0.9% 250 mL IVPB-ADDV  750 mg Intravenous Q24H       Continuous Infusions:    norepinephrine Stopped (24 1850)       Physical Exam  Constitutional: Lethargic, minimally responsive to noxious stimuli  Eyes:  PERRL  ENT: nares pateint  Neck: supple, no JVD  Cardio: RRR, S1 S2  Respiratory: Diminished bibasilar breath sounds with crackles present  GI: abdomen soft, non tender, active bowel sounds, no organomegaly + PEG tube in place  Extremities: no clubbing, cyanosis, edema  Neurologic: Does not follow commands  Skin: warm, dry      Results:     Lab Results   Component Value Date    WBC 10.8 12/12/2024    HGB 10.9 12/12/2024    HCT 38.4 12/12/2024    .0 12/12/2024    CREATSERUM 0.61 12/12/2024    BUN 49 12/12/2024     12/12/2024    K 4.3 12/12/2024     12/12/2024    CO2 >40.0 12/12/2024     12/12/2024    CA 9.9 12/12/2024    ALB 4.0 12/11/2024    ALKPHO 112 12/11/2024    BILT 1.0 12/11/2024    TP 8.2 12/11/2024    AST 24 12/11/2024    ALT 30 12/11/2024    TSH 2.138 12/11/2024    LIP 35 12/11/2024       CT ABDOMEN+PELVIS(CONTRAST ONLY)(CPT=74177)    Result Date: 12/11/2024  CONCLUSION:  1. No acute intra-abdominal process is identified. The etiology of the patient's symptoms is unclear from this study.  2. Heavy stool burden may reflect constipation.  3. Cholelithiasis without CT evidence acute complication.  4. Heterogeneously enhancing left adrenal nodule, not fully characterized.  5. Nonobstructing renal calculus.  6. Uncomplicated distal colonic diverticulosis.   7. Extensive thoracolumbar compression fractures.  8. Left hip arthroplasty with resultant artifactual degradation of the pelvis.  9. Lesser incidental findings as above.    Dictated by (CST): Ben Elmore MD on 12/11/2024 at 8:17 PM     Finalized by (CST): Ben Elmore MD on 12/11/2024 at 8:27 PM          CT CHEST (DFZ=21876)    Result Date: 12/11/2024  CONCLUSION:  1. Patchy bilateral lower lobe airspace disease, in association with extensive mucous plugging/debris of the lower lobe bronchi, concerning for potential aspiration pneumonitis and/or bronchopneumonia.  2. Small pleural effusions and associated basilar atelectasis,  with or without superimposed pneumonia.  3. Chronicity-indeterminate compression fracture deformities of the thoracic spine, up to severe degree, with kyphotic accentuation.   4. Marked cardiomegaly.  5. Dilatation of the main pulmonary artery trunk may relate to underlying pulmonary hypertension.   6. Low-density appearance of the intracardiac blood pool raises the possibility of underlying anemia. Correlate with hematologic parameters.  7. Lesser incidental findings as above.    Dictated by (CST): Ben Elmore MD on 12/11/2024 at 7:59 PM     Finalized by (CST): Ben Elmore MD on 12/11/2024 at 8:07 PM          CT BRAIN OR HEAD (CPT=70450)    Result Date: 12/11/2024  CONCLUSION:  1. No acute intracranial process by noncontrast CT technique.  2. Senescent changes of parenchymal volume loss with sequela of chronic microvascular ischemic disease including chronic lacunar infarcts of the basal ganglia.  3. There is large vessel atherosclerosis of the anterior and posterior intracranial circulations.  4. Right mastoid effusion. Correlation for relevant symptomatology is suggested.   5. Lesser incidental findings as above.     Dictated by (CST): Ben Elmore MD on 12/11/2024 at 7:46 PM     Finalized by (CST): eBn Elmore MD on 12/11/2024 at 7:50 PM          XR CHEST AP PORTABLE  (CPT=71045)    Result Date: 12/11/2024  CONCLUSION:  1. CHF with pulmonary interstitial edema and moderate right basilar pleural effusion.  Compressive atelectasis in the right lower lobe.    Dictated by (CST): Malcom Murillo MD on 12/11/2024 at 6:26 PM     Finalized by (CST): Malcom Murillo MD on 12/11/2024 at 6:29 PM           EKG    Result Date: 12/12/2024  Atrial fibrillation with rapid ventricular response Abnormal ECG When compared with ECG of 20-JAN-2024 22:34, Vent. rate has increased BY  54 BPM Nonspecific T wave abnormality no longer evident in Anterior leads Confirmed by MAGGIE WEEKS, RACHELL ([id number]) on 12/12/2024 8:12:55  AM     Assessment   1.  Fevers  2.  Acute encephalopathy  3.  Acute on chronic hypercapnic hypoxemic respiratory failure  4.  Leukocytosis  5.  HFpEF  6.  History of atrial fibrillation  7.  Prior history of pulmonary embolism  9.  Dementia  10.  Dysphagia     Plan   -Patient presents with chief complaint of fevers ongoing altered mental status over the last several days.  Recently discharged on 11/24/2024 from [facility] after hospitalization for presumed aspiration pneumonia.  -ABG with mild respiratory acidosis noted.  Currently placed on NIV.  Continue at this time.  Repeat ABG pending for this morning.  -Altered mental status seems out of proportion to mild hypercapnia.  -CT head with no acute intracranial findings seen  -CT abdomen pelvis with no acute intra-abdominal findings seen  -CT chest on 12/11/2024 with lower lobe mucous plugging concerning for aspiration with small pleural effusions.  -Continue therapy with Zosyn and vancomycin at this time  -Blood cultures pending  -Chest physiotherapy  -MetaNebs  -Had detailed conversation with daughter regarding her overall clinical decline poor functional status.  Per daughter she remains full code at this time.  I did advise daughter to strongly reconsider CODE STATUS.  Patient would be likely difficult to extubate if intubated.    Critical care time spent 30 minutes    Addendum: Patient with repeat ABG revealing worsening hypercapnic respiratory failure.  Discussed patient's clinical condition with daughter on phone.  Requesting to proceeding with intubation.  Patient successfully intubated.    Dayo Espino DO  Pulmonary Critical Care Medicine  [facility]

## 2024-12-12 NOTE — CONSULTS
[facility]  part of [facility]    Report of Consultation    Yin Butcher Patient Status:  Emergency    1931 MRN [id number]   Location [facility] EMERGENCY DEPARTMENT Attending Arnulfo Wagner MD   Hosp Day # 0 PCP Mina Walker MD     Date of Admission:  2024    Reason for Consultation:   Altered mental status, fevers    History of Present Illness:   Patient is a 89-year-old female with past medical history significant for colon cancer, atrial fibrillation, CDH, pulmonary embolism who presents with chief complaint of ongoing altered mental status and fevers.  Recently discharged from [facility] on 2024.  Patient initially presented with evidence of generalized fatigue to [facility].  Found to have evidence of acute hypercapnic hypoxemic respiratory failure with concern for aspiration pneumonia.  AG with hypercapnia noted.  Placed on BiPAP.  Hemodynamically stable.  Able to obtain 12 point review of system secondary patient's current clinical condition.  Tmax 100.9 degrees.    Past Medical History  Past Medical History:    Arthritis    Atrial fibrillation (HCC)    Back problem    Cancer (HCC)    Cataract    Osteoporosis    Personal history of antineoplastic chemotherapy       Past Surgical History  Past Surgical History:   Procedure Laterality Date    Other surgical history  2014    Procedure: HIP HEMIARTHROPLASTY/ BIPOLAR;  Surgeon: Vasu Matamoros MD;  Location:  MAIN OR    Removal of tonsils,12+ y/o         Family History  Family History   Family history unknown: Yes       Social History  Social History     Socioeconomic History    Marital status:    Tobacco Use    Smoking status: Never    Smokeless tobacco: Never   Vaping Use    Vaping status: Never Used   Substance and Sexual Activity    Alcohol use: Not Currently    Drug use: Never           Current Medications:  Current Facility-Administered Medications   Medication  Dose Route Frequency    vancomycin (Vancocin) 1.25 g in sodium chloride 0.9% 250mL IVPB premix  25 mg/kg Intravenous Once    sodium chloride 0.9 % IV bolus 500 mL  500 mL Intravenous Once    acetaminophen (Tylenol) rectal suppository 650 mg  650 mg Rectal Once     (Not in a hospital admission)      Allergies  Allergies[1]    Review of Systems:   Unable to obtain secondary to patient's current clinical condition        Physical Exam:   Blood pressure 95/72, pulse (!) 123, temperature (!) 100.9 °F (38.3 °C), temperature source Rectal, resp. rate (!) 29, weight 105 lb (47.6 kg), SpO2 99%.    Constitutional: Lethargic  Eyes: PERRL  ENT: nares patent  Neck: neck supple, no JVD  Cardio: RRR, S1 S2  Respiratory: Bibasilar crackles with diminished bibasilar breath sounds  GI: abdomen soft, non tender, active bowel souds, no organomegaly  Extremities: no clubbing, cyanosis, edema  Neurologic: no gross motor deficits  Skin: warm, dry    Results:   Laboratory Data  Lab Results   Component Value Date    WBC 12.1 (H) 12/11/2024    HGB 11.9 (L) 12/11/2024    HCT 40.5 12/11/2024    .0 12/11/2024    CREATSERUM 0.40 (L) 12/11/2024    BUN 50 (H) 12/11/2024     12/11/2024    K 4.5 12/11/2024     12/11/2024    CO2 >40.0 (HH) 12/11/2024     (H) 12/11/2024    CA 10.3 12/11/2024    ALB 4.0 12/11/2024    ALKPHO 112 12/11/2024    TP 8.2 12/11/2024    AST 24 12/11/2024    ALT 30 12/11/2024    PTT 34.6 01/31/2024    INR 1.32 (H) 01/29/2024    PTP 16.4 (H) 01/29/2024    T4F 1.0 02/19/2015    TSH 2.138 12/11/2024    LIP 35 12/11/2024    DDIMER 2.38 (H) 01/20/2024    MG 2.2 01/22/2024    PHOS 6.1 (H) 01/21/2024    TROP <0.046 02/19/2015    CK 32 01/20/2024    B12 586 01/23/2024         Imaging  No results found.    Assessment   1.  Fevers  2.  Acute encephalopathy  3.  Acute on chronic hypercapnic hypoxemic respiratory failure  4.  Leukocytosis  5.  HFpEF  6.  History of atrial fibrillation  7.  Prior history of  pulmonary embolism  9.  Dementia  10.  Dysphagia    Plan   -Patient presents with chief complaint of fevers ongoing altered mental status over the last several days.  Recently discharged on 11/24/2024 from [facility] after hospitalization for presumed aspiration pneumonia.  -ABG with mild respiratory acidosis noted.  Currently placed on NIV.  Continue at this time.  -Altered mental status does not appear to be fully explained by ABG.  Await CT head to further evaluate.  -In light of ongoing fevers awaiting CT chest abdomen pelvis.  -Check antibiotic therapy with Zosyn and vancomycin at this time  -Per ED staff daughter wants to continue aggressive management at this time.  Remains full code for now.  -Reviewed vitals, labs and imaging    Dayo Espino DO  Pulmonary Critical Care Medicine  [facility]  12/11/2024  6:01 PM        [1]   Allergies  Allergen Reactions    Erythromycin OTHER (SEE COMMENTS)    Oxycodone HALLUCINATION    Gabapentin NAUSEA ONLY and OTHER (SEE COMMENTS)     Pt stated she didn't feel like herself     Tizanidine ITCHING and OTHER (SEE COMMENTS)     Pt states burning sensation

## 2024-12-12 NOTE — PROCEDURES
Endotracheal intubation    Glide scope used to visualize vocal cords.  Under direct visualization 7.0 ET tube was advanced through vocal cords.  Cuff inflated.  End-tidal capnography with appropriate color change.  ET tube secured at 22 cm at the lip line.  Chest x-ray ordered.    Dayo Espino DO  Pulmonary Critical Care Medicine  [facility]

## 2024-12-12 NOTE — CONSULTS
Cardiology (consult dictated)    Assessment:  1.  Hypercarbic and hypoxic respiratory failure.    2.  Hypotension and fever, probably septic shock source uncertain.    3.  Historically normal LV systolic function.    4.  What is probably permanent atrial fibrillation, with rapid ventricular response.  Will give dose of digoxin but will otherwise be permissive with rapid A-fib.  Rate should come down as pressure stabilizes.      Plan:  1.  Moderate fluids.  Probably needs large volumes of fluid for sepsis, but the addition of CHF on top of her baseline respiratory insufficiency would be very poorly tolerated.    2.  Pressors as needed.    3.  Echo in the morning.    Poor prognosis.      Thank you, we will follow.

## 2024-12-12 NOTE — ED QUICK NOTES
Assumed care at this time. Pt is being admitted to ICU for sepsis, acute respiratory failure, and Afib with RVR. Pt on Bi PAP 12/5 at 50%. Pt unresponsive since arrival. Family at bedside. Pt temporarily hypotensive, improved after bolus of 0.9NS.  Levo placed on hold. Waiting for ICU bed.      CONCLUSION:  Imaging     1. CHF with pulmonary interstitial edema and moderate right basilar pleural effusion.  Compressive atelectasis in the right lower lobe.      2.Patchy bilateral lower lobe airspace disease, in association with extensive mucous plugging/debris of the lower lobe bronchi, concerning for potential aspiration pneumonitis and/or bronchopneumonia.     3.Cholelithiasis without CT evidence acute complication.       4. Nonobstructing renal calculus.

## 2024-12-12 NOTE — PROGRESS NOTES
[facility]  Cardiology Progress Note    Yin Butcher Patient Status:  Inpatient    1931 MRN [id number]   Location [facility] 2W/SW Attending Vika Elmore MD   Hosp Day # 0 PCP Mina Walker MD     Subjective     On BiPAP.  Remains unresponsive.  Currently in atrial fibrillation with rates 110s to 120s.    Objective:   Patient Vitals for the past 24 hrs:   BP Temp Temp src Pulse Resp SpO2 Weight   24 1000 99/76 -- -- 111 19 100 % --   24 0900 93/ -- -- 118 17 100 % --   24 0800 103/ 97.7 °F (36.5 °C) Temporal 118 22 100 % --   24 0530 -- -- -- -- -- -- 92 lb 9.5 oz (42 kg)   24 0200 121/ -- -- 103 24 100 % --   24 0100 102/ -- -- 104 (!) 27 99 % --   24 0031 (!) 83/46 97 °F (36.1 °C) Temporal 105 16 99 % --   24 2345 100/70 -- -- 103 (!) 30 92 % --   24 2315 107/ -- -- 120 (!) 27 99 % --   24 2255 -- -- -- (!) 121 -- -- --   24 -- -- (!) 122 19 98 % --   24 -- 97.7 °F (36.5 °C) Temporal -- -- -- --   24 -- -- (!) 126 19 97 % --   24 (!)  -- -- (!) 140 22 96 % --   24 -- -- (!) 123 19 96 % --   24 96/ -- -- (!) 134 22 97 % --   24 -- -- (!) 121 19 96 % --   24 1945 (!) 89 -- -- 101 23 97 % --   24 1915 92 -- -- (!) 125 (!) 28 98 % --   24 1858 (!)  -- -- (!) 129 26 98 % --   24 1845 (!)  -- -- (!) 122 22 99 % --   24 1830 (!)  -- -- 103 23 97 % --   24 1800  100.1 °F (37.8 °C) Rectal 110 (!) 27 99 % --   24 1730 95/72 -- -- (!) 123 (!) 29 99 % --   24 1700 99/69 -- -- 115 (!) 30 99 % --   24 1630 118/78 -- -- 118 (!) 29 99 % --   24 1610 (!) 134/100 (!) 100.9 °F (38.3 °C) Rectal (!) 124 (!) 42 97 % 105 lb (47.6 kg)       Intake/Output:   Last 3 shifts:   Intake/Output                   12/10/24 0700 - 24 0659  (Not Admitted) 12/11/24 0700 - 12/12/24 0659 12/12/24 0700 - 12/13/24 0659       Intake    Total Intake -- -- --       Output    Urine  --  10  --    Intermittent/Straight Cath (mL) -- 10 --    Total Output -- 10 --       Net I/O     -- -10 --             Vent Settings: FiO2 (%):  [50 %-60 %] 60 %    Scheduled Meds:    apixaban  2.5 mg Oral BID    piperacillin-tazobactam  3.375 g Intravenous Q8H    acetylcysteine  2 mL Nebulization TID    vancomycin  750 mg Intravenous Q24H       Continuous Infusions:    norepinephrine Stopped (12/11/24 1850)       Results:     Lab Results   Component Value Date    WBC 10.8 12/12/2024    HGB 10.9 (L) 12/12/2024    HCT 38.4 12/12/2024    .0 12/12/2024    CREATSERUM 0.61 12/12/2024    BUN 49 (H) 12/12/2024     (H) 12/12/2024    K 4.3 12/12/2024     12/12/2024    CO2 >40.0 (HH) 12/12/2024     (H) 12/12/2024    CA 9.9 12/12/2024    ALB 4.0 12/11/2024    ALKPHO 112 12/11/2024    BILT 1.0 (H) 12/11/2024    TP 8.2 12/11/2024    AST 24 12/11/2024    ALT 30 12/11/2024    PTT 34.6 01/31/2024    INR 1.32 (H) 01/29/2024    PT 15.5 (H) 07/13/2014    T4F 1.0 02/19/2015    TSH 2.138 12/11/2024    LIP 35 12/11/2024    DDIMER 2.38 (H) 01/20/2024    MG 2.2 01/22/2024    PHOS 6.1 (H) 01/21/2024    TROP <0.046 02/19/2015    CK 32 01/20/2024    B12 586 01/23/2024       Recent Labs   Lab 12/11/24  1619 12/12/24  0339   * 123*   BUN 50* 49*   CREATSERUM 0.40* 0.61   CA 10.3 9.9    147*   K 4.5 4.3    104   CO2 >40.0* >40.0*     Recent Labs   Lab 12/11/24  1619 12/12/24  0339   RBC 4.17 3.95   HGB 11.9* 10.9*   HCT 40.5 38.4   MCV 97.1 97.2   MCH 28.5 27.6   MCHC 29.4* 28.4*   RDW 21.2* 20.9*   NEPRELIM 10.98* 9.47*   WBC 12.1* 10.8   .0 179.0       No results for input(s): \"BNPML\" in the last 168 hours.    No results for input(s): \"TROP\", \"CK\" in the last 168 hours.    CT ABDOMEN+PELVIS(CONTRAST ONLY)(CPT=74177)    Result Date: 12/11/2024  CONCLUSION:   1. No acute intra-abdominal process is identified. The etiology of the patient's symptoms is unclear from this study.  2. Heavy stool burden may reflect constipation.  3. Cholelithiasis without CT evidence acute complication.  4. Heterogeneously enhancing left adrenal nodule, not fully characterized.  5. Nonobstructing renal calculus.  6. Uncomplicated distal colonic diverticulosis.   7. Extensive thoracolumbar compression fractures.  8. Left hip arthroplasty with resultant artifactual degradation of the pelvis.  9. Lesser incidental findings as above.    Dictated by (CST): Ben Elmore MD on 12/11/2024 at 8:17 PM     Finalized by (CST): Ben Elmore MD on 12/11/2024 at 8:27 PM          CT CHEST (XGS=39738)    Result Date: 12/11/2024  CONCLUSION:  1. Patchy bilateral lower lobe airspace disease, in association with extensive mucous plugging/debris of the lower lobe bronchi, concerning for potential aspiration pneumonitis and/or bronchopneumonia.  2. Small pleural effusions and associated basilar atelectasis, with or without superimposed pneumonia.  3. Chronicity-indeterminate compression fracture deformities of the thoracic spine, up to severe degree, with kyphotic accentuation.   4. Marked cardiomegaly.  5. Dilatation of the main pulmonary artery trunk may relate to underlying pulmonary hypertension.   6. Low-density appearance of the intracardiac blood pool raises the possibility of underlying anemia. Correlate with hematologic parameters.  7. Lesser incidental findings as above.    Dictated by (CST): Ben Elmore MD on 12/11/2024 at 7:59 PM     Finalized by (CST): Ben Elmore MD on 12/11/2024 at 8:07 PM          CT BRAIN OR HEAD (CPT=70450)    Result Date: 12/11/2024  CONCLUSION:  1. No acute intracranial process by noncontrast CT technique.  2. Senescent changes of parenchymal volume loss with sequela of chronic microvascular ischemic disease including chronic lacunar infarcts of the basal ganglia.   3. There is large vessel atherosclerosis of the anterior and posterior intracranial circulations.  4. Right mastoid effusion. Correlation for relevant symptomatology is suggested.   5. Lesser incidental findings as above.     Dictated by (CST): Ben Elmore MD on 2024 at 7:46 PM     Finalized by (CST): Ben Elmore MD on 2024 at 7:50 PM          XR CHEST AP PORTABLE  (CPT=71045)    Result Date: 2024  CONCLUSION:  1. CHF with pulmonary interstitial edema and moderate right basilar pleural effusion.  Compressive atelectasis in the right lower lobe.    Dictated by (CST): Malcom Mruillo MD on 2024 at 6:26 PM     Finalized by (CST): Malcom Murillo MD on 2024 at 6:29 PM         EKG    Result Date: 2024  Atrial fibrillation with rapid ventricular response Abnormal ECG When compared with ECG of 2024 22:34, Vent. rate has increased BY  54 BPM Nonspecific T wave abnormality no longer evident in Anterior leads Confirmed by MAGGIE WEEKS JORDAN ([id number]) on 2024 8:12:55 AM   CARD ECHO 2D DOPPLER (CPT=93306)    Result Date: 2024  Transthoracic Echocardiogram Name:Yin Butcher Date: 2024 :  1931 Ht:  (67in)  BP: 121 / 78 MRN:  [id number]    Age:  89years    Wt:  (92lb)  HR: 110bpm Loc:  [facility]       Gndr: F          BSA: 1.45m^2 Sonographer: Rachael HAMMER Ordering:    cleve Hawthorne Consulting:  Dayo Espino MD ---------------------------------------------------------------------------- History/Indications:   Atrial fibrillation. Fever. Shortness of breath. ---------------------------------------------------------------------------- Procedure information:  A transthoracic complete 2D study was performed. Additional evaluation included M-mode, complete spectral Doppler, and color Doppler.  Patient status:  Inpatient.  Location:  [facility]    Comparison was made to the study of 2024.    This was a routine study. Transthoracic echocardiography  for ventricular function evaluation and assessment of valvular function. Image quality was adequate. ECG rhythm:   Atrial fibrillation ---------------------------------------------------------------------------- Conclusions: 1. Left ventricle: The cavity size was below normal. Wall thickness was    increased. Systolic function was hyperdynamic. The estimated ejection    fraction was 70-75%, by visual assessment. No diagnostic evidence for    regional wall motion abnormalities. Unable to assess LV diastolic    function due to heart rhythm. 2. Right ventricle: Systolic function was mildly reduced. The tricuspid    annular plane systolic excursion is 1.26cm. The RV s' is 9.2cm/sec. 3. Left atrium: The left atrial volume was markedly increased. 4. Right atrium: The atrium was markedly dilated. 5. Aortic valve: The peak systolic velocity was 1.54m/sec. The mean systolic    gradient was 5mm Hg. The valve area (VTI) was 1.58cm^2. The valve area    (VTI) index was 1.09cm^2/m^2. 6. Mitral valve: The annulus was markedly calcified. The leaflets were    mildly calcified. Cannot exclude a vegetation. There was mild    regurgitation. The mean diastolic gradient was 4mm Hg at a heart rate of    120 bpm. 7. Pulmonary arteries: Systolic pressure was moderately increased, in the    range of 45mm Hg to 50mm Hg. 8. Pericardium, extracardiac: There were bilateral pleural effusions    present. Impressions:  This study is compared with previous dated 1/21/24: * ---------------------------------------------------------------------------- * Findings: Left ventricle:  The cavity size was below normal. Wall thickness was increased. Systolic function was hyperdynamic. The estimated ejection fraction was 70-75%, by visual assessment. No diagnostic evidence for regional wall motion abnormalities. Unable to assess LV diastolic function due to heart rhythm. Left atrium:  The left atrial volume was markedly increased. Right ventricle:  The cavity  size was normal. Systolic function was mildly reduced. Right atrium:  The atrium was markedly dilated. Mitral valve:  Poorly visualized. The annulus was markedly calcified. The leaflets were mildly calcified. Leaflet separation was normal.  Cannot exclude a vegetation.  Doppler:  Transvalvular velocity was within the normal range. There was no evidence for stenosis. There was mild regurgitation.    The valve area (LVOT continuity) was 2.01cm^2. The valve area index (LVOT continuity) was 1.38cm^2/m^2.    The mean diastolic gradient was 4mm Hg at a heart rate of 120 bpm. Aortic valve:   The valve was probably trileaflet. The leaflets were moderately calcified.  Doppler:     The valve area (VTI) was 1.58cm^2. The valve area (VTI) index was 1.09cm^2/m^2.    The mean systolic gradient was 5mm Hg. The peak systolic gradient was 13mm Hg. Tricuspid valve:  The valve is structurally normal. Leaflet separation was normal.  Doppler:  Transvalvular velocity was within the normal range. There was no evidence for stenosis. There was mild regurgitation. Pulmonic valve:   The valve is structurally normal. Cusp separation was normal.  Doppler:  Transvalvular velocity was within the normal range. There was no evidence for stenosis. There was trace regurgitation. Pericardium:   No significant pericardial effusion was identified. Pleura:  There were bilateral pleural effusions present. Aorta: Aortic root: The aortic root was normal. Ascending aorta: The ascending aorta was normal. Pulmonary arteries: Systolic pressure was moderately increased, in the range of 45mm Hg to 50mm Hg. ---------------------------------------------------------------------------- Measurements  Left              Value             Ref       01/21/2024  ventricle  IVS           (H) 1.2   cm          0.6 - 0.9 0.9  thickness,  ED, PLAX  LV ID, ED,    (L) 2.6   cm          3.8 - 5.2 3.6  PLAX  LV ID, ES,    (L) 1.6   cm          2.2 - 3.5 2.2  PLAX  LV PW          (H) 1.1   cm          0.6 - 0.9 0.8  thickness,  ED, PLAX  IVS/LV PW         1.09              --------- 1.10  ratio, ED,  PLAX  LV PW/LV ID       0.42              --------- 0.23  ratio, ED,  PLAX  LV ejection       71    %           54 - 74   70  fraction  Stroke            28    ml/m^2      --------- ----------  volume/bsa,  2D  LVOT              Value             Ref       01/21/2024  LVOT ID           2     cm          --------- ----------  LVOT peak         0.82  m/sec       --------- ----------  velocity, S  LVOT VTI, S       13.2  cm          --------- ----------  LVOT peak         3     mm Hg       --------- ----------  gradient, S  LVOT mean         1     mm Hg       --------- ----------  gradient, S  Stroke volume     41    ml          --------- ----------  (SV), LVOT DP  Cardiac           4.3   L/min       --------- ----------  output (Qs),  LVOT DP  Cardiac index     3     L/(min-m^2) --------- ----------  (Qs/bsa),  LVOT DP  Stroke index      29    ml/m^2      --------- ----------  (SV/bsa),  LVOT DP  Aortic valve      Value             Ref       01/21/2024  Aortic valve      1.54  m/sec       --------- ----------  peak  velocity, S  Aortic valve      23.3  cm          --------- ----------  VTI, S  Aortic mean       5     mm Hg       --------- ----------  gradient, S  Aortic peak       13    mm Hg       --------- ----------  gradient, S  Aortic valve      1.58  cm^2        --------- ----------  area, VTI  Aortic valve      1.09  cm^2/m^2    --------- ----------  area/bsa, VTI  Velocity          0.46              --------- ----------  ratio, peak,  LVOT/AV  Aortic root       Value             Ref       01/21/2024  Aortic root       3.3   cm          2.3 - 3.7 ----------  ID  Ascending         Value             Ref       01/21/2024  aorta  Ascending         2.9   cm          1.9 - 3.5 ----------  aorta ID  Left atrium       Value             Ref       01/21/2024  LA ID, A-P,       3.5   cm          2.7 -  3.8 4.0  ES  LA volume, S  (H) 100   ml          22 - 52   ----------  LA            (H) 69    ml/m^2      16 - 34   ----------  volume/bsa, S  LA volume,    (H) 114   ml          22 - 52   ----------  ES, 1-p A4C  LA volume,    (H) 88    ml          22 - 52   ----------  ES, 1-p A2C  LA volume,        108   ml          --------- ----------  ES, A/L  LA            (H) 74    ml/m^2      16 - 34   ----------  volume/bsa,  ES, A/L  LA/aortic         1.06              --------- 1.33  root ratio  Mitral valve      Value             Ref       01/21/2024  Mitral mean       4     mm Hg       --------- ----------  gradient, D  Mitral peak       7     mm Hg       --------- ----------  gradient, D  Mitral valve      2.01  cm^2        --------- ----------  area, LVOT  continuity  Mitral valve      1.38  cm^2/m^2    --------- ----------  area/bsa,  LVOT  continuity  Pulmonary         Value             Ref       01/21/2024  artery  PA pressure,      47    mm Hg       --------- 35  S, DP  Tricuspid         Value             Ref       01/21/2024  valve  Tricuspid     (H) 3.12  m/sec       <=2.8     2.81  regurg peak  velocity  Tricuspid         39    mm Hg       --------- 32  peak RV-RA  gradient  Systemic          Value             Ref       01/21/2024  veins  Estimated CVP     8     mm Hg       --------- 3  Right             Value             Ref       01/21/2024  ventricle  TAPSE, MM     (L) 1.26  cm          >=1.70    ----------  RV pressure,      47    mm Hg       --------- 35  S, DP  RV s',        (L) 9.2   cm/sec      >=9.5     ----------  lateral Legend: (L)  and  (H)  corinna values outside specified reference range. ---------------------------------------------------------------------------- Prepared and electronically signed by Yonatan Trevino 12/12/2024 08:57        Exam:     General: Somnolent, minimally responsive.  HEENT: Normocephalic, anicteric sclera, neck supple, no thyromegaly or adenopathy.  Neck: No JVD, carotids  2+, no bruits.  Cardiac: Irregularly irregular rhythm, mildly tachycardic.. S1, S2 normal. No murmur, pericardial rub, S3, or extra cardiac sounds.  Lungs: Clear without wheezes, rales, rhonchi or dullness.  Normal excursions and effort.  Abdomen: Soft, non-tender. No organosplenomegally, mass or rebound, BS-present.  Extremities: Without clubbing or cyanosis. No edema.    Neurologic: Unresponsive.  Skin: Warm and dry.     Assessment and Plan:   Assessment:  1.  Hypercarbic and hypoxic respiratory failure.     2.  Hypotension and fever, probably septic shock.  CT evidence of mucous plugging and concern for aspiration.     3.  Historically normal LV systolic function.     4.  What is probably permanent atrial fibrillation, with rapid ventricular response.  Lenient rate control given clinical condition.        Plan:  1.  Plan for transthoracic echocardiogram today.     2.  Continue supportive care with IV antibiotics, pulmonary treatments.     3.  Lenient rate control for atrial fibrillation.  If rates become significant elevated can give IV Lopressor as needed.    4.  Ongoing discussions regarding goals of care.    Barry Do MD  [facility]  12/12/2024

## 2024-12-12 NOTE — PROGRESS NOTES
12/12/24 0900   FOLLOW UP/PLAN   Follow Up Needed (Documentation Required) No   SLP Follow-up Date 12/12/24     BSE orders received/acknowledged and completed. No BSE appropriate at this time. Pt admitted with G-tube from extended care facility. VFSS at [facility] 11/15/24 rec NPO and PEG. Pt not appropriate for trials for candidacy of pleasure feeds d/t reduced alertness and responsiveness. Speech to sign-off. Collaborated with RN regarding Pt's swallowing plan of care and in agreement.       Yin March M.S. [facility]/SLP  Speech-Language Pathologist  [facility]  #[id number]

## 2024-12-12 NOTE — PROGRESS NOTES
[facility] Pharmacy Dosing Service      Initial Pharmacokinetic Consult for Vancomycin Dosing     Yin Butcher is a 89 year old female who is being initiated on vancomycin therapy for pneumonia.  Pharmacy has been asked to dose vancomycin by Dr Espino.  The initial treatment and monitoring approach will be steady state AUC strategy.        Weight and Temperature:    Wt Readings from Last 1 Encounters:   24 47.6 kg (105 lb)        Temp Readings from Last 1 Encounters:   24 97.7 °F (36.5 °C) (Temporal)      Labs:   Recent Labs   Lab 24  1619   CREATSERUM 0.40*      Estimated Creatinine Clearance: 66 mL/min (A) (based on SCr of 0.4 mg/dL (L)).     Recent Labs   Lab 24  1619   WBC 12.1*          The Pharmacokinetic Target is:     to 600 mg-h/L and trough <=15 mg/L    Renal Dosing Considerations:    None     Assessment/Plan:   Initial/Loading dose: Has received 1250 mg IV (25 mg/kg, capped at 2250 mg) x 1 loading dose.      Maintenance dose: Pharmacy will dose vancomycin at 750 mg IV every 24 hours    Monitorin) Plan for vancomycin peak and trough to be obtained at steady state    2) Pharmacy will order SCr as clinically indicated to assess renal function.    3) Pharmacy will monitor for toxicity and efficacy, adjust vancomycin dose and/or frequency, and order vancomycin levels as appropriate per the Pharmacy and Therapeutics Committee approved protocol until discontinuation of the medication.       We appreciate the opportunity to assist in the care of this patient.     Osvaldo Patel, PharmD  2024  10:16 PM  [place]  Pharmacy Extension: [phone]

## 2024-12-13 NOTE — PROGRESS NOTES
[facility]  part of [facility]    Progress Note    Yin Butcher Patient Status:  Inpatient    1931 MRN [id number]   Location [facility] 2W/SW Attending Vika Elmore MD   Hosp Day # 1 PCP Mian Walker MD     SUBJECTIVE:  Pt seen and examined at bedside.   Remains orally intubated, sedated    /77   Pulse 120   Temp (!) 96.3 °F (35.7 °C)   Resp 20   Wt 92 lb 9.5 oz (42 kg)   SpO2 100%   BMI 14.50 kg/m²     Physical Examination:    Gen: Orally intubated, appears comfortable  HEENT: PERRLA  Lungs: CTA B/L  Heart: Normal S1 S2, No M/G/R   Abd: Abdomen soft, nontender, nondistended, no organomegaly, bowel sounds present  Ext: No edema, no calf tenderness    Labs:   Lab Results   Component Value Date    WBC 15.8 2024    HGB 10.7 2024    HCT 37.5 2024    .0 2024    CREATSERUM 0.59 2024    BUN 38 2024     2024    K 3.4 2024     2024    CO2 31.0 2024    GLU 96 2024    CA 9.4 2024       No results for input(s): \"PGLU\" in the last 168 hours.  No results for input(s): \"INR\" in the last 168 hours.    Imaging:  Imaging reviewed in Epic.    Meds:   Current Facility-Administered Medications   Medication Dose Route Frequency    fentaNYL (Sublimaze) 50 mcg/mL injection 25 mcg  25 mcg Intravenous Q30 Min PRN    apixaban (Eliquis) tab 2.5 mg  2.5 mg Oral BID    piperacillin-tazobactam (Zosyn) 3.375 g in dextrose 5% 100 mL IVPB-ADDV  3.375 g Intravenous Q8H    acetylcysteine (Mucomyst) 20 % nebulizer solution 2 mL  2 mL Nebulization TID    albuterol (Ventolin) (5 MG/ML) 0.5% nebulizer solution 5 mg  5 mg Nebulization Q4H PRN    acetaminophen (Tylenol) 160 MG/5ML oral liquid 650 mg  650 mg Per NG Tube Q4H PRN    Or    acetaminophen (Tylenol) rectal suppository 650 mg  650 mg Rectal Q4H PRN    senna (Senokot) 8.8 MG/5ML oral syrup 17.6 mg  10 mL Per NG Tube BID    docusate (Colace) 50 MG/5ML  oral solution 100 mg  100 mg Per NG Tube BID    polyethylene glycol (PEG 3350) (Miralax) 17 g oral packet 17 g  17 g Per NG Tube Daily PRN    bisacodyl (Dulcolax) 10 MG rectal suppository 10 mg  10 mg Rectal Daily PRN    chlorhexidine gluconate (Peridex) 0.12 % oral solution 15 mL  15 mL Mouth/Throat BID@0800,2000    mineral oil-white petrolatum (Artificial Tears) 83-15 % ophthalmic ointment 1 Application  1 Application Both Eyes Nightly    propofol (Diprivan) 10 mg/mL infusion premix  5-50 mcg/kg/min (Dosing Weight) Intravenous Continuous    famotidine (Pepcid) 20 mg/2mL injection 20 mg  20 mg Intravenous Daily @ 0700    sodium chloride 0.9% infusion   Intravenous Continuous    mupirocin (Bactroban) 2% nasal ointment 1 Application  1 Application Each Nare BID    ipratropium-albuterol (Duoneb) 0.5-2.5 (3) MG/3ML inhalation solution 3 mL  3 mL Nebulization Q6H PRN    ibuprofen (Motrin) tab 200 mg  200 mg Oral Q4H PRN    ondansetron (Zofran) 4 MG/2ML injection 4 mg  4 mg Intravenous Q6H PRN    metoclopramide (Reglan) 5 mg/mL injection 10 mg  10 mg Intravenous Q8H PRN    vancomycin (Vancocin) 750 mg in sodium chloride 0.9% 250 mL IVPB-ADDV  750 mg Intravenous Q24H       Assessment:  #1.  Acute hypoxemic respiratory failure  2.  Fever  3.  Metabolic encephalopathy  4.  History of atrial fibrillation  5.  History of pulmonary embolism  6.  Dysphagia, status post PEG  7.  History of dementia  8.  Septic shock      Plan:    Patient is on ventilator  Vent management per ICU  Bronchodilator  Antibiotic  Pulmonary toileting  Being followed by cardiology  Resume Eliquis  IV Vanco, IV Zosyn  Follow-up with sputum culture, blood culture, urine culture  Sedation per protocol  Currently full code  Will get palliative consult to discuss goals of care  DVT/GI prophylaxis         Vika Elmore MD   12/13/2024  2:49 PM

## 2024-12-13 NOTE — CONSULTS
[facility]  part of [facility]  Palliative Care Initial Consult Note    Yin Butcher Patient Status:  Inpatient    1931 MRN [id number]   Location [facility] 2W/SW Attending Vika Elmore MD   Hosp Day # 2 PCP Mina Walker MD     Date of Consult: 2024  Patient seen at: [facility] Inpatient-room 223    The  Century Cures Act makes medical notes like these available to patients in the interest of transparency. Please be advised this is a medical document. Medical documents are intended to carry relevant information, facts as evident, and the clinical opinion of the practitioner. The medical note is intended as peer to peer communication and may appear blunt or direct. It is written in medical language and may contain abbreviations or verbiage that are unfamiliar.     Reason for Consultation:   for evaluation of Palliative Care needs and Goals of care discussion.    Subjective     History of Present Illness: Yin Butcher is a 89 year old female with history of HFpEF; permanent Afib on Eliquis , recent PE, recurrent aspiration pneumonia and FTT with several hospital admissions, SIADH, colon CA and osteoporosis w/ lumbar & thoracic compression fractures, and OA/ DJD s/p R hip hemiarthroplasty. Patient was admitted from [facility] ( [place] there since 24) on 2024 for AMS/ lethargy with fever (temp 100.9 F,) tachypnea- RR 29 bpm;  tachycardia 123 bpm, hypoxia and hypotension. Pt was initially on NIVS but on  was intubated for progressive resp failure and now on propofol infusion.   --Work up in our hospital has included imaging with chest X-ray which showed CHF with pulmonary interstitial edema and moderate right basilar pleural effusion along with compressive atelectasis in RLL. CT abd  showed no acute intra-abdominal process but heavy stool burden. CT head showed no acute intracranial process but parenchymal volume loss with sequela of chronic  microvascular ischemic disease including chronic lacunar infarcts of the basal ganglia. CT chest showed bilat LL airspace disease with extensive mucous plugging/debris of the lower lobe bronchi, concerning for potential aspiration pneumonitis and/or bronchopneumonia, small pleural effusions and associated basilar atelectasis, with or without superimposed pneumonia, chronicity-indeterminate compression fracture deformities of the thoracic spine, up to severe degree, with kyphotic accentuation, marked cardiomegaly and dilatation of the main pulmonary artery trunk which may relate to underlying pulmonary hypertension. 12 lead EKG showed atrial fibrillation with rapid ventricular response.   --Admission labs revealed FLORESITA with elevated BUN at 50 and creat at 0.4 with eGFR at 92 and osmolarity at 314 and hyperglycemia with glucose at 143. LFTs showed elevated Bili with total at 1 and direct at 0.5. proBNP elevated at 7,642 and procal elevated at 0.10. CBC showed leukocytosis with WBC at 12.1 and sl anemia with Hgb at 11.9 w/ adequate plt count at 229K. Urine analysis showed (+) 25 -Leukocyte esterase with f/u urine culture negative . Blood cultures from 12/11 NGTD x 2 days, Resp panel for SARS, RSV and Influenza all negative.[facility] and pt's daughter Jada and her finance Saba by phone .      Patient is being followed by several  specialist this admission: cardiology and pulmonary   12/11/24 Cardiology Consult   ASSESSMENT:    1.       Critically ill, unresponsive patient, probably septic and with severe hypercarbia contributing to her obtundation.  2.       Hypotension and fever, probably septic shock, source uncertain.    3.       Historically normal LV systolic function.  History of heart failure with preserved ejection fraction.  4.       Permanent atrial fibrillation.  On Eliquis.  Rapid response due in part to severe concurrent illness.  Will be permissive with the rate.  We will give a  dose of digoxin, and hopefully with fluids, heart rate will reduce.  PLAN:    1.       Pressors as needed.    2.       Echo in the morning.    3.       Dose of digoxin.    4.       Patient probably needs large volumes of fluid for sepsis, but the addition of CHF on top of her baseline respiratory insufficiency, it would be very poorly tolerated.    Need discussion with family regarding advanced directives and aggressiveness of care    12/11/24 Pulmonary Consult   ASSESSMENT:  1.  Fevers  2.  Acute encephalopathy  3.  Acute on chronic hypercapnic hypoxemic respiratory failure  4.  Leukocytosis  5.  HFpEF  6.  History of atrial fibrillation  7.  Prior history of pulmonary embolism  9.  Dementia  10.Dysphagia  PLAN:  -Patient presents with chief complaint of fevers ongoing altered mental status over the last several days.  Recently discharged on 11/24/2024 from [facility] after hospitalization for presumed aspiration pneumonia.  -ABG with mild respiratory acidosis noted.  Currently placed on NIV.  Continue at this time.  -Altered mental status does not appear to be fully explained by ABG.  Await CT head to further evaluate.  -In light of ongoing fevers awaiting CT chest abdomen pelvis.  -Check antibiotic therapy with Zosyn and vancomycin at this time  -Per ED staff daughter wants to continue aggressive management at this time.  Remains full code for now.    Today is day #2 of hospitalization. This is the 4th hospitalization in the past 10 months (since Feb 2024)  Pt has had 8 admission in 2023    HOSPITALIZATION SUMMARY  # 4: 12/13/24- present from [facility] ; acute hypoxic/ hypercapnic Resp failure/ sepsis   # 3: 11/4-24/24 : [facility] acute hypoxic/ hypercapnic resp failure / asp pneumonia requiring BiPap; Sepsis;  ENT evaluated ongoing issues with aspiration but does have ongoing oropharyngeal dysphagia; Failed swallow; G-tube inserted   # 2: 8/15-21/24: (seen by Palliative care at [facility] ) hypoxic/  hypercapnia resp failure; FTT/ severe PCM; dysphagia; Afib w/ RVR; pl effusions,closed thoracic & lumbar comp fxs, NSTEMI; SIADH; thoracentesis hypo-osmolality and hyponatremia  ( dyspnea multifactorial, patient has severe malnutrition and diaphragmatic wasting, low protein state with third spacing causing pulm edema and pleural effusions with component of cardiac dysfunction causing dyspnea she overall has is exhibiting a failure to thrive with multiple hospitalizations, my self and palliative have spoken to patient to consider DNR/DNI status daughter does not wish to consider and wishes for full code )  # 1: 2/4-20/24: [facility] acute on chronic diastolic HF / acute resp failure  w/ hypoxia and hypercapnia; afib; generalized weakness, ; sepsis; asp pneumonia, severe PCM, rectal wound; Had Dobbhoff FT      YEAR 2023  8 ADMISSION IN 2023  10/30-11/7/23  HFpEF and acute resp failure with hypoxia and hypercapnia, Afib RVR, dysphagia. Severe PCM   10/22-29/23- acute hypoxic resp failure ; recurrent pl effusion ; AMS ; NATE; dysphagia; weak voice L; UTI lactic acidosis  hyponatremia ; Afib   9/3-10/23: acute hypoxic resp failure w/ large R pl effusion ; elevated troponin;acute comp Fx ; chronic heel ulcers   4/28-5/8/23: NSTEMI; bilat pl effusion L thoracentesis-transudate likely from hypoalbuminemia; acute on chronic HF; (+) COVID - 19 ( was (+) 2 weeks ago; closed comp Fxs thoracic ; asp pneumonia    4/19- 4/26/23 s/p fall lumbar comp fx; closed fx mult thoracic comp vertebra/ dorsalgia/ reduces mobility    3/22-26/23: Acute Encephalopathy & Generalized Weakness due to Acute cystitis with chronic catheter and hyponatremia   1/28-2/1/23 ; Afib w/ RVR; hypotension;  acute on chronic hypoxic resp failure; UTI . Vertebral Fx ; SIADH    12/26/22-1/5/23 ; hyponatremia ; pericardial effusion; pl effusion -bilat  acute cystitis ; hyponatremia ; Afib     When I entered the room, the patient was lying in bed, intubated, and  sedated with a bear hugger covering her. She did not respond to her name or light touch. No family present at bedside.      Review of Systems/ Symptoms:   Patient was not able  to provide subjective input. Assessment is assisted by RN, and daughter Jada  Fatigue:  Yes, per reports / review of chart ; pt. with increasing fatigue / weakness and AMS for several days PTA ; bed bound  Overall Functional status: Was at the [place]  per daughter declining   Dyspnea:  Intubated on propofol FiO2 at 30 % and P- 5   Drowsiness:  sedated on propofol   Cough: ---  Pain: Per RN some nonverbal signs with routine care and ICU procedures   Non-verbal signs of pain present: Yes   Appetite/Nutritional Status: NPO - has G-tube in place   Dysphagia: on going issue over the last year with recurrent asp pneumonia now with G-tube in place since 11/24   Constipation:   unknown PTA 12/11  - none thus far this admission   Diarrhea: none documented   Nausea/Vomiting: none documented   Depression: daughter reports no history ; was an active 90+ year out; Was trying to get back from  bad year 2023 ; was making some strides    Anxiety:  daughter reports has not been issue   Emotional / coping response to illness: Laura Elias reports her mom was a very strong and positive person ; Was trying to regain her strength and get herself back on track to enjoy the things she likes . Almost got there and then  has had one set back after another. Tells me started with \"injury to randi vocal cord after a \" ? Thoracentesis\" .  Daughter has spoke with palliative care at previous hospital admission tells me not positive interactions and she wants to continue to \" get her mom better\"   Other:    Bowel Movement    No data found in the last 1 encounters.       Palliative Care Social History:   Marital Status:   Children: Yes  Living Situation Prior to Admit: Home in [place] ; Came from [facility] in [place]   Does Patient Live Alone: no with daughter    Is Patient Confused:  sedated on Propofol and intubated   Occupational History: Retired    Substance History:   reports that she has never smoked. She has never used smokeless tobacco.  reports that she does not currently use alcohol.  reports no history of drug use.  Hx of Substance Use/Abuse: No    Spiritual Assessment:   [place] - Parish Not Listed  Daughter declines at this time but aware of services and can ask at any time through RN     Past Medical History/Past Surgical History:     Medical History: obtained from [facility]  Past Medical History:    Arthritis    Atrial fibrillation (HCC)    Back problem    Cancer (HCC)    Cataract    Osteoporosis    Personal history of antineoplastic chemotherapy     Past Surgical History:   Procedure Laterality Date    Other surgical history  7/12/2014    Procedure: HIP HEMIARTHROPLASTY/ BIPOLAR;  Surgeon: Vasu Matamoros MD;  Location: [facility] MAIN OR    Removal of tonsils,12+ y/o         Family History: obtained from [facility]  Family History   Family history unknown: Yes     Allergies:  Allergies[1]    Medications:     Current Facility-Administered Medications:     fentaNYL (Sublimaze) 50 mcg/mL injection 25 mcg, 25 mcg, Intravenous, Q30 Min PRN    acetaminophen (Ofirmev) 10 mg/mL infusion premix 650 mg, 15 mg/kg, Intravenous, Q6H PRN    apixaban (Eliquis) tab 2.5 mg, 2.5 mg, Oral, BID    piperacillin-tazobactam (Zosyn) 3.375 g in dextrose 5% 100 mL IVPB-ADDV, 3.375 g, Intravenous, Q8H    acetylcysteine (Mucomyst) 20 % nebulizer solution 2 mL, 2 mL, Nebulization, TID    albuterol (Ventolin) (5 MG/ML) 0.5% nebulizer solution 5 mg, 5 mg, Nebulization, Q4H PRN    acetaminophen (Tylenol) 160 MG/5ML oral liquid 650 mg, 650 mg, Per NG Tube, Q4H PRN **OR** acetaminophen (Tylenol) rectal suppository 650 mg, 650 mg, Rectal, Q4H PRN    senna (Senokot) 8.8 MG/5ML oral syrup 17.6 mg, 10 mL, Per NG Tube, BID    docusate (Colace) 50 MG/5ML oral solution 100 mg, 100 mg, Per NG Tube, BID     polyethylene glycol (PEG 3350) (Miralax) 17 g oral packet 17 g, 17 g, Per NG Tube, Daily PRN    bisacodyl (Dulcolax) 10 MG rectal suppository 10 mg, 10 mg, Rectal, Daily PRN    chlorhexidine gluconate (Peridex) 0.12 % oral solution 15 mL, 15 mL, Mouth/Throat, BID@0800,2000    mineral oil-white petrolatum (Artificial Tears) 83-15 % ophthalmic ointment 1 Application, 1 Application, Both Eyes, Nightly    propofol (Diprivan) 10 mg/mL infusion premix, 5-50 mcg/kg/min (Dosing Weight), Intravenous, Continuous    famotidine (Pepcid) 20 mg/2mL injection 20 mg, 20 mg, Intravenous, Daily @ 0700    sodium chloride 0.9% infusion, , Intravenous, Continuous    mupirocin (Bactroban) 2% nasal ointment 1 Application, 1 Application, Each Nare, BID    ipratropium-albuterol (Duoneb) 0.5-2.5 (3) MG/3ML inhalation solution 3 mL, 3 mL, Nebulization, Q6H PRN    ondansetron (Zofran) 4 MG/2ML injection 4 mg, 4 mg, Intravenous, Q6H PRN    metoclopramide (Reglan) 5 mg/mL injection 10 mg, 10 mg, Intravenous, Q8H PRN    vancomycin (Vancocin) 750 mg in sodium chloride 0.9% 250 mL IVPB-ADDV, 750 mg, Intravenous, Q24H    Functional Status History:  ADLs: bathing or showering, dressing, getting in and out of bed or a chair, walking, using the toilet, and eating  - Dependent  IADLs: use the phone, shop for groceries, meal preparation, manage medicines, clean living area, use transportation by self, manage money- Dependent  DME: ----  Falls: Yes   per review of records - some admissions for falls and sequale-- compression Fxs      Palliative Performance Scale:   Prior to admission: (pt/family reported) 40 %  Observed during hospitalization: 10 %  % Ambulation Activity Level Self-Care Intake Consciousness   100 Full  Normal  No Disease Full Normal Full   90 Full  Normal  Some Disease Full Normal Full   80 Full  Normal w/effort  Some Disease Full Normal or reduced Full   70 Reduced  Can't Perform Job  Some Disease Full Normal or reduced Full   60 Reduced   Can't Perform Hobby   Significant Disease Occ Assist Normal or reduced Full or confused   50 Mainly sit/lie Can't do any work  Extensive Disease Partial Assist Normal or reduced Full or confused   40 Mainly in bed Can't do any work  Extensive Disease Mainly Assist Normal or reduced Full or confused   30 Bed Bound Can't do any work  Extensive Disease Max Assist  Total Care Reduced  Drowsy/confused   20 Bed Bound Can't do any work  Extensive Disease Max Assist  Total Care Minimal  Drowsy/confused   10 Bed Bound Can't do any work  Extensive Disease Max Assist  Total Care Mouth Care  Drowsy/confused   0 Death        Objective      Vital Signs:  Blood pressure 111/77, pulse 120, temperature (!) 96.3 °F (35.7 °C), resp. rate 20, weight 92 lb 9.5 oz (42 kg), SpO2 100%.  Body mass index is 14.5 kg/m².  Present Level of pain:not able to self report   Non-verbal signs of pain present: Yes, per RN (+) CPOT seen with routine ICU care and procedures     Physical Exam:  General: Unresponsive. Intubated and sedated ; Covered with bear hugger . Body habitus Cachectic ; pale and ill appearing   HEENT: AT/NC. Orally intubated ; bitemp & SCM muscle wasting    Cardiac: Irregularly irregular; On tele shows afib Prominent skeletal markings  Lungs: Bilateral breath sounds- ant rhonchi   on Ventilator FiO2 at 30 % and P- 5   Abdomen: not examined on Bare Hugger   Extremities: No LE edema present. Bilat SCDs in place  (+) Sarcopenia  Neurologic: Intubated and sedated on propofol . No myoclonus, or seizures   Psychiatric: Intubated and sedated on propofol infusion   Skin:  Pale. Cool hands.; pale & sluggish  nail beds     Hematology:  Lab Results   Component Value Date    WBC 15.8 (H) 12/13/2024    HGB 10.7 (L) 12/13/2024    HCT 37.5 12/13/2024    .0 12/13/2024   Coags:  Lab Results   Component Value Date    PT 15.5 (H) 07/13/2014    INR 1.32 (H) 01/29/2024    PTT 34.6 01/31/2024   Chemistry:  Lab Results   Component Value Date     CREATSERUM 0.59 2024    BUN 38 (H) 2024     2024    K 3.4 (L) 2024     2024    CO2 31.0 2024    GLU 96 2024    CA 9.4 2024    ALB 4.0 2024    ALKPHO 112 2024    BILT 1.0 (H) 2024    TP 8.2 2024    AST 24 2024    ALT 30 2024    DDIMER 2.38 (H) 2024    MG 2.2 2024    PHOS 6.1 (H) 2024    TROP <0.046 2015       Imagin/13/24 ECHO    ECG rhythm:   Atrial fibrillation  ----------------------------------------------------------------------------  Conclusions:  1. Left ventricle: The cavity size was below normal. Wall thickness was     increased. Systolic function was hyperdynamic. The estimated ejection     fraction was 70-75%, by visual assessment. No diagnostic evidence for     regional wall motion abnormalities. Unable to assess LV diastolic     function due to heart rhythm.  2. Right ventricle: Systolic function was mildly reduced. The tricuspid     annular plane systolic excursion is 1.26cm. The RV s' is 9.2cm/sec.  3. Left atrium: The left atrial volume was markedly increased.  4. Right atrium: The atrium was markedly dilated.  5. Aortic valve: The peak systolic velocity was 1.54m/sec. The mean systolic     gradient was 5mm Hg. The valve area (VTI) was 1.58cm^2. The valve area     (VTI) index was 1.09cm^2/m^2.  6. Mitral valve: The annulus was markedly calcified. The leaflets were     mildly calcified. Cannot exclude a vegetation. There was mild     regurgitation. The mean diastolic gradient was 4mm Hg at a heart rate of     120 bpm.  7. Pulmonary arteries: Systolic pressure was moderately increased, in the     range of 45mm Hg to 50mm Hg.  8. Pericardium, extracardiac: There were bilateral pleural effusions     present.  Impressions:  This study is compared with previous dated 24:       CT ABDOMEN+PELVIS(CONTRAST ONLY)(CPT=74177)  Result Date: 2024  CONCLUSION:     1. No acute  intra-abdominal process is identified. The etiology of the patient's symptoms is unclear from this study.  2. Heavy stool burden may reflect constipation.  3. Cholelithiasis without CT evidence acute complication.  4. Heterogeneously enhancing left adrenal nodule, not fully characterized.  5. Nonobstructing renal calculus.  6. Uncomplicated distal colonic diverticulosis.   7. Extensive thoracolumbar compression fractures.  8. Left hip arthroplasty with resultant artifactual degradation of the pelvis.  9. Lesser incidental findings as above.    Dictated by (CST): Ben Elmore MD on 12/11/2024 at 8:17 PM     Finalized by (CST): Ben Elmore MD on 12/11/2024 at 8:27 PM         CT CHEST (CPT=71250) Result Date: 12/11/2024  CONCLUSION:  1. Patchy bilateral lower lobe airspace disease, in association with extensive mucous plugging/debris of the lower lobe bronchi, concerning for potential aspiration pneumonitis and/or bronchopneumonia.  2. Small pleural effusions and associated basilar atelectasis, with or without superimposed pneumonia.  3. Chronicity-indeterminate compression fracture deformities of the thoracic spine, up to severe degree, with kyphotic accentuation.   4. Marked cardiomegaly.  5. Dilatation of the main pulmonary artery trunk may relate to underlying pulmonary hypertension.   6. Low-density appearance of the intracardiac blood pool raises the possibility of underlying anemia. Correlate with hematologic parameters.  7. Lesser incidental findings as above.    Dictated by (CST): Ben Elmore MD on 12/11/2024 at 7:59 PM     Finalized by (CST): Ben Elmore MD on 12/11/2024 at 8:07 PM        CT BRAIN OR HEAD (CPT=70450)  Result Date: 12/11/2024  CONCLUSION:    1. No acute intracranial process by noncontrast CT technique.  2. Senescent changes of parenchymal volume loss with sequela of chronic microvascular ischemic disease including chronic lacunar infarcts of the basal ganglia.  3. There is large  vessel atherosclerosis of the anterior and posterior intracranial circulations.  4. Right mastoid effusion. Correlation for relevant symptomatology is suggested.   5. Lesser incidental findings as above.     Dictated by (CST): Ben Elmore MD on 12/11/2024 at 7:46 PM     Finalized by (CST): Ben Elmore MD on 12/11/2024 at 7:50 PM         XR CHEST AP PORTABLE  (CPT=71045)  Result Date: 12/11/2024  CONCLUSION:         1. CHF with pulmonary interstitial edema and moderate right basilar pleural effusion.  Compressive atelectasis in the right lower lobe.    Dictated by (CST): Malcom Murillo MD on 12/11/2024 at 6:26 PM     Finalized by (CST): Malcom Murillo MD on 12/11/2024 at 6:29 PM          EKG  Result Date: 12/12/2024  Atrial fibrillation with rapid ventricular response Abnormal ECG When compared with ECG of 20-JAN-2024 22:34, Vent. rate has increased BY  54 BPM Nonspecific T wave abnormality no longer evident in Anterior leads Confirmed by MAGGIE WEEKS, RACHELL ([id number]) on 12/12/2024 8:12:55 AM     Summary of Discussion      I discussed reason for palliative care consultation with Daughter and her finance Saba by phone      I differentiated the palliative treatment-focus model versus the hospice comfort-focused philosophy of care. I informed the patient/family that having palliative care support does not limit medical treatment options or decisions to those who wish to continue curative or restorative medical therapies. I discussed the benefits of palliative care to include assistance with arising symptom management needs, an extra layer of support, to ensure GOC are respected throughout healthcare continuum, and assist with transition to hospice care when appropriate.      Outpatient(office setting /Community Palliative Care Services( home or facility) :  Usually visit once every 4- 6  weeks  Focus on GOC and symptom management   Palliative Care criteria:  Serious illness  Not altered by prognosis   Does  not limit curative or restorative therapies, but complement to usual care      Daughter shared with me she has met with palliative care at several prior admissions and also was asked to meet with them and declined  In review of our EMR their was a visit in 12/ 2022  and then in 1/12/24 both at [place] ( I reviewed documentation)  In that note it stated  Palliative service was consulted twice in 12/2022 and 10/2023, both times, Jada declined our service .   Per discharge summaries from [facility] palliative care was consulted but I was not able to review their notes   Daughter Jada tells me she has not felt palliative services to be helpful as it has  always discussed negative things to give up on her mom and that is not something she is willing or able to do at this time.  I reviewed with her try to identify  ways she felt I could be supportive and assistive as her mom again is experiencing a very serious illness. I reviewed I want to try to be supportive and collaborative and help in her understanding of the complexity of her mom's serious health experience. I left my contact info there and she is aware I am available by phone over the weekend and present on site on Monday       PROGNOSTIC AWARENESS/UNDERSTANDING: Remains in the information gathering phase.  Did not want specific info on her illness or prognosis at this time. Wants to focus on positive recovery as just admitted       HOPES/GOALS:   Jada wants ongoing medical treatment to foster medical stability/recovery for her mom and again move on to physical rehab course  Hopes her mom will be able to be medically extubated and have breathing tube removed soon.     FEARS/CONCERNS:    She is very worried about further vocal cord injury and this will set her mom back in regards to her vocal quality/ ability to communicate  and even ability to return to eating on her own      PROVIDED EMOTIONAL SUPPORT TO POA/ daughter Jada and her fiance Saba through  active listening as they shared her illness journey and life story and things she enjoys as I provided supportive education and guidance  .    Advance Care Planning counseling and discussion:   HCPOA Documentation Completed--Document in Epic.  , Jada Butcher/ Zhang which is pt's Daughter with 1 successor   CODE STATUS DISCUSSION:  Briefly reviewed [place] > benefit and daughter remains wanted to not set any limits at this time but continue full resuscitated efforts   POLST FORM:  not reviewed in light of above   CODE STATUS: Full Code    Assessment and Recommendations        Palliative Care encounter    Counseling regarding goals of care an advance care planning    Acute Metabolic encephalopathy    Leukocytosis     Hypotension     Fever    Sepsis, due to unspecified organism, unspecified whether acute organ dysfunction present (HCC)    Acute on chronic  respiratory failure with hypercapnia & Hypoxia  (HCC)    Atrial fibrillation with rapid ventricular response (HCC)    HFpEF ( EF     Severe PCM     Prior history of pulmonary embolism on Eliquis     H/O persistent oropharyngeal Dysphagia,status post PEG      H/O dementia    Goals of care: remain established and treatment focused at this time but ongoing GOC discussions are needed as pt's illness trajectory evolves over this admission as this appears the sickest pt has been and at her lowest reserves. Daughter, Jada  has met with PC since 2022 and has declined these services several times or has chosen ongoing medical care for curative and restorative purposes despite evident declining illness trajectory since 2023 with multiple admission for recurrent issues with ongoing functional, cognitive and nutritional decline. It goes appear pt had short period of improvement ( 4-5 months) and she is holding on that this will happen again and her mom will once again turn around despite her medical frailty   Healthcare Power of Care: Paperwork in on file in EMR dated   2/25/2011  Primary HCPOA : Jada Holcomb ( daughter) at  [phone]  Successor: Saba Demarco- daughter's  fiance  at [phone]  Code Status: Full Code remains at this time     Symptoms; RN discussed with me daughter did not want pain meds. I discussed with RN if per her assessment pt was in pain just like all other assessments, she can medicate per her professional assessment. Educate daughter quick effect of Fentanyl and not effect hemodynamics.     Pain :   Discussed with MINDI Vanegas - Due to pt overall frailty and opioid nativity changed to Fentanyl 25 mcg IVP prn  Discontinue NSAID - based on pt's condition, DOAC and age risk > benefit   Ordered  Ofirmev IVP per weight dose prn as not now NPO and rectal route is not appropriate pt is critically ill     -Discussed today's visit with:  POA/ daughter Jada and successor POA/ daughter lisset on phone  and RN.    Palliative Care Follow Up: Palliative care team will Continue to follow while inpatient. I let daughter know I am available by phone on weekend and will be back on site on Monday 12/16/24   Palliative care follow up outpatient: TBD.in past daughter Jada has not been interested in his service     Thank you for allowing Palliative Care services to participate in the care of Yin Butcher.    A total of 55 minutes were spent on this consult, which included all of the following: chart review, direct face to face contact, history taking, physical examination, counseling and coordinating care, and documentation.     Jacquelyn Lindo, APRN  12/13/2024  Palliative Care Services at [facility] :  extension [phone]   or [phone]         [1]   Allergies  Allergen Reactions    Erythromycin OTHER (SEE COMMENTS)    Oxycodone HALLUCINATION    Gabapentin NAUSEA ONLY and OTHER (SEE COMMENTS)     Pt stated she didn't feel like herself     Tizanidine ITCHING and OTHER (SEE COMMENTS)     Pt states burning sensation

## 2024-12-13 NOTE — DIETARY NOTE
ADULT NUTRITION INITIAL ASSESSMENT    Pt is at high nutrition risk.  Pt meets severe malnutrition criteria.      CRITERIA FOR MALNUTRITION DIAGNOSIS:  Criteria for severe malnutrition diagnosis: chronic illness related to body fat severe depletion and muscle mass severe depletion.    RECOMMENDATIONS TO MD:   RD ordered EN feeds to start per protocol. High refeeding risk. Appropriate labs ordered for monitoring. Replace electrolytes per protocol. Slow advancement of EN. Adjust free water flushes once off IVF - currently minimized.   See Nutrition Intervention for enteral nutrition (EN) specifics     ADMITTING DIAGNOSIS:  Atrial fibrillation with rapid ventricular response (HCC) [I48.91]  Acute respiratory failure with hypercapnia (HCC) [J96.02]  Sepsis, due to unspecified organism, unspecified whether acute organ dysfunction present ([facility]) [A41.9]  PERTINENT PAST MEDICAL HISTORY:   Past Medical History:    Arthritis    Atrial fibrillation (HCC)    Back problem    Cancer (HCC)    Cataract    Osteoporosis    Personal history of antineoplastic chemotherapy     PATIENT STATUS:   12/13/24 INITIAL VISIT: Pt assessed due to low BMI and new consult for RD to initiate tube feeds. Pt presented to hospital with AMS, fevers over the past \"few days\". Extensive PMH as listed above including recent discharge from [facility] on 11/24/24. Pt is orally intubated, sedated on Propofol. No family present at time of visit. Unable to obtain recent diet hx.      FOOD/NUTRITION RELATED HISTORY:  Appetite: NPO  Intake: NPO  Intake Meeting Needs: NPO  Percent Meals Eaten (last 3 days)       None        Food Allergies: No Known Food Allergies (NKFA)  Cultural/Ethnic/Rastafarian Preferences: Not Obtained    GASTROINTESTINAL: +BM last documented on 12/12 per flowsheet, OG tube, and abdomen is non distended, soft, with bowel sounds present.     MEDICATIONS: reviewed; Noted cardiac electrolyte replacement protocol ordered; IVF provides 1.2 L daily; Propofol  at 8.8 ml/hr provides 232 kcal from LE   propofol 35 mcg/kg/min (12/13/24 0904)    sodium chloride 50 mL/hr at 12/13/24 0200      apixaban  2.5 mg Oral BID    piperacillin-tazobactam  3.375 g Intravenous Q8H    acetylcysteine  2 mL Nebulization TID    senna  10 mL Per NG Tube BID    docusate  100 mg Per NG Tube BID    chlorhexidine gluconate  15 mL Mouth/Throat BID@0800,2000    mineral oil-white petrolatum  1 Application Both Eyes Nightly    famotidine  20 mg Intravenous Daily @ 0700    mupirocin  1 Application Each Nare BID    vancomycin  750 mg Intravenous Q24H     LABS: reviewed; noted hypokalemia with replacement ordered per protocol; no Magnesium or P level - added on to this AM's labs  Recent Labs     12/11/24  1619 12/12/24  0339 12/13/24  0423   * 123* 96   BUN 50* 49* 38*   CREATSERUM 0.40* 0.61 0.59   CA 10.3 9.9 9.4    147* 145   K 4.5 4.3 3.4*    104 107   CO2 >40.0* >40.0* 31.0   OSMOCALC 314* 318* 309*     NUTRITION RELATED PHYSICAL FINDINGS:  - Nutrition Focused Physical Exam (NFPE): severe depletion body fat orbital region and fat overlying rib cage region and severe depletion muscle mass temple region and clavicle region.   - Fluid Accumulation: none  see RN documentation for details  - Skin Integrity: at risk, reddened, and other wounds noted see RN documentation for details    ANTHROPOMETRICS:  HT:  170.2 cm (5'7\")   WT: 42 kg (92 lb 9.5 oz)   DOSING WT: 42 kg (93 lbs - lowest wt this admission) - wt utilized for anthropometric assessment  BMI: Body mass index is 14.5 kg/m².  BMI CLASSIFICATION: less than 18.5 kg/m2 - underweight  IBW: 135 lbs        69% IBW  Usual Body Wt: 105 lbs (per EMR ~1 yr ago)      89% UBW    WEIGHT HISTORY: Current wt reflects 11% (12 lb)  unintentional wt loss.   Patient Weight(s) for the past 336 hrs:   Weight   12/12/24 0530 42 kg (92 lb 9.5 oz)   12/11/24 1610 47.6 kg (105 lb)     Wt Readings from Last 10 Encounters:   12/12/24 42 kg (92 lb 9.5 oz)    02/02/24 56.7 kg (125 lb)   10/29/23 47.5 kg (104 lb 11.5 oz)   09/10/23 42.1 kg (92 lb 14.4 oz)   04/13/23 53.5 kg (118 lb)   04/10/23 53.5 kg (118 lb)   04/03/23 53.5 kg (118 lb)   03/30/23 53.5 kg (118 lb)   03/27/23 53.3 kg (117 lb 9.6 oz)   03/22/23 64 kg (141 lb)     NUTRITION DIAGNOSIS/PROBLEM:   Severe Malnutrition related to Chronic - Physiological causes resulting in anorexia or diminished intake as evidenced by body fat severe depletion and muscle mass severe depletion.    NUTRITION INTERVENTION:     NUTRITION PRESCRIPTION:   Estimated Nutrition needs: --dosing wt of 42 kg - wt taken on 1212/24  Calories: 1108 calories/day (PSU 2003B (10.7 L/min, 36.9C Tmax) or 26 calories per kg Dosing wt)   886 kcal/day (~80% needs for first week of critical illness)  Protein: 50-63 g protein/day (1.2-1.5 g protein/kg Dosing wt)  Fluid Needs: 1108 ml (1 ml/kcal)    - Diet:       Procedures    NPO      - Enteral Nutrition:   Promote at 20 ml/hr per OG tube.   Recommend advance slowly by 10 ml daily to goal rate of 30 ml/hr. Based on average 22 hour infusion time Goal rate provides 660 total TF volume, 660 kcal, 41 grams protein, 554ml total free water, and 44% RDI's.    Flush with 30ml H2O q 4 hrs (to provide 180 ml total H2O from FWF daily).  Provide 1 packet(s) Prosource BID (to provide 80 kcal, 22 g protein and 90 ml daily)  TF with Prosource, H2O flushes and lipid calories from propofol (232 kcal) will provide 972 kcal (23 kcal/kg), 63 g protein (1.5 g/kg) and 824 ml fluids.   Meets 88% of estimated energy and 100% of estimated protein needs.      - RD Malnutrition Care Plan:  EN feeds + non-nutritive kcal to meet >80% of estimated needs within first week of critical illness.  - Meals and snacks: NPO  - Medical Food Supplements: RD added None at this time  - Vitamin and mineral supplements: NPO  - Feeding assistance: NPO  - Nutrition education: not appropriate at this time  - Coordination of nutrition care:  collaboration with other providers and discussed in Care Rounds  - discussed with RN on unit  - Discharge and transfer of nutrition care to new setting or provider: monitor plans     MONITOR AND EVALUATE/NUTRITION GOALS:  - Food and Nutrient Intake:      Monitor: N/A  - Food and Nutrient Administration:      Monitor: enteral nutrition initiation, tolerance to enteral nutrition, adequacy of enteral nutrition, and for enteral nutrition adjustment  - Anthropometric Measurement:    Monitor weight  - Nutrition Goals:      EN + non-nutritive kcal >80% energy needs, labs within acceptable limits, minimize lean body mass loss, and prevent skin breakdown    DIETITIAN FOLLOW UP: RD to follow and monitor nutrition status    Janel Gonsalves MS, RD, LDN, CNSC  [id number]

## 2024-12-13 NOTE — H&P
[facility]  [facility] of [facility]    History & Physical    Yin Butcher Patient Status:  Inpatient    1931 MRN [id number]   Location [facility] 2W/SW Attending Vika Elmore MD   Hosp Day # 0 PCP Mina Walker MD     Date:  2024  Date of Admission:  2024    History of Present Illness:  Yin Butcher is a(n) 89 year old female with past medical history of atrial fibrillation, pulmonary embolism presented to ER with fever, lethargy.  Patient was recently discharged from [facility] where she was admitted for aspiration pneumonia.  Patient was placed on BiPAP in ER.  Admitted to ICU.  Family wants her to be full code.  Overnight on BiPAP, continued to be tachypneic, ended up getting intubated today    History:  Past Medical History:    Arthritis    Atrial fibrillation (HCC)    Back problem    Cancer (HCC)    Cataract    Osteoporosis    Personal history of antineoplastic chemotherapy     Past Surgical History:   Procedure Laterality Date    Other surgical history  2014    Procedure: HIP HEMIARTHROPLASTY/ BIPOLAR;  Surgeon: Vasu Matamoros MD;  Location:  MAIN OR    Removal of tonsils,12+ y/o       Family History   Family history unknown: Yes      reports that she has never smoked. She has never used smokeless tobacco. She reports that she does not currently use alcohol. She reports that she does not use drugs.    Allergies:  Allergies[1]    Home Medications:  Prescriptions Prior to Admission[2]    Review of Systems:  Constitutional: Positive for fatigue and fevers  Eyes: negative  Ears, nose, mouth, throat, and face: negative  Respiratory: negative for cough, dyspnea on exertion, hemoptysis, sputum and wheezing  Cardiovascular: negative for chest pain, dyspnea, orthopnea, palpitations and syncope  Gastrointestinal: negative for abdominal pain, change in bowel habits, constipation, diarrhea and vomiting.  Musculoskeletal: negative for back  pain, neck pain and stiff joints  Neurological: negative for dizziness, headaches, tremors and weakness  Endocrine: negative  Allergic/Immunologic: negative    Physical Exam:  BP 92/64   Pulse (!) 155   Temp (!) 96 °F (35.6 °C) (Temporal)   Resp 20   Wt 92 lb 9.5 oz (42 kg)   SpO2 (!) 75%   BMI 14.50 kg/m²     General Appearance: Orally intubated, sedated, minimally responsive  HEENT: PERRLA  Neck: Supple, no carotid bruit or JVD  Lungs: Clear to auscultation bilaterally, No wheezes, crackles  Heart: Regular rate and rhythm, S1 and S2 normal, no murmur, rub or gallop  Abdomen: Soft, non-tender, non distended, Bs+, no organomegaly   Extremities: No cyanosis, edema   Pulses: 2+ and symmetric all extremities  Neurologic: Sedated    Laboratory Data:   Lab Results   Component Value Date    WBC 10.8 12/12/2024    HGB 10.9 12/12/2024    HCT 38.4 12/12/2024    .0 12/12/2024    CREATSERUM 0.61 12/12/2024    BUN 49 12/12/2024     12/12/2024    K 4.3 12/12/2024     12/12/2024    CO2 >40.0 12/12/2024     12/12/2024    CA 9.9 12/12/2024       Imaging:  Reviewed     Assessment:  Patient Active Problem List   Diagnosis    Hip fracture (HCC)    Hypotension    Osteoporosis    General weakness    Anemia    Thrombocytopenia (HCC)    Colon cancer (HCC)    Arthritis    Cancer (HCC)    PAF (paroxysmal atrial fibrillation) ([facility])    Hyponatremia    Nausea and vomiting in adult    SIADH (syndrome of inappropriate ADH production) ([facility])    Closed compression fracture of L1 lumbar vertebra, sequela    Encephalopathy, INFECTION    Altered mental status, unspecified altered mental status type    UTI (urinary tract infection)    Hypochloremia    Lightheadedness    Hyperglycemia    Azotemia    Metabolic alkalosis    Pleural effusion    Elevated troponin    Chronic atrial fibrillation (HCC)    Acute respiratory failure ([facility])    Dyspnea, unspecified type    Acute on chronic congestive heart failure, unspecified heart  failure type (HCC)    Acute cystitis with hematuria    Acute on chronic diastolic congestive heart failure (HCC)    Sepsis (HCC)    Acute respiratory failure with hypoxia (HCC)    Somnolence    Hyperkalemia    Respiratory acidosis    Multiple subsegmental pulmonary emboli without acute cor pulmonale (HCC)    Palliative care by specialist    Goals of care, counseling/discussion    Encephalopathy    Ventral hernia without obstruction or gangrene    Advance care planning    Sepsis, due to unspecified organism, unspecified whether acute organ dysfunction present (HCC)    Acute respiratory failure with hypercapnia (HCC)    Atrial fibrillation with rapid ventricular response (HCC)     #1.  Acute hypoxemic respiratory failure  2.  Fever  3.  Metabolic encephalopathy  4.  History of atrial fibrillation  5.  History of pulmonary embolism  6.  Dysphagia, status post PEG  7.  History of dementia  8.  Septic shock      Plan:    Patient is on ventilator  Vent management per ICU  Bronchodilator  Antibiotic  Pulmonary toileting  Being followed by cardiology  Princesse Jyotsna  IV Vanco, IV Zosyn  Follow-up with sputum culture, blood culture, urine culture  Sedation per protocol  Currently full code  Will get palliative consult to discuss goals of care  DVT/GI prophylaxis    **Certification      PHYSICIAN Certification of Need for Inpatient Hospitalization - Initial Certification    Patient will require inpatient services that will reasonably be expected to span two midnight's based on the clinical documentation in H+P.   Based on patients current state of illness, I anticipate that, after discharge, patient will require TBD.      Vika Elmore MD  12/12/2024           [1]   Allergies  Allergen Reactions    Erythromycin OTHER (SEE COMMENTS)    Oxycodone HALLUCINATION    Gabapentin NAUSEA ONLY and OTHER (SEE COMMENTS)     Pt stated she didn't feel like herself     Tizanidine ITCHING and OTHER (SEE COMMENTS)     Pt states burning  sensation    [2]   Medications Prior to Admission   Medication Sig Dispense Refill Last Dose/Taking    acetaZOLAMIDE 250 MG Oral Tab 1 tablet (250 mg total) by Per G Tube route daily.   Taking    Wheat Dextrin (BENEFIBER DRINK MIX) Oral Powd Pack 1 Package by Per G Tube route daily as needed (constipation).   Taking As Needed    furosemide 20 MG Oral Tab 1 tablet (20 mg total) by Per G Tube route daily.   Taking    apixaban 5 MG Oral Tab 0.5 tablets (2.5 mg total) by Per G Tube route 2 (two) times daily.   Taking    midodrine 5 MG Oral Tab 1 tablet (5 mg total) by Per G Tube route in the morning and 1 tablet (5 mg total) at noon and 1 tablet (5 mg total) in the evening. Take along with 10mg for a for a total dose of 15mg three times daily. .   Taking    midodrine 10 MG Oral Tab 1 tablet (10 mg total) by Per G Tube route 3 (three) times daily. Take along with 5mg for a for a total dose of 15mg three times daily.   Taking    multivitamin Oral Tab 1 tablet by Per G Tube route daily.   Taking    diphenhydrAMINE-APAP, sleep, (TYLENOL PM EXTRA STRENGTH)  MG Oral Tab 1 tablet by Per G Tube route daily as needed (Pain).   Taking As Needed    digoxin 0.125 MG Oral Tab 1 tablet (125 mcg total) by Per G Tube route Every Monday, Wednesday, and Friday.  0 Taking    metoprolol tartrate 50 MG Oral Tab 0.5 tablets (25 mg total) by Per G Tube route 2 (two) times daily. Hold for BP less than 90mmHg and pulse less than 60 bpm   Taking    lidocaine-menthol 4-1 % External Patch Place 1 patch onto the skin daily. (Patient taking differently: Place 1 patch onto the skin daily. Applies to lower back) 30 patch 0 Taking Differently    Calcium Carbonate-Vitamin D (CALCIUM-D) 600-400 MG-UNIT Oral Tab 1 tablet by Per G Tube route daily.   Taking

## 2024-12-13 NOTE — PLAN OF CARE
Pt is intubated and on propofol gtt. Has a harvey. On restraints.No bowel movement overnight. Bed is locked and in lowest position. Call light is within reach.   Problem: Patient Centered Care  Goal: Patient preferences are identified and integrated in the patient's plan of care  Description: Interventions:  - What would you like us to know as we care for you?   - Provide timely, complete, and accurate information to patient/family  - Incorporate patient and family knowledge, values, beliefs, and cultural backgrounds into the planning and delivery of care  - Encourage patient/family to participate in care and decision-making at the level they choose  - Honor patient and family perspectives and choices  Outcome: Progressing     Problem: Patient/Family Goals  Goal: Patient/Family Long Term Goal  Description: Patient's Long Term Goal: to go home    Interventions:  - follow MD order  - See additional Care Plan goals for specific interventions  Outcome: Progressing  Goal: Patient/Family Short Term Goal  Description: Patient's Short Term Goal:     Interventions:   -   - See additional Care Plan goals for specific interventions  Outcome: Progressing     Problem: Safety Risk - Non-Violent Restraints  Goal: Patient will remain free from self-harm  Description: INTERVENTIONS:  - Apply the least restrictive restraint to prevent harm  - Notify patient and family of reasons restraints applied  - Assess for any contributing factors to confusion (electrolyte disturbances, delirium, medications)  - Discontinue any unnecessary medical devices as soon as possible  - Assess the patient's physical comfort, circulation, skin condition, hydration, nutrition and elimination needs   - Reorient and redirection as needed  - Assess for the need to continue restraints  12/13/2024 0731 by Rjei Fernandez RN  Outcome: Progressing  12/13/2024 0657 by Reji Fernandez RN  Outcome: Progressing     Problem: PAIN - ADULT  Goal:  Verbalizes/displays adequate comfort level or patient's stated pain goal  Description: INTERVENTIONS:  - Encourage pt to monitor pain and request assistance  - Assess pain using appropriate pain scale  - Administer analgesics based on type and severity of pain and evaluate response  - Implement non-pharmacological measures as appropriate and evaluate response  - Consider cultural and social influences on pain and pain management  - Manage/alleviate anxiety  - Utilize distraction and/or relaxation techniques  - Monitor for opioid side effects  - Notify MD/LIP if interventions unsuccessful or patient reports new pain  - Anticipate increased pain with activity and pre-medicate as appropriate  Outcome: Progressing     Problem: RISK FOR INFECTION - ADULT  Goal: Absence of fever/infection during anticipated neutropenic period  Description: INTERVENTIONS  - Monitor WBC  - Administer growth factors as ordered  - Implement neutropenic guidelines  Outcome: Progressing     Problem: SAFETY ADULT - FALL  Goal: Free from fall injury  Description: INTERVENTIONS:  - Assess pt frequently for physical needs  - Identify cognitive and physical deficits and behaviors that affect risk of falls.  - [place] fall precautions as indicated by assessment.  - Educate pt/family on patient safety including physical limitations  - Instruct pt to call for assistance with activity based on assessment  - Modify environment to reduce risk of injury  - Provide assistive devices as appropriate  - Consider OT/PT consult to assist with strengthening/mobility  - Encourage toileting schedule  Outcome: Progressing     Problem: DISCHARGE PLANNING  Goal: Discharge to home or other facility with appropriate resources  Description: INTERVENTIONS:  - Identify barriers to discharge w/pt and caregiver  - Include patient/family/discharge partner in discharge planning  - Arrange for needed discharge resources and transportation as appropriate  - Identify discharge  learning needs (meds, wound care, etc)  - Arrange for interpreters to assist at discharge as needed  - Consider post-discharge preferences of patient/family/discharge partner  - Complete POLST form as appropriate  - Assess patient's ability to be responsible for managing their own health  - Refer to Case Management Department for coordinating discharge planning if the patient needs post-hospital services based on physician/LIP order or complex needs related to functional status, cognitive ability or social support system  Outcome: Progressing     Problem: Altered Communication/Language Barrier  Goal: Patient/Family is able to understand and participate in their care  Description: Interventions:  - Assess communication ability and preferred communication style  - Implement communication aides and strategies  - Use visual cues when possible  - Listen attentively, be patient, do not interrupt  - Minimize distractions  - Allow time for understanding and response  - Establish method for patient to ask for assistance (call light)  - Provide an  as needed  - Communicate barriers and strategies to overcome with those who interact with patient  Outcome: Progressing     Problem: RESPIRATORY - ADULT  Goal: Achieves optimal ventilation and oxygenation  Description: INTERVENTIONS:  - Assess for changes in respiratory status  - Assess for changes in mentation and behavior  - Position to facilitate oxygenation and minimize respiratory effort  - Oxygen supplementation based on oxygen saturation or ABGs  - Provide Smoking Cessation handout, if applicable  - Encourage broncho-pulmonary hygiene including cough, deep breathe, Incentive Spirometry  - Assess the need for suctioning and perform as needed  - Assess and instruct to report SOB or any respiratory difficulty  - Respiratory Therapy support as indicated  - Manage/alleviate anxiety  - Monitor for signs/symptoms of CO2 retention  Outcome: Progressing

## 2024-12-13 NOTE — CM/SW NOTE
Patient is currently intubated and full code.  Per MD note, will be ordering palliative consult.    CM to follow and assist with dc planning needs.    Kimmie Dial MBA BSN RN CRRN   RN Case Manager  [phone]

## 2024-12-13 NOTE — PROGRESS NOTES
[facility]  part of Merg[facility] with [facility]     Progress Note    Yin Butcher Patient Status:  Inpatient    1931 MRN [id number]   Location [facility] 2W/SW Attending Vika Elmore MD   Hosp Day # 1 PCP Mina Walker MD       Subjective:   Patient seen and examined.  Intubated, sedated.    Objective:   Blood pressure (!) 109/91, pulse 111, temperature 96.7 °F (35.9 °C), resp. rate 20, weight 92 lb 9.5 oz (42 kg), SpO2 100%.  Intake/Output:   Last 3 shifts: I/O last 3 completed shifts:  In:  [I.V.:; IV PIGGYBACK:100]  Out: 575 [Urine:575]   This shift: I/O this shift:  In: -   Out: 75 [Urine:75]     Vent Settings: Vent Mode: CPAP;PS  FiO2 (%):  [30 %-100 %] 30 %  S RR:  [20] 20  S VT:  [450 mL] 450 mL  PEEP/CPAP (cm H2O):  [5 cm H20] 5 cm H20  MAP (cm H2O):  [11-13] 11    Hemodynamic parameters (last 24 hours):      Scheduled Meds:   Current Facility-Administered Medications   Medication Dose Route Frequency    potassium chloride (Klor-Con) 20 MEQ oral powder 40 mEq  40 mEq Oral Q4H    apixaban (Eliquis) tab 2.5 mg  2.5 mg Oral BID    piperacillin-tazobactam (Zosyn) 3.375 g in dextrose 5% 100 mL IVPB-ADDV  3.375 g Intravenous Q8H    acetylcysteine (Mucomyst) 20 % nebulizer solution 2 mL  2 mL Nebulization TID    albuterol (Ventolin) (5 MG/ML) 0.5% nebulizer solution 5 mg  5 mg Nebulization Q4H PRN    acetaminophen (Tylenol) 160 MG/5ML oral liquid 650 mg  650 mg Per NG Tube Q4H PRN    Or    acetaminophen (Tylenol) rectal suppository 650 mg  650 mg Rectal Q4H PRN    fentaNYL (Sublimaze) 50 mcg/mL injection 50 mcg  50 mcg Intravenous Q30 Min PRN    senna (Senokot) 8.8 MG/5ML oral syrup 17.6 mg  10 mL Per NG Tube BID    docusate (Colace) 50 MG/5ML oral solution 100 mg  100 mg Per NG Tube BID    polyethylene glycol (PEG 3350) (Miralax) 17 g oral packet 17 g  17 g Per NG Tube Daily PRN    bisacodyl (Dulcolax) 10 MG rectal suppository 10 mg  10 mg Rectal Daily PRN    chlorhexidine  gluconate (Peridex) 0.12 % oral solution 15 mL  15 mL Mouth/Throat BID@0800,2000    mineral oil-white petrolatum (Artificial Tears) 83-15 % ophthalmic ointment 1 Application  1 Application Both Eyes Nightly    propofol (Diprivan) 10 mg/mL infusion premix  5-50 mcg/kg/min (Dosing Weight) Intravenous Continuous    famotidine (Pepcid) 20 mg/2mL injection 20 mg  20 mg Intravenous Daily @ 0700    sodium chloride 0.9% infusion   Intravenous Continuous    mupirocin (Bactroban) 2% nasal ointment 1 Application  1 Application Each Nare BID    ipratropium-albuterol (Duoneb) 0.5-2.5 (3) MG/3ML inhalation solution 3 mL  3 mL Nebulization Q6H PRN    norepinephrine (Levophed) 4 mg/250mL infusion premix  0.5-50 mcg/min Intravenous Continuous    ibuprofen (Motrin) tab 200 mg  200 mg Oral Q4H PRN    ondansetron (Zofran) 4 MG/2ML injection 4 mg  4 mg Intravenous Q6H PRN    metoclopramide (Reglan) 5 mg/mL injection 10 mg  10 mg Intravenous Q8H PRN    vancomycin (Vancocin) 750 mg in sodium chloride 0.9% 250 mL IVPB-ADDV  750 mg Intravenous Q24H       Continuous Infusions:    propofol 35 mcg/kg/min (12/13/24 0904)    sodium chloride 50 mL/hr at 12/13/24 0200    norepinephrine Stopped (12/11/24 1850)       Physical Exam  Constitutional: Intubated, sedated  Eyes: PERRL  ENT: nares pateint  Neck: supple, no JVD  Cardio: RRR, S1 S2  Respiratory: Diminished bibasilar breath sounds with crackles present  GI: abdomen soft, non tender, active bowel sounds, no organomegaly + PEG tube in place  Extremities: no clubbing, cyanosis, edema  Neurologic: Does not follow commands  Skin: warm, dry      Results:     Lab Results   Component Value Date    WBC 15.8 12/13/2024    HGB 10.7 12/13/2024    HCT 37.5 12/13/2024    .0 12/13/2024    CREATSERUM 0.59 12/13/2024    BUN 38 12/13/2024     12/13/2024    K 3.4 12/13/2024     12/13/2024    CO2 31.0 12/13/2024    GLU 96 12/13/2024    CA 9.4 12/13/2024       CT ABDOMEN+PELVIS(CONTRAST  ONLY)(CPT=74177)    Result Date: 12/11/2024  CONCLUSION:  1. No acute intra-abdominal process is identified. The etiology of the patient's symptoms is unclear from this study.  2. Heavy stool burden may reflect constipation.  3. Cholelithiasis without CT evidence acute complication.  4. Heterogeneously enhancing left adrenal nodule, not fully characterized.  5. Nonobstructing renal calculus.  6. Uncomplicated distal colonic diverticulosis.   7. Extensive thoracolumbar compression fractures.  8. Left hip arthroplasty with resultant artifactual degradation of the pelvis.  9. Lesser incidental findings as above.    Dictated by (CST): Ben Elmore MD on 12/11/2024 at 8:17 PM     Finalized by (CST): Ben Elmore MD on 12/11/2024 at 8:27 PM          CT CHEST (UAW=73558)    Result Date: 12/11/2024  CONCLUSION:  1. Patchy bilateral lower lobe airspace disease, in association with extensive mucous plugging/debris of the lower lobe bronchi, concerning for potential aspiration pneumonitis and/or bronchopneumonia.  2. Small pleural effusions and associated basilar atelectasis, with or without superimposed pneumonia.  3. Chronicity-indeterminate compression fracture deformities of the thoracic spine, up to severe degree, with kyphotic accentuation.   4. Marked cardiomegaly.  5. Dilatation of the main pulmonary artery trunk may relate to underlying pulmonary hypertension.   6. Low-density appearance of the intracardiac blood pool raises the possibility of underlying anemia. Correlate with hematologic parameters.  7. Lesser incidental findings as above.    Dictated by (CST): Ben Elmore MD on 12/11/2024 at 7:59 PM     Finalized by (CST): Ben Elmore MD on 12/11/2024 at 8:07 PM          CT BRAIN OR HEAD (CPT=70450)    Result Date: 12/11/2024  CONCLUSION:  1. No acute intracranial process by noncontrast CT technique.  2. Senescent changes of parenchymal volume loss with sequela of chronic microvascular ischemic disease  including chronic lacunar infarcts of the basal ganglia.  3. There is large vessel atherosclerosis of the anterior and posterior intracranial circulations.  4. Right mastoid effusion. Correlation for relevant symptomatology is suggested.   5. Lesser incidental findings as above.     Dictated by (CST): Ben Elmore MD on 12/11/2024 at 7:46 PM     Finalized by (CST): Ben Elmore MD on 12/11/2024 at 7:50 PM          XR CHEST AP PORTABLE  (CPT=71045)    Result Date: 12/11/2024  CONCLUSION:  1. CHF with pulmonary interstitial edema and moderate right basilar pleural effusion.  Compressive atelectasis in the right lower lobe.    Dictated by (CST): Malcom Murillo MD on 12/11/2024 at 6:26 PM     Finalized by (CST): Malcom Murillo MD on 12/11/2024 at 6:29 PM           EKG    Result Date: 12/12/2024  Atrial fibrillation with rapid ventricular response Abnormal ECG When compared with ECG of 20-JAN-2024 22:34, Vent. rate has increased BY  54 BPM Nonspecific T wave abnormality no longer evident in Anterior leads Confirmed by MAGGIE WEEKS, RACHELL ([id number]) on 12/12/2024 8:12:55 AM     Assessment   1.  Fevers  2.  Acute encephalopathy  3.  Acute on chronic hypercapnic hypoxemic respiratory failure  4.  Leukocytosis  5.  HFpEF  6.  History of atrial fibrillation  7.  Prior history of pulmonary embolism  9.  Dementia  10.  Dysphagia     Plan   -Patient presents with chief complaint of fevers ongoing altered mental status over the last several days.  Recently discharged on 11/24/2024 from [facility] after hospitalization for presumed aspiration pneumonia.  -Worsening hypercapnic respiratory failure on NIV.  Patient intubated on 12/12/2024.  -Spontaneous breathing trial as tolerated.  -CT abdomen pelvis with no acute intra-abdominal findings seen  -CT chest on 12/11/2024 with lower lobe mucous plugging concerning for aspiration with small pleural effusions.  -Continue therapy with Zosyn and vancomycin at this  time  -Blood cultures thus far with no growth to date  -Chest physiotherapy  -DVT prophylaxis: Eliquis  -Earlier discussion with daughter regarding patient's overall clinical decline.  I strongly encouraged her to reconsider CODE and intubation status prior to intubation but at this time patient remains full code.  Given patient's overall condition and age I do not recommend long-term ventilatory support if patient not improving clinically.  Will continue to have ongoing dialogue with daughter regarding goals of care.        Dayo Espino, DO  Pulmonary Critical Care Medicine  [facility]

## 2024-12-13 NOTE — PROGRESS NOTES
[facility]  Cardiology Progress Note    Yin Butcher Patient Status:  Inpatient    1931 MRN [id number]   Location [facility] 2W/SW Attending Vika Elmore MD   Hosp Day # 1 PCP Mina Walker MD     Subjective     Intubated yesterday.  Sedated on propofol.  Remains in atrial fibrillation with rates 100-120s.    Objective:   Patient Vitals for the past 24 hrs:   BP Temp Temp src Pulse Resp SpO2   24 0800 (!) 109/91 96.7 °F (35.9 °C) -- 111 20 100 %   24 0700  -- -- (!) 127 11 100 %   24 0600  -- -- (!) 121 20 100 %   24 0500 (!)  -- -- 117 19 100 %   24 0400 (!)  98.5 °F (36.9 °C) Temporal 103 20 100 %   24 0300  -- -- 92 20 93 %   24 0200  -- -- 113 20 100 %   24 0100 (!)  -- -- 102 20 100 %   24 0000 (!)  96.5 °F (35.8 °C) Temporal 100 20 98 %   24 2310 (!)  -- -- 104 20 97 %   24 2300 (!) 80 -- -- 95 20 99 %   24 2200 (!)  -- -- 101 20 100 %   24 2100  -- -- 92 20 92 %   24 2000 (!)  97 °F (36.1 °C) Temporal 113 20 100 %   24 1900 (!)  -- -- 117 20 98 %   24 1800  -- -- (!) 155 20 (!) 75 %   24 1700 (!)  -- -- 113 20 100 %   24 1600 (!)  (!) 96 °F (35.6 °C) Temporal 116 20 97 %   24 1500 (!) 88/64 -- -- 116 20 98 %   24 1400 (!) 81/55 -- -- (!) 123 20 100 %   24 1348 (!) 81/55 -- -- (!) 132 20 --   24 1300 (!) 86/70 -- -- 114 18 100 %   24 1200 (!) 73/57 97.8 °F (36.6 °C) Temporal 114 20 99 %   24 1105 -- -- -- 100 14 100 %   24 1100 (!) 89/65 -- -- 120 12 (!) 82 %   24 1000 99/76 -- -- 111 19 100 %     Intake/Output:   Last 3 shifts:   Intake/Output                   24 0700 - 24 0659 24 0700 - 24 0659 24 0700 - 24 0659       Intake    P.O.  --  0  --    P.O. -- 0 --    I.V.  --    --    I.V. --  1772 --    Volume (mL) Propofol -- 157 --    IV PIGGYBACK  --  100  --    Volume (mL) (piperacillin-tazobactam (Zosyn) 3.375 g in dextrose 5% 100 mL IVPB-ADDV) -- 100 --    Total Intake -- 2029 --       Output    Urine  10  575  75    Intermittent/Straight Cath (mL) 10 -- --    Output (mL) (Urethral Catheter) -- 575 75    Total Output 10 575 75       Net I/O     -10 1454 -75             Vent Settings: Vent Mode: VC/AC  FiO2 (%):  [30 %-100 %] 30 %  S RR:  [20] 20  S VT:  [450 mL] 450 mL  PEEP/CPAP (cm H2O):  [5 cm H20] 5 cm H20  MAP (cm H2O):  [11-13] 11    Scheduled Meds:    apixaban  2.5 mg Oral BID    piperacillin-tazobactam  3.375 g Intravenous Q8H    acetylcysteine  2 mL Nebulization TID    senna  10 mL Per NG Tube BID    docusate  100 mg Per NG Tube BID    chlorhexidine gluconate  15 mL Mouth/Throat BID@0800,2000    mineral oil-white petrolatum  1 Application Both Eyes Nightly    famotidine  20 mg Intravenous Daily @ 0700    mupirocin  1 Application Each Nare BID    vancomycin  750 mg Intravenous Q24H       Continuous Infusions:    propofol 35 mcg/kg/min (12/13/24 0904)    sodium chloride 50 mL/hr at 12/13/24 0200    norepinephrine Stopped (12/11/24 1850)       Results:     Lab Results   Component Value Date    WBC 15.8 (H) 12/13/2024    HGB 10.7 (L) 12/13/2024    HCT 37.5 12/13/2024    .0 12/13/2024    CREATSERUM 0.59 12/13/2024    BUN 38 (H) 12/13/2024     12/13/2024    K 3.4 (L) 12/13/2024     12/13/2024    CO2 31.0 12/13/2024    GLU 96 12/13/2024    CA 9.4 12/13/2024    ALB 4.0 12/11/2024    ALKPHO 112 12/11/2024    BILT 1.0 (H) 12/11/2024    TP 8.2 12/11/2024    AST 24 12/11/2024    ALT 30 12/11/2024    PTT 34.6 01/31/2024    INR 1.32 (H) 01/29/2024    PT 15.5 (H) 07/13/2014    T4F 1.0 02/19/2015    TSH 2.138 12/11/2024    LIP 35 12/11/2024    DDIMER 2.38 (H) 01/20/2024    MG 2.2 01/22/2024    PHOS 6.1 (H) 01/21/2024    TROP <0.046 02/19/2015    CK 32 01/20/2024    B12 586 01/23/2024        Recent Labs   Lab 12/11/24  1619 12/12/24  0339 12/13/24  0423   * 123* 96   BUN 50* 49* 38*   CREATSERUM 0.40* 0.61 0.59   CA 10.3 9.9 9.4    147* 145   K 4.5 4.3 3.4*    104 107   CO2 >40.0* >40.0* 31.0     Recent Labs   Lab 12/11/24  1619 12/12/24  0339 12/13/24  0644   RBC 4.17 3.95 3.87   HGB 11.9* 10.9* 10.7*   HCT 40.5 38.4 37.5   MCV 97.1 97.2 96.9   MCH 28.5 27.6 27.6   MCHC 29.4* 28.4* 28.5*   RDW 21.2* 20.9* 21.7*   NEPRELIM 10.98* 9.47* 13.67*   WBC 12.1* 10.8 15.8*   .0 179.0 155.0       No results for input(s): \"BNPML\" in the last 168 hours.    No results for input(s): \"TROP\", \"CK\" in the last 168 hours.    CT ABDOMEN+PELVIS(CONTRAST ONLY)(CPT=74177)    Result Date: 12/11/2024  CONCLUSION:  1. No acute intra-abdominal process is identified. The etiology of the patient's symptoms is unclear from this study.  2. Heavy stool burden may reflect constipation.  3. Cholelithiasis without CT evidence acute complication.  4. Heterogeneously enhancing left adrenal nodule, not fully characterized.  5. Nonobstructing renal calculus.  6. Uncomplicated distal colonic diverticulosis.   7. Extensive thoracolumbar compression fractures.  8. Left hip arthroplasty with resultant artifactual degradation of the pelvis.  9. Lesser incidental findings as above.    Dictated by (CST): Ben Elmore MD on 12/11/2024 at 8:17 PM     Finalized by (CST): Ben Elmore MD on 12/11/2024 at 8:27 PM          CT CHEST (AME=79046)    Result Date: 12/11/2024  CONCLUSION:  1. Patchy bilateral lower lobe airspace disease, in association with extensive mucous plugging/debris of the lower lobe bronchi, concerning for potential aspiration pneumonitis and/or bronchopneumonia.  2. Small pleural effusions and associated basilar atelectasis, with or without superimposed pneumonia.  3. Chronicity-indeterminate compression fracture deformities of the thoracic spine, up to severe degree, with kyphotic accentuation.    4. Marked cardiomegaly.  5. Dilatation of the main pulmonary artery trunk may relate to underlying pulmonary hypertension.   6. Low-density appearance of the intracardiac blood pool raises the possibility of underlying anemia. Correlate with hematologic parameters.  7. Lesser incidental findings as above.    Dictated by (CST): Ben Elmore MD on 2024 at 7:59 PM     Finalized by (CST): Ben Elmore MD on 2024 at 8:07 PM          CT BRAIN OR HEAD (CPT=70450)    Result Date: 2024  CONCLUSION:  1. No acute intracranial process by noncontrast CT technique.  2. Senescent changes of parenchymal volume loss with sequela of chronic microvascular ischemic disease including chronic lacunar infarcts of the basal ganglia.  3. There is large vessel atherosclerosis of the anterior and posterior intracranial circulations.  4. Right mastoid effusion. Correlation for relevant symptomatology is suggested.   5. Lesser incidental findings as above.     Dictated by (CST): Ben Elmore MD on 2024 at 7:46 PM     Finalized by (CST): Ben Elmore MD on 2024 at 7:50 PM          XR CHEST AP PORTABLE  (CPT=71045)    Result Date: 2024  CONCLUSION:  1. CHF with pulmonary interstitial edema and moderate right basilar pleural effusion.  Compressive atelectasis in the right lower lobe.    Dictated by (CST): Malcom Murillo MD on 2024 at 6:26 PM     Finalized by (CST): Malcom Murillo MD on 2024 at 6:29 PM         EKG    Result Date: 2024  Atrial fibrillation with rapid ventricular response Abnormal ECG When compared with ECG of 2024 22:34, Vent. rate has increased BY  54 BPM Nonspecific T wave abnormality no longer evident in Anterior leads Confirmed by MAGGIE WEEKS, RACHELL ([id number]) on 2024 8:12:55 AM   CARD ECHO 2D DOPPLER (CPT=93306)    Result Date: 2024  Transthoracic Echocardiogram Name:Yin Butcher Date: 2024 :  1931 Ht:  (67in)  BP: 121 /  78 MRN:  [id number]    Age:  89years    Wt:  (92lb)  HR: 110bpm Loc:  [facility]       Gndr: F          BSA: 1.45m^2 Sonographer: Rachael HAMMER Ordering:    cleve Hawthorne Consulting:  Dayo Espino MD ---------------------------------------------------------------------------- History/Indications:   Atrial fibrillation. Fever. Shortness of breath. ---------------------------------------------------------------------------- Procedure information:  A transthoracic complete 2D study was performed. Additional evaluation included M-mode, complete spectral Doppler, and color Doppler.  Patient status:  Inpatient.  Location:  [facility]    Comparison was made to the study of 01/21/2024.    This was a routine study. Transthoracic echocardiography for ventricular function evaluation and assessment of valvular function. Image quality was adequate. ECG rhythm:   Atrial fibrillation ---------------------------------------------------------------------------- Conclusions: 1. Left ventricle: The cavity size was below normal. Wall thickness was    increased. Systolic function was hyperdynamic. The estimated ejection    fraction was 70-75%, by visual assessment. No diagnostic evidence for    regional wall motion abnormalities. Unable to assess LV diastolic    function due to heart rhythm. 2. Right ventricle: Systolic function was mildly reduced. The tricuspid    annular plane systolic excursion is 1.26cm. The RV s' is 9.2cm/sec. 3. Left atrium: The left atrial volume was markedly increased. 4. Right atrium: The atrium was markedly dilated. 5. Aortic valve: The peak systolic velocity was 1.54m/sec. The mean systolic    gradient was 5mm Hg. The valve area (VTI) was 1.58cm^2. The valve area    (VTI) index was 1.09cm^2/m^2. 6. Mitral valve: The annulus was markedly calcified. The leaflets were    mildly calcified. Cannot exclude a vegetation. There was mild    regurgitation. The mean diastolic gradient was 4mm Hg at a heart rate of     120 bpm. 7. Pulmonary arteries: Systolic pressure was moderately increased, in the    range of 45mm Hg to 50mm Hg. 8. Pericardium, extracardiac: There were bilateral pleural effusions    present. Impressions:  This study is compared with previous dated 1/21/24: * ---------------------------------------------------------------------------- * Findings: Left ventricle:  The cavity size was below normal. Wall thickness was increased. Systolic function was hyperdynamic. The estimated ejection fraction was 70-75%, by visual assessment. No diagnostic evidence for regional wall motion abnormalities. Unable to assess LV diastolic function due to heart rhythm. Left atrium:  The left atrial volume was markedly increased. Right ventricle:  The cavity size was normal. Systolic function was mildly reduced. Right atrium:  The atrium was markedly dilated. Mitral valve:  Poorly visualized. The annulus was markedly calcified. The leaflets were mildly calcified. Leaflet separation was normal.  Cannot exclude a vegetation.  Doppler:  Transvalvular velocity was within the normal range. There was no evidence for stenosis. There was mild regurgitation.    The valve area (LVOT continuity) was 2.01cm^2. The valve area index (LVOT continuity) was 1.38cm^2/m^2.    The mean diastolic gradient was 4mm Hg at a heart rate of 120 bpm. Aortic valve:   The valve was probably trileaflet. The leaflets were moderately calcified.  Doppler:     The valve area (VTI) was 1.58cm^2. The valve area (VTI) index was 1.09cm^2/m^2.    The mean systolic gradient was 5mm Hg. The peak systolic gradient was 13mm Hg. Tricuspid valve:  The valve is structurally normal. Leaflet separation was normal.  Doppler:  Transvalvular velocity was within the normal range. There was no evidence for stenosis. There was mild regurgitation. Pulmonic valve:   The valve is structurally normal. Cusp separation was normal.  Doppler:  Transvalvular velocity was within the normal range.  There was no evidence for stenosis. There was trace regurgitation. Pericardium:   No significant pericardial effusion was identified. Pleura:  There were bilateral pleural effusions present. Aorta: Aortic root: The aortic root was normal. Ascending aorta: The ascending aorta was normal. Pulmonary arteries: Systolic pressure was moderately increased, in the range of 45mm Hg to 50mm Hg. ---------------------------------------------------------------------------- Measurements  Left              Value             Ref       01/21/2024  ventricle  IVS           (H) 1.2   cm          0.6 - 0.9 0.9  thickness,  ED, PLAX  LV ID, ED,    (L) 2.6   cm          3.8 - 5.2 3.6  PLAX  LV ID, ES,    (L) 1.6   cm          2.2 - 3.5 2.2  PLAX  LV PW         (H) 1.1   cm          0.6 - 0.9 0.8  thickness,  ED, PLAX  IVS/LV PW         1.09              --------- 1.10  ratio, ED,  PLAX  LV PW/LV ID       0.42              --------- 0.23  ratio, ED,  PLAX  LV ejection       71    %           54 - 74   70  fraction  Stroke            28    ml/m^2      --------- ----------  volume/bsa,  2D  LVOT              Value             Ref       01/21/2024  LVOT ID           2     cm          --------- ----------  LVOT peak         0.82  m/sec       --------- ----------  velocity, S  LVOT VTI, S       13.2  cm          --------- ----------  LVOT peak         3     mm Hg       --------- ----------  gradient, S  LVOT mean         1     mm Hg       --------- ----------  gradient, S  Stroke volume     41    ml          --------- ----------  (SV), LVOT DP  Cardiac           4.3   L/min       --------- ----------  output (Qs),  LVOT DP  Cardiac index     3     L/(min-m^2) --------- ----------  (Qs/bsa),  LVOT DP  Stroke index      29    ml/m^2      --------- ----------  (SV/bsa),  LVOT DP  Aortic valve      Value             Ref       01/21/2024  Aortic valve      1.54  m/sec       --------- ----------  peak  velocity, S  Aortic valve      23.3  cm           --------- ----------  VTI, S  Aortic mean       5     mm Hg       --------- ----------  gradient, S  Aortic peak       13    mm Hg       --------- ----------  gradient, S  Aortic valve      1.58  cm^2        --------- ----------  area, VTI  Aortic valve      1.09  cm^2/m^2    --------- ----------  area/bsa, VTI  Velocity          0.46              --------- ----------  ratio, peak,  LVOT/AV  Aortic root       Value             Ref       01/21/2024  Aortic root       3.3   cm          2.3 - 3.7 ----------  ID  Ascending         Value             Ref       01/21/2024  aorta  Ascending         2.9   cm          1.9 - 3.5 ----------  aorta ID  Left atrium       Value             Ref       01/21/2024  LA ID, A-P,       3.5   cm          2.7 - 3.8 4.0  ES  LA volume, S  (H) 100   ml          22 - 52   ----------  LA            (H) 69    ml/m^2      16 - 34   ----------  volume/bsa, S  LA volume,    (H) 114   ml          22 - 52   ----------  ES, 1-p A4C  LA volume,    (H) 88    ml          22 - 52   ----------  ES, 1-p A2C  LA volume,        108   ml          --------- ----------  ES, A/L  LA            (H) 74    ml/m^2      16 - 34   ----------  volume/bsa,  ES, A/L  LA/aortic         1.06              --------- 1.33  root ratio  Mitral valve      Value             Ref       01/21/2024  Mitral mean       4     mm Hg       --------- ----------  gradient, D  Mitral peak       7     mm Hg       --------- ----------  gradient, D  Mitral valve      2.01  cm^2        --------- ----------  area, LVOT  continuity  Mitral valve      1.38  cm^2/m^2    --------- ----------  area/bsa,  LVOT  continuity  Pulmonary         Value             Ref       01/21/2024  artery  PA pressure,      47    mm Hg       --------- 35  S, DP  Tricuspid         Value             Ref       01/21/2024  valve  Tricuspid     (H) 3.12  m/sec       <=2.8     2.81  regurg peak  velocity  Tricuspid         39    mm Hg       --------- 32  peak RV-RA  gradient   Systemic          Value             Ref       01/21/2024  veins  Estimated CVP     8     mm Hg       --------- 3  Right             Value             Ref       01/21/2024  ventricle  TAPSE, MM     (L) 1.26  cm          >=1.70    ----------  RV pressure,      47    mm Hg       --------- 35  S, DP  RV s',        (L) 9.2   cm/sec      >=9.5     ----------  lateral Legend: (L)  and  (H)  corinna values outside specified reference range. ---------------------------------------------------------------------------- Prepared and electronically signed by Yonatan Trevino 12/12/2024 08:57     Exam:     General: Intubated, sedated.  Cachectic elderly female.  HEENT: Normocephalic, anicteric sclera, neck supple, no thyromegaly or adenopathy.  Neck: No JVD, carotids 2+, no bruits.  Cardiac: Irregularly irregular rhythm, mildly tachycardic.. S1, S2 normal. No murmur, pericardial rub, S3, or extra cardiac sounds.  Lungs: Clear without wheezes, rales, rhonchi or dullness.  Normal excursions and effort.  Abdomen: Soft, non-tender. No organosplenomegally, mass or rebound, BS-present.  Extremities: Without clubbing or cyanosis. No edema.    Neurologic: Sedated.  Skin: Warm and dry.     Assessment and Plan:   Assessment:  1.  Hypercarbic and hypoxic respiratory failure.     2.  Hypotension and fever, probably septic shock.  CT evidence of mucous plugging and concern for aspiration.     3.  Historically normal LV systolic function.     4.  What is probably permanent atrial fibrillation, with rapid ventricular response.  Lenient rate control given clinical condition.    5.  Questionable mitral valve vegetation.        Plan:  1.  Will review transthoracic echocardiogram images myself to assess possible mitral valve vegetations, will compare to prior echo.  Would prefer to be more conservative given poor prognosis.       2.  Continue supportive care with IV antibiotics, pulmonary treatments.     3.  Lenient rate control for atrial fibrillation.   If rates become significant elevated can give IV Lopressor as needed.     4.  Ongoing discussions regarding goals of care.    Barry Do MD  [facility]  12/13/2024

## 2024-12-13 NOTE — RESPIRATORY THERAPY NOTE
Pt received on full support ETT 7.0 - 23 @ lip. Pt tolerating well, suction provided as needed.  No changes were made.  RT will continue to monitor.          12/13/24 2478   Vent Information   $ RT Standby Charge (per 15 min) 1   Is this patient on Chronic Ventilation? No   Interface Invasive   Vent Type AV   Vent plugged into main power? Yes   Vent Mode VC/AC   Settings   FiO2 (%) 30 %   Resp Rate (Set) 20   Vt (Set, mL) 450 mL   Waveform Decelerating ramp   PEEP/CPAP (cm H2O) 5 cm H20   Peak Flow LPM 60   Trigger Sensitivity Flow (L/min) 2 L/min   Humidification Heat and moisture exchanger   Readings   Total RR 20   Minute Ventilation (L/min) 8.1 L/min   Expiratory Tidal Volume 410 mL   PIP Observed (cm H2O) 35 cm H2O   MAP (cm H2O) 11   I/E Ratio 1:3.8   Plateau Pressure (cm H2O) 29 cm H2O   Static Compliance (L/cm H2O) 16   Dynamic Compliance (L/cm H2O) 13 L/cm H2O

## 2024-12-13 NOTE — PLAN OF CARE
Problem: NEUROLOGICAL - ADULT  Goal: Achieves stable or improved neurological status  Description: INTERVENTIONS  - Assess for and report changes in neurological status  - Initiate measures to prevent increased intracranial pressure  - Maintain blood pressure and fluid volume within ordered parameters to optimize cerebral perfusion and minimize risk of hemorrhage  - Monitor temperature, glucose, and sodium. Initiate appropriate interventions as ordered  Outcome: Not Progressing  Goal: Achieves maximal functionality and self care  Description: INTERVENTIONS  - Monitor swallowing and airway patency with patient fatigue and changes in neurological status  - Encourage and assist patient to increase activity and self care with guidance from PT/OT  - Encourage visually impaired, hearing impaired and aphasic patients to use assistive/communication devices  Outcome: Not Progressing     Problem: Safety Risk - Non-Violent Restraints  Goal: Patient will remain free from self-harm  Description: INTERVENTIONS:  - Apply the least restrictive restraint to prevent harm  - Notify patient and family of reasons restraints applied  - Assess for any contributing factors to confusion (electrolyte disturbances, delirium, medications)  - Discontinue any unnecessary medical devices as soon as possible  - Assess the patient's physical comfort, circulation, skin condition, hydration, nutrition and elimination needs   - Reorient and redirection as needed  - Assess for the need to continue restraints  Outcome: Progressing     Problem: RESPIRATORY - ADULT  Goal: Achieves optimal ventilation and oxygenation  Description: INTERVENTIONS:  - Assess for changes in respiratory status  - Assess for changes in mentation and behavior  - Position to facilitate oxygenation and minimize respiratory effort  - Oxygen supplementation based on oxygen saturation or ABGs  - Provide Smoking Cessation handout, if applicable  - Encourage broncho-pulmonary hygiene  including cough, deep breathe, Incentive Spirometry  - Assess the need for suctioning and perform as needed  - Assess and instruct to report SOB or any respiratory difficulty  - Respiratory Therapy support as indicated  - Manage/alleviate anxiety  - Monitor for signs/symptoms of CO2 retention  Outcome: Progressing

## 2024-12-14 PROBLEM — Z71.89 COUNSELING REGARDING ADVANCE CARE PLANNING AND GOALS OF CARE: Status: ACTIVE | Noted: 2024-01-01

## 2024-12-14 PROBLEM — J96.22 ACUTE ON CHRONIC RESPIRATORY FAILURE WITH HYPOXIA AND HYPERCAPNIA (HCC): Status: ACTIVE | Noted: 2024-01-01

## 2024-12-14 PROBLEM — J96.21 ACUTE ON CHRONIC RESPIRATORY FAILURE WITH HYPOXIA AND HYPERCAPNIA (HCC): Status: ACTIVE | Noted: 2024-01-01

## 2024-12-14 PROBLEM — Z51.5 PALLIATIVE CARE ENCOUNTER: Status: ACTIVE | Noted: 2024-01-01

## 2024-12-14 NOTE — PROGRESS NOTES
Daily Pulmonary/ICU/Critical Care Progress Note          SUBJECTIVE:  On vent   Afebrile   Dtr at bedside      ALLERGIES:  Allergies[1]      MEDS:  Home Medications:  Medications Taking[2]  Scheduled Medication:   ipratropium-albuterol  3 mL Nebulization TID    apixaban  2.5 mg Oral BID    piperacillin-tazobactam  3.375 g Intravenous Q8H    acetylcysteine  2 mL Nebulization TID    senna  10 mL Per NG Tube BID    docusate  100 mg Per NG Tube BID    chlorhexidine gluconate  15 mL Mouth/Throat BID@0800,2000    mineral oil-white petrolatum  1 Application Both Eyes Nightly    famotidine  20 mg Intravenous Daily @ 0700    mupirocin  1 Application Each Nare BID    vancomycin  750 mg Intravenous Q24H     Continuous Infusing Medication:   propofol Stopped (12/14/24 0954)    dextrose 10%       PRN Medications:    ipratropium-albuterol    fentaNYL    acetaminophen    dextrose 10%    lipase-protease-amylase (Lip-Prot-Amyl) **AND** sodium bicarbonate    metoprolol    albuterol    acetaminophen **OR** acetaminophen    polyethylene glycol (PEG 3350)    bisacodyl    ondansetron    metoclopramide       PHYSICAL EXAM:  BP 94/74 (BP Location: Right arm)   Pulse 109   Temp 97.9 °F (36.6 °C) (Temporal)   Resp 20   Wt 92 lb 9.5 oz (42 kg)   SpO2 100%   BMI 14.50 kg/m²   Vent Mode: VC/AC  FiO2 (%):  [30 %] 30 %  S RR:  [20] 20  S VT:  [450 mL] 450 mL  PEEP/CPAP (cm H2O):  [5 cm H20] 5 cm H20  MAP (cm H2O):  [11-12] 12    CONSTITUTIONAL: sedated, biting ETT  HEENT: atraumatic normocephalic  MOUTH: ETT/OGT  NECK/THROAT: no JVD. Trachea midline. No obvious thyromegaly  LUNG: clear upper b/l no wheezing, + basilar crackles. Chest symmetric with respiratory motion  HEART: irregular, no obvious murmers or gallops noted  ABD: soft non tender. + bowel sounds. No organomegaly noted  EXT: no clubbing, cyanosis, or edema noted      IMAGES:   Reviewed in EMR  CXR 12/14/24  CARDIAC/VASC: Cardiomegaly.  ET tube in satisfactory position 3.7 cm,  previously 2.9 cm above the gertrudis   MEDIAST/CHER:   Atherosclerotic aorta with no visible aneurysm.    LUNGS/PLEURA: Right perihilar basilar parenchymal abnormality with subpulmonic effusion.  Unchanged curvilinear opacity at the left lung base. Blunting of the left costophrenic angle again noted.    BONES: Mild scoliosis with mild degenerative disc disease and spondylosis.    OTHER: Capsular calcifications bilateral breast implants.  Surgical clips over the right lower quadrant.  Kyphoplasty changes in the lumbar spine.  There is diffuse osseous demineralization, which limits sensitivity for detection of osseous pathology.  There is multilevel degenerative disease of the spine.       LABS:  Recent Labs   Lab 12/12/24  0339 12/13/24  0644 12/14/24  0344   RBC 3.95 3.87 3.47*   HGB 10.9* 10.7* 9.5*   HCT 38.4 37.5 31.8*   MCV 97.2 96.9 91.6   MCH 27.6 27.6 27.4   MCHC 28.4* 28.5* 29.9*   RDW 20.9* 21.7* 22.3*   NEPRELIM 9.47* 13.67* 9.04*   WBC 10.8 15.8* 10.2   .0 155.0 190.0       Recent Labs   Lab 12/11/24  1619 12/12/24  0339 12/13/24  0423 12/14/24  0344   * 123* 96 112*   BUN 50* 49* 38* 41*   CREATSERUM 0.40* 0.61 0.59 0.71   EGFRCR 92 83 84 79   CA 10.3 9.9 9.4 8.9   ALB 4.0  --   --   --     147* 145 144   K 4.5 4.3 3.4* 5.1  5.1    104 107 110   CO2 >40.0* >40.0* 31.0 28.0   ALKPHO 112  --   --   --    AST 24  --   --   --    ALT 30  --   --   --    BILT 1.0*  --   --   --    TP 8.2  --   --   --        ASSESSMENT/PLAN:  Acute on chronic hypoxemic and hypercapnic respiratory failure  -on MV support-low settings  -being treated for possible aspiration PNA-on zosyn/vanco. MRSA nares +  -WBC improving   -continue nebs, chest PT, mucomyst nebs  -consider lasix if BP tolerates    Afib and hx of HFpEF  -supportive care  -eliquis  -consider lasix if BP improves  -cardiology following     Hx of PE  -eliquis    Hx of dementia and now with acute encephalopathy  -supportive care  -wean off  propofol as able    Proph:  -DVT: eliqus  -Nutrition: TF    Dispo:  -Full code  -updated dtr at bedside    Critical care time 36 min independent of procedures    Thank you for the opportunity to care for Yin Amaya DO, MPH  Pulmonary Critical Care Medicine  [facility]          [1]   Allergies  Allergen Reactions    Erythromycin OTHER (SEE COMMENTS)    Oxycodone HALLUCINATION    Gabapentin NAUSEA ONLY and OTHER (SEE COMMENTS)     Pt stated she didn't feel like herself     Tizanidine ITCHING and OTHER (SEE COMMENTS)     Pt states burning sensation    [2]   Outpatient Medications Marked as Taking for the 12/11/24 encounter (Hospital Encounter)   Medication Sig Dispense Refill    acetaZOLAMIDE 250 MG Oral Tab 1 tablet (250 mg total) by Per G Tube route daily.      Wheat Dextrin (BENEFIBER DRINK MIX) Oral Powd Pack 1 Package by Per G Tube route daily as needed (constipation).      furosemide 20 MG Oral Tab 1 tablet (20 mg total) by Per G Tube route daily.      apixaban 5 MG Oral Tab 0.5 tablets (2.5 mg total) by Per G Tube route 2 (two) times daily.      midodrine 5 MG Oral Tab 1 tablet (5 mg total) by Per G Tube route in the morning and 1 tablet (5 mg total) at noon and 1 tablet (5 mg total) in the evening. Take along with 10mg for a for a total dose of 15mg three times daily. .      midodrine 10 MG Oral Tab 1 tablet (10 mg total) by Per G Tube route 3 (three) times daily. Take along with 5mg for a for a total dose of 15mg three times daily.      multivitamin Oral Tab 1 tablet by Per G Tube route daily.      diphenhydrAMINE-APAP, sleep, (TYLENOL PM EXTRA STRENGTH)  MG Oral Tab 1 tablet by Per G Tube route daily as needed (Pain).      digoxin 0.125 MG Oral Tab 1 tablet (125 mcg total) by Per G Tube route Every Monday, Wednesday, and Friday.  0    metoprolol tartrate 50 MG Oral Tab 0.5 tablets (25 mg total) by Per G Tube route 2 (two) times daily.  Hold for BP less than 90mmHg and pulse less than 60 bpm      lidocaine-menthol 4-1 % External Patch Place 1 patch onto the skin daily. (Patient taking differently: Place 1 patch onto the skin daily. Applies to lower back) 30 patch 0    Calcium Carbonate-Vitamin D (CALCIUM-D) 600-400 MG-UNIT Oral Tab 1 tablet by Per G Tube route daily.

## 2024-12-14 NOTE — PLAN OF CARE
Bilateral wrist restraints in place. ROM performed Q 2 hours.   Problem: Patient Centered Care  Goal: Patient preferences are identified and integrated in the patient's plan of care  Description: Interventions:  - What would you like us to know as we care for you?   - Provide timely, complete, and accurate information to patient/family  - Incorporate patient and family knowledge, values, beliefs, and cultural backgrounds into the planning and delivery of care  - Encourage patient/family to participate in care and decision-making at the level they choose  - Honor patient and family perspectives and choices  Outcome: Progressing     Problem: Safety Risk - Non-Violent Restraints  Goal: Patient will remain free from self-harm  Description: INTERVENTIONS:  - Apply the least restrictive restraint to prevent harm  - Notify patient and family of reasons restraints applied  - Assess for any contributing factors to confusion (electrolyte disturbances, delirium, medications)  - Discontinue any unnecessary medical devices as soon as possible  - Assess the patient's physical comfort, circulation, skin condition, hydration, nutrition and elimination needs   - Reorient and redirection as needed  - Assess for the need to continue restraints  Outcome: Progressing     Problem: PAIN - ADULT  Goal: Verbalizes/displays adequate comfort level or patient's stated pain goal  Description: INTERVENTIONS:  - Encourage pt to monitor pain and request assistance  - Assess pain using appropriate pain scale  - Administer analgesics based on type and severity of pain and evaluate response  - Implement non-pharmacological measures as appropriate and evaluate response  - Consider cultural and social influences on pain and pain management  - Manage/alleviate anxiety  - Utilize distraction and/or relaxation techniques  - Monitor for opioid side effects  - Notify MD/LIP if interventions unsuccessful or patient reports new pain  - Anticipate increased  Multiple vertebral segments in cervical and thoracic region misaligned. Manual osteopathic manipulative therapy performed and immediate relief obtained. Patient instantaneously improved. pain with activity and pre-medicate as appropriate  Outcome: Progressing     Problem: RISK FOR INFECTION - ADULT  Goal: Absence of fever/infection during anticipated neutropenic period  Description: INTERVENTIONS  - Monitor WBC  - Administer growth factors as ordered  - Implement neutropenic guidelines  Outcome: Progressing     Problem: SAFETY ADULT - FALL  Goal: Free from fall injury  Description: INTERVENTIONS:  - Assess pt frequently for physical needs  - Identify cognitive and physical deficits and behaviors that affect risk of falls.  - [place] fall precautions as indicated by assessment.  - Educate pt/family on patient safety including physical limitations  - Instruct pt to call for assistance with activity based on assessment  - Modify environment to reduce risk of injury  - Provide assistive devices as appropriate  - Consider OT/PT consult to assist with strengthening/mobility  - Encourage toileting schedule  Outcome: Progressing     Problem: RESPIRATORY - ADULT  Goal: Achieves optimal ventilation and oxygenation  Description: INTERVENTIONS:  - Assess for changes in respiratory status  - Assess for changes in mentation and behavior  - Position to facilitate oxygenation and minimize respiratory effort  - Oxygen supplementation based on oxygen saturation or ABGs  - Provide Smoking Cessation handout, if applicable  - Encourage broncho-pulmonary hygiene including cough, deep breathe, Incentive Spirometry  - Assess the need for suctioning and perform as needed  - Assess and instruct to report SOB or any respiratory difficulty  - Respiratory Therapy support as indicated  - Manage/alleviate anxiety  - Monitor for signs/symptoms of CO2 retention  Outcome: Progressing

## 2024-12-14 NOTE — PLAN OF CARE
Problem: RESPIRATORY - ADULT  Goal: Achieves optimal ventilation and oxygenation  Description: INTERVENTIONS:  - Assess for changes in respiratory status  - Assess for changes in mentation and behavior  - Position to facilitate oxygenation and minimize respiratory effort  - Oxygen supplementation based on oxygen saturation or ABGs  - Provide Smoking Cessation handout, if applicable  - Encourage broncho-pulmonary hygiene including cough, deep breathe, Incentive Spirometry  - Assess the need for suctioning and perform as needed  - Assess and instruct to report SOB or any respiratory difficulty  - Respiratory Therapy support as indicated  - Manage/alleviate anxiety  - Monitor for signs/symptoms of CO2 retention  12/14/2024 0321 by Mary Marley RCP  Outcome: Progressing       Pt received on MV, setting follow. Neb treatments and vest therapy done as scheduled. Pt tolerated well. No acute changes overnight. RT continue to monitor.      12/13/24 2050   Vent Information   Vent Mode VC/AC   Settings   FiO2 (%) 30 %   Resp Rate (Set) 20   Vt (Set, mL) 450 mL   Waveform Decelerating ramp   PEEP/CPAP (cm H2O) 5 cm H20

## 2024-12-14 NOTE — RESPIRATORY THERAPY NOTE
Patient received intubated and on ventilator. AC/VC 20/450/+5/30%. SBT 5/5 done today, patient was tachypneic throughout trial. Placed back on full support after 25 minutes. Respiratory status stable and tolerating ventilator well, suctioned as needed. Receiving DuoNeb and Mucomyst TID with chest physiotherapy via vest.

## 2024-12-14 NOTE — PROGRESS NOTES
[facility]  part of [facility]    Progress Note    Yin Butcher Patient Status:  Inpatient    1931 MRN [id number]   Location [facility] 2W/SW Attending Vika Elmore MD   Hosp Day # 2 PCP Mina Walker MD     SUBJECTIVE:  Pt seen and examined at bedside.   Remains orally intubated, sedated.  FiO2 of 30%, PEEP of 5  Orally intubated, not following command,    BP 94/74 (BP Location: Right arm)   Pulse 109   Temp 97.9 °F (36.6 °C) (Temporal)   Resp 20   Wt 92 lb 9.5 oz (42 kg)   SpO2 100%   BMI 14.50 kg/m²     Physical Examination:    Gen: Orally intubated, not following command,. Appears comfortable.  HEENT: PERRLA  Lungs: CTA B/L  Heart: Normal S1 S2, No M/G/R   Abd: Abdomen soft, nontender, nondistended, no organomegaly, bowel sounds present  Ext: No edema, no calf tenderness    Labs:   Lab Results   Component Value Date    WBC 10.2 2024    HGB 9.5 2024    HCT 31.8 2024    .0 2024    CREATSERUM 0.71 2024    BUN 41 2024     2024    K 5.1 2024    K 5.1 2024     2024    CO2 28.0 2024     2024    CA 8.9 2024    MG 2.0 2024    PHOS 4.1 2024    PGLU 131 2024       Recent Labs   Lab 24  0004 24  0540   PGLU 106* 131*     No results for input(s): \"INR\" in the last 168 hours.    Imaging:  Imaging reviewed in Epic.    Meds:   Current Facility-Administered Medications   Medication Dose Route Frequency    propofol (Diprivan) 10 mg/mL infusion premix  5-50 mcg/kg/min (Dosing Weight) Intravenous Continuous    ipratropium-albuterol (Duoneb) 0.5-2.5 (3) MG/3ML inhalation solution 3 mL  3 mL Nebulization TID    ipratropium-albuterol (Duoneb) 0.5-2.5 (3) MG/3ML inhalation solution 3 mL  3 mL Nebulization Q6H PRN    fentaNYL (Sublimaze) 50 mcg/mL injection 25 mcg  25 mcg Intravenous Q30 Min PRN    acetaminophen (Ofirmev) 10 mg/mL infusion premix 650 mg   15 mg/kg Intravenous Q6H PRN    dextrose 10% infusion (TPN no rate)   Intravenous Continuous PRN    pancrelipase (Lip-Prot-Amyl) (Zenpep) DR particles cap 10,000 Units  10,000 Units Per G Tube PRN    And    sodium bicarbonate tab 325 mg  325 mg Per G Tube PRN    metoprolol (Lopressor) 5 mg/5mL injection 5 mg  5 mg Intravenous Q4H PRN    apixaban (Eliquis) tab 2.5 mg  2.5 mg Oral BID    piperacillin-tazobactam (Zosyn) 3.375 g in dextrose 5% 100 mL IVPB-ADDV  3.375 g Intravenous Q8H    acetylcysteine (Mucomyst) 20 % nebulizer solution 2 mL  2 mL Nebulization TID    albuterol (Ventolin) (5 MG/ML) 0.5% nebulizer solution 5 mg  5 mg Nebulization Q4H PRN    acetaminophen (Tylenol) 160 MG/5ML oral liquid 650 mg  650 mg Per NG Tube Q4H PRN    Or    acetaminophen (Tylenol) rectal suppository 650 mg  650 mg Rectal Q4H PRN    senna (Senokot) 8.8 MG/5ML oral syrup 17.6 mg  10 mL Per NG Tube BID    docusate (Colace) 50 MG/5ML oral solution 100 mg  100 mg Per NG Tube BID    polyethylene glycol (PEG 3350) (Miralax) 17 g oral packet 17 g  17 g Per NG Tube Daily PRN    bisacodyl (Dulcolax) 10 MG rectal suppository 10 mg  10 mg Rectal Daily PRN    chlorhexidine gluconate (Peridex) 0.12 % oral solution 15 mL  15 mL Mouth/Throat BID@0800,2000    mineral oil-white petrolatum (Artificial Tears) 83-15 % ophthalmic ointment 1 Application  1 Application Both Eyes Nightly    famotidine (Pepcid) 20 mg/2mL injection 20 mg  20 mg Intravenous Daily @ 0700    mupirocin (Bactroban) 2% nasal ointment 1 Application  1 Application Each Nare BID    ondansetron (Zofran) 4 MG/2ML injection 4 mg  4 mg Intravenous Q6H PRN    metoclopramide (Reglan) 5 mg/mL injection 10 mg  10 mg Intravenous Q8H PRN    vancomycin (Vancocin) 750 mg in sodium chloride 0.9% 250 mL IVPB-ADDV  750 mg Intravenous Q24H       Assessment:  #1.  Acute hypoxemic respiratory failure  2.  Fever  3.  Metabolic encephalopathy  4.  History of atrial fibrillation  5.  History of pulmonary  embolism  6.  Dysphagia, status post PEG  7.  History of dementia  8.  Septic shock        Plan:     Patient is on ventilator  Vent management per ICU  Bronchodilator  Antibiotic  Pulmonary toileting  Being followed by cardiology  Princesse Jyotsna  IV Vanco, IV Zosyn  Follow-up with sputum culture, blood culture, urine culture  Sedation per protocol  Currently full code  Continue tube feeding  Will get palliative consult to discuss goals of care  DVT/GI prophylaxis            Vika Elmore MD   12/14/2024  9:53 AM

## 2024-12-14 NOTE — RESPIRATORY THERAPY NOTE
Pt received on vent  Suctioned pt as needed throughout the day  Pt tolerating and saturating appropriately    SBT done in the am ; SBT settings 5/5/30%  SBT unsuccessful ; MD notified     12/14/24 1049   Spontaneous Parameters   Spontaneous RR Rate 32   Spontaneous Minute Volume 5.9   Average Spontaneous Tidal Volume 200   $ Spontaneous Vital Capacity   (Unable to follow commands)   Negative Inspiratory Force -17   Total RSBI 129   Weaning Trials   Patient self-extubated? No   Compassionate wean? No   Spontaneous Breathing Trial Time Initiated 1049   Spontaneous Breathing Trial Duration 4mins   Spontaneous Breathing Trial Method CPAP/PS   Spontaneous Breathing Trial Settings 5/5/30%   Pre Trial HR 14   Pre Trial RR 32   Pre Trial SpO2 97 %   Pre Trial /79   Pre Trial Vt 200     Pt placed back on full support  Pt tolerating and saturating appropriately    Pt on current vent settings VC/20/450/+5/30% ; ETT 7.0 @ 23 cm.  RT to continue to monitor.

## 2024-12-14 NOTE — PROGRESS NOTES
[facility]  Cardiology Progress Note    Yin Butcher Patient Status:  Inpatient    1931 MRN [id number]   Location [facility] 2W/SW Attending Vika Elmore MD   Hosp Day # 2 PCP Mina Walker MD     Subjective     Intubated, sedated.  Remains in atrial fibrillation with controlled rates.    Objective:   Patient Vitals for the past 24 hrs:   BP Temp Temp src Pulse Resp SpO2   24 0600 (!) 84/69 -- -- 110 20 100 %   24 0500 (!) 87/61 -- -- 103 20 100 %   24 0400 105/70 97.9 °F (36.6 °C) Temporal 95 20 100 %   24 0300 96/65 -- -- 92 20 100 %   24 0200 90/59 -- -- 109 20 100 %   24 0100 103/79 -- -- 111 20 100 %   24 0000 95/66 97.1 °F (36.2 °C) Temporal 110 20 100 %   24 2300 (!) 89/70 -- -- 113 20 100 %   24 2200 (!) 81/61 -- -- 117 20 100 %   24 2100 100/76 -- -- (!) 130 20 100 %   24 2000 96/56 98.2 °F (36.8 °C) Temporal (!) 124 20 100 %   24 1900 112/70 -- -- (!) 130 24 100 %   24 1800 98/73 -- -- 117 20 100 %   24 1700 106/77 -- -- (!) 122 20 100 %   24 1300 111/77 -- -- 120 20 100 %   24 1200 95/73 (!) 96.3 °F (35.7 °C) -- 118 20 --   24 1100 91/80 -- -- 106 20 --   24 1000 103/72 -- -- 114 20 100 %   24 0900 119/86 -- -- (!) 128 (!) 9 96 %   24 0800 (!) 109/91 96.7 °F (35.9 °C) -- 111 20 100 %   24 0700 92/67 -- -- (!) 127 11 100 %     Intake/Output:   Last 3 shifts:   Intake/Output                   24 07 - 24 0659 24 07 - 24 0659 24 07 - 12/15/24 0659       Intake    P.O.  0  0  --    P.O. 0 0 --    I.V.  1929  3065.1  --    I.V. 1772 1622 --    Volume (mL) Propofol 157 193.1 --    Volume (mL) (sodium chloride 0.9% infusion) -- 1250 --    NG/GT  --  390  --    Tube Feeding Flushes (mL) -- 390 --    IV PIGGYBACK  100  300  --    Volume (mL) (piperacillin-tazobactam (Zosyn) 3.375 g in dextrose 5% 100 mL IVPB-ADDV)  100 300 --    Total Intake 2029 3755.1 --       Output    Urine  575  550  --    Output (mL) (Urethral Catheter) 575 550 --    Stool  --  --  --    Stool Count Calculated for I/O -- 1 x --    Total Output 575 550 --       Net I/O     1454 3205.1 --             Vent Settings: Vent Mode: VC/AC  FiO2 (%):  [30 %] 30 %  S RR:  [20] 20  S VT:  [450 mL] 450 mL  PEEP/CPAP (cm H2O):  [5 cm H20] 5 cm H20  MAP (cm H2O):  [11-12] 12    Scheduled Meds:    apixaban  2.5 mg Oral BID    piperacillin-tazobactam  3.375 g Intravenous Q8H    acetylcysteine  2 mL Nebulization TID    senna  10 mL Per NG Tube BID    docusate  100 mg Per NG Tube BID    chlorhexidine gluconate  15 mL Mouth/Throat BID@0800,2000    mineral oil-white petrolatum  1 Application Both Eyes Nightly    famotidine  20 mg Intravenous Daily @ 0700    mupirocin  1 Application Each Nare BID    vancomycin  750 mg Intravenous Q24H       Continuous Infusions:    propofol 35 mcg/kg/min (12/14/24 0312)    dextrose 10%         Results:     Lab Results   Component Value Date    WBC 10.2 12/14/2024    HGB 9.5 (L) 12/14/2024    HCT 31.8 (L) 12/14/2024    .0 12/14/2024    CREATSERUM 0.71 12/14/2024    BUN 41 (H) 12/14/2024     12/14/2024    K 5.1 12/14/2024    K 5.1 12/14/2024     12/14/2024    CO2 28.0 12/14/2024     (H) 12/14/2024    CA 8.9 12/14/2024    ALB 4.0 12/11/2024    ALKPHO 112 12/11/2024    BILT 1.0 (H) 12/11/2024    TP 8.2 12/11/2024    AST 24 12/11/2024    ALT 30 12/11/2024    PTT 34.6 01/31/2024    INR 1.32 (H) 01/29/2024    PT 15.5 (H) 07/13/2014    T4F 1.0 02/19/2015    TSH 2.138 12/11/2024    LIP 35 12/11/2024    DDIMER 2.38 (H) 01/20/2024    MG 2.0 12/14/2024    PHOS 4.1 12/14/2024    TROP <0.046 02/19/2015    CK 32 01/20/2024    B12 586 01/23/2024       Recent Labs   Lab 12/12/24  0339 12/13/24  0423 12/14/24  0344   * 96 112*   BUN 49* 38* 41*   CREATSERUM 0.61 0.59 0.71   CA 9.9 9.4 8.9   * 145 144   K 4.3 3.4* 5.1   5.1    107 110   CO2 >40.0* 31.0 28.0     Recent Labs   Lab 12/12/24  0339 12/13/24  0644 12/14/24  0344   RBC 3.95 3.87 3.47*   HGB 10.9* 10.7* 9.5*   HCT 38.4 37.5 31.8*   MCV 97.2 96.9 91.6   MCH 27.6 27.6 27.4   MCHC 28.4* 28.5* 29.9*   RDW 20.9* 21.7* 22.3*   NEPRELIM 9.47* 13.67* 9.04*   WBC 10.8 15.8* 10.2   .0 155.0 190.0       No results for input(s): \"BNPML\" in the last 168 hours.    No results for input(s): \"TROP\", \"CK\" in the last 168 hours.    XR CHEST AP PORTABLE  (CPT=71045)    Result Date: 12/13/2024  CONCLUSION:  1. Cardiomegaly.  Tortuous thoracic aorta. 2. ET tube in satisfactory position. 3. Right basilar pleural parenchymal abnormality with slight progression.  Developing atelectatic changes left basilar region. .     Dictated by (CST): Melecio Jackson MD on 12/13/2024 at 9:21 AM     Finalized by (CST): Melecio Jackson MD on 12/13/2024 at 9:24 AM          CT ABDOMEN+PELVIS(CONTRAST ONLY)(CPT=74177)    Result Date: 12/11/2024  CONCLUSION:  1. No acute intra-abdominal process is identified. The etiology of the patient's symptoms is unclear from this study.  2. Heavy stool burden may reflect constipation.  3. Cholelithiasis without CT evidence acute complication.  4. Heterogeneously enhancing left adrenal nodule, not fully characterized.  5. Nonobstructing renal calculus.  6. Uncomplicated distal colonic diverticulosis.   7. Extensive thoracolumbar compression fractures.  8. Left hip arthroplasty with resultant artifactual degradation of the pelvis.  9. Lesser incidental findings as above.    Dictated by (CST): Ben Elmore MD on 12/11/2024 at 8:17 PM     Finalized by (CST): Ben Elmore MD on 12/11/2024 at 8:27 PM          CT CHEST (PRL=20244)    Result Date: 12/11/2024  CONCLUSION:  1. Patchy bilateral lower lobe airspace disease, in association with extensive mucous plugging/debris of the lower lobe bronchi, concerning for potential aspiration pneumonitis and/or  bronchopneumonia.  2. Small pleural effusions and associated basilar atelectasis, with or without superimposed pneumonia.  3. Chronicity-indeterminate compression fracture deformities of the thoracic spine, up to severe degree, with kyphotic accentuation.   4. Marked cardiomegaly.  5. Dilatation of the main pulmonary artery trunk may relate to underlying pulmonary hypertension.   6. Low-density appearance of the intracardiac blood pool raises the possibility of underlying anemia. Correlate with hematologic parameters.  7. Lesser incidental findings as above.    Dictated by (CST): Ben Elmore MD on 2024 at 7:59 PM     Finalized by (CST): Ben Elmore MD on 2024 at 8:07 PM          CT BRAIN OR HEAD (CPT=70450)    Result Date: 2024  CONCLUSION:  1. No acute intracranial process by noncontrast CT technique.  2. Senescent changes of parenchymal volume loss with sequela of chronic microvascular ischemic disease including chronic lacunar infarcts of the basal ganglia.  3. There is large vessel atherosclerosis of the anterior and posterior intracranial circulations.  4. Right mastoid effusion. Correlation for relevant symptomatology is suggested.   5. Lesser incidental findings as above.     Dictated by (CST): Ben Elmore MD on 2024 at 7:46 PM     Finalized by (CST): Ben Elmore MD on 2024 at 7:50 PM          XR CHEST AP PORTABLE  (CPT=71045)    Result Date: 2024  CONCLUSION:  1. CHF with pulmonary interstitial edema and moderate right basilar pleural effusion.  Compressive atelectasis in the right lower lobe.    Dictated by (CST): Malcom Murillo MD on 2024 at 6:26 PM     Finalized by (CST): Malcom Murillo MD on 2024 at 6:29 PM             CARD ECHO 2D DOPPLER (CPT=93306)    Result Date: 2024  Transthoracic Echocardiogram Name:Yin Butcher Date: 2024 :  1931 Ht:  (67in)  BP: 121 / 78 MRN:  [id number]    Age:  89years    Wt:  (92lb)  HR:  110bpm Loc:  [facility]       Gndr: F          BSA: 1.45m^2 Sonographer: Rachael HAMMER Ordering:    cleve Hawthorne Consulting:  Dayo Espino MD ---------------------------------------------------------------------------- History/Indications:   Atrial fibrillation. Fever. Shortness of breath. ---------------------------------------------------------------------------- Procedure information:  A transthoracic complete 2D study was performed. Additional evaluation included M-mode, complete spectral Doppler, and color Doppler.  Patient status:  Inpatient.  Location:  [facility]    Comparison was made to the study of 01/21/2024.    This was a routine study. Transthoracic echocardiography for ventricular function evaluation and assessment of valvular function. Image quality was adequate. ECG rhythm:   Atrial fibrillation ---------------------------------------------------------------------------- Conclusions: 1. Left ventricle: The cavity size was below normal. Wall thickness was    increased. Systolic function was hyperdynamic. The estimated ejection    fraction was 70-75%, by visual assessment. No diagnostic evidence for    regional wall motion abnormalities. Unable to assess LV diastolic    function due to heart rhythm. 2. Right ventricle: Systolic function was mildly reduced. The tricuspid    annular plane systolic excursion is 1.26cm. The RV s' is 9.2cm/sec. 3. Left atrium: The left atrial volume was markedly increased. 4. Right atrium: The atrium was markedly dilated. 5. Aortic valve: The peak systolic velocity was 1.54m/sec. The mean systolic    gradient was 5mm Hg. The valve area (VTI) was 1.58cm^2. The valve area    (VTI) index was 1.09cm^2/m^2. 6. Mitral valve: The annulus was markedly calcified. The leaflets were    mildly calcified. Cannot exclude a vegetation. There was mild    regurgitation. The mean diastolic gradient was 4mm Hg at a heart rate of    120 bpm. 7. Pulmonary arteries: Systolic pressure was  moderately increased, in the    range of 45mm Hg to 50mm Hg. 8. Pericardium, extracardiac: There were bilateral pleural effusions    present. Impressions:  This study is compared with previous dated 1/21/24: * ---------------------------------------------------------------------------- * Findings: Left ventricle:  The cavity size was below normal. Wall thickness was increased. Systolic function was hyperdynamic. The estimated ejection fraction was 70-75%, by visual assessment. No diagnostic evidence for regional wall motion abnormalities. Unable to assess LV diastolic function due to heart rhythm. Left atrium:  The left atrial volume was markedly increased. Right ventricle:  The cavity size was normal. Systolic function was mildly reduced. Right atrium:  The atrium was markedly dilated. Mitral valve:  Poorly visualized. The annulus was markedly calcified. The leaflets were mildly calcified. Leaflet separation was normal.  Cannot exclude a vegetation.  Doppler:  Transvalvular velocity was within the normal range. There was no evidence for stenosis. There was mild regurgitation.    The valve area (LVOT continuity) was 2.01cm^2. The valve area index (LVOT continuity) was 1.38cm^2/m^2.    The mean diastolic gradient was 4mm Hg at a heart rate of 120 bpm. Aortic valve:   The valve was probably trileaflet. The leaflets were moderately calcified.  Doppler:     The valve area (VTI) was 1.58cm^2. The valve area (VTI) index was 1.09cm^2/m^2.    The mean systolic gradient was 5mm Hg. The peak systolic gradient was 13mm Hg. Tricuspid valve:  The valve is structurally normal. Leaflet separation was normal.  Doppler:  Transvalvular velocity was within the normal range. There was no evidence for stenosis. There was mild regurgitation. Pulmonic valve:   The valve is structurally normal. Cusp separation was normal.  Doppler:  Transvalvular velocity was within the normal range. There was no evidence for stenosis. There was trace  regurgitation. Pericardium:   No significant pericardial effusion was identified. Pleura:  There were bilateral pleural effusions present. Aorta: Aortic root: The aortic root was normal. Ascending aorta: The ascending aorta was normal. Pulmonary arteries: Systolic pressure was moderately increased, in the range of 45mm Hg to 50mm Hg. ---------------------------------------------------------------------------- Measurements  Left              Value             Ref       01/21/2024  ventricle  IVS           (H) 1.2   cm          0.6 - 0.9 0.9  thickness,  ED, PLAX  LV ID, ED,    (L) 2.6   cm          3.8 - 5.2 3.6  PLAX  LV ID, ES,    (L) 1.6   cm          2.2 - 3.5 2.2  PLAX  LV PW         (H) 1.1   cm          0.6 - 0.9 0.8  thickness,  ED, PLAX  IVS/LV PW         1.09              --------- 1.10  ratio, ED,  PLAX  LV PW/LV ID       0.42              --------- 0.23  ratio, ED,  PLAX  LV ejection       71    %           54 - 74   70  fraction  Stroke            28    ml/m^2      --------- ----------  volume/bsa,  2D  LVOT              Value             Ref       01/21/2024  LVOT ID           2     cm          --------- ----------  LVOT peak         0.82  m/sec       --------- ----------  velocity, S  LVOT VTI, S       13.2  cm          --------- ----------  LVOT peak         3     mm Hg       --------- ----------  gradient, S  LVOT mean         1     mm Hg       --------- ----------  gradient, S  Stroke volume     41    ml          --------- ----------  (SV), LVOT DP  Cardiac           4.3   L/min       --------- ----------  output (Qs),  LVOT DP  Cardiac index     3     L/(min-m^2) --------- ----------  (Qs/bsa),  LVOT DP  Stroke index      29    ml/m^2      --------- ----------  (SV/bsa),  LVOT DP  Aortic valve      Value             Ref       01/21/2024  Aortic valve      1.54  m/sec       --------- ----------  peak  velocity, S  Aortic valve      23.3  cm          --------- ----------  VTI, S  Aortic mean       5      mm Hg       --------- ----------  gradient, S  Aortic peak       13    mm Hg       --------- ----------  gradient, S  Aortic valve      1.58  cm^2        --------- ----------  area, VTI  Aortic valve      1.09  cm^2/m^2    --------- ----------  area/bsa, VTI  Velocity          0.46              --------- ----------  ratio, peak,  LVOT/AV  Aortic root       Value             Ref       01/21/2024  Aortic root       3.3   cm          2.3 - 3.7 ----------  ID  Ascending         Value             Ref       01/21/2024  aorta  Ascending         2.9   cm          1.9 - 3.5 ----------  aorta ID  Left atrium       Value             Ref       01/21/2024  LA ID, A-P,       3.5   cm          2.7 - 3.8 4.0  ES  LA volume, S  (H) 100   ml          22 - 52   ----------  LA            (H) 69    ml/m^2      16 - 34   ----------  volume/bsa, S  LA volume,    (H) 114   ml          22 - 52   ----------  ES, 1-p A4C  LA volume,    (H) 88    ml          22 - 52   ----------  ES, 1-p A2C  LA volume,        108   ml          --------- ----------  ES, A/L  LA            (H) 74    ml/m^2      16 - 34   ----------  volume/bsa,  ES, A/L  LA/aortic         1.06              --------- 1.33  root ratio  Mitral valve      Value             Ref       01/21/2024  Mitral mean       4     mm Hg       --------- ----------  gradient, D  Mitral peak       7     mm Hg       --------- ----------  gradient, D  Mitral valve      2.01  cm^2        --------- ----------  area, LVOT  continuity  Mitral valve      1.38  cm^2/m^2    --------- ----------  area/bsa,  LVOT  continuity  Pulmonary         Value             Ref       01/21/2024  artery  PA pressure,      47    mm Hg       --------- 35  S, DP  Tricuspid         Value             Ref       01/21/2024  valve  Tricuspid     (H) 3.12  m/sec       <=2.8     2.81  regurg peak  velocity  Tricuspid         39    mm Hg       --------- 32  peak RV-RA  gradient  Systemic          Value             Ref        01/21/2024  veins  Estimated CVP     8     mm Hg       --------- 3  Right             Value             Ref       01/21/2024  ventricle  TAPSE, MM     (L) 1.26  cm          >=1.70    ----------  RV pressure,      47    mm Hg       --------- 35  S, DP  RV s',        (L) 9.2   cm/sec      >=9.5     ----------  lateral Legend: (L)  and  (H)  corinna values outside specified reference range. ---------------------------------------------------------------------------- Prepared and electronically signed by Yonatan Trevino 12/12/2024 08:57        Exam:     General: Intubated, sedated.  Cachectic elderly female.  HEENT: Normocephalic, anicteric sclera, neck supple, no thyromegaly or adenopathy.  Neck: No JVD, carotids 2+, no bruits.  Cardiac: Irregularly irregular rhythm, mildly tachycardic.. S1, S2 normal. No murmur, pericardial rub, S3, or extra cardiac sounds.  Lungs: Clear without wheezes, rales, rhonchi or dullness.  Normal excursions and effort.  Abdomen: Soft, non-tender. No organosplenomegally, mass or rebound, BS-present.  Extremities: Without clubbing or cyanosis. No edema.    Neurologic: Sedated.  Skin: Warm and dry.     Assessment and Plan:   Assessment:  1.  Hypercarbic and hypoxic respiratory failure.     2.  Hypotension and fever, probably septic shock.  CT evidence of mucous plugging and concern for aspiration.     3.  Historically normal LV systolic function.     4.  What is probably permanent atrial fibrillation, with rapid ventricular response.  Lenient rate control given clinical condition.     5.  Questionable mitral valve vegetation.        Plan:  1.  Will review transthoracic echocardiogram images myself to assess possible mitral valve vegetations, will compare to prior echo.  Would prefer to be more conservative given poor prognosis.       2.  Continue supportive care with IV antibiotics, pulmonary treatments.     3.  Lenient rate control for atrial fibrillation.  If rates become significant elevated can  give IV Lopressor as needed.     4.  Ongoing discussions regarding goals of care.    Barry Do MD  [facility] [place]  12/14/2024

## 2024-12-14 NOTE — PLAN OF CARE
Patient is intubated, on propofol gtt. Has a harvey. CHG bath done.PRN tylenol given. Potassium phosphate replaced.  Bed is locked and in lowest position. Call light is within reach.  Daughter at bedside. Updated on plan of care.   Problem: Patient Centered Care  Goal: Patient preferences are identified and integrated in the patient's plan of care  Description: Interventions:  - What would you like us to know as we care for you?   - Provide timely, complete, and accurate information to patient/family  - Incorporate patient and family knowledge, values, beliefs, and cultural backgrounds into the planning and delivery of care  - Encourage patient/family to participate in care and decision-making at the level they choose  - Honor patient and family perspectives and choices  Outcome: Progressing     Problem: Patient/Family Goals  Goal: Patient/Family Long Term Goal  Description: Patient's Long Term Goal: to go home    Interventions:  - follow MD order  - See additional Care Plan goals for specific interventions  Outcome: Progressing  Goal: Patient/Family Short Term Goal  Description: Patient's Short Term Goal: to be pain free    Interventions:   - follow medication regimen  - See additional Care Plan goals for specific interventions  Outcome: Progressing     Problem: Safety Risk - Non-Violent Restraints  Goal: Patient will remain free from self-harm  Description: INTERVENTIONS:  - Apply the least restrictive restraint to prevent harm  - Notify patient and family of reasons restraints applied  - Assess for any contributing factors to confusion (electrolyte disturbances, delirium, medications)  - Discontinue any unnecessary medical devices as soon as possible  - Assess the patient's physical comfort, circulation, skin condition, hydration, nutrition and elimination needs   - Reorient and redirection as needed  - Assess for the need to continue restraints  Outcome: Progressing     Problem: PAIN - ADULT  Goal:  Verbalizes/displays adequate comfort level or patient's stated pain goal  Description: INTERVENTIONS:  - Encourage pt to monitor pain and request assistance  - Assess pain using appropriate pain scale  - Administer analgesics based on type and severity of pain and evaluate response  - Implement non-pharmacological measures as appropriate and evaluate response  - Consider cultural and social influences on pain and pain management  - Manage/alleviate anxiety  - Utilize distraction and/or relaxation techniques  - Monitor for opioid side effects  - Notify MD/LIP if interventions unsuccessful or patient reports new pain  - Anticipate increased pain with activity and pre-medicate as appropriate  Outcome: Progressing     Problem: RISK FOR INFECTION - ADULT  Goal: Absence of fever/infection during anticipated neutropenic period  Description: INTERVENTIONS  - Monitor WBC  - Administer growth factors as ordered  - Implement neutropenic guidelines  Outcome: Progressing     Problem: SAFETY ADULT - FALL  Goal: Free from fall injury  Description: INTERVENTIONS:  - Assess pt frequently for physical needs  - Identify cognitive and physical deficits and behaviors that affect risk of falls.  - [place] fall precautions as indicated by assessment.  - Educate pt/family on patient safety including physical limitations  - Instruct pt to call for assistance with activity based on assessment  - Modify environment to reduce risk of injury  - Provide assistive devices as appropriate  - Consider OT/PT consult to assist with strengthening/mobility  - Encourage toileting schedule  Outcome: Progressing     Problem: DISCHARGE PLANNING  Goal: Discharge to home or other facility with appropriate resources  Description: INTERVENTIONS:  - Identify barriers to discharge w/pt and caregiver  - Include patient/family/discharge partner in discharge planning  - Arrange for needed discharge resources and transportation as appropriate  - Identify discharge  learning needs (meds, wound care, etc)  - Arrange for interpreters to assist at discharge as needed  - Consider post-discharge preferences of patient/family/discharge partner  - Complete POLST form as appropriate  - Assess patient's ability to be responsible for managing their own health  - Refer to Case Management Department for coordinating discharge planning if the patient needs post-hospital services based on physician/LIP order or complex needs related to functional status, cognitive ability or social support system  Outcome: Progressing     Problem: Altered Communication/Language Barrier  Goal: Patient/Family is able to understand and participate in their care  Description: Interventions:  - Assess communication ability and preferred communication style  - Implement communication aides and strategies  - Use visual cues when possible  - Listen attentively, be patient, do not interrupt  - Minimize distractions  - Allow time for understanding and response  - Establish method for patient to ask for assistance (call light)  - Provide an  as needed  - Communicate barriers and strategies to overcome with those who interact with patient  Outcome: Progressing     Problem: RESPIRATORY - ADULT  Goal: Achieves optimal ventilation and oxygenation  Description: INTERVENTIONS:  - Assess for changes in respiratory status  - Assess for changes in mentation and behavior  - Position to facilitate oxygenation and minimize respiratory effort  - Oxygen supplementation based on oxygen saturation or ABGs  - Provide Smoking Cessation handout, if applicable  - Encourage broncho-pulmonary hygiene including cough, deep breathe, Incentive Spirometry  - Assess the need for suctioning and perform as needed  - Assess and instruct to report SOB or any respiratory difficulty  - Respiratory Therapy support as indicated  - Manage/alleviate anxiety  - Monitor for signs/symptoms of CO2 retention  Outcome: Progressing     Problem:  NEUROLOGICAL - ADULT  Goal: Achieves stable or improved neurological status  Description: INTERVENTIONS  - Assess for and report changes in neurological status  - Initiate measures to prevent increased intracranial pressure  - Maintain blood pressure and fluid volume within ordered parameters to optimize cerebral perfusion and minimize risk of hemorrhage  - Monitor temperature, glucose, and sodium. Initiate appropriate interventions as ordered  Outcome: Progressing  Goal: Absence of seizures  Description: INTERVENTIONS  - Monitor for seizure activity  - Administer anti-seizure medications as ordered  - Monitor neurological status  Outcome: Progressing  Goal: Remains free of injury related to seizure activity  Description: INTERVENTIONS:  - Maintain airway, patient safety  and administer oxygen as ordered  - Monitor patient for seizure activity, document and report duration and description of seizure to MD/LIP  - If seizure occurs, turn patient to side and suction secretions as needed  - Reorient patient post seizure  - Seizure pads on all 4 side rails  - Instruct patient/family to notify RN of any seizure activity  - Instruct patient/family to call for assistance with activity based on assessment  Outcome: Progressing  Goal: Achieves maximal functionality and self care  Description: INTERVENTIONS  - Monitor swallowing and airway patency with patient fatigue and changes in neurological status  - Encourage and assist patient to increase activity and self care with guidance from PT/OT  - Encourage visually impaired, hearing impaired and aphasic patients to use assistive/communication devices  Outcome: Progressing

## 2024-12-15 NOTE — PROGRESS NOTES
[facility]  part of [facility]    Progress Note    Yin Butcher Patient Status:  Inpatient    1931 MRN [id number]   Location [facility] 2W/SW Attending Vika Elmore MD   Hosp Day # 3 PCP Mina Walker MD     SUBJECTIVE:  Pt seen and examined at bedside.   Remains intubated and sedated     BP 95/73 (BP Location: Right arm)   Pulse 96   Temp 99 °F (37.2 °C) (Esophageal)   Resp (!) 29   Wt 99 lb 3.3 oz (45 kg)   SpO2 100%   BMI 15.54 kg/m²     Physical Examination:    Gen:orally intubated. Appears comfortable.  HEENT: PERRLA  Lungs: CTA B/L  Heart: Normal S1 S2, No M/G/R   Abd: Abdomen soft, nontender, nondistended, no organomegaly, bowel sounds present  Ext: No edema, no calf tenderness    Labs:   Lab Results   Component Value Date    WBC 13.4 12/15/2024    HGB 9.0 12/15/2024    HCT 29.3 12/15/2024    .0 12/15/2024    CREATSERUM 0.50 12/15/2024    BUN 27 12/15/2024     12/15/2024    K 3.9 12/15/2024     12/15/2024    CO2 28.0 12/15/2024     12/15/2024    CA 8.9 12/15/2024    PGLU 137 12/15/2024       Recent Labs   Lab 24  1146 24  1802 24  2347 12/15/24  0534 12/15/24  1215   PGLU 130* 146* 146* 126* 137*     No results for input(s): \"INR\" in the last 168 hours.    Imaging:  Imaging reviewed in Epic.    Meds:   Current Facility-Administered Medications   Medication Dose Route Frequency    propofol (Diprivan) 10 mg/mL infusion premix  5-50 mcg/kg/min (Dosing Weight) Intravenous Continuous    ipratropium-albuterol (Duoneb) 0.5-2.5 (3) MG/3ML inhalation solution 3 mL  3 mL Nebulization TID    ipratropium-albuterol (Duoneb) 0.5-2.5 (3) MG/3ML inhalation solution 3 mL  3 mL Nebulization Q6H PRN    fentaNYL (Sublimaze) 50 mcg/mL injection 25 mcg  25 mcg Intravenous Q30 Min PRN    acetaminophen (Ofirmev) 10 mg/mL infusion premix 650 mg  15 mg/kg Intravenous Q6H PRN    dextrose 10% infusion (TPN no rate)   Intravenous Continuous PRN     pancrelipase (Lip-Prot-Amyl) (Zenpep) DR particles cap 10,000 Units  10,000 Units Per G Tube PRN    And    sodium bicarbonate tab 325 mg  325 mg Per G Tube PRN    metoprolol (Lopressor) 5 mg/5mL injection 5 mg  5 mg Intravenous Q4H PRN    apixaban (Eliquis) tab 2.5 mg  2.5 mg Oral BID    piperacillin-tazobactam (Zosyn) 3.375 g in dextrose 5% 100 mL IVPB-ADDV  3.375 g Intravenous Q8H    acetylcysteine (Mucomyst) 20 % nebulizer solution 2 mL  2 mL Nebulization TID    albuterol (Ventolin) (5 MG/ML) 0.5% nebulizer solution 5 mg  5 mg Nebulization Q4H PRN    acetaminophen (Tylenol) 160 MG/5ML oral liquid 650 mg  650 mg Per NG Tube Q4H PRN    Or    acetaminophen (Tylenol) rectal suppository 650 mg  650 mg Rectal Q4H PRN    senna (Senokot) 8.8 MG/5ML oral syrup 17.6 mg  10 mL Per NG Tube BID    docusate (Colace) 50 MG/5ML oral solution 100 mg  100 mg Per NG Tube BID    polyethylene glycol (PEG 3350) (Miralax) 17 g oral packet 17 g  17 g Per NG Tube Daily PRN    bisacodyl (Dulcolax) 10 MG rectal suppository 10 mg  10 mg Rectal Daily PRN    chlorhexidine gluconate (Peridex) 0.12 % oral solution 15 mL  15 mL Mouth/Throat BID@0800,2000    mineral oil-white petrolatum (Artificial Tears) 83-15 % ophthalmic ointment 1 Application  1 Application Both Eyes Nightly    famotidine (Pepcid) 20 mg/2mL injection 20 mg  20 mg Intravenous Daily @ 0700    mupirocin (Bactroban) 2% nasal ointment 1 Application  1 Application Each Nare BID    ondansetron (Zofran) 4 MG/2ML injection 4 mg  4 mg Intravenous Q6H PRN    metoclopramide (Reglan) 5 mg/mL injection 10 mg  10 mg Intravenous Q8H PRN    vancomycin (Vancocin) 750 mg in sodium chloride 0.9% 250 mL IVPB-ADDV  750 mg Intravenous Q24H       Assessment:      #1.  Acute hypoxemic respiratory failure  2.  Fever  3.  Metabolic encephalopathy  4.  History of atrial fibrillation  5.  History of pulmonary embolism  6.  Dysphagia, status post PEG  7.  History of dementia  8.  Septic shock         Plan:     Patient is on ventilator  Vent management per ICU  Bronchodilator  Antibiotic  Pulmonary toileting  Being followed by cardiology  Resume Eliquis  IV Vanco, IV Zosyn  Follow-up with sputum culture, blood culture, urine culture  Sedation per protocol  Currently full code  Continue tube feeding  Will get palliative consult to discuss goals of care  DVT/GI prophylaxis         Vika Elmore MD   12/15/2024  2:33 PM

## 2024-12-15 NOTE — RESPIRATORY THERAPY NOTE
Pt received on vent  Pt suctioned as needed throughout the day  Nebulizers and CPT done as ordered  Pt tolerating and saturating appropriately    SBT done in the day ; SBT settings 5/5/30%  SBT unsuccessful    12/15/24 1124 12/15/24 1127   Spontaneous Parameters   Spontaneous RR Rate 26 42   Spontaneous Minute Volume 8.3 9.7   Average Spontaneous Tidal Volume 180 200   $ Spontaneous Vital Capacity   (Unable to follow commands)  --    Negative Inspiratory Force -15  --    Total RSBI 136 186   Weaning Trials   Patient self-extubated? No  --    Compassionate wean? No  --    Spontaneous Breathing Trial Time Initiated 1124  --    Spontaneous Breathing Trial Duration  --  3 MINS   Spontaneous Breathing Trial Method CPAP/PS  --    Spontaneous Breathing Trial Settings 5/5/30%  --    Pre Trial   --    Pre Trial RR 26  --    Pre Trial SpO2 100 %  --    Pre Trial BP 95/73  --    Pre Trial Vt 180  --    Post Trial HR  --  125   Post Trial RR  --  42   Post Trial SpO2  --  99 %   Post Trial BP  --  125/99   Post Trial Vt  --  200     Pt placed back on full support  Pt tolerating and saturating appropriately    Pt on current vent settings VC/20/450/+5/30% ; ETT 7.0 @ 23 cm  RT to continue to monitor.

## 2024-12-15 NOTE — PROGRESS NOTES
Daily Pulmonary/ICU/Critical Care Progress Note          SUBJECTIVE:  On vent   Afebrile   Following commands      ALLERGIES:  Allergies[1]      MEDS:  Home Medications:  Medications Taking[2]  Scheduled Medication:   ipratropium-albuterol  3 mL Nebulization TID    apixaban  2.5 mg Oral BID    piperacillin-tazobactam  3.375 g Intravenous Q8H    acetylcysteine  2 mL Nebulization TID    senna  10 mL Per NG Tube BID    docusate  100 mg Per NG Tube BID    chlorhexidine gluconate  15 mL Mouth/Throat BID@0800,2000    mineral oil-white petrolatum  1 Application Both Eyes Nightly    famotidine  20 mg Intravenous Daily @ 0700    mupirocin  1 Application Each Nare BID    vancomycin  750 mg Intravenous Q24H     Continuous Infusing Medication:   propofol 15 mcg/kg/min (12/15/24 0600)    dextrose 10%       PRN Medications:    ipratropium-albuterol    fentaNYL    acetaminophen    dextrose 10%    lipase-protease-amylase (Lip-Prot-Amyl) **AND** sodium bicarbonate    metoprolol    albuterol    acetaminophen **OR** acetaminophen    polyethylene glycol (PEG 3350)    bisacodyl    ondansetron    metoclopramide       PHYSICAL EXAM:  /77 (BP Location: Right arm)   Pulse 108   Temp 98.6 °F (37 °C)   Resp 20   Wt 99 lb 3.3 oz (45 kg)   SpO2 98%   BMI 15.54 kg/m²   Vent Mode: VC/AC  FiO2 (%):  [30 %] 30 %  S RR:  [20] 20  S VT:  [450 mL] 450 mL  PEEP/CPAP (cm H2O):  [5 cm H20] 5 cm H20  MAP (cm H2O):  [6-12] 11    CONSTITUTIONAL: sedated, biting ETT but following commands  HEENT: atraumatic normocephalic  MOUTH: ETT/OGT  NECK/THROAT: no JVD. Trachea midline. No obvious thyromegaly  LUNG: clear upper b/l no wheezing, + basilar crackles. Chest symmetric with respiratory motion  HEART: irregular, no obvious murmers or gallops noted  ABD: soft non tender. + bowel sounds. No organomegaly noted  EXT: no clubbing, cyanosis, or edema noted      IMAGES:   Reviewed in EMR  CXR 12/14/24  CARDIAC/VASC: Cardiomegaly.  ET tube in satisfactory  position 3.7 cm, previously 2.9 cm above the gertrudis   MEDIAST/CHER:   Atherosclerotic aorta with no visible aneurysm.    LUNGS/PLEURA: Right perihilar basilar parenchymal abnormality with subpulmonic effusion.  Unchanged curvilinear opacity at the left lung base. Blunting of the left costophrenic angle again noted.    BONES: Mild scoliosis with mild degenerative disc disease and spondylosis.    OTHER: Capsular calcifications bilateral breast implants.  Surgical clips over the right lower quadrant.  Kyphoplasty changes in the lumbar spine.  There is diffuse osseous demineralization, which limits sensitivity for detection of osseous pathology.  There is multilevel degenerative disease of the spine.       LABS:  Recent Labs   Lab 12/13/24  0644 12/14/24  0344 12/15/24  0403   RBC 3.87 3.47* 3.21*   HGB 10.7* 9.5* 9.0*   HCT 37.5 31.8* 29.3*   MCV 96.9 91.6 91.3   MCH 27.6 27.4 28.0   MCHC 28.5* 29.9* 30.7*   RDW 21.7* 22.3* 22.2*   NEPRELIM 13.67* 9.04* 12.15*   WBC 15.8* 10.2 13.4*   .0 190.0 160.0       Recent Labs   Lab 12/11/24  1619 12/12/24  0339 12/13/24  0423 12/14/24  0344 12/15/24  0403   *   < > 96 112* 120*   BUN 50*   < > 38* 41* 27*   CREATSERUM 0.40*   < > 0.59 0.71 0.50*   EGFRCR 92   < > 84 79 87   CA 10.3   < > 9.4 8.9 8.9   ALB 4.0  --   --   --   --       < > 145 144 142   K 4.5   < > 3.4* 5.1  5.1 3.9      < > 107 110 109   CO2 >40.0*   < > 31.0 28.0 28.0   ALKPHO 112  --   --   --   --    AST 24  --   --   --   --    ALT 30  --   --   --   --    BILT 1.0*  --   --   --   --    TP 8.2  --   --   --   --     < > = values in this interval not displayed.       ASSESSMENT/PLAN:  Acute on chronic hypoxemic and hypercapnic respiratory failure  -on MV support-low settings  -being treated for possible aspiration PNA-on zosyn/vanco. MRSA nares +  -WBC improving   -continue nebs, chest PT, mucomyst nebs  -consider lasix if BP tolerates  -perform SBT to assess for extubation      Afib and hx of HFpEF  -supportive care  -eliquis  -consider lasix if BP improves  -cardiology following     Hx of PE  -eliquis    Hx of dementia and now with acute encephalopathy  -supportive care  -wean off propofol as able    Proph:  -DVT: eliqus  -Nutrition: TF    Dispo:  -Full code    Critical care time 3o min independent of procedures    Thank you for the opportunity to care for Yin ROBBINS Maria Ljessica CHANCE Priya Amaya DO, MPH  Pulmonary Critical Care Medicine  [facility]        [1]   Allergies  Allergen Reactions    Erythromycin OTHER (SEE COMMENTS)    Oxycodone HALLUCINATION    Gabapentin NAUSEA ONLY and OTHER (SEE COMMENTS)     Pt stated she didn't feel like herself     Tizanidine ITCHING and OTHER (SEE COMMENTS)     Pt states burning sensation    [2]   Outpatient Medications Marked as Taking for the 12/11/24 encounter (Hospital Encounter)   Medication Sig Dispense Refill    acetaZOLAMIDE 250 MG Oral Tab 1 tablet (250 mg total) by Per G Tube route daily.      Wheat Dextrin (BENEFIBER DRINK MIX) Oral Powd Pack 1 Package by Per G Tube route daily as needed (constipation).      furosemide 20 MG Oral Tab 1 tablet (20 mg total) by Per G Tube route daily.      apixaban 5 MG Oral Tab 0.5 tablets (2.5 mg total) by Per G Tube route 2 (two) times daily.      midodrine 5 MG Oral Tab 1 tablet (5 mg total) by Per G Tube route in the morning and 1 tablet (5 mg total) at noon and 1 tablet (5 mg total) in the evening. Take along with 10mg for a for a total dose of 15mg three times daily. .      midodrine 10 MG Oral Tab 1 tablet (10 mg total) by Per G Tube route 3 (three) times daily. Take along with 5mg for a for a total dose of 15mg three times daily.      multivitamin Oral Tab 1 tablet by Per G Tube route daily.      diphenhydrAMINE-APAP, sleep, (TYLENOL PM EXTRA STRENGTH)  MG Oral Tab 1 tablet by Per G Tube route daily as needed (Pain).      digoxin 0.125 MG Oral Tab 1  tablet (125 mcg total) by Per G Tube route Every Monday, Wednesday, and Friday.  0    metoprolol tartrate 50 MG Oral Tab 0.5 tablets (25 mg total) by Per G Tube route 2 (two) times daily. Hold for BP less than 90mmHg and pulse less than 60 bpm      lidocaine-menthol 4-1 % External Patch Place 1 patch onto the skin daily. (Patient taking differently: Place 1 patch onto the skin daily. Applies to lower back) 30 patch 0    Calcium Carbonate-Vitamin D (CALCIUM-D) 600-400 MG-UNIT Oral Tab 1 tablet by Per G Tube route daily.

## 2024-12-15 NOTE — PLAN OF CARE
Problem: Safety Risk - Non-Violent Restraints  Goal: Patient will remain free from self-harm  Description: INTERVENTIONS:  - Apply the least restrictive restraint to prevent harm  - Notify patient and family of reasons restraints applied  - Assess for any contributing factors to confusion (electrolyte disturbances, delirium, medications)  - Discontinue any unnecessary medical devices as soon as possible  - Assess the patient's physical comfort, circulation, skin condition, hydration, nutrition and elimination needs   - Reorient and redirection as needed  - Assess for the need to continue restraints  Outcome: Not Progressing     Problem: PAIN - ADULT  Goal: Verbalizes/displays adequate comfort level or patient's stated pain goal  Description: INTERVENTIONS:  - Encourage pt to monitor pain and request assistance  - Assess pain using appropriate pain scale  - Administer analgesics based on type and severity of pain and evaluate response  - Implement non-pharmacological measures as appropriate and evaluate response  - Consider cultural and social influences on pain and pain management  - Manage/alleviate anxiety  - Utilize distraction and/or relaxation techniques  - Monitor for opioid side effects  - Notify MD/LIP if interventions unsuccessful or patient reports new pain  - Anticipate increased pain with activity and pre-medicate as appropriate  Outcome: Progressing     Problem: SAFETY ADULT - FALL  Goal: Free from fall injury  Description: INTERVENTIONS:  - Assess pt frequently for physical needs  - Identify cognitive and physical deficits and behaviors that affect risk of falls.  - [place] fall precautions as indicated by assessment.  - Educate pt/family on patient safety including physical limitations  - Instruct pt to call for assistance with activity based on assessment  - Modify environment to reduce risk of injury  - Provide assistive devices as appropriate  - Consider OT/PT consult to assist with  strengthening/mobility  - Encourage toileting schedule  Outcome: Not Progressing     Problem: RESPIRATORY - ADULT  Goal: Achieves optimal ventilation and oxygenation  Description: INTERVENTIONS:  - Assess for changes in respiratory status  - Assess for changes in mentation and behavior  - Position to facilitate oxygenation and minimize respiratory effort  - Oxygen supplementation based on oxygen saturation or ABGs  - Provide Smoking Cessation handout, if applicable  - Encourage broncho-pulmonary hygiene including cough, deep breathe, Incentive Spirometry  - Assess the need for suctioning and perform as needed  - Assess and instruct to report SOB or any respiratory difficulty  - Respiratory Therapy support as indicated  - Manage/alleviate anxiety  - Monitor for signs/symptoms of CO2 retention  Outcome: Not Progressing     Problem: NEUROLOGICAL - ADULT  Goal: Achieves stable or improved neurological status  Description: INTERVENTIONS  - Assess for and report changes in neurological status  - Initiate measures to prevent increased intracranial pressure  - Maintain blood pressure and fluid volume within ordered parameters to optimize cerebral perfusion and minimize risk of hemorrhage  - Monitor temperature, glucose, and sodium. Initiate appropriate interventions as ordered  Outcome: Not Progressing

## 2024-12-15 NOTE — PROGRESS NOTES
12/15/24 0304   Vent Information   Vent Type AV   Vent Mode VC/AC   Settings   FiO2 (%) 30 %   Resp Rate (Set) 20   Vt (Set, mL) 450 mL   PEEP/CPAP (cm H2O) 5 cm H20   ETT   Placement Date/Time: 12/12/24 1158   Airway Size: 7 mm  Cuffed: Cuffed  Insertion attempts: 2  Technique: Video laryngoscopy  Placement Verification: Colorimetric EtCO2 device  Placed By: (c) Other (Comment)   Secured at (cm) 23 cm     No acute changes overnight, pt. vitals and spO2 are stable. Suction is provided, B.S ausculted. RT will continue to monitor.

## 2024-12-15 NOTE — PLAN OF CARE
Problem: RESPIRATORY - ADULT  Goal: Achieves optimal ventilation and oxygenation  Description: INTERVENTIONS:  - Assess for changes in respiratory status  - Assess for changes in mentation and behavior  - Position to facilitate oxygenation and minimize respiratory effort  - Oxygen supplementation based on oxygen saturation or ABGs  - Provide Smoking Cessation handout, if applicable  - Encourage broncho-pulmonary hygiene including cough, deep breathe, Incentive Spirometry  - Assess the need for suctioning and perform as needed  - Assess and instruct to report SOB or any respiratory difficulty  - Respiratory Therapy support as indicated  - Manage/alleviate anxiety  - Monitor for signs/symptoms of CO2 retention  12/15/2024 0439 by Jennifer Kevin RN  Outcome: Not Progressing  12/14/2024 2148 by Jennifer Kevin RN  Outcome: Not Progressing     Problem: NEUROLOGICAL - ADULT  Goal: Achieves stable or improved neurological status  Description: INTERVENTIONS  - Assess for and report changes in neurological status  - Initiate measures to prevent increased intracranial pressure  - Maintain blood pressure and fluid volume within ordered parameters to optimize cerebral perfusion and minimize risk of hemorrhage  - Monitor temperature, glucose, and sodium. Initiate appropriate interventions as ordered  12/15/2024 0439 by Jennifer Kevin RN  Outcome: Not Progressing  12/14/2024 2148 by Jennifer Kevin RN  Outcome: Not Progressing     Problem: DISCHARGE PLANNING  Goal: Discharge to home or other facility with appropriate resources  Description: INTERVENTIONS:  - Identify barriers to discharge w/pt and caregiver  - Include patient/family/discharge partner in discharge planning  - Arrange for needed discharge resources and transportation as appropriate  - Identify discharge learning needs (meds, wound care, etc)  - Arrange for interpreters to assist at discharge as needed  - Consider post-discharge preferences of  patient/family/discharge partner  - Complete POLST form as appropriate  - Assess patient's ability to be responsible for managing their own health  - Refer to Case Management Department for coordinating discharge planning if the patient needs post-hospital services based on physician/LIP order or complex needs related to functional status, cognitive ability or social support system  Outcome: Not Progressing     Problem: SAFETY ADULT - FALL  Goal: Free from fall injury  Description: INTERVENTIONS:  - Assess pt frequently for physical needs  - Identify cognitive and physical deficits and behaviors that affect risk of falls.  - [place] fall precautions as indicated by assessment.  - Educate pt/family on patient safety including physical limitations  - Instruct pt to call for assistance with activity based on assessment  - Modify environment to reduce risk of injury  - Provide assistive devices as appropriate  - Consider OT/PT consult to assist with strengthening/mobility  - Encourage toileting schedule  12/15/2024 0439 by Jennifer Kevin, RN  Outcome: Not Progressing  12/14/2024 2148 by Jennifer Kevin, RN  Outcome: Not Progressing     Problem: RISK FOR INFECTION - ADULT  Goal: Absence of fever/infection during anticipated neutropenic period  Description: INTERVENTIONS  - Monitor WBC  - Administer growth factors as ordered  - Implement neutropenic guidelines  Outcome: Progressing     Problem: PAIN - ADULT  Goal: Verbalizes/displays adequate comfort level or patient's stated pain goal  Description: INTERVENTIONS:  - Encourage pt to monitor pain and request assistance  - Assess pain using appropriate pain scale  - Administer analgesics based on type and severity of pain and evaluate response  - Implement non-pharmacological measures as appropriate and evaluate response  - Consider cultural and social influences on pain and pain management  - Manage/alleviate anxiety  - Utilize distraction and/or relaxation  techniques  - Monitor for opioid side effects  - Notify MD/LIP if interventions unsuccessful or patient reports new pain  - Anticipate increased pain with activity and pre-medicate as appropriate  Outcome: Progressing     Problem: Safety Risk - Non-Violent Restraints  Goal: Patient will remain free from self-harm  Description: INTERVENTIONS:  - Apply the least restrictive restraint to prevent harm  - Notify patient and family of reasons restraints applied  - Assess for any contributing factors to confusion (electrolyte disturbances, delirium, medications)  - Discontinue any unnecessary medical devices as soon as possible  - Assess the patient's physical comfort, circulation, skin condition, hydration, nutrition and elimination needs   - Reorient and redirection as needed  - Assess for the need to continue restraints  12/15/2024 0439 by Jennifer Kevin, RN  Outcome: Not Progressing  12/14/2024 2148 by Jennifer Kevin, RN  Outcome: Not Progressing

## 2024-12-15 NOTE — PROGRESS NOTES
[facility] Pharmacy Dosing Service      Follow Up Pharmacokinetic Consult for Vancomycin Dosing     Yin Butcher is a 89 year old female who is receiving vancomycin therapy for sepsis. Patient is on day 5 of vancomycin and is currently receiving  750 mg  every 24 hours. The current treatment and monitoring approach is steady state AUC strategy.        Weight and Temperature:    Wt Readings from Last 1 Encounters:   12/15/24 45 kg (99 lb 3.3 oz)         Temp Readings from Last 1 Encounters:   12/15/24 98.6 °F (37 °C) (Esophageal)      Labs:   Recent Labs   Lab 24  0423 24  0344 12/15/24  0403   CREATSERUM 0.59 0.71 0.50*      Estimated Creatinine Clearance: 49.9 mL/min (A) (based on SCr of 0.5 mg/dL (L)).     Recent Labs   Lab 24  0644 244 12/15/24  0403   WBC 15.8* 10.2 13.4*        Vancomycin Levels:  Lab Results   Component Value Date/Time    VANCT 12.3 12/15/2024 03:51 PM    VANCP 23.1 (L) 2024 09:02 PM       Corresponding 24 h-AUC:  442 mg-h/L     The Pharmacokinetic Target is:     to 600 mg-h/L and trough <=15 mg/L    Renal Dosing Considerations:    None     Assessment/Plan:   Maintenance Regimen: Continue vancomycin 750 mg IV every 24 hours    Monitorin) Plan for vancomycin trough to be obtained in 5 - 7 days    2) Pharmacy will order SCr as clinically indicated to assess renal function.    3) Pharmacy will monitor for toxicity and efficacy, adjust vancomycin dose and/or frequency, and order vancomycin levels as appropriate per the Pharmacy and Therapeutics Committee approved protocol until discontinuation of the medication.       We appreciate the opportunity to assist in the care of this patient.     Argelia Balderas, PharmD  12/15/2024  4:54 PM  [facility]: [phone]

## 2024-12-15 NOTE — PROGRESS NOTES
[facility]  Cardiology Progress Note    Yin Butcher Patient Status:  Inpatient    1931 MRN [id number]   Location [facility] 2W/SW Attending Vika Elmore MD   Hosp Day # 3 PCP Mina Walker MD     Subjective     Intubated, sedated.    Objective:   Patient Vitals for the past 24 hrs:   BP Temp Temp src Pulse Resp SpO2 Weight   12/15/24 0700 (!) 85/69 98.8 °F (37.1 °C) Esophageal 119 20 -- --   12/15/24 0600 96/75 98.8 °F (37.1 °C) Esophageal 115 20 100 % --   12/15/24 0543 -- 98.8 °F (37.1 °C) Esophageal (!) 125 20 93 % --   12/15/24 0515 (!) 84/62 99 °F (37.2 °C) Esophageal 110 22 98 % --   12/15/24 0500 (!) 77/59 99 °F (37.2 °C) Esophageal (!) 122 20 100 % --   12/15/24 0400 (!) 87/64 99 °F (37.2 °C) Esophageal 116 18 100 % 99 lb 3.3 oz (45 kg)   12/15/24 0304 (!) 85/60 99 °F (37.2 °C) Esophageal (!) 121 18 100 % --   12/15/24 0300 -- -- Esophageal -- -- -- --   12/15/24 0200 -- -- Esophageal -- -- -- --   12/15/24 0100 -- 99.5 °F (37.5 °C) Esophageal 93 22 99 % --   12/15/24 0000 94/61 99.5 °F (37.5 °C) Esophageal 104 20 100 % --   24 2350 94/61 99.5 °F (37.5 °C) Esophageal 108 20 98 % --   24 2300 (!) 89/68 99.3 °F (37.4 °C) Esophageal 103 20 99 % --   24 2200 102/68 99.1 °F (37.3 °C) Esophageal (!) 125 20 99 % --   12/14/24 2100 91/68 99.1 °F (37.3 °C) Esophageal (!) 123 23 98 % --   24 2000 109/75 99 °F (37.2 °C) Esophageal 118 20 98 % --   24 1800 95/69 99.5 °F (37.5 °C) Esophageal 114 23 97 % --   24 1700 99/75 100 °F (37.8 °C) Esophageal 120 (!) 27 96 % --   24 1600 109/76 99 °F (37.2 °C) Esophageal 116 20 97 % --   24 1500 90/74 98.1 °F (36.7 °C) Esophageal 116 20 99 % --   24 1400 90/65 97.7 °F (36.5 °C) Esophageal 114 20 100 % --   24 1300 97/81 (!) 95.4 °F (35.2 °C) Temporal 99 20 97 % --   24 1200 110/74 -- -- 106 20 100 % --   24 1100 (!) 122/94 -- -- 120 20 96 % --   24 1000 104/79  -- -- 104 23 90 % --   12/14/24 0900 97/78 -- -- 102 18 100 % --   12/14/24 0800 112/86 -- -- 107 20 100 % --     Intake/Output:   Last 3 shifts:   Intake/Output                   12/13/24 0700 - 12/14/24 0659 12/14/24 0700 - 12/15/24 0659 12/15/24 0700 - 12/16/24 0659       Intake    P.O.  0  --  --    P.O. 0 -- --    I.V.  3065.1  351.1  --    I.V. 1622 250 --    Volume (mL) Propofol 193.1 101.1 --    Volume (mL) (sodium chloride 0.9% infusion) 1250 -- --    NG/GT  554  561  --    Tube Feeding Flushes (mL) 390 380 --    Intake (mL) (Gastrostomy/Enterostomy PEG-jejunostomy LUQ) 164 181 --    IV PIGGYBACK  300  100  --    Volume (mL) (piperacillin-tazobactam (Zosyn) 3.375 g in dextrose 5% 100 mL IVPB-ADDV) 300 100 --    Total Intake 3919.1 1012.1 --       Output    Urine  550  1000  --    Output (mL) (Urethral Catheter) 550 1000 --    Emesis/NG output  --  160  --    Output (mL) (Gastrostomy/Enterostomy PEG-jejunostomy LUQ) -- 160 --    Stool  --  --  --    Stool Count Calculated for I/O 1 x 3 x --    Total Output 550 1160 --       Net I/O     3369.1 -147.9 --             Vent Settings: Vent Mode: VC/AC  FiO2 (%):  [30 %] 30 %  S RR:  [20] 20  S VT:  [450 mL] 450 mL  PEEP/CPAP (cm H2O):  [5 cm H20] 5 cm H20  MAP (cm H2O):  [6-12] 11    Scheduled Meds:    ipratropium-albuterol  3 mL Nebulization TID    apixaban  2.5 mg Oral BID    piperacillin-tazobactam  3.375 g Intravenous Q8H    acetylcysteine  2 mL Nebulization TID    senna  10 mL Per NG Tube BID    docusate  100 mg Per NG Tube BID    chlorhexidine gluconate  15 mL Mouth/Throat BID@0800,2000    mineral oil-white petrolatum  1 Application Both Eyes Nightly    famotidine  20 mg Intravenous Daily @ 0700    mupirocin  1 Application Each Nare BID    vancomycin  750 mg Intravenous Q24H       Continuous Infusions:    propofol 15 mcg/kg/min (12/15/24 0600)    dextrose 10%         Results:     Lab Results   Component Value Date    WBC 13.4 (H) 12/15/2024    HGB 9.0 (L)  12/15/2024    HCT 29.3 (L) 12/15/2024    .0 12/15/2024    CREATSERUM 0.50 (L) 12/15/2024    BUN 27 (H) 12/15/2024     12/15/2024    K 3.9 12/15/2024     12/15/2024    CO2 28.0 12/15/2024     (H) 12/15/2024    CA 8.9 12/15/2024    ALB 4.0 12/11/2024    ALKPHO 112 12/11/2024    BILT 1.0 (H) 12/11/2024    TP 8.2 12/11/2024    AST 24 12/11/2024    ALT 30 12/11/2024    PTT 34.6 01/31/2024    INR 1.32 (H) 01/29/2024    PT 15.5 (H) 07/13/2014    T4F 1.0 02/19/2015    TSH 2.138 12/11/2024    LIP 35 12/11/2024    DDIMER 2.38 (H) 01/20/2024    MG 2.0 12/14/2024    PHOS 4.1 12/14/2024    TROP <0.046 02/19/2015    CK 32 01/20/2024    B12 586 01/23/2024       Recent Labs   Lab 12/13/24  0423 12/14/24  0344 12/15/24  0403   GLU 96 112* 120*   BUN 38* 41* 27*   CREATSERUM 0.59 0.71 0.50*   CA 9.4 8.9 8.9    144 142   K 3.4* 5.1  5.1 3.9    110 109   CO2 31.0 28.0 28.0     Recent Labs   Lab 12/13/24  0644 12/14/24  0344 12/15/24  0403   RBC 3.87 3.47* 3.21*   HGB 10.7* 9.5* 9.0*   HCT 37.5 31.8* 29.3*   MCV 96.9 91.6 91.3   MCH 27.6 27.4 28.0   MCHC 28.5* 29.9* 30.7*   RDW 21.7* 22.3* 22.2*   NEPRELIM 13.67* 9.04* 12.15*   WBC 15.8* 10.2 13.4*   .0 190.0 160.0       No results for input(s): \"BNPML\" in the last 168 hours.    No results for input(s): \"TROP\", \"CK\" in the last 168 hours.    XR CHEST AP PORTABLE  (CPT=71045)    Result Date: 12/14/2024  CONCLUSION:   Unchanged small bilateral pleural effusions.  Unchanged right perihilar opacity.  Left basilar subsegmental atelectasis again noted.    Dictated by (CST): Jenny Lagunas MD on 12/14/2024 at 10:20 AM     Finalized by (CST): Jenny Lagunas MD on 12/14/2024 at 10:22 AM          XR CHEST AP PORTABLE  (CPT=71045)    Result Date: 12/13/2024  CONCLUSION:  1. Cardiomegaly.  Tortuous thoracic aorta. 2. ET tube in satisfactory position. 3. Right basilar pleural parenchymal abnormality with slight progression.  Developing atelectatic  changes left basilar region. .     Dictated by ([facility]): Melecio Jackson MD on 2024 at 9:21 AM     Finalized by ([facility]): Melecio Jackson MD on 2024 at 9:24 AM             CARD ECHO 2D DOPPLER (CPT=93306)    Result Date: 2024  Transthoracic Echocardiogram Name:Yin Butcher Date: 2024 :  1931 Ht:  (67in)  BP: 121 / 78 MRN:  [id number]    Age:  89years    Wt:  (92lb)  HR: 110bpm Loc:  [facility]       Gndr: F          BSA: 1.45m^2 Sonographer: Rachael HAMMER Ordering:    cleve Hawthorne Consulting:  Dayo Espino MD ---------------------------------------------------------------------------- History/Indications:   Atrial fibrillation. Fever. Shortness of breath. ---------------------------------------------------------------------------- Procedure information:  A transthoracic complete 2D study was performed. Additional evaluation included M-mode, complete spectral Doppler, and color Doppler.  Patient status:  Inpatient.  Location:  [facility]    Comparison was made to the study of 2024.    This was a routine study. Transthoracic echocardiography for ventricular function evaluation and assessment of valvular function. Image quality was adequate. ECG rhythm:   Atrial fibrillation ---------------------------------------------------------------------------- Conclusions: 1. Left ventricle: The cavity size was below normal. Wall thickness was    increased. Systolic function was hyperdynamic. The estimated ejection    fraction was 70-75%, by visual assessment. No diagnostic evidence for    regional wall motion abnormalities. Unable to assess LV diastolic    function due to heart rhythm. 2. Right ventricle: Systolic function was mildly reduced. The tricuspid    annular plane systolic excursion is 1.26cm. The RV s' is 9.2cm/sec. 3. Left atrium: The left atrial volume was markedly increased. 4. Right atrium: The atrium was markedly dilated. 5. Aortic valve: The peak systolic velocity  was 1.54m/sec. The mean systolic    gradient was 5mm Hg. The valve area (VTI) was 1.58cm^2. The valve area    (VTI) index was 1.09cm^2/m^2. 6. Mitral valve: The annulus was markedly calcified. The leaflets were    mildly calcified. Cannot exclude a vegetation. There was mild    regurgitation. The mean diastolic gradient was 4mm Hg at a heart rate of    120 bpm. 7. Pulmonary arteries: Systolic pressure was moderately increased, in the    range of 45mm Hg to 50mm Hg. 8. Pericardium, extracardiac: There were bilateral pleural effusions    present. Impressions:  This study is compared with previous dated 1/21/24: * ---------------------------------------------------------------------------- * Findings: Left ventricle:  The cavity size was below normal. Wall thickness was increased. Systolic function was hyperdynamic. The estimated ejection fraction was 70-75%, by visual assessment. No diagnostic evidence for regional wall motion abnormalities. Unable to assess LV diastolic function due to heart rhythm. Left atrium:  The left atrial volume was markedly increased. Right ventricle:  The cavity size was normal. Systolic function was mildly reduced. Right atrium:  The atrium was markedly dilated. Mitral valve:  Poorly visualized. The annulus was markedly calcified. The leaflets were mildly calcified. Leaflet separation was normal.  Cannot exclude a vegetation.  Doppler:  Transvalvular velocity was within the normal range. There was no evidence for stenosis. There was mild regurgitation.    The valve area (LVOT continuity) was 2.01cm^2. The valve area index (LVOT continuity) was 1.38cm^2/m^2.    The mean diastolic gradient was 4mm Hg at a heart rate of 120 bpm. Aortic valve:   The valve was probably trileaflet. The leaflets were moderately calcified.  Doppler:     The valve area (VTI) was 1.58cm^2. The valve area (VTI) index was 1.09cm^2/m^2.    The mean systolic gradient was 5mm Hg. The peak systolic gradient was 13mm Hg.  Tricuspid valve:  The valve is structurally normal. Leaflet separation was normal.  Doppler:  Transvalvular velocity was within the normal range. There was no evidence for stenosis. There was mild regurgitation. Pulmonic valve:   The valve is structurally normal. Cusp separation was normal.  Doppler:  Transvalvular velocity was within the normal range. There was no evidence for stenosis. There was trace regurgitation. Pericardium:   No significant pericardial effusion was identified. Pleura:  There were bilateral pleural effusions present. Aorta: Aortic root: The aortic root was normal. Ascending aorta: The ascending aorta was normal. Pulmonary arteries: Systolic pressure was moderately increased, in the range of 45mm Hg to 50mm Hg. ---------------------------------------------------------------------------- Measurements  Left              Value             Ref       01/21/2024  ventricle  IVS           (H) 1.2   cm          0.6 - 0.9 0.9  thickness,  ED, PLAX  LV ID, ED,    (L) 2.6   cm          3.8 - 5.2 3.6  PLAX  LV ID, ES,    (L) 1.6   cm          2.2 - 3.5 2.2  PLAX  LV PW         (H) 1.1   cm          0.6 - 0.9 0.8  thickness,  ED, PLAX  IVS/LV PW         1.09              --------- 1.10  ratio, ED,  PLAX  LV PW/LV ID       0.42              --------- 0.23  ratio, ED,  PLAX  LV ejection       71    %           54 - 74   70  fraction  Stroke            28    ml/m^2      --------- ----------  volume/bsa,  2D  LVOT              Value             Ref       01/21/2024  LVOT ID           2     cm          --------- ----------  LVOT peak         0.82  m/sec       --------- ----------  velocity, S  LVOT VTI, S       13.2  cm          --------- ----------  LVOT peak         3     mm Hg       --------- ----------  gradient, S  LVOT mean         1     mm Hg       --------- ----------  gradient, S  Stroke volume     41    ml          --------- ----------  (SV), LVOT DP  Cardiac           4.3   L/min       ---------  ----------  output (Qs),  LVOT DP  Cardiac index     3     L/(min-m^2) --------- ----------  (Qs/bsa),  LVOT DP  Stroke index      29    ml/m^2      --------- ----------  (SV/bsa),  LVOT DP  Aortic valve      Value             Ref       01/21/2024  Aortic valve      1.54  m/sec       --------- ----------  peak  velocity, S  Aortic valve      23.3  cm          --------- ----------  VTI, S  Aortic mean       5     mm Hg       --------- ----------  gradient, S  Aortic peak       13    mm Hg       --------- ----------  gradient, S  Aortic valve      1.58  cm^2        --------- ----------  area, VTI  Aortic valve      1.09  cm^2/m^2    --------- ----------  area/bsa, VTI  Velocity          0.46              --------- ----------  ratio, peak,  LVOT/AV  Aortic root       Value             Ref       01/21/2024  Aortic root       3.3   cm          2.3 - 3.7 ----------  ID  Ascending         Value             Ref       01/21/2024  aorta  Ascending         2.9   cm          1.9 - 3.5 ----------  aorta ID  Left atrium       Value             Ref       01/21/2024  LA ID, A-P,       3.5   cm          2.7 - 3.8 4.0  ES  LA volume, S  (H) 100   ml          22 - 52   ----------  LA            (H) 69    ml/m^2      16 - 34   ----------  volume/bsa, S  LA volume,    (H) 114   ml          22 - 52   ----------  ES, 1-p A4C  LA volume,    (H) 88    ml          22 - 52   ----------  ES, 1-p A2C  LA volume,        108   ml          --------- ----------  ES, A/L  LA            (H) 74    ml/m^2      16 - 34   ----------  volume/bsa,  ES, A/L  LA/aortic         1.06              --------- 1.33  root ratio  Mitral valve      Value             Ref       01/21/2024  Mitral mean       4     mm Hg       --------- ----------  gradient, D  Mitral peak       7     mm Hg       --------- ----------  gradient, D  Mitral valve      2.01  cm^2        --------- ----------  area, LVOT  continuity  Mitral valve      1.38  cm^2/m^2    --------- ----------   area/bsa,  LVOT  continuity  Pulmonary         Value             Ref       01/21/2024  artery  PA pressure,      47    mm Hg       --------- 35  S, DP  Tricuspid         Value             Ref       01/21/2024  valve  Tricuspid     (H) 3.12  m/sec       <=2.8     2.81  regurg peak  velocity  Tricuspid         39    mm Hg       --------- 32  peak RV-RA  gradient  Systemic          Value             Ref       01/21/2024  veins  Estimated CVP     8     mm Hg       --------- 3  Right             Value             Ref       01/21/2024  ventricle  TAPSE, MM     (L) 1.26  cm          >=1.70    ----------  RV pressure,      47    mm Hg       --------- 35  S, DP  RV s',        (L) 9.2   cm/sec      >=9.5     ----------  lateral Legend: (L)  and  (H)  corinna values outside specified reference range. ---------------------------------------------------------------------------- Prepared and electronically signed by Yonatan Trevino 12/12/2024 08:57        Exam:     General: Intubated, sedated.  Cachectic elderly female.  HEENT: Normocephalic, anicteric sclera, neck supple, no thyromegaly or adenopathy.  Neck: No JVD, carotids 2+, no bruits.  Cardiac: Irregularly irregular rhythm, mildly tachycardic.. S1, S2 normal. No murmur, pericardial rub, S3, or extra cardiac sounds.  Lungs: Clear without wheezes, rales, rhonchi or dullness.  Normal excursions and effort.  Abdomen: Soft, non-tender. No organosplenomegally, mass or rebound, BS-present.  Extremities: Without clubbing or cyanosis. No edema.    Neurologic: Sedated.  Skin: Warm and dry.     Assessment and Plan:   Assessment:  1.  Hypercarbic and hypoxic respiratory failure.     2.  Hypotension and fever, probably septic shock.  CT evidence of mucous plugging and concern for aspiration.     3.  Historically normal LV systolic function. Reviewed TTE, no evidence of mitral vegetation to my eyes.     4.  What is probably permanent atrial fibrillation, with rapid ventricular response.   Lenient rate control given clinical condition.        Plan:  1.  Continue supportive care with IV antibiotics, pulmonary treatments.     2.  Lenient rate control for atrial fibrillation.  If rates become significant elevated can give IV Lopressor as needed.     3.  Ongoing discussions regarding goals of care.    Barry Do MD  [facility]  12/15/2024

## 2024-12-15 NOTE — PROGRESS NOTES
12/14/24 1937   Vent Information   Vent Mode VC/AC   Settings   FiO2 (%) 30 %   Resp Rate (Set) 20   Vt (Set, mL) 450 mL   Waveform Decelerating ramp   PEEP/CPAP (cm H2O) 5 cm H20   PEEP Low (cm H2O) 3 cm H2O   Trigger Sensitivity Flow (L/min) 1 L/min   Humidification Heat and moisture exchanger   Readings   Total RR 20   Minute Ventilation (L/min) 12.1 L/min   Expiratory Tidal Volume 420 mL   PIP Observed (cm H2O) 25 cm H2O   MAP (cm H2O) 12

## 2024-12-16 NOTE — PROGRESS NOTES
12/16/24 1403   Wound 12/12/24 Ankle Distal;Right   Date First Assessed/Time First Assessed: 12/12/24 0100   Present on Original Admission: Yes  Primary Wound Type: Arterial Ulcer  Location: Ankle  Wound Location Orientation: Distal;Right   Wound Image    Site Assessment Clean;Dry;Intact   Closure Approximated   Drainage Amount None   Treatments Site Care   Dressing Aquacel Foam   Dressing Status Clean;Dry;Intact   Wound Length (cm) 0 cm   Wound Width (cm) 0 cm   Wound Surface Area (cm^2) 0 cm^2   Wound Depth (cm) 0 cm   Wound Volume (cm^3) 0 cm^3   Margins Flat and Intact   Nancy-wound Assessment Clean;Intact;Dry   Wound Bed Epithelium (%) 100 %   State of Healing Epithelialized   Wound 12/12/24 Tibial Distal;Left   Date First Assessed/Time First Assessed: 12/12/24 0100   Primary Wound Type: Arterial Ulcer  Location: Tibial  Wound Location Orientation: Distal;Left   Wound Image    Site Assessment Clean;Dry;Intact   Closure Approximated   Drainage Amount None   Treatments Site Care   Dressing Aquacel Foam   Dressing Status Clean;Dry;Intact   Wound Length (cm) 0 cm   Wound Width (cm) 0 cm   Wound Surface Area (cm^2) 0 cm^2   Wound Depth (cm) 0 cm   Wound Volume (cm^3) 0 cm^3   Margins Flat and Intact   Nancy-wound Assessment Clean;Dry;Intact   Wound Bed Epithelium (%) 100 %   State of Healing Epithelialized   Wound Odor None   Wound 12/13/24 Coccyx   Date First Assessed/Time First Assessed: 12/13/24 1900   Location: Coccyx   Wound Image    Site Assessment Pink;Fragile;Dry;Intact;Excoriated;Purple  (multiple scattered open areas)   Closure Not approximated   Drainage Amount None   Treatments Cleansed;Saline;Topical (Barrier/Moisturizer/Ointment)   Dressing Aquacel Foam   Dressing Changed Changed   Dressing Status Clean;Dry;Intact   Wound Length (cm) 2 cm   Wound Width (cm) 2 cm   Wound Surface Area (cm^2) 4 cm^2   Wound Depth (cm) 0.1 cm   Wound Volume (cm^3) 0.4 cm^3   Margins Flat and Intact   Nancy-wound Assessment  Dry;Denuded;Fragile;Pink   Wound Odor None   Pressure Injury Stage 2   Wound 01/23/24 Heel Right   Date First Assessed/Time First Assessed: 01/23/24 1540   Location: Heel  Wound Location Orientation: Right   Wound Image    Site Assessment Clean;Dry;Intact;Pink   Closure None   Treatments Cleansed;Saline   Dressing Aquacel Foam   Dressing Changed Changed   Dressing Status Clean;Dry;Intact;Dressing Changed   Wound Length (cm) 0 cm   Wound Width (cm) 0 cm   Wound Surface Area (cm^2) 0 cm^2   Wound Depth (cm) 0 cm   Wound Volume (cm^3) 0 cm^3   Margins Flat and Intact   Nancy-wound Assessment Clean;Dry;Intact   Wound Bed Epithelium (%) 100 %   State of Healing Epithelialized   Wound Odor None   Wound Follow Up   Follow up needed Yes     Patient seen in bed, non verbally responsive, frail looking. Right ankle, left lower leg and coccyx assessed. See above documentation. Repositioned for comfort.Bilateral heel boots in place..

## 2024-12-16 NOTE — PROGRESS NOTES
Health system - CARDIOLOGY PROGRESS NOTE  Yin Butcher Patient Status:  Inpatient    1931 MRN [id number]   Location Health system 2W/SW Attending Vika Elmore MD   Hosp Day # 4 PCP Mina Walker MD     Assessment:    1.  Acute on chronic hypoxemic and hypercapnic respiratory failure.  Still intubated.    2.  Atrial fibrillation with rapid ventricular response.    3.  Preserved LV systolic function.    4.  Sepsis, appears to be resolving.    Satisfactory blood pressure and heart rate control at present.      Plan:    Continue supportive care.      Subjective:  No chest pain or shortness of breath.    Objective:  BP (!) 82/61 (BP Location: Right arm)   Pulse 83   Temp 97.7 °F (36.5 °C) (Esophageal)   Resp 20   Wt 101 lb 6.6 oz (46 kg)   SpO2 100%   BMI 15.88 kg/m²     Temp (24hrs), Av.3 °F (36.8 °C), Min:97.5 °F (36.4 °C), Max:99 °F (37.2 °C)      Intake/Output:    Intake/Output Summary (Last 24 hours) at 2024 0954  Last data filed at 2024 0400  Gross per 24 hour   Intake 1788.52 ml   Output 1050 ml   Net 738.52 ml       Wt Readings from Last 2 Encounters:   24 101 lb 6.6 oz (46 kg)   24 125 lb (56.7 kg)       Physical Exam:    General: Intubated.  Appears to be awake; uncertain awareness.  HEENT: No focal deficits.  Neck: No JVD, carotids 2+ no bruits.  Cardiac: Irregular rhythm, mild tachycardia, S1, S2 normal, no murmur  Lungs: Clear without wheezes, rales, rhonchi.  Normal excursions and effort.  Abdomen: Soft, non-tender.   Extremities: Without clubbing, cyanosis or edema.  Peripheral pulses are 2+.  Skin: Warm and dry.     Labs:  Lab Results   Component Value Date    WBC 11.8 2024    HGB 8.7 2024    HCT 26.5 2024    .0 2024     Lab Results   Component Value Date    PT 15.5 (H) 2014    INR 1.32 (H) 2024     Lab Results    Component Value Date     12/16/2024    K 3.9 12/16/2024     12/16/2024    CO2 28.0 12/16/2024    BUN 22 12/16/2024    CREATSERUM 0.41 12/16/2024     12/16/2024    CA 8.2 12/16/2024        Lab Results   Component Value Date    TROP <0.046 02/19/2015        Medications:     ipratropium-albuterol  3 mL Nebulization TID    apixaban  2.5 mg Oral BID    piperacillin-tazobactam  3.375 g Intravenous Q8H    acetylcysteine  2 mL Nebulization TID    senna  10 mL Per NG Tube BID    docusate  100 mg Per NG Tube BID    chlorhexidine gluconate  15 mL Mouth/Throat BID@0800,2000    mineral oil-white petrolatum  1 Application Both Eyes Nightly    famotidine  20 mg Intravenous Daily @ 0700    mupirocin  1 Application Each Nare BID    vancomycin  750 mg Intravenous Q24H      propofol 20 mcg/kg/min (12/16/24 0600)    dextrose 10%         Marck Nelson MD  12/16/2024  9:54 AM

## 2024-12-16 NOTE — PROGRESS NOTES
[facility]  part of [facility]    Progress Note    Yin Butcher Patient Status:  Inpatient    1931 MRN [id number]   Location [facility] 2W/SW Attending Vika Elmore MD   Hosp Day # 4 PCP Mina Walker MD     SUBJECTIVE:  Pt seen and examined at bedside.   Earlier on SBT, put back on full vent support    /73 (BP Location: Right arm)   Pulse 116   Temp 97 °F (36.1 °C) (Esophageal)   Resp 20   Wt 101 lb 6.6 oz (46 kg)   SpO2 100%   BMI 15.88 kg/m²     Physical Examination:    Gen:orally intubated. Appears comfortable.  HEENT: PERRLA  Lungs: CTA B/L  Heart: Normal S1 S2, No M/G/R   Abd: Abdomen soft, nontender, nondistended, no organomegaly, bowel sounds present  Ext: No edema, no calf tenderness    Labs:   Lab Results   Component Value Date    WBC 11.8 2024    HGB 8.7 2024    HCT 26.5 2024    .0 2024    CREATSERUM 0.41 2024    BUN 22 2024     2024    K 3.9 2024     2024    CO2 28.0 2024     2024    CA 8.2 2024    PGLU 94 2024       Recent Labs   Lab 12/15/24  0534 12/15/24  1215 12/15/24  1913 24  0111 24  0649   PGLU 126* 137* 127* 102* 94     No results for input(s): \"INR\" in the last 168 hours.    Imaging:  Imaging reviewed in Epic.    Meds:   Current Facility-Administered Medications   Medication Dose Route Frequency    propofol (Diprivan) 10 mg/mL infusion premix  5-50 mcg/kg/min (Dosing Weight) Intravenous Continuous    ipratropium-albuterol (Duoneb) 0.5-2.5 (3) MG/3ML inhalation solution 3 mL  3 mL Nebulization TID    ipratropium-albuterol (Duoneb) 0.5-2.5 (3) MG/3ML inhalation solution 3 mL  3 mL Nebulization Q6H PRN    fentaNYL (Sublimaze) 50 mcg/mL injection 25 mcg  25 mcg Intravenous Q30 Min PRN    acetaminophen (Ofirmev) 10 mg/mL infusion premix 650 mg  15 mg/kg Intravenous Q6H PRN    dextrose 10% infusion (TPN no rate)   Intravenous  Continuous PRN    pancrelipase (Lip-Prot-Amyl) (Zenpep) DR particles cap 10,000 Units  10,000 Units Per G Tube PRN    And    sodium bicarbonate tab 325 mg  325 mg Per G Tube PRN    metoprolol (Lopressor) 5 mg/5mL injection 5 mg  5 mg Intravenous Q4H PRN    apixaban (Eliquis) tab 2.5 mg  2.5 mg Oral BID    piperacillin-tazobactam (Zosyn) 3.375 g in dextrose 5% 100 mL IVPB-ADDV  3.375 g Intravenous Q8H    acetylcysteine (Mucomyst) 20 % nebulizer solution 2 mL  2 mL Nebulization TID    albuterol (Ventolin) (5 MG/ML) 0.5% nebulizer solution 5 mg  5 mg Nebulization Q4H PRN    acetaminophen (Tylenol) 160 MG/5ML oral liquid 650 mg  650 mg Per NG Tube Q4H PRN    Or    acetaminophen (Tylenol) rectal suppository 650 mg  650 mg Rectal Q4H PRN    senna (Senokot) 8.8 MG/5ML oral syrup 17.6 mg  10 mL Per NG Tube BID    docusate (Colace) 50 MG/5ML oral solution 100 mg  100 mg Per NG Tube BID    polyethylene glycol (PEG 3350) (Miralax) 17 g oral packet 17 g  17 g Per NG Tube Daily PRN    bisacodyl (Dulcolax) 10 MG rectal suppository 10 mg  10 mg Rectal Daily PRN    chlorhexidine gluconate (Peridex) 0.12 % oral solution 15 mL  15 mL Mouth/Throat BID@0800,2000    mineral oil-white petrolatum (Artificial Tears) 83-15 % ophthalmic ointment 1 Application  1 Application Both Eyes Nightly    famotidine (Pepcid) 20 mg/2mL injection 20 mg  20 mg Intravenous Daily @ 0700    mupirocin (Bactroban) 2% nasal ointment 1 Application  1 Application Each Nare BID    ondansetron (Zofran) 4 MG/2ML injection 4 mg  4 mg Intravenous Q6H PRN    metoclopramide (Reglan) 5 mg/mL injection 10 mg  10 mg Intravenous Q8H PRN    vancomycin (Vancocin) 750 mg in sodium chloride 0.9% 250 mL IVPB-ADDV  750 mg Intravenous Q24H       Assessment:    #1.  Acute hypoxemic respiratory failure  2.  Fever  3.  Metabolic encephalopathy  4.  History of atrial fibrillation  5.  History of pulmonary embolism  6.  Dysphagia, status post PEG  7.  History of dementia  8.  Septic  shock        Plan:     Patient is on ventilator  Vent management per ICU  Bronchodilator  Antibiotic  Pulmonary toileting  Being followed by cardiology  Princesse Jyotsna  IV Vanco, IV Zosyn  Follow-up with sputum culture, blood culture, urine culture  Sedation per protocol  Currently full code  Continue tube feeding  Will get palliative consult to discuss goals of care  DVT/GI prophylaxis           Vika Elmore MD   12/16/2024  12:43 PM

## 2024-12-16 NOTE — PLAN OF CARE
Problem: Safety Risk - Non-Violent Restraints  Goal: Patient will remain free from self-harm  Description: INTERVENTIONS:  - Apply the least restrictive restraint to prevent harm  - Notify patient and family of reasons restraints applied  - Assess for any contributing factors to confusion (electrolyte disturbances, delirium, medications)  - Discontinue any unnecessary medical devices as soon as possible  - Assess the patient's physical comfort, circulation, skin condition, hydration, nutrition and elimination needs   - Reorient and redirection as needed  - Assess for the need to continue restraints  12/16/2024 0415 by Jennifer Kevin, RN  Outcome: Not Progressing  12/15/2024 2341 by Jennifer Kevin, RN  Outcome: Not Progressing     Problem: PAIN - ADULT  Goal: Verbalizes/displays adequate comfort level or patient's stated pain goal  Description: INTERVENTIONS:  - Encourage pt to monitor pain and request assistance  - Assess pain using appropriate pain scale  - Administer analgesics based on type and severity of pain and evaluate response  - Implement non-pharmacological measures as appropriate and evaluate response  - Consider cultural and social influences on pain and pain management  - Manage/alleviate anxiety  - Utilize distraction and/or relaxation techniques  - Monitor for opioid side effects  - Notify MD/LIP if interventions unsuccessful or patient reports new pain  - Anticipate increased pain with activity and pre-medicate as appropriate  Outcome: Not Progressing     Problem: RISK FOR INFECTION - ADULT  Goal: Absence of fever/infection during anticipated neutropenic period  Description: INTERVENTIONS  - Monitor WBC  - Administer growth factors as ordered  - Implement neutropenic guidelines  Outcome: Not Progressing     Problem: SAFETY ADULT - FALL  Goal: Free from fall injury  Description: INTERVENTIONS:  - Assess pt frequently for physical needs  - Identify cognitive and physical deficits and behaviors  that affect risk of falls.  - [place] fall precautions as indicated by assessment.  - Educate pt/family on patient safety including physical limitations  - Instruct pt to call for assistance with activity based on assessment  - Modify environment to reduce risk of injury  - Provide assistive devices as appropriate  - Consider OT/PT consult to assist with strengthening/mobility  - Encourage toileting schedule  Outcome: Not Progressing     Problem: DISCHARGE PLANNING  Goal: Discharge to home or other facility with appropriate resources  Description: INTERVENTIONS:  - Identify barriers to discharge w/pt and caregiver  - Include patient/family/discharge partner in discharge planning  - Arrange for needed discharge resources and transportation as appropriate  - Identify discharge learning needs (meds, wound care, etc)  - Arrange for interpreters to assist at discharge as needed  - Consider post-discharge preferences of patient/family/discharge partner  - Complete POLST form as appropriate  - Assess patient's ability to be responsible for managing their own health  - Refer to Case Management Department for coordinating discharge planning if the patient needs post-hospital services based on physician/LIP order or complex needs related to functional status, cognitive ability or social support system  Outcome: Not Progressing     Problem: RESPIRATORY - ADULT  Goal: Achieves optimal ventilation and oxygenation  Description: INTERVENTIONS:  - Assess for changes in respiratory status  - Assess for changes in mentation and behavior  - Position to facilitate oxygenation and minimize respiratory effort  - Oxygen supplementation based on oxygen saturation or ABGs  - Provide Smoking Cessation handout, if applicable  - Encourage broncho-pulmonary hygiene including cough, deep breathe, Incentive Spirometry  - Assess the need for suctioning and perform as needed  - Assess and instruct to report SOB or any respiratory difficulty  -  Respiratory Therapy support as indicated  - Manage/alleviate anxiety  - Monitor for signs/symptoms of CO2 retention  Outcome: Not Progressing     Problem: NEUROLOGICAL - ADULT  Goal: Achieves stable or improved neurological status  Description: INTERVENTIONS  - Assess for and report changes in neurological status  - Initiate measures to prevent increased intracranial pressure  - Maintain blood pressure and fluid volume within ordered parameters to optimize cerebral perfusion and minimize risk of hemorrhage  - Monitor temperature, glucose, and sodium. Initiate appropriate interventions as ordered  Outcome: Not Progressing     Problem: Delirium  Goal: Minimize duration of delirium  Description: Interventions:  - Encourage use of hearing aids, eye glasses  - Promote highest level of mobility daily  - Provide frequent reorientation  - Promote wakefulness i.e. lights on, blinds open  - Promote sleep, encourage patient's normal rest cycle i.e. lights off, TV off, minimize noise and interruptions  - Encourage family to assist in orientation and promotion of home routines  Outcome: Not Progressing

## 2024-12-16 NOTE — PROGRESS NOTES
Was here until approx 520 pm and checked in with ANJELICA Cohen again by phone to see if daughter/ HCPOA. Jada had arrived yet to see her mom (we were expecting her laete afternoon after 4 pm today).  I wanted to talk with her face to face as previously we talked by phone on 12/13. At that time on phone consultation, she was wishing to continue full supportive medical care to foster medical stability and recovery and did not want to discuss any care or goals other than that.  PLAN : Follow-up  by phone in am to set up time to meet to further review and discuss clinical course and goals of care   Jacquelyn Lindo, APRN,CNP, ACHPN  In Palliative Care [phone]

## 2024-12-16 NOTE — PROGRESS NOTES
[facility]  part of [facility]    Progress Note      Assessment and Plan:   1.  Acute on chronic respiratory failure-multifactorial with probable aspiration pneumonitis, heart failure with preserved ejection fraction, history of venous thromboembolism now being treated for sepsis.  The patient now has a small right-sided pleural effusion and generalized weakness with dramatic hypercapnia.    Recommendations:  1.  Ongoing empiric antibiotic  2.  Spontaneous breathing trial  3.  Morning chest x-ray  4.  Ongoing discussion regarding limits in this patient with dementia at baseline and poor prognosis in the short-term.  The patient would benefit from palliative care consultation.  5.  DuoNebs    2.  DVT prophylaxis-on apixaban    3.  OBS    4.  Nutrition-tube feeds    5.  Goals of care-limits are most appropriate.  However, patient is reported as full code.  Ongoing family discussion.    Subjective:   Yin Butcher is a(n) 89 year old female who is sedated on ventilator    Objective:   Blood pressure 102/73, pulse 116, temperature 97 °F (36.1 °C), temperature source Esophageal, resp. rate 20, weight 101 lb 6.6 oz (46 kg), SpO2 100%.    Physical Exam sedate white female elderly and frail appearing  HEENT examination is unremarkable with pupils equal round and reactive to light and accommodation.   Neck without adenopathy, thyromegaly, JVD nor bruit.   Lungs diminished to auscultation and percussion.  Cardiac regular rate and rhythm no murmur.   Abdomen nontender, without hepatosplenomegaly and no mass appreciable.   Extremities without clubbing cyanosis nor edema.   Neurologic sedate on ventilator.  Skin without gross abnormality     Results:     Lab Results   Component Value Date    WBC 11.8 12/16/2024    HGB 8.7 12/16/2024    HCT 26.5 12/16/2024    .0 12/16/2024    CREATSERUM 0.41 12/16/2024    BUN 22 12/16/2024     12/16/2024    K 3.9 12/16/2024     12/16/2024    CO2 28.0  12/16/2024     12/16/2024    CA 8.2 12/16/2024     Chest x-ray-small right pleural effusion.    Esteban Damon MD  Medical Director, Critical Care, [facility]  Medical Director, [facility]  Pager: [phone]

## 2024-12-16 NOTE — PROGRESS NOTES
12/16/24 0951   Spontaneous Parameters   Spontaneous RR Rate 29   Spontaneous Minute Volume 8.2   Average Spontaneous Tidal Volume 280   $ Spontaneous Vital Capacity   (MARGARITA)   Negative Inspiratory Force -15   Total RSBI 143     Pt place on SBT for 10min before increased RR/HR. Placed back on full support. RN/APRN notified

## 2024-12-16 NOTE — PLAN OF CARE
Problem: RESPIRATORY - ADULT  Goal: Achieves optimal ventilation and oxygenation  Description: INTERVENTIONS:  - Assess for changes in respiratory status  - Assess for changes in mentation and behavior  - Position to facilitate oxygenation and minimize respiratory effort  - Oxygen supplementation based on oxygen saturation or ABGs  - Provide Smoking Cessation handout, if applicable  - Encourage broncho-pulmonary hygiene including cough, deep breathe, Incentive Spirometry  - Assess the need for suctioning and perform as needed  - Assess and instruct to report SOB or any respiratory difficulty  - Respiratory Therapy support as indicated  - Manage/alleviate anxiety  - Monitor for signs/symptoms of CO2 retention     Pt remains intubated with ETT size 7.0 at 24 cm at the lip, on full vent support. Pt saturating in low 90's, suction provided as  needed. No changes at this time, RT will continue to monitor.    Vent settings and readings as follow:     12/15/24 2350   Vent Information   $ Vent Care / Non-Invasive Subsequent Day Yes   Is this patient on Chronic Ventilation? No   Interface Invasive   Vent Type AV   Vent plugged into main power? Yes   Vent ID    Vent Mode VC/AC   Settings   FiO2 (%) 30 %   Resp Rate (Set) 12   Vt (Set, mL) 450 mL   Waveform Decelerating ramp   PEEP/CPAP (cm H2O) 5 cm H20   PEEP Low (cm H2O) 2 cm H2O   Peak Flow LPM 50   Trigger Sensitivity Flow (L/min) 1 L/min   Trigger Sensitivity Pressure (cm H2O) 3 cm H2O   Humidification Heat and moisture exchanger   Readings   Total RR 20   Minute Ventilation (L/min) 7.9 L/min   Expiratory Tidal Volume 390 mL   PIP Observed (cm H2O) 30 cm H2O   MAP (cm H2O) 11   PEEP Low (cm H2O) 2 cm H2O   I/E Ratio 1:3.1   Plateau Pressure (cm H2O) 25 cm H2O   Static Compliance (L/cm H2O) 18   Dynamic Compliance (L/cm H2O) 17 L/cm H2O   Alarms   High RR 45   Insp Pressure High (cm H2O) 60 cm H2O   Insp Pressure Low (cm H2O) 8 cm H2O   MV High (L/min) 22 L/min    MV Low (L/min) 3 L/min   Apnea Interval (sec) 20 seconds      12/15/24 2350   Respiratory Assessment   Assessment Type Assess only   Respiratory Pattern Regular   Chest Assessment Chest expansion symmetrical   Cough Non-productive   Sputum Amount None   Bilateral Breath Sounds Diminished   ETT   Placement Date/Time: 12/12/24 1158   Airway Size: 7 mm  Cuffed: Cuffed  Insertion attempts: 2  Technique: Video laryngoscopy  Placement Verification: Colorimetric EtCO2 device  Placed By: (c) Other (Comment)   Secured at (cm) 24 cm   Cuff Pressure (cm H2O) 29 cm H2O   Suctioned? Y   Measured From Lips   Secured Location Right   Secured by Commercial tube lo   Req'd equipment at bedside Bag mask   Additional Assessments   Pulse 108   Resp 20   SpO2 92 %

## 2024-12-16 NOTE — PROGRESS NOTES
Pt received intubated,neb tx&cpt given as ordered,suctioned,moderate amount of secretion obtained,no acute event on current shift.   12/15/24 2005   Settings   FiO2 (%) 30 %   Resp Rate (Set) 20   Vt (Set, mL) 450 mL   Waveform Decelerating ramp   PEEP/CPAP (cm H2O) 5 cm H20   PEEP Low (cm H2O) 2 cm H2O   Trigger Sensitivity Flow (L/min) 1 L/min   Humidification Heat and moisture exchanger

## 2024-12-17 ENCOUNTER — CASE MANAGEMENT (OUTPATIENT)
Dept: CARE COORDINATION | Age: 89
End: 2024-12-17

## 2024-12-17 NOTE — PLAN OF CARE
Patient is intubated, on propofol gtt, has a harvey.  Problem: Patient Centered Care  Goal: Patient preferences are identified and integrated in the patient's plan of care  Description: Interventions:  - What would you like us to know as we care for you?   - Provide timely, complete, and accurate information to patient/family  - Incorporate patient and family knowledge, values, beliefs, and cultural backgrounds into the planning and delivery of care  - Encourage patient/family to participate in care and decision-making at the level they choose  - Honor patient and family perspectives and choices  Outcome: Progressing     Problem: Patient/Family Goals  Goal: Patient/Family Long Term Goal  Description: Patient's Long Term Goal:     Interventions:  -   - See additional Care Plan goals for specific interventions  Outcome: Progressing  Goal: Patient/Family Short Term Goal  Description: Patient's Short Term Goal:     Interventions:   -  - See additional Care Plan goals for specific interventions  Outcome: Progressing     Problem: Safety Risk - Non-Violent Restraints  Goal: Patient will remain free from self-harm  Description: INTERVENTIONS:  - Apply the least restrictive restraint to prevent harm  - Notify patient and family of reasons restraints applied  - Assess for any contributing factors to confusion (electrolyte disturbances, delirium, medications)  - Discontinue any unnecessary medical devices as soon as possible  - Assess the patient's physical comfort, circulation, skin condition, hydration, nutrition and elimination needs   - Reorient and redirection as needed  - Assess for the need to continue restraints  Outcome: Progressing     Problem: PAIN - ADULT  Goal: Verbalizes/displays adequate comfort level or patient's stated pain goal  Description: INTERVENTIONS:  - Encourage pt to monitor pain and request assistance  - Assess pain using appropriate pain scale  - Administer analgesics based on type and severity of  pain and evaluate response  - Implement non-pharmacological measures as appropriate and evaluate response  - Consider cultural and social influences on pain and pain management  - Manage/alleviate anxiety  - Utilize distraction and/or relaxation techniques  - Monitor for opioid side effects  - Notify MD/LIP if interventions unsuccessful or patient reports new pain  - Anticipate increased pain with activity and pre-medicate as appropriate  Outcome: Progressing     Problem: RISK FOR INFECTION - ADULT  Goal: Absence of fever/infection during anticipated neutropenic period  Description: INTERVENTIONS  - Monitor WBC  - Administer growth factors as ordered  - Implement neutropenic guidelines  Outcome: Progressing     Problem: SAFETY ADULT - FALL  Goal: Free from fall injury  Description: INTERVENTIONS:  - Assess pt frequently for physical needs  - Identify cognitive and physical deficits and behaviors that affect risk of falls.  - [place] fall precautions as indicated by assessment.  - Educate pt/family on patient safety including physical limitations  - Instruct pt to call for assistance with activity based on assessment  - Modify environment to reduce risk of injury  - Provide assistive devices as appropriate  - Consider OT/PT consult to assist with strengthening/mobility  - Encourage toileting schedule  Outcome: Progressing     Problem: DISCHARGE PLANNING  Goal: Discharge to home or other facility with appropriate resources  Description: INTERVENTIONS:  - Identify barriers to discharge w/pt and caregiver  - Include patient/family/discharge partner in discharge planning  - Arrange for needed discharge resources and transportation as appropriate  - Identify discharge learning needs (meds, wound care, etc)  - Arrange for interpreters to assist at discharge as needed  - Consider post-discharge preferences of patient/family/discharge partner  - Complete POLST form as appropriate  - Assess patient's ability to be  responsible for managing their own health  - Refer to Case Management Department for coordinating discharge planning if the patient needs post-hospital services based on physician/LIP order or complex needs related to functional status, cognitive ability or social support system  Outcome: Progressing     Problem: Altered Communication/Language Barrier  Goal: Patient/Family is able to understand and participate in their care  Description: Interventions:  - Assess communication ability and preferred communication style  - Implement communication aides and strategies  - Use visual cues when possible  - Listen attentively, be patient, do not interrupt  - Minimize distractions  - Allow time for understanding and response  - Establish method for patient to ask for assistance (call light)  - Provide an  as needed  - Communicate barriers and strategies to overcome with those who interact with patient  Outcome: Progressing     Problem: RESPIRATORY - ADULT  Goal: Achieves optimal ventilation and oxygenation  Description: INTERVENTIONS:  - Assess for changes in respiratory status  - Assess for changes in mentation and behavior  - Position to facilitate oxygenation and minimize respiratory effort  - Oxygen supplementation based on oxygen saturation or ABGs  - Provide Smoking Cessation handout, if applicable  - Encourage broncho-pulmonary hygiene including cough, deep breathe, Incentive Spirometry  - Assess the need for suctioning and perform as needed  - Assess and instruct to report SOB or any respiratory difficulty  - Respiratory Therapy support as indicated  - Manage/alleviate anxiety  - Monitor for signs/symptoms of CO2 retention  Outcome: Progressing     Problem: NEUROLOGICAL - ADULT  Goal: Achieves stable or improved neurological status  Description: INTERVENTIONS  - Assess for and report changes in neurological status  - Initiate measures to prevent increased intracranial pressure  - Maintain blood pressure  and fluid volume within ordered parameters to optimize cerebral perfusion and minimize risk of hemorrhage  - Monitor temperature, glucose, and sodium. Initiate appropriate interventions as ordered  Outcome: Progressing  Goal: Absence of seizures  Description: INTERVENTIONS  - Monitor for seizure activity  - Administer anti-seizure medications as ordered  - Monitor neurological status  Outcome: Progressing  Goal: Remains free of injury related to seizure activity  Description: INTERVENTIONS:  - Maintain airway, patient safety  and administer oxygen as ordered  - Monitor patient for seizure activity, document and report duration and description of seizure to MD/LIP  - If seizure occurs, turn patient to side and suction secretions as needed  - Reorient patient post seizure  - Seizure pads on all 4 side rails  - Instruct patient/family to notify RN of any seizure activity  - Instruct patient/family to call for assistance with activity based on assessment  Outcome: Progressing  Goal: Achieves maximal functionality and self care  Description: INTERVENTIONS  - Monitor swallowing and airway patency with patient fatigue and changes in neurological status  - Encourage and assist patient to increase activity and self care with guidance from PT/OT  - Encourage visually impaired, hearing impaired and aphasic patients to use assistive/communication devices  Outcome: Progressing     Problem: Diabetes/Glucose Control  Goal: Glucose maintained within prescribed range  Description: INTERVENTIONS:  - Monitor Blood Glucose as ordered  - Assess for signs and symptoms of hyperglycemia and hypoglycemia  - Administer ordered medications to maintain glucose within target range  - Assess barriers to adequate nutritional intake and initiate nutrition consult as needed  - Instruct patient on self management of diabetes  Outcome: Progressing     Problem: Delirium  Goal: Minimize duration of delirium  Description: Interventions:  - Encourage use  of hearing aids, eye glasses  - Promote highest level of mobility daily  - Provide frequent reorientation  - Promote wakefulness i.e. lights on, blinds open  - Promote sleep, encourage patient's normal rest cycle i.e. lights off, TV off, minimize noise and interruptions  - Encourage family to assist in orientation and promotion of home routines  Outcome: Progressing

## 2024-12-17 NOTE — PROGRESS NOTES
[facility]  part of [facility]    Progress Note    Yin Butcher Patient Status:  Inpatient    1931 MRN [id number]   Location [facility] 2W/SW Attending Vika Elmore MD   Hosp Day # 5 PCP Mina Walker MD     SUBJECTIVE:  Pt seen and examined at bedside.   Remains orally intubated, sedated.  Continues to be full code orally intubated, sedated    BP 97/87 (BP Location: Right arm)   Pulse 110   Temp (!) 96.3 °F (35.7 °C) (Esophageal)   Resp 20   Wt 101 lb 6.6 oz (46 kg)   SpO2 100%   BMI 15.88 kg/m²     Physical Examination:    Gen: orally intubated, sedated. Appears comfortable.  HEENT: PERRLA  Lungs: CTA B/L  Heart: Normal S1 S2, No M/G/R   Abd: Abdomen soft, nontender, nondistended, no organomegaly, bowel sounds present  Ext: No edema, no calf tenderness    Labs:   Lab Results   Component Value Date    WBC 9.9 2024    HGB 8.7 2024    HCT 28.2 2024    .0 2024    CREATSERUM 0.42 2024    BUN 21 2024     2024    K 4.2 2024     2024    CO2 29.0 2024     2024    CA 8.6 2024    PGLU 126 2024       Recent Labs   Lab 24  0111 24  0649 24  0102 24  0721 24  1200   PGLU 102* 94 111* 80 126*     No results for input(s): \"INR\" in the last 168 hours.    Imaging:  Imaging reviewed in Epic.    Meds:   Current Facility-Administered Medications   Medication Dose Route Frequency    propofol (Diprivan) 10 mg/mL infusion premix  5-50 mcg/kg/min (Dosing Weight) Intravenous Continuous    ipratropium-albuterol (Duoneb) 0.5-2.5 (3) MG/3ML inhalation solution 3 mL  3 mL Nebulization TID    ipratropium-albuterol (Duoneb) 0.5-2.5 (3) MG/3ML inhalation solution 3 mL  3 mL Nebulization Q6H PRN    fentaNYL (Sublimaze) 50 mcg/mL injection 25 mcg  25 mcg Intravenous Q30 Min PRN    acetaminophen (Ofirmev) 10 mg/mL infusion premix 650 mg  15 mg/kg Intravenous Q6H PRN     dextrose 10% infusion (TPN no rate)   Intravenous Continuous PRN    pancrelipase (Lip-Prot-Amyl) (Zenpep) DR particles cap 10,000 Units  10,000 Units Per G Tube PRN    And    sodium bicarbonate tab 325 mg  325 mg Per G Tube PRN    metoprolol (Lopressor) 5 mg/5mL injection 5 mg  5 mg Intravenous Q4H PRN    apixaban (Eliquis) tab 2.5 mg  2.5 mg Oral BID    piperacillin-tazobactam (Zosyn) 3.375 g in dextrose 5% 100 mL IVPB-ADDV  3.375 g Intravenous Q8H    acetylcysteine (Mucomyst) 20 % nebulizer solution 2 mL  2 mL Nebulization TID    albuterol (Ventolin) (5 MG/ML) 0.5% nebulizer solution 5 mg  5 mg Nebulization Q4H PRN    acetaminophen (Tylenol) 160 MG/5ML oral liquid 650 mg  650 mg Per NG Tube Q4H PRN    Or    acetaminophen (Tylenol) rectal suppository 650 mg  650 mg Rectal Q4H PRN    senna (Senokot) 8.8 MG/5ML oral syrup 17.6 mg  10 mL Per NG Tube BID    docusate (Colace) 50 MG/5ML oral solution 100 mg  100 mg Per NG Tube BID    polyethylene glycol (PEG 3350) (Miralax) 17 g oral packet 17 g  17 g Per NG Tube Daily PRN    bisacodyl (Dulcolax) 10 MG rectal suppository 10 mg  10 mg Rectal Daily PRN    chlorhexidine gluconate (Peridex) 0.12 % oral solution 15 mL  15 mL Mouth/Throat BID@0800,2000    mineral oil-white petrolatum (Artificial Tears) 83-15 % ophthalmic ointment 1 Application  1 Application Both Eyes Nightly    famotidine (Pepcid) 20 mg/2mL injection 20 mg  20 mg Intravenous Daily @ 0700    ondansetron (Zofran) 4 MG/2ML injection 4 mg  4 mg Intravenous Q6H PRN    metoclopramide (Reglan) 5 mg/mL injection 10 mg  10 mg Intravenous Q8H PRN    vancomycin (Vancocin) 750 mg in sodium chloride 0.9% 250 mL IVPB-ADDV  750 mg Intravenous Q24H       Assessment:    #1.  Acute hypoxemic respiratory failure  2.  Fever  3.  Metabolic encephalopathy  4.  History of atrial fibrillation  5.  History of pulmonary embolism  6.  Dysphagia, status post PEG  7.  History of dementia  8.  Septic shock        Plan:     Patient is on  ventilator  Vent management per ICU  Bronchodilator  Antibiotic  Pulmonary toileting  SBT per protocol  Being followed by cardiology  Resume Eliquis  IV Vanco, IV Zosyn  Follow-up with sputum culture, blood culture, urine culture  Sedation per protocol  Currently full code  Continue tube feeding  Will get palliative consult to discuss goals of care  DVT/GI prophylaxis  Setting limit is appropriate for this patient.            Vika Elmore MD   12/17/2024  1:21 PM

## 2024-12-17 NOTE — PROGRESS NOTES
[facility]  part of [facility]  Palliative Care Progress Note    Yin Butcher Patient Status:  Inpatient    1931 MRN [id number]   Location [facility] 2W/SW Attending Vika Elmore MD   Hosp Day # 5 PCP Mina Walker MD     The  Cures Act makes medical notes like these available to patients in the interest of transparency. Please be advised this is a medical document. Medical documents are intended to carry relevant information, facts as evident, and the clinical opinion of the practitioner. The medical note is intended as peer to peer communication and may appear blunt or direct. It is written in medical language and may contain abbreviations or verbiage that are unfamiliar.     Summary     Yin Butcher is a 89 year old female with history of HFpEF; permanent Afib on Eliquis , recent PE, recurrent aspiration pneumonia and FTT with several hospital admissions, SIADH, colon CA and osteoporosis w/ lumbar & thoracic compression fractures, and OA/ DJD s/p R hip hemiarthroplasty. Patient was admitted from [facility] ( [facility] there since 24) on 2024 for AMS/ lethargy with fever (temp 100.9 F,) tachypnea- RR 29 bpm;  tachycardia 123 bpm, hypoxia and hypotension. Pt was initially on NIVS but on  was intubated for progressive resp failure and now on propofol infusion.   --Work up in our hospital has included imaging with chest X-ray which showed CHF with pulmonary interstitial edema and moderate right basilar pleural effusion along with compressive atelectasis in RLL. CT abd  showed no acute intra-abdominal process but heavy stool burden. CT head showed no acute intracranial process but parenchymal volume loss with sequela of chronic microvascular ischemic disease including chronic lacunar infarcts of the basal ganglia. CT chest showed bilat LL airspace disease with extensive mucous plugging/debris of the lower lobe bronchi, concerning for potential aspiration  pneumonitis and/or bronchopneumonia, small pleural effusions and associated basilar atelectasis, with or without superimposed pneumonia, chronicity-indeterminate compression fracture deformities of the thoracic spine, up to severe degree, with kyphotic accentuation, marked cardiomegaly and dilatation of the main pulmonary artery trunk which may relate to underlying pulmonary hypertension. 12 lead EKG showed atrial fibrillation with rapid ventricular response.   --Admission labs revealed FLORESITA with elevated BUN at 50 and creat at 0.4 with eGFR at 92 and osmolarity at 314 and hyperglycemia with glucose at 143. LFTs showed elevated Bili with total at 1 and direct at 0.5. proBNP elevated at 7,642 and procal elevated at 0.10. CBC showed leukocytosis with WBC at 12.1 and sl anemia with Hgb at 11.9 w/ adequate plt count at 229K. Urine analysis showed (+) 25 -Leukocyte esterase with f/u urine culture negative . Blood cultures from 12/11 NGTD x 2 days, Resp panel for SARS, RSV and Influenza all negative.History was obtained from [facility] and pt's daughter Jada and her finance Saba by phone .  Patient is being followed by several  specialist this admission: cardiology and pulmonary  Today is day #6 of hospitalization. This is the 4th hospitalization in the past 10 months (since Feb 2024) Pt has had 8 admission in 2023 ( see consult note)     Consult ordered by:: Dr. Vika Elmore for evaluation of Palliative Care needs and Goals of care discussion.    Subjective     Interval events: Since last encounter, pt has been undergoing daily SBTs since 12/13 and  and conts to  not tolerate with signs of distress - tachypnea, tachycardia and hypoxia. Remains on cont propofol infusion at 15 mcg/kg/ with minimal need for analgesia     Review of progress notes :   Pulm; cont SBT - not tolerating well ; CT chest LL mucous plugging concern for asp  Cont Zosyn & Vanco IV; Bld Culture NGTD; cont Chest PT.  Given patient's overall condition and  age I do not recommend long-term ventilatory support if patient not improving clinically.   Cards: patient is maintaining A-fib with heart rates in the 100-115 range this morning; patient with acceptable heart rates. Avoid rate controlling medications given sepsis. Fortunately not requiring any pressors at this time.Continue DOAC for stroke prevention. Follow-up from palliative care discussion with family      When I entered the room, the patient was intubated and sedated- lying in bed on propofol infusion . No family present at bedside.     MAR Review :   Ofirmev 650 mg Q 6 hr prn - last dose 12/16  Fentanyl 25 mcg  q 30 min prn - last 12/13   Reglan / Zofran - none   Senna & Colace scheduled - taking ]    Review of Systems/ Symptoms: Patient was not able  to provide subjective input. Assessment is assisted by RNValeria Flores   Fatigue:  Yes, per reports / review of chart ; pt. with increasing fatigue / weakness and AMS for several days PTA ; bed bound; Been having declining trajectory again this year   Overall Functional status: Was at the [place]  per daughter declining there ; now wants to explore [facility]    Dyspnea:  Conts to remains orally Intubated on conts propofol infusion FiO2 at 30 % and P- 5 Not tolerating SBT  with tachycardia, tachypnea with use of accessory muscle and hypoxia . Today 9 minutes   Drowsiness:  sedated on propofol ; Does awaken when weaned ; will follow simple command; Per Rn very weak with delayed response time   Cough: ---  Pain: Per RN some nonverbal signs with routine care and ICU procedures- has prn Fentanyl and Ofrimev - none in some time CPOT - 0    Non-verbal signs of pain present: No  Appetite/Nutritional Status: NPO - has G-tube in place since last hospital admission  at [facility]. Followed by dietician with [employer] and H20 flushes per their recommendations   Dysphagia: on going issue over the last year with recurrent asp pneumonia now with G-tube in place since 11/24    Constipation: On Colace & Senna  via NGT Last BM 12/17 brown soft medium; Prior 12/16- Bm X 3 brown soft medium,  small; large ; 12/15  BM x 1 brown soft medium  Diarrhea: none documented   Nausea/Vomiting: none documented recently last Zofran several days ago   Depression: daughter reports no history ; was an active 89+ year old; Was trying to get back from  bad year 2023 ; was making some strides and than got sick again and things starling cascading out of control    Anxiety:  daughter reports has not been issue   Emotional / coping response to illness: Daughter Jada reports her mom was a very strong and positive person ; Was trying to regain her strength and get herself back on track to enjoy the things she likes . Almost got there and then  has had one set back after another. Tells me started with \"injury to randi vocal cord after a \" ? Thoracentesis\" .  Daughter has spoke with palliative care at previous hospital admission tells me not positive interactions and she wants to continue to \" get her mom better\"   Other:     Allergies:  Allergies[1]    Medications:     Current Facility-Administered Medications:     propofol (Diprivan) 10 mg/mL infusion premix, 5-50 mcg/kg/min (Dosing Weight), Intravenous, Continuous    ipratropium-albuterol (Duoneb) 0.5-2.5 (3) MG/3ML inhalation solution 3 mL, 3 mL, Nebulization, TID    ipratropium-albuterol (Duoneb) 0.5-2.5 (3) MG/3ML inhalation solution 3 mL, 3 mL, Nebulization, Q6H PRN    fentaNYL (Sublimaze) 50 mcg/mL injection 25 mcg, 25 mcg, Intravenous, Q30 Min PRN    acetaminophen (Ofirmev) 10 mg/mL infusion premix 650 mg, 15 mg/kg, Intravenous, Q6H PRN    dextrose 10% infusion (TPN no rate), , Intravenous, Continuous PRN    pancrelipase (Lip-Prot-Amyl) (Zenpep) DR particles cap 10,000 Units, 10,000 Units, Per G Tube, PRN **AND** sodium bicarbonate tab 325 mg, 325 mg, Per G Tube, PRN    metoprolol (Lopressor) 5 mg/5mL injection 5 mg, 5 mg, Intravenous, Q4H PRN    apixaban  (Eliquis) tab 2.5 mg, 2.5 mg, Oral, BID    piperacillin-tazobactam (Zosyn) 3.375 g in dextrose 5% 100 mL IVPB-ADDV, 3.375 g, Intravenous, Q8H    acetylcysteine (Mucomyst) 20 % nebulizer solution 2 mL, 2 mL, Nebulization, TID    albuterol (Ventolin) (5 MG/ML) 0.5% nebulizer solution 5 mg, 5 mg, Nebulization, Q4H PRN    acetaminophen (Tylenol) 160 MG/5ML oral liquid 650 mg, 650 mg, Per NG Tube, Q4H PRN **OR** acetaminophen (Tylenol) rectal suppository 650 mg, 650 mg, Rectal, Q4H PRN    senna (Senokot) 8.8 MG/5ML oral syrup 17.6 mg, 10 mL, Per NG Tube, BID    docusate (Colace) 50 MG/5ML oral solution 100 mg, 100 mg, Per NG Tube, BID    polyethylene glycol (PEG 3350) (Miralax) 17 g oral packet 17 g, 17 g, Per NG Tube, Daily PRN    bisacodyl (Dulcolax) 10 MG rectal suppository 10 mg, 10 mg, Rectal, Daily PRN    chlorhexidine gluconate (Peridex) 0.12 % oral solution 15 mL, 15 mL, Mouth/Throat, BID@0800,2000    mineral oil-white petrolatum (Artificial Tears) 83-15 % ophthalmic ointment 1 Application, 1 Application, Both Eyes, Nightly    famotidine (Pepcid) 20 mg/2mL injection 20 mg, 20 mg, Intravenous, Daily @ 0700    ondansetron (Zofran) 4 MG/2ML injection 4 mg, 4 mg, Intravenous, Q6H PRN    metoclopramide (Reglan) 5 mg/mL injection 10 mg, 10 mg, Intravenous, Q8H PRN    vancomycin (Vancocin) 750 mg in sodium chloride 0.9% 250 mL IVPB-ADDV, 750 mg, Intravenous, Q24H    Objective     Vital Signs:  Blood pressure 90/73, pulse 108, temperature 96.6 °F (35.9 °C), temperature source Esophageal, resp. rate 20, weight 101 lb 6.6 oz (46 kg), SpO2 100%.  Body mass index is 15.88 kg/m².  Present Level of pain: withdraws to painful stimuli   CPOT- 0  Non-verbal signs of pain present: No    Physical Exam:  General: Unresponsive. Intubated and sedated ;  Body habitus Cachectic ; pale and ill appearing Orally intubated   HEENT: AT/NC. Orally intubated ; bitemp & SCM muscle wasting    Cardiac: Irregularly irregular; On tele shows afib    Prominent skeletal markings and bilat breast implants noted today  Lungs: Bilateral breath sounds-diminished at bases on Ventilator FiO2 at 30 % and P- 5 Failed SBT earlier today ; Has bilat breast implants  Abdomen:  soft round; NT (+) BS (+) BM  Extremities: No LE edema present. Bilat SCDs in place  (+) Sarcopenia  Neurologic: Intubated and sedated on propofol . No myoclonus, or seizures   Psychiatric: Intubated and sedated on propofol infusion   Skin:  Pale. Cool hands.; pale , no rash noted ; Bilat scattered bruising UEs.  Wound care RN notes and pictures reviewed     Prior to admission Palliative performance scale PPSv2 (%): 30    Hematology:  Lab Results   Component Value Date    WBC 9.9 12/17/2024    HGB 8.7 (L) 12/17/2024    HCT 28.2 (L) 12/17/2024    .0 12/17/2024   Coags:  Lab Results   Component Value Date    PT 15.5 (H) 07/13/2014    INR 1.32 (H) 01/29/2024    PTT 34.6 01/31/2024   Chemistry:  Lab Results   Component Value Date    CREATSERUM 0.42 (L) 12/17/2024    BUN 21 12/17/2024     12/17/2024    K 4.2 12/17/2024     12/17/2024    CO2 29.0 12/17/2024     (H) 12/17/2024    CA 8.6 (L) 12/17/2024    ALB 4.0 12/11/2024    ALKPHO 112 12/11/2024    BILT 1.0 (H) 12/11/2024    TP 8.2 12/11/2024    AST 24 12/11/2024    ALT 30 12/11/2024    DDIMER 2.38 (H) 01/20/2024    MG 2.0 12/14/2024    PHOS 4.1 12/14/2024    TROP <0.046 02/19/2015       Imaging:    XR CHEST AP PORTABLE  (CPT=71045)Result Date: 12/16/2024  FINDINGS:   CARDIAC/VASC: Mild cardiomegaly.  Mitral annular calcifications.   MEDIAST/CHER:   Atherosclerotic aorta with no visible aneurysm.  ET tube in satisfactory position 4.2 cm above the gertrudis.   LUNGS/PLEURA: Right basilar parenchymal abnormality with accompanying right-sided effusion.  Suspect pneumonitis.  Small left-sided effusion blunts the costophrenic angle.   BONES: Mild scoliosis with mild degenerative disc disease and spondylosis.    OTHER: Bilateral breast  implants with capsular calcifications.  Surgical clips right mid abdomen   CONCLUSION:  1. Cardiomegaly.  Mitral annular calcifications. 2. Right basilar parenchymal abnormality /pneumonitis with parapneumonic effusion. 3. ET tube in satisfactory position 4.2 cm above the gertrudis.     Dictated by (CST): Melecio Jackson MD on 12/16/2024 at 8:33 AM     Finalized by (CST): Melecio Jackson MD on 12/16/2024 at 8:36 AM           Summary of Discussion      Advance Care Planning counseling and discussion:   HCPOA Documentation Completed--Document in Epic., Jada Butcher/ [place] which is pt's Daughter with 1 successor Saba Demarco- daughter's finance and daughter reports her mom's favorite person  CODE STATUS DISCUSSION:  Briefly reviewed [place] > benefit and daughter remains wanted to not set any limits at this time but continue full resuscitated efforts   POLST FORM:  not reviewed in light of above   CODE STATUS: Full Code    Assessment and Recommendation       Palliative Care encounter    Counseling regarding goals of care an advance care planning    Acute Metabolic encephalopathy    Leukocytosis     Hypotension     Fever    Sepsis, due to unspecified organism, unspecified whether acute organ dysfunction present (HCC)    Acute on chronic  respiratory failure with hypercapnia & Hypoxia  (HCC)    Atrial fibrillation with rapid ventricular response (HCC)    HFpEF ( EF     Severe PCM     Prior history of pulmonary embolism on Eliquis     H/O persistent oropharyngeal Dysphagia,status post PEG      H/O dementia      Goals of care  12/17/24: Spoke with ANJELICA Flores and asked if she has heard from daughter today Called daughter/ HCPOA Jada and went to . Left her message requesting call back just to touch base and asking her to call back . As of  close of this note of call back received. Will continue to try and even reach out to successor POA   12/13/24 remain established and treatment focused at this time but ongoing  GOC discussions are needed as pt's illness trajectory evolves over this admission as this appears the sickest pt has been and at her lowest reserves. Daughter, Jada  has met with PC since 2022 and has declined these services several times or has chosen ongoing medical care for curative and restorative purposes despite evident declining illness trajectory since 2023 with multiple admission for recurrent issues with ongoing functional, cognitive and nutritional decline. It goes appear pt had short period of improvement ( 4-5 months) and she is holding on that this will happen again and her mom will once again turn around despite her medical frailty   Healthcare Power of Care: Paperwork in on file in EMR dated  2/25/2011  Primary HCPOA : Jada Holcomb ( daughter) at  [phone]  Successor: Saba Demarco- daughter's  fiance  at [phone]  Code Status: Full Code remains at this time as have not been able to have further discussion with HCPOA/ daughter Jada as called and left vm message and received no call back   I also asked ANJELICA Flores to contact me it daughter visits today      Symptoms; RN discussed with me daughter did not want pain meds. I discussed with RN if per her assessment pt was in pain just like all other assessments, she can medicate per her professional assessment. Educate daughter quick effect of Fentanyl and not effect hemodynamics.     Pain :   Discussed with MINDI Vanegas - Due to pt overall frailty and opioid nativity changed to Fentanyl 25 mcg IVP prn  Discontinue NSAID - based on pt's condition, DOAC and age risk > benefit   Ordered  Ofirmev IVP per weight dose prn as not now NPO and rectal route is not appropriate pt is critically ill     Discussed today's visit with  SANAM Flores and Care team participated in ICU team rounds and updated them on above status      Palliative Care Follow Up: Palliative care team will Continue to follow while inpatient.  Palliative care follow up outpatient:  TBD.    Thank you for allowing Palliative Care services to participate in the care of Yin Butcher.    A total of 25 minutes were spent on this consult, which included all of the following: chart review, direct face to face contact, history taking, physical examination, counseling and coordinating care, and documentation.     Jacquelyn Lindo, APRN  12/17/2024  Palliative Care Services at [facility] :  extension [phone]   or [phone]         [1]   Allergies  Allergen Reactions    Erythromycin OTHER (SEE COMMENTS)    Oxycodone HALLUCINATION    Gabapentin NAUSEA ONLY and OTHER (SEE COMMENTS)     Pt stated she didn't feel like herself     Tizanidine ITCHING and OTHER (SEE COMMENTS)     Pt states burning sensation

## 2024-12-17 NOTE — PROGRESS NOTES
Cardiology Progress Note    Yin Butcher Patient Status:  Inpatient    1931 MRN [id number]   Location [facility] 2W/SW Attending Vika Elmore MD   Hosp Day # 5 PCP Mina Walker MD     Interval Note:  Patient seen and examined  Today shows patient is maintaining A-fib with heart rates in the 100-115 range this morning  Palliative care following      --------------------------------------------------------------------------------------------------------------------------------  ROS 12 systems reviewed, pertinent findings above.  ROS    History:  Past Medical History:    Arthritis    Atrial fibrillation (HCC)    Back problem    Cancer (HCC)    Cataract    Osteoporosis    Personal history of antineoplastic chemotherapy     Past Surgical History:   Procedure Laterality Date    Other surgical history  2014    Procedure: HIP HEMIARTHROPLASTY/ BIPOLAR;  Surgeon: Vasu Matamoros MD;  Location:  MAIN OR    Removal of tonsils,12+ y/o       Family History   Family history unknown: Yes      reports that she has never smoked. She has never used smokeless tobacco. She reports that she does not currently use alcohol. She reports that she does not use drugs.    Objective:   Temp: 96.1 °F (35.6 °C)  Pulse: 118  Resp: 20  BP: 110/83  FiO2 (%): 30 %    Intake/Output:     Intake/Output Summary (Last 24 hours) at 2024 0914  Last data filed at 2024 0600  Gross per 24 hour   Intake 1850.8 ml   Output 580 ml   Net 1270.8 ml       Physical Exam:    General: Intubated, sedated  HEENT: Normocephalic, anicteric sclera, neck supple.  Neck: No JVD, carotids 2+, no bruits.  Cardiac: Irregular rate and rhythm. S1, S2 normal. No murmur, pericardial rub, S3.  Lungs: Clear without wheezes, rales, rhonchi or dullness.  Normal excursions and effort.  Abdomen: Soft, non-tender. BS-present.  Extremities: Without clubbing, cyanosis or edema.  Peripheral pulses are 2+.  Neurologic: Non-focal  Skin: Warm and  dry.       Assessment   Acute on chronic respiratory failure, aspiration pneumonitis  HFpEF  Atrial fibrillation  Metabolic encephalopathy  History of pulm embolism  Dysphagia status post PEG  Dementia      Plan  Telemetry reviewed, patient with acceptable heart rates.  Avoid rate controlling medications given sepsis.  Fortunately not requiring any pressors at this time.  Antibiotic management per primary team  Continue DOAC for stroke prevention  Follow-up from palliative care discussion with family    Thank you for allowing me to take part in the care of Yin Butcher. Please call with any questions of concerns.    Level of care: L3    Ghassan Lima DO  [facility]   Interventional Cardiac and Vascular Services      Ghassan Lima DO  December 17, 2024  9:14 AM

## 2024-12-17 NOTE — PROGRESS NOTES
[facility]  part of [facility]     Progress Note    Yin Butcher Patient Status:  Inpatient    1931 MRN [id number]   Location [facility] 2W/SW Attending Vika Elmore MD   Hosp Day # 5 PCP Mina Walker MD       Subjective:   Patient seen and examined.  Intubated, sedated.    Objective:   Blood pressure 110/83, pulse 118, temperature (!) 96.1 °F (35.6 °C), temperature source Esophageal, resp. rate 20, weight 101 lb 6.6 oz (46 kg), SpO2 96%.  Intake/Output:   Last 3 shifts: I/O last 3 completed shifts:  In: 2974.3 [I.V.:913.3; NG/GT:; IV PIGGYBACK:100]  Out: 1480 [Urine:1480]   This shift: No intake/output data recorded.     Vent Settings: Vent Mode: VC/AC  FiO2 (%):  [30 %] 30 %  S RR:  [12] 12  S VT:  [450 mL] 450 mL  PEEP/CPAP (cm H2O):  [5 cm H20] 5 cm H20  MAP (cm H2O):  [7-11] 11    Hemodynamic parameters (last 24 hours):      Scheduled Meds:   Current Facility-Administered Medications   Medication Dose Route Frequency    propofol (Diprivan) 10 mg/mL infusion premix  5-50 mcg/kg/min (Dosing Weight) Intravenous Continuous    ipratropium-albuterol (Duoneb) 0.5-2.5 (3) MG/3ML inhalation solution 3 mL  3 mL Nebulization TID    ipratropium-albuterol (Duoneb) 0.5-2.5 (3) MG/3ML inhalation solution 3 mL  3 mL Nebulization Q6H PRN    fentaNYL (Sublimaze) 50 mcg/mL injection 25 mcg  25 mcg Intravenous Q30 Min PRN    acetaminophen (Ofirmev) 10 mg/mL infusion premix 650 mg  15 mg/kg Intravenous Q6H PRN    dextrose 10% infusion (TPN no rate)   Intravenous Continuous PRN    pancrelipase (Lip-Prot-Amyl) (Zenpep) DR particles cap 10,000 Units  10,000 Units Per G Tube PRN    And    sodium bicarbonate tab 325 mg  325 mg Per G Tube PRN    metoprolol (Lopressor) 5 mg/5mL injection 5 mg  5 mg Intravenous Q4H PRN    apixaban (Eliquis) tab 2.5 mg  2.5 mg Oral BID    piperacillin-tazobactam (Zosyn) 3.375 g in dextrose 5% 100 mL IVPB-ADDV  3.375 g Intravenous Q8H    acetylcysteine  (Mucomyst) 20 % nebulizer solution 2 mL  2 mL Nebulization TID    albuterol (Ventolin) (5 MG/ML) 0.5% nebulizer solution 5 mg  5 mg Nebulization Q4H PRN    acetaminophen (Tylenol) 160 MG/5ML oral liquid 650 mg  650 mg Per NG Tube Q4H PRN    Or    acetaminophen (Tylenol) rectal suppository 650 mg  650 mg Rectal Q4H PRN    senna (Senokot) 8.8 MG/5ML oral syrup 17.6 mg  10 mL Per NG Tube BID    docusate (Colace) 50 MG/5ML oral solution 100 mg  100 mg Per NG Tube BID    polyethylene glycol (PEG 3350) (Miralax) 17 g oral packet 17 g  17 g Per NG Tube Daily PRN    bisacodyl (Dulcolax) 10 MG rectal suppository 10 mg  10 mg Rectal Daily PRN    chlorhexidine gluconate (Peridex) 0.12 % oral solution 15 mL  15 mL Mouth/Throat BID@0800,2000    mineral oil-white petrolatum (Artificial Tears) 83-15 % ophthalmic ointment 1 Application  1 Application Both Eyes Nightly    famotidine (Pepcid) 20 mg/2mL injection 20 mg  20 mg Intravenous Daily @ 0700    ondansetron (Zofran) 4 MG/2ML injection 4 mg  4 mg Intravenous Q6H PRN    metoclopramide (Reglan) 5 mg/mL injection 10 mg  10 mg Intravenous Q8H PRN    vancomycin (Vancocin) 750 mg in sodium chloride 0.9% 250 mL IVPB-ADDV  750 mg Intravenous Q24H       Continuous Infusions:    propofol 15 mcg/kg/min (12/17/24 0923)    dextrose 10%         Physical Exam  Constitutional: Intubated, sedated  Eyes: PERRL  ENT: nares pateint  Neck: supple, no JVD  Cardio: RRR, S1 S2  Respiratory: Diminished bibasilar breath sounds with crackles present  GI: abdomen soft, non tender, active bowel sounds, no organomegaly + PEG tube in place  Extremities: no clubbing, cyanosis, edema  Neurologic: Does not follow commands  Skin: warm, dry      Results:     Lab Results   Component Value Date    WBC 9.9 12/17/2024    HGB 8.7 12/17/2024    HCT 28.2 12/17/2024    .0 12/17/2024    CREATSERUM 0.42 12/17/2024    BUN 21 12/17/2024     12/17/2024    K 4.2 12/17/2024     12/17/2024    CO2 29.0  12/17/2024     12/17/2024    CA 8.6 12/17/2024       XR CHEST AP PORTABLE  (CPT=71045)    Result Date: 12/16/2024  CONCLUSION:  1. Cardiomegaly.  Mitral annular calcifications. 2. Right basilar parenchymal abnormality /pneumonitis with parapneumonic effusion. 3. ET tube in satisfactory position 4.2 cm above the gertrudis.     Dictated by (CST): Melecio Jackson MD on 12/16/2024 at 8:33 AM     Finalized by (CST): Melecio Jackson MD on 12/16/2024 at 8:36 AM                 Assessment   1.  Fevers  2.  Acute encephalopathy  3.  Acute on chronic hypercapnic hypoxemic respiratory failure  4.  Leukocytosis  5.  HFpEF  6.  History of atrial fibrillation  7.  Prior history of pulmonary embolism  9.  Dementia  10.  Dysphagia     Plan   -Patient presents with chief complaint of fevers ongoing altered mental status over the last several days.  Recently discharged on 11/24/2024 from [facility] after hospitalization for presumed aspiration pneumonia.  -Worsening hypercapnic respiratory failure on NIV.  Patient intubated on 12/12/2024.  -Spontaneous breathing trial as tolerated although not tolerating well thus far  -CT abdomen pelvis with no acute intra-abdominal findings seen  -CT chest on 12/11/2024 with lower lobe mucous plugging concerning for aspiration with small pleural effusions.  -Continue therapy with Zosyn and vancomycin  -Blood cultures thus far with no growth to date  -Chest physiotherapy  -DVT prophylaxis: Eliquis  -Earlier discussion with daughter regarding patient's overall clinical decline.  I strongly encouraged her to reconsider CODE and intubation status prior to intubation but at this time patient remains full code.  Given patient's overall condition and age I do not recommend long-term ventilatory support if patient not improving clinically.  Further recommendations per palliative care.        Dayo Espino, DO  Pulmonary Critical Care Medicine  [facility]

## 2024-12-17 NOTE — PLAN OF CARE
Problem: RESPIRATORY - ADULT  Goal: Achieves optimal ventilation and oxygenation  Description: INTERVENTIONS:  - Assess for changes in respiratory status  - Assess for changes in mentation and behavior  - Position to facilitate oxygenation and minimize respiratory effort  - Oxygen supplementation based on oxygen saturation or ABGs  - Provide Smoking Cessation handout, if applicable  - Encourage broncho-pulmonary hygiene including cough, deep breathe, Incentive Spirometry  - Assess the need for suctioning and perform as needed  - Assess and instruct to report SOB or any respiratory difficulty  - Respiratory Therapy support as indicated  - Manage/alleviate anxiety  - Monitor for signs/symptoms of CO2 retention  12/14/2024 0321 by Mary Marley RCP  Outcome: Progressing       Pt received on MV, setting follow. Neb treatments and vest therapy done as scheduled. Pt tolerated well. No acute changes overnight. RT continue to monitor.         Vent Information   Vent Mode VC/AC   Settings   FiO2 (%) 30 %   Resp Rate (Set) 12   Vt (Set, mL) 450 mL   Waveform Decelerating ramp   PEEP/CPAP (cm H2O) 5 cm H20

## 2024-12-17 NOTE — CM/SW NOTE
12/16/24 1700   CM/SW Referral Data   Referral Source    Medical Hx   Does patient have an established PCP? Yes   Patient Info   Patient's Home Environment House   Number of Levels in Home 2   Number of Stair in Home stays one level   Patient lives with Daughter   Patient Status Prior to Admission   Independent with ADLs and Mobility Yes   Discharge Needs   Anticipated D/C needs Subacute rehab     CM spoke with daughter ANGÉLICA via phone.  Prema lived with her daughter until her recent admission to [facility] and [facility].  Patient remains intubated and full code.  Palliative continues to follow.    Per daughter, her mom will need Rehab at [facility].  Daughter asking about rehab at [facility].    CM to continue to follow and assist with dc planning.  Will need PT/OT evaluations when extubated.    Kimmie Dial MBA BSN RN CRRN   RN Case Manager  [phone]

## 2024-12-17 NOTE — RESPIRATORY THERAPY NOTE
SBT: PASS/FAIL? (FAIL)  Start time:  0910  Settings:  Pressure Support: 5    CPAP: 5    FiO2:  30  Reason for Failure if present: (Delete ALL reasons that do not apply)  RR <10, >35   Patient RR = 36  Respiratory distress (AMU, abdominal paradoxus, diaphoresis, dyspnea)  SpO2 < 90%   Saturation = 98  Acute cardiac arrhythmia  ICP > 20  Mental status change  Excessive secretions  RSBI > 104   Actual RSBI score: 240    Weaning Parameters:  RR: 36  RSBI: 240  NIF: MARGARITA  VT: 140  VC: MARGARITA   (Note: If VC unable, enter same as VT in Epic to charge)    Returned to Full support: (Time:0919)

## 2024-12-18 NOTE — PLAN OF CARE
Pt remains intubated and sedated. SAT done in AM, pt opens eyes, has weak hand . Otherwise, intermittently able to follow commands. SBT done, tolerated for ~10 minutes. Remains on propofol gtt. Bowie in place. Soft bilateral wrist restraints remain in place.    Problem: Safety Risk - Non-Violent Restraints  Goal: Patient will remain free from self-harm  Description: INTERVENTIONS:  - Apply the least restrictive restraint to prevent harm  - Notify patient and family of reasons restraints applied  - Assess for any contributing factors to confusion (electrolyte disturbances, delirium, medications)  - Discontinue any unnecessary medical devices as soon as possible  - Assess the patient's physical comfort, circulation, skin condition, hydration, nutrition and elimination needs   - Reorient and redirection as needed  - Assess for the need to continue restraints  Outcome: Not Progressing     Problem: Patient Centered Care  Goal: Patient preferences are identified and integrated in the patient's plan of care  Description: Interventions:  - What would you like us to know as we care for you?   - Provide timely, complete, and accurate information to patient/family  - Incorporate patient and family knowledge, values, beliefs, and cultural backgrounds into the planning and delivery of care  - Encourage patient/family to participate in care and decision-making at the level they choose  - Honor patient and family perspectives and choices  Outcome: Progressing     Problem: Patient/Family Goals  Goal: Patient/Family Long Term Goal  Description: Patient's Long Term Goal:     Interventions:  -   - See additional Care Plan goals for specific interventions  Outcome: Progressing  Goal: Patient/Family Short Term Goal  Description: Patient's Short Term Goal:     Interventions:   -  - See additional Care Plan goals for specific interventions  Outcome: Progressing     Problem: PAIN - ADULT  Goal: Verbalizes/displays adequate comfort  level or patient's stated pain goal  Description: INTERVENTIONS:  - Encourage pt to monitor pain and request assistance  - Assess pain using appropriate pain scale  - Administer analgesics based on type and severity of pain and evaluate response  - Implement non-pharmacological measures as appropriate and evaluate response  - Consider cultural and social influences on pain and pain management  - Manage/alleviate anxiety  - Utilize distraction and/or relaxation techniques  - Monitor for opioid side effects  - Notify MD/LIP if interventions unsuccessful or patient reports new pain  - Anticipate increased pain with activity and pre-medicate as appropriate  Outcome: Progressing     Problem: RISK FOR INFECTION - ADULT  Goal: Absence of fever/infection during anticipated neutropenic period  Description: INTERVENTIONS  - Monitor WBC  - Administer growth factors as ordered  - Implement neutropenic guidelines  Outcome: Progressing     Problem: SAFETY ADULT - FALL  Goal: Free from fall injury  Description: INTERVENTIONS:  - Assess pt frequently for physical needs  - Identify cognitive and physical deficits and behaviors that affect risk of falls.  - Gorman fall precautions as indicated by assessment.  - Educate pt/family on patient safety including physical limitations  - Instruct pt to call for assistance with activity based on assessment  - Modify environment to reduce risk of injury  - Provide assistive devices as appropriate  - Consider OT/PT consult to assist with strengthening/mobility  - Encourage toileting schedule  Outcome: Progressing     Problem: DISCHARGE PLANNING  Goal: Discharge to home or other facility with appropriate resources  Description: INTERVENTIONS:  - Identify barriers to discharge w/pt and caregiver  - Include patient/family/discharge partner in discharge planning  - Arrange for needed discharge resources and transportation as appropriate  - Identify discharge learning needs (meds, wound care,  etc)  - Arrange for interpreters to assist at discharge as needed  - Consider post-discharge preferences of patient/family/discharge partner  - Complete POLST form as appropriate  - Assess patient's ability to be responsible for managing their own health  - Refer to Case Management Department for coordinating discharge planning if the patient needs post-hospital services based on physician/LIP order or complex needs related to functional status, cognitive ability or social support system  Outcome: Progressing     Problem: Altered Communication/Language Barrier  Goal: Patient/Family is able to understand and participate in their care  Description: Interventions:  - Assess communication ability and preferred communication style  - Implement communication aides and strategies  - Use visual cues when possible  - Listen attentively, be patient, do not interrupt  - Minimize distractions  - Allow time for understanding and response  - Establish method for patient to ask for assistance (call light)  - Provide an  as needed  - Communicate barriers and strategies to overcome with those who interact with patient  Outcome: Progressing     Problem: RESPIRATORY - ADULT  Goal: Achieves optimal ventilation and oxygenation  Description: INTERVENTIONS:  - Assess for changes in respiratory status  - Assess for changes in mentation and behavior  - Position to facilitate oxygenation and minimize respiratory effort  - Oxygen supplementation based on oxygen saturation or ABGs  - Provide Smoking Cessation handout, if applicable  - Encourage broncho-pulmonary hygiene including cough, deep breathe, Incentive Spirometry  - Assess the need for suctioning and perform as needed  - Assess and instruct to report SOB or any respiratory difficulty  - Respiratory Therapy support as indicated  - Manage/alleviate anxiety  - Monitor for signs/symptoms of CO2 retention  Outcome: Progressing     Problem: NEUROLOGICAL - ADULT  Goal: Achieves  stable or improved neurological status  Description: INTERVENTIONS  - Assess for and report changes in neurological status  - Initiate measures to prevent increased intracranial pressure  - Maintain blood pressure and fluid volume within ordered parameters to optimize cerebral perfusion and minimize risk of hemorrhage  - Monitor temperature, glucose, and sodium. Initiate appropriate interventions as ordered  Outcome: Progressing  Goal: Absence of seizures  Description: INTERVENTIONS  - Monitor for seizure activity  - Administer anti-seizure medications as ordered  - Monitor neurological status  Outcome: Progressing  Goal: Remains free of injury related to seizure activity  Description: INTERVENTIONS:  - Maintain airway, patient safety  and administer oxygen as ordered  - Monitor patient for seizure activity, document and report duration and description of seizure to MD/LIP  - If seizure occurs, turn patient to side and suction secretions as needed  - Reorient patient post seizure  - Seizure pads on all 4 side rails  - Instruct patient/family to notify RN of any seizure activity  - Instruct patient/family to call for assistance with activity based on assessment  Outcome: Progressing  Goal: Achieves maximal functionality and self care  Description: INTERVENTIONS  - Monitor swallowing and airway patency with patient fatigue and changes in neurological status  - Encourage and assist patient to increase activity and self care with guidance from PT/OT  - Encourage visually impaired, hearing impaired and aphasic patients to use assistive/communication devices  Outcome: Progressing     Problem: Diabetes/Glucose Control  Goal: Glucose maintained within prescribed range  Description: INTERVENTIONS:  - Monitor Blood Glucose as ordered  - Assess for signs and symptoms of hyperglycemia and hypoglycemia  - Administer ordered medications to maintain glucose within target range  - Assess barriers to adequate nutritional intake and  initiate nutrition consult as needed  - Instruct patient on self management of diabetes  Outcome: Progressing     Problem: Delirium  Goal: Minimize duration of delirium  Description: Interventions:  - Encourage use of hearing aids, eye glasses  - Promote highest level of mobility daily  - Provide frequent reorientation  - Promote wakefulness i.e. lights on, blinds open  - Promote sleep, encourage patient's normal rest cycle i.e. lights off, TV off, minimize noise and interruptions  - Encourage family to assist in orientation and promotion of home routines  Outcome: Progressing

## 2024-12-18 NOTE — PROGRESS NOTES
[facility]  part of [facility]     Progress Note    Yin Butcher Patient Status:  Inpatient    1931 MRN [id number]   Location [facility] 2W/SW Attending Vika Elmore MD   Hosp Day # 6 PCP Mina Walker MD       Subjective:   Patient seen and examined.  Intubated, sedated.  Did not tolerate spontaneous breathing trial yesterday    Objective:   Blood pressure 91/76, pulse 120, temperature 97.9 °F (36.6 °C), temperature source Esophageal, resp. rate 24, weight 101 lb 6.6 oz (46 kg), SpO2 100%.  Intake/Output:   Last 3 shifts: I/O last 3 completed shifts:  In: 3765.9 [I.V.:1443.9; NG/GT:2322]  Out: 1030 [Urine:1030]   This shift: No intake/output data recorded.     Vent Settings: Vent Mode: VC/AC  FiO2 (%):  [30 %] 30 %  S RR:  [12] 12  S VT:  [450 mL] 450 mL  PEEP/CPAP (cm H2O):  [5 cm H20] 5 cm H20  MAP (cm H2O):  [7-13] 9    Hemodynamic parameters (last 24 hours):      Scheduled Meds:   Current Facility-Administered Medications   Medication Dose Route Frequency    propofol (Diprivan) 10 mg/mL infusion premix  5-50 mcg/kg/min (Dosing Weight) Intravenous Continuous    ipratropium-albuterol (Duoneb) 0.5-2.5 (3) MG/3ML inhalation solution 3 mL  3 mL Nebulization TID    ipratropium-albuterol (Duoneb) 0.5-2.5 (3) MG/3ML inhalation solution 3 mL  3 mL Nebulization Q6H PRN    fentaNYL (Sublimaze) 50 mcg/mL injection 25 mcg  25 mcg Intravenous Q30 Min PRN    acetaminophen (Ofirmev) 10 mg/mL infusion premix 650 mg  15 mg/kg Intravenous Q6H PRN    dextrose 10% infusion (TPN no rate)   Intravenous Continuous PRN    pancrelipase (Lip-Prot-Amyl) (Zenpep) DR particles cap 10,000 Units  10,000 Units Per G Tube PRN    And    sodium bicarbonate tab 325 mg  325 mg Per G Tube PRN    metoprolol (Lopressor) 5 mg/5mL injection 5 mg  5 mg Intravenous Q4H PRN    apixaban (Eliquis) tab 2.5 mg  2.5 mg Oral BID    piperacillin-tazobactam (Zosyn) 3.375 g in dextrose 5% 100 mL IVPB-ADDV  3.375 g  Intravenous Q8H    acetylcysteine (Mucomyst) 20 % nebulizer solution 2 mL  2 mL Nebulization TID    albuterol (Ventolin) (5 MG/ML) 0.5% nebulizer solution 5 mg  5 mg Nebulization Q4H PRN    acetaminophen (Tylenol) 160 MG/5ML oral liquid 650 mg  650 mg Per NG Tube Q4H PRN    Or    acetaminophen (Tylenol) rectal suppository 650 mg  650 mg Rectal Q4H PRN    senna (Senokot) 8.8 MG/5ML oral syrup 17.6 mg  10 mL Per NG Tube BID    docusate (Colace) 50 MG/5ML oral solution 100 mg  100 mg Per NG Tube BID    polyethylene glycol (PEG 3350) (Miralax) 17 g oral packet 17 g  17 g Per NG Tube Daily PRN    bisacodyl (Dulcolax) 10 MG rectal suppository 10 mg  10 mg Rectal Daily PRN    chlorhexidine gluconate (Peridex) 0.12 % oral solution 15 mL  15 mL Mouth/Throat BID@0800,2000    mineral oil-white petrolatum (Artificial Tears) 83-15 % ophthalmic ointment 1 Application  1 Application Both Eyes Nightly    famotidine (Pepcid) 20 mg/2mL injection 20 mg  20 mg Intravenous Daily @ 0700    ondansetron (Zofran) 4 MG/2ML injection 4 mg  4 mg Intravenous Q6H PRN    metoclopramide (Reglan) 5 mg/mL injection 10 mg  10 mg Intravenous Q8H PRN    vancomycin (Vancocin) 750 mg in sodium chloride 0.9% 250 mL IVPB-ADDV  750 mg Intravenous Q24H       Continuous Infusions:    propofol 20 mcg/kg/min (12/18/24 1400)    dextrose 10%         Physical Exam  Constitutional: Intubated, sedated  Eyes: PERRL  ENT: nares pateint  Neck: supple, no JVD  Cardio: RRR, S1 S2  Respiratory: Diminished bibasilar breath sounds with crackles present  GI: abdomen soft, non tender, active bowel sounds, no organomegaly + PEG tube in place  Extremities: no clubbing, cyanosis, edema  Neurologic: Does not follow commands  Skin: warm, dry      Results:            XR CHEST AP PORTABLE  (CPT=71045)    Result Date: 12/18/2024  CONCLUSION:  1. Borderline heart size.  Tortuous aorta. 2. ET tube in satisfactory position. 3. Right basilar pleural parenchymal abnormality with moderate  right effusion.  Minimal left basilar atelectasis/scarring and small left-sided effusion    Dictated by (CST): Melecio Jackson MD on 12/18/2024 at 1:24 PM     Finalized by (CST): Melecio Jackson MD on 12/18/2024 at 1:25 PM                 Assessment   1.  Fevers  2.  Acute encephalopathy  3.  Acute on chronic hypercapnic hypoxemic respiratory failure  4.  Leukocytosis  5.  HFpEF  6.  History of atrial fibrillation  7.  Prior history of pulmonary embolism  9.  Dementia  10.  Dysphagia     Plan   -Patient presents with chief complaint of fevers ongoing altered mental status over the last several days.  Recently discharged on 11/24/2024 from [facility] after hospitalization for presumed aspiration pneumonia.  -Worsening hypercapnic respiratory failure on NIV.  Patient intubated on 12/12/2024.  -Spontaneous breathing trial as tolerated although not tolerating well thus far  -CT abdomen pelvis with no acute intra-abdominal findings seen  -CT chest on 12/11/2024 with lower lobe mucous plugging concerning for aspiration with small pleural effusions.  -Continue therapy with Zosyn and vancomycin  -Blood cultures thus far with no growth to date  -Chest physiotherapy  -DVT prophylaxis: Eliquis  -Had another discussion with daughter regarding patient's overall clinical condition.  Prognosis is poor at this point.  Not tolerating spontaneous breathing trials.  Continues to be full code at this time.  Daughter not willing to set any limits at this time.        Dayo Espino, DO  Pulmonary Critical Care Medicine  [facility]

## 2024-12-18 NOTE — PROGRESS NOTES
RESPIRATORY THERAPY MECHANICAL VENTILATION PROGRESS NOTE    Ventilator Weaning:  Patient meets criteria for weaning? yes Weaning was attempted yes using pressure support 5 cmH2O + PEEP 5 cmH2O. The patient tolerated well for 30 minutes. The patient was returned to original ventilator settings  due to tachypnea,tachycardia and low exhale volume.    Current Ventilator Data:    Patient received on below vent settings with ET tube 7.0 secured at 24@ the lips. Suction scant amount of clear thin secretions. No acute events/changes made today. Neb and vest therapy given as ordered.        12/18/24 0755   Vent Information   Vent Mode VC/AC   Settings   FiO2 (%) 30 %   Resp Rate (Set) 12   Vt (Set, mL) 450 mL   Waveform Decelerating ramp   PEEP/CPAP (cm H2O) 5 cm H20   PEEP Low (cm H2O) 2 cm H2O   Peak Flow LPM 50   Trigger Sensitivity Flow (L/min) 1 L/min   Trigger Sensitivity Pressure (cm H2O) 3 cm H2O   Humidification Heater   H2O Bag Level 1/2 Full   Heater Temperature 98.6 °F (37 °C)   Readings   Total RR 21   Minute Ventilation (L/min) 9.6 L/min   Expiratory Tidal Volume 430 mL   PIP Observed (cm H2O) 38 cm H2O   MAP (cm H2O) 13

## 2024-12-18 NOTE — PROGRESS NOTES
[facility] - CARDIOLOGY PROGRESS NOTE      Yin Butcher Patient Status:  Inpatient    1931 MRN [id number]   Location [facility] 2W/SW Attending Vika Elmore MD   Hosp Day # 6 PCP Mina Walker MD         Assessment and Plan:   Assessment   Acute on chronic respiratory failure, aspiration pneumonitis  HFpEF  Atrial fibrillation  Metabolic encephalopathy  History of pulm embolism  Dysphagia status post PEG  Dementia        Plan  Heart rate is now increasing as sedation is lifted.  May need to resume sedation and not ready for extubation.  If heart rates remain high with adequate blood pressures can try IV Cardizem   Family presently continuing aggressive treatment and not pursuing palliative care.    Antibiotic management per primary team  Continue DOAC for stroke prevention    Subjective:   Yin Butcher is a(n) 89 year old female is intubated sedation is being decreased.  Patient did close her eyes when asked but otherwise does not follow commands or awake  Objective:   Blood pressure 95/83, pulse (!) 142, temperature 97.7 °F (36.5 °C), temperature source Esophageal, resp. rate (!) 31, weight 101 lb 6.6 oz (46 kg), SpO2 100%.  Intake/Output:        Last 24 hours:   Intake/Output Summary (Last 24 hours) at 2024 1047  Last data filed at 2024 0600  Gross per 24 hour   Intake 1915.1 ml   Output 675 ml   Net 1240.1 ml      This shift: No intake/output data recorded.    Exam  Gen: Comfortable, in no acute distress,    Psych intubated and limited evaluation  HEENT: atraumatic, normal conjunctiva, moist mucosa  Neck:supple,no JVD, no bruits  Lungs: clear to ascultation, normal respiratory effort  Cardiac: irregular rate and rhythm, nl S1,S2, no pathologic murmur  Abd: Abdomen soft, nontender, nondistended,  bowel sounds present  Ext:  no clubbing, no cyanosis,no edema  Neuro: Landon close eyes to commands only   skin: no rashes or  lesions        Scheduled Meds:    ipratropium-albuterol  3 mL Nebulization TID    apixaban  2.5 mg Oral BID    piperacillin-tazobactam  3.375 g Intravenous Q8H    acetylcysteine  2 mL Nebulization TID    senna  10 mL Per NG Tube BID    docusate  100 mg Per NG Tube BID    chlorhexidine gluconate  15 mL Mouth/Throat BID@0800,2000    mineral oil-white petrolatum  1 Application Both Eyes Nightly    famotidine  20 mg Intravenous Daily @ 0700    vancomycin  750 mg Intravenous Q24H       Results:     Lab Results   Component Value Date    WBC 9.9 12/17/2024    HGB 8.7 (L) 12/17/2024    HCT 28.2 (L) 12/17/2024    .0 12/17/2024    CREATSERUM 0.42 (L) 12/17/2024    BUN 21 12/17/2024     12/17/2024    K 4.2 12/17/2024     12/17/2024    CO2 29.0 12/17/2024     (H) 12/17/2024    CA 8.6 (L) 12/17/2024    ALB 4.0 12/11/2024    ALKPHO 112 12/11/2024    BILT 1.0 (H) 12/11/2024    TP 8.2 12/11/2024    AST 24 12/11/2024    ALT 30 12/11/2024    PTT 34.6 01/31/2024    INR 1.32 (H) 01/29/2024    PT 15.5 (H) 07/13/2014    T4F 1.0 02/19/2015    TSH 2.138 12/11/2024    LIP 35 12/11/2024    DDIMER 2.38 (H) 01/20/2024    MG 2.0 12/14/2024    PHOS 4.1 12/14/2024    TROP <0.046 02/19/2015    CK 32 01/20/2024    B12 586 01/23/2024       No results found.          Barry Rivero MD  [facility]  12/18/2024

## 2024-12-18 NOTE — PLAN OF CARE
Problem: RESPIRATORY - ADULT  Goal: Achieves optimal ventilation and oxygenation  Description: INTERVENTIONS:  - Assess for changes in respiratory status  - Assess for changes in mentation and behavior  - Position to facilitate oxygenation and minimize respiratory effort  - Oxygen supplementation based on oxygen saturation or ABGs  - Provide Smoking Cessation handout, if applicable  - Encourage broncho-pulmonary hygiene including cough, deep breathe, Incentive Spirometry  - Assess the need for suctioning and perform as needed  - Assess and instruct to report SOB or any respiratory difficulty  - Respiratory Therapy support as indicated  - Manage/alleviate anxiety  - Monitor for signs/symptoms of CO2 retention  12/14/2024 0321 by Mary Marley RCP  Outcome: Progressing       Pt received on MV, settings follow. Neb treatments and vest therapy done as scheduled. Pt tolerated well.   No acute changes overnight.   RT continue to monitor.         Vent Information   Vent Mode VC/AC   Settings   FiO2 (%) 30 %   Resp Rate (Set) 12   Vt (Set, mL) 450 mL   Waveform Decelerating ramp   PEEP/CPAP (cm H2O) 5 cm H20

## 2024-12-18 NOTE — DIETARY NOTE
ADULT NUTRITION INITIAL ASSESSMENT    Pt is at high nutrition risk.  Pt meets severe malnutrition criteria.      CRITERIA FOR MALNUTRITION DIAGNOSIS:  Criteria for severe malnutrition diagnosis: chronic illness related to body fat severe depletion and muscle mass severe depletion.    RECOMMENDATIONS TO MD:   See Nutrition Intervention for enteral nutrition (EN) specifics     ADMITTING DIAGNOSIS:  Atrial fibrillation with rapid ventricular response (HCC) [I48.91]  Acute respiratory failure with hypercapnia ([facility]) [J96.02]  Sepsis, due to unspecified organism, unspecified whether acute organ dysfunction present ([facility]) [A41.9]  PERTINENT PAST MEDICAL HISTORY:   Past Medical History:    Arthritis    Atrial fibrillation (HCC)    Back problem    Cancer (HCC)    Cataract    Osteoporosis    Personal history of antineoplastic chemotherapy     PATIENT STATUS:   12/13/24 INITIAL VISIT: Pt assessed due to low BMI and new consult for RD to initiate tube feeds. Pt presented to hospital with AMS, fevers over the past \"few days\". Extensive PMH as listed above including recent discharge from [facility] on 11/24/24. Pt is orally intubated, sedated on Propofol. No family present at time of visit. Unable to obtain recent diet hx.   12/18/24 UPDATE: Remains orally intubated, Sedated on Propofol. Pt's DTR at bedside at time of visit - provided with diet hx. Per DTR, pt with good/normal appetite/intake with stable wt prior to recent admission in November. Per DTR, pt sustained an injury to her esophagus during that admission resulting in dysphagia therefore a G-tube was surgically placed. DTR did note some wt loss recently however unable to quantify. Expressed desire to see \"wt gain\" with administration of EN feeds. Explained to DTR that the goal of EN administration is currently to meet needs as not to overfeed pt and worsen respiratory status. Pt was transferred to rehab following discharge where she was starting to work with SLP however  remained NPO.    FOOD/NUTRITION RELATED HISTORY:  Appetite: NPO  Intake:  EN orders per rehab record Jevity 1.5 @ 45 ml/hr x 21 hrs + ProHeal Sugar Free Critical Care AWC 30 ml BID (100 kcal & 17g protein in 30 ml) + 250 ml H2O flushes TID to provide 1618 kcal (39 kcal/kg), 94 g protein (2.2 g/kg) and 1528 ml free H2O.   Intake Meeting Needs: NPO  Percent Meals Eaten (last 3 days)       None        Food Allergies: No Known Food Allergies (NKFA)  Cultural/Ethnic/Mandaen Preferences: Not Obtained    GASTROINTESTINAL: +BM last documented on 12/17 per flowsheet, PEG/G-tube, and abdomen is soft, flat, with active bowel sounds     MEDICATIONS: reviewed; Noted cardiac electrolyte replacement protocol ordered; Propofol at 5 ml/hr (132 kcal from LE); IV ABX in 550 ml;    propofol Stopped (12/18/24 0830)    dextrose 10%        ipratropium-albuterol  3 mL Nebulization TID    apixaban  2.5 mg Oral BID    piperacillin-tazobactam  3.375 g Intravenous Q8H    acetylcysteine  2 mL Nebulization TID    senna  10 mL Per NG Tube BID    docusate  100 mg Per NG Tube BID    chlorhexidine gluconate  15 mL Mouth/Throat BID@0800,2000    mineral oil-white petrolatum  1 Application Both Eyes Nightly    famotidine  20 mg Intravenous Daily @ 0700    vancomycin  750 mg Intravenous Q24H     LABS: reviewed  Recent Labs     12/15/24  0403 12/16/24  0553 12/16/24  0913 12/17/24  0528   * 118*  --  100*   BUN 27*  --  22 21   CREATSERUM 0.50* 0.41*  --  0.42*   CA 8.9 8.2*  --  8.6*    137  --  141   K 3.9  --  3.9 4.2    104  --  108   CO2 28.0 28.0  --  29.0   OSMOCALC 300*  --   --  295     NUTRITION RELATED PHYSICAL FINDINGS:  - Nutrition Focused Physical Exam (NFPE): severe depletion body fat orbital region and fat overlying rib cage region and severe depletion muscle mass temple region and clavicle region.   - Fluid Accumulation: non-pitting Left Upper extremity and perineal, +1 Bilateral Hand (s), and none  see RN  documentation for details  - Skin Integrity: Pressure Injury: Stage 2 on coccyx and other wounds noted see RN documentation for details    ANTHROPOMETRICS:  HT:  170.2 cm (5'7\")   WT: 46 kg (101 lb 6.6 oz) (wt up 9% (8 lbs) from lowest wt this admission with noted edema present)  DOSING WT: 42 kg (93 lbs - lowest wt this admission) - wt utilized for anthropometric assessment  BMI: Body mass index is 14.5 kg/m².  BMI CLASSIFICATION: less than 18.5 kg/m2 - underweight  IBW: 135 lbs        69% IBW  Usual Body Wt: 105 lbs (per EMR ~1 yr ago)      89% UBW    WEIGHT HISTORY: Current wt reflects 11% (12 lb)  unintentional wt loss.   Patient Weight(s) for the past 336 hrs:   Weight   12/16/24 0400 46 kg (101 lb 6.6 oz)   12/15/24 0400 45 kg (99 lb 3.3 oz)   12/12/24 0530 42 kg (92 lb 9.5 oz)   12/11/24 1610 47.6 kg (105 lb)     Wt Readings from Last 10 Encounters:   12/16/24 46 kg (101 lb 6.6 oz)   02/02/24 56.7 kg (125 lb)   10/29/23 47.5 kg (104 lb 11.5 oz)   09/10/23 42.1 kg (92 lb 14.4 oz)   04/13/23 53.5 kg (118 lb)   04/10/23 53.5 kg (118 lb)   04/03/23 53.5 kg (118 lb)   03/30/23 53.5 kg (118 lb)   03/27/23 53.3 kg (117 lb 9.6 oz)   03/22/23 64 kg (141 lb)     NUTRITION DIAGNOSIS/PROBLEM:   Severe Malnutrition related to Chronic - Physiological causes resulting in anorexia or diminished intake as evidenced by body fat severe depletion and muscle mass severe depletion.    NUTRITION DIAGNOSIS PROGRESS:  Improvement (unresolved) - EN feeds infusing at goal rate. Meeting 100% of current estimated needs.    NUTRITION INTERVENTION:     NUTRITION PRESCRIPTION:   Estimated Nutrition needs: --dosing wt of 42 kg - wt taken on 1212/24  Calories: 983 calories/day (PSU 2003B (5.6 L/min, 37.1C Tmax) or 23 calories per kg Dosing wt)  Protein: 50-63 g protein/day (1.2-1.5 g protein/kg Dosing wt)  Fluid Needs: 983 ml (1 ml/kcal)    - Diet:       Procedures    NPO      - Enteral Nutrition:   Promote at 35 ml/hr per OG tube.   Based on  average 22 hour infusion time Goal rate provides 770 total TF volume, 770 kcal, 48 grams protein, 647 ml total free water, and 51% RDI's.    Flush with 100 ml H2O q 4 hrs (to provide 600 ml total H2O from FWF daily).  Provide 1 packet(s) Prosource daily (to provide 40 kcal, 11 g protein and 45 ml daily)  TF with Prosource, H2O flushes and lipid calories from propofol (132 kcal) will provide 942 kcal (22 kcal/kg), 59 g protein (1.4 g/kg) and 1292 ml fluids.   Meets 96% of estimated energy and 100% of estimated protein needs.      - RD Malnutrition Care Plan:  EN feeds + non-nutritive kcal to meet >80% of estimated needs  - Meals and snacks: NPO  - Medical Food Supplements: RD added None at this time  - Vitamin and mineral supplements: NPO  - Feeding assistance: NPO  - Nutrition education: not appropriate at this time  - Coordination of nutrition care: collaboration with other providers and discussed in Care Rounds  - discussed with RN on unit  - Discharge and transfer of nutrition care to new setting or provider: monitor plans     MONITOR AND EVALUATE/NUTRITION GOALS:  - Food and Nutrient Intake:      Monitor: N/A  - Food and Nutrient Administration:      Monitor: enteral nutrition initiation, tolerance to enteral nutrition, adequacy of enteral nutrition, for enteral nutrition adjustment, and propofol rate  - Anthropometric Measurement:    Monitor weight  - Nutrition Goals:      EN + non-nutritive kcal >80% energy needs, labs within acceptable limits, minimize lean body mass loss, prevent skin breakdown, and support body systems    DIETITIAN FOLLOW UP: RD to follow and monitor nutrition status    Janel Gonsalves MS, RD, LDN, [facility]  [id number]

## 2024-12-18 NOTE — PROGRESS NOTES
Called by RNSandra that daughter Jada was here at bedside. I approached her and she asked me \" What do you want?\" I let her know I was reaching out and following up on both her and her mom as I know how difficulty having a loved one so seriously ill in ICU can be. She tells me \" My mom is improving and I will call you when I need you.\"   PLAN:   Will follow peripherally. Please call us when needs arise   Jacquelyn Lindo, APRN, CNP, ACHPN  [facility] at [facility]   [phone]

## 2024-12-18 NOTE — PLAN OF CARE
Problem: Safety Risk - Non-Violent Restraints  Goal: Patient will remain free from self-harm  Description: INTERVENTIONS:  - Apply the least restrictive restraint to prevent harm  - Notify patient and family of reasons restraints applied  - Assess for any contributing factors to confusion (electrolyte disturbances, delirium, medications)  - Discontinue any unnecessary medical devices as soon as possible  - Assess the patient's physical comfort, circulation, skin condition, hydration, nutrition and elimination needs   - Reorient and redirection as needed  - Assess for the need to continue restraints  12/18/2024 0634 by Meredith Clark, RN  Outcome: Not Progressing

## 2024-12-18 NOTE — PLAN OF CARE
[place] care completed. Propofol and line changed. Wound care done.   Problem: Patient Centered Care  Goal: Patient preferences are identified and integrated in the patient's plan of care  Description: Interventions:  - What would you like us to know as we care for you?   - Provide timely, complete, and accurate information to patient/family  - Incorporate patient and family knowledge, values, beliefs, and cultural backgrounds into the planning and delivery of care  - Encourage patient/family to participate in care and decision-making at the level they choose  - Honor patient and family perspectives and choices  Outcome: Progressing     Problem: Patient/Family Goals  Goal: Patient/Family Long Term Goal  Description: Patient's Long Term Goal:     Interventions:  -   - See additional Care Plan goals for specific interventions  Outcome: Progressing  Goal: Patient/Family Short Term Goal  Description: Patient's Short Term Goal:     Interventions:   -   - See additional Care Plan goals for specific interventions  Outcome: Progressing     Problem: PAIN - ADULT  Goal: Verbalizes/displays adequate comfort level or patient's stated pain goal  Description: INTERVENTIONS:  - Encourage pt to monitor pain and request assistance  - Assess pain using appropriate pain scale  - Administer analgesics based on type and severity of pain and evaluate response  - Implement non-pharmacological measures as appropriate and evaluate response  - Consider cultural and social influences on pain and pain management  - Manage/alleviate anxiety  - Utilize distraction and/or relaxation techniques  - Monitor for opioid side effects  - Notify MD/LIP if interventions unsuccessful or patient reports new pain  - Anticipate increased pain with activity and pre-medicate as appropriate  Outcome: Progressing     Problem: RISK FOR INFECTION - ADULT  Goal: Absence of fever/infection during anticipated neutropenic period  Description: INTERVENTIONS  -  Monitor WBC  - Administer growth factors as ordered  - Implement neutropenic guidelines  Outcome: Progressing     Problem: SAFETY ADULT - FALL  Goal: Free from fall injury  Description: INTERVENTIONS:  - Assess pt frequently for physical needs  - Identify cognitive and physical deficits and behaviors that affect risk of falls.  - [facility] fall precautions as indicated by assessment.  - Educate pt/family on patient safety including physical limitations  - Instruct pt to call for assistance with activity based on assessment  - Modify environment to reduce risk of injury  - Provide assistive devices as appropriate  - Consider OT/PT consult to assist with strengthening/mobility  - Encourage toileting schedule  Outcome: Progressing     Problem: DISCHARGE PLANNING  Goal: Discharge to home or other facility with appropriate resources  Description: INTERVENTIONS:  - Identify barriers to discharge w/pt and caregiver  - Include patient/family/discharge partner in discharge planning  - Arrange for needed discharge resources and transportation as appropriate  - Identify discharge learning needs (meds, wound care, etc)  - Arrange for interpreters to assist at discharge as needed  - Consider post-discharge preferences of patient/family/discharge partner  - Complete POLST form as appropriate  - Assess patient's ability to be responsible for managing their own health  - Refer to Case Management Department for coordinating discharge planning if the patient needs post-hospital services based on physician/LIP order or complex needs related to functional status, cognitive ability or social support system  Outcome: Progressing     Problem: Altered Communication/Language Barrier  Goal: Patient/Family is able to understand and participate in their care  Description: Interventions:  - Assess communication ability and preferred communication style  - Implement communication aides and strategies  - Use visual cues when possible  - Listen  attentively, be patient, do not interrupt  - Minimize distractions  - Allow time for understanding and response  - Establish method for patient to ask for assistance (call light)  - Provide an  as needed  - Communicate barriers and strategies to overcome with those who interact with patient  Outcome: Progressing     Problem: RESPIRATORY - ADULT  Goal: Achieves optimal ventilation and oxygenation  Description: INTERVENTIONS:  - Assess for changes in respiratory status  - Assess for changes in mentation and behavior  - Position to facilitate oxygenation and minimize respiratory effort  - Oxygen supplementation based on oxygen saturation or ABGs  - Provide Smoking Cessation handout, if applicable  - Encourage broncho-pulmonary hygiene including cough, deep breathe, Incentive Spirometry  - Assess the need for suctioning and perform as needed  - Assess and instruct to report SOB or any respiratory difficulty  - Respiratory Therapy support as indicated  - Manage/alleviate anxiety  - Monitor for signs/symptoms of CO2 retention  Outcome: Progressing     Problem: NEUROLOGICAL - ADULT  Goal: Achieves stable or improved neurological status  Description: INTERVENTIONS  - Assess for and report changes in neurological status  - Initiate measures to prevent increased intracranial pressure  - Maintain blood pressure and fluid volume within ordered parameters to optimize cerebral perfusion and minimize risk of hemorrhage  - Monitor temperature, glucose, and sodium. Initiate appropriate interventions as ordered  Outcome: Progressing  Goal: Absence of seizures  Description: INTERVENTIONS  - Monitor for seizure activity  - Administer anti-seizure medications as ordered  - Monitor neurological status  Outcome: Progressing  Goal: Remains free of injury related to seizure activity  Description: INTERVENTIONS:  - Maintain airway, patient safety  and administer oxygen as ordered  - Monitor patient for seizure activity, document  and report duration and description of seizure to MD/LIP  - If seizure occurs, turn patient to side and suction secretions as needed  - Reorient patient post seizure  - Seizure pads on all 4 side rails  - Instruct patient/family to notify RN of any seizure activity  - Instruct patient/family to call for assistance with activity based on assessment  Outcome: Progressing  Goal: Achieves maximal functionality and self care  Description: INTERVENTIONS  - Monitor swallowing and airway patency with patient fatigue and changes in neurological status  - Encourage and assist patient to increase activity and self care with guidance from PT/OT  - Encourage visually impaired, hearing impaired and aphasic patients to use assistive/communication devices  Outcome: Progressing     Problem: Diabetes/Glucose Control  Goal: Glucose maintained within prescribed range  Description: INTERVENTIONS:  - Monitor Blood Glucose as ordered  - Assess for signs and symptoms of hyperglycemia and hypoglycemia  - Administer ordered medications to maintain glucose within target range  - Assess barriers to adequate nutritional intake and initiate nutrition consult as needed  - Instruct patient on self management of diabetes  Outcome: Progressing     Problem: Delirium  Goal: Minimize duration of delirium  Description: Interventions:  - Encourage use of hearing aids, eye glasses  - Promote highest level of mobility daily  - Provide frequent reorientation  - Promote wakefulness i.e. lights on, blinds open  - Promote sleep, encourage patient's normal rest cycle i.e. lights off, TV off, minimize noise and interruptions  - Encourage family to assist in orientation and promotion of home routines  Outcome: Progressing

## 2024-12-18 NOTE — CM/SW NOTE
Patient did not pass breathing trial again today.  Ongoing discussion with daughter re: setting limits.  Was informed that daughter is not ready to set limits.  CM/SW available for ongoing dc planning needs.    Kimmie Dial MBA BSN RN CRRN   RN Case Manager  [phone]

## 2024-12-18 NOTE — PROGRESS NOTES
[facility]  part of [facility]    Progress Note    Yin Butcher Patient Status:  Inpatient    1931 MRN [id number]   Location [facility] 2W/SW Attending Vika Elmore MD   Hosp Day # 6 PCP Mina Walker MD     SUBJECTIVE:  Pt seen and examined at bedside.   Remains orally intubated, sedated      BP 99/78 (BP Location: Right arm)   Pulse 107   Temp 97.9 °F (36.6 °C) (Esophageal)   Resp 14   Wt 101 lb 6.6 oz (46 kg)   SpO2 99%   BMI 15.88 kg/m²     Physical Examination:    Gen: orally intubated, sedated Appears comfortable.  HEENT: PERRLA  Lungs: CTA B/L  Heart: Normal S1 S2, No M/G/R   Abd: Abdomen soft, nontender, nondistended, no organomegaly, bowel sounds present  Ext: No edema, no calf tenderness    Labs:   Lab Results   Component Value Date    PGLU 132 2024       Recent Labs   Lab 24  1200 24  1755 24  0032 24  0515 24  1210   PGLU 126* 87 110* 118* 132*     No results for input(s): \"INR\" in the last 168 hours.    Imaging:  Imaging reviewed in Epic.    Meds:   Current Facility-Administered Medications   Medication Dose Route Frequency    propofol (Diprivan) 10 mg/mL infusion premix  5-50 mcg/kg/min (Dosing Weight) Intravenous Continuous    ipratropium-albuterol (Duoneb) 0.5-2.5 (3) MG/3ML inhalation solution 3 mL  3 mL Nebulization TID    ipratropium-albuterol (Duoneb) 0.5-2.5 (3) MG/3ML inhalation solution 3 mL  3 mL Nebulization Q6H PRN    fentaNYL (Sublimaze) 50 mcg/mL injection 25 mcg  25 mcg Intravenous Q30 Min PRN    acetaminophen (Ofirmev) 10 mg/mL infusion premix 650 mg  15 mg/kg Intravenous Q6H PRN    dextrose 10% infusion (TPN no rate)   Intravenous Continuous PRN    pancrelipase (Lip-Prot-Amyl) (Zenpep) DR particles cap 10,000 Units  10,000 Units Per G Tube PRN    And    sodium bicarbonate tab 325 mg  325 mg Per G Tube PRN    metoprolol (Lopressor) 5 mg/5mL injection 5 mg  5 mg Intravenous Q4H PRN    apixaban (Eliquis)  tab 2.5 mg  2.5 mg Oral BID    piperacillin-tazobactam (Zosyn) 3.375 g in dextrose 5% 100 mL IVPB-ADDV  3.375 g Intravenous Q8H    acetylcysteine (Mucomyst) 20 % nebulizer solution 2 mL  2 mL Nebulization TID    albuterol (Ventolin) (5 MG/ML) 0.5% nebulizer solution 5 mg  5 mg Nebulization Q4H PRN    acetaminophen (Tylenol) 160 MG/5ML oral liquid 650 mg  650 mg Per NG Tube Q4H PRN    Or    acetaminophen (Tylenol) rectal suppository 650 mg  650 mg Rectal Q4H PRN    senna (Senokot) 8.8 MG/5ML oral syrup 17.6 mg  10 mL Per NG Tube BID    docusate (Colace) 50 MG/5ML oral solution 100 mg  100 mg Per NG Tube BID    polyethylene glycol (PEG 3350) (Miralax) 17 g oral packet 17 g  17 g Per NG Tube Daily PRN    bisacodyl (Dulcolax) 10 MG rectal suppository 10 mg  10 mg Rectal Daily PRN    chlorhexidine gluconate (Peridex) 0.12 % oral solution 15 mL  15 mL Mouth/Throat BID@0800,2000    mineral oil-white petrolatum (Artificial Tears) 83-15 % ophthalmic ointment 1 Application  1 Application Both Eyes Nightly    famotidine (Pepcid) 20 mg/2mL injection 20 mg  20 mg Intravenous Daily @ 0700    ondansetron (Zofran) 4 MG/2ML injection 4 mg  4 mg Intravenous Q6H PRN    metoclopramide (Reglan) 5 mg/mL injection 10 mg  10 mg Intravenous Q8H PRN    vancomycin (Vancocin) 750 mg in sodium chloride 0.9% 250 mL IVPB-ADDV  750 mg Intravenous Q24H       Assessment:         #1.  Acute hypoxemic respiratory failure  2.  Fever  3.  Metabolic encephalopathy  4.  History of atrial fibrillation  5.  History of pulmonary embolism  6.  Dysphagia, status post PEG  7.  History of dementia  8.  Septic shock        Plan:     Patient is on ventilator  Vent management per ICU  Bronchodilator  Antibiotic  Pulmonary toileting  SBT per protocol  Being followed by cardiology  Resume Eliquis  IV Vanco, IV Zosyn  Follow-up with sputum culture, blood culture, urine culture  Sedation per protocol  Currently full code  Continue tube feeding  Will get palliative  consult to discuss goals of care  DVT/GI prophylaxis  Setting limit is appropriate for this patient.  Looks like family is not receptive of talking to palliative    Vika Elmore MD   12/18/2024  1:06 PM

## 2024-12-19 ENCOUNTER — CASE MANAGEMENT (OUTPATIENT)
Dept: CARE COORDINATION | Age: 89
End: 2024-12-19

## 2024-12-19 NOTE — SPIRITUAL CARE NOTE
Spiritual Care Visit Note    Patient Name: Yin Butcher Date of Spiritual Care Visit: 24   : 1931 Primary Dx: Sepsis, due to unspecified organism, unspecified whether acute organ dysfunction present (HCC)       Referred By: Referral From: MILA Villalobos    Spiritual Care Taxonomy:         Visit Type/Summary:     - Spiritual Care: Attempted visit. Patient is unavailable. Left a Spiritual Care contact information card.  remains available as needed for follow up.    Spiritual Care support can be requested via an Epic consult. For urgent/immediate needs, please contact the On Call  at: [facility]: ext [phone]    Chaplain Germán.

## 2024-12-19 NOTE — PROGRESS NOTES
[facility]  part of [facility]    Progress Note    Yin Butcher Patient Status:  Inpatient    1931 MRN [id number]   Location [facility] 2W/SW Attending Vika Elmore MD   Hosp Day # 7 PCP Mina Walker MD     SUBJECTIVE:  Pt seen and examined at bedside.   Orally intubated and sedated       /76   Pulse 108   Temp 98.4 °F (36.9 °C)   Resp 13   Wt 116 lb 6.5 oz (52.8 kg)   SpO2 100%   BMI 18.23 kg/m²     Physical Examination:    Gen: Orally intubated  Appears comfortable.  HEENT: PERRLA  Lungs: CTA B/L  Heart: Normal S1 S2, No M/G/R   Abd: Abdomen soft, nontender, nondistended, no organomegaly, bowel sounds present  Ext: No edema, no calf tenderness    Labs:   Lab Results   Component Value Date    WBC 7.6 2024    HGB 9.2 2024    HCT 27.8 2024    .0 2024    CREATSERUM 0.50 2024    BUN 20 2024     2024    K 3.8 2024     2024    CO2 27.0 2024     2024    CA 8.7 2024    PGLU 104 2024       Recent Labs   Lab 24  1210 24  1759 24  2342 24  0610 24  1210   PGLU 132* 162* 108* 112* 104*     No results for input(s): \"INR\" in the last 168 hours.    Imaging:  Imaging reviewed in Epic.    Meds:   Current Facility-Administered Medications   Medication Dose Route Frequency    propofol (Diprivan) 10 mg/mL infusion premix  5-50 mcg/kg/min (Dosing Weight) Intravenous Continuous    ipratropium-albuterol (Duoneb) 0.5-2.5 (3) MG/3ML inhalation solution 3 mL  3 mL Nebulization TID    ipratropium-albuterol (Duoneb) 0.5-2.5 (3) MG/3ML inhalation solution 3 mL  3 mL Nebulization Q6H PRN    fentaNYL (Sublimaze) 50 mcg/mL injection 25 mcg  25 mcg Intravenous Q30 Min PRN    acetaminophen (Ofirmev) 10 mg/mL infusion premix 650 mg  15 mg/kg Intravenous Q6H PRN    dextrose 10% infusion (TPN no rate)   Intravenous Continuous PRN    pancrelipase (Lip-Prot-Amyl)  (Zenpep) DR particles cap 10,000 Units  10,000 Units Per G Tube PRN    And    sodium bicarbonate tab 325 mg  325 mg Per G Tube PRN    metoprolol (Lopressor) 5 mg/5mL injection 5 mg  5 mg Intravenous Q4H PRN    apixaban (Eliquis) tab 2.5 mg  2.5 mg Oral BID    piperacillin-tazobactam (Zosyn) 3.375 g in dextrose 5% 100 mL IVPB-ADDV  3.375 g Intravenous Q8H    acetylcysteine (Mucomyst) 20 % nebulizer solution 2 mL  2 mL Nebulization TID    albuterol (Ventolin) (5 MG/ML) 0.5% nebulizer solution 5 mg  5 mg Nebulization Q4H PRN    acetaminophen (Tylenol) 160 MG/5ML oral liquid 650 mg  650 mg Per NG Tube Q4H PRN    Or    acetaminophen (Tylenol) rectal suppository 650 mg  650 mg Rectal Q4H PRN    senna (Senokot) 8.8 MG/5ML oral syrup 17.6 mg  10 mL Per NG Tube BID    docusate (Colace) 50 MG/5ML oral solution 100 mg  100 mg Per NG Tube BID    polyethylene glycol (PEG 3350) (Miralax) 17 g oral packet 17 g  17 g Per NG Tube Daily PRN    bisacodyl (Dulcolax) 10 MG rectal suppository 10 mg  10 mg Rectal Daily PRN    chlorhexidine gluconate (Peridex) 0.12 % oral solution 15 mL  15 mL Mouth/Throat BID@0800,2000    mineral oil-white petrolatum (Artificial Tears) 83-15 % ophthalmic ointment 1 Application  1 Application Both Eyes Nightly    famotidine (Pepcid) 20 mg/2mL injection 20 mg  20 mg Intravenous Daily @ 0700    ondansetron (Zofran) 4 MG/2ML injection 4 mg  4 mg Intravenous Q6H PRN    metoclopramide (Reglan) 5 mg/mL injection 10 mg  10 mg Intravenous Q8H PRN    vancomycin (Vancocin) 750 mg in sodium chloride 0.9% 250 mL IVPB-ADDV  750 mg Intravenous Q24H           #1.  Acute hypoxemic respiratory failure  2.  Fever  3.  Metabolic encephalopathy  4.  History of atrial fibrillation  5.  History of pulmonary embolism  6.  Dysphagia, status post PEG  7.  History of dementia  8.  Septic shock        Plan:     Patient is on ventilator  Vent management per ICU  Bronchodilator  Antibiotic  Pulmonary toileting  SBT per protocol  Being  followed by cardiology  Princesse Jyotsna  IV Vanco, IV Zosyn  Follow-up with sputum culture, blood culture, urine culture  Sedation per protocol  Currently full code  Continue tube feeding  Will get palliative consult to discuss goals of care  DVT/GI prophylaxis  Setting limit is appropriate for this patient.  Looks like family is not receptive of talking to palliative  Ongoing discussion regarding GOC  Overall poor prognosis       Vika Elmore MD   12/19/2024  1:27 PM

## 2024-12-19 NOTE — CM/SW NOTE
[facility]  Brief Operative Note     SUMMARY     Surgery Date: 9/19/2018     Surgeon(s) and Role:     * Maddie Lucas MD - Primary    Assisting Surgeon: None    Pre-op Diagnosis:  Spasticity [R25.2]    Post-op Diagnosis:  Post-Op Diagnosis Codes:     * Spasticity [R25.2]    Procedure(s) (LRB):  REPLACEMENT, BACLOFEN PUMP (N/A)    Anesthesia: General    Description of the findings of the procedure: See full operative report  Findings/Edmonds Components: See full operative report    Estimated Blood Loss: * No values recorded between 9/19/2018 10:42 AM and 9/19/2018 12:01 PM *         Specimens:   Specimen (12h ago, onward)    None          Discharge Note    SUMMARY     Admit Date: 9/19/2018    Discharge Date and Time:  09/19/2018 12:07 PM    Hospital Course (synopsis of major diagnoses, care, treatment, and services provided during the course of the hospital stay): Patient admitted for outpatient procedure, tolerated without perioperative complications.     Final Diagnosis: Post-Op Diagnosis Codes:     * Spasticity [R25.2]    Disposition: Home or Self Care    Follow Up/Patient Instructions:     You may remove dressing in 48 hours. No showering for 5 days from surgery date. No scrubbing or soaking of wound.    Keep abdominal binder on at all times until clinic visit in 2 weeks.    You will follow up in clinic in 2 weeks for staple removal.    Please complete 5 day course of antibiotic.    Medications:  Reconciled Home Medications:      Medication List      START taking these medications    cephALEXin 250 MG capsule  Commonly known as:  KEFLEX  Take 1 capsule (250 mg total) by mouth every 6 (six) hours. for 5 days     HYDROcodone-acetaminophen 5-325 mg per tablet  Commonly known as:  NORCO  Take 1 tablet by mouth every 6 (six) hours as needed for Pain.        STOP taking these medications    baclofen 10 MG tablet  Commonly known as:  LIORESAL          Discharge Procedure Orders   Diet Adult Regular  Pt remains intubated on sedation after failing multiple breathing trials. NG tube feeds started. Pt's daughter Jada notified  that she would prefer patient dc to [facility] for [facility] at [place] instead of returning to [facility].  notified Jada that sending a referral to [facility] would not be appropriate at this time pt is critically ill on the ventilator. Jada confirmed understanding and understand that dc planning discussions will be ongoing.    Plan: TBD    ALBA Butler    [phone]         Lifting restrictions     Notify your health care provider if you experience any of the following:  temperature >100.4     Notify your health care provider if you experience any of the following:  persistent nausea and vomiting or diarrhea     Notify your health care provider if you experience any of the following:  severe uncontrolled pain     Notify your health care provider if you experience any of the following:  redness, tenderness, or signs of infection (pain, swelling, redness, odor or green/yellow discharge around incision site)     Notify your health care provider if you experience any of the following:  difficulty breathing or increased cough     Notify your health care provider if you experience any of the following:  severe persistent headache     Notify your health care provider if you experience any of the following:  worsening rash     Notify your health care provider if you experience any of the following:  increased confusion or weakness     Notify your health care provider if you experience any of the following:  persistent dizziness, light-headedness, or visual disturbances     Remove dressing in 48 hours     Weight bearing restrictions (specify):     Follow-up Information     [facility] Neurosurgery [facility] In 2 weeks.    Specialty:  Neurosurgery  Why:  For wound re-checkcepa  Contact information:  [address]ey[address] Arnold  [facility] [address] [phone]  Additional information:  7th Floor

## 2024-12-19 NOTE — RESPIRATORY THERAPY NOTE
Patient received intubated and on ventilator VC 12/450/+5/30%. Bilateral breath sounds auscultated. Suction provided when indicated. No acute events during the daytime.     SBT attempted in AM. RSBI 164, NIF -12,   SBT not attempted in PM per family request.    RT will continue to monitor.

## 2024-12-19 NOTE — PROGRESS NOTES
[facility]  part of [facility]     Progress Note    Yin Butcher Patient Status:  Inpatient    1931 MRN [id number]   Location [facility] 2W/SW Attending Vika Elmore MD   Hosp Day # 7 PCP Mina Walker MD       Subjective:   Patient seen and examined.  Intubated, sedated.  Tolerated spontaneous breathing trial for only 15 minutes today.    Objective:   Blood pressure 98/77, pulse 115, temperature 98.4 °F (36.9 °C), temperature source Esophageal, resp. rate 19, weight 116 lb 6.5 oz (52.8 kg), SpO2 99%.  Intake/Output:   Last 3 shifts: I/O last 3 completed shifts:  In: 3773 [I.V.:1126.5; NG/GT:2197; IV PIGGYBACK:449.5]  Out: 1775 [Urine:1775]   This shift: I/O this shift:  In: -   Out: 75 [Urine:75]     Vent Settings: Vent Mode: VC/AC  FiO2 (%):  [30 %] 30 %  S RR:  [12] 12  S VT:  [450 mL] 450 mL  PEEP/CPAP (cm H2O):  [5 cm H20] 5 cm H20  MAP (cm H2O):  [9-10] 10    Hemodynamic parameters (last 24 hours):      Scheduled Meds:   Current Facility-Administered Medications   Medication Dose Route Frequency    propofol (Diprivan) 10 mg/mL infusion premix  5-50 mcg/kg/min (Dosing Weight) Intravenous Continuous    ipratropium-albuterol (Duoneb) 0.5-2.5 (3) MG/3ML inhalation solution 3 mL  3 mL Nebulization TID    ipratropium-albuterol (Duoneb) 0.5-2.5 (3) MG/3ML inhalation solution 3 mL  3 mL Nebulization Q6H PRN    fentaNYL (Sublimaze) 50 mcg/mL injection 25 mcg  25 mcg Intravenous Q30 Min PRN    acetaminophen (Ofirmev) 10 mg/mL infusion premix 650 mg  15 mg/kg Intravenous Q6H PRN    dextrose 10% infusion (TPN no rate)   Intravenous Continuous PRN    pancrelipase (Lip-Prot-Amyl) (Zenpep) DR particles cap 10,000 Units  10,000 Units Per G Tube PRN    And    sodium bicarbonate tab 325 mg  325 mg Per G Tube PRN    metoprolol (Lopressor) 5 mg/5mL injection 5 mg  5 mg Intravenous Q4H PRN    apixaban (Eliquis) tab 2.5 mg  2.5 mg Oral BID    piperacillin-tazobactam (Zosyn) 3.375 g in  dextrose 5% 100 mL IVPB-ADDV  3.375 g Intravenous Q8H    acetylcysteine (Mucomyst) 20 % nebulizer solution 2 mL  2 mL Nebulization TID    albuterol (Ventolin) (5 MG/ML) 0.5% nebulizer solution 5 mg  5 mg Nebulization Q4H PRN    acetaminophen (Tylenol) 160 MG/5ML oral liquid 650 mg  650 mg Per NG Tube Q4H PRN    Or    acetaminophen (Tylenol) rectal suppository 650 mg  650 mg Rectal Q4H PRN    senna (Senokot) 8.8 MG/5ML oral syrup 17.6 mg  10 mL Per NG Tube BID    docusate (Colace) 50 MG/5ML oral solution 100 mg  100 mg Per NG Tube BID    polyethylene glycol (PEG 3350) (Miralax) 17 g oral packet 17 g  17 g Per NG Tube Daily PRN    bisacodyl (Dulcolax) 10 MG rectal suppository 10 mg  10 mg Rectal Daily PRN    chlorhexidine gluconate (Peridex) 0.12 % oral solution 15 mL  15 mL Mouth/Throat BID@0800,2000    mineral oil-white petrolatum (Artificial Tears) 83-15 % ophthalmic ointment 1 Application  1 Application Both Eyes Nightly    famotidine (Pepcid) 20 mg/2mL injection 20 mg  20 mg Intravenous Daily @ 0700    ondansetron (Zofran) 4 MG/2ML injection 4 mg  4 mg Intravenous Q6H PRN    metoclopramide (Reglan) 5 mg/mL injection 10 mg  10 mg Intravenous Q8H PRN    vancomycin (Vancocin) 750 mg in sodium chloride 0.9% 250 mL IVPB-ADDV  750 mg Intravenous Q24H       Continuous Infusions:    propofol 18 mcg/kg/min (12/19/24 0900)    dextrose 10%         Physical Exam  Constitutional: Intubated, sedated  Eyes: PERRL  ENT: nares pateint  Neck: supple, no JVD  Cardio: RRR, S1 S2  Respiratory: Diminished bibasilar breath sounds with crackles present  GI: abdomen soft, non tender, active bowel sounds, no organomegaly + PEG tube in place  Extremities: no clubbing, cyanosis, edema  Neurologic: Does not follow commands  Skin: warm, dry      Results:     Lab Results   Component Value Date    WBC 7.6 12/19/2024    HGB 9.2 12/19/2024    HCT 27.8 12/19/2024    .0 12/19/2024    CREATSERUM 0.50 12/19/2024    BUN 20 12/19/2024      12/19/2024    K 3.8 12/19/2024     12/19/2024    CO2 27.0 12/19/2024     12/19/2024    CA 8.7 12/19/2024         XR CHEST AP PORTABLE  (CPT=71045)    Result Date: 12/18/2024  CONCLUSION:  1. Borderline heart size.  Tortuous aorta. 2. ET tube in satisfactory position. 3. Right basilar pleural parenchymal abnormality with moderate right effusion.  Minimal left basilar atelectasis/scarring and small left-sided effusion    Dictated by (CST): Melecio Jackson MD on 12/18/2024 at 1:24 PM     Finalized by (CST): Melecio Jackson MD on 12/18/2024 at 1:25 PM                 Assessment   1.  Fevers  2.  Acute encephalopathy  3.  Acute on chronic hypercapnic hypoxemic respiratory failure  4.  Leukocytosis  5.  HFpEF  6.  History of atrial fibrillation  7.  Prior history of pulmonary embolism  9.  Dementia  10.  Dysphagia     Plan   -Patient presents with chief complaint of fevers ongoing altered mental status over the last several days.  Recently discharged on 11/24/2024 from [facility] after hospitalization for presumed aspiration pneumonia.  -Worsening hypercapnic respiratory failure on NIV.  Patient intubated on 12/12/2024.  -Spontaneous breathing trial as tolerated although not tolerating well thus far  -CT abdomen pelvis with no acute intra-abdominal findings seen  -CT chest on 12/11/2024 with lower lobe mucous plugging concerning for aspiration with small pleural effusions.  -Continue therapy with Zosyn and vancomycin  -Blood cultures thus far with no growth to date  -Chest physiotherapy  -DVT prophylaxis: Eliquis  -Ongoing discussion with daughter regarding patient's overall clinical condition.  Prognosis is poor at this point.  Not tolerating spontaneous breathing trials.  Continues to be full code at this time.  Daughter not willing to set any limits at this time.        Dayo Espino, DO  Pulmonary Critical Care Medicine  [facility]

## 2024-12-19 NOTE — PROGRESS NOTES
[facility]  Cardiology Progress Note    Yin Butcher Patient Status:  Inpatient    1931 MRN [id number]   Location [facility] 2W/SW Attending Vika Elmore MD   Hosp Day # 7 PCP Mina Walker MD     Subjective     Intubated, sedated.  Variable rates in atrial fibrillation.    Objective:   Patient Vitals for the past 24 hrs:   BP Temp Temp src Pulse Resp SpO2 Weight   24 0800 98/77 98.4 °F (36.9 °C) Esophageal 115 19 99 % --   24 0700 94/70 98.2 °F (36.8 °C) -- 115 18 100 % --   24 0605 (!) 80/62 98.4 °F (36.9 °C) Esophageal 106 24 100 % --   24 0600 (!) 79/68 98.6 °F (37 °C) Esophageal 118 21 100 % 116 lb 6.5 oz (52.8 kg)   24 0500 (!) 86/72 98.8 °F (37.1 °C) Esophageal (!) 130 14 100 % --   24 0400 90/64 98.8 °F (37.1 °C) Esophageal (!) 128 12 100 % --   24 0300 93/80 98.8 °F (37.1 °C) -- (!) 128 15 100 % --   24 0200 101/76 98.6 °F (37 °C) Esophageal (!) 143 16 100 % --   24 0100 97/87 98.6 °F (37 °C) Esophageal (!) 123 20 100 % --   24 0000 (!) 88/70 98.6 °F (37 °C) Esophageal 108 12 100 % --   24 2300 (!) 77/61 98.4 °F (36.9 °C) -- (!) 128 13 100 % --   24 2200 (!) 86/58 98.4 °F (36.9 °C) -- (!) 121 12 100 % --   24 2100 (!) 86/64 98.1 °F (36.7 °C) -- 106 12 100 % --   24 2000 91/73 98.4 °F (36.9 °C) Esophageal (!) 134 14 100 % --   24 1800 91/70 98.1 °F (36.7 °C) Esophageal (!) 135 18 100 % --   24 1700 (!) 88/75 98.1 °F (36.7 °C) Esophageal 120 12 100 % --   24 1600 (!) 88/71 97.9 °F (36.6 °C) Esophageal 110 14 100 % --   24 1500 94/82 97.9 °F (36.6 °C) Esophageal 107 13 100 % --   24 1400 91/76 97.9 °F (36.6 °C) Esophageal 120 24 100 % --   24 1300 (!) 84/68 98.1 °F (36.7 °C) Esophageal 118 18 100 % --   24 1200 99/78 97.9 °F (36.6 °C) Esophageal 107 14 99 % --   24 1100 (!) 85/66 97.9 °F (36.6 °C) Esophageal 118 12 100 % --      Intake/Output:   Last 3 shifts:   Intake/Output                   12/17/24 0700 - 12/18/24 0659 12/18/24 0700 - 12/19/24 0659 12/19/24 0700 - 12/20/24 0659       Intake    I.V.  776.1  513.5  --    I.V. 706 399 --    Volume (mL) Propofol 70.1 114.5 --    NG/GT  1139  1382  --    Tube Feeding Flushes (mL) 400 600 --    Intake (mL) (Gastrostomy/Enterostomy PEG-jejunostomy LUQ) 739 782 --    IV PIGGYBACK  --  449.5  --    Volume (mL) (vancomycin (Vancocin) 750 mg in sodium chloride 0.9% 250 mL IVPB-ADDV) -- 250 --    Volume (mL) (piperacillin-tazobactam (Zosyn) 3.375 g in dextrose 5% 100 mL IVPB-ADDV) -- 199.5 --    Total Intake 1915.1 2345 --       Output    Urine  675  1250  75    Output (mL) (Urethral Catheter) 675 1250 75    Stool  --  --  --    Stool Count Calculated for I/O -- 1 x --    Total Output 675 1250 75       Net I/O     1240.1 1095 -75             Vent Settings: Vent Mode: VC/AC  FiO2 (%):  [30 %] 30 %  S RR:  [12] 12  S VT:  [450 mL] 450 mL  PEEP/CPAP (cm H2O):  [5 cm H20] 5 cm H20  MAP (cm H2O):  [9-10] 10      Scheduled Meds:    ipratropium-albuterol  3 mL Nebulization TID    apixaban  2.5 mg Oral BID    piperacillin-tazobactam  3.375 g Intravenous Q8H    acetylcysteine  2 mL Nebulization TID    senna  10 mL Per NG Tube BID    docusate  100 mg Per NG Tube BID    chlorhexidine gluconate  15 mL Mouth/Throat BID@0800,2000    mineral oil-white petrolatum  1 Application Both Eyes Nightly    famotidine  20 mg Intravenous Daily @ 0700    vancomycin  750 mg Intravenous Q24H       Continuous Infusions:    propofol 18 mcg/kg/min (12/19/24 0900)    dextrose 10%         Results:     Lab Results   Component Value Date    WBC 7.6 12/19/2024    HGB 9.2 (L) 12/19/2024    HCT 27.8 (L) 12/19/2024    .0 12/19/2024    CREATSERUM 0.50 (L) 12/19/2024    BUN 20 12/19/2024     12/19/2024    K 3.8 12/19/2024     12/19/2024    CO2 27.0 12/19/2024     (H) 12/19/2024    CA 8.7 12/19/2024    ALB 4.0  2024    ALKPHO 112 2024    BILT 1.0 (H) 2024    TP 8.2 2024    AST 24 2024    ALT 30 2024    PTT 34.6 2024    INR 1.32 (H) 2024    PT 15.5 (H) 2014    T4F 1.0 2015    TSH 2.138 2024    LIP 35 2024    DDIMER 2.38 (H) 2024    MG 2.0 2024    PHOS 4.1 2024    TROP <0.046 2015    CK 32 2024    B12 586 2024       Recent Labs   Lab 24  0553 24  0913 24  0528 24  0332   *  --  100* 118*   BUN  --  22 21 20   CREATSERUM 0.41*  --  0.42* 0.50*   CA 8.2*  --  8.6* 8.7     --  141 139   K  --  3.9 4.2 3.8     --  108 107   CO2 28.0  --  29.0 27.0     Recent Labs   Lab 12/15/24  0403 24  0553 24  0528 24  0332   RBC 3.21* 2.96* 3.10* 3.16*   HGB 9.0* 8.7* 8.7* 9.2*   HCT 29.3* 26.5* 28.2* 27.8*   MCV 91.3 89.5 91.0 88.0   MCH 28.0 29.4 28.1 29.1   MCHC 30.7* 32.8 30.9* 33.1   RDW 22.2* 22.4* 21.8* 21.6*   NEPRELIM 12.15* 10.21* 8.16*  --    WBC 13.4* 11.8* 9.9 7.6   .0 118.0* 158.0 193.0       No results for input(s): \"BNPML\" in the last 168 hours.    No results for input(s): \"TROP\", \"CK\" in the last 168 hours.    XR CHEST AP PORTABLE  (CPT=71045)    Result Date: 2024  CONCLUSION:  1. Borderline heart size.  Tortuous aorta. 2. ET tube in satisfactory position. 3. Right basilar pleural parenchymal abnormality with moderate right effusion.  Minimal left basilar atelectasis/scarring and small left-sided effusion    Dictated by (CST): Melceio Jackson MD on 2024 at 1:24 PM     Finalized by (CST): Melecio Jackson MD on 2024 at 1:25 PM             CARD ECHO 2D DOPPLER (CPT=93306)    Result Date: 2024  Transthoracic Echocardiogram Name:Yin Butcher Date: 2024 :  1931 Ht:  (67in)  BP: 121 / 78 MRN:  [id number]    Age:  89years    Wt:  (92lb)  HR: 110bpm Loc:  [facility]       Gndr: F          BSA: 1.45m^2 Sonographer:  Rachael HAMMER Ordering:    cleve Hawthorne Consulting:  Dayo Espino MD ---------------------------------------------------------------------------- History/Indications:   Atrial fibrillation. Fever. Shortness of breath. ---------------------------------------------------------------------------- Procedure information:  A transthoracic complete 2D study was performed. Additional evaluation included M-mode, complete spectral Doppler, and color Doppler.  Patient status:  Inpatient.  Location:  [facility]    Comparison was made to the study of 01/21/2024.    This was a routine study. Transthoracic echocardiography for ventricular function evaluation and assessment of valvular function. Image quality was adequate. ECG rhythm:   Atrial fibrillation ---------------------------------------------------------------------------- Conclusions: 1. Left ventricle: The cavity size was below normal. Wall thickness was    increased. Systolic function was hyperdynamic. The estimated ejection    fraction was 70-75%, by visual assessment. No diagnostic evidence for    regional wall motion abnormalities. Unable to assess LV diastolic    function due to heart rhythm. 2. Right ventricle: Systolic function was mildly reduced. The tricuspid    annular plane systolic excursion is 1.26cm. The RV s' is 9.2cm/sec. 3. Left atrium: The left atrial volume was markedly increased. 4. Right atrium: The atrium was markedly dilated. 5. Aortic valve: The peak systolic velocity was 1.54m/sec. The mean systolic    gradient was 5mm Hg. The valve area (VTI) was 1.58cm^2. The valve area    (VTI) index was 1.09cm^2/m^2. 6. Mitral valve: The annulus was markedly calcified. The leaflets were    mildly calcified. Cannot exclude a vegetation. There was mild    regurgitation. The mean diastolic gradient was 4mm Hg at a heart rate of    120 bpm. 7. Pulmonary arteries: Systolic pressure was moderately increased, in the    range of 45mm Hg to 50mm Hg. 8.  Pericardium, extracardiac: There were bilateral pleural effusions    present. Impressions:  This study is compared with previous dated 1/21/24: * ---------------------------------------------------------------------------- * Findings: Left ventricle:  The cavity size was below normal. Wall thickness was increased. Systolic function was hyperdynamic. The estimated ejection fraction was 70-75%, by visual assessment. No diagnostic evidence for regional wall motion abnormalities. Unable to assess LV diastolic function due to heart rhythm. Left atrium:  The left atrial volume was markedly increased. Right ventricle:  The cavity size was normal. Systolic function was mildly reduced. Right atrium:  The atrium was markedly dilated. Mitral valve:  Poorly visualized. The annulus was markedly calcified. The leaflets were mildly calcified. Leaflet separation was normal.  Cannot exclude a vegetation.  Doppler:  Transvalvular velocity was within the normal range. There was no evidence for stenosis. There was mild regurgitation.    The valve area (LVOT continuity) was 2.01cm^2. The valve area index (LVOT continuity) was 1.38cm^2/m^2.    The mean diastolic gradient was 4mm Hg at a heart rate of 120 bpm. Aortic valve:   The valve was probably trileaflet. The leaflets were moderately calcified.  Doppler:     The valve area (VTI) was 1.58cm^2. The valve area (VTI) index was 1.09cm^2/m^2.    The mean systolic gradient was 5mm Hg. The peak systolic gradient was 13mm Hg. Tricuspid valve:  The valve is structurally normal. Leaflet separation was normal.  Doppler:  Transvalvular velocity was within the normal range. There was no evidence for stenosis. There was mild regurgitation. Pulmonic valve:   The valve is structurally normal. Cusp separation was normal.  Doppler:  Transvalvular velocity was within the normal range. There was no evidence for stenosis. There was trace regurgitation. Pericardium:   No significant pericardial effusion  was identified. Pleura:  There were bilateral pleural effusions present. Aorta: Aortic root: The aortic root was normal. Ascending aorta: The ascending aorta was normal. Pulmonary arteries: Systolic pressure was moderately increased, in the range of 45mm Hg to 50mm Hg. ---------------------------------------------------------------------------- Measurements  Left              Value             Ref       01/21/2024  ventricle  IVS           (H) 1.2   cm          0.6 - 0.9 0.9  thickness,  ED, PLAX  LV ID, ED,    (L) 2.6   cm          3.8 - 5.2 3.6  PLAX  LV ID, ES,    (L) 1.6   cm          2.2 - 3.5 2.2  PLAX  LV PW         (H) 1.1   cm          0.6 - 0.9 0.8  thickness,  ED, PLAX  IVS/LV PW         1.09              --------- 1.10  ratio, ED,  PLAX  LV PW/LV ID       0.42              --------- 0.23  ratio, ED,  PLAX  LV ejection       71    %           54 - 74   70  fraction  Stroke            28    ml/m^2      --------- ----------  volume/bsa,  2D  LVOT              Value             Ref       01/21/2024  LVOT ID           2     cm          --------- ----------  LVOT peak         0.82  m/sec       --------- ----------  velocity, S  LVOT VTI, S       13.2  cm          --------- ----------  LVOT peak         3     mm Hg       --------- ----------  gradient, S  LVOT mean         1     mm Hg       --------- ----------  gradient, S  Stroke volume     41    ml          --------- ----------  (SV), LVOT DP  Cardiac           4.3   L/min       --------- ----------  output (Qs),  LVOT DP  Cardiac index     3     L/(min-m^2) --------- ----------  (Qs/bsa),  LVOT DP  Stroke index      29    ml/m^2      --------- ----------  (SV/bsa),  LVOT DP  Aortic valve      Value             Ref       01/21/2024  Aortic valve      1.54  m/sec       --------- ----------  peak  velocity, S  Aortic valve      23.3  cm          --------- ----------  VTI, S  Aortic mean       5     mm Hg       --------- ----------  gradient, S  Aortic peak        13    mm Hg       --------- ----------  gradient, S  Aortic valve      1.58  cm^2        --------- ----------  area, VTI  Aortic valve      1.09  cm^2/m^2    --------- ----------  area/bsa, VTI  Velocity          0.46              --------- ----------  ratio, peak,  LVOT/AV  Aortic root       Value             Ref       01/21/2024  Aortic root       3.3   cm          2.3 - 3.7 ----------  ID  Ascending         Value             Ref       01/21/2024  aorta  Ascending         2.9   cm          1.9 - 3.5 ----------  aorta ID  Left atrium       Value             Ref       01/21/2024  LA ID, A-P,       3.5   cm          2.7 - 3.8 4.0  ES  LA volume, S  (H) 100   ml          22 - 52   ----------  LA            (H) 69    ml/m^2      16 - 34   ----------  volume/bsa, S  LA volume,    (H) 114   ml          22 - 52   ----------  ES, 1-p A4C  LA volume,    (H) 88    ml          22 - 52   ----------  ES, 1-p A2C  LA volume,        108   ml          --------- ----------  ES, A/L  LA            (H) 74    ml/m^2      16 - 34   ----------  volume/bsa,  ES, A/L  LA/aortic         1.06              --------- 1.33  root ratio  Mitral valve      Value             Ref       01/21/2024  Mitral mean       4     mm Hg       --------- ----------  gradient, D  Mitral peak       7     mm Hg       --------- ----------  gradient, D  Mitral valve      2.01  cm^2        --------- ----------  area, LVOT  continuity  Mitral valve      1.38  cm^2/m^2    --------- ----------  area/bsa,  LVOT  continuity  Pulmonary         Value             Ref       01/21/2024  artery  PA pressure,      47    mm Hg       --------- 35  S, DP  Tricuspid         Value             Ref       01/21/2024  valve  Tricuspid     (H) 3.12  m/sec       <=2.8     2.81  regurg peak  velocity  Tricuspid         39    mm Hg       --------- 32  peak RV-RA  gradient  Systemic          Value             Ref       01/21/2024  veins  Estimated CVP     8     mm Hg       --------- 3   Right             Value             Ref       01/21/2024  ventricle  TAPSE, MM     (L) 1.26  cm          >=1.70    ----------  RV pressure,      47    mm Hg       --------- 35  S, DP  RV s',        (L) 9.2   cm/sec      >=9.5     ----------  lateral Legend: (L)  and  (H)  corinna values outside specified reference range. ---------------------------------------------------------------------------- Prepared and electronically signed by Yonatan Trevino 12/12/2024 08:57        Exam:     General: Intubated, sedated.  Cachectic elderly female.  HEENT: Normocephalic, anicteric sclera, neck supple, no thyromegaly or adenopathy.  Neck: No JVD, carotids 2+, no bruits.  Cardiac: Irregularly irregular rhythm.. S1, S2 normal. No murmur, pericardial rub, S3, or extra cardiac sounds.  Lungs: Clear without wheezes, rales, rhonchi or dullness.  Normal excursions and effort.  Abdomen: Soft, non-tender. No organosplenomegally, mass or rebound, BS-present.  Extremities: Without clubbing or cyanosis. No edema.    Neurologic: Sedated.  Skin: Warm and dry.     Assessment and Plan:   Assessment   Acute on chronic respiratory failure, aspiration pneumonitis  HFpEF  Atrial fibrillation  Metabolic encephalopathy  History of pulm embolism  Dysphagia status post PEG  Dementia        Plan  Heart rate is now increasing as sedation is lifted. If heart rates remain high with adequate blood pressures can try IV Lopressor as needed  Family presently continuing aggressive treatment and not pursuing palliative care.    Antibiotic management per primary team  Continue DOAC for stroke prevention    Barry Do MD  [facility]  12/19/2024

## 2024-12-19 NOTE — PLAN OF CARE
Problem: Safety Risk - Non-Violent Restraints  Goal: Patient will remain free from self-harm  Description: INTERVENTIONS:  - Apply the least restrictive restraint to prevent harm  - Notify patient and family of reasons restraints applied  - Assess for any contributing factors to confusion (electrolyte disturbances, delirium, medications)  - Discontinue any unnecessary medical devices as soon as possible  - Assess the patient's physical comfort, circulation, skin condition, hydration, nutrition and elimination needs   - Reorient and redirection as needed  - Assess for the need to continue restraints  Outcome: Not Progressing     Problem: Patient Centered Care  Goal: Patient preferences are identified and integrated in the patient's plan of care  Description: Interventions:  - What would you like us to know as we care for you?   - Provide timely, complete, and accurate information to patient/family  - Incorporate patient and family knowledge, values, beliefs, and cultural backgrounds into the planning and delivery of care  - Encourage patient/family to participate in care and decision-making at the level they choose  - Honor patient and family perspectives and choices  Outcome: Progressing     Problem: Patient/Family Goals  Goal: Patient/Family Long Term Goal  Description: Patient's Long Term Goal:     Interventions:  -   - See additional Care Plan goals for specific interventions  Outcome: Progressing  Goal: Patient/Family Short Term Goal  Description: Patient's Short Term Goal:     Interventions:   -   - See additional Care Plan goals for specific interventions  Outcome: Progressing     Problem: PAIN - ADULT  Goal: Verbalizes/displays adequate comfort level or patient's stated pain goal  Description: INTERVENTIONS:  - Encourage pt to monitor pain and request assistance  - Assess pain using appropriate pain scale  - Administer analgesics based on type and severity of pain and evaluate response  - Implement  non-pharmacological measures as appropriate and evaluate response  - Consider cultural and social influences on pain and pain management  - Manage/alleviate anxiety  - Utilize distraction and/or relaxation techniques  - Monitor for opioid side effects  - Notify MD/LIP if interventions unsuccessful or patient reports new pain  - Anticipate increased pain with activity and pre-medicate as appropriate  Outcome: Progressing     Problem: RISK FOR INFECTION - ADULT  Goal: Absence of fever/infection during anticipated neutropenic period  Description: INTERVENTIONS  - Monitor WBC  - Administer growth factors as ordered  - Implement neutropenic guidelines  Outcome: Progressing     Problem: SAFETY ADULT - FALL  Goal: Free from fall injury  Description: INTERVENTIONS:  - Assess pt frequently for physical needs  - Identify cognitive and physical deficits and behaviors that affect risk of falls.  - [place] fall precautions as indicated by assessment.  - Educate pt/family on patient safety including physical limitations  - Instruct pt to call for assistance with activity based on assessment  - Modify environment to reduce risk of injury  - Provide assistive devices as appropriate  - Consider OT/PT consult to assist with strengthening/mobility  - Encourage toileting schedule  Outcome: Progressing     Problem: DISCHARGE PLANNING  Goal: Discharge to home or other facility with appropriate resources  Description: INTERVENTIONS:  - Identify barriers to discharge w/pt and caregiver  - Include patient/family/discharge partner in discharge planning  - Arrange for needed discharge resources and transportation as appropriate  - Identify discharge learning needs (meds, wound care, etc)  - Arrange for interpreters to assist at discharge as needed  - Consider post-discharge preferences of patient/family/discharge partner  - Complete POLST form as appropriate  - Assess patient's ability to be responsible for managing their own health  -  Refer to Case Management Department for coordinating discharge planning if the patient needs post-hospital services based on physician/LIP order or complex needs related to functional status, cognitive ability or social support system  Outcome: Progressing     Problem: Altered Communication/Language Barrier  Goal: Patient/Family is able to understand and participate in their care  Description: Interventions:  - Assess communication ability and preferred communication style  - Implement communication aides and strategies  - Use visual cues when possible  - Listen attentively, be patient, do not interrupt  - Minimize distractions  - Allow time for understanding and response  - Establish method for patient to ask for assistance (call light)  - Provide an  as needed  - Communicate barriers and strategies to overcome with those who interact with patient  Outcome: Progressing     Problem: RESPIRATORY - ADULT  Goal: Achieves optimal ventilation and oxygenation  Description: INTERVENTIONS:  - Assess for changes in respiratory status  - Assess for changes in mentation and behavior  - Position to facilitate oxygenation and minimize respiratory effort  - Oxygen supplementation based on oxygen saturation or ABGs  - Provide Smoking Cessation handout, if applicable  - Encourage broncho-pulmonary hygiene including cough, deep breathe, Incentive Spirometry  - Assess the need for suctioning and perform as needed  - Assess and instruct to report SOB or any respiratory difficulty  - Respiratory Therapy support as indicated  - Manage/alleviate anxiety  - Monitor for signs/symptoms of CO2 retention  Outcome: Progressing     Problem: NEUROLOGICAL - ADULT  Goal: Achieves stable or improved neurological status  Description: INTERVENTIONS  - Assess for and report changes in neurological status  - Initiate measures to prevent increased intracranial pressure  - Maintain blood pressure and fluid volume within ordered parameters to  optimize cerebral perfusion and minimize risk of hemorrhage  - Monitor temperature, glucose, and sodium. Initiate appropriate interventions as ordered  Outcome: Progressing  Goal: Absence of seizures  Description: INTERVENTIONS  - Monitor for seizure activity  - Administer anti-seizure medications as ordered  - Monitor neurological status  Outcome: Progressing  Goal: Remains free of injury related to seizure activity  Description: INTERVENTIONS:  - Maintain airway, patient safety  and administer oxygen as ordered  - Monitor patient for seizure activity, document and report duration and description of seizure to MD/LIP  - If seizure occurs, turn patient to side and suction secretions as needed  - Reorient patient post seizure  - Seizure pads on all 4 side rails  - Instruct patient/family to notify RN of any seizure activity  - Instruct patient/family to call for assistance with activity based on assessment  Outcome: Progressing  Goal: Achieves maximal functionality and self care  Description: INTERVENTIONS  - Monitor swallowing and airway patency with patient fatigue and changes in neurological status  - Encourage and assist patient to increase activity and self care with guidance from PT/OT  - Encourage visually impaired, hearing impaired and aphasic patients to use assistive/communication devices  Outcome: Progressing     Problem: Diabetes/Glucose Control  Goal: Glucose maintained within prescribed range  Description: INTERVENTIONS:  - Monitor Blood Glucose as ordered  - Assess for signs and symptoms of hyperglycemia and hypoglycemia  - Administer ordered medications to maintain glucose within target range  - Assess barriers to adequate nutritional intake and initiate nutrition consult as needed  - Instruct patient on self management of diabetes  Outcome: Progressing     Problem: Delirium  Goal: Minimize duration of delirium  Description: Interventions:  - Encourage use of hearing aids, eye glasses  - Promote  highest level of mobility daily  - Provide frequent reorientation  - Promote wakefulness i.e. lights on, blinds open  - Promote sleep, encourage patient's normal rest cycle i.e. lights off, TV off, minimize noise and interruptions  - Encourage family to assist in orientation and promotion of home routines  Outcome: Progressing

## 2024-12-19 NOTE — PLAN OF CARE
Patient is still intubated and sedated. On propofol gtt which was titrated to 18 mcg. Patients pupils are reactive and has movement to painful stimulus in all 4 extremities. Patient has size 7 ETT at 24 @ the lip, with a bite block in place because of biting on the tube. Vent settings are AC, RR12, , PEEP 5, FiO2 30%. Patient is in Afib, and has been tachycardic, vitals as charted. Tube feedings continued and harvey in place. Patient is still on bilateral SWR. Wound dressings changed. Safety measures maintained, bed in locked and lowest position.   Problem: Patient Centered Care  Goal: Patient preferences are identified and integrated in the patient's plan of care  Description: Interventions:  - What would you like us to know as we care for you?   - Provide timely, complete, and accurate information to patient/family  - Incorporate patient and family knowledge, values, beliefs, and cultural backgrounds into the planning and delivery of care  - Encourage patient/family to participate in care and decision-making at the level they choose  - Honor patient and family perspectives and choices  Outcome: Progressing     Problem: Patient/Family Goals  Goal: Patient/Family Long Term Goal  Description: Patient's Long Term Goal:     Interventions:  - See additional Care Plan goals for specific interventions  Outcome: Progressing  Goal: Patient/Family Short Term Goal  Description: Patient's Short Term Goal:     Interventions:  - See additional Care Plan goals for specific interventions  Outcome: Progressing     Problem: Safety Risk - Non-Violent Restraints  Goal: Patient will remain free from self-harm  Description: INTERVENTIONS:  - Apply the least restrictive restraint to prevent harm  - Notify patient and family of reasons restraints applied  - Assess for any contributing factors to confusion (electrolyte disturbances, delirium, medications)  - Discontinue any unnecessary medical devices as soon as possible  - Assess the  patient's physical comfort, circulation, skin condition, hydration, nutrition and elimination needs   - Reorient and redirection as needed  - Assess for the need to continue restraints  Outcome: Progressing     Problem: PAIN - ADULT  Goal: Verbalizes/displays adequate comfort level or patient's stated pain goal  Description: INTERVENTIONS:  - Encourage pt to monitor pain and request assistance  - Assess pain using appropriate pain scale  - Administer analgesics based on type and severity of pain and evaluate response  - Implement non-pharmacological measures as appropriate and evaluate response  - Consider cultural and social influences on pain and pain management  - Manage/alleviate anxiety  - Utilize distraction and/or relaxation techniques  - Monitor for opioid side effects  - Notify MD/LIP if interventions unsuccessful or patient reports new pain  - Anticipate increased pain with activity and pre-medicate as appropriate  Outcome: Progressing     Problem: RISK FOR INFECTION - ADULT  Goal: Absence of fever/infection during anticipated neutropenic period  Description: INTERVENTIONS  - Monitor WBC  - Administer growth factors as ordered  - Implement neutropenic guidelines  Outcome: Progressing     Problem: SAFETY ADULT - FALL  Goal: Free from fall injury  Description: INTERVENTIONS:  - Assess pt frequently for physical needs  - Identify cognitive and physical deficits and behaviors that affect risk of falls.  - [place] fall precautions as indicated by assessment.  - Educate pt/family on patient safety including physical limitations  - Instruct pt to call for assistance with activity based on assessment  - Modify environment to reduce risk of injury  - Provide assistive devices as appropriate  - Consider OT/PT consult to assist with strengthening/mobility  - Encourage toileting schedule  Outcome: Progressing     Problem: DISCHARGE PLANNING  Goal: Discharge to home or other facility with appropriate  resources  Description: INTERVENTIONS:  - Identify barriers to discharge w/pt and caregiver  - Include patient/family/discharge partner in discharge planning  - Arrange for needed discharge resources and transportation as appropriate  - Identify discharge learning needs (meds, wound care, etc)  - Arrange for interpreters to assist at discharge as needed  - Consider post-discharge preferences of patient/family/discharge partner  - Complete POLST form as appropriate  - Assess patient's ability to be responsible for managing their own health  - Refer to Case Management Department for coordinating discharge planning if the patient needs post-hospital services based on physician/LIP order or complex needs related to functional status, cognitive ability or social support system  Outcome: Progressing     Problem: Altered Communication/Language Barrier  Goal: Patient/Family is able to understand and participate in their care  Description: Interventions:  - Assess communication ability and preferred communication style  - Implement communication aides and strategies  - Use visual cues when possible  - Listen attentively, be patient, do not interrupt  - Minimize distractions  - Allow time for understanding and response  - Establish method for patient to ask for assistance (call light)  - Provide an  as needed  - Communicate barriers and strategies to overcome with those who interact with patient  Outcome: Progressing     Problem: RESPIRATORY - ADULT  Goal: Achieves optimal ventilation and oxygenation  Description: INTERVENTIONS:  - Assess for changes in respiratory status  - Assess for changes in mentation and behavior  - Position to facilitate oxygenation and minimize respiratory effort  - Oxygen supplementation based on oxygen saturation or ABGs  - Provide Smoking Cessation handout, if applicable  - Encourage broncho-pulmonary hygiene including cough, deep breathe, Incentive Spirometry  - Assess the need for  suctioning and perform as needed  - Assess and instruct to report SOB or any respiratory difficulty  - Respiratory Therapy support as indicated  - Manage/alleviate anxiety  - Monitor for signs/symptoms of CO2 retention  Outcome: Progressing     Problem: NEUROLOGICAL - ADULT  Goal: Achieves stable or improved neurological status  Description: INTERVENTIONS  - Assess for and report changes in neurological status  - Initiate measures to prevent increased intracranial pressure  - Maintain blood pressure and fluid volume within ordered parameters to optimize cerebral perfusion and minimize risk of hemorrhage  - Monitor temperature, glucose, and sodium. Initiate appropriate interventions as ordered  Outcome: Progressing  Goal: Absence of seizures  Description: INTERVENTIONS  - Monitor for seizure activity  - Administer anti-seizure medications as ordered  - Monitor neurological status  Outcome: Progressing  Goal: Remains free of injury related to seizure activity  Description: INTERVENTIONS:  - Maintain airway, patient safety  and administer oxygen as ordered  - Monitor patient for seizure activity, document and report duration and description of seizure to MD/LIP  - If seizure occurs, turn patient to side and suction secretions as needed  - Reorient patient post seizure  - Seizure pads on all 4 side rails  - Instruct patient/family to notify RN of any seizure activity  - Instruct patient/family to call for assistance with activity based on assessment  Outcome: Progressing  Goal: Achieves maximal functionality and self care  Description: INTERVENTIONS  - Monitor swallowing and airway patency with patient fatigue and changes in neurological status  - Encourage and assist patient to increase activity and self care with guidance from PT/OT  - Encourage visually impaired, hearing impaired and aphasic patients to use assistive/communication devices  Outcome: Progressing     Problem: Diabetes/Glucose Control  Goal: Glucose  maintained within prescribed range  Description: INTERVENTIONS:  - Monitor Blood Glucose as ordered  - Assess for signs and symptoms of hyperglycemia and hypoglycemia  - Administer ordered medications to maintain glucose within target range  - Assess barriers to adequate nutritional intake and initiate nutrition consult as needed  - Instruct patient on self management of diabetes  Outcome: Progressing     Problem: Delirium  Goal: Minimize duration of delirium  Description: Interventions:  - Encourage use of hearing aids, eye glasses  - Promote highest level of mobility daily  - Provide frequent reorientation  - Promote wakefulness i.e. lights on, blinds open  - Promote sleep, encourage patient's normal rest cycle i.e. lights off, TV off, minimize noise and interruptions  - Encourage family to assist in orientation and promotion of home routines  Outcome: Progressing

## 2024-12-20 ENCOUNTER — CASE MANAGEMENT (OUTPATIENT)
Dept: CARE COORDINATION | Age: 89
End: 2024-12-20

## 2024-12-20 NOTE — PROGRESS NOTES
[facility]  part of [facility]     Progress Note    Yin Butcher Patient Status:  Inpatient    1931 MRN [id number]   Location [facility] 2W/SW Attending Vika Elmore MD   Hosp Day # 8 PCP Mina Walker MD       Subjective:   Patient seen and examined.  Intubated, sedated.  Tolerated spontaneous breathing trial for only 15 minutes yesterday.    Objective:   Blood pressure 94/76, pulse 92, temperature 98.2 °F (36.8 °C), resp. rate 14, weight 124 lb 1.9 oz (56.3 kg), SpO2 90%.  Intake/Output:   Last 3 shifts: I/O last 3 completed shifts:  In: 2938.4 [I.V.:470.9; NG/GT:1731; IV PIGGYBACK:736.5]  Out: 2100 [Urine:2100]   This shift: No intake/output data recorded.     Vent Settings: Vent Mode: VC/AC  FiO2 (%):  [30 %] 30 %  S RR:  [12] 12  S VT:  [450 mL] 450 mL  PEEP/CPAP (cm H2O):  [5 cm H20] 5 cm H20  MAP (cm H2O):  [9-11] 10    Hemodynamic parameters (last 24 hours):      Scheduled Meds:   Current Facility-Administered Medications   Medication Dose Route Frequency    vancomycin (Firvanq) 50 mg/mL oral solution 125 mg  125 mg Per NG Tube Daily    norepinephrine (Levophed) 4 mg/250mL infusion premix  0.5-50 mcg/min Intravenous Continuous    midodrine (ProAmatine) tab 5 mg  5 mg Per NG Tube TID    propofol (Diprivan) 10 mg/mL infusion premix  5-50 mcg/kg/min (Dosing Weight) Intravenous Continuous    ipratropium-albuterol (Duoneb) 0.5-2.5 (3) MG/3ML inhalation solution 3 mL  3 mL Nebulization TID    ipratropium-albuterol (Duoneb) 0.5-2.5 (3) MG/3ML inhalation solution 3 mL  3 mL Nebulization Q6H PRN    fentaNYL (Sublimaze) 50 mcg/mL injection 25 mcg  25 mcg Intravenous Q30 Min PRN    acetaminophen (Ofirmev) 10 mg/mL infusion premix 650 mg  15 mg/kg Intravenous Q6H PRN    dextrose 10% infusion (TPN no rate)   Intravenous Continuous PRN    pancrelipase (Lip-Prot-Amyl) (Zenpep) DR particles cap 10,000 Units  10,000 Units Per G Tube PRN    And    sodium bicarbonate tab 325 mg  325  mg Per G Tube PRN    metoprolol (Lopressor) 5 mg/5mL injection 5 mg  5 mg Intravenous Q4H PRN    apixaban (Eliquis) tab 2.5 mg  2.5 mg Oral BID    piperacillin-tazobactam (Zosyn) 3.375 g in dextrose 5% 100 mL IVPB-ADDV  3.375 g Intravenous Q8H    acetylcysteine (Mucomyst) 20 % nebulizer solution 2 mL  2 mL Nebulization TID    albuterol (Ventolin) (5 MG/ML) 0.5% nebulizer solution 5 mg  5 mg Nebulization Q4H PRN    acetaminophen (Tylenol) 160 MG/5ML oral liquid 650 mg  650 mg Per NG Tube Q4H PRN    Or    acetaminophen (Tylenol) rectal suppository 650 mg  650 mg Rectal Q4H PRN    senna (Senokot) 8.8 MG/5ML oral syrup 17.6 mg  10 mL Per NG Tube BID    docusate (Colace) 50 MG/5ML oral solution 100 mg  100 mg Per NG Tube BID    polyethylene glycol (PEG 3350) (Miralax) 17 g oral packet 17 g  17 g Per NG Tube Daily PRN    bisacodyl (Dulcolax) 10 MG rectal suppository 10 mg  10 mg Rectal Daily PRN    chlorhexidine gluconate (Peridex) 0.12 % oral solution 15 mL  15 mL Mouth/Throat BID@0800,2000    mineral oil-white petrolatum (Artificial Tears) 83-15 % ophthalmic ointment 1 Application  1 Application Both Eyes Nightly    famotidine (Pepcid) 20 mg/2mL injection 20 mg  20 mg Intravenous Daily @ 0700    ondansetron (Zofran) 4 MG/2ML injection 4 mg  4 mg Intravenous Q6H PRN    metoclopramide (Reglan) 5 mg/mL injection 10 mg  10 mg Intravenous Q8H PRN    vancomycin (Vancocin) 750 mg in sodium chloride 0.9% 250 mL IVPB-ADDV  750 mg Intravenous Q24H       Continuous Infusions:    norepinephrine      propofol 18 mcg/kg/min (12/19/24 2125)    dextrose 10%         Physical Exam  Constitutional: Intubated, sedated  Eyes: PERRL  ENT: nares pateint  Neck: supple, no JVD  Cardio: RRR, S1 S2  Respiratory: Diminished bibasilar breath sounds with crackles present  GI: abdomen soft, non tender, active bowel sounds, no organomegaly + PEG tube in place  Extremities: no clubbing, cyanosis, edema  Neurologic: Does not follow commands  Skin: warm,  dry      Results:     Lab Results   Component Value Date    WBC 8.7 12/20/2024    HGB 8.9 12/20/2024    HCT 28.3 12/20/2024    .0 12/20/2024    CREATSERUM 0.47 12/20/2024    BUN 18 12/20/2024     12/20/2024    K 4.1 12/20/2024    K 4.1 12/20/2024     12/20/2024    CO2 28.0 12/20/2024     12/20/2024    CA 8.8 12/20/2024         XR CHEST AP PORTABLE  (CPT=71045)    Result Date: 12/18/2024  CONCLUSION:  1. Borderline heart size.  Tortuous aorta. 2. ET tube in satisfactory position. 3. Right basilar pleural parenchymal abnormality with moderate right effusion.  Minimal left basilar atelectasis/scarring and small left-sided effusion    Dictated by (CST): Melecio Jackson MD on 12/18/2024 at 1:24 PM     Finalized by (CST): Melecio Jackson MD on 12/18/2024 at 1:25 PM                 Assessment   1.  Fevers  2.  Acute encephalopathy  3.  Acute on chronic hypercapnic hypoxemic respiratory failure  4.  Leukocytosis  5.  HFpEF  6.  History of atrial fibrillation  7.  Prior history of pulmonary embolism  9.  Dementia  10.  Dysphagia     Plan   -Patient presents with chief complaint of fevers ongoing altered mental status over the last several days.  Recently discharged on 11/24/2024 from [facility] after hospitalization for presumed aspiration pneumonia.  -Worsening hypercapnic respiratory failure on NIV.  Patient intubated on 12/12/2024.  -Spontaneous breathing trial as tolerated although not tolerating well thus far  -CT chest on 12/11/2024 with lower lobe mucous plugging concerning for aspiration with small pleural effusions.  -Continue therapy with Zosyn and vancomycin.  Okay to discontinue after 10-day course  -Blood cultures thus far with no growth to date  -Chest physiotherapy  -DVT prophylaxis: Eliquis  -Ongoing discussion with daughter regarding patient's overall clinical condition.  Prognosis is poor at this point.  Not tolerating spontaneous breathing trials.  Continues  to be full code at this time.  Daughter not willing to set any limits at this time but will advise to consider comfort approach if unable to wean off ventilator over the course of this weekend.        Dayo Espino,   Pulmonary Critical Care Medicine  [facility]

## 2024-12-20 NOTE — PLAN OF CARE
Problem: RESPIRATORY - ADULT  Goal: Achieves optimal ventilation and oxygenation  Description: INTERVENTIONS:  - Assess for changes in respiratory status  - Assess for changes in mentation and behavior  - Position to facilitate oxygenation and minimize respiratory effort  - Oxygen supplementation based on oxygen saturation or ABGs  - Provide Smoking Cessation handout, if applicable  - Encourage broncho-pulmonary hygiene including cough, deep breathe, Incentive Spirometry  - Assess the need for suctioning and perform as needed  - Assess and instruct to report SOB or any respiratory difficulty  - Respiratory Therapy support as indicated  - Manage/alleviate anxiety  - Monitor for signs/symptoms of CO2 retention  Outcome: Progressing     Pt remains intubated with ETT size 7.0 at 22 cm at the lip, on full vent support. Pt is stable and tolerating well, saturating appropriately, suction as needed. No changes at this time, RT will continue to monitor.    Vent settings and readings as follow:     12/19/24 0620   Vent Information   $ Vent Care / Non-Invasive Subsequent Day Yes   Is this patient on Chronic Ventilation? No   Interface Invasive   Vent Type AV   Vent plugged into main power? Yes   Vent ID    Vent Mode VC/AC   Settings   FiO2 (%) 30 %   Resp Rate (Set) 12   Vt (Set, mL) 450 mL   Waveform Decelerating ramp   PEEP/CPAP (cm H2O) 5 cm H20   PEEP Low (cm H2O) 2 cm H2O   Peak Flow LPM 50   Trigger Sensitivity Flow (L/min) 1 L/min   Trigger Sensitivity Pressure (cm H2O) 3 cm H2O   Humidification Heater   H2O Bag Level 3/4 Full   Heater Temperature 99.3 °F (37.4 °C)   Readings   Total RR 12   Minute Ventilation (L/min) 4.9 L/min   Expiratory Tidal Volume 430 mL   PIP Observed (cm H2O) 29 cm H2O   MAP (cm H2O) 9   PEEP Low (cm H2O) 2 cm H2O   I/E Ratio 1:4.8   Plateau Pressure (cm H2O) 26 cm H2O   Static Compliance (L/cm H2O) 21   Dynamic Compliance (L/cm H2O) 17 L/cm H2O   Airway Resistance 9.1   Alarms   High RR  45   Insp Pressure High (cm H2O) 50 cm H2O   Insp Pressure Low (cm H2O) 8 cm H2O   MV High (L/min) 20 L/min   MV Low (L/min) 3 L/min   Apnea Interval (sec) 20 seconds      12/19/24 8796   ETT   Placement Date/Time: 12/12/24 6567   Airway Size: 7 mm  Cuffed: Cuffed  Insertion attempts: 2  Technique: Video laryngoscopy  Placement Verification: Colorimetric EtCO2 device  Placed By: (c) Other (Comment)   Secured at (cm) 22 cm   Cuff Pressure (cm H2O) 28 cm H2O   Suctioned? Y   Measured From Lips   Secured Location Right   Secured by Commercial tube lo   Req'd equipment at bedside Bag mask   Additional Assessments   Pulse 120   Resp 12   SpO2 100 %

## 2024-12-20 NOTE — PLAN OF CARE
Problem: Safety Risk - Non-Violent Restraints  Goal: Patient will remain free from self-harm  Description: INTERVENTIONS:  - Apply the least restrictive restraint to prevent harm  - Notify patient and family of reasons restraints applied  - Assess for any contributing factors to confusion (electrolyte disturbances, delirium, medications)  - Discontinue any unnecessary medical devices as soon as possible  - Assess the patient's physical comfort, circulation, skin condition, hydration, nutrition and elimination needs   - Reorient and redirection as needed  - Assess for the need to continue restraints  Outcome: Progressing     Problem: RISK FOR INFECTION - ADULT  Goal: Absence of fever/infection during anticipated neutropenic period  Description: INTERVENTIONS  - Monitor WBC  - Administer growth factors as ordered  - Implement neutropenic guidelines  Outcome: Progressing     Problem: SAFETY ADULT - FALL  Goal: Free from fall injury  Description: INTERVENTIONS:  - Assess pt frequently for physical needs  - Identify cognitive and physical deficits and behaviors that affect risk of falls.  - [place] fall precautions as indicated by assessment.  - Educate pt/family on patient safety including physical limitations  - Instruct pt to call for assistance with activity based on assessment  - Modify environment to reduce risk of injury  - Provide assistive devices as appropriate  - Consider OT/PT consult to assist with strengthening/mobility  - Encourage toileting schedule  Outcome: Progressing     Problem: NEUROLOGICAL - ADULT  Goal: Absence of seizures  Description: INTERVENTIONS  - Monitor for seizure activity  - Administer anti-seizure medications as ordered  - Monitor neurological status  Outcome: Progressing  Goal: Remains free of injury related to seizure activity  Description: INTERVENTIONS:  - Maintain airway, patient safety  and administer oxygen as ordered  - Monitor patient for seizure activity, document and  report duration and description of seizure to MD/LIP  - If seizure occurs, turn patient to side and suction secretions as needed  - Reorient patient post seizure  - Seizure pads on all 4 side rails  - Instruct patient/family to notify RN of any seizure activity  - Instruct patient/family to call for assistance with activity based on assessment  Outcome: Progressing     Problem: Diabetes/Glucose Control  Goal: Glucose maintained within prescribed range  Description: INTERVENTIONS:  - Monitor Blood Glucose as ordered  - Assess for signs and symptoms of hyperglycemia and hypoglycemia  - Administer ordered medications to maintain glucose within target range  - Assess barriers to adequate nutritional intake and initiate nutrition consult as needed  - Instruct patient on self management of diabetes  Outcome: Progressing

## 2024-12-20 NOTE — PLAN OF CARE
Problem: DISCHARGE PLANNING  Goal: Discharge to home or other facility with appropriate resources  Description: INTERVENTIONS:  - Identify barriers to discharge w/pt and caregiver  - Include patient/family/discharge partner in discharge planning  - Arrange for needed discharge resources and transportation as appropriate  - Identify discharge learning needs (meds, wound care, etc)  - Arrange for interpreters to assist at discharge as needed  - Consider post-discharge preferences of patient/family/discharge partner  - Complete POLST form as appropriate  - Assess patient's ability to be responsible for managing their own health  - Refer to Case Management Department for coordinating discharge planning if the patient needs post-hospital services based on physician/LIP order or complex needs related to functional status, cognitive ability or social support system  12/19/2024 1830 by Mario Fabian RN  Outcome: Not Progressing  12/19/2024 1829 by Mario Fabian RN  Outcome: Not Progressing     Problem: RESPIRATORY - ADULT  Goal: Achieves optimal ventilation and oxygenation  Description: INTERVENTIONS:  - Assess for changes in respiratory status  - Assess for changes in mentation and behavior  - Position to facilitate oxygenation and minimize respiratory effort  - Oxygen supplementation based on oxygen saturation or ABGs  - Provide Smoking Cessation handout, if applicable  - Encourage broncho-pulmonary hygiene including cough, deep breathe, Incentive Spirometry  - Assess the need for suctioning and perform as needed  - Assess and instruct to report SOB or any respiratory difficulty  - Respiratory Therapy support as indicated  - Manage/alleviate anxiety  - Monitor for signs/symptoms of CO2 retention  12/19/2024 1830 by Mario Fabian RN  Outcome: Not Progressing  12/19/2024 1829 by Mario Fabian RN  Outcome: Not Progressing     Problem: NEUROLOGICAL - ADULT  Goal: Achieves stable or improved  neurological status  Description: INTERVENTIONS  - Assess for and report changes in neurological status  - Initiate measures to prevent increased intracranial pressure  - Maintain blood pressure and fluid volume within ordered parameters to optimize cerebral perfusion and minimize risk of hemorrhage  - Monitor temperature, glucose, and sodium. Initiate appropriate interventions as ordered  12/19/2024 1830 by Mario Fabian RN  Outcome: Not Progressing  12/19/2024 1829 by Mario Fabian RN  Outcome: Not Progressing  Goal: Achieves maximal functionality and self care  Description: INTERVENTIONS  - Monitor swallowing and airway patency with patient fatigue and changes in neurological status  - Encourage and assist patient to increase activity and self care with guidance from PT/OT  - Encourage visually impaired, hearing impaired and aphasic patients to use assistive/communication devices  12/19/2024 1830 by Mario Fabian RN  Outcome: Not Progressing  12/19/2024 1829 by Mario Fabian RN  Outcome: Not Progressing

## 2024-12-20 NOTE — WOUND PROGRESS NOTE
Attempted to see pt for wound follow up. Bedside Rn advised pt readying for breathing trial. Wound Rn will check back as schedule permits.

## 2024-12-20 NOTE — PLAN OF CARE
Patient intubated and sedated. Propofol gtt continued. Pupils reactive and movement to painful stimuli in all 4 extremities. Patients vitals as charted. Patient in a-fib, taking eliquis for VTE prophylaxis. Midodrine scheduled. G-tube to continuous feeding, harvey in place. Tylenol given for presumed pain, patient biting on ETT. Plan for SBT today, daughter made aware of trial this AM. Bilateral SWR continued. Safety measures maintained, bed in locked and lowest position.   Problem: Patient Centered Care  Goal: Patient preferences are identified and integrated in the patient's plan of care  Description: Interventions:  - What would you like us to know as we care for you?   - Provide timely, complete, and accurate information to patient/family  - Incorporate patient and family knowledge, values, beliefs, and cultural backgrounds into the planning and delivery of care  - Encourage patient/family to participate in care and decision-making at the level they choose  - Honor patient and family perspectives and choices  Outcome: Progressing     Problem: Patient/Family Goals  Goal: Patient/Family Long Term Goal  Description: Patient's Long Term Goal:     Interventions:  -   - See additional Care Plan goals for specific interventions  Outcome: Progressing  Goal: Patient/Family Short Term Goal  Description: Patient's Short Term Goal:     Interventions:     - See additional Care Plan goals for specific interventions  Outcome: Progressing     Problem: Safety Risk - Non-Violent Restraints  Goal: Patient will remain free from self-harm  Description: INTERVENTIONS:  - Apply the least restrictive restraint to prevent harm  - Notify patient and family of reasons restraints applied  - Assess for any contributing factors to confusion (electrolyte disturbances, delirium, medications)  - Discontinue any unnecessary medical devices as soon as possible  - Assess the patient's physical comfort, circulation, skin condition, hydration,  nutrition and elimination needs   - Reorient and redirection as needed  - Assess for the need to continue restraints  Outcome: Progressing     Problem: PAIN - ADULT  Goal: Verbalizes/displays adequate comfort level or patient's stated pain goal  Description: INTERVENTIONS:  - Encourage pt to monitor pain and request assistance  - Assess pain using appropriate pain scale  - Administer analgesics based on type and severity of pain and evaluate response  - Implement non-pharmacological measures as appropriate and evaluate response  - Consider cultural and social influences on pain and pain management  - Manage/alleviate anxiety  - Utilize distraction and/or relaxation techniques  - Monitor for opioid side effects  - Notify MD/LIP if interventions unsuccessful or patient reports new pain  - Anticipate increased pain with activity and pre-medicate as appropriate  Outcome: Progressing     Problem: RISK FOR INFECTION - ADULT  Goal: Absence of fever/infection during anticipated neutropenic period  Description: INTERVENTIONS  - Monitor WBC  - Administer growth factors as ordered  - Implement neutropenic guidelines  Outcome: Progressing     Problem: SAFETY ADULT - FALL  Goal: Free from fall injury  Description: INTERVENTIONS:  - Assess pt frequently for physical needs  - Identify cognitive and physical deficits and behaviors that affect risk of falls.  - [place] fall precautions as indicated by assessment.  - Educate pt/family on patient safety including physical limitations  - Instruct pt to call for assistance with activity based on assessment  - Modify environment to reduce risk of injury  - Provide assistive devices as appropriate  - Consider OT/PT consult to assist with strengthening/mobility  - Encourage toileting schedule  Outcome: Progressing     Problem: DISCHARGE PLANNING  Goal: Discharge to home or other facility with appropriate resources  Description: INTERVENTIONS:  - Identify barriers to discharge w/pt and  caregiver  - Include patient/family/discharge partner in discharge planning  - Arrange for needed discharge resources and transportation as appropriate  - Identify discharge learning needs (meds, wound care, etc)  - Arrange for interpreters to assist at discharge as needed  - Consider post-discharge preferences of patient/family/discharge partner  - Complete POLST form as appropriate  - Assess patient's ability to be responsible for managing their own health  - Refer to Case Management Department for coordinating discharge planning if the patient needs post-hospital services based on physician/LIP order or complex needs related to functional status, cognitive ability or social support system  Outcome: Progressing     Problem: Altered Communication/Language Barrier  Goal: Patient/Family is able to understand and participate in their care  Description: Interventions:  - Assess communication ability and preferred communication style  - Implement communication aides and strategies  - Use visual cues when possible  - Listen attentively, be patient, do not interrupt  - Minimize distractions  - Allow time for understanding and response  - Establish method for patient to ask for assistance (call light)  - Provide an  as needed  - Communicate barriers and strategies to overcome with those who interact with patient  Outcome: Progressing     Problem: RESPIRATORY - ADULT  Goal: Achieves optimal ventilation and oxygenation  Description: INTERVENTIONS:  - Assess for changes in respiratory status  - Assess for changes in mentation and behavior  - Position to facilitate oxygenation and minimize respiratory effort  - Oxygen supplementation based on oxygen saturation or ABGs  - Provide Smoking Cessation handout, if applicable  - Encourage broncho-pulmonary hygiene including cough, deep breathe, Incentive Spirometry  - Assess the need for suctioning and perform as needed  - Assess and instruct to report SOB or any  respiratory difficulty  - Respiratory Therapy support as indicated  - Manage/alleviate anxiety  - Monitor for signs/symptoms of CO2 retention  Outcome: Progressing     Problem: NEUROLOGICAL - ADULT  Goal: Achieves stable or improved neurological status  Description: INTERVENTIONS  - Assess for and report changes in neurological status  - Initiate measures to prevent increased intracranial pressure  - Maintain blood pressure and fluid volume within ordered parameters to optimize cerebral perfusion and minimize risk of hemorrhage  - Monitor temperature, glucose, and sodium. Initiate appropriate interventions as ordered  Outcome: Progressing  Goal: Absence of seizures  Description: INTERVENTIONS  - Monitor for seizure activity  - Administer anti-seizure medications as ordered  - Monitor neurological status  Outcome: Progressing  Goal: Remains free of injury related to seizure activity  Description: INTERVENTIONS:  - Maintain airway, patient safety  and administer oxygen as ordered  - Monitor patient for seizure activity, document and report duration and description of seizure to MD/LIP  - If seizure occurs, turn patient to side and suction secretions as needed  - Reorient patient post seizure  - Seizure pads on all 4 side rails  - Instruct patient/family to notify RN of any seizure activity  - Instruct patient/family to call for assistance with activity based on assessment  Outcome: Progressing  Goal: Achieves maximal functionality and self care  Description: INTERVENTIONS  - Monitor swallowing and airway patency with patient fatigue and changes in neurological status  - Encourage and assist patient to increase activity and self care with guidance from PT/OT  - Encourage visually impaired, hearing impaired and aphasic patients to use assistive/communication devices  Outcome: Progressing     Problem: Diabetes/Glucose Control  Goal: Glucose maintained within prescribed range  Description: INTERVENTIONS:  - Monitor Blood  Glucose as ordered  - Assess for signs and symptoms of hyperglycemia and hypoglycemia  - Administer ordered medications to maintain glucose within target range  - Assess barriers to adequate nutritional intake and initiate nutrition consult as needed  - Instruct patient on self management of diabetes  Outcome: Progressing     Problem: Delirium  Goal: Minimize duration of delirium  Description: Interventions:  - Encourage use of hearing aids, eye glasses  - Promote highest level of mobility daily  - Provide frequent reorientation  - Promote wakefulness i.e. lights on, blinds open  - Promote sleep, encourage patient's normal rest cycle i.e. lights off, TV off, minimize noise and interruptions  - Encourage family to assist in orientation and promotion of home routines  Outcome: Progressing

## 2024-12-20 NOTE — RESPIRATORY THERAPY NOTE
SBT attempted at 1030. Pt did not tolerate, HR to 144 after four minutes. Pt placed back on full support.    Ventilator settings  AC/VC 12/400/+5/30%  Bilateral breath sounds auscultated.  Suctioned small amounts of clear secretions.  RT will continue to monitor.

## 2024-12-20 NOTE — PROGRESS NOTES
[facility]  part of [facility]    Progress Note    Yin Butcher Patient Status:  Inpatient    1931 MRN [id number]   Location [facility] 2W/SW Attending Vika Elmore MD   Hosp Day # 8 PCP Mina aWlker MD     SUBJECTIVE:  Pt seen and examined at bedside.   Orally intubated and sedated     BP 95/67 (BP Location: Right arm)   Pulse 119   Temp 97.7 °F (36.5 °C)   Resp 12   Ht 5' 7.01\" (1.702 m)   Wt 124 lb 1.9 oz (56.3 kg)   SpO2 100%   BMI 19.44 kg/m²     Physical Examination:    Gen: Orally intubated . Appears comfortable.  HEENT: PERRLA  Lungs: CTA B/L  Heart: Normal S1 S2, No M/G/R   Abd: Abdomen soft, nontender, nondistended, no organomegaly, bowel sounds present  Ext: No edema, no calf tenderness    Labs:   Lab Results   Component Value Date    WBC 8.7 2024    HGB 8.9 2024    HCT 28.3 2024    .0 2024    CREATSERUM 0.47 2024    BUN 18 2024     2024    K 4.1 2024    K 4.1 2024     2024    CO2 28.0 2024     2024    CA 8.8 2024    PGLU 114 2024       Recent Labs   Lab 24  1210 24  1806 24  2326 24  0632 24  1118   PGLU 104* 95 106* 113* 114*     No results for input(s): \"INR\" in the last 168 hours.    Imaging:  Imaging reviewed in Epic.    Meds:   Current Facility-Administered Medications   Medication Dose Route Frequency    vancomycin (Firvanq) 50 mg/mL oral solution 125 mg  125 mg Per NG Tube Daily    norepinephrine (Levophed) 4 mg/250mL infusion premix  0.5-50 mcg/min Intravenous Continuous    midodrine (ProAmatine) tab 5 mg  5 mg Per NG Tube TID    propofol (Diprivan) 10 mg/mL infusion premix  5-50 mcg/kg/min (Dosing Weight) Intravenous Continuous    ipratropium-albuterol (Duoneb) 0.5-2.5 (3) MG/3ML inhalation solution 3 mL  3 mL Nebulization TID    ipratropium-albuterol (Duoneb) 0.5-2.5 (3) MG/3ML inhalation solution 3 mL  3  mL Nebulization Q6H PRN    fentaNYL (Sublimaze) 50 mcg/mL injection 25 mcg  25 mcg Intravenous Q30 Min PRN    acetaminophen (Ofirmev) 10 mg/mL infusion premix 650 mg  15 mg/kg Intravenous Q6H PRN    dextrose 10% infusion (TPN no rate)   Intravenous Continuous PRN    pancrelipase (Lip-Prot-Amyl) (Zenpep) DR particles cap 10,000 Units  10,000 Units Per G Tube PRN    And    sodium bicarbonate tab 325 mg  325 mg Per G Tube PRN    metoprolol (Lopressor) 5 mg/5mL injection 5 mg  5 mg Intravenous Q4H PRN    apixaban (Eliquis) tab 2.5 mg  2.5 mg Oral BID    piperacillin-tazobactam (Zosyn) 3.375 g in dextrose 5% 100 mL IVPB-ADDV  3.375 g Intravenous Q8H    acetylcysteine (Mucomyst) 20 % nebulizer solution 2 mL  2 mL Nebulization TID    albuterol (Ventolin) (5 MG/ML) 0.5% nebulizer solution 5 mg  5 mg Nebulization Q4H PRN    acetaminophen (Tylenol) 160 MG/5ML oral liquid 650 mg  650 mg Per NG Tube Q4H PRN    Or    acetaminophen (Tylenol) rectal suppository 650 mg  650 mg Rectal Q4H PRN    senna (Senokot) 8.8 MG/5ML oral syrup 17.6 mg  10 mL Per NG Tube BID    docusate (Colace) 50 MG/5ML oral solution 100 mg  100 mg Per NG Tube BID    polyethylene glycol (PEG 3350) (Miralax) 17 g oral packet 17 g  17 g Per NG Tube Daily PRN    bisacodyl (Dulcolax) 10 MG rectal suppository 10 mg  10 mg Rectal Daily PRN    chlorhexidine gluconate (Peridex) 0.12 % oral solution 15 mL  15 mL Mouth/Throat BID@0800,2000    mineral oil-white petrolatum (Artificial Tears) 83-15 % ophthalmic ointment 1 Application  1 Application Both Eyes Nightly    famotidine (Pepcid) 20 mg/2mL injection 20 mg  20 mg Intravenous Daily @ 0700    ondansetron (Zofran) 4 MG/2ML injection 4 mg  4 mg Intravenous Q6H PRN    metoclopramide (Reglan) 5 mg/mL injection 10 mg  10 mg Intravenous Q8H PRN    vancomycin (Vancocin) 750 mg in sodium chloride 0.9% 250 mL IVPB-ADDV  750 mg Intravenous Q24H       Assessment:    #1.  Acute hypoxemic respiratory failure  2.  Fever  3.   Metabolic encephalopathy  4.  History of atrial fibrillation  5.  History of pulmonary embolism  6.  Dysphagia, status post PEG  7.  History of dementia  8.  Septic shock        Plan:     Patient is on ventilator  Vent management per ICU  Bronchodilator  Antibiotic  Pulmonary toileting  SBT per protocol  Being followed by cardiology  Resume Eliquis  IV Vanco, IV Zosyn  Follow-up with sputum culture, blood culture, urine culture  Sedation per protocol  Currently full code  Continue tube feeding  Will get palliative consult to discuss goals of care  DVT/GI prophylaxis  Setting limit is appropriate for this patient.  Looks like family is not receptive of talking to palliative  Ongoing discussion regarding GOC  Overall poor prognosis          Vika Elmore MD   12/20/2024  5:34 PM

## 2024-12-20 NOTE — PROGRESS NOTES
[facility]    CDI Prediction Tool Protocol (Vancomycin Initiated)    OVP (oral vancomycin prophylaxis) 125 mg PO Daily is being started in this patient based on a score of 15.      Score Breakdown:  High risk antibiotic use (5 points)  Hospital length of stay > 7 days (3 points)  Malignancy (3 points)     Long term care facility resident (1 point)  Age >/= 80 years (3 points)    This patient is currently at high risk for developing CDI due to his/her score being >/=13 points and is being started on prophylactic oral vancomycin to prevent Cdiff.     This patient does not have C. difficile infection. All measures taken within this protocol are to decrease the risk of CDI development.    Adriana Tovar PharmD  12/20/2024  7:47 AM  [facility]  Pharmacy Extension: [phone]

## 2024-12-20 NOTE — PROGRESS NOTES
[facility] Cardiology Progress Note    Patient seen and examined.  Chart reviewed.       Intubated and sedated    BP 94/76 (BP Location: Right arm)   Pulse 92   Temp 98.2 °F (36.8 °C)   Resp 14   Wt 124 lb 1.9 oz   SpO2 90%   BMI 19.44 kg/m²   Gen: alert and oriented  HEENT: moist mucous membranes  Neck: No JVD  CV: irregular  Lungs: CTAB anteriorly  Ext: no edema, SCDs on  Skin: warm and dry      Intake/Output Summary (Last 24 hours) at 12/20/2024 0856  Last data filed at 12/20/2024 0600  Gross per 24 hour   Intake 1261.38 ml   Output 1025 ml   Net 236.38 ml       Lab Results   Component Value Date    WBC 8.7 12/20/2024    HGB 8.9 12/20/2024    HCT 28.3 12/20/2024    .0 12/20/2024    CREATSERUM 0.47 12/20/2024    BUN 18 12/20/2024     12/20/2024    K 4.1 12/20/2024    K 4.1 12/20/2024     12/20/2024    CO2 28.0 12/20/2024     12/20/2024    CA 8.8 12/20/2024      vancomycin (Firvanq) 50 mg/mL oral solution 125 mg  125 mg Per NG Tube Daily    [COMPLETED] potassium chloride (Klor-Con) 20 MEQ oral powder 40 mEq  40 mEq Oral Once    [COMPLETED] sodium chloride 0.9 % IV bolus 250 mL  250 mL Intravenous Once    norepinephrine (Levophed) 4 mg/250mL infusion premix  0.5-50 mcg/min Intravenous Continuous    midodrine (ProAmatine) tab 5 mg  5 mg Per NG Tube TID    propofol (Diprivan) 10 mg/mL infusion premix  5-50 mcg/kg/min (Dosing Weight) Intravenous Continuous    ipratropium-albuterol (Duoneb) 0.5-2.5 (3) MG/3ML inhalation solution 3 mL  3 mL Nebulization TID    ipratropium-albuterol (Duoneb) 0.5-2.5 (3) MG/3ML inhalation solution 3 mL  3 mL Nebulization Q6H PRN    [COMPLETED] potassium chloride (Klor-Con) 20 MEQ oral powder 40 mEq  40 mEq Oral Q4H    fentaNYL (Sublimaze) 50 mcg/mL injection 25 mcg  25 mcg Intravenous Q30 Min PRN    acetaminophen (Ofirmev) 10 mg/mL infusion premix 650 mg  15 mg/kg Intravenous Q6H PRN    dextrose 10% infusion (TPN no rate)   Intravenous Continuous PRN     pancrelipase (Lip-Prot-Amyl) (Zenpep) DR particles cap 10,000 Units  10,000 Units Per G Tube PRN    And    sodium bicarbonate tab 325 mg  325 mg Per G Tube PRN    [COMPLETED] potassium phosphate dibasic 15 mmol in sodium chloride 0.9% 250 mL IVPB  15 mmol Intravenous Once    Followed by    [COMPLETED] potassium chloride 20 mEq/100mL IVPB premix 20 mEq  20 mEq Intravenous Once    [COMPLETED] sodium chloride 0.9 % IV bolus 500 mL  500 mL Intravenous Once    metoprolol (Lopressor) 5 mg/5mL injection 5 mg  5 mg Intravenous Q4H PRN    apixaban (Eliquis) tab 2.5 mg  2.5 mg Oral BID    piperacillin-tazobactam (Zosyn) 3.375 g in dextrose 5% 100 mL IVPB-ADDV  3.375 g Intravenous Q8H    acetylcysteine (Mucomyst) 20 % nebulizer solution 2 mL  2 mL Nebulization TID    albuterol (Ventolin) (5 MG/ML) 0.5% nebulizer solution 5 mg  5 mg Nebulization Q4H PRN    [] phenylephrine HCl (Mamadou-Synephrine) 1 MG/10ML injection        [] propofol (Diprivan) 10 MG/ML injection        acetaminophen (Tylenol) 160 MG/5ML oral liquid 650 mg  650 mg Per NG Tube Q4H PRN    Or    acetaminophen (Tylenol) rectal suppository 650 mg  650 mg Rectal Q4H PRN    senna (Senokot) 8.8 MG/5ML oral syrup 17.6 mg  10 mL Per NG Tube BID    docusate (Colace) 50 MG/5ML oral solution 100 mg  100 mg Per NG Tube BID    polyethylene glycol (PEG 3350) (Miralax) 17 g oral packet 17 g  17 g Per NG Tube Daily PRN    bisacodyl (Dulcolax) 10 MG rectal suppository 10 mg  10 mg Rectal Daily PRN    chlorhexidine gluconate (Peridex) 0.12 % oral solution 15 mL  15 mL Mouth/Throat BID@0800,2000    mineral oil-white petrolatum (Artificial Tears) 83-15 % ophthalmic ointment 1 Application  1 Application Both Eyes Nightly    famotidine (Pepcid) 20 mg/2mL injection 20 mg  20 mg Intravenous Daily @ 0700    [COMPLETED] sodium chloride 0.9 % IV bolus 500 mL  500 mL Intravenous Once    [COMPLETED] fentaNYL (Sublimaze) 50 mcg/mL injection        [COMPLETED] mupirocin (Bactroban)  2% nasal ointment 1 Application  1 Application Each Nare BID    [COMPLETED] piperacillin-tazobactam (Zosyn) 4.5 g in dextrose 5% 100 mL IVPB-ADDV  4.5 g Intravenous Once    [COMPLETED] vancomycin (Vancocin) 1.25 g in sodium chloride 0.9% 250mL IVPB premix  25 mg/kg Intravenous Once    [COMPLETED] acetaminophen (Tylenol) rectal suppository 650 mg  650 mg Rectal Once    [COMPLETED] sodium chloride 0.9 % IV bolus 500 mL  500 mL Intravenous Once    [COMPLETED] iopamidol 76% (ISOVUE-370) injection for power injector  80 mL Intravenous ONCE PRN    ondansetron (Zofran) 4 MG/2ML injection 4 mg  4 mg Intravenous Q6H PRN    metoclopramide (Reglan) 5 mg/mL injection 10 mg  10 mg Intravenous Q8H PRN    vancomycin (Vancocin) 750 mg in sodium chloride 0.9% 250 mL IVPB-ADDV  750 mg Intravenous Q24H    [COMPLETED] digoxin (Lanoxin) 250 MCG/ML injection 250 mcg  250 mcg Intravenous Once   Echo 12/12/2024:  1. Left ventricle: The cavity size was below normal. Wall thickness was      increased. Systolic function was hyperdynamic. The estimated ejection      fraction was 70-75%, by visual assessment. No diagnostic evidence for      regional wall motion abnormalities. Unable to assess LV diastolic      function due to heart rhythm.   2. Right ventricle: Systolic function was mildly reduced. The tricuspid      annular plane systolic excursion is 1.26cm. The RV s' is 9.2cm/sec.   3. Left atrium: The left atrial volume was markedly increased.   4. Right atrium: The atrium was markedly dilated.   5. Aortic valve: The peak systolic velocity was 1.54m/sec. The mean systolic      gradient was 5mm Hg. The valve area (VTI) was 1.58cm^2. The valve area      (VTI) index was 1.09cm^2/m^2.   6. Mitral valve: The annulus was markedly calcified. The leaflets were      mildly calcified. Cannot exclude a vegetation. There was mild      regurgitation. The mean diastolic gradient was 4mm Hg at a heart rate of      120 bpm.   7. Pulmonary arteries:  Systolic pressure was moderately increased, in the      range of 45mm Hg to 50mm Hg.   8. Pericardium, extracardiac: There were bilateral pleural effusions      present.     Imp/Recs:  Cardiac wise patient with afib and component of diastolic HF (LVEF 70%)  Keep net negative fluid balance  Continue pressor support as needed  On midodrine for hypotension, which limits use of HF meds - use prn metoprolol as needed  Prn lopressor for tachycardia  Continue low dose Eliquis for stroke prevention  Broad spectrum antibiotics per primary team      Will follow,    Mark Rich MD

## 2024-12-21 NOTE — PROGRESS NOTES
Cardiology Progress Note    Yin Butcher Patient Status:  Inpatient    1931 MRN [id number]   Location [facility] 2W/SW Attending Vika Elmore MD   Hosp Day # 9 PCP Mina Walker MD     Interval Note:  Patient intubated, sedated  No acute issues overnight  Heart rate is running little fast this morning      --------------------------------------------------------------------------------------------------------------------------------  ROS 12 systems reviewed, pertinent findings above.  ROS    History:  Past Medical History:    Arthritis    Atrial fibrillation (HCC)    Back problem    Cancer (HCC)    Cataract    Osteoporosis    Personal history of antineoplastic chemotherapy     Past Surgical History:   Procedure Laterality Date    Other surgical history  2014    Procedure: HIP HEMIARTHROPLASTY/ BIPOLAR;  Surgeon: Vasu Matamoros MD;  Location:  MAIN OR    Removal of tonsils,12+ y/o       Family History   Family history unknown: Yes      reports that she has never smoked. She has never used smokeless tobacco. She reports that she does not currently use alcohol. She reports that she does not use drugs.    Objective:   Temp: 98.1 °F (36.7 °C)  Pulse: 118  Resp: 12  BP: 118/82  FiO2 (%): 30 %    Intake/Output:     Intake/Output Summary (Last 24 hours) at 2024 0811  Last data filed at 2024 0600  Gross per 24 hour   Intake 1914 ml   Output 1600 ml   Net 314 ml       Physical Exam:    General: Intubated, sedated irregularly  HEENT: Normocephalic, anicteric sclera, neck supple.  Neck: No JVD, carotids 2+, no bruits.  Cardiac: Irregularly rate and rhythm. S1, S2 normal. No murmur, pericardial rub, S3.  Lungs: Clear without wheezes, rales, rhonchi or dullness.  Normal excursions and effort.  Abdomen: Soft, non-tender. BS-present.  Extremities: Without clubbing, cyanosis or edema.  Peripheral pulses are 2+.  Neurologic: Non-focal  Skin: Warm and dry.       Assessment   Atrial  fibrillation with RVR  Vent dependent respiratory failure, hypercapnic and hypoxemic  Encephalopathy  Fever  Leukocytosis  HFpEF  History of PE  Dementia      Plan  Prolonged intubation over 1 week  Failed SBT several days ago  Heart rate running fast this morning, BP soft-on midodrine  Continue Eliquis for anticoagulation, cardioembolic stroke protection  Recommend oral digoxin 0.25 this morning and then 0.125 in 6 hours followed by daily 0.125 daily    Thank you for allowing me to take part in the care of Yin Butcher. Please call with any questions of concerns.      Level of care: L3    Ghassan Lima DO  [facility]   Interventional Cardiac and Vascular Services      Ghassan Lima DO  December 21, 2024  8:11 AM

## 2024-12-21 NOTE — PROGRESS NOTES
[facility]  part of [facility]    Progress Note    Yin Butcher Patient Status:  Inpatient    1931 MRN [id number]   Location [facility] 2W/SW Attending Vika Elmore MD   Hosp Day # 9 PCP Mina Walker MD     SUBJECTIVE:  Pt seen and examined at bedside.   Remains orally intubated, sedated  Currently on full vent support  No overnight event    /82 (BP Location: Right arm)   Pulse 105   Temp 98.1 °F (36.7 °C) (Esophageal)   Resp 12   Ht 5' 7.01\" (1.702 m)   Wt 125 lb (56.7 kg)   SpO2 100%   BMI 19.57 kg/m²     Physical Examination:    Gen: Orally intubated, sedated. Appears comfortable.  HEENT: PERRLA  Lungs: CTA B/L  Heart: Normal S1 S2, No M/G/R   Abd: Abdomen soft, nontender, nondistended, no organomegaly, bowel sounds present  Ext: No edema, no calf tenderness    Labs:   Lab Results   Component Value Date    WBC 10.9 2024    HGB 9.1 2024    HCT 29.7 2024    .0 2024    CREATSERUM 0.47 2024    BUN 16 2024     2024    K 4.1 2024     2024    CO2 28.0 2024     2024    CA 9.0 2024    PGLU 107 2024       Recent Labs   Lab 24  1806 24  2326 24  0632 24  1118 24  1740   PGLU 95 106* 113* 114* 107*     No results for input(s): \"INR\" in the last 168 hours.    Imaging:  Imaging reviewed in Epic.    Meds:   Current Facility-Administered Medications   Medication Dose Route Frequency    digoxin (Lanoxin) tab 125 mcg  125 mcg Oral Daily    furosemide (Lasix) 10 mg/mL injection 20 mg  20 mg Intravenous Once    vancomycin (Firvanq) 50 mg/mL oral solution 125 mg  125 mg Per NG Tube Daily    norepinephrine (Levophed) 4 mg/250mL infusion premix  0.5-50 mcg/min Intravenous Continuous    midodrine (ProAmatine) tab 5 mg  5 mg Per NG Tube TID    propofol (Diprivan) 10 mg/mL infusion premix  5-50 mcg/kg/min (Dosing Weight) Intravenous Continuous     ipratropium-albuterol (Duoneb) 0.5-2.5 (3) MG/3ML inhalation solution 3 mL  3 mL Nebulization TID    ipratropium-albuterol (Duoneb) 0.5-2.5 (3) MG/3ML inhalation solution 3 mL  3 mL Nebulization Q6H PRN    fentaNYL (Sublimaze) 50 mcg/mL injection 25 mcg  25 mcg Intravenous Q30 Min PRN    acetaminophen (Ofirmev) 10 mg/mL infusion premix 650 mg  15 mg/kg Intravenous Q6H PRN    dextrose 10% infusion (TPN no rate)   Intravenous Continuous PRN    pancrelipase (Lip-Prot-Amyl) (Zenpep) DR particles cap 10,000 Units  10,000 Units Per G Tube PRN    And    sodium bicarbonate tab 325 mg  325 mg Per G Tube PRN    metoprolol (Lopressor) 5 mg/5mL injection 5 mg  5 mg Intravenous Q4H PRN    [Held by provider] apixaban (Eliquis) tab 2.5 mg  2.5 mg Oral BID    piperacillin-tazobactam (Zosyn) 3.375 g in dextrose 5% 100 mL IVPB-ADDV  3.375 g Intravenous Q8H    acetylcysteine (Mucomyst) 20 % nebulizer solution 2 mL  2 mL Nebulization TID    albuterol (Ventolin) (5 MG/ML) 0.5% nebulizer solution 5 mg  5 mg Nebulization Q4H PRN    acetaminophen (Tylenol) 160 MG/5ML oral liquid 650 mg  650 mg Per NG Tube Q4H PRN    Or    acetaminophen (Tylenol) rectal suppository 650 mg  650 mg Rectal Q4H PRN    senna (Senokot) 8.8 MG/5ML oral syrup 17.6 mg  10 mL Per NG Tube BID    docusate (Colace) 50 MG/5ML oral solution 100 mg  100 mg Per NG Tube BID    polyethylene glycol (PEG 3350) (Miralax) 17 g oral packet 17 g  17 g Per NG Tube Daily PRN    bisacodyl (Dulcolax) 10 MG rectal suppository 10 mg  10 mg Rectal Daily PRN    chlorhexidine gluconate (Peridex) 0.12 % oral solution 15 mL  15 mL Mouth/Throat BID@0800,2000    mineral oil-white petrolatum (Artificial Tears) 83-15 % ophthalmic ointment 1 Application  1 Application Both Eyes Nightly    famotidine (Pepcid) 20 mg/2mL injection 20 mg  20 mg Intravenous Daily @ 0700    ondansetron (Zofran) 4 MG/2ML injection 4 mg  4 mg Intravenous Q6H PRN    metoclopramide (Reglan) 5 mg/mL injection 10 mg  10 mg  Intravenous Q8H PRN    vancomycin (Vancocin) 750 mg in sodium chloride 0.9% 250 mL IVPB-ADDV  750 mg Intravenous Q24H       Assessment:    #1.  Acute hypoxemic respiratory failure  2.  Fever  3.  Metabolic encephalopathy  4.  History of atrial fibrillation  5.  History of pulmonary embolism  6.  Dysphagia, status post PEG  7.  History of dementia  8.  Septic shock        Plan:     Patient is on ventilator  Vent management per ICU  Bronchodilator  Antibiotic  Pulmonary toileting  SBT per protocol  Being followed by cardiology  Resume Eliquis  IV Vanco, IV Zosyn  Follow-up with sputum culture, blood culture, urine culture  Sedation per protocol  Currently full code  Continue tube feeding  Will get palliative consult to discuss goals of care  DVT/GI prophylaxis  Setting limit is appropriate for this patient.  Looks like family is not receptive of talking to palliative  Ongoing discussion regarding GOC  Overall poor prognosis        Vika Elmore MD   12/21/2024  11:28 AM

## 2024-12-21 NOTE — PROGRESS NOTES
RESPIRATORY THERAPY MECHANICAL VENTILATION PROGRESS NOTE    Ventilator Weaning:  Patient meets criteria for weaning? yes Weaning was attempted yes using pressure support 5 cmH2O + PEEP 5 cmH2O. The patient tolerated well for 45 minutes.     Weaning Parameters-     VC- 0.24  NIF -11  RSBI- 131    Dr. Eisenberg ordered lasix, PS trial done again after 2 hours.    13:18 pm- pt placed on CPAP 5/5, 30%    ABG result after 45 min.on CPAP 5/5-     PH- 7.40  PCO2- 53  PO2- 111  HCO3- 30.2  BE 6.8    14:45 PM- back on full vent support and DC vest therapy per Dr. Eisenberg.    Current Ventilator Data:   Suction small amount of white thick secretions.      12/21/24 1445   Vent Information   Vent Mode VC/AC   Settings   FiO2 (%) 30 %   Resp Rate (Set) 12   Vt (Set, mL) 400 mL   Waveform Decelerating ramp   PEEP/CPAP (cm H2O) 5 cm H20   PEEP Low (cm H2O) 2 cm H2O   Trigger Sensitivity Flow (L/min) 1 L/min   Humidification Heater   H2O Bag Level 1/4 Full   Heater Temperature 98.6 °F (37 °C)   Readings   Total RR 22   Minute Ventilation (L/min) 6.7 L/min   Expiratory Tidal Volume 380 mL   PIP Observed (cm H2O) 26 cm H2O   MAP (cm H2O) 8

## 2024-12-21 NOTE — PLAN OF CARE
Problem: Patient Centered Care  Goal: Patient preferences are identified and integrated in the patient's plan of care  Description: Interventions:  - What would you like us to know as we care for you?   - Provide timely, complete, and accurate information to patient/family  - Incorporate patient and family knowledge, values, beliefs, and cultural backgrounds into the planning and delivery of care  - Encourage patient/family to participate in care and decision-making at the level they choose  - Honor patient and family perspectives and choices  Outcome: Progressing     Problem: Patient/Family Goals  Goal: Patient/Family Long Term Goal  Description: Patient's Long Term Goal:     Interventions:  -   - See additional Care Plan goals for specific interventions  Outcome: Progressing  Goal: Patient/Family Short Term Goal  Description: Patient's Short Term Goal:     Interventions:     - See additional Care Plan goals for specific interventions  Outcome: Progressing     Problem: Safety Risk - Non-Violent Restraints  Goal: Patient will remain free from self-harm  Description: INTERVENTIONS:  - Apply the least restrictive restraint to prevent harm  - Notify patient and family of reasons restraints applied  - Assess for any contributing factors to confusion (electrolyte disturbances, delirium, medications)  - Discontinue any unnecessary medical devices as soon as possible  - Assess the patient's physical comfort, circulation, skin condition, hydration, nutrition and elimination needs   - Reorient and redirection as needed  - Assess for the need to continue restraints  Outcome: Progressing     Problem: PAIN - ADULT  Goal: Verbalizes/displays adequate comfort level or patient's stated pain goal  Description: INTERVENTIONS:  - Encourage pt to monitor pain and request assistance  - Assess pain using appropriate pain scale  - Administer analgesics based on type and severity of pain and evaluate response  - Implement  non-pharmacological measures as appropriate and evaluate response  - Consider cultural and social influences on pain and pain management  - Manage/alleviate anxiety  - Utilize distraction and/or relaxation techniques  - Monitor for opioid side effects  - Notify MD/LIP if interventions unsuccessful or patient reports new pain  - Anticipate increased pain with activity and pre-medicate as appropriate  Outcome: Progressing     Problem: RISK FOR INFECTION - ADULT  Goal: Absence of fever/infection during anticipated neutropenic period  Description: INTERVENTIONS  - Monitor WBC  - Administer growth factors as ordered  - Implement neutropenic guidelines  Outcome: Progressing     Problem: SAFETY ADULT - FALL  Goal: Free from fall injury  Description: INTERVENTIONS:  - Assess pt frequently for physical needs  - Identify cognitive and physical deficits and behaviors that affect risk of falls.  - [place] fall precautions as indicated by assessment.  - Educate pt/family on patient safety including physical limitations  - Instruct pt to call for assistance with activity based on assessment  - Modify environment to reduce risk of injury  - Provide assistive devices as appropriate  - Consider OT/PT consult to assist with strengthening/mobility  - Encourage toileting schedule  Outcome: Progressing     Problem: DISCHARGE PLANNING  Goal: Discharge to home or other facility with appropriate resources  Description: INTERVENTIONS:  - Identify barriers to discharge w/pt and caregiver  - Include patient/family/discharge partner in discharge planning  - Arrange for needed discharge resources and transportation as appropriate  - Identify discharge learning needs (meds, wound care, etc)  - Arrange for interpreters to assist at discharge as needed  - Consider post-discharge preferences of patient/family/discharge partner  - Complete POLST form as appropriate  - Assess patient's ability to be responsible for managing their own health  -  Refer to Case Management Department for coordinating discharge planning if the patient needs post-hospital services based on physician/LIP order or complex needs related to functional status, cognitive ability or social support system  Outcome: Progressing     Problem: Altered Communication/Language Barrier  Goal: Patient/Family is able to understand and participate in their care  Description: Interventions:  - Assess communication ability and preferred communication style  - Implement communication aides and strategies  - Use visual cues when possible  - Listen attentively, be patient, do not interrupt  - Minimize distractions  - Allow time for understanding and response  - Establish method for patient to ask for assistance (call light)  - Provide an  as needed  - Communicate barriers and strategies to overcome with those who interact with patient  Outcome: Progressing     Problem: RESPIRATORY - ADULT  Goal: Achieves optimal ventilation and oxygenation  Description: INTERVENTIONS:  - Assess for changes in respiratory status  - Assess for changes in mentation and behavior  - Position to facilitate oxygenation and minimize respiratory effort  - Oxygen supplementation based on oxygen saturation or ABGs  - Provide Smoking Cessation handout, if applicable  - Encourage broncho-pulmonary hygiene including cough, deep breathe, Incentive Spirometry  - Assess the need for suctioning and perform as needed  - Assess and instruct to report SOB or any respiratory difficulty  - Respiratory Therapy support as indicated  - Manage/alleviate anxiety  - Monitor for signs/symptoms of CO2 retention  Outcome: Progressing     Problem: NEUROLOGICAL - ADULT  Goal: Achieves stable or improved neurological status  Description: INTERVENTIONS  - Assess for and report changes in neurological status  - Initiate measures to prevent increased intracranial pressure  - Maintain blood pressure and fluid volume within ordered parameters to  optimize cerebral perfusion and minimize risk of hemorrhage  - Monitor temperature, glucose, and sodium. Initiate appropriate interventions as ordered  Outcome: Progressing  Goal: Absence of seizures  Description: INTERVENTIONS  - Monitor for seizure activity  - Administer anti-seizure medications as ordered  - Monitor neurological status  Outcome: Progressing  Goal: Remains free of injury related to seizure activity  Description: INTERVENTIONS:  - Maintain airway, patient safety  and administer oxygen as ordered  - Monitor patient for seizure activity, document and report duration and description of seizure to MD/LIP  - If seizure occurs, turn patient to side and suction secretions as needed  - Reorient patient post seizure  - Seizure pads on all 4 side rails  - Instruct patient/family to notify RN of any seizure activity  - Instruct patient/family to call for assistance with activity based on assessment  Outcome: Progressing  Goal: Achieves maximal functionality and self care  Description: INTERVENTIONS  - Monitor swallowing and airway patency with patient fatigue and changes in neurological status  - Encourage and assist patient to increase activity and self care with guidance from PT/OT  - Encourage visually impaired, hearing impaired and aphasic patients to use assistive/communication devices  Outcome: Progressing     Problem: Diabetes/Glucose Control  Goal: Glucose maintained within prescribed range  Description: INTERVENTIONS:  - Monitor Blood Glucose as ordered  - Assess for signs and symptoms of hyperglycemia and hypoglycemia  - Administer ordered medications to maintain glucose within target range  - Assess barriers to adequate nutritional intake and initiate nutrition consult as needed  - Instruct patient on self management of diabetes  Outcome: Progressing     Problem: Delirium  Goal: Minimize duration of delirium  Description: Interventions:  - Encourage use of hearing aids, eye glasses  - Promote  highest level of mobility daily  - Provide frequent reorientation  - Promote wakefulness i.e. lights on, blinds open  - Promote sleep, encourage patient's normal rest cycle i.e. lights off, TV off, minimize noise and interruptions  - Encourage family to assist in orientation and promotion of home routines  Outcome: Progressing

## 2024-12-21 NOTE — PROGRESS NOTES
[facility]  part of [facility]    Progress Note    Yin Butcher Patient Status:  Inpatient    1931 MRN [id number]   Location [facility] 2W/SW Attending Vika Elmore MD   Hosp Day # 9 PCP Mina Walker MD         Subjective:     Unable to perform ROS    Patient was seen and examined several times today  Earlier on full support  Sedation was stopped and I watched started on SBT  Awake and alert  Not able to get review of system from patient since intubated  Mild oral and endotracheal secretion  Objective:   Blood pressure 118/82, pulse 105, temperature 98.1 °F (36.7 °C), temperature source Esophageal, resp. rate 12, height 5' 7.01\" (1.702 m), weight 125 lb (56.7 kg), SpO2 100%.  Physical Exam  Vitals and nursing note reviewed.   Constitutional:       General: She is not in acute distress.  HENT:      Head: Atraumatic.      Nose: Nose normal.      Mouth/Throat:      Mouth: Mucous membranes are moist.   Eyes:      General: No scleral icterus.  Cardiovascular:      Rate and Rhythm: Normal rate.      Heart sounds: Murmur heard.      No gallop.   Pulmonary:      Effort: No respiratory distress.      Breath sounds: No stridor. No wheezing or rhonchi.      Comments: Diminished breath sounds in the right lower lung field otherwise clear  Abdominal:      General: Abdomen is flat. Bowel sounds are normal.      Palpations: Abdomen is soft.   Musculoskeletal:      Cervical back: No rigidity.      Comments: Mild edema in upper extremities   Neurological:      General: No focal deficit present.         Results:   Lab Results   Component Value Date    WBC 10.9 2024    HGB 9.1 (L) 2024    HCT 29.7 (L) 2024    .0 2024    CREATSERUM 0.47 (L) 2024    BUN 16 2024     2024    K 4.1 2024     2024    CO2 28.0 2024     (H) 2024    CA 9.0 2024    ALB 4.0 2024    ALKPHO 112 2024    BILT 1.0  (H) 12/11/2024    TP 8.2 12/11/2024    AST 24 12/11/2024    ALT 30 12/11/2024    PTT 34.6 01/31/2024    INR 1.32 (H) 01/29/2024    PT 15.5 (H) 07/13/2014    T4F 1.0 02/19/2015    TSH 2.138 12/11/2024    LIP 35 12/11/2024    DDIMER 2.38 (H) 01/20/2024    MG 2.0 12/14/2024    PHOS 4.1 12/14/2024    TROP <0.046 02/19/2015    TROPHS 25 01/20/2024    CK 32 01/20/2024    B12 586 01/23/2024       XR CHEST AP PORTABLE  (CPT=71045)    Result Date: 12/21/2024  CONCLUSION:   No new abnormality.  Persistent bilateral pleural effusions and associated bibasilar opacities.  Endotracheal tube is unchanged.      Dictated by (CST): Breezy Felton MD on 12/21/2024 at 9:20 AM     Finalized by (CST): Breezy Felton MD on 12/21/2024 at 9:22 AM          XR CHEST AP PORTABLE  (CPT=71045)    Result Date: 12/20/2024  CONCLUSION:   Mild improvement in the right pleural effusion with associated improvement in the right basilar opacity.  No significant change in the small left pleural effusion with associated left basilar opacity.    Dictated by (CST): Rohit Avila MD on 12/20/2024 at 12:20 PM     Finalized by (CST): Rohit Avila MD on 12/20/2024 at 12:23 PM               Assessment & Plan:       1-acute on chronic hypoxemic and hypercapnic respiratory failure  - HFPEF   -Aspiration pneumonia  - Dementia and weakness and dysphagia    Chest x-ray with moderate pleural effusion mainly right base   Chest CT on 12/11 with mucous plugging  Received 10 days of antibiotics with Zosyn  Bronchial hygiene and chest PT  Intubated on 12/12/2024  Oxygenating well on 30% FiO2 and overall awake and alert  SBRI slightly high but overall acceptable parameter  I's and O's positive  9 L and patient has been off Lasix since admission  Will give Lasix IV  Follow-up another SBT trial today hopefully to extubate    2-dementia and dysphagia  Supportive care    3- HFpEF with chronic A-fib on Eliquis  Eliquis  Prn lasix   F/u bnp     4-DVT prophylaxis  Patient on  Eliquis    5-full code per family wishes  Daughter not willing to set any limits    D/w daughter at bedside at length   D/w staff and RT   > 35 min cct with pt today     Addendum :  Watched her again on SBT / borderline and abgs on cpap/ps with higher Pco2   Pt still full code / weak and muscle fatigue /wasting / ? Tolerate extubation with borderline SBI parameter with very low NIF and high SBRI   D/w daughter at length                     Hansel Eisenberg MD  12/21/2024

## 2024-12-21 NOTE — PLAN OF CARE
Problem: RESPIRATORY - ADULT  Goal: Achieves optimal ventilation and oxygenation  Description: INTERVENTIONS:  - Assess for changes in respiratory status  - Assess for changes in mentation and behavior  - Position to facilitate oxygenation and minimize respiratory effort  - Oxygen supplementation based on oxygen saturation or ABGs  - Provide Smoking Cessation handout, if applicable  - Encourage broncho-pulmonary hygiene including cough, deep breathe, Incentive Spirometry  - Assess the need for suctioning and perform as needed  - Assess and instruct to report SOB or any respiratory difficulty  - Respiratory Therapy support as indicated  - Manage/alleviate anxiety  - Monitor for signs/symptoms of CO2 retention  Outcome: Not Progressing     Pt remains intubated with ETT size 7.0 at 23 cm at the lip, on full vent support. Pt is stable, saturating 100%. Suction provided as needed, no changes at this time, RT will continue to monitor.    Vent settings and readings as follow:     12/20/24 2055   Vent Information   Interface Invasive   Vent Type AV   Vent plugged into main power? Yes   Vent ID [id number]   Vent Mode VC/AC   Settings   FiO2 (%) 30 %   Resp Rate (Set) 12   Vt (Set, mL) 400 mL   Waveform Decelerating ramp   PEEP/CPAP (cm H2O) 5 cm H20   PEEP Low (cm H2O) 2 cm H2O   Peak Flow LPM 50   Trigger Sensitivity Flow (L/min) 1 L/min   Trigger Sensitivity Pressure (cm H2O) 3 cm H2O   Humidification Heater   H2O Bag Level 1/2 Full   Heater Temperature 97 °F (36.1 °C)   Readings   Total RR 17   Minute Ventilation (L/min) 7.3 L/min   Expiratory Tidal Volume 400 mL   PIP Observed (cm H2O) 29 cm H2O   MAP (cm H2O) 10   PEEP Low (cm H2O) 2 cm H2O   I/E Ratio 1:5.3   Plateau Pressure (cm H2O) 25 cm H2O   Static Compliance (L/cm H2O) 19   Dynamic Compliance (L/cm H2O) 16 L/cm H2O   Airway Resistance 10.5   Alarms   High RR 45   Insp Pressure High (cm H2O) 50 cm H2O   Insp Pressure Low (cm H2O) 8 cm H2O   MV High (L/min) 20 L/min    MV Low (L/min) 3 L/min   Apnea Interval (sec) 20 seconds      12/20/24 2055   ETT   Placement Date/Time: 12/12/24 1158   Airway Size: 7 mm  Cuffed: Cuffed  Insertion attempts: 2  Technique: Video laryngoscopy  Placement Verification: Colorimetric EtCO2 device  Placed By: (c) Other (Comment)   Secured at (cm) 23 cm   Cuff Pressure (cm H2O) 29 cm H2O   Suctioned? Y   Measured From Lips   Secured Location Center   Secured by Commercial tube lo   Req'd equipment at bedside Bag mask   Additional Assessments   Pulse 114   Resp 17   SpO2 100 %

## 2024-12-22 NOTE — PROGRESS NOTES
[facility]  part of [facility]    Progress Note    Yin Butcher Patient Status:  Inpatient    1931 MRN [id number]   Location [facility] 2W/SW Attending Carmelina Duong MD   Hosp Day # 10 PCP Mina Walker MD       Subjective:     Unable to perform ROS    Patient was seen and examined  Earlier comfortable on propofol at 10 mics with 30% FiO2 with no significant oral or endotracheal secretion  Propofol was placed on hold and patient not tolerating with significant tachycardia and tachypnea and unable to do SBT with her significant tachycardia  Tolerating tube feeding  Opens her eyes but lethargic and moves extremity but not following commands  Objective:   Blood pressure 103/66, pulse (!) 139, temperature 97.9 °F (36.6 °C), temperature source Esophageal, resp. rate 12, height 5' 7.01\" (1.702 m), weight 123 lb 14.4 oz (56.2 kg), SpO2 100%.  Physical Exam  Vitals and nursing note reviewed.   Constitutional:       General: She is in acute distress.      Appearance: She is ill-appearing.   HENT:      Head: Atraumatic.      Nose: Nose normal.      Mouth/Throat:      Mouth: Mucous membranes are moist.   Eyes:      General: No scleral icterus.  Cardiovascular:      Rate and Rhythm: Normal rate.      Heart sounds: Murmur heard.      No gallop.   Pulmonary:      Effort: No respiratory distress.      Breath sounds: No stridor. No wheezing, rhonchi or rales.   Abdominal:      General: Abdomen is flat. Bowel sounds are normal. There is no distension.      Palpations: Abdomen is soft.   Musculoskeletal:      Cervical back: No rigidity.      Right lower leg: No edema.      Left lower leg: No edema.   Skin:     General: Skin is dry.   Neurological:      Mental Status: Mental status is at baseline.         Results:   Lab Results   Component Value Date    WBC 9.6 2024    HGB 9.0 (L) 2024    HCT 28.3 (L) 2024    .0 2024    CREATSERUM 0.47 (L) 2024    BUN  19 12/22/2024     12/22/2024    K 3.9 12/22/2024     12/22/2024    CO2 30.0 12/22/2024    GLU 98 12/22/2024    CA 8.7 12/22/2024    ALB 4.0 12/11/2024    ALKPHO 112 12/11/2024    BILT 1.0 (H) 12/11/2024    TP 8.2 12/11/2024    AST 24 12/11/2024    ALT 30 12/11/2024    PTT 34.6 01/31/2024    INR 1.32 (H) 01/29/2024    PT 15.5 (H) 07/13/2014    T4F 1.0 02/19/2015    TSH 2.138 12/11/2024    LIP 35 12/11/2024    DDIMER 2.38 (H) 01/20/2024    MG 2.0 12/14/2024    PHOS 4.1 12/14/2024    TROP <0.046 02/19/2015    TROPHS 25 01/20/2024    CK 32 01/20/2024    B12 586 01/23/2024       XR CHEST AP PORTABLE  (CPT=71045)    Result Date: 12/22/2024  CONCLUSION: No new abnormality.  Bilateral pleural effusions and associated bibasilar opacities are similar to the prior exam.  Endotracheal tube is unchanged.  Esophageal temperature probe is no longer seen.    elm-remote      Dictated by (CST): Breezy Felton MD on 12/22/2024 at 6:48 AM     Finalized by (CST): Breezy Felton MD on 12/22/2024 at 6:49 AM          XR CHEST AP PORTABLE  (CPT=71045)    Result Date: 12/21/2024  CONCLUSION:   No new abnormality.  Persistent bilateral pleural effusions and associated bibasilar opacities.  Endotracheal tube is unchanged.      Dictated by (CST): Breezy Felton MD on 12/21/2024 at 9:20 AM     Finalized by (CST): Breezy Felton MD on 12/21/2024 at 9:22 AM               Assessment & Plan:      1-acute on chronic hypoxemic and hypercapnic respiratory failure  ABGs prior to admission on 12/12 with pH of 7.29 and pCO2 119 and failed NIV       - Dementia / dysphagia - aspiration and weakness/muscle wasting and ventilatory failure  - HFPEF with edema / chronic basilar transudate effusion   - Aspiration pneumonia       CXR basilar effusion / small   Chest CT on 12/11 with mucous plugging  Completed antibiotics with Zosyn and vancomycin  Bronchial hygiene   Intubated on 12/12/2024  Oxygenating well on 30% FiO2   Received Lasix yesterday  /borderline SBT with very low tidal volume and high SBRI and f/u abgs on SBT yesterday showed increase pCO2  Today with significant tachycardia and tachypnea when off sedation     2-dementia and dysphagia  Supportive care  Nutrition via G-tube     3- HFpEF with chronic A-fib on Eliquis  Eliquis  Prn lasix   F/u bnp today lower      4-DVT prophylaxis  Patient on Eliquis     5-full code per family wishes , setting limit is most appropriate  Daughter declined palliative care , patient remained full code       D/w daughter at bedside at length today   D/w staff and RT   Poor prognosis  35 min cct with pt today                  Hansel Eisenberg MD  12/22/2024

## 2024-12-22 NOTE — PLAN OF CARE
Pt off sedation majority of the night. Commanding appropriately. Began biting and reaching for ETT, dyssynchronous from ventilator. Low dose sedation restarted for pt comfort. All safety measures in place, frequent nursing rounds made.     Problem: Patient Centered Care  Goal: Patient preferences are identified and integrated in the patient's plan of care  Description: Interventions:  - What would you like us to know as we care for you?   - Provide timely, complete, and accurate information to patient/family  - Incorporate patient and family knowledge, values, beliefs, and cultural backgrounds into the planning and delivery of care  - Encourage patient/family to participate in care and decision-making at the level they choose  - Honor patient and family perspectives and choices  Outcome: Progressing     Problem: Patient/Family Goals  Goal: Patient/Family Long Term Goal  Description: Patient's Long Term Goal:     Interventions:  -   - See additional Care Plan goals for specific interventions  Outcome: Progressing  Goal: Patient/Family Short Term Goal  Description: Patient's Short Term Goal:     Interventions:     - See additional Care Plan goals for specific interventions  Outcome: Progressing     Problem: Safety Risk - Non-Violent Restraints  Goal: Patient will remain free from self-harm  Description: INTERVENTIONS:  - Apply the least restrictive restraint to prevent harm  - Notify patient and family of reasons restraints applied  - Assess for any contributing factors to confusion (electrolyte disturbances, delirium, medications)  - Discontinue any unnecessary medical devices as soon as possible  - Assess the patient's physical comfort, circulation, skin condition, hydration, nutrition and elimination needs   - Reorient and redirection as needed  - Assess for the need to continue restraints  12/22/2024 0548 by Lorrie Grayson, RN  Outcome: Progressing  12/21/2024 2213 by Lorrie Grayson, RN  Outcome: Progressing      Problem: PAIN - ADULT  Goal: Verbalizes/displays adequate comfort level or patient's stated pain goal  Description: INTERVENTIONS:  - Encourage pt to monitor pain and request assistance  - Assess pain using appropriate pain scale  - Administer analgesics based on type and severity of pain and evaluate response  - Implement non-pharmacological measures as appropriate and evaluate response  - Consider cultural and social influences on pain and pain management  - Manage/alleviate anxiety  - Utilize distraction and/or relaxation techniques  - Monitor for opioid side effects  - Notify MD/LIP if interventions unsuccessful or patient reports new pain  - Anticipate increased pain with activity and pre-medicate as appropriate  Outcome: Progressing     Problem: RISK FOR INFECTION - ADULT  Goal: Absence of fever/infection during anticipated neutropenic period  Description: INTERVENTIONS  - Monitor WBC  - Administer growth factors as ordered  - Implement neutropenic guidelines  Outcome: Progressing     Problem: SAFETY ADULT - FALL  Goal: Free from fall injury  Description: INTERVENTIONS:  - Assess pt frequently for physical needs  - Identify cognitive and physical deficits and behaviors that affect risk of falls.  - [place] fall precautions as indicated by assessment.  - Educate pt/family on patient safety including physical limitations  - Instruct pt to call for assistance with activity based on assessment  - Modify environment to reduce risk of injury  - Provide assistive devices as appropriate  - Consider OT/PT consult to assist with strengthening/mobility  - Encourage toileting schedule  Outcome: Progressing     Problem: DISCHARGE PLANNING  Goal: Discharge to home or other facility with appropriate resources  Description: INTERVENTIONS:  - Identify barriers to discharge w/pt and caregiver  - Include patient/family/discharge partner in discharge planning  - Arrange for needed discharge resources and transportation as  appropriate  - Identify discharge learning needs (meds, wound care, etc)  - Arrange for interpreters to assist at discharge as needed  - Consider post-discharge preferences of patient/family/discharge partner  - Complete POLST form as appropriate  - Assess patient's ability to be responsible for managing their own health  - Refer to Case Management Department for coordinating discharge planning if the patient needs post-hospital services based on physician/LIP order or complex needs related to functional status, cognitive ability or social support system  Outcome: Progressing     Problem: Altered Communication/Language Barrier  Goal: Patient/Family is able to understand and participate in their care  Description: Interventions:  - Assess communication ability and preferred communication style  - Implement communication aides and strategies  - Use visual cues when possible  - Listen attentively, be patient, do not interrupt  - Minimize distractions  - Allow time for understanding and response  - Establish method for patient to ask for assistance (call light)  - Provide an  as needed  - Communicate barriers and strategies to overcome with those who interact with patient  Outcome: Progressing     Problem: RESPIRATORY - ADULT  Goal: Achieves optimal ventilation and oxygenation  Description: INTERVENTIONS:  - Assess for changes in respiratory status  - Assess for changes in mentation and behavior  - Position to facilitate oxygenation and minimize respiratory effort  - Oxygen supplementation based on oxygen saturation or ABGs  - Provide Smoking Cessation handout, if applicable  - Encourage broncho-pulmonary hygiene including cough, deep breathe, Incentive Spirometry  - Assess the need for suctioning and perform as needed  - Assess and instruct to report SOB or any respiratory difficulty  - Respiratory Therapy support as indicated  - Manage/alleviate anxiety  - Monitor for signs/symptoms of CO2  retention  Outcome: Progressing     Problem: NEUROLOGICAL - ADULT  Goal: Achieves stable or improved neurological status  Description: INTERVENTIONS  - Assess for and report changes in neurological status  - Initiate measures to prevent increased intracranial pressure  - Maintain blood pressure and fluid volume within ordered parameters to optimize cerebral perfusion and minimize risk of hemorrhage  - Monitor temperature, glucose, and sodium. Initiate appropriate interventions as ordered  Outcome: Progressing  Goal: Absence of seizures  Description: INTERVENTIONS  - Monitor for seizure activity  - Administer anti-seizure medications as ordered  - Monitor neurological status  Outcome: Progressing  Goal: Remains free of injury related to seizure activity  Description: INTERVENTIONS:  - Maintain airway, patient safety  and administer oxygen as ordered  - Monitor patient for seizure activity, document and report duration and description of seizure to MD/LIP  - If seizure occurs, turn patient to side and suction secretions as needed  - Reorient patient post seizure  - Seizure pads on all 4 side rails  - Instruct patient/family to notify RN of any seizure activity  - Instruct patient/family to call for assistance with activity based on assessment  Outcome: Progressing  Goal: Achieves maximal functionality and self care  Description: INTERVENTIONS  - Monitor swallowing and airway patency with patient fatigue and changes in neurological status  - Encourage and assist patient to increase activity and self care with guidance from PT/OT  - Encourage visually impaired, hearing impaired and aphasic patients to use assistive/communication devices  Outcome: Progressing     Problem: Diabetes/Glucose Control  Goal: Glucose maintained within prescribed range  Description: INTERVENTIONS:  - Monitor Blood Glucose as ordered  - Assess for signs and symptoms of hyperglycemia and hypoglycemia  - Administer ordered medications to maintain  glucose within target range  - Assess barriers to adequate nutritional intake and initiate nutrition consult as needed  - Instruct patient on self management of diabetes  Outcome: Progressing     Problem: Delirium  Goal: Minimize duration of delirium  Description: Interventions:  - Encourage use of hearing aids, eye glasses  - Promote highest level of mobility daily  - Provide frequent reorientation  - Promote wakefulness i.e. lights on, blinds open  - Promote sleep, encourage patient's normal rest cycle i.e. lights off, TV off, minimize noise and interruptions  - Encourage family to assist in orientation and promotion of home routines  Outcome: Progressing

## 2024-12-22 NOTE — RESPIRATORY THERAPY NOTE
Pt received on AC/VC 12/400/30%/+5. Pt was suctioned prn, nebs given in am and ett rotated. Unable to do SBT due to high -140. Dr. Eisenberg aware. Will continue to follow.

## 2024-12-22 NOTE — PROGRESS NOTES
[facility]  part of [facility]    Progress Note    Yin Butcher Patient Status:  Inpatient    1931 MRN [id number]   Location [facility] 2W/SW Attending Carmelina Duong MD   Hosp Day # 10 PCP Mina Walker MD     Chief Complaint: Altered mental status    Subjective:   Subjective:  Pt seen today in bed. Intubated/sedated.    Objective:   Blood pressure 93/65, pulse (!) 124, temperature 97.9 °F (36.6 °C), resp. rate 13, height 5' 7.01\" (1.702 m), weight 123 lb 14.4 oz (56.2 kg), SpO2 100%.  Physical Exam  Vitals and nursing note reviewed.   Constitutional:       Appearance: She is ill-appearing.      Interventions: She is sedated and intubated.   Cardiovascular:      Rate and Rhythm: Tachycardia present. Rhythm irregular.      Pulses: Normal pulses.   Pulmonary:      Effort: Pulmonary effort is normal. She is intubated.      Breath sounds: Decreased breath sounds present.   Abdominal:      General: Bowel sounds are normal.      Palpations: Abdomen is soft.   Skin:     General: Skin is warm and dry.   Neurological:      General: No focal deficit present.         Results:   Lab Results   Component Value Date    WBC 9.6 2024    HGB 9.0 (L) 2024    HCT 28.3 (L) 2024    .0 2024    CREATSERUM 0.47 (L) 2024    BUN 19 2024     2024    K 3.9 2024     2024    CO2 30.0 2024    GLU 98 2024    CA 8.7 2024    ALB 4.0 2024    ALKPHO 112 2024    BILT 1.0 (H) 2024    TP 8.2 2024    AST 24 2024    ALT 30 2024    PTT 34.6 2024    INR 1.32 (H) 2024    PT 15.5 (H) 2014    T4F 1.0 2015    TSH 2.138 2024    LIP 35 2024    DDIMER 2.38 (H) 2024    MG 2.0 2024    PHOS 4.1 2024    TROP <0.046 2015    TROPHS 25 2024    CK 32 2024    B12 586 2024       XR CHEST AP PORTABLE  (CPT=71045)    Result Date:  12/22/2024  CONCLUSION: No new abnormality.  Bilateral pleural effusions and associated bibasilar opacities are similar to the prior exam.  Endotracheal tube is unchanged.  Esophageal temperature probe is no longer seen.    elm-remote      Dictated by (CST): Breezy Felton MD on 12/22/2024 at 6:48 AM     Finalized by (CST): Breezy Felton MD on 12/22/2024 at 6:49 AM          XR CHEST AP PORTABLE  (CPT=71045)    Result Date: 12/21/2024  CONCLUSION:   No new abnormality.  Persistent bilateral pleural effusions and associated bibasilar opacities.  Endotracheal tube is unchanged.      Dictated by (CST): Breezy Felton MD on 12/21/2024 at 9:20 AM     Finalized by (CST): Breezy Felton MD on 12/21/2024 at 9:22 AM               Assessment & Plan:   *Acute respiratory failure with hypercapnia/Aspiration pneumonia  CXR and CT chest reviewed   Intubated/sedated 12/12   S/P 10 day course of antibiotics   Cont nebs   Failed multiple attempts of breathing trial   Pulmonology following     *HFpEF  Cont PRN lasix  Cardiology following     *Atrial fibrillation with rapid ventricular response (HCC)  Tachy this morning   Cont Eliquis and digoxin  Cardiology following     *Dysphagia   Cont Tube feeds  Aspiration precautions     *Hypotension  Cont midodrine   Cardiology following       DVT prophylaxis: Eliquis  Code status: FULL CODE    MINDI Man MD  12/22/2024

## 2024-12-22 NOTE — PLAN OF CARE
Patient off sedation around 0830 this AM. Did ok on AM breathing trial in terms of 02 saturations on only 30% FI02, not patient barely opening her eyes and not fallowing commands. Low tidal volumes/ NIF.     Patient more awake in the afternoon after longer time off sedation. Good output after lasix into harvey catheter. A second breathing trial completed in the afternoon. Volumes/ NIF about the same. Concerning increase in C02 after being on trial observed on blood gas results.     Determined not appropriate for extubation today per Pulmonologist. Pulmonologist in the room communicating with daughter in depth.     Harvey catheter was going to be removed today but now we are keeping in place for continued strict I/ O measurement. > 1 Liter of urine out after IV lasix today. Per, pulmonology, will continue with one time doses of IV lasix.         Problem: Safety Risk - Non-Violent Restraints  Goal: Patient will remain free from self-harm  Description: INTERVENTIONS:  - Apply the least restrictive restraint to prevent harm  - Notify patient and family of reasons restraints applied  - Assess for any contributing factors to confusion (electrolyte disturbances, delirium, medications)  - Discontinue any unnecessary medical devices as soon as possible  - Assess the patient's physical comfort, circulation, skin condition, hydration, nutrition and elimination needs   - Reorient and redirection as needed  - Assess for the need to continue restraints  Outcome: Progressing     Problem: RESPIRATORY - ADULT  Goal: Achieves optimal ventilation and oxygenation  Description: INTERVENTIONS:  - Assess for changes in respiratory status  - Assess for changes in mentation and behavior  - Position to facilitate oxygenation and minimize respiratory effort  - Oxygen supplementation based on oxygen saturation or ABGs  - Provide Smoking Cessation handout, if applicable  - Encourage broncho-pulmonary hygiene including cough, deep breathe,  Incentive Spirometry  - Assess the need for suctioning and perform as needed  - Assess and instruct to report SOB or any respiratory difficulty  - Respiratory Therapy support as indicated  - Manage/alleviate anxiety  - Monitor for signs/symptoms of CO2 retention  Outcome: Not Progressing     Problem: NEUROLOGICAL - ADULT  Goal: Achieves stable or improved neurological status  Description: INTERVENTIONS  - Assess for and report changes in neurological status  - Initiate measures to prevent increased intracranial pressure  - Maintain blood pressure and fluid volume within ordered parameters to optimize cerebral perfusion and minimize risk of hemorrhage  - Monitor temperature, glucose, and sodium. Initiate appropriate interventions as ordered  Outcome: Not Progressing  Goal: Absence of seizures  Description: INTERVENTIONS  - Monitor for seizure activity  - Administer anti-seizure medications as ordered  - Monitor neurological status  Outcome: Not Progressing     Problem: Delirium  Goal: Minimize duration of delirium  Description: Interventions:  - Encourage use of hearing aids, eye glasses  - Promote highest level of mobility daily  - Provide frequent reorientation  - Promote wakefulness i.e. lights on, blinds open  - Promote sleep, encourage patient's normal rest cycle i.e. lights off, TV off, minimize noise and interruptions  - Encourage family to assist in orientation and promotion of home routines  Outcome: Not Progressing

## 2024-12-22 NOTE — RESPIRATORY THERAPY NOTE
Received Pt on full support with settings of      12/21/24 1915   Vent Information   Interface Invasive   Vent Type AV   Vent plugged into main power? Yes   Vent ID    Vent Mode VC/AC   Settings   FiO2 (%) 30 %   Resp Rate (Set) 12   Vt (Set, mL) 400 mL   Waveform Decelerating ramp   PEEP/CPAP (cm H2O) 5 cm H20       7.0 ETT tube secured at 22 @ LIP, Markings on ETT tube around lip/teeth area has diminished due to Pt constantly chewing on it when she is not sedated . Bilateral BS auscultated and suction was provides as needed. Pt started chewing on the tube around 3:30 in the morning causing the peak pressures to go high. Sedation was given by the nurse and bite block was place by RT. No vent changes were made overnight. Daughter at bedside, RT to continue monitor.

## 2024-12-22 NOTE — PROGRESS NOTES
12/21/24 2220   B=SAT and SBT   Spontaneous Awakening Trial Safety Screen (Nursing Only) Pass- Proceed with Trial   Spontaneous Awakening Trial Tolerance (Nursing Only) Pass - Proceed with Trial   Spontaneous Breathing Trial Safety Screen (RT Only) Pass - Proceed with Trial   Spontaneous Breathing Trial Tolerance (RT Only) RSBI >104   Sedation holiday (date) 12/21/24   Sedation holiday (time) 0815     Patient had 2 breathing trials today. Reassessed in the afternoon after IV lasix dose + more time off sedation. Patient was more alert in the afternoon, but blood gases revealed concerning CO2 retention. Shallow breathing + inadequate tidal  volumes noted.     Pulmonologist at bedside to discuss patient's high risk and plan moving forward.

## 2024-12-22 NOTE — PROGRESS NOTES
Cardiology Progress Note    Yin Butcher Patient Status:  Inpatient    1931 MRN [id number]   Location [facility] 2W/SW Attending Vika Elmore MD   Hosp Day # 10 PCP Mina Walker MD     Interval Note:  Patient intubated, sedated  Heart rate improved with digoxin       --------------------------------------------------------------------------------------------------------------------------------  ROS 12 systems reviewed, pertinent findings above.  ROS    History:  Past Medical History:    Arthritis    Atrial fibrillation (HCC)    Back problem    Cancer (HCC)    Cataract    Osteoporosis    Personal history of antineoplastic chemotherapy     Past Surgical History:   Procedure Laterality Date    Other surgical history  2014    Procedure: HIP HEMIARTHROPLASTY/ BIPOLAR;  Surgeon: Vasu Matamoros MD;  Location:  MAIN OR    Removal of tonsils,12+ y/o       Family History   Family history unknown: Yes      reports that she has never smoked. She has never used smokeless tobacco. She reports that she does not currently use alcohol. She reports that she does not use drugs.    Objective:   Temp: 97.9 °F (36.6 °C)  Pulse: 124  Resp: 13  BP: 93/65  FiO2 (%): 30 %    Intake/Output:     Intake/Output Summary (Last 24 hours) at 2024 1014  Last data filed at 2024 0530  Gross per 24 hour   Intake 1766.7 ml   Output 1700 ml   Net 66.7 ml       Physical Exam:    General: Intubated, sedated irregularly  HEENT: Normocephalic, anicteric sclera, neck supple.  Neck: No JVD, carotids 2+, no bruits.  Cardiac: Irregularly rate and rhythm. S1, S2 normal. No murmur, pericardial rub, S3.  Lungs: Clear without wheezes, rales, rhonchi or dullness.  Normal excursions and effort.  Abdomen: Soft, non-tender. BS-present.  Extremities: Without clubbing, cyanosis or edema.  Peripheral pulses are 2+.  Neurologic: Non-focal  Skin: Warm and dry.       Assessment   Atrial fibrillation with RVR  Vent dependent  respiratory failure, hypercapnic and hypoxemic  Encephalopathy  Fever  Leukocytosis  HFpEF  History of PE  Dementia      Plan  Prolonged intubation over 1 week  Failed SBT several days ago  Heart rate running fast this morning, BP soft-on midodrine-increase to 7.5 or 10 mg 3 times daily if needed  Continue Eliquis for anticoagulation, cardioembolic stroke protection  Responded favorably to digoxin, continue daily dosing    Thank you for allowing me to take part in the care of Yin Butcher. Please call with any questions of concerns.      Level of care: L3    Ghassan Lima DO  [facility]   Interventional Cardiac and Vascular Services      Ghassan Lima DO

## 2024-12-23 ENCOUNTER — CASE MANAGEMENT (OUTPATIENT)
Dept: CARE COORDINATION | Age: 89
End: 2024-12-23

## 2024-12-23 NOTE — RESPIRATORY THERAPY NOTE
Patient received intubated and on ventilator 12/400/+5/30%. Bilateral breath sounds auscultated. Suction provided when indicated. No acute events during the daytime.     4hr SBT NIF -8, , RSBI 131, duration per MD.  Pt tolerated well VSS.     RT will continue to monitor.

## 2024-12-23 NOTE — RESPIRATORY THERAPY NOTE
Patient received intubated and on ventilator VC 12/400/+5/30%. Bilateral breath sounds auscultated. Suction provided when indicated. No acute events during the night. RT will continue to monitor.       12/23/24 0513   Readings   Total RR 13   Minute Ventilation (L/min) 5.3 L/min   Expiratory Tidal Volume 380 mL   PIP Observed (cm H2O) 31 cm H2O   MAP (cm H2O) 9

## 2024-12-23 NOTE — DIETARY NOTE
ADULT NUTRITION REASSESSMENT    Pt is at high nutrition risk.  Pt meets severe malnutrition criteria.      CRITERIA FOR MALNUTRITION DIAGNOSIS:  Criteria for severe malnutrition diagnosis: chronic illness related to body fat severe depletion and muscle mass severe depletion.    RECOMMENDATIONS TO MD:   See Nutrition Intervention for enteral nutrition (EN) specifics . CPM.     ADMITTING DIAGNOSIS:  Atrial fibrillation with rapid ventricular response (HCC) [I48.91]  Acute respiratory failure with hypercapnia (HCC) [J96.02]  Sepsis, due to unspecified organism, unspecified whether acute organ dysfunction present (HCC) [A41.9]  PERTINENT PAST MEDICAL HISTORY:   Past Medical History:    Arthritis    Atrial fibrillation (HCC)    Back problem    Cancer (HCC)    Cataract    Osteoporosis    Personal history of antineoplastic chemotherapy     PATIENT STATUS:   12/13/24 INITIAL VISIT: Pt assessed due to low BMI and new consult for RD to initiate tube feeds. Pt presented to hospital with AMS, fevers over the past \"few days\". Extensive PMH as listed above including recent discharge from [facility] on 11/24/24. Pt is orally intubated, sedated on Propofol. No family present at time of visit. Unable to obtain recent diet hx.   12/18/24 UPDATE: Remains orally intubated, Sedated on Propofol. Pt's DTR at bedside at time of visit - provided with diet hx. Per DTR, pt with good/normal appetite/intake with stable wt prior to recent admission in November. Per DTR, pt sustained an injury to her esophagus during that admission resulting in dysphagia therefore a G-tube was surgically placed. DTR did note some wt loss recently however unable to quantify. Expressed desire to see \"wt gain\" with administration of EN feeds. Explained to DTR that the goal of EN administration is currently to meet needs as not to overfeed pt and worsen respiratory status. Pt was transferred to rehab following discharge where she was starting to work with SLP however  remained NPO.  12/23/24 UPDATE: EN tolerated well. Re-calculate needs/[place] equation and increasing tube feeds accordingly. Propofol intake negligible. Adding daily multivitamin to assure meeting >100% RDI's. Noted 24# wt gain in 5-6 days if accurate but is c/w +10L Net I/O- ? Fluid gains.     FOOD/NUTRITION RELATED HISTORY:  Appetite: NPO  Intake:  EN orders per rehab record Jevity 1.5 @ 45 ml/hr x 21 hrs + ProHeal Sugar Free Critical Care AWC 30 ml BID (100 kcal & 17g protein in 30 ml) + 250 ml H2O flushes TID to provide 1618 kcal (39 kcal/kg), 94 g protein (2.2 g/kg) and 1528 ml free H2O.   Intake Meeting Needs: NPO  Percent Meals Eaten (last 3 days)       None        Food Allergies: No Known Food Allergies (NKFA)  Cultural/Ethnic/Shinto Preferences: Not Obtained    GASTROINTESTINAL: +BM last documented on 12/22 per flowsheet, PEG/G-tube, and abdomen is soft, nondistended. On colace and senna daily.     MEDICATIONS: reviewed; Noted cardiac electrolyte replacement protocol ordered; Propofol at 2.5 ml/hr (negligible);   norepinephrine      propofol 10 mcg/kg/min (12/22/24 0504)    dextrose 10%        midodrine  10 mg Per NG Tube TID    digoxin  125 mcg Oral Daily    vancomycin  125 mg Per NG Tube Daily    apixaban  2.5 mg Oral BID    senna  10 mL Per NG Tube BID    docusate  100 mg Per NG Tube BID    chlorhexidine gluconate  15 mL Mouth/Throat BID@0800,2000    mineral oil-white petrolatum  1 Application Both Eyes Nightly    famotidine  20 mg Intravenous Daily @ 0700   PRN: Dulcolax if needed.   LABS: reviewed. Low Creat- non-specific indicator of low muscle mass.   Recent Labs     12/21/24  0430 12/22/24  0452 12/23/24  0537   * 98 112*   BUN 16 19 21   CREATSERUM 0.47* 0.47* 0.41*   CA 9.0 8.7 8.8    139 140   K 4.1 3.9 4.4    105 105   CO2 28.0 30.0 30.0   OSMOCALC 290 290 294     NUTRITION RELATED PHYSICAL FINDINGS:  - Nutrition Focused Physical Exam (NFPE): severe depletion body fat  orbital region and fat overlying rib cage region and severe depletion muscle mass temple region and clavicle region.   - Fluid Accumulation: +2 RLE and hands, +1 LUE.  see RN documentation for details  - Skin Integrity: Pressure Injury: Stage 2 on coccyx and other wounds noted see RN documentation for details    ANTHROPOMETRICS:  HT:  170.2 cm (5'7\")   WT: 55.7 kg (122 lb 12.7 oz) (wt up 31 % (29 lbs) from lowest wt this admission with noted edema present) Big jump in weight from 101 lbs 12/16 to 125 lbs 12/21- appears fluid gain (net I/0: +10.4L)   DOSING WT: 42 kg (93 lbs - lowest wt this admission) - wt utilized for anthropometric assessment  BMI: Body mass index is 19.23kg/m².  BMI CLASSIFICATION: less than 18.5 kg/m2 - underweight  IBW: 135 lbs        69% IBW admit. 12/23: 90% but with edema.   Usual Body Wt: 105 lbs (per EMR ~1 yr ago)      89% UBW    WEIGHT HISTORY: Current wt reflects 11% (12 lb)  unintentional wt loss.   Patient Weight(s) for the past 336 hrs:   Weight   12/23/24 0600 55.7 kg (122 lb 12.7 oz)   12/22/24 0504 56.2 kg (123 lb 14.4 oz)   12/21/24 0600 56.7 kg (125 lb)   12/20/24 0600 56.3 kg (124 lb 1.9 oz)   12/19/24 0600 52.8 kg (116 lb 6.5 oz)   12/16/24 0400 46 kg (101 lb 6.6 oz)   12/15/24 0400 45 kg (99 lb 3.3 oz)   12/12/24 0530 42 kg (92 lb 9.5 oz)   12/11/24 1610 47.6 kg (105 lb)     Wt Readings from Last 10 Encounters:   12/23/24 55.7 kg (122 lb 12.7 oz)   02/02/24 56.7 kg (125 lb)   10/29/23 47.5 kg (104 lb 11.5 oz)   09/10/23 42.1 kg (92 lb 14.4 oz)   04/13/23 53.5 kg (118 lb)   04/10/23 53.5 kg (118 lb)   04/03/23 53.5 kg (118 lb)   03/30/23 53.5 kg (118 lb)   03/27/23 53.3 kg (117 lb 9.6 oz)   03/22/23 64 kg (141 lb)     NUTRITION DIAGNOSIS/PROBLEM:   Severe Malnutrition related to Chronic - Physiological causes resulting in anorexia or diminished intake as evidenced by body fat severe depletion and muscle mass severe depletion.    NUTRITION DIAGNOSIS PROGRESS:  Improvement  (unresolved) - EN feeds infusing at goal rate. Meeting 100% of current estimated needs.    NUTRITION INTERVENTION:     NUTRITION PRESCRIPTION:   Estimated Nutrition needs: --dosing wt of 42 kg - wt taken on 1212/24  Calories: 1092 calories/day (PSU 2003B (ave 8 L/min, 37.1C Tmax) or 26 calories per kg Dosing wt)  Protein: 50-63 g protein/day (1.2-1.5 g protein/kg Dosing wt)  Fluid Needs: 1092 ml (1 ml/kcal)    - Diet:       Procedures    NPO      - Enteral Nutrition:   Promote at 50 ml/hr per OG tube +  ml q 4hrs (600 ml).   Based on average 22 hour infusion time Goal rate provides 1100 total TF volume, 1100 kcal, 69  grams protein, 924 ml total free water, and 73 % RDI's, 1524 ml total h20.    Meets 100% of estimated energy and 107% of estimated protein needs.      - RD Malnutrition Care Plan:  EN feeds + non-nutritive kcal to meet >80% of estimated needs  - Meals and snacks: NPO  - Medical Food Supplements: NPO  - Vitamin and mineral supplements: added daily multivitamin to assure meeting >100% RDI's.   - Feeding assistance: NPO  - Nutrition education: not appropriate at this time  - Coordination of nutrition care: collaboration with other providers and discussed in Care Rounds  -  - Discharge and transfer of nutrition care to new setting or provider: monitor plans     MONITOR AND EVALUATE/NUTRITION GOALS:  - Food and Nutrient Intake:      Monitor: N/A  - Food and Nutrient Administration:      Monitor: tolerance to enteral nutrition, adequacy of enteral nutrition, for enteral nutrition adjustment, and propofol rate  - Anthropometric Measurement:    Monitor weight  - Nutrition Goals:      EN + non-nutritive kcal >80% energy needs, labs within acceptable limits, and support wound healing. LTG: repletion if/when in rehab phase of illness.     DIETITIAN FOLLOW UP: RD to follow and monitor nutrition status    Pati Matthew RD, LDN, [facility] ([id number])

## 2024-12-23 NOTE — PROGRESS NOTES
[facility] - CARDIOLOGY PROGRESS NOTE  Yin Butcher Patient Status:  Inpatient    1931 MRN [id number]   Location [facility] 2W/SW Attending Vika Elmore MD   Hosp Day # 11 PCP Mina Walker MD     Assessment:    1.  Persistent atrial fibrillation.  Satisfactory heart rate control.  On daily digoxin 0.125 mg daily.  Normal renal function.  Metoprolol and diltiazem being withheld due to patient's hypotension.    2.  Historically normal LV systolic function.    3.  Respiratory failure, still intubated.  Failing spontaneous breathing trial.  Bilateral pleural effusions, moderate to large on right.      Plan:    No new cardiac recommendations.    Check digoxin level in AM.      Subjective:  No chest pain or shortness of breath.    Objective:  BP (!) 89/57 (BP Location: Right arm)   Pulse 99   Temp 97.2 °F (36.2 °C) (Temporal)   Resp 12   Ht 170.2 cm (5' 7.01\")   Wt 122 lb 12.7 oz (55.7 kg)   SpO2 99%   BMI 19.23 kg/m²     Temp (24hrs), Av.2 °F (36.8 °C), Min:97 °F (36.1 °C), Max:99.5 °F (37.5 °C)      Intake/Output:    Intake/Output Summary (Last 24 hours) at 2024 0950  Last data filed at 2024 0600  Gross per 24 hour   Intake 1273.4 ml   Output 450 ml   Net 823.4 ml       Wt Readings from Last 2 Encounters:   24 122 lb 12.7 oz (55.7 kg)   24 125 lb (56.7 kg)       Physical Exam:    General: Obtunded.  Opens eyes and seems to connect.  No gross distress.  HEENT: No focal deficits.  Neck: No JVD, carotids 2+ no bruits.  Cardiac: Irregular rhythm, mild tachycardia.  S1, S2 normal, no murmur  Lungs: Clear anterolaterally without wheezes, rales, rhonchi.  Normal excursions and effort.  Abdomen: Soft  Extremities: Without clubbing, cyanosis or edema.  Peripheral pulses are diminished  Skin: Cool and dry.     Labs:  Lab Results   Component Value Date    WBC 12.1  12/23/2024    HGB 9.1 12/23/2024    HCT 27.7 12/23/2024    .0 12/23/2024     Lab Results   Component Value Date    PT 15.5 (H) 07/13/2014    INR 1.32 (H) 01/29/2024     Lab Results   Component Value Date     12/23/2024    K 4.4 12/23/2024     12/23/2024    CO2 30.0 12/23/2024    BUN 21 12/23/2024    CREATSERUM 0.41 12/23/2024     12/23/2024    CA 8.8 12/23/2024        Lab Results   Component Value Date    TROP <0.046 02/19/2015        Medications:     midodrine  10 mg Per NG Tube TID    digoxin  125 mcg Oral Daily    vancomycin  125 mg Per NG Tube Daily    apixaban  2.5 mg Oral BID    senna  10 mL Per NG Tube BID    docusate  100 mg Per NG Tube BID    chlorhexidine gluconate  15 mL Mouth/Throat BID@0800,2000    mineral oil-white petrolatum  1 Application Both Eyes Nightly    famotidine  20 mg Intravenous Daily @ 0700      norepinephrine      propofol 10 mcg/kg/min (12/22/24 0504)    dextrose 10%         Marck Nelson MD  12/23/2024  9:50 AM

## 2024-12-23 NOTE — PLAN OF CARE
Patient is axox0. Opens eyes spontaneous. Follow some commands. Patient is biting the ETT; additional prn added. Low harvey output.  Problem: Patient Centered Care  Goal: Patient preferences are identified and integrated in the patient's plan of care  Description: Interventions:  - What would you like us to know as we care for you?   - Provide timely, complete, and accurate information to patient/family  - Incorporate patient and family knowledge, values, beliefs, and cultural backgrounds into the planning and delivery of care  - Encourage patient/family to participate in care and decision-making at the level they choose  - Honor patient and family perspectives and choices  Outcome: Progressing     Problem: Patient/Family Goals  Goal: Patient/Family Long Term Goal  Description: Patient's Long Term Goal:     Interventions:  -   - See additional Care Plan goals for specific interventions  Outcome: Progressing  Goal: Patient/Family Short Term Goal  Description: Patient's Short Term Goal:     Interventions:   -   - See additional Care Plan goals for specific interventions  Outcome: Progressing     Problem: PAIN - ADULT  Goal: Verbalizes/displays adequate comfort level or patient's stated pain goal  Description: INTERVENTIONS:  - Encourage pt to monitor pain and request assistance  - Assess pain using appropriate pain scale  - Administer analgesics based on type and severity of pain and evaluate response  - Implement non-pharmacological measures as appropriate and evaluate response  - Consider cultural and social influences on pain and pain management  - Manage/alleviate anxiety  - Utilize distraction and/or relaxation techniques  - Monitor for opioid side effects  - Notify MD/LIP if interventions unsuccessful or patient reports new pain  - Anticipate increased pain with activity and pre-medicate as appropriate  Outcome: Progressing     Problem: RISK FOR INFECTION - ADULT  Goal: Absence of fever/infection during  anticipated neutropenic period  Description: INTERVENTIONS  - Monitor WBC  - Administer growth factors as ordered  - Implement neutropenic guidelines  Outcome: Progressing     Problem: SAFETY ADULT - FALL  Goal: Free from fall injury  Description: INTERVENTIONS:  - Assess pt frequently for physical needs  - Identify cognitive and physical deficits and behaviors that affect risk of falls.  - [place] fall precautions as indicated by assessment.  - Educate pt/family on patient safety including physical limitations  - Instruct pt to call for assistance with activity based on assessment  - Modify environment to reduce risk of injury  - Provide assistive devices as appropriate  - Consider OT/PT consult to assist with strengthening/mobility  - Encourage toileting schedule  Outcome: Progressing     Problem: DISCHARGE PLANNING  Goal: Discharge to home or other facility with appropriate resources  Description: INTERVENTIONS:  - Identify barriers to discharge w/pt and caregiver  - Include patient/family/discharge partner in discharge planning  - Arrange for needed discharge resources and transportation as appropriate  - Identify discharge learning needs (meds, wound care, etc)  - Arrange for interpreters to assist at discharge as needed  - Consider post-discharge preferences of patient/family/discharge partner  - Complete POLST form as appropriate  - Assess patient's ability to be responsible for managing their own health  - Refer to Case Management Department for coordinating discharge planning if the patient needs post-hospital services based on physician/LIP order or complex needs related to functional status, cognitive ability or social support system  Outcome: Progressing     Problem: Altered Communication/Language Barrier  Goal: Patient/Family is able to understand and participate in their care  Description: Interventions:  - Assess communication ability and preferred communication style  - Implement communication  aides and strategies  - Use visual cues when possible  - Listen attentively, be patient, do not interrupt  - Minimize distractions  - Allow time for understanding and response  - Establish method for patient to ask for assistance (call light)  - Provide an  as needed  - Communicate barriers and strategies to overcome with those who interact with patient  Outcome: Progressing     Problem: RESPIRATORY - ADULT  Goal: Achieves optimal ventilation and oxygenation  Description: INTERVENTIONS:  - Assess for changes in respiratory status  - Assess for changes in mentation and behavior  - Position to facilitate oxygenation and minimize respiratory effort  - Oxygen supplementation based on oxygen saturation or ABGs  - Provide Smoking Cessation handout, if applicable  - Encourage broncho-pulmonary hygiene including cough, deep breathe, Incentive Spirometry  - Assess the need for suctioning and perform as needed  - Assess and instruct to report SOB or any respiratory difficulty  - Respiratory Therapy support as indicated  - Manage/alleviate anxiety  - Monitor for signs/symptoms of CO2 retention  Outcome: Progressing     Problem: NEUROLOGICAL - ADULT  Goal: Achieves stable or improved neurological status  Description: INTERVENTIONS  - Assess for and report changes in neurological status  - Initiate measures to prevent increased intracranial pressure  - Maintain blood pressure and fluid volume within ordered parameters to optimize cerebral perfusion and minimize risk of hemorrhage  - Monitor temperature, glucose, and sodium. Initiate appropriate interventions as ordered  Outcome: Progressing  Goal: Absence of seizures  Description: INTERVENTIONS  - Monitor for seizure activity  - Administer anti-seizure medications as ordered  - Monitor neurological status  Outcome: Progressing  Goal: Remains free of injury related to seizure activity  Description: INTERVENTIONS:  - Maintain airway, patient safety  and administer  oxygen as ordered  - Monitor patient for seizure activity, document and report duration and description of seizure to MD/LIP  - If seizure occurs, turn patient to side and suction secretions as needed  - Reorient patient post seizure  - Seizure pads on all 4 side rails  - Instruct patient/family to notify RN of any seizure activity  - Instruct patient/family to call for assistance with activity based on assessment  Outcome: Progressing  Goal: Achieves maximal functionality and self care  Description: INTERVENTIONS  - Monitor swallowing and airway patency with patient fatigue and changes in neurological status  - Encourage and assist patient to increase activity and self care with guidance from PT/OT  - Encourage visually impaired, hearing impaired and aphasic patients to use assistive/communication devices  Outcome: Progressing     Problem: Diabetes/Glucose Control  Goal: Glucose maintained within prescribed range  Description: INTERVENTIONS:  - Monitor Blood Glucose as ordered  - Assess for signs and symptoms of hyperglycemia and hypoglycemia  - Administer ordered medications to maintain glucose within target range  - Assess barriers to adequate nutritional intake and initiate nutrition consult as needed  - Instruct patient on self management of diabetes  Outcome: Progressing     Problem: Delirium  Goal: Minimize duration of delirium  Description: Interventions:  - Encourage use of hearing aids, eye glasses  - Promote highest level of mobility daily  - Provide frequent reorientation  - Promote wakefulness i.e. lights on, blinds open  - Promote sleep, encourage patient's normal rest cycle i.e. lights off, TV off, minimize noise and interruptions  - Encourage family to assist in orientation and promotion of home routines  Outcome: Progressing     Problem: Safety Risk - Non-Violent Restraints  Goal: Patient will remain free from self-harm  Description: INTERVENTIONS:  - Apply the least restrictive restraint to prevent  harm  - Notify patient and family of reasons restraints applied  - Assess for any contributing factors to confusion (electrolyte disturbances, delirium, medications)  - Discontinue any unnecessary medical devices as soon as possible  - Assess the patient's physical comfort, circulation, skin condition, hydration, nutrition and elimination needs   - Reorient and redirection as needed  - Assess for the need to continue restraints  Outcome: Not Progressing

## 2024-12-23 NOTE — PROGRESS NOTES
12/23/24 1450   Wound 12/13/24 Coccyx   Date First Assessed/Time First Assessed: 12/13/24 1900   Location: Coccyx   Wound Image    Site Assessment Clean;Fragile;Granulation tissue;Moist;Red   Closure Not approximated   Drainage Amount Scant   Drainage Description Serosanguineous   Treatments Cleansed;Topical (Barrier/Moisturizer/Ointment)   Dressing Aquacel Foam   Dressing Changed Changed   Dressing Status Dressing Changed;Removed;Old drainage   Wound Depth (cm) 0.1 cm   Nancy-wound Assessment Clean;Dry;Intact   Wound Granulation Tissue Red   Wound Bed Granulation (%) 100 %   State of Healing Fully granulated   Wound Odor None   Wound Follow Up   Follow up needed No     Pt seen for wound follow up. Recommend to continue using zinc and protective foam. Pillow placed under the left hip.

## 2024-12-23 NOTE — PROGRESS NOTES
[facility]  part of [facility]    Progress Note      Assessment and Plan:   1.  Acute on chronic respiratory failure-multifactorial with probable aspiration pneumonitis, heart failure with preserved ejection fraction, history of venous thromboembolism now being treated for sepsis.  The patient now has a small right-sided pleural effusion and generalized weakness with dramatic hypercapnia.    The patient has improved to a significant degree and is doing acceptably with her breathing trials.  I spoke to the daughter this morning over the phone regarding the issue of reintubation.  She is going to talk to her fiancé and call me back.    Recommendations:  1.  Off ongoing empiric antibiotic  2.  Spontaneous breathing trial ongoing with possible extubation as soon as today.  3.  Morning chest x-ray  4.  Ongoing discussion regarding limits in this patient with dementia at baseline and poor prognosis in the short-term.    5.  DuoNebs    2.  DVT prophylaxis-on apixaban    3.  OBS    4.  Nutrition-tube feeds    5.  Goals of care-limits are most appropriate.  However, patient is reported as full code.  Ongoing family discussion.    Subjective:   Yin Butcher is a(n) 89 year old female who is following commands on ventilator    Objective:   Blood pressure (!) 89/57, pulse 99, temperature 97.2 °F (36.2 °C), temperature source Temporal, resp. rate 12, height 5' 7.01\" (1.702 m), weight 122 lb 12.7 oz (55.7 kg), SpO2 99%.    Physical Exam sedate white female elderly and frail appearing  HEENT examination is unremarkable with pupils equal round and reactive to light and accommodation.   Neck without adenopathy, thyromegaly, JVD nor bruit.   Lungs diminished to auscultation and percussion.  Cardiac regular rate and rhythm no murmur.   Abdomen nontender, without hepatosplenomegaly and no mass appreciable.   Extremities without clubbing cyanosis nor edema.   Neurologic following commands but generally weak on  ventilator.  Skin without gross abnormality     Results:     Lab Results   Component Value Date    WBC 12.1 12/23/2024    HGB 9.1 12/23/2024    HCT 27.7 12/23/2024    .0 12/23/2024    CREATSERUM 0.41 12/23/2024    BUN 21 12/23/2024     12/23/2024    K 4.4 12/23/2024     12/23/2024    CO2 30.0 12/23/2024     12/23/2024    CA 8.8 12/23/2024     Chest x-ray-small right pleural effusion.    Esteban Damon MD  Medical Director, Critical Care, [facility]  Medical Director, [facility]  Pager: [phone]  >35 min crit care

## 2024-12-24 ENCOUNTER — CASE MANAGEMENT (OUTPATIENT)
Dept: CARE COORDINATION | Age: 89
End: 2024-12-24

## 2024-12-24 NOTE — PROGRESS NOTES
[facility] - CARDIOLOGY PROGRESS NOTE  Yin Butcher Patient Status:  Inpatient    1931 MRN [id number]   Location [facility] 2W/SW Attending Vika Elmore MD   Hosp Day # 12 PCP Mina Walker MD     Assessment:    1.  Persistent atrial fibrillation.  Satisfactory heart rate control.  On daily digoxin 0.125 mg daily.  Normal renal function.  Metoprolol and diltiazem being withheld due to patient's hypotension.    2.  Historically normal LV systolic function.    3.  Respiratory failure secondary to aspiration, pneumonia, still intubated.  Failing spontaneous breathing trial.  Bilateral pleural effusions, moderate to large on right.      Plan:  Digoxin level low however A-fib rates controlled, no dose change  11 L positive, keeping lungs dry may help with extubation.  Lasix 40 mg IV twice daily    Demarcus Vázquez MD  Interventional Cardiology  [facility]    Subjective:  No chest pain or shortness of breath.    Objective:  /77 (BP Location: Right arm)   Pulse 102   Temp 97.9 °F (36.6 °C) (Temporal)   Resp 14   Ht 67.01\"   Wt 122 lb 12.7 oz (55.7 kg)   SpO2 100%   BMI 19.23 kg/m²     Temp (24hrs), Av.7 °F (36.5 °C), Min:96.4 °F (35.8 °C), Max:98.8 °F (37.1 °C)      Intake/Output:    Intake/Output Summary (Last 24 hours) at 2024 0610  Last data filed at 2024 2200  Gross per 24 hour   Intake 843 ml   Output 150 ml   Net 693 ml       Wt Readings from Last 2 Encounters:   24 122 lb 12.7 oz (55.7 kg)   24 125 lb (56.7 kg)       Physical Exam:    General: Obtunded.  Opens eyes and seems to connect.  No gross distress.  HEENT: No focal deficits.  Neck: No JVD, carotids 2+ no bruits.  Cardiac: Irregular rhythm, mild tachycardia.  S1, S2 normal, no murmur  Lungs: Clear anterolaterally without wheezes, rales, rhonchi.  Normal excursions and  effort.  Abdomen: Soft  Extremities: Without clubbing, cyanosis or edema.  Peripheral pulses are diminished  Skin: Cool and dry.     Labs:  Lab Results   Component Value Date    WBC 10.2 12/24/2024    HGB 8.8 12/24/2024    HCT 27.3 12/24/2024    .0 12/24/2024     Lab Results   Component Value Date    PT 15.5 (H) 07/13/2014    INR 1.32 (H) 01/29/2024             Lab Results   Component Value Date    TROP <0.046 02/19/2015        Medications:     multivitamin  1 tablet Per G Tube Daily    midodrine  10 mg Per NG Tube TID    digoxin  125 mcg Oral Daily    vancomycin  125 mg Per NG Tube Daily    apixaban  2.5 mg Oral BID    senna  10 mL Per NG Tube BID    docusate  100 mg Per NG Tube BID    chlorhexidine gluconate  15 mL Mouth/Throat BID@0800,2000    mineral oil-white petrolatum  1 Application Both Eyes Nightly    famotidine  20 mg Intravenous Daily @ 0700      norepinephrine      propofol Stopped (12/22/24 0900)    dextrose 10%

## 2024-12-24 NOTE — PROGRESS NOTES
[facility]  part of [facility]     Progress Note    Yin Butcher Patient Status:  Inpatient    1931 MRN [id number]   Location [facility] 2W/SW Attending Vika Elmore MD   Hosp Day # 12 PCP Mina Walker MD       Subjective:   Patient seen and examined.  Intubated.  No significant distress    Objective:   Blood pressure (!) 134/92, pulse 114, temperature 98.1 °F (36.7 °C), resp. rate 19, height 5' 7.01\" (1.702 m), weight 124 lb 5.4 oz (56.4 kg), SpO2 94%.  Intake/Output:   Last 3 shifts: I/O last 3 completed shifts:  In:  [NG/GT:]  Out: 575 [Urine:575]   This shift: No intake/output data recorded.     Vent Settings: Vent Mode: VC/AC  FiO2 (%):  [30 %] 30 %  S RR:  [12] 12  S VT:  [40 mL-400 mL] 40 mL  PEEP/CPAP (cm H2O):  [5 cm H20] 5 cm H20  MAP (cm H2O):  [9] 9    Hemodynamic parameters (last 24 hours):      Scheduled Meds:   Current Facility-Administered Medications   Medication Dose Route Frequency    furosemide (Lasix) 10 mg/mL injection 40 mg  40 mg Intravenous BID (Diuretic)    midazolam (Versed) 2 MG/2ML injection 2 mg  2 mg Intravenous Q1H PRN    glycopyrrolate (Robinul) 0.2 MG/ML injection 0.1 mg  0.1 mg Intravenous Q6H PRN    multivitamin (Tab-A-Jenny/Beta Carotene) tab 1 tablet  1 tablet Per G Tube Daily    ipratropium-albuterol (Duoneb) 0.5-2.5 (3) MG/3ML inhalation solution 3 mL  3 mL Nebulization Q6H PRN    midodrine (ProAmatine) tab 10 mg  10 mg Per NG Tube TID    digoxin (Lanoxin) tab 125 mcg  125 mcg Oral Daily    vancomycin (Firvanq) 50 mg/mL oral solution 125 mg  125 mg Per NG Tube Daily    norepinephrine (Levophed) 4 mg/250mL infusion premix  0.5-50 mcg/min Intravenous Continuous    propofol (Diprivan) 10 mg/mL infusion premix  5-50 mcg/kg/min (Dosing Weight) Intravenous Continuous    fentaNYL (Sublimaze) 50 mcg/mL injection 25 mcg  25 mcg Intravenous Q30 Min PRN    acetaminophen (Ofirmev) 10 mg/mL infusion premix 650 mg  15 mg/kg Intravenous Q6H  PRN    dextrose 10% infusion (TPN no rate)   Intravenous Continuous PRN    pancrelipase (Lip-Prot-Amyl) (Zenpep) DR particles cap 10,000 Units  10,000 Units Per G Tube PRN    And    sodium bicarbonate tab 325 mg  325 mg Per G Tube PRN    metoprolol (Lopressor) 5 mg/5mL injection 5 mg  5 mg Intravenous Q4H PRN    apixaban (Eliquis) tab 2.5 mg  2.5 mg Oral BID    albuterol (Ventolin) (5 MG/ML) 0.5% nebulizer solution 5 mg  5 mg Nebulization Q4H PRN    acetaminophen (Tylenol) 160 MG/5ML oral liquid 650 mg  650 mg Per NG Tube Q4H PRN    Or    acetaminophen (Tylenol) rectal suppository 650 mg  650 mg Rectal Q4H PRN    senna (Senokot) 8.8 MG/5ML oral syrup 17.6 mg  10 mL Per NG Tube BID    docusate (Colace) 50 MG/5ML oral solution 100 mg  100 mg Per NG Tube BID    polyethylene glycol (PEG 3350) (Miralax) 17 g oral packet 17 g  17 g Per NG Tube Daily PRN    bisacodyl (Dulcolax) 10 MG rectal suppository 10 mg  10 mg Rectal Daily PRN    chlorhexidine gluconate (Peridex) 0.12 % oral solution 15 mL  15 mL Mouth/Throat BID@0800,2000    mineral oil-white petrolatum (Artificial Tears) 83-15 % ophthalmic ointment 1 Application  1 Application Both Eyes Nightly    famotidine (Pepcid) 20 mg/2mL injection 20 mg  20 mg Intravenous Daily @ 0700    ondansetron (Zofran) 4 MG/2ML injection 4 mg  4 mg Intravenous Q6H PRN    metoclopramide (Reglan) 5 mg/mL injection 10 mg  10 mg Intravenous Q8H PRN       Continuous Infusions:    norepinephrine      propofol Stopped (12/22/24 0900)    dextrose 10%         Physical Exam  Constitutional: Intubated, sedated  Eyes: PERRL  ENT: nares pateint  Neck: supple, no JVD  Cardio: RRR, S1 S2  Respiratory: Diminished bibasilar breath sounds with crackles present  GI: abdomen soft, non tender, active bowel sounds, no organomegaly + PEG tube in place  Extremities: no clubbing, cyanosis, edema  Neurologic: Does not follow commands  Skin: warm, dry      Results:     Lab Results   Component Value Date    WBC  10.2 12/24/2024    HGB 8.8 12/24/2024    HCT 27.3 12/24/2024    .0 12/24/2024    CREATSERUM 0.38 12/24/2024    BUN 18 12/24/2024     12/24/2024    K 4.2 12/24/2024     12/24/2024    CO2 30.0 12/24/2024     12/24/2024    CA 8.8 12/24/2024         XR CHEST AP PORTABLE  (CPT=71045)    Result Date: 12/24/2024  CONCLUSION:  1. Borderline cardiomegaly.  Tortuous aorta. 2. Right basilar pleural parenchymal abnormality.  Bilateral perihilar interstitial prominence.  Improved aeration left lung base.  Small left-sided effusion remains. 3. ET tube in satisfactory position.    Dictated by (CST): Melecio Jackosn MD on 12/24/2024 at 7:41 AM     Finalized by (CST): Melecio Jackson MD on 12/24/2024 at 7:43 AM          XR CHEST AP PORTABLE  (CPT=71045)    Result Date: 12/23/2024  CONCLUSION:   No significant change in the right greater than left pleural effusions with associated bibasilar opacities.    Dictated by (CST): Rohit Avila MD on 12/23/2024 at 7:35 AM     Finalized by (CST): Rohit Avila MD on 12/23/2024 at 7:36 AM                 Assessment   1.  Fevers  2.  Acute encephalopathy  3.  Acute on chronic hypercapnic hypoxemic respiratory failure  4.  Leukocytosis  5.  HFpEF  6.  History of atrial fibrillation  7.  Prior history of pulmonary embolism  9.  Dementia  10.  Dysphagia     Plan   -Patient presents with chief complaint of fevers ongoing altered mental status over the last several days.  Recently discharged on 11/24/2024 from [facility] after hospitalization for presumed aspiration pneumonia.  -Worsening hypercapnic respiratory failure on NIV.  Patient intubated on 12/12/2024.  -Spontaneous breathing trial as tolerated.  Possible extubation if tolerating  -CT chest on 12/11/2024 with lower lobe mucous plugging concerning for aspiration with small pleural effusions.  -Pleated course of antibiotic therapy  -Blood cultures thus far with no growth to date  -Chest  physiotherapy  -DVT prophylaxis: Eliquis  -Ongoing discussion with daughter regarding patient's overall clinical condition.  Patient made DNR but for now daughter agreeable to reintubation.  Will clarify CODE STATUS before potentially extubating.  Possible extubation if tolerating spontaneous breathing trial.      Dayo Espino, DO  Pulmonary Critical Care Medicine  [facility]

## 2024-12-24 NOTE — CM/SW NOTE
Remains on vent support.  CM/SW will continue to follow and assist with dc planning needs.    Kimmie Dial MBA BSN RN CRRN   RN Case Manager  [phone]

## 2024-12-24 NOTE — PALLIATIVE CARE NOTE
Brief Palliative Care Note    We were re-consulted for Ms. Butcher today. I spoke with Jada via phone, she would like to meet tomorrow around 3pm. She would like to involve her fiance and also her mother, is requesting sedation be held so that Prema can participate in discussions of how/when to proceed with extubation and options for recurrent respiratory failure. I will meet her tomorrow afternoon.    Barry Liz MD  Palliative Care Services  [facility] Ph: [phone]                          Barry Liz MD  Palliative Care Services  [facility] [phone]

## 2024-12-24 NOTE — PLAN OF CARE
Patient is axox2. Follows basic commands. Two loose bm. Patient has edema. No complaints of pain.   Problem: Patient Centered Care  Goal: Patient preferences are identified and integrated in the patient's plan of care  Description: Interventions:  - What would you like us to know as we care for you?   - Provide timely, complete, and accurate information to patient/family  - Incorporate patient and family knowledge, values, beliefs, and cultural backgrounds into the planning and delivery of care  - Encourage patient/family to participate in care and decision-making at the level they choose  - Honor patient and family perspectives and choices  Outcome: Progressing     Problem: Patient/Family Goals  Goal: Patient/Family Long Term Goal  Description: Patient's Long Term Goal:     Interventions:  -   - See additional Care Plan goals for specific interventions  Outcome: Progressing  Goal: Patient/Family Short Term Goal  Description: Patient's Short Term Goal:     Interventions:   -   - See additional Care Plan goals for specific interventions  Outcome: Progressing     Problem: PAIN - ADULT  Goal: Verbalizes/displays adequate comfort level or patient's stated pain goal  Description: INTERVENTIONS:  - Encourage pt to monitor pain and request assistance  - Assess pain using appropriate pain scale  - Administer analgesics based on type and severity of pain and evaluate response  - Implement non-pharmacological measures as appropriate and evaluate response  - Consider cultural and social influences on pain and pain management  - Manage/alleviate anxiety  - Utilize distraction and/or relaxation techniques  - Monitor for opioid side effects  - Notify MD/LIP if interventions unsuccessful or patient reports new pain  - Anticipate increased pain with activity and pre-medicate as appropriate  Outcome: Progressing     Problem: RISK FOR INFECTION - ADULT  Goal: Absence of fever/infection during anticipated neutropenic  period  Description: INTERVENTIONS  - Monitor WBC  - Administer growth factors as ordered  - Implement neutropenic guidelines  Outcome: Progressing     Problem: SAFETY ADULT - FALL  Goal: Free from fall injury  Description: INTERVENTIONS:  - Assess pt frequently for physical needs  - Identify cognitive and physical deficits and behaviors that affect risk of falls.  - [place] fall precautions as indicated by assessment.  - Educate pt/family on patient safety including physical limitations  - Instruct pt to call for assistance with activity based on assessment  - Modify environment to reduce risk of injury  - Provide assistive devices as appropriate  - Consider OT/PT consult to assist with strengthening/mobility  - Encourage toileting schedule  Outcome: Progressing     Problem: DISCHARGE PLANNING  Goal: Discharge to home or other facility with appropriate resources  Description: INTERVENTIONS:  - Identify barriers to discharge w/pt and caregiver  - Include patient/family/discharge partner in discharge planning  - Arrange for needed discharge resources and transportation as appropriate  - Identify discharge learning needs (meds, wound care, etc)  - Arrange for interpreters to assist at discharge as needed  - Consider post-discharge preferences of patient/family/discharge partner  - Complete POLST form as appropriate  - Assess patient's ability to be responsible for managing their own health  - Refer to Case Management Department for coordinating discharge planning if the patient needs post-hospital services based on physician/LIP order or complex needs related to functional status, cognitive ability or social support system  Outcome: Progressing     Problem: Altered Communication/Language Barrier  Goal: Patient/Family is able to understand and participate in their care  Description: Interventions:  - Assess communication ability and preferred communication style  - Implement communication aides and strategies  - Use  visual cues when possible  - Listen attentively, be patient, do not interrupt  - Minimize distractions  - Allow time for understanding and response  - Establish method for patient to ask for assistance (call light)  - Provide an  as needed  - Communicate barriers and strategies to overcome with those who interact with patient  Outcome: Progressing     Problem: RESPIRATORY - ADULT  Goal: Achieves optimal ventilation and oxygenation  Description: INTERVENTIONS:  - Assess for changes in respiratory status  - Assess for changes in mentation and behavior  - Position to facilitate oxygenation and minimize respiratory effort  - Oxygen supplementation based on oxygen saturation or ABGs  - Provide Smoking Cessation handout, if applicable  - Encourage broncho-pulmonary hygiene including cough, deep breathe, Incentive Spirometry  - Assess the need for suctioning and perform as needed  - Assess and instruct to report SOB or any respiratory difficulty  - Respiratory Therapy support as indicated  - Manage/alleviate anxiety  - Monitor for signs/symptoms of CO2 retention  Outcome: Progressing     Problem: NEUROLOGICAL - ADULT  Goal: Achieves stable or improved neurological status  Description: INTERVENTIONS  - Assess for and report changes in neurological status  - Initiate measures to prevent increased intracranial pressure  - Maintain blood pressure and fluid volume within ordered parameters to optimize cerebral perfusion and minimize risk of hemorrhage  - Monitor temperature, glucose, and sodium. Initiate appropriate interventions as ordered  Outcome: Progressing  Goal: Absence of seizures  Description: INTERVENTIONS  - Monitor for seizure activity  - Administer anti-seizure medications as ordered  - Monitor neurological status  Outcome: Progressing  Goal: Remains free of injury related to seizure activity  Description: INTERVENTIONS:  - Maintain airway, patient safety  and administer oxygen as ordered  - Monitor  patient for seizure activity, document and report duration and description of seizure to MD/LIP  - If seizure occurs, turn patient to side and suction secretions as needed  - Reorient patient post seizure  - Seizure pads on all 4 side rails  - Instruct patient/family to notify RN of any seizure activity  - Instruct patient/family to call for assistance with activity based on assessment  Outcome: Progressing  Goal: Achieves maximal functionality and self care  Description: INTERVENTIONS  - Monitor swallowing and airway patency with patient fatigue and changes in neurological status  - Encourage and assist patient to increase activity and self care with guidance from PT/OT  - Encourage visually impaired, hearing impaired and aphasic patients to use assistive/communication devices  Outcome: Progressing     Problem: Diabetes/Glucose Control  Goal: Glucose maintained within prescribed range  Description: INTERVENTIONS:  - Monitor Blood Glucose as ordered  - Assess for signs and symptoms of hyperglycemia and hypoglycemia  - Administer ordered medications to maintain glucose within target range  - Assess barriers to adequate nutritional intake and initiate nutrition consult as needed  - Instruct patient on self management of diabetes  Outcome: Progressing     Problem: Delirium  Goal: Minimize duration of delirium  Description: Interventions:  - Encourage use of hearing aids, eye glasses  - Promote highest level of mobility daily  - Provide frequent reorientation  - Promote wakefulness i.e. lights on, blinds open  - Promote sleep, encourage patient's normal rest cycle i.e. lights off, TV off, minimize noise and interruptions  - Encourage family to assist in orientation and promotion of home routines  Outcome: Progressing     Problem: Safety Risk - Non-Violent Restraints  Goal: Patient will remain free from self-harm  Description: INTERVENTIONS:  - Apply the least restrictive restraint to prevent harm  - Notify patient and  family of reasons restraints applied  - Assess for any contributing factors to confusion (electrolyte disturbances, delirium, medications)  - Discontinue any unnecessary medical devices as soon as possible  - Assess the patient's physical comfort, circulation, skin condition, hydration, nutrition and elimination needs   - Reorient and redirection as needed  - Assess for the need to continue restraints  Outcome: Not Progressing

## 2024-12-24 NOTE — PROGRESS NOTES
[facility]  part of [facility]    Progress Note    Yin Butcher Patient Status:  Inpatient    1931 MRN [id number]   Location [facility] 2W/SW Attending Carmelina Duong MD   Hosp Day # 12 PCP Mina Walker MD     Chief Complaint: Altered mental status    Subjective:   Subjective:  Pt seen today in bed. Intubated/sedated.    Objective:   Blood pressure (!) 134/92, pulse 114, temperature 98.1 °F (36.7 °C), resp. rate 19, height 5' 7.01\" (1.702 m), weight 124 lb 5.4 oz (56.4 kg), SpO2 94%.  Physical Exam  Vitals and nursing note reviewed.   Constitutional:       Appearance: She is ill-appearing.      Interventions: She is sedated and intubated.   Cardiovascular:      Rate and Rhythm: Tachycardia present. Rhythm irregular.      Pulses: Normal pulses.   Pulmonary:      Effort: Pulmonary effort is normal. She is intubated.      Breath sounds: Decreased breath sounds present.   Abdominal:      General: Bowel sounds are normal.      Palpations: Abdomen is soft.   Skin:     General: Skin is warm and dry.   Neurological:      General: No focal deficit present.         Results:   Lab Results   Component Value Date    WBC 10.2 2024    HGB 8.8 (L) 2024    HCT 27.3 (L) 2024    .0 2024    CREATSERUM 0.38 (L) 2024    BUN 18 2024     (L) 2024    K 4.2 2024     2024    CO2 30.0 2024     (H) 2024    CA 8.8 2024    ALB 4.0 2024    ALKPHO 112 2024    BILT 1.0 (H) 2024    TP 8.2 2024    AST 24 2024    ALT 30 2024    PTT 34.6 2024    INR 1.32 (H) 2024    PT 15.5 (H) 2014    T4F 1.0 2015    TSH 2.138 2024    LIP 35 2024    DDIMER 2.38 (H) 2024    MG 2.0 2024    PHOS 4.1 2024    TROP <0.046 2015    TROPHS 25 2024    CK 32 2024    B12 586 2024       XR CHEST AP PORTABLE   Addended by: CHAYO PALMER on: 4/28/2023 12:43 PM     Modules accepted: Orders     (CPT=71045)    Result Date: 12/24/2024  CONCLUSION:  1. Borderline cardiomegaly.  Tortuous aorta. 2. Right basilar pleural parenchymal abnormality.  Bilateral perihilar interstitial prominence.  Improved aeration left lung base.  Small left-sided effusion remains. 3. ET tube in satisfactory position.    Dictated by (CST): Melecio Jackson MD on 12/24/2024 at 7:41 AM     Finalized by (CST): Melecio Jackson MD on 12/24/2024 at 7:43 AM          XR CHEST AP PORTABLE  (CPT=71045)    Result Date: 12/23/2024  CONCLUSION:   No significant change in the right greater than left pleural effusions with associated bibasilar opacities.    Dictated by (CST): Rohit Avila MD on 12/23/2024 at 7:35 AM     Finalized by (CST): Rohit Avila MD on 12/23/2024 at 7:36 AM               Assessment & Plan:   *Acute respiratory failure with hypercapnia/Aspiration pneumonia  CXR and CT chest reviewed   Intubated/sedated 12/12   S/P 10 day course of antibiotics   Cont nebs   Failed multiple attempts of breathing trial - plan for breathing trial today   Pulmonology following     *HFpEF  Cont lasix  Cardiology following     *Atrial fibrillation with rapid ventricular response (HCC)  Tachy this morning   Cont Eliquis and digoxin  Cardiology following     *Dysphagia   Cont Tube feeds  Aspiration precautions     *Hypotension  Cont midodrine   Cardiology following       DVT prophylaxis: Eliquis  Code status: FULL CODE    MINDI Man MD  12/24/2024

## 2024-12-24 NOTE — PLAN OF CARE
Pt is alert & or x 2, restraint need continued as pt pulls toward et tube when turning. Afib, heart rate increasing when pt agitiated, prn fentanyl, new order in epic. Pt is not on sedation, remains intubated.  Iv sites are saline locked flushed. She is getting midodrine as sched.  Breathing trial done today. She failed within ten min. Repisratory updated dr Espino of this. Heart rate increased to 140's respirations greater than 40.  Her heart rate has been below 100. Except when suctinoning, turning and or cleaning her. She has redness on  sacrum and coxxy, right ankle pressure injusry. Turning pt q 2 hrs. Bite block placed due to pt continouly biting the ET tube.  She has copious oral secretions. Bm incont today. Freq checks to ensure she is clean and dry. Bowie cathetor in place. Tube feeding  to peg tube she has abdominal hernia present. Promote at 50 rate per hr.  100 ml flushes q 4 hrs.   Problem: Patient Centered Care  Goal: Patient preferences are identified and integrated in the patient's plan of care  Description: Interventions:  - What would you like us to know as we care for you?   - Provide timely, complete, and accurate information to patient/family  - Incorporate patient and family knowledge, values, beliefs, and cultural backgrounds into the planning and delivery of care  - Encourage patient/family to participate in care and decision-making at the level they choose  - Honor patient and family perspectives and choices  Outcome: Not Progressing     Problem: Patient/Family Goals  Goal: Patient/Family Long Term Goal  Description: Patient's Long Term Goal:     Interventions:    - See additional Care Plan goals for specific interventions  Outcome: Not Progressing  Goal: Patient/Family Short Term Goal  Description: Patient's Short Term Goal:    Interventions:     - See additional Care Plan goals for specific interventions  Outcome: Not Progressing     Problem: Safety Risk - Non-Violent Restraints  Goal:  Patient will remain free from self-harm  Description: INTERVENTIONS:  - Apply the least restrictive restraint to prevent harm  - Notify patient and family of reasons restraints applied  - Assess for any contributing factors to confusion (electrolyte disturbances, delirium, medications)  - Discontinue any unnecessary medical devices as soon as possible  - Assess the patient's physical comfort, circulation, skin condition, hydration, nutrition and elimination needs   - Reorient and redirection as needed  - Assess for the need to continue restraints  Outcome: Not Progressing     Problem: PAIN - ADULT  Goal: Verbalizes/displays adequate comfort level or patient's stated pain goal  Description: INTERVENTIONS:  - Encourage pt to monitor pain and request assistance  - Assess pain using appropriate pain scale  - Administer analgesics based on type and severity of pain and evaluate response  - Implement non-pharmacological measures as appropriate and evaluate response  - Consider cultural and social influences on pain and pain management  - Manage/alleviate anxiety  - Utilize distraction and/or relaxation techniques  - Monitor for opioid side effects  - Notify MD/LIP if interventions unsuccessful or patient reports new pain  - Anticipate increased pain with activity and pre-medicate as appropriate  Outcome: Not Progressing     Problem: RISK FOR INFECTION - ADULT  Goal: Absence of fever/infection during anticipated neutropenic period  Description: INTERVENTIONS  - Monitor WBC  - Administer growth factors as ordered  - Implement neutropenic guidelines  Outcome: Not Progressing     Problem: SAFETY ADULT - FALL  Goal: Free from fall injury  Description: INTERVENTIONS:  - Assess pt frequently for physical needs  - Identify cognitive and physical deficits and behaviors that affect risk of falls.  - [place] fall precautions as indicated by assessment.  - Educate pt/family on patient safety including physical limitations  -  Instruct pt to call for assistance with activity based on assessment  - Modify environment to reduce risk of injury  - Provide assistive devices as appropriate  - Consider OT/PT consult to assist with strengthening/mobility  - Encourage toileting schedule  Outcome: Not Progressing     Problem: DISCHARGE PLANNING  Goal: Discharge to home or other facility with appropriate resources  Description: INTERVENTIONS:  - Identify barriers to discharge w/pt and caregiver  - Include patient/family/discharge partner in discharge planning  - Arrange for needed discharge resources and transportation as appropriate  - Identify discharge learning needs (meds, wound care, etc)  - Arrange for interpreters to assist at discharge as needed  - Consider post-discharge preferences of patient/family/discharge partner  - Complete POLST form as appropriate  - Assess patient's ability to be responsible for managing their own health  - Refer to Case Management Department for coordinating discharge planning if the patient needs post-hospital services based on physician/LIP order or complex needs related to functional status, cognitive ability or social support system  Outcome: Not Progressing     Problem: Altered Communication/Language Barrier  Goal: Patient/Family is able to understand and participate in their care  Description: Interventions:  - Assess communication ability and preferred communication style  - Implement communication aides and strategies  - Use visual cues when possible  - Listen attentively, be patient, do not interrupt  - Minimize distractions  - Allow time for understanding and response  - Establish method for patient to ask for assistance (call light)  - Provide an  as needed  - Communicate barriers and strategies to overcome with those who interact with patient  Outcome: Not Progressing     Problem: RESPIRATORY - ADULT  Goal: Achieves optimal ventilation and oxygenation  Description: INTERVENTIONS:  - Assess  for changes in respiratory status  - Assess for changes in mentation and behavior  - Position to facilitate oxygenation and minimize respiratory effort  - Oxygen supplementation based on oxygen saturation or ABGs  - Provide Smoking Cessation handout, if applicable  - Encourage broncho-pulmonary hygiene including cough, deep breathe, Incentive Spirometry  - Assess the need for suctioning and perform as needed  - Assess and instruct to report SOB or any respiratory difficulty  - Respiratory Therapy support as indicated  - Manage/alleviate anxiety  - Monitor for signs/symptoms of CO2 retention  Outcome: Not Progressing     Problem: NEUROLOGICAL - ADULT  Goal: Achieves stable or improved neurological status  Description: INTERVENTIONS  - Assess for and report changes in neurological status  - Initiate measures to prevent increased intracranial pressure  - Maintain blood pressure and fluid volume within ordered parameters to optimize cerebral perfusion and minimize risk of hemorrhage  - Monitor temperature, glucose, and sodium. Initiate appropriate interventions as ordered  Outcome: Not Progressing  Goal: Absence of seizures  Description: INTERVENTIONS  - Monitor for seizure activity  - Administer anti-seizure medications as ordered  - Monitor neurological status  Outcome: Not Progressing  Goal: Remains free of injury related to seizure activity  Description: INTERVENTIONS:  - Maintain airway, patient safety  and administer oxygen as ordered  - Monitor patient for seizure activity, document and report duration and description of seizure to MD/LIP  - If seizure occurs, turn patient to side and suction secretions as needed  - Reorient patient post seizure  - Seizure pads on all 4 side rails  - Instruct patient/family to notify RN of any seizure activity  - Instruct patient/family to call for assistance with activity based on assessment  Outcome: Not Progressing  Goal: Achieves maximal functionality and self  care  Description: INTERVENTIONS  - Monitor swallowing and airway patency with patient fatigue and changes in neurological status  - Encourage and assist patient to increase activity and self care with guidance from PT/OT  - Encourage visually impaired, hearing impaired and aphasic patients to use assistive/communication devices  Outcome: Not Progressing     Problem: Diabetes/Glucose Control  Goal: Glucose maintained within prescribed range  Description: INTERVENTIONS:  - Monitor Blood Glucose as ordered  - Assess for signs and symptoms of hyperglycemia and hypoglycemia  - Administer ordered medications to maintain glucose within target range  - Assess barriers to adequate nutritional intake and initiate nutrition consult as needed  - Instruct patient on self management of diabetes  Outcome: Not Progressing     Problem: Delirium  Goal: Minimize duration of delirium  Description: Interventions:  - Encourage use of hearing aids, eye glasses  - Promote highest level of mobility daily  - Provide frequent reorientation  - Promote wakefulness i.e. lights on, blinds open  - Promote sleep, encourage patient's normal rest cycle i.e. lights off, TV off, minimize noise and interruptions  - Encourage family to assist in orientation and promotion of home routines  Outcome: Not Progressing

## 2024-12-24 NOTE — PROGRESS NOTES
Received PT intubated with a (7) ETT secured at (22) on vent with the following settings; BS heard bilaterally, SXN provided as needed. No changes made, RT to continue to monitor.        12/24/24 0623   Vent Information   Vent Mode VC/AC   Settings   FiO2 (%) 30 %   Resp Rate (Set) 12   Vt (Set, mL) 400 mL   Waveform Decelerating ramp   PEEP/CPAP (cm H2O) 5 cm H20   PEEP Low (cm H2O) 2 cm H2O   Peak Flow LPM 55   Trigger Sensitivity Flow (L/min) 2 L/min   Humidification Heater   H2O Bag Level 1/2 Full   Heater Temperature 98.6 °F (37 °C)   Readings   Total RR 13   Minute Ventilation (L/min) 5.4 L/min   Expiratory Tidal Volume 360 mL   PIP Observed (cm H2O) 33 cm H2O   MAP (cm H2O) 9   PEEP Low (cm H2O) 2 cm H2O   Plateau Pressure (cm H2O) 27 cm H2O   Static Compliance (L/cm H2O) 15   Dynamic Compliance (L/cm H2O) 13 L/cm H2O

## 2024-12-25 NOTE — PLAN OF CARE
Received pt w/ daughter bedside. Tolerating vent off sedation. TF infusing through PEG> Bowie intact to gravity. Safety restraints in place for airway protection.    Vent Mode: VC/AC  FiO2 (%):  [30 %] 30 %  S RR:  [12] 12  S VT:  [400 mL] 400 mL  PEEP/CPAP (cm H2O):  [5 cm H20] 5 cm H20  MAP (cm H2O):  [9-10] 10    Endorsed care over to [place] [id number] for staff reassignment.     Problem: Safety Risk - Non-Violent Restraints  Goal: Patient will remain free from self-harm  Description: INTERVENTIONS:  - Apply the least restrictive restraint to prevent harm  - Notify patient and family of reasons restraints applied  - Assess for any contributing factors to confusion (electrolyte disturbances, delirium, medications)  - Discontinue any unnecessary medical devices as soon as possible  - Assess the patient's physical comfort, circulation, skin condition, hydration, nutrition and elimination needs   - Reorient and redirection as needed  - Assess for the need to continue restraints  12/24/2024 2245 by Kris Farah RN  Outcome: Not Progressing    Problem: PAIN - ADULT  Goal: Verbalizes/displays adequate comfort level or patient's stated pain goal  Description: INTERVENTIONS:  - Encourage pt to monitor pain and request assistance  - Assess pain using appropriate pain scale  - Administer analgesics based on type and severity of pain and evaluate response  - Implement non-pharmacological measures as appropriate and evaluate response  - Consider cultural and social influences on pain and pain management  - Manage/alleviate anxiety  - Utilize distraction and/or relaxation techniques  - Monitor for opioid side effects  - Notify MD/LIP if interventions unsuccessful or patient reports new pain  - Anticipate increased pain with activity and pre-medicate as appropriate  Outcome: Progressing     Problem: RISK FOR INFECTION - ADULT  Goal: Absence of fever/infection during anticipated neutropenic period  Description: INTERVENTIONS  -  Monitor WBC  - Administer growth factors as ordered  - Implement neutropenic guidelines  Outcome: Progressing     Problem: SAFETY ADULT - FALL  Goal: Free from fall injury  Description: INTERVENTIONS:  - Assess pt frequently for physical needs  - Identify cognitive and physical deficits and behaviors that affect risk of falls.  - [place] fall precautions as indicated by assessment.  - Educate pt/family on patient safety including physical limitations  - Instruct pt to call for assistance with activity based on assessment  - Modify environment to reduce risk of injury  - Provide assistive devices as appropriate  - Consider OT/PT consult to assist with strengthening/mobility  - Encourage toileting schedule  Outcome: Progressing     Problem: DISCHARGE PLANNING  Goal: Discharge to home or other facility with appropriate resources  Description: INTERVENTIONS:  - Identify barriers to discharge w/pt and caregiver  - Include patient/family/discharge partner in discharge planning  - Arrange for needed discharge resources and transportation as appropriate  - Identify discharge learning needs (meds, wound care, etc)  - Arrange for interpreters to assist at discharge as needed  - Consider post-discharge preferences of patient/family/discharge partner  - Complete POLST form as appropriate  - Assess patient's ability to be responsible for managing their own health  - Refer to Case Management Department for coordinating discharge planning if the patient needs post-hospital services based on physician/LIP order or complex needs related to functional status, cognitive ability or social support system  Outcome: Progressing     Problem: RESPIRATORY - ADULT  Goal: Achieves optimal ventilation and oxygenation  Description: INTERVENTIONS:  - Assess for changes in respiratory status  - Assess for changes in mentation and behavior  - Position to facilitate oxygenation and minimize respiratory effort  - Oxygen supplementation based on  oxygen saturation or ABGs  - Provide Smoking Cessation handout, if applicable  - Encourage broncho-pulmonary hygiene including cough, deep breathe, Incentive Spirometry  - Assess the need for suctioning and perform as needed  - Assess and instruct to report SOB or any respiratory difficulty  - Respiratory Therapy support as indicated  - Manage/alleviate anxiety  - Monitor for signs/symptoms of CO2 retention  Outcome: Progressing     Problem: NEUROLOGICAL - ADULT  Goal: Achieves stable or improved neurological status  Description: INTERVENTIONS  - Assess for and report changes in neurological status  - Initiate measures to prevent increased intracranial pressure  - Maintain blood pressure and fluid volume within ordered parameters to optimize cerebral perfusion and minimize risk of hemorrhage  - Monitor temperature, glucose, and sodium. Initiate appropriate interventions as ordered  Outcome: Progressing  Goal: Absence of seizures  Description: INTERVENTIONS  - Monitor for seizure activity  - Administer anti-seizure medications as ordered  - Monitor neurological status  Outcome: Progressing  Goal: Remains free of injury related to seizure activity  Description: INTERVENTIONS:  - Maintain airway, patient safety  and administer oxygen as ordered  - Monitor patient for seizure activity, document and report duration and description of seizure to MD/LIP  - If seizure occurs, turn patient to side and suction secretions as needed  - Reorient patient post seizure  - Seizure pads on all 4 side rails  - Instruct patient/family to notify RN of any seizure activity  - Instruct patient/family to call for assistance with activity based on assessment  Outcome: Progressing  Goal: Achieves maximal functionality and self care  Description: INTERVENTIONS  - Monitor swallowing and airway patency with patient fatigue and changes in neurological status  - Encourage and assist patient to increase activity and self care with guidance from  PT/OT  - Encourage visually impaired, hearing impaired and aphasic patients to use assistive/communication devices  Outcome: Progressing     Problem: Diabetes/Glucose Control  Goal: Glucose maintained within prescribed range  Description: INTERVENTIONS:  - Monitor Blood Glucose as ordered  - Assess for signs and symptoms of hyperglycemia and hypoglycemia  - Administer ordered medications to maintain glucose within target range  - Assess barriers to adequate nutritional intake and initiate nutrition consult as needed  - Instruct patient on self management of diabetes  Outcome: Progressing

## 2024-12-25 NOTE — SPIRITUAL CARE NOTE
Spiritual Care Visit Note    Patient Name: Yin Butcher Date of Spiritual Care Visit: 24   : 1931 Primary Dx: Sepsis, due to unspecified organism, unspecified whether acute organ dysfunction present (HCC)       Referred By: Referral From:     Spiritual Care Taxonomy:    Intended Effects: Aligning care plan with patient's values    Methods: Collaborate with care team member    Interventions: Assist someone with Advance Directives;Discuss plan of care;Discuss frustrations with someone    Visit Type/Summary:     - PoA: Other: per RN, No POAH needs to be completed at this time.    Spiritual Care support can be requested via an [facility] consult. For urgent/immediate needs, please contact the On Call  at: [place]: ext [phone]    Rev. Carmelina Ulloa MDiv

## 2024-12-25 NOTE — PROGRESS NOTES
[facility]  part of [facility]    Progress Note    Yin Butcher Patient Status:  Inpatient    1931 MRN [id number]   Location [facility] 2W/SW Attending Carmelina Duong MD   Hosp Day # 13 PCP Mina Walker MD     Chief Complaint: Altered mental status    Subjective:   Subjective:  Pt seen today in bed. Intubated/sedated.    Objective:   Blood pressure 110/74, pulse 76, temperature 97.9 °F (36.6 °C), temperature source Temporal, resp. rate 14, height 5' 7.01\" (1.702 m), weight 126 lb 8.7 oz (57.4 kg), SpO2 100%.  Physical Exam  Vitals and nursing note reviewed.   Constitutional:       Appearance: She is ill-appearing.      Interventions: She is sedated and intubated.   Cardiovascular:      Rate and Rhythm: Tachycardia present. Rhythm irregular.      Pulses: Normal pulses.   Pulmonary:      Effort: Pulmonary effort is normal. She is intubated.      Breath sounds: Decreased breath sounds present.   Abdominal:      General: Bowel sounds are normal.      Palpations: Abdomen is soft.   Skin:     General: Skin is warm and dry.   Neurological:      General: No focal deficit present.         Results:   Lab Results   Component Value Date    WBC 12.4 (H) 2024    HGB 8.9 (L) 2024    HCT 28.1 (L) 2024    .0 2024    CREATSERUM 0.42 (L) 2024    BUN 20 2024     (L) 2024    K 4.0 2024    CL 99 2024    CO2 34.0 (H) 2024     (H) 2024    CA 8.9 2024    ALB 4.0 2024    ALKPHO 112 2024    BILT 1.0 (H) 2024    TP 8.2 2024    AST 24 2024    ALT 30 2024    PTT 34.6 2024    INR 1.32 (H) 2024    PT 15.5 (H) 2014    T4F 1.0 2015    TSH 2.138 2024    LIP 35 2024    DDIMER 2.38 (H) 2024    MG 2.0 2024    PHOS 4.1 2024    TROP <0.046 2015    TROPHS 25 2024    CK 32 2024    B12 586 2024       XR CHEST  AP PORTABLE  (CPT=71045)    Result Date: 12/24/2024  CONCLUSION:  1. Borderline cardiomegaly.  Tortuous aorta. 2. Right basilar pleural parenchymal abnormality.  Bilateral perihilar interstitial prominence.  Improved aeration left lung base.  Small left-sided effusion remains. 3. ET tube in satisfactory position.    Dictated by (CST): Melecio Jackson MD on 12/24/2024 at 7:41 AM     Finalized by (CST): Melecio Jackson MD on 12/24/2024 at 7:43 AM               Assessment & Plan:   *Acute respiratory failure with hypercapnia/Aspiration pneumonia  CXR and CT chest reviewed   Intubated/sedated 12/12   S/P 10 day course of antibiotics   Cont nebs   Failed multiple attempts of breathing trial    Pulmonology following     *HFpEF  Cont lasix  Cardiology following     *Atrial fibrillation with rapid ventricular response (HCC)  Rates improved   Cont Eliquis and digoxin  Cardiology following     *Dysphagia   Cont Tube feeds, at goal  Aspiration precautions     *Hypotension  Cont midodrine   Cardiology following       DVT prophylaxis: Eliquis  Code status: FULL CODE      Preethi Recinos MD  12/25/2024

## 2024-12-25 NOTE — PLAN OF CARE
Problem: Patient Centered Care  Goal: Patient preferences are identified and integrated in the patient's plan of care  Description: Interventions:  - What would you like us to know as we care for you?   - Provide timely, complete, and accurate information to patient/family  - Incorporate patient and family knowledge, values, beliefs, and cultural backgrounds into the planning and delivery of care  - Encourage patient/family to participate in care and decision-making at the level they choose  - Honor patient and family perspectives and choices  Outcome: Progressing     Problem: Patient/Family Goals  Goal: Patient/Family Long Term Goal  Description: Patient's Long Term Goal:     Interventions:  - See additional Care Plan goals for specific interventions  Outcome: Progressing  Goal: Patient/Family Short Term Goal  Description: Patient's Short Term Goal:     Interventions:   - See additional Care Plan goals for specific interventions  Outcome: Progressing     Problem: PAIN - ADULT  Goal: Verbalizes/displays adequate comfort level or patient's stated pain goal  Description: INTERVENTIONS:  - Encourage pt to monitor pain and request assistance  - Assess pain using appropriate pain scale  - Administer analgesics based on type and severity of pain and evaluate response  - Implement non-pharmacological measures as appropriate and evaluate response  - Consider cultural and social influences on pain and pain management  - Manage/alleviate anxiety  - Utilize distraction and/or relaxation techniques  - Monitor for opioid side effects  - Notify MD/LIP if interventions unsuccessful or patient reports new pain  - Anticipate increased pain with activity and pre-medicate as appropriate  Outcome: Progressing     Problem: RISK FOR INFECTION - ADULT  Goal: Absence of fever/infection during anticipated neutropenic period  Description: INTERVENTIONS  - Monitor WBC  - Administer growth factors as ordered  - Implement neutropenic  guidelines  Outcome: Progressing     Problem: SAFETY ADULT - FALL  Goal: Free from fall injury  Description: INTERVENTIONS:  - Assess pt frequently for physical needs  - Identify cognitive and physical deficits and behaviors that affect risk of falls.  - [place] fall precautions as indicated by assessment.  - Educate pt/family on patient safety including physical limitations  - Instruct pt to call for assistance with activity based on assessment  - Modify environment to reduce risk of injury  - Provide assistive devices as appropriate  - Consider OT/PT consult to assist with strengthening/mobility  - Encourage toileting schedule  Outcome: Progressing     Problem: DISCHARGE PLANNING  Goal: Discharge to home or other facility with appropriate resources  Description: INTERVENTIONS:  - Identify barriers to discharge w/pt and caregiver  - Include patient/family/discharge partner in discharge planning  - Arrange for needed discharge resources and transportation as appropriate  - Identify discharge learning needs (meds, wound care, etc)  - Arrange for interpreters to assist at discharge as needed  - Consider post-discharge preferences of patient/family/discharge partner  - Complete POLST form as appropriate  - Assess patient's ability to be responsible for managing their own health  - Refer to Case Management Department for coordinating discharge planning if the patient needs post-hospital services based on physician/LIP order or complex needs related to functional status, cognitive ability or social support system  Outcome: Progressing     Problem: Altered Communication/Language Barrier  Goal: Patient/Family is able to understand and participate in their care  Description: Interventions:  - Assess communication ability and preferred communication style  - Implement communication aides and strategies  - Use visual cues when possible  - Listen attentively, be patient, do not interrupt  - Minimize distractions  - Allow  time for understanding and response  - Establish method for patient to ask for assistance (call light)  - Provide an  as needed  - Communicate barriers and strategies to overcome with those who interact with patient  Outcome: Progressing     Problem: RESPIRATORY - ADULT  Goal: Achieves optimal ventilation and oxygenation  Description: INTERVENTIONS:  - Assess for changes in respiratory status  - Assess for changes in mentation and behavior  - Position to facilitate oxygenation and minimize respiratory effort  - Oxygen supplementation based on oxygen saturation or ABGs  - Provide Smoking Cessation handout, if applicable  - Encourage broncho-pulmonary hygiene including cough, deep breathe, Incentive Spirometry  - Assess the need for suctioning and perform as needed  - Assess and instruct to report SOB or any respiratory difficulty  - Respiratory Therapy support as indicated  - Manage/alleviate anxiety  - Monitor for signs/symptoms of CO2 retention  Outcome: Progressing     Problem: NEUROLOGICAL - ADULT  Goal: Achieves stable or improved neurological status  Description: INTERVENTIONS  - Assess for and report changes in neurological status  - Initiate measures to prevent increased intracranial pressure  - Maintain blood pressure and fluid volume within ordered parameters to optimize cerebral perfusion and minimize risk of hemorrhage  - Monitor temperature, glucose, and sodium. Initiate appropriate interventions as ordered  Outcome: Progressing  Goal: Absence of seizures  Description: INTERVENTIONS  - Monitor for seizure activity  - Administer anti-seizure medications as ordered  - Monitor neurological status  Outcome: Progressing  Goal: Remains free of injury related to seizure activity  Description: INTERVENTIONS:  - Maintain airway, patient safety  and administer oxygen as ordered  - Monitor patient for seizure activity, document and report duration and description of seizure to MD/LIP  - If seizure  occurs, turn patient to side and suction secretions as needed  - Reorient patient post seizure  - Seizure pads on all 4 side rails  - Instruct patient/family to notify RN of any seizure activity  - Instruct patient/family to call for assistance with activity based on assessment  Outcome: Progressing  Goal: Achieves maximal functionality and self care  Description: INTERVENTIONS  - Monitor swallowing and airway patency with patient fatigue and changes in neurological status  - Encourage and assist patient to increase activity and self care with guidance from PT/OT  - Encourage visually impaired, hearing impaired and aphasic patients to use assistive/communication devices  Outcome: Progressing     Problem: Diabetes/Glucose Control  Goal: Glucose maintained within prescribed range  Description: INTERVENTIONS:  - Monitor Blood Glucose as ordered  - Assess for signs and symptoms of hyperglycemia and hypoglycemia  - Administer ordered medications to maintain glucose within target range  - Assess barriers to adequate nutritional intake and initiate nutrition consult as needed  - Instruct patient on self management of diabetes  Outcome: Progressing     Problem: Delirium  Goal: Minimize duration of delirium  Description: Interventions:  - Encourage use of hearing aids, eye glasses  - Promote highest level of mobility daily  - Provide frequent reorientation  - Promote wakefulness i.e. lights on, blinds open  - Promote sleep, encourage patient's normal rest cycle i.e. lights off, TV off, minimize noise and interruptions  - Encourage family to assist in orientation and promotion of home routines  Outcome: Progressing     Problem: Safety Risk - Non-Violent Restraints  Goal: Patient will remain free from self-harm  Description: INTERVENTIONS:  - Apply the least restrictive restraint to prevent harm  - Notify patient and family of reasons restraints applied  - Assess for any contributing factors to confusion (electrolyte  disturbances, delirium, medications)  - Discontinue any unnecessary medical devices as soon as possible  - Assess the patient's physical comfort, circulation, skin condition, hydration, nutrition and elimination needs   - Reorient and redirection as needed  - Assess for the need to continue restraints  Outcome: Not Progressing

## 2024-12-25 NOTE — PROGRESS NOTES
[facility]  part of [facility]     Progress Note    Yin Butcher Patient Status:  Inpatient    1931 MRN [id number]   Location [facility] 2W/SW Attending Vika Elmore MD   Hosp Day # 13 PCP Mina Walker MD       Subjective:   Patient seen and examined.  Intubated.  No significant distress.  Did not tolerate spontaneous breathing trial yesterday    Objective:   Blood pressure 98/62, pulse 87, temperature 97.9 °F (36.6 °C), temperature source Temporal, resp. rate 16, height 5' 7.01\" (1.702 m), weight 126 lb 8.7 oz (57.4 kg), SpO2 100%.  Intake/Output:   Last 3 shifts: I/O last 3 completed shifts:  In: 2445 [I.V.:120; NG/GT:2325]  Out: 3425 [Urine:3425]   This shift: No intake/output data recorded.     Vent Settings: Vent Mode: VC/AC  FiO2 (%):  [30 %] 30 %  S RR:  [12] 12  S VT:  [400 mL] 400 mL  PEEP/CPAP (cm H2O):  [5 cm H20] 5 cm H20  MAP (cm H2O):  [9-12] 9    Hemodynamic parameters (last 24 hours):      Scheduled Meds:   Current Facility-Administered Medications   Medication Dose Route Frequency    furosemide (Lasix) 10 mg/mL injection 40 mg  40 mg Intravenous BID (Diuretic)    midazolam (Versed) 2 MG/2ML injection 2 mg  2 mg Intravenous Q1H PRN    glycopyrrolate (Robinul) 0.2 MG/ML injection 0.1 mg  0.1 mg Intravenous Q6H PRN    multivitamin (Tab-A-Jenny/Beta Carotene) tab 1 tablet  1 tablet Per G Tube Daily    ipratropium-albuterol (Duoneb) 0.5-2.5 (3) MG/3ML inhalation solution 3 mL  3 mL Nebulization Q6H PRN    midodrine (ProAmatine) tab 10 mg  10 mg Per NG Tube TID    digoxin (Lanoxin) tab 125 mcg  125 mcg Oral Daily    vancomycin (Firvanq) 50 mg/mL oral solution 125 mg  125 mg Per NG Tube Daily    norepinephrine (Levophed) 4 mg/250mL infusion premix  0.5-50 mcg/min Intravenous Continuous    propofol (Diprivan) 10 mg/mL infusion premix  5-50 mcg/kg/min (Dosing Weight) Intravenous Continuous    fentaNYL (Sublimaze) 50 mcg/mL injection 25 mcg  25 mcg Intravenous Q30  Min PRN    acetaminophen (Ofirmev) 10 mg/mL infusion premix 650 mg  15 mg/kg Intravenous Q6H PRN    dextrose 10% infusion (TPN no rate)   Intravenous Continuous PRN    pancrelipase (Lip-Prot-Amyl) (Zenpep) DR particles cap 10,000 Units  10,000 Units Per G Tube PRN    And    sodium bicarbonate tab 325 mg  325 mg Per G Tube PRN    metoprolol (Lopressor) 5 mg/5mL injection 5 mg  5 mg Intravenous Q4H PRN    apixaban (Eliquis) tab 2.5 mg  2.5 mg Oral BID    albuterol (Ventolin) (5 MG/ML) 0.5% nebulizer solution 5 mg  5 mg Nebulization Q4H PRN    acetaminophen (Tylenol) 160 MG/5ML oral liquid 650 mg  650 mg Per NG Tube Q4H PRN    Or    acetaminophen (Tylenol) rectal suppository 650 mg  650 mg Rectal Q4H PRN    senna (Senokot) 8.8 MG/5ML oral syrup 17.6 mg  10 mL Per NG Tube BID    docusate (Colace) 50 MG/5ML oral solution 100 mg  100 mg Per NG Tube BID    polyethylene glycol (PEG 3350) (Miralax) 17 g oral packet 17 g  17 g Per NG Tube Daily PRN    bisacodyl (Dulcolax) 10 MG rectal suppository 10 mg  10 mg Rectal Daily PRN    chlorhexidine gluconate (Peridex) 0.12 % oral solution 15 mL  15 mL Mouth/Throat BID@0800,2000    mineral oil-white petrolatum (Artificial Tears) 83-15 % ophthalmic ointment 1 Application  1 Application Both Eyes Nightly    famotidine (Pepcid) 20 mg/2mL injection 20 mg  20 mg Intravenous Daily @ 0700    ondansetron (Zofran) 4 MG/2ML injection 4 mg  4 mg Intravenous Q6H PRN    metoclopramide (Reglan) 5 mg/mL injection 10 mg  10 mg Intravenous Q8H PRN       Continuous Infusions:    norepinephrine      propofol Stopped (12/22/24 0900)    dextrose 10%         Physical Exam  Constitutional: Intubated  Eyes: PERRL  ENT: nares pateint  Neck: supple, no JVD  Cardio: RRR, S1 S2  Respiratory: Diminished bibasilar breath sounds with crackles present  GI: abdomen soft, non tender, active bowel sounds, no organomegaly + PEG tube in place  Extremities: no clubbing, cyanosis, edema  Neurologic: Does not follow  commands  Skin: warm, dry      Results:     Lab Results   Component Value Date    WBC 12.4 12/25/2024    HGB 8.9 12/25/2024    HCT 28.1 12/25/2024    .0 12/25/2024    CREATSERUM 0.42 12/25/2024    BUN 20 12/25/2024     12/25/2024    K 4.0 12/25/2024    CL 99 12/25/2024    CO2 34.0 12/25/2024     12/25/2024    CA 8.9 12/25/2024         XR CHEST AP PORTABLE  (CPT=71045)    Result Date: 12/24/2024  CONCLUSION:  1. Borderline cardiomegaly.  Tortuous aorta. 2. Right basilar pleural parenchymal abnormality.  Bilateral perihilar interstitial prominence.  Improved aeration left lung base.  Small left-sided effusion remains. 3. ET tube in satisfactory position.    Dictated by (CST): Melecio Jackson MD on 12/24/2024 at 7:41 AM     Finalized by (CST): Melecio Jackson MD on 12/24/2024 at 7:43 AM                 Assessment   1.  Fevers  2.  Acute encephalopathy  3.  Acute on chronic hypercapnic hypoxemic respiratory failure  4.  Leukocytosis  5.  HFpEF  6.  History of atrial fibrillation  7.  Prior history of pulmonary embolism  9.  Dementia  10.  Dysphagia     Plan   -Patient presents with chief complaint of fevers ongoing altered mental status over the last several days.  Recently discharged on 11/24/2024 from [facility] after hospitalization for presumed aspiration pneumonia.  -Worsening hypercapnic respiratory failure on NIV.  Patient intubated on 12/12/2024.  -Spontaneous breathing trial as tolerated.  However not tolerating thus far with low tidal volumes and tachycardia.  Will try again today.  -CT chest on 12/11/2024 with lower lobe mucous plugging concerning for aspiration with small pleural effusions.  -Completed course of antibiotic therapy  -Chest physiotherapy  -DVT prophylaxis: Eliquis  -Ongoing discussion with daughter regarding patient's overall clinical condition.  Patient made DNR but for now daughter agreeable to reintubation.  Will clarify reintubation status prior to  any potential extubation.      Dayo Espino,   Pulmonary Critical Care Medicine  [facility]

## 2024-12-25 NOTE — PLAN OF CARE
Pt remains intubated on vent. Restraints intact for line/airway safety.    Problem: Safety Risk - Non-Violent Restraints  Goal: Patient will remain free from self-harm  Description: INTERVENTIONS:  - Apply the least restrictive restraint to prevent harm  - Notify patient and family of reasons restraints applied  - Assess for any contributing factors to confusion (electrolyte disturbances, delirium, medications)  - Discontinue any unnecessary medical devices as soon as possible  - Assess the patient's physical comfort, circulation, skin condition, hydration, nutrition and elimination needs   - Reorient and redirection as needed  - Assess for the need to continue restraints  Outcome: Not Progressing

## 2024-12-25 NOTE — PROGRESS NOTES
[facility] - CARDIOLOGY PROGRESS NOTE  Yin Butcher Patient Status:  Inpatient    1931 MRN [id number]   Location [facility] 2W/SW Attending Vika Elmore MD   Hosp Day # 13 PCP Mina Walker MD     Assessment:    1.  Persistent atrial fibrillation.  Satisfactory heart rate control.  On daily digoxin 0.125 mg daily.  Normal renal function.  Metoprolol and diltiazem being withheld due to patient's hypotension.    2.  Historically normal LV systolic function.    3.  Respiratory failure secondary to aspiration, pneumonia, still intubated.  Failing spontaneous breathing trial.  Bilateral pleural effusions, moderate to large on right.      Plan:  Digoxin level low however A-fib rates controlled, no dose change  11 L positive, keeping lungs dry may help with extubation.  Lasix 40 mg IV reduced to daily due to mild contraction alkalosis.    Demarcus Vázquez MD  Interventional Cardiology  [facility]    Subjective:  No chest pain or shortness of breath.    Objective:  /74 (BP Location: Right arm)   Pulse 76   Temp 97.9 °F (36.6 °C) (Temporal)   Resp 14   Ht 67.01\"   Wt 126 lb 8.7 oz (57.4 kg)   SpO2 100%   BMI 19.81 kg/m²     Temp (24hrs), Av.1 °F (36.2 °C), Min:96.3 °F (35.7 °C), Max:97.9 °F (36.6 °C)      Intake/Output:    Intake/Output Summary (Last 24 hours) at 2024 0901  Last data filed at 2024 0516  Gross per 24 hour   Intake 1728 ml   Output 3200 ml   Net -1472 ml       Wt Readings from Last 2 Encounters:   24 126 lb 8.7 oz (57.4 kg)   24 125 lb (56.7 kg)       Physical Exam:    General: Obtunded.  Opens eyes and seems to connect.  No gross distress.  HEENT: No focal deficits.  Neck: No JVD, carotids 2+ no bruits.  Cardiac: Irregular rhythm, mild tachycardia.  S1, S2 normal, no murmur  Lungs: Clear anterolaterally without wheezes, rales,  rhonchi.  Normal excursions and effort.  Abdomen: Soft  Extremities: Without clubbing, cyanosis or edema.  Peripheral pulses are diminished  Skin: Cool and dry.     Labs:  Lab Results   Component Value Date    WBC 12.4 12/25/2024    HGB 8.9 12/25/2024    HCT 28.1 12/25/2024    .0 12/25/2024     Lab Results   Component Value Date    PT 15.5 (H) 07/13/2014    INR 1.32 (H) 01/29/2024     Lab Results   Component Value Date     12/25/2024    K 4.0 12/25/2024    CL 99 12/25/2024    CO2 34.0 12/25/2024    BUN 20 12/25/2024    CREATSERUM 0.42 12/25/2024     12/25/2024    CA 8.9 12/25/2024          Lab Results   Component Value Date    TROP <0.046 02/19/2015        Medications:     furosemide  40 mg Intravenous BID (Diuretic)    multivitamin  1 tablet Per G Tube Daily    midodrine  10 mg Per NG Tube TID    digoxin  125 mcg Oral Daily    vancomycin  125 mg Per NG Tube Daily    apixaban  2.5 mg Oral BID    senna  10 mL Per NG Tube BID    docusate  100 mg Per NG Tube BID    chlorhexidine gluconate  15 mL Mouth/Throat BID@0800,2000    mineral oil-white petrolatum  1 Application Both Eyes Nightly    famotidine  20 mg Intravenous Daily @ 0700      norepinephrine      propofol Stopped (12/22/24 0900)    dextrose 10%

## 2024-12-25 NOTE — RESPIRATORY THERAPY NOTE
Patient received on vent:       12/24/24 1536   Vent Information   Vent Mode VC/AC   Settings   FiO2 (%) 30 %   Resp Rate (Set) 12   Vt (Set, mL) 400 mL   Waveform Decelerating ramp   PEEP/CPAP (cm H2O) 5 cm H20     Bilateral BS auscultated, suction provided as needed. Pt failed SBT, only lasted for 10 mins, became tachycardic so switched back to full support. MD aware. RT will monitor.

## 2024-12-25 NOTE — CONSULTS
Palliative Care Initial Inpatient Consult    Yni Butcher Patient Status:  Inpatient    1931 MRN [id number]   Location [facility] 2W/SW Attending Carmelina Duong MD   Hosp Day # 13 PCP Mina Walker MD     Date of Consult: 2024  Patient seen at: [facility] Inpatient    Reason for Consultation: Consult requested for goals of care and care coordination.    Subjective     History of Present Illness: Yin Butcher is a(n) 89 year old female with past medical history of atrial fibrillation, pulmonary embolism presented to ER with fever, lethargy.  Patient was recently discharged from [facility] where she was admitted for aspiration pneumonia.  Patient was placed on BiPAP in ER.  Admitted to ICU.  Family has wanted her to be full code. Overnight on BiPAP, continued to be tachypneic, ended up getting intubated, has failed SBTs. History was obtained from [facility] and family interview.  Our palliative care service was asked to evaluate the patient for a goals of care discussion and symptom management.      Review of Systems: Palliative Care symptom needs assessed:     Unable to accurately obtain due to intubation    Palliative Care Social History:   Marital Status:    Children: Yes  Living Situation Prior to Admit: Home  Occupational History: Retired  Personal:     Spiritual  Yin Butcher  NA     requested: No    Medical History: obtained from [facility]  Past Medical History:    Arthritis    Atrial fibrillation (HCC)    Back problem    Cancer (HCC)    Cataract    Osteoporosis    Personal history of antineoplastic chemotherapy     Past Surgical History:   Procedure Laterality Date    Other surgical history  2014    Procedure: HIP HEMIARTHROPLASTY/ BIPOLAR;  Surgeon: Vasu Matamoros MD;  Location:  MAIN OR    Removal of tonsils,12+ y/o         Family History: obtained from [facility]  Family History   Family history unknown: Yes        Allergies:  Allergies[1]    Medications:     Current Facility-Administered Medications:     [START ON 12/26/2024] furosemide (Lasix) 10 mg/mL injection 40 mg, 40 mg, Intravenous, Daily    midazolam (Versed) 2 MG/2ML injection 2 mg, 2 mg, Intravenous, Q1H PRN    glycopyrrolate (Robinul) 0.2 MG/ML injection 0.1 mg, 0.1 mg, Intravenous, Q6H PRN    multivitamin (Tab-A-Jenny/Beta Carotene) tab 1 tablet, 1 tablet, Per G Tube, Daily    ipratropium-albuterol (Duoneb) 0.5-2.5 (3) MG/3ML inhalation solution 3 mL, 3 mL, Nebulization, Q6H PRN    midodrine (ProAmatine) tab 10 mg, 10 mg, Per NG Tube, TID    digoxin (Lanoxin) tab 125 mcg, 125 mcg, Oral, Daily    vancomycin (Firvanq) 50 mg/mL oral solution 125 mg, 125 mg, Per NG Tube, Daily    norepinephrine (Levophed) 4 mg/250mL infusion premix, 0.5-50 mcg/min, Intravenous, Continuous    propofol (Diprivan) 10 mg/mL infusion premix, 5-50 mcg/kg/min (Dosing Weight), Intravenous, Continuous    fentaNYL (Sublimaze) 50 mcg/mL injection 25 mcg, 25 mcg, Intravenous, Q30 Min PRN    acetaminophen (Ofirmev) 10 mg/mL infusion premix 650 mg, 15 mg/kg, Intravenous, Q6H PRN    dextrose 10% infusion (TPN no rate), , Intravenous, Continuous PRN    pancrelipase (Lip-Prot-Amyl) (Zenpep) DR particles cap 10,000 Units, 10,000 Units, Per G Tube, PRN **AND** sodium bicarbonate tab 325 mg, 325 mg, Per G Tube, PRN    metoprolol (Lopressor) 5 mg/5mL injection 5 mg, 5 mg, Intravenous, Q4H PRN    apixaban (Eliquis) tab 2.5 mg, 2.5 mg, Oral, BID    albuterol (Ventolin) (5 MG/ML) 0.5% nebulizer solution 5 mg, 5 mg, Nebulization, Q4H PRN    acetaminophen (Tylenol) 160 MG/5ML oral liquid 650 mg, 650 mg, Per NG Tube, Q4H PRN **OR** acetaminophen (Tylenol) rectal suppository 650 mg, 650 mg, Rectal, Q4H PRN    senna (Senokot) 8.8 MG/5ML oral syrup 17.6 mg, 10 mL, Per NG Tube, BID    docusate (Colace) 50 MG/5ML oral solution 100 mg, 100 mg, Per NG Tube, BID    polyethylene glycol (PEG 3350) (Miralax) 17 g  oral packet 17 g, 17 g, Per NG Tube, Daily PRN    bisacodyl (Dulcolax) 10 MG rectal suppository 10 mg, 10 mg, Rectal, Daily PRN    chlorhexidine gluconate (Peridex) 0.12 % oral solution 15 mL, 15 mL, Mouth/Throat, BID@0800,2000    mineral oil-white petrolatum (Artificial Tears) 83-15 % ophthalmic ointment 1 Application, 1 Application, Both Eyes, Nightly    famotidine (Pepcid) 20 mg/2mL injection 20 mg, 20 mg, Intravenous, Daily @ 0700    ondansetron (Zofran) 4 MG/2ML injection 4 mg, 4 mg, Intravenous, Q6H PRN    metoclopramide (Reglan) 5 mg/mL injection 10 mg, 10 mg, Intravenous, Q8H PRN  No current outpatient medications on file.         Palliative Performance Scale: 20 %  % Ambulation Activity Level Self-Care Intake Consciousness   100 Full  Normal  No Disease Full Normal Full   90 Full  Normal  Some Disease Full Normal Full   80 Full  Normal w/effort  Some Disease Full Normal or reduced Full   70 Reduced  Can't Perform Job  Some Disease Full Normal or reduced Full   60 Reduced  Can't Perform Hobby   Significant Disease Occ Assist Normal or reduced Full or confused   50 Mainly sit/lie Can't do any work  Extensive Disease Partial Assist Normal or reduced Full or confused   40 Mainly in bed Can't do any work  Extensive Disease Mainly Assist Normal or reduced Full or confused   30 Bed Bound Can't do any work  Extensive Disease Max Assist  Total Care Reduced  Drowsy/confused   20 Bed Bound Can't do any work  Extensive Disease Max Assist  Total Care Minimal  Drowsy/confused   10 Bed Bound Can't do any work  Extensive Disease Max Assist  Total Care Mouth Care  Drowsy/confused   0 Death        Objective      Vital Signs:  Blood pressure 97/60, pulse 89, temperature 97.9 °F (36.6 °C), temperature source Temporal, resp. rate 10, height 5' 7.01\" (1.702 m), weight 126 lb 8.7 oz (57.4 kg), SpO2 99%.  Body mass index is 19.81 kg/m².  Non-verbal signs of pain present: No    Physical Exam:  General: Intubated, arousable  HEENT:  AT/NC. ETT  Cardiac: RRR  Lungs: mechanical BS  Abdomen: Soft, non-tender, non-distended, normal bowel sounds X 4 quadrants   Extremities: mild-mod Edema present  Skin: Warm and dry.    Hematology:  Lab Results   Component Value Date    WBC 12.4 (H) 12/25/2024    HGB 8.9 (L) 12/25/2024    HCT 28.1 (L) 12/25/2024    .0 12/25/2024       Coags:  Lab Results   Component Value Date    PT 15.5 (H) 07/13/2014    INR 1.32 (H) 01/29/2024    PTT 34.6 01/31/2024       Chemistry:  Lab Results   Component Value Date    CREATSERUM 0.42 (L) 12/25/2024    BUN 20 12/25/2024     (L) 12/25/2024    K 4.0 12/25/2024    CL 99 12/25/2024    CO2 34.0 (H) 12/25/2024     (H) 12/25/2024    CA 8.9 12/25/2024    ALB 4.0 12/11/2024    ALKPHO 112 12/11/2024    BILT 1.0 (H) 12/11/2024    TP 8.2 12/11/2024    AST 24 12/11/2024    ALT 30 12/11/2024    DDIMER 2.38 (H) 01/20/2024    MG 2.0 12/14/2024    PHOS 4.1 12/14/2024    TROP <0.046 02/19/2015       Imaging:  XR CHEST AP PORTABLE  (CPT=71045)    Result Date: 12/25/2024  CONCLUSION:  1. Stable exam.  Small right greater than left pleural effusions are unchanged.  Associated hazy bibasilar opacities, which could relate to any combination of compressive atelectasis or coexistent infection. 2. Stable endotracheal tube position.   Dictated by (CST): Ricco Segundo MD on 12/25/2024 at 9:18 AM     Finalized by (CST): Ricco Segundo MD on 12/25/2024 at 9:20 AM          XR CHEST AP PORTABLE  (CPT=71045)    Result Date: 12/24/2024  CONCLUSION:  1. Borderline cardiomegaly.  Tortuous aorta. 2. Right basilar pleural parenchymal abnormality.  Bilateral perihilar interstitial prominence.  Improved aeration left lung base.  Small left-sided effusion remains. 3. ET tube in satisfactory position.    Dictated by (CST): Melecio Jackson MD on 12/24/2024 at 7:41 AM     Finalized by (CST): Melecio Jackson MD on 12/24/2024 at 7:43 AM           Assessment and Recommendations        Sepsis,  due to unspecified organism, unspecified whether acute organ dysfunction present (HCC)    Acute respiratory failure with hypercapnia (HCC)    Atrial fibrillation with rapid ventricular response (HCC)    Acute on chronic respiratory failure with hypoxia and hypercapnia (HCC)    Palliative care encounter    Counseling regarding advance care planning and goals of care    Symptom Management      NA    2.     Serious Illness Conversation:   Code Status: DNAR/Full  POA: Surrogate is daughter Jada  I met with Jada in person and also present via phone was Librado, her fiance. Jada lives with Prema in [facility], and splits her time in [place], where her fimorena lives. Prema was home apparently until the last several months when she began problems with her effusions, aspiration etc.   I asked if Jada could give me a big picture sense of what was going with her mom. She is able to discuss the details of all of the illnesses and episodes but I also wondered if she felt overall that Prema was improving or declining.   I expressed my view/worry that her lungs are shutting down/have shut down etc to the extent that she is dying from this. I explained the physiology of chronic aspiration in context of dysphagia as a terminal/lethal component of not just dementia or old age but of many complex chronic illnesses.   I explained that I felt that Prema is unfortunately showing us that she is dying, albeit slowly and that we are in a position to decide how much to try and resist or fight natural dying or how much we can decide to support or allow for it.   Prema repeated that her mom had always just said 'she wants everything done' but unfortunately had little more context or nuance. Ie what 'everything' looks like, logistically, financially, quality etc. Librado said that he thinks Prema is pretty understanding of these issues, having dealt with her own  dying and other parents. He and Jada were hoping to involved Prema in the  conversation.   When I spoke to Prema she could nod yes or no in response to basic questions but when I asked about natural dying she closed her eyes and would not answer. Jada saw this and said that she is probably thinking and that she and her fiancee would talk to Prema in more detail. They hoped ideally that they could ask her when she is extubated.  They wanted to know what their deadline is to 'make a decision.' They did not seem opposed to consider compassionate extubation but instead seem very unsure about what to do. They appear open to learning as much as possible about their options.       Palliative Care Follow Up: Palliative care team will Continue to follow while inpatient    Thank you for allowing Palliative Care services to participate in the care of Yin Butcher.    A total of 70 minutes were spent on this visit, which included all of the following: direct face to face contact, history taking, physical examination, and >50% was spent counseling and coordinating care.    Barry Liz MD  12/25/2024  4:18 PM  Palliative Care Services  [facility] [phone]    Note to patient:  The 21st Century Cures Act makes medical notes like these available to patients in the interest of transparency. However, be advised this is a medical document. It is intended as peer to peer communication. It is written in medical language and may contain abbreviations or verbiage that are unfamiliar. It may appear blunt or direct. Medical documents are intended to carry relevant information, facts as evident, and the clinical opinion of the practitioner.           [1]   Allergies  Allergen Reactions    Erythromycin OTHER (SEE COMMENTS)    Oxycodone HALLUCINATION    Gabapentin NAUSEA ONLY and OTHER (SEE COMMENTS)     Pt stated she didn't feel like herself     Tizanidine ITCHING and OTHER (SEE COMMENTS)     Pt states burning sensation

## 2024-12-26 ENCOUNTER — CASE MANAGEMENT (OUTPATIENT)
Dept: CARE COORDINATION | Age: 89
End: 2024-12-26

## 2024-12-26 NOTE — PROGRESS NOTES
12/26/24 0537   Vent Information   Vent Mode VC/AC   Settings   FiO2 (%) 30 %   Resp Rate (Set) 12   Vt (Set, mL) 400 mL   PEEP/CPAP (cm H2O) 5 cm H20   Humidification Heater   ETT   Placement Date/Time: 12/12/24 1158   Airway Size: 7 mm  Cuffed: Cuffed  Insertion attempts: 2  Technique: Video laryngoscopy  Placement Verification: Colorimetric EtCO2 device  Placed By: (c) Other (Comment)   Secured at (cm) 22 cm     Pt. vitals and SpO2 are stable on monitor. Pt. is suctioned and provided. B.S ausculted. No acute changes overnight. Pt. have a fixation of biting the ET tube thus peak alarm goes off frequently, but otherwise no major adverse. RT will continue to monitor.

## 2024-12-26 NOTE — PROGRESS NOTES
12/26/24 1437   Vent Information   Vent Mode VC/AC   Settings   FiO2 (%) 30 %   Resp Rate (Set) 12   Vt (Set, mL) 400 mL   Waveform Decelerating ramp   PEEP/CPAP (cm H2O) 5 cm H20     Received patient intubated/mechanically ventilated on full vent support. Suction provided as needed. Pt biting on ETT- bite block in place

## 2024-12-26 NOTE — PLAN OF CARE
Problem: Safety Risk - Non-Violent Restraints  Goal: Patient will remain free from self-harm  Description: INTERVENTIONS:  - Apply the least restrictive restraint to prevent harm  - Notify patient and family of reasons restraints applied  - Assess for any contributing factors to confusion (electrolyte disturbances, delirium, medications)  - Discontinue any unnecessary medical devices as soon as possible  - Assess the patient's physical comfort, circulation, skin condition, hydration, nutrition and elimination needs   - Reorient and redirection as needed  - Assess for the need to continue restraints  Outcome: Not Progressing     Problem: PAIN - ADULT  Goal: Verbalizes/displays adequate comfort level or patient's stated pain goal  Description: INTERVENTIONS:  - Encourage pt to monitor pain and request assistance  - Assess pain using appropriate pain scale  - Administer analgesics based on type and severity of pain and evaluate response  - Implement non-pharmacological measures as appropriate and evaluate response  - Consider cultural and social influences on pain and pain management  - Manage/alleviate anxiety  - Utilize distraction and/or relaxation techniques  - Monitor for opioid side effects  - Notify MD/LIP if interventions unsuccessful or patient reports new pain  - Anticipate increased pain with activity and pre-medicate as appropriate  Outcome: Progressing     Problem: RISK FOR INFECTION - ADULT  Goal: Absence of fever/infection during anticipated neutropenic period  Description: INTERVENTIONS  - Monitor WBC  - Administer growth factors as ordered  - Implement neutropenic guidelines  Outcome: Progressing     Problem: SAFETY ADULT - FALL  Goal: Free from fall injury  Description: INTERVENTIONS:  - Assess pt frequently for physical needs  - Identify cognitive and physical deficits and behaviors that affect risk of falls.  - [place] fall precautions as indicated by assessment.  - Educate pt/family on patient  safety including physical limitations  - Instruct pt to call for assistance with activity based on assessment  - Modify environment to reduce risk of injury  - Provide assistive devices as appropriate  - Consider OT/PT consult to assist with strengthening/mobility  - Encourage toileting schedule  Outcome: Progressing     Problem: DISCHARGE PLANNING  Goal: Discharge to home or other facility with appropriate resources  Description: INTERVENTIONS:  - Identify barriers to discharge w/pt and caregiver  - Include patient/family/discharge partner in discharge planning  - Arrange for needed discharge resources and transportation as appropriate  - Identify discharge learning needs (meds, wound care, etc)  - Arrange for interpreters to assist at discharge as needed  - Consider post-discharge preferences of patient/family/discharge partner  - Complete POLST form as appropriate  - Assess patient's ability to be responsible for managing their own health  - Refer to Case Management Department for coordinating discharge planning if the patient needs post-hospital services based on physician/LIP order or complex needs related to functional status, cognitive ability or social support system  Outcome: Progressing     Problem: Altered Communication/Language Barrier  Goal: Patient/Family is able to understand and participate in their care  Description: Interventions:  - Assess communication ability and preferred communication style  - Implement communication aides and strategies  - Use visual cues when possible  - Listen attentively, be patient, do not interrupt  - Minimize distractions  - Allow time for understanding and response  - Establish method for patient to ask for assistance (call light)  - Provide an  as needed  - Communicate barriers and strategies to overcome with those who interact with patient  Outcome: Progressing     Problem: RESPIRATORY - ADULT  Goal: Achieves optimal ventilation and  oxygenation  Description: INTERVENTIONS:  - Assess for changes in respiratory status  - Assess for changes in mentation and behavior  - Position to facilitate oxygenation and minimize respiratory effort  - Oxygen supplementation based on oxygen saturation or ABGs  - Provide Smoking Cessation handout, if applicable  - Encourage broncho-pulmonary hygiene including cough, deep breathe, Incentive Spirometry  - Assess the need for suctioning and perform as needed  - Assess and instruct to report SOB or any respiratory difficulty  - Respiratory Therapy support as indicated  - Manage/alleviate anxiety  - Monitor for signs/symptoms of CO2 retention  Outcome: Progressing     Problem: NEUROLOGICAL - ADULT  Goal: Achieves stable or improved neurological status  Description: INTERVENTIONS  - Assess for and report changes in neurological status  - Initiate measures to prevent increased intracranial pressure  - Maintain blood pressure and fluid volume within ordered parameters to optimize cerebral perfusion and minimize risk of hemorrhage  - Monitor temperature, glucose, and sodium. Initiate appropriate interventions as ordered  Outcome: Progressing  Goal: Absence of seizures  Description: INTERVENTIONS  - Monitor for seizure activity  - Administer anti-seizure medications as ordered  - Monitor neurological status  Outcome: Progressing  Goal: Remains free of injury related to seizure activity  Description: INTERVENTIONS:  - Maintain airway, patient safety  and administer oxygen as ordered  - Monitor patient for seizure activity, document and report duration and description of seizure to MD/LIP  - If seizure occurs, turn patient to side and suction secretions as needed  - Reorient patient post seizure  - Seizure pads on all 4 side rails  - Instruct patient/family to notify RN of any seizure activity  - Instruct patient/family to call for assistance with activity based on assessment  Outcome: Progressing  Goal: Achieves maximal  functionality and self care  Description: INTERVENTIONS  - Monitor swallowing and airway patency with patient fatigue and changes in neurological status  - Encourage and assist patient to increase activity and self care with guidance from PT/OT  - Encourage visually impaired, hearing impaired and aphasic patients to use assistive/communication devices  Outcome: Progressing     Problem: Diabetes/Glucose Control  Goal: Glucose maintained within prescribed range  Description: INTERVENTIONS:  - Monitor Blood Glucose as ordered  - Assess for signs and symptoms of hyperglycemia and hypoglycemia  - Administer ordered medications to maintain glucose within target range  - Assess barriers to adequate nutritional intake and initiate nutrition consult as needed  - Instruct patient on self management of diabetes  Outcome: Progressing     Problem: Delirium  Goal: Minimize duration of delirium  Description: Interventions:  - Encourage use of hearing aids, eye glasses  - Promote highest level of mobility daily  - Provide frequent reorientation  - Promote wakefulness i.e. lights on, blinds open  - Promote sleep, encourage patient's normal rest cycle i.e. lights off, TV off, minimize noise and interruptions  - Encourage family to assist in orientation and promotion of home routines  Outcome: Progressing

## 2024-12-26 NOTE — PROGRESS NOTES
[facility]  part of [facility]    Cardiology Progress Note    Yin Butcher Patient Status:  Inpatient    1931 MRN [id number]   Location [facility] 2W/SW Attending Carmelina Duong MD   Hosp Day # 14 PCP Mina Walker MD     Interval History   Remains intubated, not responsive    Assessment and Plan:   Acute hypoxic/hypercapnic respiratory failure, intubated  Encephalopathy  H/o PE  Dementia    Persistent AF  -tele with rate controlled AF  -continue digoxin  -continue weight/age adjusted dose of apixaban (2.5mg BID)    Pulmonary hypertension  Suspected HFpEF  -TTE 24 with elevated R sided filling pressure (45-50mmHg)  -continue IV furosemide 40mg daily to augment respiratory status for extubation and maintain euvolemia    Objective:   Patient Vitals for the past 24 hrs:   BP Temp Temp src Pulse Resp SpO2 Weight   24 0810 -- -- -- 112 -- -- --   24 0800 109/74 -- -- 120 13 95 % --   24 0600 106/61 -- -- 98 15 95 % 126 lb 1.7 oz (57.2 kg)   24 0500 106/84 -- -- 108 20 95 % --   24 0400 106/84 98 °F (36.7 °C) Temporal 106 17 96 % --   24 0300 109/78 -- -- 103 23 95 % --   24 0200 115/82 -- -- 91 13 95 % --   24 0100 102/65 -- -- 89 16 94 % --   24 0000 100/59 98 °F (36.7 °C) Temporal 72 17 100 % --   24 2300 107/79 -- -- 79 26 100 % --   24 2200 117/71 -- -- 94 18 100 % --   24 2100 109/74 -- -- 78 18 100 % --   24 2000 98/65 98.4 °F (36.9 °C) Temporal 78 15 99 % --   24 1900 (!) 80/51 -- -- 69 14 99 % --   24 1800 100/70 -- -- 97 17 100 % --   24 1700 (!) 87/58 -- -- 76 14 100 % --   24 1600 97/60 -- -- 89 10 99 % --   24 1500 112/78 -- -- 100 15 97 % --   24 1400 104/59 -- -- 82 12 99 % --   24 1300 92/59 -- -- 79 15 100 % --   24 1200 114/76 -- -- 97 (!) 29 97 % --   24 1100 112/63 -- -- 112 15 99 % --   24 1015 -- -- -- 93 -- -- --    12/25/24 1000 111/67 -- -- 99 13 100 % --   12/25/24 0900 112/70 -- -- 85 12 100 % --       Intake/Output:   Last 3 shifts:   Intake/Output                   12/24/24 0700 - 12/25/24 0659 12/25/24 0700 - 12/26/24 0659 12/26/24 0700 - 12/27/24 0659       Intake    P.O.  0  --  --    P.O. 0 -- --    I.V.  120  --  --    I.V. 120 -- --    NG/GT  1608  1761  --    Tube Feeding Flushes (mL) 580 600 --    Intake (mL) (Gastrostomy/Enterostomy PEG-jejunostomy LUQ) 1028 1161 --    Total Intake 1728 1761 --       Output    Urine  3200  1275  --    Output (mL) (Urethral Catheter) 3200 1275 --    Emesis/NG output  0  --  --    Emesis 0 -- --    Stool  --  --  --    Stool Count Calculated for I/O -- 1 x --    Total Output 3200 1275 --       Net I/O     -1472 486 --             Vent Settings: Vent Mode: VC/AC  FiO2 (%):  [30 %] 30 %  S RR:  [12] 12  S VT:  [400 mL] 400 mL  PEEP/CPAP (cm H2O):  [5 cm H20] 5 cm H20  MAP (cm H2O):  [8-11] 11    Hemodynamic parameters (last 24 hours):      Scheduled Meds:    furosemide  40 mg Intravenous Daily    multivitamin  1 tablet Per G Tube Daily    midodrine  10 mg Per NG Tube TID    digoxin  125 mcg Oral Daily    vancomycin  125 mg Per NG Tube Daily    apixaban  2.5 mg Oral BID    senna  10 mL Per NG Tube BID    docusate  100 mg Per NG Tube BID    chlorhexidine gluconate  15 mL Mouth/Throat BID@0800,2000    mineral oil-white petrolatum  1 Application Both Eyes Nightly    famotidine  20 mg Intravenous Daily @ 0700       Continuous Infusions:    norepinephrine      propofol Stopped (12/22/24 0900)    dextrose 10%         Results:     Lab Results   Component Value Date    WBC 9.6 12/26/2024    HGB 9.0 (L) 12/26/2024    HCT 30.1 (L) 12/26/2024    .0 12/26/2024    CREATSERUM 0.35 (L) 12/26/2024    BUN 16 12/26/2024     (L) 12/26/2024    K 4.3 12/26/2024    CL 99 12/26/2024    CO2 30.0 12/26/2024     (H) 12/26/2024    CA 9.1 12/26/2024    ALB 4.0 12/11/2024    ALKPHO 112  12/11/2024    BILT 1.0 (H) 12/11/2024    TP 8.2 12/11/2024    AST 24 12/11/2024    ALT 30 12/11/2024    PTT 34.6 01/31/2024    INR 1.32 (H) 01/29/2024    PT 15.5 (H) 07/13/2014    T4F 1.0 02/19/2015    TSH 2.138 12/11/2024    LIP 35 12/11/2024    DDIMER 2.38 (H) 01/20/2024    MG 2.0 12/14/2024    PHOS 4.1 12/14/2024    TROP <0.046 02/19/2015    CK 32 01/20/2024    B12 586 01/23/2024       Recent Labs   Lab 12/24/24  0538 12/25/24  0318 12/26/24  0327   * 127* 107*   BUN 18 20 16   CREATSERUM 0.38* 0.42* 0.35*   CA 8.8 8.9 9.1   * 135* 133*   K 4.2 4.0 4.3    99 99   CO2 30.0 34.0* 30.0     Recent Labs   Lab 12/24/24  0538 12/25/24  0318 12/26/24  0327   RBC 3.04* 3.18* 3.31*   HGB 8.8* 8.9* 9.0*   HCT 27.3* 28.1* 30.1*   MCV 89.8 88.4 90.9   MCH 28.9 28.0 27.2   MCHC 32.2 31.7 29.9*   RDW 20.3* 20.6* 20.5*   NEPRELIM 8.80* 11.14* 8.26*   WBC 10.2 12.4* 9.6   .0 258.0 223.0       Recent Labs   Lab 12/22/24  0452   BNPML 690*       No results for input(s): \"TROP\", \"CK\" in the last 168 hours.    XR CHEST AP PORTABLE  (CPT=71045)    Result Date: 12/26/2024  CONCLUSION: Small bilateral pleural effusions larger on the right.  Bibasilar pulmonary opacities suggesting atelectasis.  No significant interval change.  Stable/satisfactory position of endotracheal tube.    Dictated by (CST): Alo Mullins MD on 12/26/2024 at 7:24 AM     Finalized by (CST): Alo Mullins MD on 12/26/2024 at 7:25 AM          XR CHEST AP PORTABLE  (CPT=71045)    Result Date: 12/25/2024  CONCLUSION:  1. Stable exam.  Small right greater than left pleural effusions are unchanged.  Associated hazy bibasilar opacities, which could relate to any combination of compressive atelectasis or coexistent infection. 2. Stable endotracheal tube position.   Dictated by (CST): Ricco Segundo MD on 12/25/2024 at 9:18 AM     Finalized by (CST): Ricco Segundo MD on 12/25/2024 at 9:20 AM          XR CHEST AP PORTABLE  (CPT=71045)    Result  Date: 2024  CONCLUSION:  1. Borderline cardiomegaly.  Tortuous aorta. 2. Right basilar pleural parenchymal abnormality.  Bilateral perihilar interstitial prominence.  Improved aeration left lung base.  Small left-sided effusion remains. 3. ET tube in satisfactory position.    Dictated by (CST): Melecio Jackson MD on 2024 at 7:41 AM     Finalized by (CST): Melecio Jackson MD on 2024 at 7:43 AM             CARD ECHO 2D DOPPLER (CPT=93306)    Result Date: 2024  Transthoracic Echocardiogram Name:Yin Butcher Date: 2024 :  1931 Ht:  (67in)  BP: 121 / 78 MRN:  [id number]    Age:  89years    Wt:  (92lb)  HR: 110bpm Loc:  [facility]       Gndr: F          BSA: 1.45m^2 Sonographer: Rachael HAMMER Ordering:    cleve Hawthorne Consulting:  Dayo Espino MD ---------------------------------------------------------------------------- History/Indications:   Atrial fibrillation. Fever. Shortness of breath. ---------------------------------------------------------------------------- Procedure information:  A transthoracic complete 2D study was performed. Additional evaluation included M-mode, complete spectral Doppler, and color Doppler.  Patient status:  Inpatient.  Location:  [facility]    Comparison was made to the study of 2024.    This was a routine study. Transthoracic echocardiography for ventricular function evaluation and assessment of valvular function. Image quality was adequate. ECG rhythm:   Atrial fibrillation ---------------------------------------------------------------------------- Conclusions: 1. Left ventricle: The cavity size was below normal. Wall thickness was    increased. Systolic function was hyperdynamic. The estimated ejection    fraction was 70-75%, by visual assessment. No diagnostic evidence for    regional wall motion abnormalities. Unable to assess LV diastolic    function due to heart rhythm. 2. Right ventricle: Systolic function was mildly  reduced. The tricuspid    annular plane systolic excursion is 1.26cm. The RV s' is 9.2cm/sec. 3. Left atrium: The left atrial volume was markedly increased. 4. Right atrium: The atrium was markedly dilated. 5. Aortic valve: The peak systolic velocity was 1.54m/sec. The mean systolic    gradient was 5mm Hg. The valve area (VTI) was 1.58cm^2. The valve area    (VTI) index was 1.09cm^2/m^2. 6. Mitral valve: The annulus was markedly calcified. The leaflets were    mildly calcified. Cannot exclude a vegetation. There was mild    regurgitation. The mean diastolic gradient was 4mm Hg at a heart rate of    120 bpm. 7. Pulmonary arteries: Systolic pressure was moderately increased, in the    range of 45mm Hg to 50mm Hg. 8. Pericardium, extracardiac: There were bilateral pleural effusions    present. Impressions:  This study is compared with previous dated 1/21/24: * ---------------------------------------------------------------------------- * Findings: Left ventricle:  The cavity size was below normal. Wall thickness was increased. Systolic function was hyperdynamic. The estimated ejection fraction was 70-75%, by visual assessment. No diagnostic evidence for regional wall motion abnormalities. Unable to assess LV diastolic function due to heart rhythm. Left atrium:  The left atrial volume was markedly increased. Right ventricle:  The cavity size was normal. Systolic function was mildly reduced. Right atrium:  The atrium was markedly dilated. Mitral valve:  Poorly visualized. The annulus was markedly calcified. The leaflets were mildly calcified. Leaflet separation was normal.  Cannot exclude a vegetation.  Doppler:  Transvalvular velocity was within the normal range. There was no evidence for stenosis. There was mild regurgitation.    The valve area (LVOT continuity) was 2.01cm^2. The valve area index (LVOT continuity) was 1.38cm^2/m^2.    The mean diastolic gradient was 4mm Hg at a heart rate of 120 bpm. Aortic valve:    The valve was probably trileaflet. The leaflets were moderately calcified.  Doppler:     The valve area (VTI) was 1.58cm^2. The valve area (VTI) index was 1.09cm^2/m^2.    The mean systolic gradient was 5mm Hg. The peak systolic gradient was 13mm Hg. Tricuspid valve:  The valve is structurally normal. Leaflet separation was normal.  Doppler:  Transvalvular velocity was within the normal range. There was no evidence for stenosis. There was mild regurgitation. Pulmonic valve:   The valve is structurally normal. Cusp separation was normal.  Doppler:  Transvalvular velocity was within the normal range. There was no evidence for stenosis. There was trace regurgitation. Pericardium:   No significant pericardial effusion was identified. Pleura:  There were bilateral pleural effusions present. Aorta: Aortic root: The aortic root was normal. Ascending aorta: The ascending aorta was normal. Pulmonary arteries: Systolic pressure was moderately increased, in the range of 45mm Hg to 50mm Hg. ---------------------------------------------------------------------------- Measurements  Left              Value             Ref       01/21/2024  ventricle  IVS           (H) 1.2   cm          0.6 - 0.9 0.9  thickness,  ED, PLAX  LV ID, ED,    (L) 2.6   cm          3.8 - 5.2 3.6  PLAX  LV ID, ES,    (L) 1.6   cm          2.2 - 3.5 2.2  PLAX  LV PW         (H) 1.1   cm          0.6 - 0.9 0.8  thickness,  ED, PLAX  IVS/LV PW         1.09              --------- 1.10  ratio, ED,  PLAX  LV PW/LV ID       0.42              --------- 0.23  ratio, ED,  PLAX  LV ejection       71    %           54 - 74   70  fraction  Stroke            28    ml/m^2      --------- ----------  volume/bsa,  2D  LVOT              Value             Ref       01/21/2024  LVOT ID           2     cm          --------- ----------  LVOT peak         0.82  m/sec       --------- ----------  velocity, S  LVOT VTI, S       13.2  cm          --------- ----------  LVOT peak          3     mm Hg       --------- ----------  gradient, S  LVOT mean         1     mm Hg       --------- ----------  gradient, S  Stroke volume     41    ml          --------- ----------  (SV), LVOT DP  Cardiac           4.3   L/min       --------- ----------  output (Qs),  LVOT DP  Cardiac index     3     L/(min-m^2) --------- ----------  (Qs/bsa),  LVOT DP  Stroke index      29    ml/m^2      --------- ----------  (SV/bsa),  LVOT DP  Aortic valve      Value             Ref       01/21/2024  Aortic valve      1.54  m/sec       --------- ----------  peak  velocity, S  Aortic valve      23.3  cm          --------- ----------  VTI, S  Aortic mean       5     mm Hg       --------- ----------  gradient, S  Aortic peak       13    mm Hg       --------- ----------  gradient, S  Aortic valve      1.58  cm^2        --------- ----------  area, VTI  Aortic valve      1.09  cm^2/m^2    --------- ----------  area/bsa, VTI  Velocity          0.46              --------- ----------  ratio, peak,  LVOT/AV  Aortic root       Value             Ref       01/21/2024  Aortic root       3.3   cm          2.3 - 3.7 ----------  ID  Ascending         Value             Ref       01/21/2024  aorta  Ascending         2.9   cm          1.9 - 3.5 ----------  aorta ID  Left atrium       Value             Ref       01/21/2024  LA ID, A-P,       3.5   cm          2.7 - 3.8 4.0  ES  LA volume, S  (H) 100   ml          22 - 52   ----------  LA            (H) 69    ml/m^2      16 - 34   ----------  volume/bsa, S  LA volume,    (H) 114   ml          22 - 52   ----------  ES, 1-p A4C  LA volume,    (H) 88    ml          22 - 52   ----------  ES, 1-p A2C  LA volume,        108   ml          --------- ----------  ES, A/L  LA            (H) 74    ml/m^2      16 - 34   ----------  volume/bsa,  ES, A/L  LA/aortic         1.06              --------- 1.33  root ratio  Mitral valve      Value             Ref       01/21/2024  Mitral mean       4     mm Hg        --------- ----------  gradient, D  Mitral peak       7     mm Hg       --------- ----------  gradient, D  Mitral valve      2.01  cm^2        --------- ----------  area, LVOT  continuity  Mitral valve      1.38  cm^2/m^2    --------- ----------  area/bsa,  LVOT  continuity  Pulmonary         Value             Ref       01/21/2024  artery  PA pressure,      47    mm Hg       --------- 35  S, DP  Tricuspid         Value             Ref       01/21/2024  valve  Tricuspid     (H) 3.12  m/sec       <=2.8     2.81  regurg peak  velocity  Tricuspid         39    mm Hg       --------- 32  peak RV-RA  gradient  Systemic          Value             Ref       01/21/2024  veins  Estimated CVP     8     mm Hg       --------- 3  Right             Value             Ref       01/21/2024  ventricle  TAPSE, MM     (L) 1.26  cm          >=1.70    ----------  RV pressure,      47    mm Hg       --------- 35  S, DP  RV s',        (L) 9.2   cm/sec      >=9.5     ----------  lateral Legend: (L)  and  (H)  corinna values outside specified reference range. ---------------------------------------------------------------------------- Prepared and electronically signed by Yonatan Trevino 12/12/2024 08:57        Exam:     Physical Exam:  General: intubated, not responsive  HEENT: Normocephalic, anicteric sclera, neck supple, no thyromegaly or adenopathy.  Neck: No JVD, carotids 2+, no bruits.  [facility]  Abdomen: Soft, non-tender. No organosplenomegally, mass or rebound, BS-present.  Extremities: No edema.    Neurologic: unable to obtain  Skin: Warm and dry.     Marquise Riley, [facility] [place]

## 2024-12-26 NOTE — CM/SW NOTE
MD order received regarding POLST form.  The pt's family is being followed by palliative care and deciding on GOC.    SW/CM and palliative will remain available for a POLST form pending progress while admitted and plan.      The pt's code status is currently DNAR/Full Tx.        LENNIE Tanner ext [phone]

## 2024-12-26 NOTE — CM/SW NOTE
Palliative continues to meet with family.  Remains on vent support.    Kimmie Dial MBA BSN RN CRRN   RN Case Manager  [phone]

## 2024-12-26 NOTE — PROGRESS NOTES
[facility]  part of [facility]    Progress Note    Yin Butcher Patient Status:  Inpatient    1931 MRN [id number]   Location [facility] 2W/SW Attending Carmelina Duong MD   Hosp Day # 14 PCP Mina Walker MD     Chief Complaint: Altered mental status    Subjective:   Subjective:  Pt seen today in bed. Intubated/sedated. Daughter at bedside, all questions answered.    Objective:   Blood pressure 96/72, pulse 108, temperature 98 °F (36.7 °C), temperature source Temporal, resp. rate 12, height 5' 7.01\" (1.702 m), weight 126 lb 1.7 oz (57.2 kg), SpO2 94%.  Physical Exam  Vitals and nursing note reviewed.   Constitutional:       Appearance: She is ill-appearing.      Interventions: She is sedated and intubated.   Cardiovascular:      Rate and Rhythm: Tachycardia present. Rhythm irregular.      Pulses: Normal pulses.   Pulmonary:      Effort: Pulmonary effort is normal. She is intubated.      Breath sounds: Decreased breath sounds present.   Abdominal:      General: Bowel sounds are normal.      Palpations: Abdomen is soft.   Skin:     General: Skin is warm and dry.   Neurological:      General: No focal deficit present.         Results:   Lab Results   Component Value Date    WBC 9.6 2024    HGB 9.0 (L) 2024    HCT 30.1 (L) 2024    .0 2024    CREATSERUM 0.35 (L) 2024    BUN 16 2024     (L) 2024    K 4.3 2024    CL 99 2024    CO2 30.0 2024     (H) 2024    CA 9.1 2024    ALB 4.0 2024    ALKPHO 112 2024    BILT 1.0 (H) 2024    TP 8.2 2024    AST 24 2024    ALT 30 2024    PTT 34.6 2024    INR 1.32 (H) 2024    PT 15.5 (H) 2014    T4F 1.0 2015    TSH 2.138 2024    LIP 35 2024    DDIMER 2.38 (H) 2024    MG 2.0 2024    PHOS 4.1 2024    TROP <0.046 2015    TROPHS 25 2024    CK 32 2024     B12 586 01/23/2024       XR CHEST AP PORTABLE  (CPT=71045)    Result Date: 12/26/2024  CONCLUSION: Small bilateral pleural effusions larger on the right.  Bibasilar pulmonary opacities suggesting atelectasis.  No significant interval change.  Stable/satisfactory position of endotracheal tube.    Dictated by (CST): Alo Mullins MD on 12/26/2024 at 7:24 AM     Finalized by (CST): Alo Mullins MD on 12/26/2024 at 7:25 AM          XR CHEST AP PORTABLE  (CPT=71045)    Result Date: 12/25/2024  CONCLUSION:  1. Stable exam.  Small right greater than left pleural effusions are unchanged.  Associated hazy bibasilar opacities, which could relate to any combination of compressive atelectasis or coexistent infection. 2. Stable endotracheal tube position.   Dictated by (CST): Ricco Segundo MD on 12/25/2024 at 9:18 AM     Finalized by (CST): Rcico Segundo MD on 12/25/2024 at 9:20 AM               Assessment & Plan:   *Acute respiratory failure with hypercapnia/Aspiration pneumonia  CXR and CT chest reviewed   Intubated/sedated 12/12   S/P 10 day course of antibiotics   Cont nebs   Failed multiple attempts of breathing trial    Pulmonology following   Palliative consulted, recs appreciated  Poor prognosis    *HFpEF  Cont lasix  Cardiology following     *Atrial fibrillation with rapid ventricular response (HCC)  Rates improved   Cont Eliquis and digoxin  Cardiology following     *Dysphagia   Cont Tube feeds, at goal  Aspiration precautions     *Hypotension  Cont midodrine   Cardiology following       DVT prophylaxis: Eliquis  Code status: DNR/Full tx      Preethi Recinos MD  12/26/2024

## 2024-12-26 NOTE — PROGRESS NOTES
[facility]  part of [facility]     Progress Note    Yin Butcher Patient Status:  Inpatient    1931 MRN [id number]   Location [facility] 2W/SW Attending Vika Elmore MD   Hosp Day # 14 PCP Mina Walker MD       Subjective:   Patient seen and examined.  Intubated.  No significant distress.  Arousable.  Did not tolerate spontaneous breathing trial yesterday    Objective:   Blood pressure 97/68, pulse 116, temperature 98 °F (36.7 °C), temperature source Temporal, resp. rate 12, height 5' 7.01\" (1.702 m), weight 126 lb 1.7 oz (57.2 kg), SpO2 96%.  Intake/Output:   Last 3 shifts: I/O last 3 completed shifts:  In: 2558 [I.V.:40; NG/GT:2518]  Out:  [Urine:]   This shift: No intake/output data recorded.     Vent Settings: Vent Mode: VC/AC  FiO2 (%):  [30 %] 30 %  S RR:  [12] 12  S VT:  [400 mL] 400 mL  PEEP/CPAP (cm H2O):  [5 cm H20] 5 cm H20  MAP (cm H2O):  [8-11] 11    Hemodynamic parameters (last 24 hours):      Scheduled Meds:   Current Facility-Administered Medications   Medication Dose Route Frequency    furosemide (Lasix) 10 mg/mL injection 40 mg  40 mg Intravenous Daily    midazolam (Versed) 2 MG/2ML injection 2 mg  2 mg Intravenous Q1H PRN    glycopyrrolate (Robinul) 0.2 MG/ML injection 0.1 mg  0.1 mg Intravenous Q6H PRN    multivitamin (Tab-A-Jenny/Beta Carotene) tab 1 tablet  1 tablet Per G Tube Daily    ipratropium-albuterol (Duoneb) 0.5-2.5 (3) MG/3ML inhalation solution 3 mL  3 mL Nebulization Q6H PRN    midodrine (ProAmatine) tab 10 mg  10 mg Per NG Tube TID    digoxin (Lanoxin) tab 125 mcg  125 mcg Oral Daily    vancomycin (Firvanq) 50 mg/mL oral solution 125 mg  125 mg Per NG Tube Daily    norepinephrine (Levophed) 4 mg/250mL infusion premix  0.5-50 mcg/min Intravenous Continuous    propofol (Diprivan) 10 mg/mL infusion premix  5-50 mcg/kg/min (Dosing Weight) Intravenous Continuous    fentaNYL (Sublimaze) 50 mcg/mL injection 25 mcg  25 mcg Intravenous Q30  Min PRN    acetaminophen (Ofirmev) 10 mg/mL infusion premix 650 mg  15 mg/kg Intravenous Q6H PRN    dextrose 10% infusion (TPN no rate)   Intravenous Continuous PRN    pancrelipase (Lip-Prot-Amyl) (Zenpep) DR particles cap 10,000 Units  10,000 Units Per G Tube PRN    And    sodium bicarbonate tab 325 mg  325 mg Per G Tube PRN    metoprolol (Lopressor) 5 mg/5mL injection 5 mg  5 mg Intravenous Q4H PRN    apixaban (Eliquis) tab 2.5 mg  2.5 mg Oral BID    albuterol (Ventolin) (5 MG/ML) 0.5% nebulizer solution 5 mg  5 mg Nebulization Q4H PRN    acetaminophen (Tylenol) 160 MG/5ML oral liquid 650 mg  650 mg Per NG Tube Q4H PRN    Or    acetaminophen (Tylenol) rectal suppository 650 mg  650 mg Rectal Q4H PRN    senna (Senokot) 8.8 MG/5ML oral syrup 17.6 mg  10 mL Per NG Tube BID    docusate (Colace) 50 MG/5ML oral solution 100 mg  100 mg Per NG Tube BID    polyethylene glycol (PEG 3350) (Miralax) 17 g oral packet 17 g  17 g Per NG Tube Daily PRN    bisacodyl (Dulcolax) 10 MG rectal suppository 10 mg  10 mg Rectal Daily PRN    chlorhexidine gluconate (Peridex) 0.12 % oral solution 15 mL  15 mL Mouth/Throat BID@0800,2000    mineral oil-white petrolatum (Artificial Tears) 83-15 % ophthalmic ointment 1 Application  1 Application Both Eyes Nightly    famotidine (Pepcid) 20 mg/2mL injection 20 mg  20 mg Intravenous Daily @ 0700    ondansetron (Zofran) 4 MG/2ML injection 4 mg  4 mg Intravenous Q6H PRN    metoclopramide (Reglan) 5 mg/mL injection 10 mg  10 mg Intravenous Q8H PRN       Continuous Infusions:    norepinephrine      propofol Stopped (12/22/24 0900)    dextrose 10%         Physical Exam  Constitutional: Intubated  Eyes: PERRL  ENT: nares pateint  Neck: supple, no JVD  Cardio: RRR, S1 S2  Respiratory: Diminished bibasilar breath sounds with crackles present  GI: abdomen soft, non tender, active bowel sounds, no organomegaly + PEG tube in place  Extremities: no clubbing, cyanosis, edema  Neurologic: Does not follow  commands  Skin: warm, dry      Results:     Lab Results   Component Value Date    WBC 9.6 12/26/2024    HGB 9.0 12/26/2024    HCT 30.1 12/26/2024    .0 12/26/2024    CREATSERUM 0.35 12/26/2024    BUN 16 12/26/2024     12/26/2024    K 4.3 12/26/2024    CL 99 12/26/2024    CO2 30.0 12/26/2024     12/26/2024    CA 9.1 12/26/2024         XR CHEST AP PORTABLE  (CPT=71045)    Result Date: 12/26/2024  CONCLUSION: Small bilateral pleural effusions larger on the right.  Bibasilar pulmonary opacities suggesting atelectasis.  No significant interval change.  Stable/satisfactory position of endotracheal tube.    Dictated by (CST): Alo Mullins MD on 12/26/2024 at 7:24 AM     Finalized by (CST): Alo Mullins MD on 12/26/2024 at 7:25 AM          XR CHEST AP PORTABLE  (CPT=71045)    Result Date: 12/25/2024  CONCLUSION:  1. Stable exam.  Small right greater than left pleural effusions are unchanged.  Associated hazy bibasilar opacities, which could relate to any combination of compressive atelectasis or coexistent infection. 2. Stable endotracheal tube position.   Dictated by (CST): Ricco Sgeundo MD on 12/25/2024 at 9:18 AM     Finalized by (CST): Ricco Segundo MD on 12/25/2024 at 9:20 AM                 Assessment   1.  Fevers  2.  Acute encephalopathy  3.  Acute on chronic hypercapnic hypoxemic respiratory failure  4.  Leukocytosis  5.  HFpEF  6.  History of atrial fibrillation  7.  Prior history of pulmonary embolism  9.  Dementia  10.  Dysphagia     Plan   -Patient presents with chief complaint of fevers ongoing altered mental status over the last several days.  Recently discharged on 11/24/2024 from [facility] after hospitalization for presumed aspiration pneumonia.  -Worsening hypercapnic respiratory failure on NIV.  Patient intubated on 12/12/2024.  -Spontaneous breathing trial as tolerated.  However not tolerating thus far with low tidal volumes and tachycardia.  Will try again  today.  -CT chest on 12/11/2024 with lower lobe mucous plugging concerning for aspiration with small pleural effusions.  -Completed course of antibiotic therapy  -Chest physiotherapy  -DVT prophylaxis: Eliquis  -Had another discussion with daughter regarding the patient's poor prognosis.  I do not believe patient is appropriate for tracheostomy given her poor baseline functional status and age.  I recommended to the daughter that she needs to make decision regarding her reintubation status.  I recommended extubation in next 1 to 2 days and consideration of comfort care measures if clinically declining.  Also discussed case with Dr. Liz from palliative care who is having ongoing discussions with daughter regarding goals of care.  Prognosis overall is poor      Dayo Espino,   Pulmonary Critical Care Medicine  [facility]

## 2024-12-26 NOTE — PALLIATIVE CARE NOTE
Brief Palliative Care Note    I called Jada's fiance and he is booking a trip to arrive tomorrow in the late morning. He gave me some contextual information about Prema and Jada. He said he's known them for 23 years and discussing aging and dying is simply avoided at all cost. He says that Prema has been diagnosed with dementia even several years ago and it is very evident to him but Jada will not acknowledge that there is any cognitive change in her mother.     He said that Prema has told both of them in no uncertain terms that she does NOT want to be kept alive on life support. He says that he has to gently  Jada to try to focus on Prema, and 'not make this about her.' He says that Prema has prayed with him, done last rites, etc that she herself is not afraid of dying and is ready, its just her daughter who doesn't understand.     I feel that today's meeting with Jada was helpful to sit with difficult emotions and will be helpful to have her fiance in person tomorrow morning. I think that she will ultimately understand and agree with DNI. I recommend that in our approach we not try to convince or corner Jada alone, but only with a nurse she has rapport with or myself and her fiance.     Barry Liz MD  Palliative Care Services  [facility] [phone]

## 2024-12-26 NOTE — RESPIRATORY THERAPY NOTE
SBT: PASS/FAIL? (Fail)  Start time:  1151  Settings:  Pressure Support: 5    CPAP: 5    FiO2:  30%  Reason for Failure (if present): Low Vts w/ elevated RSBI (129), tachycardia >120    Weaning Parameters:     12/25/24 1151 12/25/24 1217   Spontaneous Parameters   Spontaneous RR Rate 22 27   Spontaneous Minute Volume 4.1  --    Average Spontaneous Tidal Volume 200 200   $ Spontaneous Vital Capacity 0.23  --    Negative Inspiratory Force -18  --    Total RSBI 118 129       Returned to Full support: (Time:1218)  MD notified: (Physician: Dr Espino)  Current Ventilator Settings:   - Mode: AC/VC   - Rate: 12   - Tidal Volume:400   - FiO2: 30   - PEEP 5    Suction provided as needed. ETT remains secured at 22cm at the lip. RT to continue to monitor.

## 2024-12-26 NOTE — PROGRESS NOTES
Palliative Care Progress Note    Yin Butcher Patient Status:  Inpatient    1931 MRN [id number]   Location [facility] 2W/SW Attending Carmelina Duong MD   Hosp Day # 14 PCP Mina Walker MD     Date of Consult: 2024  Patient seen at: [facility] In[facility]    Reason for Consultation: Consult requested for goals of care and care coordination.    Subjective     S: No events. Daughter at bedside.     Review of Systems: Palliative Care symptom needs assessed:     Unable to accurately obtain due to intubation    Palliative Care Social History:   Marital Status:    Children: Yes  Living Situation Prior to Admit: Home  Occupational History: Retired  Personal:     Spiritual  Yin Butcher  NA     requested: No    Medical History: obtained from [facility]  Past Medical History:    Arthritis    Atrial fibrillation (HCC)    Back problem    Cancer (HCC)    Cataract    Osteoporosis    Personal history of antineoplastic chemotherapy     Past Surgical History:   Procedure Laterality Date    Other surgical history  2014    Procedure: HIP HEMIARTHROPLASTY/ BIPOLAR;  Surgeon: Vasu Matamoros MD;  Location:  MAIN OR    Removal of tonsils,12+ y/o         Family History: obtained from [facility]  Family History   Family history unknown: Yes       Allergies:  Allergies[1]    Medications:     Current Facility-Administered Medications:     furosemide (Lasix) 10 mg/mL injection 40 mg, 40 mg, Intravenous, Daily    midazolam (Versed) 2 MG/2ML injection 2 mg, 2 mg, Intravenous, Q1H PRN    glycopyrrolate (Robinul) 0.2 MG/ML injection 0.1 mg, 0.1 mg, Intravenous, Q6H PRN    multivitamin (Tab-A-Jenny/Beta Carotene) tab 1 tablet, 1 tablet, Per G Tube, Daily    ipratropium-albuterol (Duoneb) 0.5-2.5 (3) MG/3ML inhalation solution 3 mL, 3 mL, Nebulization, Q6H PRN    midodrine (ProAmatine) tab 10 mg, 10 mg, Per NG Tube, TID    digoxin (Lanoxin) tab 125 mcg, 125 mcg, Oral, Daily    vancomycin (Firvanq)  50 mg/mL oral solution 125 mg, 125 mg, Per NG Tube, Daily    norepinephrine (Levophed) 4 mg/250mL infusion premix, 0.5-50 mcg/min, Intravenous, Continuous    propofol (Diprivan) 10 mg/mL infusion premix, 5-50 mcg/kg/min (Dosing Weight), Intravenous, Continuous    fentaNYL (Sublimaze) 50 mcg/mL injection 25 mcg, 25 mcg, Intravenous, Q30 Min PRN    acetaminophen (Ofirmev) 10 mg/mL infusion premix 650 mg, 15 mg/kg, Intravenous, Q6H PRN    dextrose 10% infusion (TPN no rate), , Intravenous, Continuous PRN    pancrelipase (Lip-Prot-Amyl) (Zenpep) DR particles cap 10,000 Units, 10,000 Units, Per G Tube, PRN **AND** sodium bicarbonate tab 325 mg, 325 mg, Per G Tube, PRN    metoprolol (Lopressor) 5 mg/5mL injection 5 mg, 5 mg, Intravenous, Q4H PRN    apixaban (Eliquis) tab 2.5 mg, 2.5 mg, Oral, BID    albuterol (Ventolin) (5 MG/ML) 0.5% nebulizer solution 5 mg, 5 mg, Nebulization, Q4H PRN    acetaminophen (Tylenol) 160 MG/5ML oral liquid 650 mg, 650 mg, Per NG Tube, Q4H PRN **OR** acetaminophen (Tylenol) rectal suppository 650 mg, 650 mg, Rectal, Q4H PRN    senna (Senokot) 8.8 MG/5ML oral syrup 17.6 mg, 10 mL, Per NG Tube, BID    docusate (Colace) 50 MG/5ML oral solution 100 mg, 100 mg, Per NG Tube, BID    polyethylene glycol (PEG 3350) (Miralax) 17 g oral packet 17 g, 17 g, Per NG Tube, Daily PRN    bisacodyl (Dulcolax) 10 MG rectal suppository 10 mg, 10 mg, Rectal, Daily PRN    chlorhexidine gluconate (Peridex) 0.12 % oral solution 15 mL, 15 mL, Mouth/Throat, BID@0800,2000    mineral oil-white petrolatum (Artificial Tears) 83-15 % ophthalmic ointment 1 Application, 1 Application, Both Eyes, Nightly    famotidine (Pepcid) 20 mg/2mL injection 20 mg, 20 mg, Intravenous, Daily @ 0700    ondansetron (Zofran) 4 MG/2ML injection 4 mg, 4 mg, Intravenous, Q6H PRN    metoclopramide (Reglan) 5 mg/mL injection 10 mg, 10 mg, Intravenous, Q8H PRN  No current outpatient medications on file.         Palliative Performance Scale: 20 %  %  Ambulation Activity Level Self-Care Intake Consciousness   100 Full  Normal  No Disease Full Normal Full   90 Full  Normal  Some Disease Full Normal Full   80 Full  Normal w/effort  Some Disease Full Normal or reduced Full   70 Reduced  Can't Perform Job  Some Disease Full Normal or reduced Full   60 Reduced  Can't Perform Hobby   Significant Disease Occ Assist Normal or reduced Full or confused   50 Mainly sit/lie Can't do any work  Extensive Disease Partial Assist Normal or reduced Full or confused   40 Mainly in bed Can't do any work  Extensive Disease Mainly Assist Normal or reduced Full or confused   30 Bed Bound Can't do any work  Extensive Disease Max Assist  Total Care Reduced  Drowsy/confused   20 Bed Bound Can't do any work  Extensive Disease Max Assist  Total Care Minimal  Drowsy/confused   10 Bed Bound Can't do any work  Extensive Disease Max Assist  Total Care Mouth Care  Drowsy/confused   0 Death        Objective      Vital Signs:  Blood pressure 96/72, pulse 108, temperature 98 °F (36.7 °C), temperature source Temporal, resp. rate 12, height 5' 7.01\" (1.702 m), weight 126 lb 1.7 oz (57.2 kg), SpO2 94%.  Body mass index is 19.75 kg/m².  Non-verbal signs of pain present: No    Physical Exam:  General: Intubated, arousable  HEENT: AT/NC. ETT  Cardiac: RRR  Lungs: mechanical BS  Abdomen: Soft, non-tender, non-distended, normal bowel sounds X 4 quadrants   Extremities: mild-mod Edema present  Skin: Warm and dry.    Hematology:  Lab Results   Component Value Date    WBC 9.6 12/26/2024    HGB 9.0 (L) 12/26/2024    HCT 30.1 (L) 12/26/2024    .0 12/26/2024       Coags:  Lab Results   Component Value Date    PT 15.5 (H) 07/13/2014    INR 1.32 (H) 01/29/2024    PTT 34.6 01/31/2024       Chemistry:  Lab Results   Component Value Date    CREATSERUM 0.35 (L) 12/26/2024    BUN 16 12/26/2024     (L) 12/26/2024    K 4.3 12/26/2024    CL 99 12/26/2024    CO2 30.0 12/26/2024     (H) 12/26/2024    CA  9.1 12/26/2024    ALB 4.0 12/11/2024    ALKPHO 112 12/11/2024    BILT 1.0 (H) 12/11/2024    TP 8.2 12/11/2024    AST 24 12/11/2024    ALT 30 12/11/2024    DDIMER 2.38 (H) 01/20/2024    MG 2.0 12/14/2024    PHOS 4.1 12/14/2024    TROP <0.046 02/19/2015       Imaging:  XR CHEST AP PORTABLE  (CPT=71045)    Result Date: 12/26/2024  CONCLUSION: Small bilateral pleural effusions larger on the right.  Bibasilar pulmonary opacities suggesting atelectasis.  No significant interval change.  Stable/satisfactory position of endotracheal tube.    Dictated by (CST): Alo Mullins MD on 12/26/2024 at 7:24 AM     Finalized by (CST): Alo Mullins MD on 12/26/2024 at 7:25 AM          XR CHEST AP PORTABLE  (CPT=71045)    Result Date: 12/25/2024  CONCLUSION:  1. Stable exam.  Small right greater than left pleural effusions are unchanged.  Associated hazy bibasilar opacities, which could relate to any combination of compressive atelectasis or coexistent infection. 2. Stable endotracheal tube position.   Dictated by (CST): Ricco Segundo MD on 12/25/2024 at 9:18 AM     Finalized by (CST): Ricco Segundo MD on 12/25/2024 at 9:20 AM           Assessment and Recommendations        Sepsis, due to unspecified organism, unspecified whether acute organ dysfunction present (HCC)    Acute respiratory failure with hypercapnia (HCC)    Atrial fibrillation with rapid ventricular response (HCC)    Acute on chronic respiratory failure with hypoxia and hypercapnia (HCC)    Palliative care encounter    Counseling regarding advance care planning and goals of care    Symptom Management      NA    2.     Serious Illness Conversation:   Code Status: DNAR/Full  POA: Surrogate is daughter Jada  I met with Jada in person and also present via phone was Librado, her fiance. Jada lives with Prema in [place], and splits her time in [place], where her fiancee lives. Prema was home apparently until the last several months when she began problems with her  effusions, aspiration etc.   I asked if Jada could give me a big picture sense of what was going with her mom. She is able to discuss the details of all of the illnesses and episodes but I also wondered if she felt overall that Prema was improving or declining.   I expressed my view/worry that her lungs are shutting down/have shut down etc to the extent that she is dying from this. I explained the physiology of chronic aspiration in context of dysphagia as a terminal/lethal component of not just dementia or old age but of many complex chronic illnesses.   I explained that I felt that Prema is unfortunately showing us that she is dying, albeit slowly and that we are in a position to decide how much to try and resist or fight natural dying or how much we can decide to support or allow for it.   Prema repeated that her mom had always just said 'she wants everything done' but unfortunately had little more context or nuance. Ie what 'everything' looks like, logistically, financially, quality etc. Librado said that he thinks Prema is pretty understanding of these issues, having dealt with her own  dying and other parents. He and Jada were hoping to involved Prema in the conversation.   When I spoke to Prema she could nod yes or no in response to basic questions but when I asked about natural dying she closed her eyes and would not answer. Jada saw this and said that she is probably thinking and that she and her fiancee would talk to Prema in more detail. They hoped ideally that they could ask her when she is extubated.  They wanted to know what their deadline is to 'make a decision.' They did not seem opposed to consider compassionate extubation but instead seem very unsure about what to do. They appear open to learning as much as possible about their options.   I met with Jada and called Librado separately. I explained that her body is not supporting life, and that her best possible chance of accomplishing their goal  of breathing independently and being at home would be compassionate extubation. Jada seems to understand but continued to ask about trach. I think it has been helpful to explain that what she has witnessed over the last several months are not just 'hiccups' but they are a gradual dying from dementia and advanced age.   I explained that extubation should probably happen within the next 24-48 hours as discussed with Dr. Espino and that we would endeavor to help her be comfortable if she were passing away.   I can have a more definitive discussion about plan tomorrow with fiance is present after 10-11am or so unless another provider discusses it earlier. I think we should support Jada in the grief process as gently as possible.       Palliative Care Follow Up: Palliative care team will Continue to follow while inpatient    Thank you for allowing Palliative Care services to participate in the care of Yin Butcher.    A total of 80 minutes were spent on this visit, which included all of the following: direct face to face contact, history taking, physical examination, and >50% was spent counseling and coordinating care.    Barry Liz MD  Palliative Care Services  [facility] [phone]    Note to patient:  The 21st Century Cures Act makes medical notes like these available to patients in the interest of transparency. However, be advised this is a medical document. It is intended as peer to peer communication. It is written in medical language and may contain abbreviations or verbiage that are unfamiliar. It may appear blunt or direct. Medical documents are intended to carry relevant information, facts as evident, and the clinical opinion of the practitioner.           [1]   Allergies  Allergen Reactions    Erythromycin OTHER (SEE COMMENTS)    Oxycodone HALLUCINATION    Gabapentin NAUSEA ONLY and OTHER (SEE COMMENTS)     Pt stated she didn't feel like herself     Tizanidine ITCHING and OTHER (SEE  COMMENTS)     Pt states burning sensation

## 2024-12-26 NOTE — PROGRESS NOTES
CARDIOLOGY NOTE    Patient actively meeting with palliative care during times of rounds, morning and afternoon.  Will follow-up.

## 2024-12-27 ENCOUNTER — CASE MANAGEMENT (OUTPATIENT)
Dept: CARE COORDINATION | Age: 89
End: 2024-12-27

## 2024-12-27 NOTE — PROGRESS NOTES
[facility]  part of [facility]    Progress Note    Yin Butcher Patient Status:  Inpatient    1931 MRN [id number]   Location [facility] 2W/SW Attending Carmelina Duong MD   Hosp Day # 15 PCP Mina Walker MD     Chief Complaint: Altered mental status    Subjective:   Subjective:  Pt seen today in bed. Intubated/sedated. Failed breathing trial today.     Objective:   Blood pressure 106/80, pulse 94, temperature 97.5 °F (36.4 °C), temperature source Temporal, resp. rate 24, height 5' 7.01\" (1.702 m), weight 126 lb 8.7 oz (57.4 kg), SpO2 95%.  Physical Exam  Vitals and nursing note reviewed.   Constitutional:       Appearance: She is ill-appearing.      Interventions: She is sedated and intubated.   Cardiovascular:      Rate and Rhythm: Tachycardia present. Rhythm irregular.      Pulses: Normal pulses.   Pulmonary:      Effort: Pulmonary effort is normal. She is intubated.      Breath sounds: Decreased breath sounds present.   Abdominal:      General: Bowel sounds are normal.      Palpations: Abdomen is soft.   Skin:     General: Skin is warm and dry.   Neurological:      General: No focal deficit present.         Results:   Lab Results   Component Value Date    WBC 12.7 (H) 2024    HGB 9.0 (L) 2024    HCT 28.2 (L) 2024    .0 2024    CREATSERUM 0.40 (L) 2024    BUN 21 2024     (L) 2024    K 4.6 2024    CL 98 2024    CO2 31.0 2024     (H) 2024    CA 9.3 2024    ALB 4.0 2024    ALKPHO 112 2024    BILT 1.0 (H) 2024    TP 8.2 2024    AST 24 2024    ALT 30 2024    PTT 34.6 2024    INR 1.32 (H) 2024    PT 15.5 (H) 2014    T4F 1.0 2015    TSH 2.138 2024    LIP 35 2024    DDIMER 2.38 (H) 2024    MG 2.0 2024    PHOS 4.1 2024    TROP <0.046 2015    TROPHS 25 2024    CK 32 2024    B12 586  01/23/2024       XR CHEST AP PORTABLE  (CPT=71045)    Result Date: 12/26/2024  CONCLUSION: Small bilateral pleural effusions larger on the right.  Bibasilar pulmonary opacities suggesting atelectasis.  No significant interval change.  Stable/satisfactory position of endotracheal tube.    Dictated by (CST): Alo Mullins MD on 12/26/2024 at 7:24 AM     Finalized by (CST): Alo Mullins MD on 12/26/2024 at 7:25 AM               Assessment & Plan:   *Acute respiratory failure with hypercapnia/Aspiration pneumonia  CXR and CT chest reviewed   Intubated/sedated 12/12   S/P 10 day course of antibiotics   Cont nebs   Failed multiple attempts of breathing trial    Pulmonology following   Palliative consulted, recs appreciated  Poor prognosis    *HFpEF  Cont lasix  Cardiology following     *Atrial fibrillation with rapid ventricular response (HCC)  Rates improved   Cont Eliquis and digoxin  Cardiology following     *Dysphagia   Cont Tube feeds, at goal  Aspiration precautions     *Hypotension  Cont midodrine   Cardiology following       DVT prophylaxis: Eliquis  Code status: DNR/Full tx    Dispo: ongoing discussions w/ daughter regarding comfort care/terminal wean      Preethi Recinos MD  12/27/2024

## 2024-12-27 NOTE — PROGRESS NOTES
Palliative Care Progress Note    Yin Butcher Patient Status:  Inpatient    1931 MRN [id number]   Location [facility] 2W/SW Attending Carmelina Duong MD   Hosp Day # 15 PCP Mina Walker MD     Date of Consult: 2024  Patient seen at: [facility] Inpatient    Reason for Consultation: Consult requested for goals of care and care coordination.    Subjective     S: No events. Daughter at bedside.     Review of Systems: Palliative Care symptom needs assessed:     Unable to accurately obtain due to intubation    Palliative Care Social History:   Marital Status:    Children: Yes  Living Situation Prior to Admit: Home  Occupational History: Retired  Personal:     Spiritual  Yin Butcher  NA     requested: No    Medical History: obtained from [facility]  Past Medical History:    Arthritis    Atrial fibrillation (HCC)    Back problem    Cancer (HCC)    Cataract    Osteoporosis    Personal history of antineoplastic chemotherapy     Past Surgical History:   Procedure Laterality Date    Other surgical history  2014    Procedure: HIP HEMIARTHROPLASTY/ BIPOLAR;  Surgeon: Vasu Matamoros MD;  Location:  MAIN OR    Removal of tonsils,12+ y/o         Family History: obtained from [facility]  Family History   Family history unknown: Yes       Allergies:  Allergies[1]    Medications:     Current Facility-Administered Medications:     furosemide (Lasix) 10 mg/mL injection 40 mg, 40 mg, Intravenous, Daily    midazolam (Versed) 2 MG/2ML injection 2 mg, 2 mg, Intravenous, Q1H PRN    glycopyrrolate (Robinul) 0.2 MG/ML injection 0.1 mg, 0.1 mg, Intravenous, Q6H PRN    multivitamin (Tab-A-Jenny/Beta Carotene) tab 1 tablet, 1 tablet, Per G Tube, Daily    ipratropium-albuterol (Duoneb) 0.5-2.5 (3) MG/3ML inhalation solution 3 mL, 3 mL, Nebulization, Q6H PRN    midodrine (ProAmatine) tab 10 mg, 10 mg, Per NG Tube, TID    digoxin (Lanoxin) tab 125 mcg, 125 mcg, Oral, Daily    vancomycin (Firvanq)  50 mg/mL oral solution 125 mg, 125 mg, Per NG Tube, Daily    norepinephrine (Levophed) 4 mg/250mL infusion premix, 0.5-50 mcg/min, Intravenous, Continuous    propofol (Diprivan) 10 mg/mL infusion premix, 5-50 mcg/kg/min (Dosing Weight), Intravenous, Continuous    fentaNYL (Sublimaze) 50 mcg/mL injection 25 mcg, 25 mcg, Intravenous, Q30 Min PRN    acetaminophen (Ofirmev) 10 mg/mL infusion premix 650 mg, 15 mg/kg, Intravenous, Q6H PRN    dextrose 10% infusion (TPN no rate), , Intravenous, Continuous PRN    pancrelipase (Lip-Prot-Amyl) (Zenpep) DR particles cap 10,000 Units, 10,000 Units, Per G Tube, PRN **AND** sodium bicarbonate tab 325 mg, 325 mg, Per G Tube, PRN    metoprolol (Lopressor) 5 mg/5mL injection 5 mg, 5 mg, Intravenous, Q4H PRN    apixaban (Eliquis) tab 2.5 mg, 2.5 mg, Oral, BID    albuterol (Ventolin) (5 MG/ML) 0.5% nebulizer solution 5 mg, 5 mg, Nebulization, Q4H PRN    acetaminophen (Tylenol) 160 MG/5ML oral liquid 650 mg, 650 mg, Per NG Tube, Q4H PRN **OR** acetaminophen (Tylenol) rectal suppository 650 mg, 650 mg, Rectal, Q4H PRN    senna (Senokot) 8.8 MG/5ML oral syrup 17.6 mg, 10 mL, Per NG Tube, BID    docusate (Colace) 50 MG/5ML oral solution 100 mg, 100 mg, Per NG Tube, BID    polyethylene glycol (PEG 3350) (Miralax) 17 g oral packet 17 g, 17 g, Per NG Tube, Daily PRN    bisacodyl (Dulcolax) 10 MG rectal suppository 10 mg, 10 mg, Rectal, Daily PRN    chlorhexidine gluconate (Peridex) 0.12 % oral solution 15 mL, 15 mL, Mouth/Throat, BID@0800,2000    mineral oil-white petrolatum (Artificial Tears) 83-15 % ophthalmic ointment 1 Application, 1 Application, Both Eyes, Nightly    famotidine (Pepcid) 20 mg/2mL injection 20 mg, 20 mg, Intravenous, Daily @ 0700    ondansetron (Zofran) 4 MG/2ML injection 4 mg, 4 mg, Intravenous, Q6H PRN    metoclopramide (Reglan) 5 mg/mL injection 10 mg, 10 mg, Intravenous, Q8H PRN  No current outpatient medications on file.         Palliative Performance Scale: 20 %  %  Ambulation Activity Level Self-Care Intake Consciousness   100 Full  Normal  No Disease Full Normal Full   90 Full  Normal  Some Disease Full Normal Full   80 Full  Normal w/effort  Some Disease Full Normal or reduced Full   70 Reduced  Can't Perform Job  Some Disease Full Normal or reduced Full   60 Reduced  Can't Perform Hobby   Significant Disease Occ Assist Normal or reduced Full or confused   50 Mainly sit/lie Can't do any work  Extensive Disease Partial Assist Normal or reduced Full or confused   40 Mainly in bed Can't do any work  Extensive Disease Mainly Assist Normal or reduced Full or confused   30 Bed Bound Can't do any work  Extensive Disease Max Assist  Total Care Reduced  Drowsy/confused   20 Bed Bound Can't do any work  Extensive Disease Max Assist  Total Care Minimal  Drowsy/confused   10 Bed Bound Can't do any work  Extensive Disease Max Assist  Total Care Mouth Care  Drowsy/confused   0 Death        Objective      Vital Signs:  Blood pressure 116/61, pulse 97, temperature 97.5 °F (36.4 °C), temperature source Temporal, resp. rate 17, height 5' 7.01\" (1.702 m), weight 126 lb 8.7 oz (57.4 kg), SpO2 97%.  Body mass index is 19.81 kg/m².  Non-verbal signs of pain present: No    Physical Exam:  General: Intubated, arousable  HEENT: AT/NC. ETT  Cardiac: RRR  Lungs: mechanical BS  Abdomen: Soft, non-tender, non-distended, normal bowel sounds X 4 quadrants   Extremities: mild-mod Edema present  Skin: Warm and dry.    Hematology:  Lab Results   Component Value Date    WBC 12.7 (H) 12/27/2024    HGB 9.0 (L) 12/27/2024    HCT 28.2 (L) 12/27/2024    .0 12/27/2024       Coags:  Lab Results   Component Value Date    PT 15.5 (H) 07/13/2014    INR 1.32 (H) 01/29/2024    PTT 34.6 01/31/2024       Chemistry:  Lab Results   Component Value Date    CREATSERUM 0.40 (L) 12/27/2024    BUN 21 12/27/2024     (L) 12/27/2024    K 4.6 12/27/2024    CL 98 12/27/2024    CO2 31.0 12/27/2024     (H) 12/27/2024     CA 9.3 12/27/2024    ALB 4.0 12/11/2024    ALKPHO 112 12/11/2024    BILT 1.0 (H) 12/11/2024    TP 8.2 12/11/2024    AST 24 12/11/2024    ALT 30 12/11/2024    DDIMER 2.38 (H) 01/20/2024    MG 2.0 12/14/2024    PHOS 4.1 12/14/2024    TROP <0.046 02/19/2015       Imaging:  XR CHEST AP PORTABLE  (CPT=71045)    Result Date: 12/26/2024  CONCLUSION: Small bilateral pleural effusions larger on the right.  Bibasilar pulmonary opacities suggesting atelectasis.  No significant interval change.  Stable/satisfactory position of endotracheal tube.    Dictated by (CST): Alo Mullins MD on 12/26/2024 at 7:24 AM     Finalized by (CST): Alo Mullins MD on 12/26/2024 at 7:25 AM           Assessment and Recommendations        Sepsis, due to unspecified organism, unspecified whether acute organ dysfunction present (HCC)    Acute respiratory failure with hypercapnia (HCC)    Atrial fibrillation with rapid ventricular response (HCC)    Acute on chronic respiratory failure with hypoxia and hypercapnia (HCC)    Palliative care encounter    Counseling regarding advance care planning and goals of care    Symptom Management      NA    2.     Serious Illness Conversation:   Code Status: DNAR/Full  POA: Surrogate is daughter Jada  I met with Jada in person and also present via phone was Cierracirilo, her fiance. Jada lives with Prema in [facility], and splits her time in [place], where her fiancee lives. Prema was home apparently until the last several months when she began problems with her effusions, aspiration etc.   I asked if Jada could give me a big picture sense of what was going with her mom. She is able to discuss the details of all of the illnesses and episodes but I also wondered if she felt overall that Prema was improving or declining.   I expressed my view/worry that her lungs are shutting down/have shut down etc to the extent that she is dying from this. I explained the physiology of chronic aspiration in context of dysphagia as a  terminal/lethal component of not just dementia or old age but of many complex chronic illnesses.   I explained that I felt that Prema is unfortunately showing us that she is dying, albeit slowly and that we are in a position to decide how much to try and resist or fight natural dying or how much we can decide to support or allow for it.   Prema repeated that her mom had always just said 'she wants everything done' but unfortunately had little more context or nuance. Ie what 'everything' looks like, logistically, financially, quality etc. Cierracirilo said that he thinks Prema is pretty understanding of these issues, having dealt with her own  dying and other parents. He and Jada were hoping to involved Prema in the conversation.   When I spoke to Prema she could nod yes or no in response to basic questions but when I asked about natural dying she closed her eyes and would not answer. Jada saw this and said that she is probably thinking and that she and her fiancee would talk to Prema in more detail. They hoped ideally that they could ask her when she is extubated.  They wanted to know what their deadline is to 'make a decision.' They did not seem opposed to consider compassionate extubation but instead seem very unsure about what to do. They appear open to learning as much as possible about their options.   I met with Jada and called Librado separately. I explained that her body is not supporting life, and that her best possible chance of accomplishing their goal of breathing independently and being at home would be compassionate extubation. Jada seems to understand but continued to ask about trach. I think it has been helpful to explain that what she has witnessed over the last several months are not just 'hiccups' but they are a gradual dying from dementia and advanced age.   I explained that extubation should probably happen within the next 24-48 hours as discussed with Dr. Espino and that we would endeavor to  help her be comfortable if she were passing away.   I can have a more definitive discussion about plan tomorrow with fiance is present after 10-11am or so unless another provider discusses it earlier. I think we should support Jada in the grief process as gently as possible.   See separate note for discussion. Family agreeable to DNR/DNI and compassionate extubation.       Palliative Care Follow Up: Palliative care team will Continue to follow while inpatient    Thank you for allowing Palliative Care services to participate in the care of Yin Butcher.    A total of 70 minutes were spent on this visit, which included all of the following: direct face to face contact, history taking, physical examination, and >50% was spent counseling and coordinating care.    Barry Liz MD  Palliative Care Services  [facility] [phone]    Note to patient:  The 21st Century Cures Act makes medical notes like these available to patients in the interest of transparency. However, be advised this is a medical document. It is intended as peer to peer communication. It is written in medical language and may contain abbreviations or verbiage that are unfamiliar. It may appear blunt or direct. Medical documents are intended to carry relevant information, facts as evident, and the clinical opinion of the practitioner.           [1]   Allergies  Allergen Reactions    Erythromycin OTHER (SEE COMMENTS)    Oxycodone HALLUCINATION    Gabapentin NAUSEA ONLY and OTHER (SEE COMMENTS)     Pt stated she didn't feel like herself     Tizanidine ITCHING and OTHER (SEE COMMENTS)     Pt states burning sensation

## 2024-12-27 NOTE — PLAN OF CARE
Problem: Safety Risk - Non-Violent Restraints  Goal: Patient will remain free from self-harm  Description: INTERVENTIONS:  - Apply the least restrictive restraint to prevent harm  - Notify patient and family of reasons restraints applied  - Assess for any contributing factors to confusion (electrolyte disturbances, delirium, medications)  - Discontinue any unnecessary medical devices as soon as possible  - Assess the patient's physical comfort, circulation, skin condition, hydration, nutrition and elimination needs   - Reorient and redirection as needed  - Assess for the need to continue restraints  12/27/2024 0510 by Bambi Roque, RN  Outcome: Not Progressing  12/26/2024 2158 by Bambi Roque, RN  Outcome: Not Progressing     Problem: PAIN - ADULT  Goal: Verbalizes/displays adequate comfort level or patient's stated pain goal  Description: INTERVENTIONS:  - Encourage pt to monitor pain and request assistance  - Assess pain using appropriate pain scale  - Administer analgesics based on type and severity of pain and evaluate response  - Implement non-pharmacological measures as appropriate and evaluate response  - Consider cultural and social influences on pain and pain management  - Manage/alleviate anxiety  - Utilize distraction and/or relaxation techniques  - Monitor for opioid side effects  - Notify MD/LIP if interventions unsuccessful or patient reports new pain  - Anticipate increased pain with activity and pre-medicate as appropriate  Outcome: Progressing     Problem: RISK FOR INFECTION - ADULT  Goal: Absence of fever/infection during anticipated neutropenic period  Description: INTERVENTIONS  - Monitor WBC  - Administer growth factors as ordered  - Implement neutropenic guidelines  Outcome: Progressing     Problem: SAFETY ADULT - FALL  Goal: Free from fall injury  Description: INTERVENTIONS:  - Assess pt frequently for physical needs  - Identify cognitive and physical deficits and behaviors that  affect risk of falls.  - [place] fall precautions as indicated by assessment.  - Educate pt/family on patient safety including physical limitations  - Instruct pt to call for assistance with activity based on assessment  - Modify environment to reduce risk of injury  - Provide assistive devices as appropriate  - Consider OT/PT consult to assist with strengthening/mobility  - Encourage toileting schedule  Outcome: Progressing     Problem: DISCHARGE PLANNING  Goal: Discharge to home or other facility with appropriate resources  Description: INTERVENTIONS:  - Identify barriers to discharge w/pt and caregiver  - Include patient/family/discharge partner in discharge planning  - Arrange for needed discharge resources and transportation as appropriate  - Identify discharge learning needs (meds, wound care, etc)  - Arrange for interpreters to assist at discharge as needed  - Consider post-discharge preferences of patient/family/discharge partner  - Complete POLST form as appropriate  - Assess patient's ability to be responsible for managing their own health  - Refer to Case Management Department for coordinating discharge planning if the patient needs post-hospital services based on physician/LIP order or complex needs related to functional status, cognitive ability or social support system  Outcome: Progressing     Problem: Altered Communication/Language Barrier  Goal: Patient/Family is able to understand and participate in their care  Description: Interventions:  - Assess communication ability and preferred communication style  - Implement communication aides and strategies  - Use visual cues when possible  - Listen attentively, be patient, do not interrupt  - Minimize distractions  - Allow time for understanding and response  - Establish method for patient to ask for assistance (call light)  - Provide an  as needed  - Communicate barriers and strategies to overcome with those who interact with  patient  Outcome: Progressing     Problem: RESPIRATORY - ADULT  Goal: Achieves optimal ventilation and oxygenation  Description: INTERVENTIONS:  - Assess for changes in respiratory status  - Assess for changes in mentation and behavior  - Position to facilitate oxygenation and minimize respiratory effort  - Oxygen supplementation based on oxygen saturation or ABGs  - Provide Smoking Cessation handout, if applicable  - Encourage broncho-pulmonary hygiene including cough, deep breathe, Incentive Spirometry  - Assess the need for suctioning and perform as needed  - Assess and instruct to report SOB or any respiratory difficulty  - Respiratory Therapy support as indicated  - Manage/alleviate anxiety  - Monitor for signs/symptoms of CO2 retention  Outcome: Progressing     Problem: NEUROLOGICAL - ADULT  Goal: Achieves stable or improved neurological status  Description: INTERVENTIONS  - Assess for and report changes in neurological status  - Initiate measures to prevent increased intracranial pressure  - Maintain blood pressure and fluid volume within ordered parameters to optimize cerebral perfusion and minimize risk of hemorrhage  - Monitor temperature, glucose, and sodium. Initiate appropriate interventions as ordered  Outcome: Progressing  Goal: Absence of seizures  Description: INTERVENTIONS  - Monitor for seizure activity  - Administer anti-seizure medications as ordered  - Monitor neurological status  Outcome: Progressing  Goal: Remains free of injury related to seizure activity  Description: INTERVENTIONS:  - Maintain airway, patient safety  and administer oxygen as ordered  - Monitor patient for seizure activity, document and report duration and description of seizure to MD/LIP  - If seizure occurs, turn patient to side and suction secretions as needed  - Reorient patient post seizure  - Seizure pads on all 4 side rails  - Instruct patient/family to notify RN of any seizure activity  - Instruct patient/family to  call for assistance with activity based on assessment  Outcome: Progressing  Goal: Achieves maximal functionality and self care  Description: INTERVENTIONS  - Monitor swallowing and airway patency with patient fatigue and changes in neurological status  - Encourage and assist patient to increase activity and self care with guidance from PT/OT  - Encourage visually impaired, hearing impaired and aphasic patients to use assistive/communication devices  Outcome: Progressing     Problem: Diabetes/Glucose Control  Goal: Glucose maintained within prescribed range  Description: INTERVENTIONS:  - Monitor Blood Glucose as ordered  - Assess for signs and symptoms of hyperglycemia and hypoglycemia  - Administer ordered medications to maintain glucose within target range  - Assess barriers to adequate nutritional intake and initiate nutrition consult as needed  - Instruct patient on self management of diabetes  Outcome: Progressing     Problem: Delirium  Goal: Minimize duration of delirium  Description: Interventions:  - Encourage use of hearing aids, eye glasses  - Promote highest level of mobility daily  - Provide frequent reorientation  - Promote wakefulness i.e. lights on, blinds open  - Promote sleep, encourage patient's normal rest cycle i.e. lights off, TV off, minimize noise and interruptions  - Encourage family to assist in orientation and promotion of home routines  Outcome: Progressing

## 2024-12-27 NOTE — DIETARY NOTE
ADULT NUTRITION REASSESSMENT    Pt is at high nutrition risk.  Pt meets severe malnutrition criteria.      CRITERIA FOR MALNUTRITION DIAGNOSIS:  Criteria for severe malnutrition diagnosis: chronic illness related to body fat severe depletion and muscle mass severe depletion.    RECOMMENDATIONS TO MD:   See Nutrition Intervention for enteral nutrition (EN) specifics . CPM.     ADMITTING DIAGNOSIS:  Atrial fibrillation with rapid ventricular response (HCC) [I48.91]  Acute respiratory failure with hypercapnia (HCC) [J96.02]  Sepsis, due to unspecified organism, unspecified whether acute organ dysfunction present (HCC) [A41.9]  PERTINENT PAST MEDICAL HISTORY:   Past Medical History:    Arthritis    Atrial fibrillation (HCC)    Back problem    Cancer (HCC)    Cataract    Osteoporosis    Personal history of antineoplastic chemotherapy     PATIENT STATUS:   12/13/24 INITIAL VISIT: Pt assessed due to low BMI and new consult for RD to initiate tube feeds. Pt presented to hospital with AMS, fevers over the past \"few days\". Extensive PMH as listed above including recent discharge from [facility] on 11/24/24. Pt is orally intubated, sedated on Propofol. No family present at time of visit. Unable to obtain recent diet hx.   12/18/24 UPDATE: Remains orally intubated, Sedated on Propofol. Pt's DTR at bedside at time of visit - provided with diet hx. Per DTR, pt with good/normal appetite/intake with stable wt prior to recent admission in November. Per DTR, pt sustained an injury to her esophagus during that admission resulting in dysphagia therefore a G-tube was surgically placed. DTR did note some wt loss recently however unable to quantify. Expressed desire to see \"wt gain\" with administration of EN feeds. Explained to DTR that the goal of EN administration is currently to meet needs as not to overfeed pt and worsen respiratory status. Pt was transferred to rehab following discharge where she was starting to work with SLP however  remained NPO.  12/23/24 UPDATE: EN tolerated well. Re-calculate needs/[employer] [employer] equation and increasing tube feeds accordingly. Propofol intake negligible. Adding daily multivitamin to assure meeting >100% RDI's. Noted 24# wt gain in 5-6 days if accurate but is c/w +10L Net I/O- ? Fluid gains.   12/27/24 UPDATE: Remains orally intubated. Off sedation. Failed SBT again today. GOC discussions on-going. Tolerating EN feeds via G-tube at goal rate. Pt's prognosis remains poor - not appropriate for tracheostomy. Noted hyponatremia - adjusted EN and FWF.      FOOD/NUTRITION RELATED HISTORY:  Appetite: NPO  Intake:  EN orders per rehab record Jevity 1.5 @ 45 ml/hr x 21 hrs + ProHeal Sugar Free Critical Care AWC 30 ml BID (100 kcal & 17g protein in 30 ml) + 250 ml H2O flushes TID to provide 1618 kcal (39 kcal/kg), 94 g protein (2.2 g/kg) and 1528 ml free H2O.   Intake Meeting Needs: NPO  Percent Meals Eaten (last 3 days)       None        Food Allergies: No Known Food Allergies (NKFA)  Cultural/Ethnic/Advent Preferences: Not Obtained    GASTROINTESTINAL: +BM last documented on 12/27 per flowsheet, PEG/G-tube, and abdomen is soft with active bowel sounds     MEDICATIONS: reviewed; Noted cardiac electrolyte replacement protocol ordered; scheduled senokot and colace; daily lasix    norepinephrine      propofol Stopped (12/22/24 0900)    dextrose 10%        furosemide  40 mg Intravenous Daily    multivitamin  1 tablet Per G Tube Daily    midodrine  10 mg Per NG Tube TID    digoxin  125 mcg Oral Daily    vancomycin  125 mg Per NG Tube Daily    apixaban  2.5 mg Oral BID    senna  10 mL Per NG Tube BID    docusate  100 mg Per NG Tube BID    chlorhexidine gluconate  15 mL Mouth/Throat BID@0800,2000    mineral oil-white petrolatum  1 Application Both Eyes Nightly    famotidine  20 mg Intravenous Daily @ 0700   PRN: Dulcolax if needed.     LABS: reviewed. Low Creat- non-specific indicator of low muscle mass, noted hyponatremia    Recent Labs     12/25/24  0318 12/26/24  0327 12/27/24  0400   * 107* 118*   BUN 20 16 21   CREATSERUM 0.42* 0.35* 0.40*   CA 8.9 9.1 9.3   * 133* 133*   K 4.0 4.3 4.6   CL 99 99 98   CO2 34.0* 30.0 31.0   OSMOCALC 284 278 280     NUTRITION RELATED PHYSICAL FINDINGS:  - Nutrition Focused Physical Exam (NFPE): severe depletion body fat orbital region and fat overlying rib cage region and severe depletion muscle mass temple region and clavicle region.   - Fluid Accumulation: non-pitting Right Upper extremity and Hand (s) and +3 Bilateral Lower extremity and Feet, Left Upper extremity and Hand (s), and perineal . See RN documentation for details  - Skin Integrity: Pressure Injury: Stage 2 on coccyx and other wounds noted see RN documentation for details    ANTHROPOMETRICS:  HT:  170.2 cm (5'7\")   WT: 57.4 kg (126 lb 8.7 oz) (wt up 31 % (29 lbs) from lowest wt this admission with noted edema present) Big jump in weight from 101 lbs 12/16 to 125 lbs 12/21- appears fluid gain (net I/0: +10.3L)   DOSING WT: 42 kg (93 lbs - lowest wt this admission) - wt utilized for anthropometric assessment  BMI: Body mass index is 19.23kg/m².  BMI CLASSIFICATION: less than 18.5 kg/m2 - underweight  IBW: 135 lbs        69% IBW admit. 12/23: 90% but with edema.   Usual Body Wt: 105 lbs (per EMR ~1 yr ago)      89% UBW    WEIGHT HISTORY: Current wt reflects 11% (12 lb)  unintentional wt loss.   Patient Weight(s) for the past 336 hrs:   Weight   12/27/24 0500 57.4 kg (126 lb 8.7 oz)   12/26/24 0600 57.2 kg (126 lb 1.7 oz)   12/25/24 0356 57.4 kg (126 lb 8.7 oz)   12/24/24 0600 56.4 kg (124 lb 5.4 oz)   12/23/24 0600 55.7 kg (122 lb 12.7 oz)   12/22/24 0504 56.2 kg (123 lb 14.4 oz)   12/21/24 0600 56.7 kg (125 lb)   12/20/24 0600 56.3 kg (124 lb 1.9 oz)   12/19/24 0600 52.8 kg (116 lb 6.5 oz)   12/16/24 0400 46 kg (101 lb 6.6 oz)   12/15/24 0400 45 kg (99 lb 3.3 oz)     Wt Readings from Last 10 Encounters:   12/27/24 57.4 kg  (126 lb 8.7 oz)   02/02/24 56.7 kg (125 lb)   10/29/23 47.5 kg (104 lb 11.5 oz)   09/10/23 42.1 kg (92 lb 14.4 oz)   04/13/23 53.5 kg (118 lb)   04/10/23 53.5 kg (118 lb)   04/03/23 53.5 kg (118 lb)   03/30/23 53.5 kg (118 lb)   03/27/23 53.3 kg (117 lb 9.6 oz)   03/22/23 64 kg (141 lb)     NUTRITION DIAGNOSIS/PROBLEM:   Severe Malnutrition related to Chronic - Physiological causes resulting in anorexia or diminished intake as evidenced by body fat severe depletion and muscle mass severe depletion.    NUTRITION DIAGNOSIS PROGRESS:  Improvement (unresolved) - EN feeds infusing at goal rate. Meeting 100% of current estimated needs.    NUTRITION INTERVENTION:     NUTRITION PRESCRIPTION:   Estimated Nutrition needs: --dosing wt of 42 kg - wt taken on 1212/24  Calories: 953 calories/day (PSU 2003B (ave 5.7 L/min, 36.9C Tmax) or 23 calories per kg Dosing wt)  Protein: 50-63 g protein/day (1.2-1.5 g protein/kg Dosing wt)  Fluid Needs: 953 ml (1 ml/kcal)    - Diet:       Procedures    NPO      - Enteral Nutrition:   Promote at 45 ml/hr per OG tube + FWF 75 ml q 4hrs (450 ml).   Based on average 22 hour infusion time Goal rate provides 990 total TF volume, 990 kcal, 62  grams protein, 832 ml total free water, and 66% RDI's, 1282 ml total H2O (decreased free water due to mild hyponatremia)   Meets 100% of estimated energy and 100% of estimated protein needs.      - RD Malnutrition Care Plan:  EN feeds to meet estimated nutritional needs needs  - Meals and snacks: NPO  - Medical Food Supplements: NPO  - Vitamin and mineral supplements: added daily multivitamin to assure meeting >100% RDI's.   - Feeding assistance: NPO  - Nutrition education: not appropriate at this time  - Coordination of nutrition care: collaboration with other providers and discussed in Care Rounds  - EN adjustments communicated to RN via secure message  - Discharge and transfer of nutrition care to new setting or provider: monitor plans     MONITOR AND  EVALUATE/NUTRITION GOALS:  - Food and Nutrient Intake:      Monitor: N/A  - Food and Nutrient Administration:      Monitor: tolerance to enteral nutrition, adequacy of enteral nutrition, and for enteral nutrition adjustment  - Anthropometric Measurement:    Monitor weight  - Nutrition Goals:      labs within acceptable limits, support wound healing, and EN feeds to meet 100% of estimated nutritional needs . LTG: repletion if/when in rehab phase of illness.     DIETITIAN FOLLOW UP: RD to follow and monitor nutrition status    Janel Gonsalves MS, RD, LDN, [facility]  [phone]

## 2024-12-27 NOTE — RESPIRATORY THERAPY NOTE
Pt received on full vent support with 7.0 ETT secured at 22 cm at the lip, Current vent settings-     12/27/24 0226   Vent Information   Vent Mode VC/AC   Settings   FiO2 (%) 30 %   Resp Rate (Set) 12   Vt (Set, mL) 400 mL   Waveform Decelerating ramp   PEEP/CPAP (cm H2O) 5 cm H20       Suction provided as needed. No changes overnight. RT will continue to monitor.

## 2024-12-27 NOTE — PLAN OF CARE
Problem: Safety Risk - Non-Violent Restraints  Goal: Patient will remain free from self-harm  Description: INTERVENTIONS:  - Apply the least restrictive restraint to prevent harm  - Notify patient and family of reasons restraints applied  - Assess for any contributing factors to confusion (electrolyte disturbances, delirium, medications)  - Discontinue any unnecessary medical devices as soon as possible  - Assess the patient's physical comfort, circulation, skin condition, hydration, nutrition and elimination needs   - Reorient and redirection as needed  - Assess for the need to continue restraints  12/27/2024 0726 by Vicki Miranda, RN  Outcome: Progressing  12/27/2024 0718 by Vicki Miranda, RN  Outcome: Progressing

## 2024-12-27 NOTE — PALLIATIVE CARE NOTE
Brief Palliative Care Note    I met with Jada and her fiancee Librado. They have discussed her mom and agree that she would not want to be maintained on life support. They understand compassionate extubation with no attempt to re-intubate. They are discussing whether they would like to proceed NOW or tomorrow in the morning.     Plan:    -DNR/DNI   -They want to have CPAP/BIPAP available if needed   -They do not want her pre-medicated with any opioid   -I recommend having PRN doses of morphine available but not an infusion unless she becomes profoundly dyspneic   -there is an association with morphine and 'putting her to sleep' and I tried to dispel that and explain that it should be used if NEEDED for air hunger, not otherwise. They were comfortable with this.       Barry Liz MD  Palliative Care Services  [facility] [phone]

## 2024-12-27 NOTE — PROGRESS NOTES
[facility]  part of [facility]     Progress Note    Yin Butcher Patient Status:  Inpatient    1931 MRN [id number]   Location [facility] 2W/SW Attending Vika Elmore MD   Hosp Day # 15 PCP Mina Walker MD       Subjective:   Patient seen and examined.  Intubated.  No significant distress.  Arousable.  Did not tolerate spontaneous breathing trial yesterday.  Appears comfortable    Objective:   Blood pressure 113/73, pulse 96, temperature 97.5 °F (36.4 °C), temperature source Temporal, resp. rate 15, height 5' 7.01\" (1.702 m), weight 126 lb 8.7 oz (57.4 kg), SpO2 98%.  Intake/Output:   Last 3 shifts: I/O last 3 completed shifts:  In: 1679 [NG/GT:1679]  Out: 1750 [Urine:1750]   This shift: No intake/output data recorded.     Vent Settings: Vent Mode: VC/AC  FiO2 (%):  [30 %] 30 %  S RR:  [12] 12  S VT:  [400 mL] 400 mL  PEEP/CPAP (cm H2O):  [5 cm H20] 5 cm H20  MAP (cm H2O):  [6-15] 15    Hemodynamic parameters (last 24 hours):      Scheduled Meds:   Current Facility-Administered Medications   Medication Dose Route Frequency    furosemide (Lasix) 10 mg/mL injection 40 mg  40 mg Intravenous Daily    midazolam (Versed) 2 MG/2ML injection 2 mg  2 mg Intravenous Q1H PRN    glycopyrrolate (Robinul) 0.2 MG/ML injection 0.1 mg  0.1 mg Intravenous Q6H PRN    multivitamin (Tab-A-Jenny/Beta Carotene) tab 1 tablet  1 tablet Per G Tube Daily    ipratropium-albuterol (Duoneb) 0.5-2.5 (3) MG/3ML inhalation solution 3 mL  3 mL Nebulization Q6H PRN    midodrine (ProAmatine) tab 10 mg  10 mg Per NG Tube TID    digoxin (Lanoxin) tab 125 mcg  125 mcg Oral Daily    vancomycin (Firvanq) 50 mg/mL oral solution 125 mg  125 mg Per NG Tube Daily    norepinephrine (Levophed) 4 mg/250mL infusion premix  0.5-50 mcg/min Intravenous Continuous    propofol (Diprivan) 10 mg/mL infusion premix  5-50 mcg/kg/min (Dosing Weight) Intravenous Continuous    fentaNYL (Sublimaze) 50 mcg/mL injection 25 mcg  25 mcg  Intravenous Q30 Min PRN    acetaminophen (Ofirmev) 10 mg/mL infusion premix 650 mg  15 mg/kg Intravenous Q6H PRN    dextrose 10% infusion (TPN no rate)   Intravenous Continuous PRN    pancrelipase (Lip-Prot-Amyl) (Zenpep) DR particles cap 10,000 Units  10,000 Units Per G Tube PRN    And    sodium bicarbonate tab 325 mg  325 mg Per G Tube PRN    metoprolol (Lopressor) 5 mg/5mL injection 5 mg  5 mg Intravenous Q4H PRN    apixaban (Eliquis) tab 2.5 mg  2.5 mg Oral BID    albuterol (Ventolin) (5 MG/ML) 0.5% nebulizer solution 5 mg  5 mg Nebulization Q4H PRN    acetaminophen (Tylenol) 160 MG/5ML oral liquid 650 mg  650 mg Per NG Tube Q4H PRN    Or    acetaminophen (Tylenol) rectal suppository 650 mg  650 mg Rectal Q4H PRN    senna (Senokot) 8.8 MG/5ML oral syrup 17.6 mg  10 mL Per NG Tube BID    docusate (Colace) 50 MG/5ML oral solution 100 mg  100 mg Per NG Tube BID    polyethylene glycol (PEG 3350) (Miralax) 17 g oral packet 17 g  17 g Per NG Tube Daily PRN    bisacodyl (Dulcolax) 10 MG rectal suppository 10 mg  10 mg Rectal Daily PRN    chlorhexidine gluconate (Peridex) 0.12 % oral solution 15 mL  15 mL Mouth/Throat BID@0800,2000    mineral oil-white petrolatum (Artificial Tears) 83-15 % ophthalmic ointment 1 Application  1 Application Both Eyes Nightly    famotidine (Pepcid) 20 mg/2mL injection 20 mg  20 mg Intravenous Daily @ 0700    ondansetron (Zofran) 4 MG/2ML injection 4 mg  4 mg Intravenous Q6H PRN    metoclopramide (Reglan) 5 mg/mL injection 10 mg  10 mg Intravenous Q8H PRN       Continuous Infusions:    norepinephrine      propofol Stopped (12/22/24 0900)    dextrose 10%         Physical Exam  Constitutional: Intubated  Eyes: PERRL  ENT: nares pateint  Neck: supple, no JVD  Cardio: RRR, S1 S2  Respiratory: Diminished bibasilar breath sounds with crackles present  GI: abdomen soft, non tender, active bowel sounds, no organomegaly + PEG tube in place  Extremities: no clubbing, cyanosis, edema  Neurologic: Does  not follow commands  Skin: warm, dry      Results:     Lab Results   Component Value Date    WBC 12.7 12/27/2024    HGB 9.0 12/27/2024    HCT 28.2 12/27/2024    .0 12/27/2024    CREATSERUM 0.40 12/27/2024    BUN 21 12/27/2024     12/27/2024    K 4.6 12/27/2024    CL 98 12/27/2024    CO2 31.0 12/27/2024     12/27/2024    CA 9.3 12/27/2024         XR CHEST AP PORTABLE  (CPT=71045)    Result Date: 12/26/2024  CONCLUSION: Small bilateral pleural effusions larger on the right.  Bibasilar pulmonary opacities suggesting atelectasis.  No significant interval change.  Stable/satisfactory position of endotracheal tube.    Dictated by (CST): Alo Mullins MD on 12/26/2024 at 7:24 AM     Finalized by (CST): Alo Mullins MD on 12/26/2024 at 7:25 AM                 Assessment   1.  Fevers  2.  Acute encephalopathy  3.  Acute on chronic hypercapnic hypoxemic respiratory failure  4.  Leukocytosis  5.  HFpEF  6.  History of atrial fibrillation  7.  Prior history of pulmonary embolism  9.  Dementia  10.  Dysphagia     Plan   -Patient presents with chief complaint of fevers ongoing altered mental status over the last several days.  Recently discharged on 11/24/2024 from  after hospitalization for presumed aspiration pneumonia.  -Worsening hypercapnic respiratory failure on NIV.  Patient intubated on 12/12/2024.  -Spontaneous breathing trial as tolerated.  However not tolerating thus far with low tidal volumes and tachycardia.  Will try again today.  -CT chest on 12/11/2024 with lower lobe mucous plugging concerning for aspiration with small pleural effusions.  -Completed course of antibiotic therapy  -Chest physiotherapy  -DVT prophylaxis: Eliquis  -Ongoing discussions with daughter regarding the patient's poor prognosis.  I do not believe patient is appropriate for tracheostomy given her poor baseline functional status and age.  I recommended to the daughter that she needs to make decision  regarding her reintubation status.  I recommended extubation and consideration of comfort care measures if clinically declining.  Also discussed case with Dr. Liz from palliative care who is having ongoing discussions with daughter regarding goals of care.  Prognosis overall is poor.  Will follow-up with daughter today.  Daughter awaiting fiancé to arrive from out of town to make final decision.      Dayo Espino, DO  Pulmonary Critical Care Medicine  [facility]

## 2024-12-27 NOTE — PLAN OF CARE
Problem: RESPIRATORY - ADULT  Goal: Achieves optimal ventilation and oxygenation  Description: INTERVENTIONS:  - Assess for changes in respiratory status  - Assess for changes in mentation and behavior  - Position to facilitate oxygenation and minimize respiratory effort  - Oxygen supplementation based on oxygen saturation or ABGs  - Provide Smoking Cessation handout, if applicable  - Encourage broncho-pulmonary hygiene including cough, deep breathe, Incentive Spirometry  - Assess the need for suctioning and perform as needed  - Assess and instruct to report SOB or any respiratory difficulty  - Respiratory Therapy support as indicated  - Manage/alleviate anxiety  - Monitor for signs/symptoms of CO2 retention  Outcome: Not Progressing     Pt remains intubated with ETT size 7.0 at 22 cm at the lip, on full vent support. Pt saturating appropriately, suction provided as needed, no changes at this time, RT will continue to monitor.    Vent settings and readings as follow:     12/26/24 2050   Vent Information   Interface Invasive   Vent Type AV   Vent plugged into main power? Yes   Vent ID    Vent Mode VC/AC   Settings   FiO2 (%) 30 %   Resp Rate (Set) 12   Vt (Set, mL) 400 mL   Waveform Decelerating ramp   PEEP/CPAP (cm H2O) 5 cm H20   PEEP Low (cm H2O) 2 cm H2O   Peak Flow LPM 50   Trigger Sensitivity Flow (L/min) 2 L/min   Trigger Sensitivity Pressure (cm H2O) 3 cm H2O   Humidification Heater   H2O Bag Level 1/4 Full   Heater Temperature 98.4 °F (36.9 °C)   Readings   Total RR 12   Minute Ventilation (L/min) 5 L/min   Expiratory Tidal Volume 400 mL   PIP Observed (cm H2O) 30 cm H2O   MAP (cm H2O) 9   PEEP Low (cm H2O) 2 cm H2O   I/E Ratio 1:5.3   Plateau Pressure (cm H2O) 25 cm H2O   Static Compliance (L/cm H2O) 19   Dynamic Compliance (L/cm H2O) 16 L/cm H2O   Airway Resistance 14.2   Alarms   High RR 40   Insp Pressure High (cm H2O) 60 cm H2O   Insp Pressure Low (cm H2O) 8 cm H2O   MV High (L/min) 20 L/min    MV Low (L/min) 3 L/min   Apnea Interval (sec) 20 seconds      12/26/24 2050   ETT   Placement Date/Time: 12/12/24 1158   Airway Size: 7 mm  Cuffed: Cuffed  Insertion attempts: 2  Technique: Video laryngoscopy  Placement Verification: Colorimetric EtCO2 device  Placed By: (c) Other (Comment)   Secured at (cm) 22 cm   Cuff Pressure (cm H2O) 30 cm H2O   Suctioned? N   Measured From Lips   Secured Location Center   Secured by Commercial tube lo   Req'd equipment at bedside Bag mask   Additional Assessments   Pulse 96   Resp 12   SpO2 98 %

## 2024-12-28 NOTE — PROGRESS NOTES
[facility]  part of [facility]    Progress Note    Yin Butcher Patient Status:  Inpatient    1931 MRN [id number]   Location [facility] 2W/SW Attending Carmelina Duong MD   Hosp Day # 16 PCP Mina Walker MD     Chief Complaint: Altered mental status    Subjective:   Subjective:  Pt seen today in bed in CCU. Extubated yesterday, on BIPAP now. Opens eyes to stimuli.     Objective:   Blood pressure 118/88, pulse (!) 124, temperature 97.4 °F (36.3 °C), temperature source Temporal, resp. rate (!) 41, height 5' 7.01\" (1.702 m), weight 127 lb 10.3 oz (57.9 kg), SpO2 100%.  Physical Exam  Vitals and nursing note reviewed.   Constitutional:       Appearance: She is ill-appearing.      Comments: On continuous BIPAP   Cardiovascular:      Rate and Rhythm: Tachycardia present. Rhythm irregular.      Pulses: Normal pulses.   Pulmonary:      Effort: Pulmonary effort is normal. Tachypnea present.      Breath sounds: Decreased breath sounds present.   Abdominal:      General: Bowel sounds are normal.      Palpations: Abdomen is soft.   Skin:     General: Skin is warm and dry.   Neurological:      General: No focal deficit present.         Results:   Lab Results   Component Value Date    WBC 18.3 (H) 2024    HGB 9.2 (L) 2024    HCT 30.2 (L) 2024    .0 2024    CREATSERUM 0.42 (L) 2024    BUN 20 2024     (L) 2024    K 4.9 2024    CL 98 2024    CO2 32.0 2024     (H) 2024    CA 9.4 2024    ALB 4.0 2024    ALKPHO 112 2024    BILT 1.0 (H) 2024    TP 8.2 2024    AST 24 2024    ALT 30 2024    PTT 34.6 2024    INR 1.32 (H) 2024    PT 15.5 (H) 2014    T4F 1.0 2015    TSH 2.138 2024    LIP 35 2024    DDIMER 2.38 (H) 2024    MG 2.0 2024    PHOS 4.1 2024    TROP <0.046 2015    TROPHS 25 2024    CK 32 2024     B12 586 01/23/2024       No results found.        Assessment & Plan:   *Acute respiratory failure with hypercapnia/Aspiration pneumonia  CXR and CT chest reviewed   Intubated/sedated 12/12   Extubated 12/27 - on BIPAP now with tachycardia and tachypnea   S/P 10 day course of antibiotics   Cont nebs   Failed multiple attempts of breathing trial    Pulmonology following   Palliative following   Guarded prognosis     *HFpEF  Cont lasix  Cardiology following     *Atrial fibrillation with rapid ventricular response (HCC)  Rates improved   Cont Eliquis and digoxin  Cardiology following     *Dysphagia   Cont Tube feeds, at goal  Aspiration precautions     *Hypotension  Cont midodrine   Cardiology following       DVT prophylaxis: Eliquis  Code status: DNR/select     Dispo: Pt extubated on BIPAP, however with increasing tachycardia and tachypnea. Palliative following with multiple family meetings. DNI - extubated 12/27/24 at 6:30pm. Poor prognosis. Discussed POC with Pulm and RN.       MINDI Hernandez. MD  12/28/2024

## 2024-12-28 NOTE — PLAN OF CARE
Pt remains on cont bipap, harvey remains in place, pt remains in restraints. Pt safety maintained, family updated on POC.   Problem: Patient Centered Care  Goal: Patient preferences are identified and integrated in the patient's plan of care  Description: Interventions:  - What would you like us to know as we care for you?   - Provide timely, complete, and accurate information to patient/family  - Incorporate patient and family knowledge, values, beliefs, and cultural backgrounds into the planning and delivery of care  - Encourage patient/family to participate in care and decision-making at the level they choose  - Honor patient and family perspectives and choices  Outcome: Progressing     Problem: Patient/Family Goals  Goal: Patient/Family Long Term Goal  Description: Patient's Long Term Goal: be comfortable    Interventions:  - repositioning, distraction  - See additional Care Plan goals for specific interventions  Outcome: Progressing  Goal: Patient/Family Short Term Goal  Description: Patient's Short Term Goal: get better    Interventions:   - medication, respiratory support  - See additional Care Plan goals for specific interventions  Outcome: Progressing     Problem: PAIN - ADULT  Goal: Verbalizes/displays adequate comfort level or patient's stated pain goal  Description: INTERVENTIONS:  - Encourage pt to monitor pain and request assistance  - Assess pain using appropriate pain scale  - Administer analgesics based on type and severity of pain and evaluate response  - Implement non-pharmacological measures as appropriate and evaluate response  - Consider cultural and social influences on pain and pain management  - Manage/alleviate anxiety  - Utilize distraction and/or relaxation techniques  - Monitor for opioid side effects  - Notify MD/LIP if interventions unsuccessful or patient reports new pain  - Anticipate increased pain with activity and pre-medicate as appropriate  Outcome: Progressing     Problem: RISK  FOR INFECTION - ADULT  Goal: Absence of fever/infection during anticipated neutropenic period  Description: INTERVENTIONS  - Monitor WBC  - Administer growth factors as ordered  - Implement neutropenic guidelines  Outcome: Progressing     Problem: SAFETY ADULT - FALL  Goal: Free from fall injury  Description: INTERVENTIONS:  - Assess pt frequently for physical needs  - Identify cognitive and physical deficits and behaviors that affect risk of falls.  - [place] fall precautions as indicated by assessment.  - Educate pt/family on patient safety including physical limitations  - Instruct pt to call for assistance with activity based on assessment  - Modify environment to reduce risk of injury  - Provide assistive devices as appropriate  - Consider OT/PT consult to assist with strengthening/mobility  - Encourage toileting schedule  Outcome: Progressing     Problem: DISCHARGE PLANNING  Goal: Discharge to home or other facility with appropriate resources  Description: INTERVENTIONS:  - Identify barriers to discharge w/pt and caregiver  - Include patient/family/discharge partner in discharge planning  - Arrange for needed discharge resources and transportation as appropriate  - Identify discharge learning needs (meds, wound care, etc)  - Arrange for interpreters to assist at discharge as needed  - Consider post-discharge preferences of patient/family/discharge partner  - Complete POLST form as appropriate  - Assess patient's ability to be responsible for managing their own health  - Refer to Case Management Department for coordinating discharge planning if the patient needs post-hospital services based on physician/LIP order or complex needs related to functional status, cognitive ability or social support system  Outcome: Progressing     Problem: Altered Communication/Language Barrier  Goal: Patient/Family is able to understand and participate in their care  Description: Interventions:  - Assess communication ability  and preferred communication style  - Implement communication aides and strategies  - Use visual cues when possible  - Listen attentively, be patient, do not interrupt  - Minimize distractions  - Allow time for understanding and response  - Establish method for patient to ask for assistance (call light)  - Provide an  as needed  - Communicate barriers and strategies to overcome with those who interact with patient  Outcome: Progressing     Problem: RESPIRATORY - ADULT  Goal: Achieves optimal ventilation and oxygenation  Description: INTERVENTIONS:  - Assess for changes in respiratory status  - Assess for changes in mentation and behavior  - Position to facilitate oxygenation and minimize respiratory effort  - Oxygen supplementation based on oxygen saturation or ABGs  - Provide Smoking Cessation handout, if applicable  - Encourage broncho-pulmonary hygiene including cough, deep breathe, Incentive Spirometry  - Assess the need for suctioning and perform as needed  - Assess and instruct to report SOB or any respiratory difficulty  - Respiratory Therapy support as indicated  - Manage/alleviate anxiety  - Monitor for signs/symptoms of CO2 retention  Outcome: Progressing     Problem: NEUROLOGICAL - ADULT  Goal: Achieves stable or improved neurological status  Description: INTERVENTIONS  - Assess for and report changes in neurological status  - Initiate measures to prevent increased intracranial pressure  - Maintain blood pressure and fluid volume within ordered parameters to optimize cerebral perfusion and minimize risk of hemorrhage  - Monitor temperature, glucose, and sodium. Initiate appropriate interventions as ordered  Outcome: Progressing  Goal: Absence of seizures  Description: INTERVENTIONS  - Monitor for seizure activity  - Administer anti-seizure medications as ordered  - Monitor neurological status  Outcome: Progressing  Goal: Remains free of injury related to seizure activity  Description:  INTERVENTIONS:  - Maintain airway, patient safety  and administer oxygen as ordered  - Monitor patient for seizure activity, document and report duration and description of seizure to MD/LIP  - If seizure occurs, turn patient to side and suction secretions as needed  - Reorient patient post seizure  - Seizure pads on all 4 side rails  - Instruct patient/family to notify RN of any seizure activity  - Instruct patient/family to call for assistance with activity based on assessment  Outcome: Progressing  Goal: Achieves maximal functionality and self care  Description: INTERVENTIONS  - Monitor swallowing and airway patency with patient fatigue and changes in neurological status  - Encourage and assist patient to increase activity and self care with guidance from PT/OT  - Encourage visually impaired, hearing impaired and aphasic patients to use assistive/communication devices  Outcome: Progressing     Problem: Diabetes/Glucose Control  Goal: Glucose maintained within prescribed range  Description: INTERVENTIONS:  - Monitor Blood Glucose as ordered  - Assess for signs and symptoms of hyperglycemia and hypoglycemia  - Administer ordered medications to maintain glucose within target range  - Assess barriers to adequate nutritional intake and initiate nutrition consult as needed  - Instruct patient on self management of diabetes  Outcome: Progressing     Problem: Delirium  Goal: Minimize duration of delirium  Description: Interventions:  - Encourage use of hearing aids, eye glasses  - Promote highest level of mobility daily  - Provide frequent reorientation  - Promote wakefulness i.e. lights on, blinds open  - Promote sleep, encourage patient's normal rest cycle i.e. lights off, TV off, minimize noise and interruptions  - Encourage family to assist in orientation and promotion of home routines  Outcome: Progressing

## 2024-12-28 NOTE — PROGRESS NOTES
[facility]  part of [facility]     Progress Note    Yin Butcher Patient Status:  Inpatient    1931 MRN [id number]   Location [facility] 2W/SW Attending Vika Elmore MD   Hosp Day # 16 PCP Mina Walker MD       Subjective:   Patient seen and examined.  Extubated yesterday but requiring BiPAP afterwards.  Somnolent but arousable    Objective:   Blood pressure 122/84, pulse 93, temperature 97.4 °F (36.3 °C), temperature source Temporal, resp. rate (!) 39, height 5' 7.01\" (1.702 m), weight 127 lb 10.3 oz (57.9 kg), SpO2 100%.  Intake/Output:   Last 3 shifts: I/O last 3 completed shifts:  In: 2316 [NG/GT:2316]  Out: 1050 [Urine:1050]   This shift: No intake/output data recorded.     Vent Settings: Vent Mode: VC/AC  FiO2 (%):  [30 %-60 %] 60 %  S RR:  [12] 12  S VT:  [400 mL] 400 mL  PEEP/CPAP (cm H2O):  [5 cm H20] 5 cm H20  MAP (cm H2O):  [7-15] 15    Hemodynamic parameters (last 24 hours):      Scheduled Meds:   Current Facility-Administered Medications   Medication Dose Route Frequency    furosemide (Lasix) 10 mg/mL injection 40 mg  40 mg Intravenous Daily    midazolam (Versed) 2 MG/2ML injection 2 mg  2 mg Intravenous Q1H PRN    glycopyrrolate (Robinul) 0.2 MG/ML injection 0.1 mg  0.1 mg Intravenous Q6H PRN    multivitamin (Tab-A-Jenny/Beta Carotene) tab 1 tablet  1 tablet Per G Tube Daily    ipratropium-albuterol (Duoneb) 0.5-2.5 (3) MG/3ML inhalation solution 3 mL  3 mL Nebulization Q6H PRN    midodrine (ProAmatine) tab 10 mg  10 mg Per NG Tube TID    digoxin (Lanoxin) tab 125 mcg  125 mcg Oral Daily    vancomycin (Firvanq) 50 mg/mL oral solution 125 mg  125 mg Per NG Tube Daily    norepinephrine (Levophed) 4 mg/250mL infusion premix  0.5-50 mcg/min Intravenous Continuous    propofol (Diprivan) 10 mg/mL infusion premix  5-50 mcg/kg/min (Dosing Weight) Intravenous Continuous    fentaNYL (Sublimaze) 50 mcg/mL injection 25 mcg  25 mcg Intravenous Q30 Min PRN    acetaminophen  (Ofirmev) 10 mg/mL infusion premix 650 mg  15 mg/kg Intravenous Q6H PRN    dextrose 10% infusion (TPN no rate)   Intravenous Continuous PRN    pancrelipase (Lip-Prot-Amyl) (Zenpep) DR particles cap 10,000 Units  10,000 Units Per G Tube PRN    And    sodium bicarbonate tab 325 mg  325 mg Per G Tube PRN    metoprolol (Lopressor) 5 mg/5mL injection 5 mg  5 mg Intravenous Q4H PRN    apixaban (Eliquis) tab 2.5 mg  2.5 mg Oral BID    albuterol (Ventolin) (5 MG/ML) 0.5% nebulizer solution 5 mg  5 mg Nebulization Q4H PRN    acetaminophen (Tylenol) 160 MG/5ML oral liquid 650 mg  650 mg Per NG Tube Q4H PRN    Or    acetaminophen (Tylenol) rectal suppository 650 mg  650 mg Rectal Q4H PRN    senna (Senokot) 8.8 MG/5ML oral syrup 17.6 mg  10 mL Per NG Tube BID    docusate (Colace) 50 MG/5ML oral solution 100 mg  100 mg Per NG Tube BID    polyethylene glycol (PEG 3350) (Miralax) 17 g oral packet 17 g  17 g Per NG Tube Daily PRN    bisacodyl (Dulcolax) 10 MG rectal suppository 10 mg  10 mg Rectal Daily PRN    chlorhexidine gluconate (Peridex) 0.12 % oral solution 15 mL  15 mL Mouth/Throat BID@0800,2000    mineral oil-white petrolatum (Artificial Tears) 83-15 % ophthalmic ointment 1 Application  1 Application Both Eyes Nightly    famotidine (Pepcid) 20 mg/2mL injection 20 mg  20 mg Intravenous Daily @ 0700    ondansetron (Zofran) 4 MG/2ML injection 4 mg  4 mg Intravenous Q6H PRN    metoclopramide (Reglan) 5 mg/mL injection 10 mg  10 mg Intravenous Q8H PRN       Continuous Infusions:    norepinephrine      propofol Stopped (12/22/24 0900)    dextrose 10%         Physical Exam  Constitutional: Somnolent but arousable  Eyes: PERRL  ENT: nares pateint  Neck: supple, no JVD  Cardio: RRR, S1 S2  Respiratory: Diminished bibasilar breath sounds with crackles present  GI: abdomen soft, non tender, active bowel sounds, no organomegaly + PEG tube in place  Extremities: no clubbing, cyanosis, edema  Neurologic: Does not follow commands  Skin:  warm, dry      Results:     Lab Results   Component Value Date    WBC 18.3 12/28/2024    HGB 9.2 12/28/2024    HCT 30.2 12/28/2024    .0 12/28/2024    CREATSERUM 0.42 12/28/2024    BUN 20 12/28/2024     12/28/2024    K 4.9 12/28/2024    CL 98 12/28/2024    CO2 32.0 12/28/2024     12/28/2024    CA 9.4 12/28/2024         No results found.          Assessment   1.  Fevers  2.  Acute encephalopathy  3.  Acute on chronic hypercapnic hypoxemic respiratory failure  4.  Leukocytosis  5.  HFpEF  6.  History of atrial fibrillation  7.  Prior history of pulmonary embolism  9.  Dementia  10.  Dysphagia     Plan   -Patient presents with chief complaint of fevers ongoing altered mental status over the last several days.  Recently discharged on 11/24/2024 from [facility] after hospitalization for presumed aspiration pneumonia.  -Worsening hypercapnic respiratory failure on NIV.  Patient intubated on 12/12/2024.  -Spontaneous breathing trial as tolerated.  However not tolerating thus far with low tidal volumes and tachycardia.  Will try again today.  -CT chest on 12/11/2024 with lower lobe mucous plugging concerning for aspiration with small pleural effusions.  -Completed course of antibiotic therapy  -Chest physiotherapy  -DVT prophylaxis: Eliquis  -After multiple discussions with medical team and palliative care physician Dr. Liz decision made to make patient DNR/DNI.  Patient extubated on 12/27/2024.  Currently requiring NIV.  Closely monitor respiratory status.  Prognosis is guarded      Dayo Espino DO  Pulmonary Critical Care Medicine  [facility]

## 2024-12-28 NOTE — RESPIRATORY THERAPY NOTE
Patient extubated to high flow nasal cannula, patient desaturated to 78%, BiPAP initiated 15/5 60%. Tolerating BiPAP.

## 2024-12-28 NOTE — PLAN OF CARE
Problem: RESPIRATORY - ADULT  Goal: Achieves optimal ventilation and oxygenation  Description: INTERVENTIONS:  - Assess for changes in respiratory status  - Assess for changes in mentation and behavior  - Position to facilitate oxygenation and minimize respiratory effort  - Oxygen supplementation based on oxygen saturation or ABGs  - Provide Smoking Cessation handout, if applicable  - Encourage broncho-pulmonary hygiene including cough, deep breathe, Incentive Spirometry  - Assess the need for suctioning and perform as needed  - Assess and instruct to report SOB or any respiratory difficulty  - Respiratory Therapy support as indicated  - Manage/alleviate anxiety  - Monitor for signs/symptoms of CO2 retention  Outcome: Progressing   Received patient on vent  AC/VC 12/400/+5/30%    ETT 7.0/22 @ the lip, alaina. Breath sound auscultated and suction when indicated    1240 : start SBT , CPAP 5/5/30%       12/27/24 1240   Vent Information   Vent ID    Vent Mode (S)  CPAP;PS   Settings   FiO2 (%) 30 %   PEEP/CPAP (cm H2O) 5 cm H20   Press Support CWP 5   Readings   Total RR 35   Minute Ventilation (L/min) 5.9 L/min   Vt Spontaneous (mL) 200 mL   MAP (cm H2O) 7   Alarms   High RR 40   Insp Pressure High (cm H2O) 60 cm H2O   Insp Pressure Low (cm H2O) 8 cm H2O   MV High (L/min) 20 L/min   MV Low (L/min) 3 L/min   Apnea Interval (sec) 20 seconds   Apnea Rate 12   Apnea Volume (mL) 400 mL   Spontaneous Breathing Trial   Spontaneous Breathing Trial Complete Y   Is the FiO2 <= 0.5? (titrated for sats 92-94%) Y   Is the PEEP <= 5? Y   Is the RSBI <=104 N   Is the patient off pressor and narcotic / sedation drips? Y   Is the patient free of ventricular arrhythmias in the past 24 hours? Y   Is the patient's cough adequate? N   Is the patient alert (neuro), able to follow commands? N   Daily Screen Meets All Criteria No   Spontaneous Parameters   Sedation holiday (date) 12/27/24   Spontaneous RR Rate 36   Spontaneous Minute  Volume 7   Average Spontaneous Tidal Volume 0.19   $ Spontaneous Vital Capacity 350   Total RSBI 187   Weaning Trials   Spontaneous Breathing Trial Time Initiated 1240   Spontaneous Breathing Trial Method CPAP/PS   Spontaneous Breathing Trial Settings 5/5/30%   Pre Trial HR 93   Pre Trial RR 21   Pre Trial SpO2 98 %   Pre Trial /80   Pre Trial Vt 380     1315 : Back on full support due to low vt, high rate, high BP and increased WOB.    Later plan to extubate the patient per family.

## 2024-12-29 NOTE — PROGRESS NOTES
[facility]  part of [facility]     Progress Note    Yin Butcher Patient Status:  Inpatient    1931 MRN [id number]   Location [facility] 2W/SW Attending Vika Elmore MD   Hosp Day # 17 PCP Mina Walker MD       Subjective:   Patient seen and examined.  On BiPAP since extubation.  Transition to nasal cannula this morning.  Opens eyes.  Objective:   Blood pressure 112/72, pulse 101, temperature 98 °F (36.7 °C), temperature source Temporal, resp. rate 22, height 5' 7.01\" (1.702 m), weight 126 lb 8.7 oz (57.4 kg), SpO2 100%.  Intake/Output:   Last 3 shifts: I/O last 3 completed shifts:  In: 2202 [NG/GT:2202]  Out: 1300 [Urine:1300]   This shift: No intake/output data recorded.     Vent Settings: FiO2 (%):  [60 %] 60 %    Hemodynamic parameters (last 24 hours):      Scheduled Meds:   Current Facility-Administered Medications   Medication Dose Route Frequency    furosemide (Lasix) 10 mg/mL injection 40 mg  40 mg Intravenous Daily    midazolam (Versed) 2 MG/2ML injection 2 mg  2 mg Intravenous Q1H PRN    glycopyrrolate (Robinul) 0.2 MG/ML injection 0.1 mg  0.1 mg Intravenous Q6H PRN    multivitamin (Tab-A-Jenny/Beta Carotene) tab 1 tablet  1 tablet Per G Tube Daily    ipratropium-albuterol (Duoneb) 0.5-2.5 (3) MG/3ML inhalation solution 3 mL  3 mL Nebulization Q6H PRN    midodrine (ProAmatine) tab 10 mg  10 mg Per NG Tube TID    digoxin (Lanoxin) tab 125 mcg  125 mcg Oral Daily    vancomycin (Firvanq) 50 mg/mL oral solution 125 mg  125 mg Per NG Tube Daily    norepinephrine (Levophed) 4 mg/250mL infusion premix  0.5-50 mcg/min Intravenous Continuous    acetaminophen (Ofirmev) 10 mg/mL infusion premix 650 mg  15 mg/kg Intravenous Q6H PRN    dextrose 10% infusion (TPN no rate)   Intravenous Continuous PRN    pancrelipase (Lip-Prot-Amyl) (Zenpep) DR particles cap 10,000 Units  10,000 Units Per G Tube PRN    And    sodium bicarbonate tab 325 mg  325 mg Per G Tube PRN    metoprolol  (Lopressor) 5 mg/5mL injection 5 mg  5 mg Intravenous Q4H PRN    apixaban (Eliquis) tab 2.5 mg  2.5 mg Oral BID    albuterol (Ventolin) (5 MG/ML) 0.5% nebulizer solution 5 mg  5 mg Nebulization Q4H PRN    acetaminophen (Tylenol) 160 MG/5ML oral liquid 650 mg  650 mg Per NG Tube Q4H PRN    Or    acetaminophen (Tylenol) rectal suppository 650 mg  650 mg Rectal Q4H PRN    senna (Senokot) 8.8 MG/5ML oral syrup 17.6 mg  10 mL Per NG Tube BID    docusate (Colace) 50 MG/5ML oral solution 100 mg  100 mg Per NG Tube BID    polyethylene glycol (PEG 3350) (Miralax) 17 g oral packet 17 g  17 g Per NG Tube Daily PRN    bisacodyl (Dulcolax) 10 MG rectal suppository 10 mg  10 mg Rectal Daily PRN    famotidine (Pepcid) 20 mg/2mL injection 20 mg  20 mg Intravenous Daily @ 0700    ondansetron (Zofran) 4 MG/2ML injection 4 mg  4 mg Intravenous Q6H PRN    metoclopramide (Reglan) 5 mg/mL injection 10 mg  10 mg Intravenous Q8H PRN       Continuous Infusions:    norepinephrine      dextrose 10%         Physical Exam  Constitutional: Arousable  Eyes: PERRL  ENT: nares pateint  Neck: supple, no JVD  Cardio: RRR, S1 S2  Respiratory: Diminished bibasilar breath sounds with crackles present  GI: abdomen soft, non tender, active bowel sounds, no organomegaly + PEG tube in place  Extremities: no clubbing, cyanosis, edema  Neurologic: Does not follow commands  Skin: warm, dry      Results:     Lab Results   Component Value Date    WBC 10.5 12/29/2024    HGB 9.5 12/29/2024    HCT 30.4 12/29/2024    .0 12/29/2024    CREATSERUM 0.34 12/29/2024    BUN 20 12/29/2024     12/29/2024    K 4.9 12/29/2024    CL 96 12/29/2024    CO2 36.0 12/29/2024     12/29/2024    CA 9.5 12/29/2024         No results found.          Assessment   1.  Fevers  2.  Acute encephalopathy  3.  Acute on chronic hypercapnic hypoxemic respiratory failure  4.  Leukocytosis  5.  HFpEF  6.  History of atrial fibrillation  7.  Prior history of pulmonary embolism  9.   Dementia  10.  Dysphagia     Plan   -Patient presents with chief complaint of fevers ongoing altered mental status over the last several days.  Recently discharged on 11/24/2024 from [facility] after hospitalization for presumed aspiration pneumonia.  -Worsening hypercapnic respiratory failure on NIV.  Patient intubated on 12/12/2024.  Extubated on 12/27/2024 after discussion with goals of care with palliative care team.  Initially required NIV since extubation after significant hypoxia but transition to nasal cannula this morning.  Wean oxygen as tolerated.  -CT chest on 12/11/2024 with lower lobe mucous plugging concerning for aspiration with small pleural effusions.  -Completed course of antibiotic therapy  -Chest physiotherapy  -DVT prophylaxis: Eliquis  -Patient DNR/DNI moving forward        Dayo Espino DO  Pulmonary Critical Care Medicine  [facility]

## 2024-12-29 NOTE — PLAN OF CARE
Pt remains on cont bipap, pt remains in restraints, harvey remains in place. Oral care done as tolerated, regularly repositioned. Pt safety maintained.   Problem: Patient Centered Care  Goal: Patient preferences are identified and integrated in the patient's plan of care  Description: Interventions:  - What would you like us to know as we care for you?   - Provide timely, complete, and accurate information to patient/family  - Incorporate patient and family knowledge, values, beliefs, and cultural backgrounds into the planning and delivery of care  - Encourage patient/family to participate in care and decision-making at the level they choose  - Honor patient and family perspectives and choices  12/29/2024 0523 by Thaddeus Dillon RN  Outcome: Progressing  12/28/2024 2147 by Thaddeus Dillon RN  Outcome: Progressing     Problem: Patient/Family Goals  Goal: Patient/Family Long Term Goal  Description: Patient's Long Term Goal: be comfortable    Interventions:  - medication, repositioning, distraction  - See additional Care Plan goals for specific interventions  12/29/2024 0523 by Thaddeus Dillon RN  Outcome: Progressing  12/28/2024 2147 by Thaddeus Dillon RN  Outcome: Progressing  Goal: Patient/Family Short Term Goal  Description: Patient's Short Term Goal: breathe better    Interventions:   - respiratory support, medication, oral care  - See additional Care Plan goals for specific interventions  12/29/2024 0523 by Thaddeus Dillon RN  Outcome: Progressing  12/28/2024 2147 by Thaddeus Dillon RN  Outcome: Progressing     Problem: PAIN - ADULT  Goal: Verbalizes/displays adequate comfort level or patient's stated pain goal  Description: INTERVENTIONS:  - Encourage pt to monitor pain and request assistance  - Assess pain using appropriate pain scale  - Administer analgesics based on type and severity of pain and evaluate response  - Implement non-pharmacological measures as appropriate and evaluate response  - Consider  cultural and social influences on pain and pain management  - Manage/alleviate anxiety  - Utilize distraction and/or relaxation techniques  - Monitor for opioid side effects  - Notify MD/LIP if interventions unsuccessful or patient reports new pain  - Anticipate increased pain with activity and pre-medicate as appropriate  12/29/2024 0523 by Thaddeus Dillon, RN  Outcome: Progressing  12/28/2024 2147 by Thaddeus Dillon, RN  Outcome: Progressing     Problem: RISK FOR INFECTION - ADULT  Goal: Absence of fever/infection during anticipated neutropenic period  Description: INTERVENTIONS  - Monitor WBC  - Administer growth factors as ordered  - Implement neutropenic guidelines  12/29/2024 0523 by Thaddeus Dillon, RN  Outcome: Progressing  12/28/2024 2147 by Thaddeus Dillon RN  Outcome: Progressing     Problem: SAFETY ADULT - FALL  Goal: Free from fall injury  Description: INTERVENTIONS:  - Assess pt frequently for physical needs  - Identify cognitive and physical deficits and behaviors that affect risk of falls.  - [place] fall precautions as indicated by assessment.  - Educate pt/family on patient safety including physical limitations  - Instruct pt to call for assistance with activity based on assessment  - Modify environment to reduce risk of injury  - Provide assistive devices as appropriate  - Consider OT/PT consult to assist with strengthening/mobility  - Encourage toileting schedule  12/29/2024 0523 by Thaddeus Dillon, RN  Outcome: Progressing  12/28/2024 2147 by Thaddeus Dillon RN  Outcome: Progressing     Problem: DISCHARGE PLANNING  Goal: Discharge to home or other facility with appropriate resources  Description: INTERVENTIONS:  - Identify barriers to discharge w/pt and caregiver  - Include patient/family/discharge partner in discharge planning  - Arrange for needed discharge resources and transportation as appropriate  - Identify discharge learning needs (meds, wound care, etc)  - Arrange for interpreters to  assist at discharge as needed  - Consider post-discharge preferences of patient/family/discharge partner  - Complete POLST form as appropriate  - Assess patient's ability to be responsible for managing their own health  - Refer to Case Management Department for coordinating discharge planning if the patient needs post-hospital services based on physician/LIP order or complex needs related to functional status, cognitive ability or social support system  12/29/2024 0523 by Thaddeus Dillon, RN  Outcome: Progressing  12/28/2024 2147 by Thaddeus Dillon, RN  Outcome: Progressing     Problem: Altered Communication/Language Barrier  Goal: Patient/Family is able to understand and participate in their care  Description: Interventions:  - Assess communication ability and preferred communication style  - Implement communication aides and strategies  - Use visual cues when possible  - Listen attentively, be patient, do not interrupt  - Minimize distractions  - Allow time for understanding and response  - Establish method for patient to ask for assistance (call light)  - Provide an  as needed  - Communicate barriers and strategies to overcome with those who interact with patient  12/29/2024 0523 by Thaddeus Dillon, RN  Outcome: Progressing  12/28/2024 2147 by Thaddeus Dillon, RN  Outcome: Progressing     Problem: RESPIRATORY - ADULT  Goal: Achieves optimal ventilation and oxygenation  Description: INTERVENTIONS:  - Assess for changes in respiratory status  - Assess for changes in mentation and behavior  - Position to facilitate oxygenation and minimize respiratory effort  - Oxygen supplementation based on oxygen saturation or ABGs  - Provide Smoking Cessation handout, if applicable  - Encourage broncho-pulmonary hygiene including cough, deep breathe, Incentive Spirometry  - Assess the need for suctioning and perform as needed  - Assess and instruct to report SOB or any respiratory difficulty  - Respiratory Therapy support  as indicated  - Manage/alleviate anxiety  - Monitor for signs/symptoms of CO2 retention  12/29/2024 0523 by Thaddeus Dillon RN  Outcome: Progressing  12/28/2024 2147 by Thaddeus Dillon RN  Outcome: Progressing     Problem: NEUROLOGICAL - ADULT  Goal: Achieves stable or improved neurological status  Description: INTERVENTIONS  - Assess for and report changes in neurological status  - Initiate measures to prevent increased intracranial pressure  - Maintain blood pressure and fluid volume within ordered parameters to optimize cerebral perfusion and minimize risk of hemorrhage  - Monitor temperature, glucose, and sodium. Initiate appropriate interventions as ordered  12/29/2024 0523 by Thaddeus Dillon RN  Outcome: Progressing  12/28/2024 2147 by Thaddeus Dillon RN  Outcome: Progressing  Goal: Absence of seizures  Description: INTERVENTIONS  - Monitor for seizure activity  - Administer anti-seizure medications as ordered  - Monitor neurological status  12/29/2024 0523 by Thaddeus Dillon RN  Outcome: Progressing  12/28/2024 2147 by Thaddeus Dillon RN  Outcome: Progressing  Goal: Remains free of injury related to seizure activity  Description: INTERVENTIONS:  - Maintain airway, patient safety  and administer oxygen as ordered  - Monitor patient for seizure activity, document and report duration and description of seizure to MD/LIP  - If seizure occurs, turn patient to side and suction secretions as needed  - Reorient patient post seizure  - Seizure pads on all 4 side rails  - Instruct patient/family to notify RN of any seizure activity  - Instruct patient/family to call for assistance with activity based on assessment  12/29/2024 0523 by Thaddeus Dillon RN  Outcome: Progressing  12/28/2024 2147 by Thaddeus Dillon RN  Outcome: Progressing  Goal: Achieves maximal functionality and self care  Description: INTERVENTIONS  - Monitor swallowing and airway patency with patient fatigue and changes in neurological status  -  Encourage and assist patient to increase activity and self care with guidance from PT/OT  - Encourage visually impaired, hearing impaired and aphasic patients to use assistive/communication devices  12/29/2024 0523 by Thaddeus Dillon RN  Outcome: Progressing  12/28/2024 2147 by Thaddeus Dillon RN  Outcome: Progressing     Problem: Diabetes/Glucose Control  Goal: Glucose maintained within prescribed range  Description: INTERVENTIONS:  - Monitor Blood Glucose as ordered  - Assess for signs and symptoms of hyperglycemia and hypoglycemia  - Administer ordered medications to maintain glucose within target range  - Assess barriers to adequate nutritional intake and initiate nutrition consult as needed  - Instruct patient on self management of diabetes  12/29/2024 0523 by Thaddeus Dillon, RN  Outcome: Progressing  12/28/2024 2147 by Thaddeus Dillon RN  Outcome: Progressing     Problem: Delirium  Goal: Minimize duration of delirium  Description: Interventions:  - Encourage use of hearing aids, eye glasses  - Promote highest level of mobility daily  - Provide frequent reorientation  - Promote wakefulness i.e. lights on, blinds open  - Promote sleep, encourage patient's normal rest cycle i.e. lights off, TV off, minimize noise and interruptions  - Encourage family to assist in orientation and promotion of home routines  12/29/2024 0523 by Thaddeus Dillon, RN  Outcome: Progressing  12/28/2024 2147 by Thaddeus Dillon RN  Outcome: Progressing

## 2024-12-29 NOTE — PROGRESS NOTES
[facility]  part of [facility]    Cardiology Progress Note    Yin Butcher Patient Status:  Inpatient    1931 MRN [id number]   Location [facility] 2W/SW Attending Carmelina Duong MD   Hosp Day # 16 PCP Mina Walker MD     Interval History   Extubated, DNR/DNI now in  Currently on BiPAP  Remains non responsive    Assessment and Plan:   Acute hypoxic/hypercapnic respiratory failure, intubated  Encephalopathy  H/o PE  Dementia    Persistent AF  -tele with rate controlled AF  -continue digoxin  -continue weight/age adjusted dose of apixaban (2.5mg BID)    Pulmonary hypertension  Suspected HFpEF  -TTE 24 with elevated R sided filling pressure (45-50mmHg)  -continue IV furosemide 40mg daily to augment respiratory status for extubation and maintain euvolemia    Objective:   Patient Vitals for the past 24 hrs:   BP Temp Temp src Pulse Resp SpO2 Weight   24 0810 -- -- -- 112 -- -- --   24 0800 109/74 -- -- 120 13 95 % --   24 0600 106/61 -- -- 98 15 95 % 126 lb 1.7 oz (57.2 kg)   24 0500 106/84 -- -- 108 20 95 % --   24 0400 106/84 98 °F (36.7 °C) Temporal 106 17 96 % --   24 0300 109/78 -- -- 103 23 95 % --   24 0200 115/82 -- -- 91 13 95 % --   24 0100 102/65 -- -- 89 16 94 % --   24 0000 100/59 98 °F (36.7 °C) Temporal 72 17 100 % --   24 2300 107/79 -- -- 79 26 100 % --   24 2200 117/71 -- -- 94 18 100 % --   24 2100 109/74 -- -- 78 18 100 % --   24 2000 98/65 98.4 °F (36.9 °C) Temporal 78 15 99 % --   24 1900 (!) 80/51 -- -- 69 14 99 % --   24 1800 100/70 -- -- 97 17 100 % --   24 1700 (!) 87/58 -- -- 76 14 100 % --   24 1600 97/60 -- -- 89 10 99 % --   24 1500 112/78 -- -- 100 15 97 % --   24 1400 104/59 -- -- 82 12 99 % --   24 1300 92/59 -- -- 79 15 100 % --   24 1200 114/76 -- -- 97 (!) 29 97 % --   24 1100 112/63 -- -- 112 15 99 % --    12/25/24 1015 -- -- -- 93 -- -- --   12/25/24 1000 111/67 -- -- 99 13 100 % --   12/25/24 0900 112/70 -- -- 85 12 100 % --       Intake/Output:   Last 3 shifts:   Intake/Output                   12/24/24 0700 - 12/25/24 0659 12/25/24 0700 - 12/26/24 0659 12/26/24 0700 - 12/27/24 0659       Intake    P.O.  0  --  --    P.O. 0 -- --    I.V.  120  --  --    I.V. 120 -- --    NG/GT  1608  1761  --    Tube Feeding Flushes (mL) 580 600 --    Intake (mL) (Gastrostomy/Enterostomy PEG-jejunostomy LUQ) 1028 1161 --    Total Intake 1728 1761 --       Output    Urine  3200  1275  --    Output (mL) (Urethral Catheter) 3200 1275 --    Emesis/NG output  0  --  --    Emesis 0 -- --    Stool  --  --  --    Stool Count Calculated for I/O -- 1 x --    Total Output 3200 1275 --       Net I/O     -1472 486 --             Vent Settings: FiO2 (%):  [60 %] 60 %    Hemodynamic parameters (last 24 hours):      Scheduled Meds:    furosemide  40 mg Intravenous Daily    multivitamin  1 tablet Per G Tube Daily    midodrine  10 mg Per NG Tube TID    digoxin  125 mcg Oral Daily    vancomycin  125 mg Per NG Tube Daily    apixaban  2.5 mg Oral BID    senna  10 mL Per NG Tube BID    docusate  100 mg Per NG Tube BID    famotidine  20 mg Intravenous Daily @ 0700       Continuous Infusions:    norepinephrine      dextrose 10%         Results:     Lab Results   Component Value Date    WBC 18.3 (H) 12/28/2024    HGB 9.2 (L) 12/28/2024    HCT 30.2 (L) 12/28/2024    .0 12/28/2024    CREATSERUM 0.42 (L) 12/28/2024    BUN 20 12/28/2024     (L) 12/28/2024    K 4.9 12/28/2024    CL 98 12/28/2024    CO2 32.0 12/28/2024     (H) 12/28/2024    CA 9.4 12/28/2024    ALB 4.0 12/11/2024    ALKPHO 112 12/11/2024    BILT 1.0 (H) 12/11/2024    TP 8.2 12/11/2024    AST 24 12/11/2024    ALT 30 12/11/2024    PTT 34.6 01/31/2024    INR 1.32 (H) 01/29/2024    PT 15.5 (H) 07/13/2014    T4F 1.0 02/19/2015    TSH 2.138 12/11/2024    LIP 35 12/11/2024     DDIMER 2.38 (H) 2024    MG 2.0 2024    PHOS 4.1 2024    TROP <0.046 2015    CK 32 2024    B12 586 2024       Recent Labs   Lab 24  0327 24  0400 24  0355   * 118* 144*   BUN 16 21 20   CREATSERUM 0.35* 0.40* 0.42*   CA 9.1 9.3 9.4   * 133* 134*   K 4.3 4.6 4.9   CL 99 98 98   CO2 30.0 31.0 32.0     Recent Labs   Lab 24  0400 24  0355   RBC 3.31* 3.23* 3.30*   HGB 9.0* 9.0* 9.2*   HCT 30.1* 28.2* 30.2*   MCV 90.9 87.3 91.5   MCH 27.2 27.9 27.9   MCHC 29.9* 31.9 30.5*   RDW 20.5* 20.4* 20.2*   NEPRELIM 8.26* 11.56* 17.14*   WBC 9.6 12.7* 18.3*   .0 246.0 216.0       Recent Labs   Lab 24  0452   BNPML 690*       No results for input(s): \"TROP\", \"CK\" in the last 168 hours.    XR CHEST AP PORTABLE  (CPT=71045)    Result Date: 2024  CONCLUSION: Small bilateral pleural effusions larger on the right.  Bibasilar pulmonary opacities suggesting atelectasis.  No significant interval change.  Stable/satisfactory position of endotracheal tube.    Dictated by (CST): Alo Mullins MD on 2024 at 7:24 AM     Finalized by (CST): Alo Mullins MD on 2024 at 7:25 AM             CARD ECHO 2D DOPPLER (CPT=93306)    Result Date: 2024  Transthoracic Echocardiogram Name:Yin Butcher Date: 2024 :  1931 Ht:  (67in)  BP: 121 / 78 MRN:  [id number]    Age:  89years    Wt:  (92lb)  HR: 110bpm Loc:  [facility]       Gndr: F          BSA: 1.45m^2 Sonographer: Rachael HAMMER Ordering:    cleve Hawthorne Consulting:  Dayo Espino MD ---------------------------------------------------------------------------- History/Indications:   Atrial fibrillation. Fever. Shortness of breath. ---------------------------------------------------------------------------- Procedure information:  A transthoracic complete 2D study was performed. Additional evaluation included M-mode, complete spectral Doppler, and color  Doppler.  Patient status:  Inpatient.  Location:  [facility]    Comparison was made to the study of 01/21/2024.    This was a routine study. Transthoracic echocardiography for ventricular function evaluation and assessment of valvular function. Image quality was adequate. ECG rhythm:   Atrial fibrillation ---------------------------------------------------------------------------- Conclusions: 1. Left ventricle: The cavity size was below normal. Wall thickness was    increased. Systolic function was hyperdynamic. The estimated ejection    fraction was 70-75%, by visual assessment. No diagnostic evidence for    regional wall motion abnormalities. Unable to assess LV diastolic    function due to heart rhythm. 2. Right ventricle: Systolic function was mildly reduced. The tricuspid    annular plane systolic excursion is 1.26cm. The RV s' is 9.2cm/sec. 3. Left atrium: The left atrial volume was markedly increased. 4. Right atrium: The atrium was markedly dilated. 5. Aortic valve: The peak systolic velocity was 1.54m/sec. The mean systolic    gradient was 5mm Hg. The valve area (VTI) was 1.58cm^2. The valve area    (VTI) index was 1.09cm^2/m^2. 6. Mitral valve: The annulus was markedly calcified. The leaflets were    mildly calcified. Cannot exclude a vegetation. There was mild    regurgitation. The mean diastolic gradient was 4mm Hg at a heart rate of    120 bpm. 7. Pulmonary arteries: Systolic pressure was moderately increased, in the    range of 45mm Hg to 50mm Hg. 8. Pericardium, extracardiac: There were bilateral pleural effusions    present. Impressions:  This study is compared with previous dated 1/21/24: * ---------------------------------------------------------------------------- * Findings: Left ventricle:  The cavity size was below normal. Wall thickness was increased. Systolic function was hyperdynamic. The estimated ejection fraction was 70-75%, by visual assessment. No diagnostic evidence for regional wall motion  abnormalities. Unable to assess LV diastolic function due to heart rhythm. Left atrium:  The left atrial volume was markedly increased. Right ventricle:  The cavity size was normal. Systolic function was mildly reduced. Right atrium:  The atrium was markedly dilated. Mitral valve:  Poorly visualized. The annulus was markedly calcified. The leaflets were mildly calcified. Leaflet separation was normal.  Cannot exclude a vegetation.  Doppler:  Transvalvular velocity was within the normal range. There was no evidence for stenosis. There was mild regurgitation.    The valve area (LVOT continuity) was 2.01cm^2. The valve area index (LVOT continuity) was 1.38cm^2/m^2.    The mean diastolic gradient was 4mm Hg at a heart rate of 120 bpm. Aortic valve:   The valve was probably trileaflet. The leaflets were moderately calcified.  Doppler:     The valve area (VTI) was 1.58cm^2. The valve area (VTI) index was 1.09cm^2/m^2.    The mean systolic gradient was 5mm Hg. The peak systolic gradient was 13mm Hg. Tricuspid valve:  The valve is structurally normal. Leaflet separation was normal.  Doppler:  Transvalvular velocity was within the normal range. There was no evidence for stenosis. There was mild regurgitation. Pulmonic valve:   The valve is structurally normal. Cusp separation was normal.  Doppler:  Transvalvular velocity was within the normal range. There was no evidence for stenosis. There was trace regurgitation. Pericardium:   No significant pericardial effusion was identified. Pleura:  There were bilateral pleural effusions present. Aorta: Aortic root: The aortic root was normal. Ascending aorta: The ascending aorta was normal. Pulmonary arteries: Systolic pressure was moderately increased, in the range of 45mm Hg to 50mm Hg. ---------------------------------------------------------------------------- Measurements  Left              Value             Ref       01/21/2024  ventricle  IVS           (H) 1.2   cm           0.6 - 0.9 0.9  thickness,  ED, PLAX  LV ID, ED,    (L) 2.6   cm          3.8 - 5.2 3.6  PLAX  LV ID, ES,    (L) 1.6   cm          2.2 - 3.5 2.2  PLAX  LV PW         (H) 1.1   cm          0.6 - 0.9 0.8  thickness,  ED, PLAX  IVS/LV PW         1.09              --------- 1.10  ratio, ED,  PLAX  LV PW/LV ID       0.42              --------- 0.23  ratio, ED,  PLAX  LV ejection       71    %           54 - 74   70  fraction  Stroke            28    ml/m^2      --------- ----------  volume/bsa,  2D  LVOT              Value             Ref       01/21/2024  LVOT ID           2     cm          --------- ----------  LVOT peak         0.82  m/sec       --------- ----------  velocity, S  LVOT VTI, S       13.2  cm          --------- ----------  LVOT peak         3     mm Hg       --------- ----------  gradient, S  LVOT mean         1     mm Hg       --------- ----------  gradient, S  Stroke volume     41    ml          --------- ----------  (SV), LVOT DP  Cardiac           4.3   L/min       --------- ----------  output (Qs),  LVOT DP  Cardiac index     3     L/(min-m^2) --------- ----------  (Qs/bsa),  LVOT DP  Stroke index      29    ml/m^2      --------- ----------  (SV/bsa),  LVOT DP  Aortic valve      Value             Ref       01/21/2024  Aortic valve      1.54  m/sec       --------- ----------  peak  velocity, S  Aortic valve      23.3  cm          --------- ----------  VTI, S  Aortic mean       5     mm Hg       --------- ----------  gradient, S  Aortic peak       13    mm Hg       --------- ----------  gradient, S  Aortic valve      1.58  cm^2        --------- ----------  area, VTI  Aortic valve      1.09  cm^2/m^2    --------- ----------  area/bsa, VTI  Velocity          0.46              --------- ----------  ratio, peak,  LVOT/AV  Aortic root       Value             Ref       01/21/2024  Aortic root       3.3   cm          2.3 - 3.7 ----------  ID  Ascending         Value             Ref       01/21/2024  aorta   Ascending         2.9   cm          1.9 - 3.5 ----------  aorta ID  Left atrium       Value             Ref       01/21/2024  LA ID, A-P,       3.5   cm          2.7 - 3.8 4.0  ES  LA volume, S  (H) 100   ml          22 - 52   ----------  LA            (H) 69    ml/m^2      16 - 34   ----------  volume/bsa, S  LA volume,    (H) 114   ml          22 - 52   ----------  ES, 1-p A4C  LA volume,    (H) 88    ml          22 - 52   ----------  ES, 1-p A2C  LA volume,        108   ml          --------- ----------  ES, A/L  LA            (H) 74    ml/m^2      16 - 34   ----------  volume/bsa,  ES, A/L  LA/aortic         1.06              --------- 1.33  root ratio  Mitral valve      Value             Ref       01/21/2024  Mitral mean       4     mm Hg       --------- ----------  gradient, D  Mitral peak       7     mm Hg       --------- ----------  gradient, D  Mitral valve      2.01  cm^2        --------- ----------  area, LVOT  continuity  Mitral valve      1.38  cm^2/m^2    --------- ----------  area/bsa,  LVOT  continuity  Pulmonary         Value             Ref       01/21/2024  artery  PA pressure,      47    mm Hg       --------- 35  S, DP  Tricuspid         Value             Ref       01/21/2024  valve  Tricuspid     (H) 3.12  m/sec       <=2.8     2.81  regurg peak  velocity  Tricuspid         39    mm Hg       --------- 32  peak RV-RA  gradient  Systemic          Value             Ref       01/21/2024  veins  Estimated CVP     8     mm Hg       --------- 3  Right             Value             Ref       01/21/2024  ventricle  TAPSE, MM     (L) 1.26  cm          >=1.70    ----------  RV pressure,      47    mm Hg       --------- 35  S, DP  RV s',        (L) 9.2   cm/sec      >=9.5     ----------  lateral Legend: (L)  and  (H)  corinna values outside specified reference range. ---------------------------------------------------------------------------- Prepared and electronically signed by Yonatan Trevino 12/12/2024 08:57         Exam:     Physical Exam:  General: on BiPAP  HEENT: Normocephalic, anicteric sclera, neck supple, no thyromegaly or adenopathy.  Neck: No JVD, carotids 2+, no bruits.  Cardiac on BiPAP  Abdomen: Soft, non-tender. No organosplenomegally, mass or rebound, BS-present.  Extremities: No edema.    Neurologic: unable to obtain  Skin: Warm and dry.     Marquise Riley, [facility] [place]

## 2024-12-29 NOTE — PLAN OF CARE
Problem: Safety Risk - Non-Violent Restraints  Goal: Patient will remain free from self-harm  Description: INTERVENTIONS:  - Apply the least restrictive restraint to prevent harm  - Notify patient and family of reasons restraints applied  - Assess for any contributing factors to confusion (electrolyte disturbances, delirium, medications)  - Discontinue any unnecessary medical devices as soon as possible  - Assess the patient's physical comfort, circulation, skin condition, hydration, nutrition and elimination needs   - Reorient and redirection as needed  - Assess for the need to continue restraints  12/28/2024 2147 by Thaddeus Dillon, RN  Outcome: Not Progressing

## 2024-12-29 NOTE — PROGRESS NOTES
[facility]  part of [facility]    Progress Note    Yin Butcher Patient Status:  Inpatient    1931 MRN [id number]   Location [facility] 2W/SW Attending Carmelina Duong MD   Hosp Day # 17 PCP Mina Walker MD     Chief Complaint: Altered mental status    Subjective:   Subjective:  Pt seen today in bed in CCU. NAD noted currently, on 10L NC. Opens eyes to stimuli.     Objective:   Blood pressure 117/74, pulse 100, temperature 96.9 °F (36.1 °C), temperature source Temporal, resp. rate 24, height 5' 7.01\" (1.702 m), weight 126 lb 8.7 oz (57.4 kg), SpO2 100%.  Physical Exam  Vitals and nursing note reviewed.   Constitutional:       Appearance: She is ill-appearing.      Interventions: Nasal cannula in place.   HENT:      Mouth/Throat:      Mouth: Mucous membranes are dry.   Cardiovascular:      Rate and Rhythm: Tachycardia present. Rhythm irregular.      Pulses: Normal pulses.   Pulmonary:      Effort: Pulmonary effort is normal. Tachypnea present.      Breath sounds: Decreased breath sounds present.   Abdominal:      General: Bowel sounds are normal.      Palpations: Abdomen is soft.   Skin:     General: Skin is warm and dry.   Neurological:      General: No focal deficit present.         Results:   Lab Results   Component Value Date    WBC 10.5 2024    HGB 9.5 (L) 2024    HCT 30.4 (L) 2024    .0 2024    CREATSERUM 0.34 (L) 2024    BUN 20 2024     (L) 2024    K 4.9 2024    CL 96 (L) 2024    CO2 36.0 (H) 2024     (H) 2024    CA 9.5 2024    ALB 4.0 2024    ALKPHO 112 2024    BILT 1.0 (H) 2024    TP 8.2 2024    AST 24 2024    ALT 30 2024    PTT 34.6 2024    INR 1.32 (H) 2024    PT 15.5 (H) 2014    T4F 1.0 2015    TSH 2.138 2024    LIP 35 2024    DDIMER 2.38 (H) 2024    MG 2.0 2024    PHOS 4.1 2024     TROP <0.046 02/19/2015    TROPHS 25 01/20/2024    CK 32 01/20/2024    B12 586 01/23/2024       No results found.        Assessment & Plan:   *Acute respiratory failure with hypercapnia/Aspiration pneumonia  CXR and CT chest reviewed   Intubated/sedated 12/12   Extubated 12/27 - was on BIPAP, now tolerating 10L NC   S/P 10 day course of antibiotics   Cont nebs   Leukocytosis improving   Pulmonology following   Palliative following   Guarded prognosis     *HFpEF  Cont lasix  Cardiology following     *Atrial fibrillation with rapid ventricular response (HCC)  Rates improved   Cont Eliquis and digoxin  Cardiology following     *Dysphagia   Cont Tube feeds, at goal  Aspiration precautions     *Hypotension  Cont midodrine   Cardiology following       DVT prophylaxis: Eliquis  Code status: DNR/select     Dispo: Pt extubated 12/27/24, was on cont BIPAP, on on 10L NC. Palliative following with multiple family meetings. DNI.  Prognosis remains guarded.       MINDI Hernandez. MD  12/29/2024

## 2024-12-29 NOTE — PROGRESS NOTES
[facility]  part of [facility]    Cardiology Progress Note    Yin Butcher Patient Status:  Inpatient    1931 MRN [id number]   Location [facility] 2W/SW Attending Carmelina Duong MD   Hosp Day # 17 PCP Mina Walker MD     Interval History   Off BiPAP  Awake but not following commands, nonverbal    Assessment and Plan:   Acute hypoxic/hypercapnic respiratory failure, resolving  Encephalopathy, likely baseline  H/o PE  Dementia    Persistent AF  -tele with rate controlled AF  -continue digoxin  -continue weight/age adjusted dose of apixaban (2.5mg BID)    Pulmonary hypertension  Suspected HFpEF  -TTE 24 with elevated R sided filling pressure (45-50mmHg)  -continue IV furosemide 40mg daily to maintain euvolemic    Objective:   Patient Vitals for the past 24 hrs:   BP Temp Temp src Pulse Resp SpO2 Weight   24 0810 -- -- -- 112 -- -- --   24 0800 109/74 -- -- 120 13 95 % --   24 0600 106/61 -- -- 98 15 95 % 126 lb 1.7 oz (57.2 kg)   24 0500 106/84 -- -- 108 20 95 % --   24 0400 106/84 98 °F (36.7 °C) Temporal 106 17 96 % --   24 0300 109/78 -- -- 103 23 95 % --   24 0200 115/82 -- -- 91 13 95 % --   24 0100 102/65 -- -- 89 16 94 % --   24 0000 100/59 98 °F (36.7 °C) Temporal 72 17 100 % --   24 2300 107/79 -- -- 79 26 100 % --   24 2200 117/71 -- -- 94 18 100 % --   24 2100 109/74 -- -- 78 18 100 % --   24 2000 98/65 98.4 °F (36.9 °C) Temporal 78 15 99 % --   24 1900 (!) 80/51 -- -- 69 14 99 % --   24 1800 100/70 -- -- 97 17 100 % --   24 1700 (!) 87/58 -- -- 76 14 100 % --   24 1600 97/60 -- -- 89 10 99 % --   24 1500 112/78 -- -- 100 15 97 % --   24 1400 104/59 -- -- 82 12 99 % --   24 1300 92/59 -- -- 79 15 100 % --   24 1200 114/76 -- -- 97 (!) 29 97 % --   24 1100 112/63 -- -- 112 15 99 % --   24 1015 -- -- -- 93 -- -- --    12/25/24 1000 111/67 -- -- 99 13 100 % --   12/25/24 0900 112/70 -- -- 85 12 100 % --       Intake/Output:   Last 3 shifts:   Intake/Output                   12/24/24 0700 - 12/25/24 0659 12/25/24 0700 - 12/26/24 0659 12/26/24 0700 - 12/27/24 0659       Intake    P.O.  0  --  --    P.O. 0 -- --    I.V.  120  --  --    I.V. 120 -- --    NG/GT  1608  1761  --    Tube Feeding Flushes (mL) 580 600 --    Intake (mL) (Gastrostomy/Enterostomy PEG-jejunostomy LUQ) 1028 1161 --    Total Intake 1728 1761 --       Output    Urine  3200  1275  --    Output (mL) (Urethral Catheter) 3200 1275 --    Emesis/NG output  0  --  --    Emesis 0 -- --    Stool  --  --  --    Stool Count Calculated for I/O -- 1 x --    Total Output 3200 1275 --       Net I/O     -1472 486 --             Vent Settings:      Hemodynamic parameters (last 24 hours):      Scheduled Meds:    furosemide  40 mg Intravenous Daily    multivitamin  1 tablet Per G Tube Daily    midodrine  10 mg Per NG Tube TID    digoxin  125 mcg Oral Daily    vancomycin  125 mg Per NG Tube Daily    apixaban  2.5 mg Oral BID    senna  10 mL Per NG Tube BID    docusate  100 mg Per NG Tube BID    famotidine  20 mg Intravenous Daily @ 0700       Continuous Infusions:    norepinephrine      dextrose 10%         Results:     Lab Results   Component Value Date    WBC 10.5 12/29/2024    HGB 9.5 (L) 12/29/2024    HCT 30.4 (L) 12/29/2024    .0 12/29/2024    CREATSERUM 0.34 (L) 12/29/2024    BUN 20 12/29/2024     (L) 12/29/2024    K 4.9 12/29/2024    CL 96 (L) 12/29/2024    CO2 36.0 (H) 12/29/2024     (H) 12/29/2024    CA 9.5 12/29/2024    ALB 4.0 12/11/2024    ALKPHO 112 12/11/2024    BILT 1.0 (H) 12/11/2024    TP 8.2 12/11/2024    AST 24 12/11/2024    ALT 30 12/11/2024    PTT 34.6 01/31/2024    INR 1.32 (H) 01/29/2024    PT 15.5 (H) 07/13/2014    T4F 1.0 02/19/2015    TSH 2.138 12/11/2024    LIP 35 12/11/2024    DDIMER 2.38 (H) 01/20/2024    MG 2.0 12/14/2024    PHOS  4.1 2024    TROP <0.046 2015    CK 32 2024    B12 586 2024       Recent Labs   Lab 24  0400 24  0355 24   * 144* 129*   BUN 21 20 20   CREATSERUM 0.40* 0.42* 0.34*   CA 9.3 9.4 9.5   * 134* 135*   K 4.6 4.9 4.9   CL 98 98 96*   CO2 31.0 32.0 36.0*     Recent Labs   Lab 24  0400 24  0355 24   RBC 3.23* 3.30* 3.36*   HGB 9.0* 9.2* 9.5*   HCT 28.2* 30.2* 30.4*   MCV 87.3 91.5 90.5   MCH 27.9 27.9 28.3   MCHC 31.9 30.5* 31.3   RDW 20.4* 20.2* 19.9*   NEPRELIM 11.56* 17.14* 9.49*   WBC 12.7* 18.3* 10.5   .0 216.0 243.0       No results for input(s): \"BNPML\" in the last 168 hours.      No results for input(s): \"TROP\", \"CK\" in the last 168 hours.    No results found.      CARD ECHO 2D DOPPLER (CPT=93306)    Result Date: 2024  Transthoracic Echocardiogram Name:Yin Butcher Date: 2024 :  1931 Ht:  (67in)  BP: 121 / 78 MRN:  [id number]    Age:  89years    Wt:  (92lb)  HR: 110bpm Loc:  [facility]       Gndr: F          BSA: 1.45m^2 Sonographer: Rachael HAMMER Ordering:    cleve Hawthorne Consulting:  Dayo Espino MD ---------------------------------------------------------------------------- History/Indications:   Atrial fibrillation. Fever. Shortness of breath. ---------------------------------------------------------------------------- Procedure information:  A transthoracic complete 2D study was performed. Additional evaluation included M-mode, complete spectral Doppler, and color Doppler.  Patient status:  Inpatient.  Location:  U    Comparison was made to the study of 2024.    This was a routine study. Transthoracic echocardiography for ventricular function evaluation and assessment of valvular function. Image quality was adequate. ECG rhythm:   Atrial fibrillation ---------------------------------------------------------------------------- Conclusions: 1. Left ventricle: The cavity size was  below normal. Wall thickness was    increased. Systolic function was hyperdynamic. The estimated ejection    fraction was 70-75%, by visual assessment. No diagnostic evidence for    regional wall motion abnormalities. Unable to assess LV diastolic    function due to heart rhythm. 2. Right ventricle: Systolic function was mildly reduced. The tricuspid    annular plane systolic excursion is 1.26cm. The RV s' is 9.2cm/sec. 3. Left atrium: The left atrial volume was markedly increased. 4. Right atrium: The atrium was markedly dilated. 5. Aortic valve: The peak systolic velocity was 1.54m/sec. The mean systolic    gradient was 5mm Hg. The valve area (VTI) was 1.58cm^2. The valve area    (VTI) index was 1.09cm^2/m^2. 6. Mitral valve: The annulus was markedly calcified. The leaflets were    mildly calcified. Cannot exclude a vegetation. There was mild    regurgitation. The mean diastolic gradient was 4mm Hg at a heart rate of    120 bpm. 7. Pulmonary arteries: Systolic pressure was moderately increased, in the    range of 45mm Hg to 50mm Hg. 8. Pericardium, extracardiac: There were bilateral pleural effusions    present. Impressions:  This study is compared with previous dated 1/21/24: * ---------------------------------------------------------------------------- * Findings: Left ventricle:  The cavity size was below normal. Wall thickness was increased. Systolic function was hyperdynamic. The estimated ejection fraction was 70-75%, by visual assessment. No diagnostic evidence for regional wall motion abnormalities. Unable to assess LV diastolic function due to heart rhythm. Left atrium:  The left atrial volume was markedly increased. Right ventricle:  The cavity size was normal. Systolic function was mildly reduced. Right atrium:  The atrium was markedly dilated. Mitral valve:  Poorly visualized. The annulus was markedly calcified. The leaflets were mildly calcified. Leaflet separation was normal.  Cannot exclude a  vegetation.  Doppler:  Transvalvular velocity was within the normal range. There was no evidence for stenosis. There was mild regurgitation.    The valve area (LVOT continuity) was 2.01cm^2. The valve area index (LVOT continuity) was 1.38cm^2/m^2.    The mean diastolic gradient was 4mm Hg at a heart rate of 120 bpm. Aortic valve:   The valve was probably trileaflet. The leaflets were moderately calcified.  Doppler:     The valve area (VTI) was 1.58cm^2. The valve area (VTI) index was 1.09cm^2/m^2.    The mean systolic gradient was 5mm Hg. The peak systolic gradient was 13mm Hg. Tricuspid valve:  The valve is structurally normal. Leaflet separation was normal.  Doppler:  Transvalvular velocity was within the normal range. There was no evidence for stenosis. There was mild regurgitation. Pulmonic valve:   The valve is structurally normal. Cusp separation was normal.  Doppler:  Transvalvular velocity was within the normal range. There was no evidence for stenosis. There was trace regurgitation. Pericardium:   No significant pericardial effusion was identified. Pleura:  There were bilateral pleural effusions present. Aorta: Aortic root: The aortic root was normal. Ascending aorta: The ascending aorta was normal. Pulmonary arteries: Systolic pressure was moderately increased, in the range of 45mm Hg to 50mm Hg. ---------------------------------------------------------------------------- Measurements  Left              Value             Ref       01/21/2024  ventricle  IVS           (H) 1.2   cm          0.6 - 0.9 0.9  thickness,  ED, PLAX  LV ID, ED,    (L) 2.6   cm          3.8 - 5.2 3.6  PLAX  LV ID, ES,    (L) 1.6   cm          2.2 - 3.5 2.2  PLAX  LV PW         (H) 1.1   cm          0.6 - 0.9 0.8  thickness,  ED, PLAX  IVS/LV PW         1.09              --------- 1.10  ratio, ED,  PLAX  LV PW/LV ID       0.42              --------- 0.23  ratio, ED,  PLAX  LV ejection       71    %           54 - 74   70  fraction   Stroke            28    ml/m^2      --------- ----------  volume/bsa,  2D  LVOT              Value             Ref       01/21/2024  LVOT ID           2     cm          --------- ----------  LVOT peak         0.82  m/sec       --------- ----------  velocity, S  LVOT VTI, S       13.2  cm          --------- ----------  LVOT peak         3     mm Hg       --------- ----------  gradient, S  LVOT mean         1     mm Hg       --------- ----------  gradient, S  Stroke volume     41    ml          --------- ----------  (SV), LVOT DP  Cardiac           4.3   L/min       --------- ----------  output (Qs),  LVOT DP  Cardiac index     3     L/(min-m^2) --------- ----------  (Qs/bsa),  LVOT DP  Stroke index      29    ml/m^2      --------- ----------  (SV/bsa),  LVOT DP  Aortic valve      Value             Ref       01/21/2024  Aortic valve      1.54  m/sec       --------- ----------  peak  velocity, S  Aortic valve      23.3  cm          --------- ----------  VTI, S  Aortic mean       5     mm Hg       --------- ----------  gradient, S  Aortic peak       13    mm Hg       --------- ----------  gradient, S  Aortic valve      1.58  cm^2        --------- ----------  area, VTI  Aortic valve      1.09  cm^2/m^2    --------- ----------  area/bsa, VTI  Velocity          0.46              --------- ----------  ratio, peak,  LVOT/AV  Aortic root       Value             Ref       01/21/2024  Aortic root       3.3   cm          2.3 - 3.7 ----------  ID  Ascending         Value             Ref       01/21/2024  aorta  Ascending         2.9   cm          1.9 - 3.5 ----------  aorta ID  Left atrium       Value             Ref       01/21/2024  LA ID, A-P,       3.5   cm          2.7 - 3.8 4.0  ES  LA volume, S  (H) 100   ml          22 - 52   ----------  LA            (H) 69    ml/m^2      16 - 34   ----------  volume/bsa, S  LA volume,    (H) 114   ml          22 - 52   ----------  ES, 1-p A4C  LA volume,    (H) 88    ml          22 - 52    ----------  ES, 1-p A2C  LA volume,        108   ml          --------- ----------  ES, A/L  LA            (H) 74    ml/m^2      16 - 34   ----------  volume/bsa,  ES, A/L  LA/aortic         1.06              --------- 1.33  root ratio  Mitral valve      Value             Ref       01/21/2024  Mitral mean       4     mm Hg       --------- ----------  gradient, D  Mitral peak       7     mm Hg       --------- ----------  gradient, D  Mitral valve      2.01  cm^2        --------- ----------  area, LVOT  continuity  Mitral valve      1.38  cm^2/m^2    --------- ----------  area/bsa,  LVOT  continuity  Pulmonary         Value             Ref       01/21/2024  artery  PA pressure,      47    mm Hg       --------- 35  S, DP  Tricuspid         Value             Ref       01/21/2024  valve  Tricuspid     (H) 3.12  m/sec       <=2.8     2.81  regurg peak  velocity  Tricuspid         39    mm Hg       --------- 32  peak RV-RA  gradient  Systemic          Value             Ref       01/21/2024  veins  Estimated CVP     8     mm Hg       --------- 3  Right             Value             Ref       01/21/2024  ventricle  TAPSE, MM     (L) 1.26  cm          >=1.70    ----------  RV pressure,      47    mm Hg       --------- 35  S, DP  RV s',        (L) 9.2   cm/sec      >=9.5     ----------  lateral Legend: (L)  and  (H)  corinna values outside specified reference range. ---------------------------------------------------------------------------- Prepared and electronically signed by Yonatan Trevino 12/12/2024 08:57        Exam:     Physical Exam:  General: awake, not responsive, nonverbal  HEENT: Normocephalic, anicteric sclera, neck supple, no thyromegaly or adenopathy.  Neck: No JVD, carotids 2+, no bruits.  Cardiac: Irreg irreg  Pulm: diminshed BS  Abdomen: Soft, non-tender. BS-present.  Extremities: No edema.    Neurologic: unable to obtain  Skin: Warm and dry.     Marquise Riley, [facility] [place]

## 2024-12-30 ENCOUNTER — CASE MANAGEMENT (OUTPATIENT)
Dept: CARE COORDINATION | Age: 89
End: 2024-12-30

## 2024-12-30 NOTE — PROGRESS NOTES
[facility] - CARDIOLOGY PROGRESS NOTE  Yin Butcher Patient Status:  Inpatient    1931 MRN [id number]   Location [facility] 2W/SW Attending Vika Elmore MD   Hosp Day # 18 PCP Mina Walker MD     Assessment:    1.  Acute hypoxic/hypercapnic respiratory failure, resolved.  Patient is comfortable on nasal cannula.    2.  Longstanding persistent atrial fibrillation.  Persistent mild tachycardia.  Receiving digoxin and Eliquis p.o.  Normal LV function on echo.      Plan:    Add metoprolol tartrate via G-tube for better rate control.      Subjective:  No chest pain or shortness of breath.    Objective:  /66   Pulse 88   Temp 97.1 °F (36.2 °C) (Temporal)   Resp 12   Ht 170.2 cm (5' 7.01\")   Wt 126 lb 8.7 oz (57.4 kg)   SpO2 100%   BMI 19.81 kg/m²     Temp (24hrs), Av.3 °F (36.3 °C), Min:96.9 °F (36.1 °C), Max:97.6 °F (36.4 °C)      Intake/Output:    Intake/Output Summary (Last 24 hours) at 2024 0933  Last data filed at 2024 0800  Gross per 24 hour   Intake 2179 ml   Output 1820 ml   Net 359 ml       Wt Readings from Last 2 Encounters:   24 126 lb 8.7 oz (57.4 kg)   24 125 lb (56.7 kg)       Physical Exam:    General: Awake, confused,  No apparent distress.  HEENT: No focal deficits.  Neck: No JVD, carotids 2+ no bruits.  Cardiac: Irregular rhythm, persistent mild tachycardia.  S1, S2 normal, no murmur  Lungs: Clear anterolaterally without wheezes, rales, rhonchi.  Normal excursions and effort.  Abdomen: Soft, non-tender.   Extremities: Without clubbing, cyanosis or edema.  Peripheral pulses are 2+.  Skin: Warm and dry.     Labs:  Lab Results   Component Value Date    WBC 8.0 2024    HGB 9.6 2024    HCT 30.9 2024    .0 2024     Lab Results   Component Value Date    PT 15.5 (H) 2014    INR 1.32 (H) 2024     Lab  Results   Component Value Date     12/30/2024    K 5.3 12/30/2024    CL 94 12/30/2024    CO2 36.0 12/30/2024    BUN 19 12/30/2024    CREATSERUM 0.35 12/30/2024    GLU 96 12/30/2024    CA 9.8 12/30/2024        Lab Results   Component Value Date    TROP <0.046 02/19/2015        Medications:     furosemide  40 mg Intravenous Daily    multivitamin  1 tablet Per G Tube Daily    midodrine  10 mg Per NG Tube TID    digoxin  125 mcg Oral Daily    vancomycin  125 mg Per NG Tube Daily    apixaban  2.5 mg Oral BID    senna  10 mL Per NG Tube BID    docusate  100 mg Per NG Tube BID    famotidine  20 mg Intravenous Daily @ 0700      dextrose 10%         Marck Nelson MD  12/30/2024  9:33 AM

## 2024-12-30 NOTE — PROGRESS NOTES
[facility]  part of [facility]    Progress Note    Yin Butcher Patient Status:  Inpatient    1931 MRN [id number]   Location [facility] 2W/SW Attending Vika Elmore MD   Hosp Day # 18 PCP Mina Walker MD     SUBJECTIVE:  Pt seen and examined at bedside.   Remains on NC O2  Comfortable     /61   Pulse 70   Temp 97.3 °F (36.3 °C) (Temporal)   Resp 18   Ht 5' 7.01\" (1.702 m)   Wt 121 lb 0.5 oz (54.9 kg)   SpO2 100%   BMI 18.95 kg/m²     Physical Examination:    Gen: Awake, alert, oriented. Appears comfortable.  HEENT: PERRLA  Lungs: CTA B/L  Heart: Normal S1 S2, No M/G/R   Abd: Abdomen soft, nontender, nondistended, no organomegaly, bowel sounds present  Ext: No edema, no calf tenderness    Labs:   Lab Results   Component Value Date    WBC 8.0 2024    HGB 9.6 2024    HCT 30.9 2024    .0 2024    CREATSERUM 0.35 2024    BUN 19 2024     2024    K 5.3 2024    CL 94 2024    CO2 36.0 2024    GLU 96 2024    CA 9.8 2024       No results for input(s): \"PGLU\" in the last 168 hours.  No results for input(s): \"INR\" in the last 168 hours.    Imaging:  Imaging reviewed in Epic.    Meds:   Current Facility-Administered Medications   Medication Dose Route Frequency    [START ON 2024] metoprolol tartrate (Lopressor) tab 25 mg  25 mg Oral 2x Daily(Beta Blocker)    furosemide (Lasix) 10 mg/mL injection 40 mg  40 mg Intravenous Daily    glycopyrrolate (Robinul) 0.2 MG/ML injection 0.1 mg  0.1 mg Intravenous Q6H PRN    multivitamin (Tab-A-Jenny/Beta Carotene) tab 1 tablet  1 tablet Per G Tube Daily    ipratropium-albuterol (Duoneb) 0.5-2.5 (3) MG/3ML inhalation solution 3 mL  3 mL Nebulization Q6H PRN    midodrine (ProAmatine) tab 10 mg  10 mg Per NG Tube TID    digoxin (Lanoxin) tab 125 mcg  125 mcg Oral Daily    acetaminophen (Ofirmev) 10 mg/mL infusion premix 650 mg  15 mg/kg Intravenous Q6H  PRN    dextrose 10% infusion (TPN no rate)   Intravenous Continuous PRN    pancrelipase (Lip-Prot-Amyl) (Zenpep) DR particles cap 10,000 Units  10,000 Units Per G Tube PRN    And    sodium bicarbonate tab 325 mg  325 mg Per G Tube PRN    metoprolol (Lopressor) 5 mg/5mL injection 5 mg  5 mg Intravenous Q4H PRN    apixaban (Eliquis) tab 2.5 mg  2.5 mg Oral BID    albuterol (Ventolin) (5 MG/ML) 0.5% nebulizer solution 5 mg  5 mg Nebulization Q4H PRN    acetaminophen (Tylenol) 160 MG/5ML oral liquid 650 mg  650 mg Per NG Tube Q4H PRN    Or    acetaminophen (Tylenol) rectal suppository 650 mg  650 mg Rectal Q4H PRN    senna (Senokot) 8.8 MG/5ML oral syrup 17.6 mg  10 mL Per NG Tube BID    docusate (Colace) 50 MG/5ML oral solution 100 mg  100 mg Per NG Tube BID    polyethylene glycol (PEG 3350) (Miralax) 17 g oral packet 17 g  17 g Per NG Tube Daily PRN    bisacodyl (Dulcolax) 10 MG rectal suppository 10 mg  10 mg Rectal Daily PRN    famotidine (Pepcid) 20 mg/2mL injection 20 mg  20 mg Intravenous Daily @ 0700    ondansetron (Zofran) 4 MG/2ML injection 4 mg  4 mg Intravenous Q6H PRN    metoclopramide (Reglan) 5 mg/mL injection 10 mg  10 mg Intravenous Q8H PRN       Assessment:    *Acute respiratory failure with hypercapnia/Aspiration pneumonia  CXR and CT chest reviewed   Intubated/sedated 12/12   Extubated 12/27 - was on BIPAP, now tolerating  NC   S/P 10 day course of antibiotics   Cont nebs   Leukocytosis improving   Pulmonology following   Palliative following   Guarded prognosis      *HFpEF  Cont lasix  Cardiology following      *Atrial fibrillation with rapid ventricular response (HCC)  Rates improved   Cont Eliquis and digoxin  Cardiology following      *Dysphagia   Cont Tube feeds, at goal  Aspiration precautions      *Hypotension  Cont midodrine   Cardiology following         DVT prophylaxis: Eliquis  Code status: DNR/DNI      Dispo: Pt extubated 12/27/24, was on cont BIPAP, on on NC. Palliative following with  multiple family meetings. DNI.  Prognosis remains guarded.     Vika Elmore MD   12/30/2024  5:43 PM

## 2024-12-30 NOTE — PLAN OF CARE
Problem: RISK FOR INFECTION - ADULT  Goal: Absence of fever/infection during anticipated neutropenic period  Description: INTERVENTIONS  - Monitor WBC  - Administer growth factors as ordered  - Implement neutropenic guidelines  Outcome: Progressing     Problem: SAFETY ADULT - FALL  Goal: Free from fall injury  Description: INTERVENTIONS:  - Assess pt frequently for physical needs  - Identify cognitive and physical deficits and behaviors that affect risk of falls.  - [place] fall precautions as indicated by assessment.  - Educate pt/family on patient safety including physical limitations  - Instruct pt to call for assistance with activity based on assessment  - Modify environment to reduce risk of injury  - Provide assistive devices as appropriate  - Consider OT/PT consult to assist with strengthening/mobility  - Encourage toileting schedule  Outcome: Progressing     Problem: RESPIRATORY - ADULT  Goal: Achieves optimal ventilation and oxygenation  Description: INTERVENTIONS:  - Assess for changes in respiratory status  - Assess for changes in mentation and behavior  - Position to facilitate oxygenation and minimize respiratory effort  - Oxygen supplementation based on oxygen saturation or ABGs  - Provide Smoking Cessation handout, if applicable  - Encourage broncho-pulmonary hygiene including cough, deep breathe, Incentive Spirometry  - Assess the need for suctioning and perform as needed  - Assess and instruct to report SOB or any respiratory difficulty  - Respiratory Therapy support as indicated  - Manage/alleviate anxiety  - Monitor for signs/symptoms of CO2 retention  Outcome: Progressing     Problem: NEUROLOGICAL - ADULT  Goal: Absence of seizures  Description: INTERVENTIONS  - Monitor for seizure activity  - Administer anti-seizure medications as ordered  - Monitor neurological status  Outcome: Progressing  Goal: Remains free of injury related to seizure activity  Description: INTERVENTIONS:  - Maintain  airway, patient safety  and administer oxygen as ordered  - Monitor patient for seizure activity, document and report duration and description of seizure to MD/LIP  - If seizure occurs, turn patient to side and suction secretions as needed  - Reorient patient post seizure  - Seizure pads on all 4 side rails  - Instruct patient/family to notify RN of any seizure activity  - Instruct patient/family to call for assistance with activity based on assessment  Outcome: Progressing     Problem: Patient Centered Care  Goal: Patient preferences are identified and integrated in the patient's plan of care  Description: Interventions:  - Provide timely, complete, and accurate information to patient/family  - Incorporate patient and family knowledge, values, beliefs, and cultural backgrounds into the planning and delivery of care  - Encourage patient/family to participate in care and decision-making at the level they choose  - Honor patient and family perspectives and choices  Outcome: Not Progressing     Problem: Safety Risk - Non-Violent Restraints  Goal: Patient will remain free from self-harm  Description: INTERVENTIONS:  - Apply the least restrictive restraint to prevent harm  - Notify patient and family of reasons restraints applied  - Assess for any contributing factors to confusion (electrolyte disturbances, delirium, medications)  - Discontinue any unnecessary medical devices as soon as possible  - Assess the patient's physical comfort, circulation, skin condition, hydration, nutrition and elimination needs   - Reorient and redirection as needed  - Assess for the need to continue restraints  12/30/2024 1638 by Ines Austin, RN  Outcome: Not Progressing  12/30/2024 0906 by Ines Austin, RN  Outcome: Not Progressing     Problem: NEUROLOGICAL - ADULT  Goal: Achieves maximal functionality and self care  Description: INTERVENTIONS  - Monitor swallowing and airway patency with patient fatigue and changes in  neurological status  - Encourage and assist patient to increase activity and self care with guidance from PT/OT  - Encourage visually impaired, hearing impaired and aphasic patients to use assistive/communication devices  Outcome: Not Progressing

## 2024-12-30 NOTE — PROGRESS NOTES
[facility]  part of [facility]     Progress Note    Yin Butcher Patient Status:  Inpatient    1931 MRN [id number]   Location [facility] 2W/SW Attending Vika Elmore MD   Hosp Day # 18 PCP Mina Walker MD       Subjective:   Patient seen and examined.  Tolerating nasal cannula.  No significant distress.  Objective:   Blood pressure 101/65, pulse 93, temperature 97.1 °F (36.2 °C), temperature source Temporal, resp. rate 18, height 5' 7.01\" (1.702 m), weight 126 lb 8.7 oz (57.4 kg), SpO2 100%.  Intake/Output:   Last 3 shifts: I/O last 3 completed shifts:  In:  [NG/GT:]  Out:  [Urine:]   This shift: I/O this shift:  In: 0   Out: 600 [Urine:600]     Vent Settings:      Hemodynamic parameters (last 24 hours):      Scheduled Meds:   Current Facility-Administered Medications   Medication Dose Route Frequency    metoprolol tartrate (Lopressor) tab 25 mg  25 mg Oral 2x Daily(Beta Blocker)    furosemide (Lasix) 10 mg/mL injection 40 mg  40 mg Intravenous Daily    glycopyrrolate (Robinul) 0.2 MG/ML injection 0.1 mg  0.1 mg Intravenous Q6H PRN    multivitamin (Tab-A-Jenny/Beta Carotene) tab 1 tablet  1 tablet Per G Tube Daily    ipratropium-albuterol (Duoneb) 0.5-2.5 (3) MG/3ML inhalation solution 3 mL  3 mL Nebulization Q6H PRN    midodrine (ProAmatine) tab 10 mg  10 mg Per NG Tube TID    digoxin (Lanoxin) tab 125 mcg  125 mcg Oral Daily    acetaminophen (Ofirmev) 10 mg/mL infusion premix 650 mg  15 mg/kg Intravenous Q6H PRN    dextrose 10% infusion (TPN no rate)   Intravenous Continuous PRN    pancrelipase (Lip-Prot-Amyl) (Zenpep) DR particles cap 10,000 Units  10,000 Units Per G Tube PRN    And    sodium bicarbonate tab 325 mg  325 mg Per G Tube PRN    metoprolol (Lopressor) 5 mg/5mL injection 5 mg  5 mg Intravenous Q4H PRN    apixaban (Eliquis) tab 2.5 mg  2.5 mg Oral BID    albuterol (Ventolin) (5 MG/ML) 0.5% nebulizer solution 5 mg  5 mg Nebulization Q4H PRN     acetaminophen (Tylenol) 160 MG/5ML oral liquid 650 mg  650 mg Per NG Tube Q4H PRN    Or    acetaminophen (Tylenol) rectal suppository 650 mg  650 mg Rectal Q4H PRN    senna (Senokot) 8.8 MG/5ML oral syrup 17.6 mg  10 mL Per NG Tube BID    docusate (Colace) 50 MG/5ML oral solution 100 mg  100 mg Per NG Tube BID    polyethylene glycol (PEG 3350) (Miralax) 17 g oral packet 17 g  17 g Per NG Tube Daily PRN    bisacodyl (Dulcolax) 10 MG rectal suppository 10 mg  10 mg Rectal Daily PRN    famotidine (Pepcid) 20 mg/2mL injection 20 mg  20 mg Intravenous Daily @ 0700    ondansetron (Zofran) 4 MG/2ML injection 4 mg  4 mg Intravenous Q6H PRN    metoclopramide (Reglan) 5 mg/mL injection 10 mg  10 mg Intravenous Q8H PRN       Continuous Infusions:    dextrose 10%         Physical Exam  Constitutional: Arousable  Eyes: PERRL  ENT: nares pateint  Neck: supple, no JVD  Cardio: RRR, S1 S2  Respiratory: Diminished bibasilar breath sounds with crackles present  GI: abdomen soft, non tender, active bowel sounds, no organomegaly + PEG tube in place  Extremities: no clubbing, cyanosis, edema  Neurologic: Does not follow commands  Skin: warm, dry      Results:     Lab Results   Component Value Date    WBC 8.0 12/30/2024    HGB 9.6 12/30/2024    HCT 30.9 12/30/2024    .0 12/30/2024    CREATSERUM 0.35 12/30/2024    BUN 19 12/30/2024     12/30/2024    K 5.3 12/30/2024    CL 94 12/30/2024    CO2 36.0 12/30/2024    GLU 96 12/30/2024    CA 9.8 12/30/2024         No results found.          Assessment   1.  Fevers  2.  Acute encephalopathy  3.  Acute on chronic hypercapnic hypoxemic respiratory failure  4.  Leukocytosis  5.  HFpEF  6.  History of atrial fibrillation  7.  Prior history of pulmonary embolism  9.  Dementia  10.  Dysphagia     Plan   -Patient presents with chief complaint of fevers ongoing altered mental status over the last several days.  Recently discharged on 11/24/2024 from [facility] after  hospitalization for presumed aspiration pneumonia.  -Worsening hypercapnic respiratory failure on NIV.  Patient intubated on 12/12/2024.  Extubated on 12/27/2024 after discussion with goals of care with palliative care team.  Initially required NIV for 2 days after extubation but now tolerating nasal cannula.  -CT chest on 12/11/2024 with lower lobe mucous plugging concerning for aspiration with small pleural effusions.  -Completed course of antibiotic therapy  -Chest physiotherapy  -DVT prophylaxis: Eliquis  -Patient DNR/DNI moving forward  -Oxygenation status stable.  Okay to transfer to floor        Dayo Espino DO  Pulmonary Critical Care Medicine  [facility]

## 2024-12-30 NOTE — PLAN OF CARE
Pt more alert overnight, remains on NC, PW remains in place. TF remain. Repositioned regularly. Pt safety maintained, family updated on POC.   Problem: Patient Centered Care  Goal: Patient preferences are identified and integrated in the patient's plan of care  Description: Interventions:  - What would you like us to know as we care for you?   - Provide timely, complete, and accurate information to patient/family  - Incorporate patient and family knowledge, values, beliefs, and cultural backgrounds into the planning and delivery of care  - Encourage patient/family to participate in care and decision-making at the level they choose  - Honor patient and family perspectives and choices  Outcome: Progressing     Problem: Patient/Family Goals  Goal: Patient/Family Long Term Goal  Description: Patient's Long Term Goal: Be comfortable    Interventions:  - repositioning  - See additional Care Plan goals for specific interventions  Outcome: Progressing  Goal: Patient/Family Short Term Goal  Description: Patient's Short Term Goal: get better    Interventions:   - respiratory support, breathing exercises, medication, nutritional management  - See additional Care Plan goals for specific interventions  Outcome: Progressing     Problem: PAIN - ADULT  Goal: Verbalizes/displays adequate comfort level or patient's stated pain goal  Description: INTERVENTIONS:  - Encourage pt to monitor pain and request assistance  - Assess pain using appropriate pain scale  - Administer analgesics based on type and severity of pain and evaluate response  - Implement non-pharmacological measures as appropriate and evaluate response  - Consider cultural and social influences on pain and pain management  - Manage/alleviate anxiety  - Utilize distraction and/or relaxation techniques  - Monitor for opioid side effects  - Notify MD/LIP if interventions unsuccessful or patient reports new pain  - Anticipate increased pain with activity and pre-medicate as  appropriate  Outcome: Progressing     Problem: RISK FOR INFECTION - ADULT  Goal: Absence of fever/infection during anticipated neutropenic period  Description: INTERVENTIONS  - Monitor WBC  - Administer growth factors as ordered  - Implement neutropenic guidelines  Outcome: Progressing     Problem: SAFETY ADULT - FALL  Goal: Free from fall injury  Description: INTERVENTIONS:  - Assess pt frequently for physical needs  - Identify cognitive and physical deficits and behaviors that affect risk of falls.  - [place] fall precautions as indicated by assessment.  - Educate pt/family on patient safety including physical limitations  - Instruct pt to call for assistance with activity based on assessment  - Modify environment to reduce risk of injury  - Provide assistive devices as appropriate  - Consider OT/PT consult to assist with strengthening/mobility  - Encourage toileting schedule  Outcome: Progressing     Problem: DISCHARGE PLANNING  Goal: Discharge to home or other facility with appropriate resources  Description: INTERVENTIONS:  - Identify barriers to discharge w/pt and caregiver  - Include patient/family/discharge partner in discharge planning  - Arrange for needed discharge resources and transportation as appropriate  - Identify discharge learning needs (meds, wound care, etc)  - Arrange for interpreters to assist at discharge as needed  - Consider post-discharge preferences of patient/family/discharge partner  - Complete POLST form as appropriate  - Assess patient's ability to be responsible for managing their own health  - Refer to Case Management Department for coordinating discharge planning if the patient needs post-hospital services based on physician/LIP order or complex needs related to functional status, cognitive ability or social support system  Outcome: Progressing     Problem: Altered Communication/Language Barrier  Goal: Patient/Family is able to understand and participate in their  care  Description: Interventions:  - Assess communication ability and preferred communication style  - Implement communication aides and strategies  - Use visual cues when possible  - Listen attentively, be patient, do not interrupt  - Minimize distractions  - Allow time for understanding and response  - Establish method for patient to ask for assistance (call light)  - Provide an  as needed  - Communicate barriers and strategies to overcome with those who interact with patient  Outcome: Progressing     Problem: RESPIRATORY - ADULT  Goal: Achieves optimal ventilation and oxygenation  Description: INTERVENTIONS:  - Assess for changes in respiratory status  - Assess for changes in mentation and behavior  - Position to facilitate oxygenation and minimize respiratory effort  - Oxygen supplementation based on oxygen saturation or ABGs  - Provide Smoking Cessation handout, if applicable  - Encourage broncho-pulmonary hygiene including cough, deep breathe, Incentive Spirometry  - Assess the need for suctioning and perform as needed  - Assess and instruct to report SOB or any respiratory difficulty  - Respiratory Therapy support as indicated  - Manage/alleviate anxiety  - Monitor for signs/symptoms of CO2 retention  Outcome: Progressing     Problem: NEUROLOGICAL - ADULT  Goal: Achieves stable or improved neurological status  Description: INTERVENTIONS  - Assess for and report changes in neurological status  - Initiate measures to prevent increased intracranial pressure  - Maintain blood pressure and fluid volume within ordered parameters to optimize cerebral perfusion and minimize risk of hemorrhage  - Monitor temperature, glucose, and sodium. Initiate appropriate interventions as ordered  Outcome: Progressing  Goal: Absence of seizures  Description: INTERVENTIONS  - Monitor for seizure activity  - Administer anti-seizure medications as ordered  - Monitor neurological status  Outcome: Progressing  Goal: Remains  free of injury related to seizure activity  Description: INTERVENTIONS:  - Maintain airway, patient safety  and administer oxygen as ordered  - Monitor patient for seizure activity, document and report duration and description of seizure to MD/LIP  - If seizure occurs, turn patient to side and suction secretions as needed  - Reorient patient post seizure  - Seizure pads on all 4 side rails  - Instruct patient/family to notify RN of any seizure activity  - Instruct patient/family to call for assistance with activity based on assessment  Outcome: Progressing  Goal: Achieves maximal functionality and self care  Description: INTERVENTIONS  - Monitor swallowing and airway patency with patient fatigue and changes in neurological status  - Encourage and assist patient to increase activity and self care with guidance from PT/OT  - Encourage visually impaired, hearing impaired and aphasic patients to use assistive/communication devices  Outcome: Progressing     Problem: Diabetes/Glucose Control  Goal: Glucose maintained within prescribed range  Description: INTERVENTIONS:  - Monitor Blood Glucose as ordered  - Assess for signs and symptoms of hyperglycemia and hypoglycemia  - Administer ordered medications to maintain glucose within target range  - Assess barriers to adequate nutritional intake and initiate nutrition consult as needed  - Instruct patient on self management of diabetes  Outcome: Progressing     Problem: Delirium  Goal: Minimize duration of delirium  Description: Interventions:  - Encourage use of hearing aids, eye glasses  - Promote highest level of mobility daily  - Provide frequent reorientation  - Promote wakefulness i.e. lights on, blinds open  - Promote sleep, encourage patient's normal rest cycle i.e. lights off, TV off, minimize noise and interruptions  - Encourage family to assist in orientation and promotion of home routines  Outcome: Progressing

## 2024-12-30 NOTE — PROGRESS NOTES
Palliative Care Progress Note    Yin Butcher Patient Status:  Inpatient    1931 MRN [id number]   Location [facility] 2W/SW Attending Carmelina Duong MD   Hosp Day # 18 PCP Mina Walker MD     Date of Consult: 2024  Patient seen at: [facility] Inpatient    Reason for Consultation: Consult requested for goals of care and care coordination.    Subjective     S: Denies complaint although speaking requires respiratory effort. Pulled out her IV.     Review of Systems: Palliative Care symptom needs assessed:     Unable to accurately obtain due to intubation    Palliative Care Social History:   Marital Status:    Children: Yes  Living Situation Prior to Admit: Home  Occupational History: Retired  Personal:     Spiritual  Yin Butcher  NA     requested: No    Medical History: obtained from [facility]  Past Medical History:    Arthritis    Atrial fibrillation (HCC)    Back problem    Cancer (HCC)    Cataract    Osteoporosis    Personal history of antineoplastic chemotherapy     Past Surgical History:   Procedure Laterality Date    Other surgical history  2014    Procedure: HIP HEMIARTHROPLASTY/ BIPOLAR;  Surgeon: Vasu Matamoros MD;  Location:  MAIN OR    Removal of tonsils,12+ y/o         Family History: obtained from [facility]  Family History   Family history unknown: Yes       Allergies:  Allergies[1]    Medications:     Current Facility-Administered Medications:     metoprolol tartrate (Lopressor) tab 25 mg, 25 mg, Oral, 2x Daily(Beta Blocker)    furosemide (Lasix) 10 mg/mL injection 40 mg, 40 mg, Intravenous, Daily    midazolam (Versed) 2 MG/2ML injection 2 mg, 2 mg, Intravenous, Q1H PRN    glycopyrrolate (Robinul) 0.2 MG/ML injection 0.1 mg, 0.1 mg, Intravenous, Q6H PRN    multivitamin (Tab-A-Jenny/Beta Carotene) tab 1 tablet, 1 tablet, Per G Tube, Daily    ipratropium-albuterol (Duoneb) 0.5-2.5 (3) MG/3ML inhalation solution 3 mL, 3 mL, Nebulization, Q6H PRN     midodrine (ProAmatine) tab 10 mg, 10 mg, Per NG Tube, TID    digoxin (Lanoxin) tab 125 mcg, 125 mcg, Oral, Daily    vancomycin (Firvanq) 50 mg/mL oral solution 125 mg, 125 mg, Per NG Tube, Daily    acetaminophen (Ofirmev) 10 mg/mL infusion premix 650 mg, 15 mg/kg, Intravenous, Q6H PRN    dextrose 10% infusion (TPN no rate), , Intravenous, Continuous PRN    pancrelipase (Lip-Prot-Amyl) (Zenpep) DR particles cap 10,000 Units, 10,000 Units, Per G Tube, PRN **AND** sodium bicarbonate tab 325 mg, 325 mg, Per G Tube, PRN    metoprolol (Lopressor) 5 mg/5mL injection 5 mg, 5 mg, Intravenous, Q4H PRN    apixaban (Eliquis) tab 2.5 mg, 2.5 mg, Oral, BID    albuterol (Ventolin) (5 MG/ML) 0.5% nebulizer solution 5 mg, 5 mg, Nebulization, Q4H PRN    acetaminophen (Tylenol) 160 MG/5ML oral liquid 650 mg, 650 mg, Per NG Tube, Q4H PRN **OR** acetaminophen (Tylenol) rectal suppository 650 mg, 650 mg, Rectal, Q4H PRN    senna (Senokot) 8.8 MG/5ML oral syrup 17.6 mg, 10 mL, Per NG Tube, BID    docusate (Colace) 50 MG/5ML oral solution 100 mg, 100 mg, Per NG Tube, BID    polyethylene glycol (PEG 3350) (Miralax) 17 g oral packet 17 g, 17 g, Per NG Tube, Daily PRN    bisacodyl (Dulcolax) 10 MG rectal suppository 10 mg, 10 mg, Rectal, Daily PRN    famotidine (Pepcid) 20 mg/2mL injection 20 mg, 20 mg, Intravenous, Daily @ 0700    ondansetron (Zofran) 4 MG/2ML injection 4 mg, 4 mg, Intravenous, Q6H PRN    metoclopramide (Reglan) 5 mg/mL injection 10 mg, 10 mg, Intravenous, Q8H PRN  No current outpatient medications on file.         Palliative Performance Scale: 20 %  % Ambulation Activity Level Self-Care Intake Consciousness   100 Full  Normal  No Disease Full Normal Full   90 Full  Normal  Some Disease Full Normal Full   80 Full  Normal w/effort  Some Disease Full Normal or reduced Full   70 Reduced  Can't Perform Job  Some Disease Full Normal or reduced Full   60 Reduced  Can't Perform Hobby   Significant Disease Occ Assist Normal or  reduced Full or confused   50 Mainly sit/lie Can't do any work  Extensive Disease Partial Assist Normal or reduced Full or confused   40 Mainly in bed Can't do any work  Extensive Disease Mainly Assist Normal or reduced Full or confused   30 Bed Bound Can't do any work  Extensive Disease Max Assist  Total Care Reduced  Drowsy/confused   20 Bed Bound Can't do any work  Extensive Disease Max Assist  Total Care Minimal  Drowsy/confused   10 Bed Bound Can't do any work  Extensive Disease Max Assist  Total Care Mouth Care  Drowsy/confused   0 Death        Objective      Vital Signs:  Blood pressure 131/66, pulse 88, temperature 97.1 °F (36.2 °C), temperature source Temporal, resp. rate 12, height 5' 7.01\" (1.702 m), weight 126 lb 8.7 oz (57.4 kg), SpO2 100%.  Body mass index is 19.81 kg/m².  Non-verbal signs of pain present: No    Physical Exam:  General: Mildly SOB, thin, confused  HEENT: AT/NC  Cardiac: RRR  Lungs: coarse BS  Abdomen: Soft, non-tender, non-distended, normal bowel sounds X 4 quadrants   Extremities: mild-mod Edema present  Skin: Warm and dry.    Hematology:  Lab Results   Component Value Date    WBC 8.0 12/30/2024    HGB 9.6 (L) 12/30/2024    HCT 30.9 (L) 12/30/2024    .0 12/30/2024       Coags:  Lab Results   Component Value Date    PT 15.5 (H) 07/13/2014    INR 1.32 (H) 01/29/2024    PTT 34.6 01/31/2024       Chemistry:  Lab Results   Component Value Date    CREATSERUM 0.35 (L) 12/30/2024    BUN 19 12/30/2024     (L) 12/30/2024    K 5.3 (H) 12/30/2024    CL 94 (L) 12/30/2024    CO2 36.0 (H) 12/30/2024    GLU 96 12/30/2024    CA 9.8 12/30/2024    ALB 4.0 12/11/2024    ALKPHO 112 12/11/2024    BILT 1.0 (H) 12/11/2024    TP 8.2 12/11/2024    AST 24 12/11/2024    ALT 30 12/11/2024    DDIMER 2.38 (H) 01/20/2024    MG 2.0 12/14/2024    PHOS 4.1 12/14/2024    TROP <0.046 02/19/2015       Imaging:  No results found.    Assessment and Recommendations        Sepsis, due to unspecified organism,  unspecified whether acute organ dysfunction present (HCC)    Acute respiratory failure with hypercapnia (HCC)    Atrial fibrillation with rapid ventricular response (HCC)    Acute on chronic respiratory failure with hypoxia and hypercapnia (HCC)    Palliative care encounter    Counseling regarding advance care planning and goals of care    Symptom Management       Dyspnea   -on NC   -daughter is uncomfortable with opioids or sedation   -will discuss comfort vs select treatment intent      2.     Serious Illness Conversation:   Code Status: DNAR/Full  POA: Surrogate is daughter Jada  I met with Jada in person and also present via phone was Librado, her fiance. Jada lives with Prema in [facility], and splits her time in [place], where her fimorena lives. Prema was home apparently until the last several months when she began problems with her effusions, aspiration etc.   I asked if Jada could give me a big picture sense of what was going with her mom. She is able to discuss the details of all of the illnesses and episodes but I also wondered if she felt overall that Prema was improving or declining.   I expressed my view/worry that her lungs are shutting down/have shut down etc to the extent that she is dying from this. I explained the physiology of chronic aspiration in context of dysphagia as a terminal/lethal component of not just dementia or old age but of many complex chronic illnesses.   I explained that I felt that Prema is unfortunately showing us that she is dying, albeit slowly and that we are in a position to decide how much to try and resist or fight natural dying or how much we can decide to support or allow for it.   Prema repeated that her mom had always just said 'she wants everything done' but unfortunately had little more context or nuance. Ie what 'everything' looks like, logistically, financially, quality etc. Librado said that he thinks Prema is pretty understanding of these issues, having dealt with  her own  dying and other parents. He and Jada were hoping to involved Prema in the conversation.   When I spoke to Prema she could nod yes or no in response to basic questions but when I asked about natural dying she closed her eyes and would not answer. Jada saw this and said that she is probably thinking and that she and her fiancee would talk to Prema in more detail. They hoped ideally that they could ask her when she is extubated.  They wanted to know what their deadline is to 'make a decision.' They did not seem opposed to consider compassionate extubation but instead seem very unsure about what to do. They appear open to learning as much as possible about their options.   I met with Jada and called Librado separately. I explained that her body is not supporting life, and that her best possible chance of accomplishing their goal of breathing independently and being at home would be compassionate extubation. Jada seems to understand but continued to ask about trach. I think it has been helpful to explain that what she has witnessed over the last several months are not just 'hiccups' but they are a gradual dying from dementia and advanced age.   I explained that extubation should probably happen within the next 24-48 hours as discussed with Dr. Espino and that we would endeavor to help her be comfortable if she were passing away.   I can have a more definitive discussion about plan tomorrow with lisset is present after 10-11am or so unless another provider discusses it earlier. I think we should support Jada in the grief process as gently as possible.   See separate note for discussion. Family agreeable to DNR/DNI and compassionate extubation.  Jada to arrive later today, I will speak to her about care options, next steps.       Palliative Care Follow Up: Palliative care team will Continue to follow while inpatient    Thank you for allowing Palliative Care services to participate in the care of Yin ROBBINS  Guadalupe.    A total of 40 minutes were spent on this visit, which included all of the following: direct face to face contact, history taking, physical examination, and >50% was spent counseling and coordinating care.    Barry Liz MD  Palliative Care Services  [facility] [phone]    Note to patient:  The 21st Century Cures Act makes medical notes like these available to patients in the interest of transparency. However, be advised this is a medical document. It is intended as peer to peer communication. It is written in medical language and may contain abbreviations or verbiage that are unfamiliar. It may appear blunt or direct. Medical documents are intended to carry relevant information, facts as evident, and the clinical opinion of the practitioner.           [1]   Allergies  Allergen Reactions    Erythromycin OTHER (SEE COMMENTS)    Oxycodone HALLUCINATION    Gabapentin NAUSEA ONLY and OTHER (SEE COMMENTS)     Pt stated she didn't feel like herself     Tizanidine ITCHING and OTHER (SEE COMMENTS)     Pt states burning sensation

## 2024-12-31 ENCOUNTER — CASE MANAGEMENT (OUTPATIENT)
Dept: CARE COORDINATION | Age: 89
End: 2024-12-31

## 2024-12-31 NOTE — DIETARY NOTE
ADULT NUTRITION REASSESSMENT    Pt is at high nutrition risk.  Pt meets severe malnutrition criteria.      CRITERIA FOR MALNUTRITION DIAGNOSIS:  Criteria for severe malnutrition diagnosis: chronic illness related to body fat severe depletion and muscle mass severe depletion.    RECOMMENDATIONS TO MD:   See Nutrition Intervention for enteral nutrition (EN) specifics  Adjusted formula to Jevity 1.5 (PTA formula) and Decreased FWF (mild hyponatremia)    ADMITTING DIAGNOSIS:  Atrial fibrillation with rapid ventricular response ([facility]) [I48.91]  Acute respiratory failure with hypercapnia ([facility]) [J96.02]  Sepsis, due to unspecified organism, unspecified whether acute organ dysfunction present ([facility]) [A41.9]  PERTINENT PAST MEDICAL HISTORY:   Past Medical History:    Arthritis    Atrial fibrillation (HCC)    Back problem    Cancer (HCC)    Cataract    Osteoporosis    Personal history of antineoplastic chemotherapy     PATIENT STATUS:   12/13/24 INITIAL VISIT: Pt assessed due to low BMI and new consult for RD to initiate tube feeds. Pt presented to hospital with AMS, fevers over the past \"few days\". Extensive PMH as listed above including recent discharge from [facility] on 11/24/24. Pt is orally intubated, sedated on Propofol. No family present at time of visit. Unable to obtain recent diet hx.   12/18/24 UPDATE: Remains orally intubated, Sedated on Propofol. Pt's DTR at bedside at time of visit - provided with diet hx. Per DTR, pt with good/normal appetite/intake with stable wt prior to recent admission in November. Per DTR, pt sustained an injury to her esophagus during that admission resulting in dysphagia therefore a G-tube was surgically placed. DTR did note some wt loss recently however unable to quantify. Expressed desire to see \"wt gain\" with administration of EN feeds. Explained to DTR that the goal of EN administration is currently to meet needs as not to overfeed pt and worsen respiratory status. Pt was transferred to  rehab following discharge where she was starting to work with SLP however remained NPO.  12/23/24 UPDATE: EN tolerated well. Re-calculate needs/[place] equation and increasing tube feeds accordingly. Propofol intake negligible. Adding daily multivitamin to assure meeting >100% RDI's. Noted 24# wt gain in 5-6 days if accurate but is c/w +10L Net I/O- ? Fluid gains.   12/27/24 UPDATE: Remains orally intubated. Off sedation. Failed SBT again today. GOC discussions on-going. Tolerating EN feeds via G-tube at goal rate. Pt's prognosis remains poor - not appropriate for tracheostomy. Noted hyponatremia - adjusted EN and FWF.      Update 12/31/24: RA completed per protocol. Chart reviewed, extubated 12/27. Off Bipap currently on room air. PEG placed 11/22/24 per outside hospital RD note review. Discussion with RN, tolerating TF at current weight (current receiving 45 ml/hr using Promote). Adjusted calorie needs now extubated. Adjusted TF to Jevity 1.5 (usual TF per rehab records). Palliative care following, note reviewed. Current Code Status: DNAR/Select    FOOD/NUTRITION RELATED HISTORY:  Intake:  EN orders per rehab record Jevity 1.5 @ 45 ml/hr x 21 hrs + ProHeal Sugar Free Critical Care AWC 30 ml BID (100 kcal & 17g protein in 30 ml) + 250 ml H2O flushes TID to provide 1618 kcal (39 kcal/kg), 94 g protein (2.2 g/kg) and 1528 ml free H2O.   Intake Meeting Needs:  TF adjusted to meet needs  Percent Meals Eaten (last 3 days)       None          Food Allergies: No Known Food Allergies (NKFA)  Cultural/Ethnic/Lutheran Preferences: Not Obtained    GASTROINTESTINAL: +BM 12/31/24 - medium;soft;loose; PEG/G-tube     MEDICATIONS: reviewed; Noted cardiac electrolyte replacement protocol ordered; scheduled senokot and colace; daily lasix    dextrose 10%        acetaZOLAMIDE  250 mg Oral Daily    furosemide  20 mg Oral Daily    metoprolol tartrate  25 mg Oral 2x Daily(Beta Blocker)    multivitamin  1 tablet Per G Tube Daily     midodrine  10 mg Per NG Tube TID    digoxin  125 mcg Oral Daily    apixaban  2.5 mg Oral BID    senna  10 mL Per NG Tube BID    docusate  100 mg Per NG Tube BID    famotidine  20 mg Intravenous Daily @ 0700   PRN: Dulcolax if needed.     LABS: reviewed. Hyponatremia (135) noted  Recent Labs     12/29/24  0427 12/30/24  0439 12/31/24  0622   * 96 115*   BUN 20 19 21   CREATSERUM 0.34* 0.35* 0.37*   CA 9.5 9.8 9.6   * 134* 135*   K 4.9 5.3* 4.5   CL 96* 94* 92*   CO2 36.0* 36.0* >40.0*   OSMOCALC 284 280 284     NUTRITION RELATED PHYSICAL FINDINGS:  - Nutrition Focused Physical Exam (NFPE): severe depletion body fat orbital region and fat overlying rib cage region and severe depletion muscle mass temple region and clavicle region.   - Fluid Accumulation: non-pitting Bilateral Lower extremity and Feet, Left Upper extremity and Hand (s), and perineal, +1 Right Upper extremity, and +2 Right Hand (s) per flowsheet review --> See RN documentation for details  - Skin Integrity: Pressure Injury: Stage 2 on coccyx, at risk, and other wounds noted see RN documentation for details    ANTHROPOMETRICS:  HT:  170.2 cm (5'7\")   WT: 54.9 kg (121 lb 0.5 oz) (wt up 31 % (28 lbs) from lowest wt this admission with noted edema present) Big jump in weight from 101 lbs on 12/16 and 116# 6 oz on 12/19 to 125 lbs on 12/21- appears fluid gain (net I/0: +4.49L)   Last Visit WT: 126# 9 oz on 12/27/24  Admit WT: 105# on 12/11/24  DOSING WT: 42 kg (93 lbs - lowest wt this admission) - wt utilized for anthropometric assessment  BMI: Body mass index is 18.95 kg/m² current weight with above noted edema.  BMI CLASSIFICATION: less than 18.5 kg/m2 - underweight dosing wt  IBW: 135 lbs        78% IBW admit wt.   Usual Body Wt: 105 lbs (per EMR ~1 yr ago)      100% UBW admit wt    WEIGHT HISTORY: Current wt reflects 11% (12 lb)  unintentional wt loss.   Patient Weight(s) for the past 336 hrs:   Weight   12/30/24 1250 54.9 kg (121 lb 0.5 oz)    12/29/24 0600 57.4 kg (126 lb 8.7 oz)   12/28/24 0600 57.9 kg (127 lb 10.3 oz)   12/27/24 0500 57.4 kg (126 lb 8.7 oz)   12/26/24 0600 57.2 kg (126 lb 1.7 oz)   12/25/24 0356 57.4 kg (126 lb 8.7 oz)   12/24/24 0600 56.4 kg (124 lb 5.4 oz)   12/23/24 0600 55.7 kg (122 lb 12.7 oz)   12/22/24 0504 56.2 kg (123 lb 14.4 oz)   12/21/24 0600 56.7 kg (125 lb)   12/20/24 0600 56.3 kg (124 lb 1.9 oz)   12/19/24 0600 52.8 kg (116 lb 6.5 oz)     Wt Readings from Last 10 Encounters:   12/30/24 54.9 kg (121 lb 0.5 oz)   02/02/24 56.7 kg (125 lb)   10/29/23 47.5 kg (104 lb 11.5 oz)   09/10/23 42.1 kg (92 lb 14.4 oz)   04/13/23 53.5 kg (118 lb)   04/10/23 53.5 kg (118 lb)   04/03/23 53.5 kg (118 lb)   03/30/23 53.5 kg (118 lb)   03/27/23 53.3 kg (117 lb 9.6 oz)   03/22/23 64 kg (141 lb)     NUTRITION DIAGNOSIS/PROBLEM:   Severe Malnutrition related to Chronic - Physiological causes resulting in anorexia or diminished intake as evidenced by body fat severe depletion and muscle mass severe depletion.    NUTRITION DIAGNOSIS PROGRESS:  Improvement (unresolved) - Adjusted EN feeds to meet estimated needs    NUTRITION INTERVENTION:   NUTRITION PRESCRIPTION:   Estimated Nutrition needs: --dosing wt of 42 kg - wt taken on 12/12/24 **Updated 12/31/24  Calories: 2126-1260 calories/day (35-40 calories per kg Dosing wt)  Protein: 63-84 g protein/day (1.5-2.0 g protein/kg Dosing wt)  Fluid Needs: 0992-8334 ml/day (1 ml/kcal)    - Diet:       Procedures    NPO      - Enteral Nutrition: Jevity 1.5 at 45 ml/hr per G-Tube/PEG. Based on average 22 hour infusion time. Current rate provides 1485 kcal, 63 grams protein, 752 ml total free water, and 99% RDI's, 100% calorie needs and 100% protein needs. Water flushes 60 ml q 4 hours (360 ml). Total water 1112 ml daily (decreased free water due to mild hyponatremia).    - RD Malnutrition Care Plan:  EN feeds to meet estimated nutritional needs needs  - Vitamin and mineral supplements: MVI  - Nutrition  education: not appropriate at this time  - Coordination of nutrition care: collaboration with other providers - EN adjustments communicated to RN via secure message  - Discharge and transfer of nutrition care to new setting or provider: monitor plans Plan for BRADLY pending evals, facility choice and medical clearance    MONITOR AND EVALUATE/NUTRITION GOALS:  - Food and Nutrient Intake:      Monitor: N/A  - Food and Nutrient Administration:      Monitor: tolerance to enteral nutrition, adequacy of enteral nutrition, and for enteral nutrition adjustment  - Anthropometric Measurement:    Monitor weight  - Nutrition Goals:      allow wt loss due to fluid losses, true wt gain, tube feed meets greater than 80% of needs, labs within acceptable limits, euglycemia, and support wound healing    DIETITIAN FOLLOW UP: RD to follow and monitor nutrition status    Argelia Mac MS, KIAH, RDN, LDN  Clinical Dietitian  P: [phone]

## 2024-12-31 NOTE — PLAN OF CARE
Double RN skin check done prior to transfer off Unit. Skin check performed by this RN and Meredith DEJESUS.     Wounds are as follows: Redness/abrasion: upper back, right ankle. Excoriation/skin tear: sacrum/coccyx,   Redness: left ankle.     Will remain available for any further questions or concerns.

## 2024-12-31 NOTE — PROGRESS NOTES
Palliative Care Progress Note    Yin Butcher Patient Status:  Inpatient    1931 MRN [id number]   Location [facility] 2W/SW Attending Carmelina Duong MD   Hosp Day # 19 PCP Mina Walker MD     Date of Consult: 2024  Patient seen at: [facility] Inpatient    Reason for Consultation: Consult requested for goals of care and care coordination.    Subjective     S: Transferred to floor. Hoarse, emaciated, in restraints.     Review of Systems: Palliative Care symptom needs assessed:     Unable to accurately obtain due to intubation    Palliative Care Social History:   Marital Status:    Children: Yes  Living Situation Prior to Admit: Home  Occupational History: Retired  Personal:     Spiritual  Yin Butcher  NA     requested: No    Medical History: obtained from [facility]  Past Medical History:    Arthritis    Atrial fibrillation (HCC)    Back problem    Cancer (HCC)    Cataract    Osteoporosis    Personal history of antineoplastic chemotherapy     Past Surgical History:   Procedure Laterality Date    Other surgical history  2014    Procedure: HIP HEMIARTHROPLASTY/ BIPOLAR;  Surgeon: Vasu Matamoros MD;  Location:  MAIN OR    Removal of tonsils,12+ y/o         Family History: obtained from [facility]  Family History   Family history unknown: Yes       Allergies:  Allergies[1]    Medications:     Current Facility-Administered Medications:     acetaZOLAMIDE (Diamox) tab 250 mg, 250 mg, Oral, Daily    furosemide (Lasix) tab 20 mg, 20 mg, Oral, Daily    metoprolol tartrate (Lopressor) tab 25 mg, 25 mg, Oral, 2x Daily(Beta Blocker)    glycopyrrolate (Robinul) 0.2 MG/ML injection 0.1 mg, 0.1 mg, Intravenous, Q6H PRN    multivitamin (Tab-A-Jenny/Beta Carotene) tab 1 tablet, 1 tablet, Per G Tube, Daily    ipratropium-albuterol (Duoneb) 0.5-2.5 (3) MG/3ML inhalation solution 3 mL, 3 mL, Nebulization, Q6H PRN    midodrine (ProAmatine) tab 10 mg, 10 mg, Per NG Tube, TID    digoxin  (Lanoxin) tab 125 mcg, 125 mcg, Oral, Daily    acetaminophen (Ofirmev) 10 mg/mL infusion premix 650 mg, 15 mg/kg, Intravenous, Q6H PRN    dextrose 10% infusion (TPN no rate), , Intravenous, Continuous PRN    pancrelipase (Lip-Prot-Amyl) (Zenpep) DR particles cap 10,000 Units, 10,000 Units, Per G Tube, PRN **AND** sodium bicarbonate tab 325 mg, 325 mg, Per G Tube, PRN    metoprolol (Lopressor) 5 mg/5mL injection 5 mg, 5 mg, Intravenous, Q4H PRN    apixaban (Eliquis) tab 2.5 mg, 2.5 mg, Oral, BID    albuterol (Ventolin) (5 MG/ML) 0.5% nebulizer solution 5 mg, 5 mg, Nebulization, Q4H PRN    acetaminophen (Tylenol) 160 MG/5ML oral liquid 650 mg, 650 mg, Per NG Tube, Q4H PRN **OR** acetaminophen (Tylenol) rectal suppository 650 mg, 650 mg, Rectal, Q4H PRN    senna (Senokot) 8.8 MG/5ML oral syrup 17.6 mg, 10 mL, Per NG Tube, BID    docusate (Colace) 50 MG/5ML oral solution 100 mg, 100 mg, Per NG Tube, BID    polyethylene glycol (PEG 3350) (Miralax) 17 g oral packet 17 g, 17 g, Per NG Tube, Daily PRN    bisacodyl (Dulcolax) 10 MG rectal suppository 10 mg, 10 mg, Rectal, Daily PRN    famotidine (Pepcid) 20 mg/2mL injection 20 mg, 20 mg, Intravenous, Daily @ 0700    ondansetron (Zofran) 4 MG/2ML injection 4 mg, 4 mg, Intravenous, Q6H PRN    metoclopramide (Reglan) 5 mg/mL injection 10 mg, 10 mg, Intravenous, Q8H PRN  No current outpatient medications on file.         Palliative Performance Scale: 20 %  % Ambulation Activity Level Self-Care Intake Consciousness   100 Full  Normal  No Disease Full Normal Full   90 Full  Normal  Some Disease Full Normal Full   80 Full  Normal w/effort  Some Disease Full Normal or reduced Full   70 Reduced  Can't Perform Job  Some Disease Full Normal or reduced Full   60 Reduced  Can't Perform Hobby   Significant Disease Occ Assist Normal or reduced Full or confused   50 Mainly sit/lie Can't do any work  Extensive Disease Partial Assist Normal or reduced Full or confused   40 Mainly in bed Can't  do any work  Extensive Disease Mainly Assist Normal or reduced Full or confused   30 Bed Bound Can't do any work  Extensive Disease Max Assist  Total Care Reduced  Drowsy/confused   20 Bed Bound Can't do any work  Extensive Disease Max Assist  Total Care Minimal  Drowsy/confused   10 Bed Bound Can't do any work  Extensive Disease Max Assist  Total Care Mouth Care  Drowsy/confused   0 Death        Objective      Vital Signs:  Blood pressure 118/72, pulse 91, temperature 98.1 °F (36.7 °C), temperature source Axillary, resp. rate 20, height 5' 7.01\" (1.702 m), weight 121 lb 0.5 oz (54.9 kg), SpO2 99%.  Body mass index is 18.95 kg/m².  Non-verbal signs of pain present: No    Physical Exam:  General: Mildly SOB, thin, confused  HEENT: AT/NC  Cardiac: RRR  Lungs: coarse BS  Abdomen: Soft, non-tender, non-distended, normal bowel sounds X 4 quadrants   Extremities: mild-mod Edema present  Skin: Warm and dry.    Hematology:  Lab Results   Component Value Date    WBC 8.9 12/31/2024    HGB 8.9 (L) 12/31/2024    HCT 28.7 (L) 12/31/2024    .0 12/31/2024       Coags:  Lab Results   Component Value Date    PT 15.5 (H) 07/13/2014    INR 1.32 (H) 01/29/2024    PTT 34.6 01/31/2024       Chemistry:  Lab Results   Component Value Date    CREATSERUM 0.37 (L) 12/31/2024    BUN 21 12/31/2024     (L) 12/31/2024    K 4.5 12/31/2024    CL 92 (L) 12/31/2024    CO2 >40.0 (HH) 12/31/2024     (H) 12/31/2024    CA 9.6 12/31/2024    ALB 4.0 12/11/2024    ALKPHO 112 12/11/2024    BILT 1.0 (H) 12/11/2024    TP 8.2 12/11/2024    AST 24 12/11/2024    ALT 30 12/11/2024    DDIMER 2.38 (H) 01/20/2024    MG 2.0 12/14/2024    PHOS 4.1 12/14/2024    TROP <0.046 02/19/2015       Imaging:  No results found.    Assessment and Recommendations        Sepsis, due to unspecified organism, unspecified whether acute organ dysfunction present (HCC)    Acute respiratory failure with hypercapnia (HCC)    Atrial fibrillation with rapid ventricular  response (HCC)    Acute on chronic respiratory failure with hypoxia and hypercapnia (HCC)    Palliative care encounter    Counseling regarding advance care planning and goals of care    Symptom Management       Dyspnea   -on NC   -daughter is uncomfortable with opioids or sedation   -will discuss comfort vs select treatment intent      2.     Serious Illness Conversation:   Code Status: DNAR/Full  POA: Surrogate is daughter Jada  I met with Jada in person and also present via phone was Bebobryon, her fiance. Jada lives with Prema in [facility], and splits her time in [place], where her fisullye lives. Prema was home apparently until the last several months when she began problems with her effusions, aspiration etc.   I asked if Jada could give me a big picture sense of what was going with her mom. She is able to discuss the details of all of the illnesses and episodes but I also wondered if she felt overall that Prema was improving or declining.   I expressed my view/worry that her lungs are shutting down/have shut down etc to the extent that she is dying from this. I explained the physiology of chronic aspiration in context of dysphagia as a terminal/lethal component of not just dementia or old age but of many complex chronic illnesses.   I explained that I felt that Prema is unfortunately showing us that she is dying, albeit slowly and that we are in a position to decide how much to try and resist or fight natural dying or how much we can decide to support or allow for it.   Prema repeated that her mom had always just said 'she wants everything done' but unfortunately had little more context or nuance. Ie what 'everything' looks like, logistically, financially, quality etc. Librado said that he thinks Prema is pretty understanding of these issues, having dealt with her own  dying and other parents. He and Jada were hoping to involved Prema in the conversation.   When I spoke to Prema she could nod yes or no in  response to basic questions but when I asked about natural dying she closed her eyes and would not answer. Jada saw this and said that she is probably thinking and that she and her fiancee would talk to Prema in more detail. They hoped ideally that they could ask her when she is extubated.  They wanted to know what their deadline is to 'make a decision.' They did not seem opposed to consider compassionate extubation but instead seem very unsure about what to do. They appear open to learning as much as possible about their options.   I met with Jada and called Librado separately. I explained that her body is not supporting life, and that her best possible chance of accomplishing their goal of breathing independently and being at home would be compassionate extubation. Jada seems to understand but continued to ask about trach. I think it has been helpful to explain that what she has witnessed over the last several months are not just 'hiccups' but they are a gradual dying from dementia and advanced age.   I explained that extubation should probably happen within the next 24-48 hours as discussed with Dr. Espino and that we would endeavor to help her be comfortable if she were passing away.   I can have a more definitive discussion about plan tomorrow with lisset is present after 10-11am or so unless another provider discusses it earlier. I think we should support Jada in the grief process as gently as possible.   See separate note for discussion. Family agreeable to DNR/DNI and compassionate extubation.  I spoke to Jada via phone last night. Her goal is to have Prema go to rehab to get stronger and then return home. Unfortunately I feel that she is probably not a candidate for this, but I will defer to primary and PT/OT. I will call Jada and Librado to discuss comfort care/hospice. I did explain that no matter what she will most likely need around the clock care and Jada agreed, and agreed that she would NOT want her  mom in a facility permanently. Jada is not viewing this process as a terminal process, but focused on isolated details of Prema's course in hopes that this is another 'hiccup' and that she will recover. I encourage all care providers to continue to educate her.      Palliative Care Follow Up: Palliative care team will Continue to follow while inpatient    Thank you for allowing Palliative Care services to participate in the care of Yin Butcher.    A total of 60 minutes were spent on this visit, which included all of the following: direct face to face contact, history taking, physical examination, and >50% was spent counseling and coordinating care.    Barry Liz MD  Palliative Care Services  [facility] [phone]    Note to patient:  The 21st Century Cures Act makes medical notes like these available to patients in the interest of transparency. However, be advised this is a medical document. It is intended as peer to peer communication. It is written in medical language and may contain abbreviations or verbiage that are unfamiliar. It may appear blunt or direct. Medical documents are intended to carry relevant information, facts as evident, and the clinical opinion of the practitioner.           [1]   Allergies  Allergen Reactions    Erythromycin OTHER (SEE COMMENTS)    Oxycodone HALLUCINATION    Gabapentin NAUSEA ONLY and OTHER (SEE COMMENTS)     Pt stated she didn't feel like herself     Tizanidine ITCHING and OTHER (SEE COMMENTS)     Pt states burning sensation

## 2024-12-31 NOTE — CM/SW NOTE
Pt now on RA, obtunded, and non-responsive. Palliative continues to follow. RN notified CM that pt's daughter is requesting [facility] [place]. Daughter notified this CM on 12/11 that she did not want patient to return to [facility] at [place], and that [facility] is #[facility] choice if [facility] remained an option once patient was extubated. New [facility] referral search started in [facility]. List of accepting facilities will be needed if [facility] is unable to accept. PT/OT evals ordered for later today.    1/2/2025 at 1500: List of accepting [facility] facilities emailed to pt's daughter Jada at [email or web address] per her request. [facility] and [facility] are only accepting facilities at [place]. Jada agreeable to review and provide choice by tomorrow am.    Plan: [facility] pending evals, facility choice, and medical clearance.    Barry Deluca, GOLDENN    [phone]

## 2024-12-31 NOTE — PLAN OF CARE
No complaints of pain at this time. Frequent rounding by nursing staff. Restraints still in place. Safety precautions maintained/call light within reach.     Problem: Patient Centered Care  Goal: Patient preferences are identified and integrated in the patient's plan of care  Description: Interventions:  - What would you like us to know as we care for you?   - Provide timely, complete, and accurate information to patient/family  - Incorporate patient and family knowledge, values, beliefs, and cultural backgrounds into the planning and delivery of care  - Encourage patient/family to participate in care and decision-making at the level they choose  - Honor patient and family perspectives and choices  Outcome: Progressing     Problem: Safety Risk - Non-Violent Restraints  Goal: Patient will remain free from self-harm  Description: INTERVENTIONS:  - Apply the least restrictive restraint to prevent harm  - Notify patient and family of reasons restraints applied  - Assess for any contributing factors to confusion (electrolyte disturbances, delirium, medications)  - Discontinue any unnecessary medical devices as soon as possible  - Assess the patient's physical comfort, circulation, skin condition, hydration, nutrition and elimination needs   - Reorient and redirection as needed  - Assess for the need to continue restraints  Outcome: Progressing     Problem: PAIN - ADULT  Goal: Verbalizes/displays adequate comfort level or patient's stated pain goal  Description: INTERVENTIONS:  - Encourage pt to monitor pain and request assistance  - Assess pain using appropriate pain scale  - Administer analgesics based on type and severity of pain and evaluate response  - Implement non-pharmacological measures as appropriate and evaluate response  - Consider cultural and social influences on pain and pain management  - Manage/alleviate anxiety  - Utilize distraction and/or relaxation techniques  - Monitor for opioid side effects  -  Notify MD/LIP if interventions unsuccessful or patient reports new pain  - Anticipate increased pain with activity and pre-medicate as appropriate  Outcome: Progressing     Problem: RISK FOR INFECTION - ADULT  Goal: Absence of fever/infection during anticipated neutropenic period  Description: INTERVENTIONS  - Monitor WBC  - Administer growth factors as ordered  - Implement neutropenic guidelines  Outcome: Progressing     Problem: SAFETY ADULT - FALL  Goal: Free from fall injury  Description: INTERVENTIONS:  - Assess pt frequently for physical needs  - Identify cognitive and physical deficits and behaviors that affect risk of falls.  - [place] fall precautions as indicated by assessment.  - Educate pt/family on patient safety including physical limitations  - Instruct pt to call for assistance with activity based on assessment  - Modify environment to reduce risk of injury  - Provide assistive devices as appropriate  - Consider OT/PT consult to assist with strengthening/mobility  - Encourage toileting schedule  Outcome: Progressing     Problem: DISCHARGE PLANNING  Goal: Discharge to home or other facility with appropriate resources  Description: INTERVENTIONS:  - Identify barriers to discharge w/pt and caregiver  - Include patient/family/discharge partner in discharge planning  - Arrange for needed discharge resources and transportation as appropriate  - Identify discharge learning needs (meds, wound care, etc)  - Arrange for interpreters to assist at discharge as needed  - Consider post-discharge preferences of patient/family/discharge partner  - Complete POLST form as appropriate  - Assess patient's ability to be responsible for managing their own health  - Refer to Case Management Department for coordinating discharge planning if the patient needs post-hospital services based on physician/LIP order or complex needs related to functional status, cognitive ability or social support system  Outcome: Progressing      Problem: Altered Communication/Language Barrier  Goal: Patient/Family is able to understand and participate in their care  Description: Interventions:  - Assess communication ability and preferred communication style  - Implement communication aides and strategies  - Use visual cues when possible  - Listen attentively, be patient, do not interrupt  - Minimize distractions  - Allow time for understanding and response  - Establish method for patient to ask for assistance (call light)  - Provide an  as needed  - Communicate barriers and strategies to overcome with those who interact with patient  Outcome: Progressing     Problem: RESPIRATORY - ADULT  Goal: Achieves optimal ventilation and oxygenation  Description: INTERVENTIONS:  - Assess for changes in respiratory status  - Assess for changes in mentation and behavior  - Position to facilitate oxygenation and minimize respiratory effort  - Oxygen supplementation based on oxygen saturation or ABGs  - Provide Smoking Cessation handout, if applicable  - Encourage broncho-pulmonary hygiene including cough, deep breathe, Incentive Spirometry  - Assess the need for suctioning and perform as needed  - Assess and instruct to report SOB or any respiratory difficulty  - Respiratory Therapy support as indicated  - Manage/alleviate anxiety  - Monitor for signs/symptoms of CO2 retention  Outcome: Progressing     Problem: NEUROLOGICAL - ADULT  Goal: Achieves stable or improved neurological status  Description: INTERVENTIONS  - Assess for and report changes in neurological status  - Initiate measures to prevent increased intracranial pressure  - Maintain blood pressure and fluid volume within ordered parameters to optimize cerebral perfusion and minimize risk of hemorrhage  - Monitor temperature, glucose, and sodium. Initiate appropriate interventions as ordered  Outcome: Progressing  Goal: Absence of seizures  Description: INTERVENTIONS  - Monitor for seizure  activity  - Administer anti-seizure medications as ordered  - Monitor neurological status  Outcome: Progressing  Goal: Remains free of injury related to seizure activity  Description: INTERVENTIONS:  - Maintain airway, patient safety  and administer oxygen as ordered  - Monitor patient for seizure activity, document and report duration and description of seizure to MD/LIP  - If seizure occurs, turn patient to side and suction secretions as needed  - Reorient patient post seizure  - Seizure pads on all 4 side rails  - Instruct patient/family to notify RN of any seizure activity  - Instruct patient/family to call for assistance with activity based on assessment  Outcome: Progressing  Goal: Achieves maximal functionality and self care  Description: INTERVENTIONS  - Monitor swallowing and airway patency with patient fatigue and changes in neurological status  - Encourage and assist patient to increase activity and self care with guidance from PT/OT  - Encourage visually impaired, hearing impaired and aphasic patients to use assistive/communication devices  Outcome: Progressing     Problem: Diabetes/Glucose Control  Goal: Glucose maintained within prescribed range  Description: INTERVENTIONS:  - Monitor Blood Glucose as ordered  - Assess for signs and symptoms of hyperglycemia and hypoglycemia  - Administer ordered medications to maintain glucose within target range  - Assess barriers to adequate nutritional intake and initiate nutrition consult as needed  - Instruct patient on self management of diabetes  Outcome: Progressing     Problem: Delirium  Goal: Minimize duration of delirium  Description: Interventions:  - Encourage use of hearing aids, eye glasses  - Promote highest level of mobility daily  - Provide frequent reorientation  - Promote wakefulness i.e. lights on, blinds open  - Promote sleep, encourage patient's normal rest cycle i.e. lights off, TV off, minimize noise and interruptions  - Encourage family to  assist in orientation and promotion of home routines  Outcome: Progressing

## 2024-12-31 NOTE — PROGRESS NOTES
[facility]  part of [facility]    Progress Note    Yin Butcher Patient Status:  Inpatient    1931 MRN [id number]   Location [facility]5W Attending Vika Elmore MD   Hosp Day # 19 PCP Mina Walker MD     SUBJECTIVE:  Pt seen and examined at bedside.    on room air today  Lethargic, minimal oral intake    /85 (BP Location: Right arm)   Pulse 116   Temp 97 °F (36.1 °C) (Axillary)   Resp 18   Ht 5' 7.01\" (1.702 m)   Wt 121 lb 0.5 oz (54.9 kg)   SpO2 94%   BMI 18.95 kg/m²     Physical Examination:    Gen:  Appears comfortable.  HEENT: PERRLA  Lungs: CTA B/L  Heart: Normal S1 S2, No M/G/R   Abd: Abdomen soft, nontender, nondistended, no organomegaly, bowel sounds present, PEG in place  Ext: No edema, no calf tenderness    Labs:   Lab Results   Component Value Date    WBC 8.9 2024    HGB 8.9 2024    HCT 28.7 2024    .0 2024    CREATSERUM 0.37 2024    BUN 21 2024     2024    K 4.5 2024    CL 92 2024    CO2 >40.0 2024     2024    CA 9.6 2024       No results for input(s): \"PGLU\" in the last 168 hours.  No results for input(s): \"INR\" in the last 168 hours.    Imaging:  Imaging reviewed in Epic.    Meds:   Current Facility-Administered Medications   Medication Dose Route Frequency    acetaZOLAMIDE (Diamox) tab 250 mg  250 mg Oral Daily    furosemide (Lasix) tab 20 mg  20 mg Oral Daily    metoprolol tartrate (Lopressor) tab 25 mg  25 mg Oral 2x Daily(Beta Blocker)    glycopyrrolate (Robinul) 0.2 MG/ML injection 0.1 mg  0.1 mg Intravenous Q6H PRN    multivitamin (Tab-A-Jenny/Beta Carotene) tab 1 tablet  1 tablet Per G Tube Daily    ipratropium-albuterol (Duoneb) 0.5-2.5 (3) MG/3ML inhalation solution 3 mL  3 mL Nebulization Q6H PRN    midodrine (ProAmatine) tab 10 mg  10 mg Per NG Tube TID    digoxin (Lanoxin) tab 125 mcg  125 mcg Oral Daily    acetaminophen (Ofirmev) 10 mg/mL  infusion premix 650 mg  15 mg/kg Intravenous Q6H PRN    dextrose 10% infusion (TPN no rate)   Intravenous Continuous PRN    pancrelipase (Lip-Prot-Amyl) (Zenpep) DR particles cap 10,000 Units  10,000 Units Per G Tube PRN    And    sodium bicarbonate tab 325 mg  325 mg Per G Tube PRN    metoprolol (Lopressor) 5 mg/5mL injection 5 mg  5 mg Intravenous Q4H PRN    apixaban (Eliquis) tab 2.5 mg  2.5 mg Oral BID    albuterol (Ventolin) (5 MG/ML) 0.5% nebulizer solution 5 mg  5 mg Nebulization Q4H PRN    acetaminophen (Tylenol) 160 MG/5ML oral liquid 650 mg  650 mg Per NG Tube Q4H PRN    Or    acetaminophen (Tylenol) rectal suppository 650 mg  650 mg Rectal Q4H PRN    senna (Senokot) 8.8 MG/5ML oral syrup 17.6 mg  10 mL Per NG Tube BID    docusate (Colace) 50 MG/5ML oral solution 100 mg  100 mg Per NG Tube BID    polyethylene glycol (PEG 3350) (Miralax) 17 g oral packet 17 g  17 g Per NG Tube Daily PRN    bisacodyl (Dulcolax) 10 MG rectal suppository 10 mg  10 mg Rectal Daily PRN    famotidine (Pepcid) 20 mg/2mL injection 20 mg  20 mg Intravenous Daily @ 0700    ondansetron (Zofran) 4 MG/2ML injection 4 mg  4 mg Intravenous Q6H PRN    metoclopramide (Reglan) 5 mg/mL injection 10 mg  10 mg Intravenous Q8H PRN       Assessment:     *Acute respiratory failure with hypercapnia/Aspiration pneumonia  CXR and CT chest reviewed   Intubated/sedated 12/12   Extubated 12/27 - was on BIPAP, now tolerating  NC   S/P 10 day course of antibiotics   Cont nebs   Leukocytosis improving   Pulmonology following   Palliative following   Guarded prognosis      *HFpEF  Cont lasix  Cardiology following      *Atrial fibrillation with rapid ventricular response (HCC)  Rates improved   Cont Eliquis and digoxin  Cardiology following      *Dysphagia   Cont Tube feeds, at goal  Aspiration precautions      *Hypotension  Cont midodrine   Cardiology following         DVT prophylaxis: Eliquis  Code status: DNR/DNI      Dispo: Pt extubated 12/27/24, was on  cont BIPAP, on on NC. Palliative following with multiple family meetings. Prognosis remains guarded.   Patient completed course of antibiotic therapy.  Medically stable for discharge once accepting facility finalized.      Vika Elmore MD   12/31/2024  12:13 PM

## 2024-12-31 NOTE — PROGRESS NOTES
[facility]  part of [facility]     Progress Note    Yin Butcher Patient Status:  Inpatient    1931 MRN [id number]   Location [facility] 2W/SW Attending Vika Elmore MD   Hosp Day # 19 PCP Mina Walker MD       Subjective:   Patient seen and examined.  Resting in bed with no significant distress  Objective:   Blood pressure 118/85, pulse 116, temperature 97 °F (36.1 °C), temperature source Axillary, resp. rate 18, height 5' 7.01\" (1.702 m), weight 121 lb 0.5 oz (54.9 kg), SpO2 94%.  Intake/Output:   Last 3 shifts: I/O last 3 completed shifts:  In:  [NG/GT:]  Out:  [Urine:]   This shift: I/O this shift:  In: 75 [NG/GT:75]  Out: -      Vent Settings:      Hemodynamic parameters (last 24 hours):      Scheduled Meds:   Current Facility-Administered Medications   Medication Dose Route Frequency    acetaZOLAMIDE (Diamox) tab 250 mg  250 mg Oral Daily    furosemide (Lasix) tab 20 mg  20 mg Oral Daily    metoprolol tartrate (Lopressor) tab 25 mg  25 mg Oral 2x Daily(Beta Blocker)    glycopyrrolate (Robinul) 0.2 MG/ML injection 0.1 mg  0.1 mg Intravenous Q6H PRN    multivitamin (Tab-A-Jenny/Beta Carotene) tab 1 tablet  1 tablet Per G Tube Daily    ipratropium-albuterol (Duoneb) 0.5-2.5 (3) MG/3ML inhalation solution 3 mL  3 mL Nebulization Q6H PRN    midodrine (ProAmatine) tab 10 mg  10 mg Per NG Tube TID    digoxin (Lanoxin) tab 125 mcg  125 mcg Oral Daily    acetaminophen (Ofirmev) 10 mg/mL infusion premix 650 mg  15 mg/kg Intravenous Q6H PRN    dextrose 10% infusion (TPN no rate)   Intravenous Continuous PRN    pancrelipase (Lip-Prot-Amyl) (Zenpep) DR particles cap 10,000 Units  10,000 Units Per G Tube PRN    And    sodium bicarbonate tab 325 mg  325 mg Per G Tube PRN    metoprolol (Lopressor) 5 mg/5mL injection 5 mg  5 mg Intravenous Q4H PRN    apixaban (Eliquis) tab 2.5 mg  2.5 mg Oral BID    albuterol (Ventolin) (5 MG/ML) 0.5% nebulizer solution 5 mg  5 mg  Nebulization Q4H PRN    acetaminophen (Tylenol) 160 MG/5ML oral liquid 650 mg  650 mg Per NG Tube Q4H PRN    Or    acetaminophen (Tylenol) rectal suppository 650 mg  650 mg Rectal Q4H PRN    senna (Senokot) 8.8 MG/5ML oral syrup 17.6 mg  10 mL Per NG Tube BID    docusate (Colace) 50 MG/5ML oral solution 100 mg  100 mg Per NG Tube BID    polyethylene glycol (PEG 3350) (Miralax) 17 g oral packet 17 g  17 g Per NG Tube Daily PRN    bisacodyl (Dulcolax) 10 MG rectal suppository 10 mg  10 mg Rectal Daily PRN    famotidine (Pepcid) 20 mg/2mL injection 20 mg  20 mg Intravenous Daily @ 0700    ondansetron (Zofran) 4 MG/2ML injection 4 mg  4 mg Intravenous Q6H PRN    metoclopramide (Reglan) 5 mg/mL injection 10 mg  10 mg Intravenous Q8H PRN       Continuous Infusions:    dextrose 10%         Physical Exam  Constitutional: Arousable  Eyes: PERRL  ENT: nares pateint  Neck: supple, no JVD  Cardio: RRR, S1 S2  Respiratory: Diminished bibasilar breath sounds with crackles present  GI: abdomen soft, non tender, active bowel sounds, no organomegaly + PEG tube in place  Extremities: no clubbing, cyanosis, edema  Neurologic: Does not follow commands  Skin: warm, dry      Results:     Lab Results   Component Value Date    WBC 8.9 12/31/2024    HGB 8.9 12/31/2024    HCT 28.7 12/31/2024    .0 12/31/2024    CREATSERUM 0.37 12/31/2024    BUN 21 12/31/2024     12/31/2024    K 4.5 12/31/2024    CL 92 12/31/2024    CO2 >40.0 12/31/2024     12/31/2024    CA 9.6 12/31/2024         No results found.          Assessment   1.  Fevers  2.  Acute encephalopathy  3.  Acute on chronic hypercapnic hypoxemic respiratory failure  4.  Leukocytosis  5.  HFpEF  6.  History of atrial fibrillation  7.  Prior history of pulmonary embolism  9.  Dementia  10.  Dysphagia     Plan   -Patient presents with chief complaint of fevers ongoing altered mental status over the last several days.  Recently discharged on 11/24/2024 from [facility] after hospitalization for presumed aspiration pneumonia.  -Worsening hypercapnic respiratory failure on NIV.  Patient intubated on 12/12/2024.  Extubated on 12/27/2024 after discussion with goals of care with palliative care team.  Initially required NIV for 2 days after extubation but now weaned off all oxygen  -CT chest on 12/11/2024 with lower lobe mucous plugging concerning for aspiration with small pleural effusions.  -Completed course of antibiotic therapy  -Chest physiotherapy  -DVT prophylaxis: Eliquis  -Patient DNR/DNI moving forward  -Ongoing discussions with palliative care team with daughter regarding goals of care.  Will likely return to [place].        Dayo Espino, DO  Pulmonary Critical Care Medicine  [facility]

## 2024-12-31 NOTE — PLAN OF CARE
Problem: Patient Centered Care  Goal: Patient preferences are identified and integrated in the patient's plan of care  Description: Interventions:  - What would you like us to know as we care for you?   - Provide timely, complete, and accurate information to patient/family  - Incorporate patient and family knowledge, values, beliefs, and cultural backgrounds into the planning and delivery of care  - Encourage patient/family to participate in care and decision-making at the level they choose  - Honor patient and family perspectives and choices  Outcome: Progressing     Problem: Patient/Family Goals  Goal: Patient/Family Long Term Goal  Description: Patient's Long Term Goal:     Interventions:  - See additional Care Plan goals for specific interventions  Outcome: Progressing  Goal: Patient/Family Short Term Goal  Description: Patient's Short Term Goal:    Interventions:   - See additional Care Plan goals for specific interventions  Outcome: Progressing     Problem: Safety Risk - Non-Violent Restraints  Goal: Patient will remain free from self-harm  Description: INTERVENTIONS:  - Apply the least restrictive restraint to prevent harm  - Notify patient and family of reasons restraints applied  - Assess for any contributing factors to confusion (electrolyte disturbances, delirium, medications)  - Discontinue any unnecessary medical devices as soon as possible  - Assess the patient's physical comfort, circulation, skin condition, hydration, nutrition and elimination needs   - Reorient and redirection as needed  - Assess for the need to continue restraints  Outcome: Progressing     Problem: PAIN - ADULT  Goal: Verbalizes/displays adequate comfort level or patient's stated pain goal  Description: INTERVENTIONS:  - Encourage pt to monitor pain and request assistance  - Assess pain using appropriate pain scale  - Administer analgesics based on type and severity of pain and evaluate response  - Implement non-pharmacological  measures as appropriate and evaluate response  - Consider cultural and social influences on pain and pain management  - Manage/alleviate anxiety  - Utilize distraction and/or relaxation techniques  - Monitor for opioid side effects  - Notify MD/LIP if interventions unsuccessful or patient reports new pain  - Anticipate increased pain with activity and pre-medicate as appropriate  Outcome: Progressing     Problem: RISK FOR INFECTION - ADULT  Goal: Absence of fever/infection during anticipated neutropenic period  Description: INTERVENTIONS  - Monitor WBC  - Administer growth factors as ordered  - Implement neutropenic guidelines  Outcome: Progressing     Problem: SAFETY ADULT - FALL  Goal: Free from fall injury  Description: INTERVENTIONS:  - Assess pt frequently for physical needs  - Identify cognitive and physical deficits and behaviors that affect risk of falls.  - [place] fall precautions as indicated by assessment.  - Educate pt/family on patient safety including physical limitations  - Instruct pt to call for assistance with activity based on assessment  - Modify environment to reduce risk of injury  - Provide assistive devices as appropriate  - Consider OT/PT consult to assist with strengthening/mobility  - Encourage toileting schedule  Outcome: Progressing     Problem: DISCHARGE PLANNING  Goal: Discharge to home or other facility with appropriate resources  Description: INTERVENTIONS:  - Identify barriers to discharge w/pt and caregiver  - Include patient/family/discharge partner in discharge planning  - Arrange for needed discharge resources and transportation as appropriate  - Identify discharge learning needs (meds, wound care, etc)  - Arrange for interpreters to assist at discharge as needed  - Consider post-discharge preferences of patient/family/discharge partner  - Complete POLST form as appropriate  - Assess patient's ability to be responsible for managing their own health  - Refer to Case Management  Department for coordinating discharge planning if the patient needs post-hospital services based on physician/LIP order or complex needs related to functional status, cognitive ability or social support system  Outcome: Progressing     Problem: Altered Communication/Language Barrier  Goal: Patient/Family is able to understand and participate in their care  Description: Interventions:  - Assess communication ability and preferred communication style  - Implement communication aides and strategies  - Use visual cues when possible  - Listen attentively, be patient, do not interrupt  - Minimize distractions  - Allow time for understanding and response  - Establish method for patient to ask for assistance (call light)  - Provide an  as needed  - Communicate barriers and strategies to overcome with those who interact with patient  Outcome: Progressing     Problem: RESPIRATORY - ADULT  Goal: Achieves optimal ventilation and oxygenation  Description: INTERVENTIONS:  - Assess for changes in respiratory status  - Assess for changes in mentation and behavior  - Position to facilitate oxygenation and minimize respiratory effort  - Oxygen supplementation based on oxygen saturation or ABGs  - Provide Smoking Cessation handout, if applicable  - Encourage broncho-pulmonary hygiene including cough, deep breathe, Incentive Spirometry  - Assess the need for suctioning and perform as needed  - Assess and instruct to report SOB or any respiratory difficulty  - Respiratory Therapy support as indicated  - Manage/alleviate anxiety  - Monitor for signs/symptoms of CO2 retention  Outcome: Progressing     Problem: NEUROLOGICAL - ADULT  Goal: Achieves stable or improved neurological status  Description: INTERVENTIONS  - Assess for and report changes in neurological status  - Initiate measures to prevent increased intracranial pressure  - Maintain blood pressure and fluid volume within ordered parameters to optimize cerebral  perfusion and minimize risk of hemorrhage  - Monitor temperature, glucose, and sodium. Initiate appropriate interventions as ordered  Outcome: Progressing  Goal: Absence of seizures  Description: INTERVENTIONS  - Monitor for seizure activity  - Administer anti-seizure medications as ordered  - Monitor neurological status  Outcome: Progressing  Goal: Remains free of injury related to seizure activity  Description: INTERVENTIONS:  - Maintain airway, patient safety  and administer oxygen as ordered  - Monitor patient for seizure activity, document and report duration and description of seizure to MD/LIP  - If seizure occurs, turn patient to side and suction secretions as needed  - Reorient patient post seizure  - Seizure pads on all 4 side rails  - Instruct patient/family to notify RN of any seizure activity  - Instruct patient/family to call for assistance with activity based on assessment  Outcome: Progressing  Goal: Achieves maximal functionality and self care  Description: INTERVENTIONS  - Monitor swallowing and airway patency with patient fatigue and changes in neurological status  - Encourage and assist patient to increase activity and self care with guidance from PT/OT  - Encourage visually impaired, hearing impaired and aphasic patients to use assistive/communication devices  Outcome: Progressing     Problem: Diabetes/Glucose Control  Goal: Glucose maintained within prescribed range  Description: INTERVENTIONS:  - Monitor Blood Glucose as ordered  - Assess for signs and symptoms of hyperglycemia and hypoglycemia  - Administer ordered medications to maintain glucose within target range  - Assess barriers to adequate nutritional intake and initiate nutrition consult as needed  - Instruct patient on self management of diabetes  Outcome: Progressing     Problem: Delirium  Goal: Minimize duration of delirium  Description: Interventions:  - Encourage use of hearing aids, eye glasses  - Promote highest level of  mobility daily  - Provide frequent reorientation  - Promote wakefulness i.e. lights on, blinds open  - Promote sleep, encourage patient's normal rest cycle i.e. lights off, TV off, minimize noise and interruptions  - Encourage family to assist in orientation and promotion of home routines  Outcome: Progressing     Problem: SAFETY ADULT - FALL  Goal: Free from fall injury  Description: INTERVENTIONS:  - Assess pt frequently for physical needs  - Identify cognitive and physical deficits and behaviors that affect risk of falls.  - [place] fall precautions as indicated by assessment.  - Educate pt/family on patient safety including physical limitations  - Instruct pt to call for assistance with activity based on assessment  - Modify environment to reduce risk of injury  - Provide assistive devices as appropriate  - Consider OT/PT consult to assist with strengthening/mobility  - Encourage toileting schedule  Outcome: Progressing

## 2024-12-31 NOTE — PROGRESS NOTES
[facility] - CARDIOLOGY PROGRESS NOTE  Yin Butcher Patient Status:  Inpatient    1931 MRN [id number]   Location [facility] 2W/SW Attending Vika Elmore MD   Hosp Day # 19 PCP Mina Walker MD     Assessment:    1.  Acute hypoxic/hypercapnic respiratory failure, resolved.  Patient is comfortable on nasal cannula.  S/p prolonged intubation. 3-5 L NC.    2.  Longstanding persistent atrial fibrillation.  Persistent mild tachycardia.  Receiving digoxin and Eliquis p.o.  Normal LV function on echo.      Plan:  Contraction alkalosis.  Will resume daily acetazolamide as taking PTA.  Resume furosemide 20 mg per GT daily.  Goals of care discussions appropriate.    Olivia  L3          Subjective:  No chest pain or shortness of breath.  Quiet but disoriented.    Objective:  /63 (BP Location: Right arm)   Pulse 89   Temp 97.8 °F (36.6 °C) (Axillary)   Resp 22   Ht 170.2 cm (5' 7.01\")   Wt 121 lb 0.5 oz (54.9 kg)   SpO2 97%   BMI 18.95 kg/m²     Temp (24hrs), Av.5 °F (36.4 °C), Min:96.9 °F (36.1 °C), Max:98.1 °F (36.7 °C)      Intake/Output:    Intake/Output Summary (Last 24 hours) at 2024 0740  Last data filed at 2024 0628  Gross per 24 hour   Intake 1591 ml   Output 1875 ml   Net -284 ml       Wt Readings from Last 2 Encounters:   24 121 lb 0.5 oz (54.9 kg)   24 125 lb (56.7 kg)       Physical Exam:    General: Awake, confused,  No apparent distress.  HEENT: No focal deficits.  Neck: No JVD, carotids 2+ no bruits.  Cardiac: Irregular rhythm, persistent mild tachycardia.  S1, S2 normal, no murmur  Lungs: Clear anterolaterally without wheezes, rales, rhonchi.  Normal excursions and effort.  Abdomen: Soft, non-tender.   Extremities: Without clubbing, cyanosis or edema.  Peripheral pulses are 2+.  Skin: Warm and dry.     Labs:  Lab Results   Component Value Date     WBC 8.9 12/31/2024    HGB 8.9 12/31/2024    HCT 28.7 12/31/2024    .0 12/31/2024     Lab Results   Component Value Date    PT 15.5 (H) 07/13/2014    INR 1.32 (H) 01/29/2024     Lab Results   Component Value Date     12/31/2024    K 4.5 12/31/2024    CL 92 12/31/2024    CO2 >40.0 12/31/2024    BUN 21 12/31/2024    CREATSERUM 0.37 12/31/2024     12/31/2024    CA 9.6 12/31/2024        Lab Results   Component Value Date    TROP <0.046 02/19/2015        Medications:     metoprolol tartrate  25 mg Oral 2x Daily(Beta Blocker)    furosemide  40 mg Intravenous Daily    multivitamin  1 tablet Per G Tube Daily    midodrine  10 mg Per NG Tube TID    digoxin  125 mcg Oral Daily    apixaban  2.5 mg Oral BID    senna  10 mL Per NG Tube BID    docusate  100 mg Per NG Tube BID    famotidine  20 mg Intravenous Daily @ 0700      dextrose 10%

## 2025-01-01 ENCOUNTER — APPOINTMENT (OUTPATIENT)
Dept: GENERAL RADIOLOGY | Facility: HOSPITAL | Age: OVER 89
End: 2025-01-01
Attending: HOSPITALIST
Payer: MEDICARE

## 2025-01-01 VITALS
DIASTOLIC BLOOD PRESSURE: 68 MMHG | OXYGEN SATURATION: 73 % | TEMPERATURE: 97 F | SYSTOLIC BLOOD PRESSURE: 117 MMHG | HEART RATE: 120 BPM | HEIGHT: 67.01 IN | RESPIRATION RATE: 24 BRPM | BODY MASS INDEX: 19 KG/M2 | WEIGHT: 121.06 LBS

## 2025-01-01 LAB
ANION GAP SERPL CALC-SCNC: 2 MMOL/L (ref 0–18)
ANION GAP SERPL CALC-SCNC: 4 MMOL/L (ref 0–18)
ANION GAP SERPL CALC-SCNC: 6 MMOL/L (ref 0–18)
ANION GAP SERPL CALC-SCNC: 6 MMOL/L (ref 0–18)
ANION GAP SERPL CALC-SCNC: 8 MMOL/L (ref 0–18)
BASOPHILS # BLD AUTO: 0.02 X10(3) UL (ref 0–0.2)
BASOPHILS # BLD AUTO: 0.03 X10(3) UL (ref 0–0.2)
BASOPHILS # BLD AUTO: 0.08 X10(3) UL (ref 0–0.2)
BASOPHILS NFR BLD AUTO: 0.1 %
BASOPHILS NFR BLD AUTO: 0.2 %
BASOPHILS NFR BLD AUTO: 0.2 %
BASOPHILS NFR BLD AUTO: 0.3 %
BASOPHILS NFR BLD AUTO: 0.4 %
BUN BLD-MCNC: 13 MG/DL (ref 9–23)
BUN BLD-MCNC: 16 MG/DL (ref 9–23)
BUN BLD-MCNC: 18 MG/DL (ref 9–23)
BUN BLD-MCNC: 18 MG/DL (ref 9–23)
BUN BLD-MCNC: 22 MG/DL (ref 9–23)
BUN BLD-MCNC: 31 MG/DL (ref 9–23)
BUN BLD-MCNC: 38 MG/DL (ref 9–23)
BUN BLD-MCNC: 40 MG/DL (ref 9–23)
BUN/CREAT SERPL: 28.9 (ref 10–20)
BUN/CREAT SERPL: 38.1 (ref 10–20)
BUN/CREAT SERPL: 40.9 (ref 10–20)
BUN/CREAT SERPL: 41.8 (ref 10–20)
BUN/CREAT SERPL: 41.9 (ref 10–20)
BUN/CREAT SERPL: 46.8 (ref 10–20)
BUN/CREAT SERPL: 56.4 (ref 10–20)
BUN/CREAT SERPL: 57.1 (ref 10–20)
CALCIUM BLD-MCNC: 10.2 MG/DL (ref 8.7–10.4)
CALCIUM BLD-MCNC: 10.2 MG/DL (ref 8.7–10.4)
CALCIUM BLD-MCNC: 10.5 MG/DL (ref 8.7–10.4)
CALCIUM BLD-MCNC: 9.6 MG/DL (ref 8.7–10.4)
CALCIUM BLD-MCNC: 9.6 MG/DL (ref 8.7–10.4)
CALCIUM BLD-MCNC: 9.7 MG/DL (ref 8.7–10.4)
CALCIUM BLD-MCNC: 9.8 MG/DL (ref 8.7–10.4)
CALCIUM BLD-MCNC: 9.8 MG/DL (ref 8.7–10.4)
CHLORIDE SERPL-SCNC: 101 MMOL/L (ref 98–112)
CHLORIDE SERPL-SCNC: 101 MMOL/L (ref 98–112)
CHLORIDE SERPL-SCNC: 102 MMOL/L (ref 98–112)
CHLORIDE SERPL-SCNC: 96 MMOL/L (ref 98–112)
CHLORIDE SERPL-SCNC: 96 MMOL/L (ref 98–112)
CHLORIDE SERPL-SCNC: 98 MMOL/L (ref 98–112)
CHLORIDE SERPL-SCNC: 99 MMOL/L (ref 98–112)
CHLORIDE SERPL-SCNC: 99 MMOL/L (ref 98–112)
CO2 SERPL-SCNC: 32 MMOL/L (ref 21–32)
CO2 SERPL-SCNC: 32 MMOL/L (ref 21–32)
CO2 SERPL-SCNC: 33 MMOL/L (ref 21–32)
CO2 SERPL-SCNC: 34 MMOL/L (ref 21–32)
CO2 SERPL-SCNC: 35 MMOL/L (ref 21–32)
CREAT BLD-MCNC: 0.42 MG/DL
CREAT BLD-MCNC: 0.43 MG/DL
CREAT BLD-MCNC: 0.44 MG/DL
CREAT BLD-MCNC: 0.45 MG/DL
CREAT BLD-MCNC: 0.47 MG/DL
CREAT BLD-MCNC: 0.55 MG/DL
CREAT BLD-MCNC: 0.7 MG/DL
CREAT BLD-MCNC: 0.91 MG/DL
DEPRECATED RDW RBC AUTO: 63.1 FL (ref 35.1–46.3)
DEPRECATED RDW RBC AUTO: 63.6 FL (ref 35.1–46.3)
DEPRECATED RDW RBC AUTO: 63.8 FL (ref 35.1–46.3)
DEPRECATED RDW RBC AUTO: 63.9 FL (ref 35.1–46.3)
DEPRECATED RDW RBC AUTO: 64 FL (ref 35.1–46.3)
DEPRECATED RDW RBC AUTO: 64.8 FL (ref 35.1–46.3)
DEPRECATED RDW RBC AUTO: 64.8 FL (ref 35.1–46.3)
EGFRCR SERPLBLD CKD-EPI 2021: 59 ML/MIN/1.73M2 (ref 60–?)
EGFRCR SERPLBLD CKD-EPI 2021: 81 ML/MIN/1.73M2 (ref 60–?)
EGFRCR SERPLBLD CKD-EPI 2021: 85 ML/MIN/1.73M2 (ref 60–?)
EGFRCR SERPLBLD CKD-EPI 2021: 89 ML/MIN/1.73M2 (ref 60–?)
EGFRCR SERPLBLD CKD-EPI 2021: 90 ML/MIN/1.73M2 (ref 60–?)
EGFRCR SERPLBLD CKD-EPI 2021: 90 ML/MIN/1.73M2 (ref 60–?)
EGFRCR SERPLBLD CKD-EPI 2021: 91 ML/MIN/1.73M2 (ref 60–?)
EGFRCR SERPLBLD CKD-EPI 2021: 91 ML/MIN/1.73M2 (ref 60–?)
EOSINOPHIL # BLD AUTO: 0 X10(3) UL (ref 0–0.7)
EOSINOPHIL # BLD AUTO: 0.01 X10(3) UL (ref 0–0.7)
EOSINOPHIL # BLD AUTO: 0.03 X10(3) UL (ref 0–0.7)
EOSINOPHIL # BLD AUTO: 0.04 X10(3) UL (ref 0–0.7)
EOSINOPHIL # BLD AUTO: 0.06 X10(3) UL (ref 0–0.7)
EOSINOPHIL NFR BLD AUTO: 0 %
EOSINOPHIL NFR BLD AUTO: 0.1 %
EOSINOPHIL NFR BLD AUTO: 0.4 %
EOSINOPHIL NFR BLD AUTO: 0.5 %
EOSINOPHIL NFR BLD AUTO: 0.7 %
ERYTHROCYTE [DISTWIDTH] IN BLOOD BY AUTOMATED COUNT: 19.3 % (ref 11–15)
ERYTHROCYTE [DISTWIDTH] IN BLOOD BY AUTOMATED COUNT: 19.4 % (ref 11–15)
ERYTHROCYTE [DISTWIDTH] IN BLOOD BY AUTOMATED COUNT: 19.4 % (ref 11–15)
ERYTHROCYTE [DISTWIDTH] IN BLOOD BY AUTOMATED COUNT: 19.5 % (ref 11–15)
ERYTHROCYTE [DISTWIDTH] IN BLOOD BY AUTOMATED COUNT: 19.7 % (ref 11–15)
GLUCOSE BLD-MCNC: 115 MG/DL (ref 70–99)
GLUCOSE BLD-MCNC: 135 MG/DL (ref 70–99)
GLUCOSE BLD-MCNC: 137 MG/DL (ref 70–99)
GLUCOSE BLD-MCNC: 84 MG/DL (ref 70–99)
GLUCOSE BLD-MCNC: 91 MG/DL (ref 70–99)
GLUCOSE BLD-MCNC: 92 MG/DL (ref 70–99)
GLUCOSE BLD-MCNC: 94 MG/DL (ref 70–99)
GLUCOSE BLD-MCNC: 98 MG/DL (ref 70–99)
GLUCOSE BLDC GLUCOMTR-MCNC: 128 MG/DL (ref 70–99)
HCT VFR BLD AUTO: 28.1 %
HCT VFR BLD AUTO: 30.1 %
HCT VFR BLD AUTO: 30.5 %
HCT VFR BLD AUTO: 31.2 %
HCT VFR BLD AUTO: 32.1 %
HCT VFR BLD AUTO: 32.6 %
HCT VFR BLD AUTO: 34.7 %
HGB BLD-MCNC: 10 G/DL
HGB BLD-MCNC: 10.4 G/DL
HGB BLD-MCNC: 10.4 G/DL
HGB BLD-MCNC: 8.8 G/DL
HGB BLD-MCNC: 9.5 G/DL
HGB BLD-MCNC: 9.7 G/DL
HGB BLD-MCNC: 9.9 G/DL
IMM GRANULOCYTES # BLD AUTO: 0.02 X10(3) UL (ref 0–1)
IMM GRANULOCYTES # BLD AUTO: 0.03 X10(3) UL (ref 0–1)
IMM GRANULOCYTES # BLD AUTO: 0.04 X10(3) UL (ref 0–1)
IMM GRANULOCYTES # BLD AUTO: 0.05 X10(3) UL (ref 0–1)
IMM GRANULOCYTES # BLD AUTO: 0.05 X10(3) UL (ref 0–1)
IMM GRANULOCYTES # BLD AUTO: 0.15 X10(3) UL (ref 0–1)
IMM GRANULOCYTES # BLD AUTO: 0.23 X10(3) UL (ref 0–1)
IMM GRANULOCYTES NFR BLD: 0.3 %
IMM GRANULOCYTES NFR BLD: 0.4 %
IMM GRANULOCYTES NFR BLD: 0.5 %
IMM GRANULOCYTES NFR BLD: 0.6 %
IMM GRANULOCYTES NFR BLD: 0.7 %
LYMPHOCYTES # BLD AUTO: 0.2 X10(3) UL (ref 1–4)
LYMPHOCYTES # BLD AUTO: 0.3 X10(3) UL (ref 1–4)
LYMPHOCYTES # BLD AUTO: 0.33 X10(3) UL (ref 1–4)
LYMPHOCYTES # BLD AUTO: 0.44 X10(3) UL (ref 1–4)
LYMPHOCYTES # BLD AUTO: 0.51 X10(3) UL (ref 1–4)
LYMPHOCYTES # BLD AUTO: 0.57 X10(3) UL (ref 1–4)
LYMPHOCYTES # BLD AUTO: 0.62 X10(3) UL (ref 1–4)
LYMPHOCYTES NFR BLD AUTO: 0.6 %
LYMPHOCYTES NFR BLD AUTO: 1.3 %
LYMPHOCYTES NFR BLD AUTO: 2.5 %
LYMPHOCYTES NFR BLD AUTO: 3.2 %
LYMPHOCYTES NFR BLD AUTO: 6.9 %
LYMPHOCYTES NFR BLD AUTO: 6.9 %
LYMPHOCYTES NFR BLD AUTO: 8.4 %
MCH RBC QN AUTO: 27.4 PG (ref 26–34)
MCH RBC QN AUTO: 27.8 PG (ref 26–34)
MCH RBC QN AUTO: 27.8 PG (ref 26–34)
MCH RBC QN AUTO: 27.9 PG (ref 26–34)
MCH RBC QN AUTO: 28.6 PG (ref 26–34)
MCH RBC QN AUTO: 28.7 PG (ref 26–34)
MCH RBC QN AUTO: 28.9 PG (ref 26–34)
MCHC RBC AUTO-ENTMCNC: 30 G/DL (ref 31–37)
MCHC RBC AUTO-ENTMCNC: 31.1 G/DL (ref 31–37)
MCHC RBC AUTO-ENTMCNC: 31.2 G/DL (ref 31–37)
MCHC RBC AUTO-ENTMCNC: 31.3 G/DL (ref 31–37)
MCHC RBC AUTO-ENTMCNC: 31.6 G/DL (ref 31–37)
MCHC RBC AUTO-ENTMCNC: 31.9 G/DL (ref 31–37)
MCHC RBC AUTO-ENTMCNC: 32.5 G/DL (ref 31–37)
MCV RBC AUTO: 88 FL
MCV RBC AUTO: 89.2 FL
MCV RBC AUTO: 89.2 FL
MCV RBC AUTO: 89.7 FL
MCV RBC AUTO: 89.8 FL
MCV RBC AUTO: 91.2 FL
MCV RBC AUTO: 91.6 FL
MONOCYTES # BLD AUTO: 0.45 X10(3) UL (ref 0.1–1)
MONOCYTES # BLD AUTO: 0.49 X10(3) UL (ref 0.1–1)
MONOCYTES # BLD AUTO: 0.55 X10(3) UL (ref 0.1–1)
MONOCYTES # BLD AUTO: 0.67 X10(3) UL (ref 0.1–1)
MONOCYTES # BLD AUTO: 0.68 X10(3) UL (ref 0.1–1)
MONOCYTES # BLD AUTO: 1.14 X10(3) UL (ref 0.1–1)
MONOCYTES # BLD AUTO: 1.33 X10(3) UL (ref 0.1–1)
MONOCYTES NFR BLD AUTO: 4.3 %
MONOCYTES NFR BLD AUTO: 4.8 %
MONOCYTES NFR BLD AUTO: 5 %
MONOCYTES NFR BLD AUTO: 5.1 %
MONOCYTES NFR BLD AUTO: 6 %
MONOCYTES NFR BLD AUTO: 6.1 %
MONOCYTES NFR BLD AUTO: 7.5 %
NEUTROPHILS # BLD AUTO: 12.03 X10 (3) UL (ref 1.5–7.7)
NEUTROPHILS # BLD AUTO: 12.03 X10(3) UL (ref 1.5–7.7)
NEUTROPHILS # BLD AUTO: 12.47 X10 (3) UL (ref 1.5–7.7)
NEUTROPHILS # BLD AUTO: 12.47 X10(3) UL (ref 1.5–7.7)
NEUTROPHILS # BLD AUTO: 21.92 X10 (3) UL (ref 1.5–7.7)
NEUTROPHILS # BLD AUTO: 21.92 X10(3) UL (ref 1.5–7.7)
NEUTROPHILS # BLD AUTO: 29.07 X10 (3) UL (ref 1.5–7.7)
NEUTROPHILS # BLD AUTO: 29.07 X10(3) UL (ref 1.5–7.7)
NEUTROPHILS # BLD AUTO: 6.1 X10 (3) UL (ref 1.5–7.7)
NEUTROPHILS # BLD AUTO: 6.1 X10(3) UL (ref 1.5–7.7)
NEUTROPHILS # BLD AUTO: 6.34 X10 (3) UL (ref 1.5–7.7)
NEUTROPHILS # BLD AUTO: 6.34 X10(3) UL (ref 1.5–7.7)
NEUTROPHILS # BLD AUTO: 7.04 X10 (3) UL (ref 1.5–7.7)
NEUTROPHILS # BLD AUTO: 7.04 X10(3) UL (ref 1.5–7.7)
NEUTROPHILS NFR BLD AUTO: 82.9 %
NEUTROPHILS NFR BLD AUTO: 85.5 %
NEUTROPHILS NFR BLD AUTO: 85.8 %
NEUTROPHILS NFR BLD AUTO: 91.1 %
NEUTROPHILS NFR BLD AUTO: 91.8 %
NEUTROPHILS NFR BLD AUTO: 93.2 %
NEUTROPHILS NFR BLD AUTO: 94.1 %
OSMOLALITY SERPL CALC.SUM OF ELEC: 284 MOSM/KG (ref 275–295)
OSMOLALITY SERPL CALC.SUM OF ELEC: 284 MOSM/KG (ref 275–295)
OSMOLALITY SERPL CALC.SUM OF ELEC: 285 MOSM/KG (ref 275–295)
OSMOLALITY SERPL CALC.SUM OF ELEC: 287 MOSM/KG (ref 275–295)
OSMOLALITY SERPL CALC.SUM OF ELEC: 291 MOSM/KG (ref 275–295)
OSMOLALITY SERPL CALC.SUM OF ELEC: 291 MOSM/KG (ref 275–295)
OSMOLALITY SERPL CALC.SUM OF ELEC: 292 MOSM/KG (ref 275–295)
OSMOLALITY SERPL CALC.SUM OF ELEC: 292 MOSM/KG (ref 275–295)
PLATELET # BLD AUTO: 254 10(3)UL (ref 150–450)
PLATELET # BLD AUTO: 259 10(3)UL (ref 150–450)
PLATELET # BLD AUTO: 265 10(3)UL (ref 150–450)
PLATELET # BLD AUTO: 283 10(3)UL (ref 150–450)
PLATELET # BLD AUTO: 284 10(3)UL (ref 150–450)
PLATELET # BLD AUTO: 296 10(3)UL (ref 150–450)
PLATELET # BLD AUTO: 300 10(3)UL (ref 150–450)
PLATELET MORPHOLOGY: NORMAL
POTASSIUM SERPL-SCNC: 3.4 MMOL/L (ref 3.5–5.1)
POTASSIUM SERPL-SCNC: 3.7 MMOL/L (ref 3.5–5.1)
POTASSIUM SERPL-SCNC: 3.9 MMOL/L (ref 3.5–5.1)
POTASSIUM SERPL-SCNC: 4.1 MMOL/L (ref 3.5–5.1)
POTASSIUM SERPL-SCNC: 4.1 MMOL/L (ref 3.5–5.1)
POTASSIUM SERPL-SCNC: 4.3 MMOL/L (ref 3.5–5.1)
POTASSIUM SERPL-SCNC: 4.5 MMOL/L (ref 3.5–5.1)
POTASSIUM SERPL-SCNC: 4.7 MMOL/L (ref 3.5–5.1)
POTASSIUM SERPL-SCNC: 4.8 MMOL/L (ref 3.5–5.1)
RBC # BLD AUTO: 3.08 X10(6)UL
RBC # BLD AUTO: 3.42 X10(6)UL
RBC # BLD AUTO: 3.42 X10(6)UL
RBC # BLD AUTO: 3.48 X10(6)UL
RBC # BLD AUTO: 3.6 X10(6)UL
RBC # BLD AUTO: 3.63 X10(6)UL
RBC # BLD AUTO: 3.79 X10(6)UL
SODIUM SERPL-SCNC: 134 MMOL/L (ref 136–145)
SODIUM SERPL-SCNC: 135 MMOL/L (ref 136–145)
SODIUM SERPL-SCNC: 135 MMOL/L (ref 136–145)
SODIUM SERPL-SCNC: 136 MMOL/L (ref 136–145)
SODIUM SERPL-SCNC: 136 MMOL/L (ref 136–145)
SODIUM SERPL-SCNC: 138 MMOL/L (ref 136–145)
SODIUM SERPL-SCNC: 140 MMOL/L (ref 136–145)
SODIUM SERPL-SCNC: 141 MMOL/L (ref 136–145)
WBC # BLD AUTO: 13.1 X10(3) UL (ref 4–11)
WBC # BLD AUTO: 13.7 X10(3) UL (ref 4–11)
WBC # BLD AUTO: 23.5 X10(3) UL (ref 4–11)
WBC # BLD AUTO: 30.9 X10(3) UL (ref 4–11)
WBC # BLD AUTO: 7.4 X10(3) UL (ref 4–11)
WBC # BLD AUTO: 7.4 X10(3) UL (ref 4–11)
WBC # BLD AUTO: 8.2 X10(3) UL (ref 4–11)

## 2025-01-01 PROCEDURE — 99233 SBSQ HOSP IP/OBS HIGH 50: CPT | Performed by: INTERNAL MEDICINE

## 2025-01-01 PROCEDURE — 99232 SBSQ HOSP IP/OBS MODERATE 35: CPT | Performed by: INTERNAL MEDICINE

## 2025-01-01 PROCEDURE — 71045 X-RAY EXAM CHEST 1 VIEW: CPT | Performed by: HOSPITALIST

## 2025-01-01 PROCEDURE — 99231 SBSQ HOSP IP/OBS SF/LOW 25: CPT | Performed by: NURSE PRACTITIONER

## 2025-01-01 RX ORDER — ACETAZOLAMIDE 250 MG/1
250 TABLET ORAL DAILY
Status: DISCONTINUED | OUTPATIENT
Start: 2025-01-01 | End: 2025-01-01

## 2025-01-01 RX ORDER — VANCOMYCIN HYDROCHLORIDE 50 MG/ML
125 KIT ORAL DAILY
Status: DISCONTINUED | OUTPATIENT
Start: 2025-01-01 | End: 2025-01-01

## 2025-01-01 RX ORDER — FUROSEMIDE 20 MG/1
20 TABLET ORAL DAILY
Status: DISCONTINUED | OUTPATIENT
Start: 2025-01-01 | End: 2025-01-01

## 2025-01-01 RX ORDER — POTASSIUM CHLORIDE 1500 MG/1
40 TABLET, EXTENDED RELEASE ORAL ONCE
Status: COMPLETED | OUTPATIENT
Start: 2025-01-01 | End: 2025-01-01

## 2025-01-01 RX ORDER — SCOPOLAMINE 1 MG/3D
1 PATCH, EXTENDED RELEASE TRANSDERMAL
Status: DISCONTINUED | OUTPATIENT
Start: 2025-01-01 | End: 2025-01-01

## 2025-01-01 RX ORDER — MIDODRINE HYDROCHLORIDE 5 MG/1
5 TABLET ORAL 3 TIMES DAILY
Status: DISCONTINUED | OUTPATIENT
Start: 2025-01-01 | End: 2025-01-01

## 2025-01-01 RX ORDER — MORPHINE SULFATE 2 MG/ML
1 INJECTION, SOLUTION INTRAMUSCULAR; INTRAVENOUS EVERY 6 HOURS
Status: DISCONTINUED | OUTPATIENT
Start: 2025-01-01 | End: 2025-01-01

## 2025-01-01 RX ORDER — METOPROLOL TARTRATE 25 MG/1
25 TABLET, FILM COATED ORAL
Status: DISCONTINUED | OUTPATIENT
Start: 2025-01-01 | End: 2025-01-01

## 2025-01-01 RX ORDER — ACETAMINOPHEN 650 MG/1
650 SUPPOSITORY RECTAL EVERY 4 HOURS PRN
Status: DISCONTINUED | OUTPATIENT
Start: 2025-01-01 | End: 2025-01-01

## 2025-01-01 RX ORDER — ALBUTEROL SULFATE 0.83 MG/ML
SOLUTION RESPIRATORY (INHALATION)
Status: DISPENSED
Start: 2025-01-01 | End: 2025-01-01

## 2025-01-01 RX ORDER — SODIUM CHLORIDE 450 MG/100ML
INJECTION, SOLUTION INTRAVENOUS CONTINUOUS
Status: DISCONTINUED | OUTPATIENT
Start: 2025-01-01 | End: 2025-01-01

## 2025-01-01 RX ORDER — LORAZEPAM 2 MG/ML
1 CONCENTRATE ORAL EVERY 4 HOURS PRN
Status: DISCONTINUED | OUTPATIENT
Start: 2025-01-01 | End: 2025-01-01

## 2025-01-01 RX ORDER — HYDROMORPHONE HYDROCHLORIDE 1 MG/ML
1 SOLUTION ORAL EVERY 4 HOURS
Status: DISCONTINUED | OUTPATIENT
Start: 2025-01-01 | End: 2025-01-01

## 2025-01-01 RX ORDER — LORAZEPAM 2 MG/ML
1 CONCENTRATE ORAL EVERY 6 HOURS
Status: DISCONTINUED | OUTPATIENT
Start: 2025-01-01 | End: 2025-01-01

## 2025-01-01 RX ORDER — HYDROMORPHONE HYDROCHLORIDE 1 MG/ML
0.2 INJECTION, SOLUTION INTRAMUSCULAR; INTRAVENOUS; SUBCUTANEOUS EVERY 2 HOUR PRN
Status: DISCONTINUED | OUTPATIENT
Start: 2025-01-01 | End: 2025-01-01

## 2025-01-01 RX ORDER — SODIUM CHLORIDE 0.9 % (FLUSH) 0.9 %
10 SYRINGE (ML) INJECTION AS NEEDED
Status: DISCONTINUED | OUTPATIENT
Start: 2025-01-01 | End: 2025-01-01

## 2025-01-01 RX ORDER — ALBUTEROL SULFATE 0.83 MG/ML
2.5 SOLUTION RESPIRATORY (INHALATION) 2 TIMES DAILY
Status: DISCONTINUED | OUTPATIENT
Start: 2025-01-01 | End: 2025-01-01

## 2025-01-01 RX ORDER — ACETAMINOPHEN 160 MG/5ML
650 SOLUTION ORAL EVERY 4 HOURS PRN
Status: DISCONTINUED | OUTPATIENT
Start: 2025-01-01 | End: 2025-01-01

## 2025-01-01 RX ORDER — HYDROMORPHONE HYDROCHLORIDE 1 MG/ML
0.3 INJECTION, SOLUTION INTRAMUSCULAR; INTRAVENOUS; SUBCUTANEOUS EVERY 6 HOURS
Status: DISCONTINUED | OUTPATIENT
Start: 2025-01-01 | End: 2025-01-01

## 2025-01-01 RX ORDER — HYDROMORPHONE HYDROCHLORIDE 1 MG/ML
1 SOLUTION ORAL EVERY 2 HOUR PRN
Status: DISCONTINUED | OUTPATIENT
Start: 2025-01-01 | End: 2025-01-01

## 2025-01-01 NOTE — PLAN OF CARE
Patient nonverbal. Tele. Call light within reach. Frequent rounding by staff.  Problem: Patient Centered Care  Goal: Patient preferences are identified and integrated in the patient's plan of care  Description: Interventions:  - What would you like us to know as we care for you?   - Provide timely, complete, and accurate information to patient/family  - Incorporate patient and family knowledge, values, beliefs, and cultural backgrounds into the planning and delivery of care  - Encourage patient/family to participate in care and decision-making at the level they choose  - Honor patient and family perspectives and choices  Outcome: Progressing     Problem: Patient/Family Goals  Goal: Patient/Family Long Term Goal  Description: Patient's Long Term Goal: discharge    Interventions:  - - Monitor vitals  - Monitor appropriate labs  - Pain management  - Follow MD order  - Diagnostics per order  - Update/Informing patient and family on plan of care  - Discharge planning  - See additional Care Plan goals for specific interventions  Outcome: Progressing  Goal: Patient/Family Short Term Goal  Description: Patient's Short Term Goal: feel better    Interventions:   - - Monitor vitals  - Monitor appropriate labs  - Pain management  - Follow MD order  - Diagnostics per order  - Update/Informing patient and family on plan of care  - Discharge planning  - See additional Care Plan goals for specific interventions  Outcome: Progressing     Problem: Safety Risk - Non-Violent Restraints  Goal: Patient will remain free from self-harm  Description: INTERVENTIONS:  - Apply the least restrictive restraint to prevent harm  - Notify patient and family of reasons restraints applied  - Assess for any contributing factors to confusion (electrolyte disturbances, delirium, medications)  - Discontinue any unnecessary medical devices as soon as possible  - Assess the patient's physical comfort, circulation, skin condition, hydration, nutrition and  elimination needs   - Reorient and redirection as needed  - Assess for the need to continue restraints  Outcome: Progressing     Problem: PAIN - ADULT  Goal: Verbalizes/displays adequate comfort level or patient's stated pain goal  Description: INTERVENTIONS:  - Encourage pt to monitor pain and request assistance  - Assess pain using appropriate pain scale  - Administer analgesics based on type and severity of pain and evaluate response  - Implement non-pharmacological measures as appropriate and evaluate response  - Consider cultural and social influences on pain and pain management  - Manage/alleviate anxiety  - Utilize distraction and/or relaxation techniques  - Monitor for opioid side effects  - Notify MD/LIP if interventions unsuccessful or patient reports new pain  - Anticipate increased pain with activity and pre-medicate as appropriate  Outcome: Progressing     Problem: RISK FOR INFECTION - ADULT  Goal: Absence of fever/infection during anticipated neutropenic period  Description: INTERVENTIONS  - Monitor WBC  - Administer growth factors as ordered  - Implement neutropenic guidelines  Outcome: Progressing     Problem: SAFETY ADULT - FALL  Goal: Free from fall injury  Description: INTERVENTIONS:  - Assess pt frequently for physical needs  - Identify cognitive and physical deficits and behaviors that affect risk of falls.  - [place] fall precautions as indicated by assessment.  - Educate pt/family on patient safety including physical limitations  - Instruct pt to call for assistance with activity based on assessment  - Modify environment to reduce risk of injury  - Provide assistive devices as appropriate  - Consider OT/PT consult to assist with strengthening/mobility  - Encourage toileting schedule  Outcome: Progressing     Problem: DISCHARGE PLANNING  Goal: Discharge to home or other facility with appropriate resources  Description: INTERVENTIONS:  - Identify barriers to discharge w/pt and caregiver  -  Include patient/family/discharge partner in discharge planning  - Arrange for needed discharge resources and transportation as appropriate  - Identify discharge learning needs (meds, wound care, etc)  - Arrange for interpreters to assist at discharge as needed  - Consider post-discharge preferences of patient/family/discharge partner  - Complete POLST form as appropriate  - Assess patient's ability to be responsible for managing their own health  - Refer to Case Management Department for coordinating discharge planning if the patient needs post-hospital services based on physician/LIP order or complex needs related to functional status, cognitive ability or social support system  Outcome: Progressing     Problem: Altered Communication/Language Barrier  Goal: Patient/Family is able to understand and participate in their care  Description: Interventions:  - Assess communication ability and preferred communication style  - Implement communication aides and strategies  - Use visual cues when possible  - Listen attentively, be patient, do not interrupt  - Minimize distractions  - Allow time for understanding and response  - Establish method for patient to ask for assistance (call light)  - Provide an  as needed  - Communicate barriers and strategies to overcome with those who interact with patient  Outcome: Progressing     Problem: RESPIRATORY - ADULT  Goal: Achieves optimal ventilation and oxygenation  Description: INTERVENTIONS:  - Assess for changes in respiratory status  - Assess for changes in mentation and behavior  - Position to facilitate oxygenation and minimize respiratory effort  - Oxygen supplementation based on oxygen saturation or ABGs  - Provide Smoking Cessation handout, if applicable  - Encourage broncho-pulmonary hygiene including cough, deep breathe, Incentive Spirometry  - Assess the need for suctioning and perform as needed  - Assess and instruct to report SOB or any respiratory  difficulty  - Respiratory Therapy support as indicated  - Manage/alleviate anxiety  - Monitor for signs/symptoms of CO2 retention  Outcome: Progressing     Problem: NEUROLOGICAL - ADULT  Goal: Achieves stable or improved neurological status  Description: INTERVENTIONS  - Assess for and report changes in neurological status  - Initiate measures to prevent increased intracranial pressure  - Maintain blood pressure and fluid volume within ordered parameters to optimize cerebral perfusion and minimize risk of hemorrhage  - Monitor temperature, glucose, and sodium. Initiate appropriate interventions as ordered  Outcome: Progressing  Goal: Absence of seizures  Description: INTERVENTIONS  - Monitor for seizure activity  - Administer anti-seizure medications as ordered  - Monitor neurological status  Outcome: Progressing  Goal: Remains free of injury related to seizure activity  Description: INTERVENTIONS:  - Maintain airway, patient safety  and administer oxygen as ordered  - Monitor patient for seizure activity, document and report duration and description of seizure to MD/LIP  - If seizure occurs, turn patient to side and suction secretions as needed  - Reorient patient post seizure  - Seizure pads on all 4 side rails  - Instruct patient/family to notify RN of any seizure activity  - Instruct patient/family to call for assistance with activity based on assessment  Outcome: Progressing  Goal: Achieves maximal functionality and self care  Description: INTERVENTIONS  - Monitor swallowing and airway patency with patient fatigue and changes in neurological status  - Encourage and assist patient to increase activity and self care with guidance from PT/OT  - Encourage visually impaired, hearing impaired and aphasic patients to use assistive/communication devices  Outcome: Progressing     Problem: Diabetes/Glucose Control  Goal: Glucose maintained within prescribed range  Description: INTERVENTIONS:  - Monitor Blood Glucose as  ordered  - Assess for signs and symptoms of hyperglycemia and hypoglycemia  - Administer ordered medications to maintain glucose within target range  - Assess barriers to adequate nutritional intake and initiate nutrition consult as needed  - Instruct patient on self management of diabetes  Outcome: Progressing     Problem: Delirium  Goal: Minimize duration of delirium  Description: Interventions:  - Encourage use of hearing aids, eye glasses  - Promote highest level of mobility daily  - Provide frequent reorientation  - Promote wakefulness i.e. lights on, blinds open  - Promote sleep, encourage patient's normal rest cycle i.e. lights off, TV off, minimize noise and interruptions  - Encourage family to assist in orientation and promotion of home routines  Outcome: Progressing

## 2025-01-01 NOTE — RESPIRATORY THERAPY NOTE
RN called for deep suction . This Patient was nasal suction, patient has large ,white and thick secretions.

## 2025-01-01 NOTE — PROGRESS NOTES
Progress Note  Yin Butcher Patient Status:  Inpatient    1931 MRN [id number]   Location [facility]5W Attending Vika Elmore MD   Hosp Day # 20 PCP Mina Walker MD     Subjective   Nonverbal. Not in acute distress.       Objective:   BP (!) 143/93 (BP Location: Right arm)   Pulse 101   Temp 97.6 °F (36.4 °C) (Axillary)   Resp 18   Ht 5' 7.01\" (1.702 m)   Wt 121 lb 0.5 oz (54.9 kg)   SpO2 93%   BMI 18.95 kg/m²     Telemetry: afib; rates improved 80's to 90's     Intake/Output:    Intake/Output Summary (Last 24 hours) at 2025 06  Last data filed at 2024  Gross per 24 hour   Intake 1013 ml   Output --   Net 1013 ml       Wt Readings from Last 3 Encounters:   24 121 lb 0.5 oz (54.9 kg)   24 125 lb (56.7 kg)   10/29/23 104 lb 11.5 oz (47.5 kg)         Labs:  Recent Labs   Lab 24  0439 24  0622   * 96 115*   BUN 20 19 21   CREATSERUM 0.34* 0.35* 0.37*   EGFRCR 96 95 94   CA 9.5 9.8 9.6   * 134* 135*   K 4.9 5.3* 4.5   CL 96* 94* 92*   CO2 36.0* 36.0* >40.0*     Recent Labs   Lab 24  0439 24  0622   RBC 3.36* 3.31* 3.20*   HGB 9.5* 9.6* 8.9*   HCT 30.4* 30.9* 28.7*   MCV 90.5 93.4 89.7   MCH 28.3 29.0 27.8   MCHC 31.3 31.1 31.0   RDW 19.9* 19.7* 19.6*   NEPRELIM 9.49* 6.83 7.60   WBC 10.5 8.0 8.9   .0 217.0 255.0         No results for input(s): \"TROP\", \"TROPHS\", \"CK\" in the last 168 hours.  @pro-BNP@       Review of Systems:     Patient non verbal. NAD.    Exam:     Physical Exam:  General: emaciated, non-verbal    HEENT: Normocephalic, neck supple, no thyromegaly or adenopathy.  Neck: No JVD, carotids 2+, no bruits.  Cardiac: Irregular rhythm/ rate controlled. S1, S2 normal. No murmur, pericardial rub, S3, or extra cardiac sounds.  Lungs: Diminished anteriorly.  Normal excursions and effort.  Abdomen: Soft, non-tender. BS-present.  Extremities: Without clubbing or cyanosis. No edema.     Skin: Warm and dry.     Medications:     acetaZOLAMIDE  250 mg Oral Daily    furosemide  20 mg Oral Daily    famotidine  20 mg Per NG Tube Daily    metoprolol tartrate  25 mg Oral 2x Daily(Beta Blocker)    multivitamin  1 tablet Per G Tube Daily    midodrine  10 mg Per NG Tube TID    digoxin  125 mcg Oral Daily    apixaban  2.5 mg Oral BID    senna  10 mL Per NG Tube BID    docusate  100 mg Per NG Tube BID      dextrose 10%         Diagnostic Studies:     No results found.      CARD ECHO 2D DOPPLER   Result Date: 12/12/2024  Conclusions:     1. Left ventricle: The cavity size was below normal. Wall thickness was      increased. Systolic function was hyperdynamic. The estimated ejection      fraction was 70-75%, by visual assessment. No diagnostic evidence for      regional wall motion abnormalities. Unable to assess LV diastolic      function due to heart rhythm.   2. Right ventricle: Systolic function was mildly reduced. The tricuspid      annular plane systolic excursion is 1.26cm. The RV s' is 9.2cm/sec.   3. Left atrium: The left atrial volume was markedly increased.   4. Right atrium: The atrium was markedly dilated.   5. Aortic valve: The peak systolic velocity was 1.54m/sec. The mean systolic      gradient was 5mm Hg. The valve area (VTI) was 1.58cm^2. The valve area      (VTI) index was 1.09cm^2/m^2.   6. Mitral valve: The annulus was markedly calcified. The leaflets were      mildly calcified. Cannot exclude a vegetation. There was mild      regurgitation. The mean diastolic gradient was 4mm Hg at a heart rate of      120 bpm.   7. Pulmonary arteries: Systolic pressure was moderately increased, in the      range of 45mm Hg to 50mm Hg.   8. Pericardium, extracardiac: There were bilateral pleural effusions      present.   Impressions:  This study is compared with previous dated 1/21/24:     Assessment and Plan:       Assessment:  # Acute hypoxic/hypercapnic respiratory failure, resolving   S/p prolonged  intubation   On room air   # Longstanding Persistent AF  Persistent mild atrial tachycardia - improved with BB   Continue digoxin and metoprolol   On Eliquis 2.5 mg bid (adjusted for age and weight)  # Pulmonary HTN   TTE 12/12/24 with elevated R sided filling pressure (45-50mmHg)   LVEF hyperdynamic 70-75%   Low dose furosemide   # hypotension - on midodrine 10 mg tid   # H/o PE - on eliquis   # Dementia - per primary   # Palliative care following     Plan:  Rates better controlled in 80's to 90's > continue dig and BB   Continue eliquis for AC   Bps trending up, reasonable to down titrate midodrine as BP allows      Plan of care discussed with patient, RN.      Lula Ruiz, APRN  1/1/2025  6:13 AM  Ph [phone] ([place])  Ph [phone] ([place])

## 2025-01-01 NOTE — PROGRESS NOTES
[facility]  part of [facility]     Progress Note    Yin Butcher Patient Status:  Inpatient    1931 MRN [id number]   Location [facility] 2W/SW Attending Vika Elmore MD   Hosp Day # 20 PCP Mina Walker MD       Subjective:   Patient seen and examined.  Resting in bed.  Significant hypoxia.  Apparently clear throat  Objective:   Blood pressure 144/81, pulse 82, temperature 97.4 °F (36.3 °C), temperature source Axillary, resp. rate 22, height 5' 7.01\" (1.702 m), weight 121 lb 0.5 oz (54.9 kg), SpO2 93%.  Intake/Output:   Last 3 shifts: I/O last 3 completed shifts:  In:  [I.V.:10; NG/GT:]  Out: 0    This shift: No intake/output data recorded.     Vent Settings:      Hemodynamic parameters (last 24 hours):      Scheduled Meds:   Current Facility-Administered Medications   Medication Dose Route Frequency    midodrine (ProAmatine) tab 7.5 mg  7.5 mg Per NG Tube TID    acetaZOLAMIDE (Diamox) tab 250 mg  250 mg Oral Daily    furosemide (Lasix) tab 20 mg  20 mg Oral Daily    famotidine (Pepcid) tab 20 mg  20 mg Per NG Tube Daily    metoprolol tartrate (Lopressor) tab 25 mg  25 mg Oral 2x Daily(Beta Blocker)    glycopyrrolate (Robinul) 0.2 MG/ML injection 0.1 mg  0.1 mg Intravenous Q6H PRN    multivitamin (Tab-A-Jenny/Beta Carotene) tab 1 tablet  1 tablet Per G Tube Daily    ipratropium-albuterol (Duoneb) 0.5-2.5 (3) MG/3ML inhalation solution 3 mL  3 mL Nebulization Q6H PRN    digoxin (Lanoxin) tab 125 mcg  125 mcg Oral Daily    acetaminophen (Ofirmev) 10 mg/mL infusion premix 650 mg  15 mg/kg Intravenous Q6H PRN    dextrose 10% infusion (TPN no rate)   Intravenous Continuous PRN    pancrelipase (Lip-Prot-Amyl) (Zenpep) DR particles cap 10,000 Units  10,000 Units Per G Tube PRN    And    sodium bicarbonate tab 325 mg  325 mg Per G Tube PRN    metoprolol (Lopressor) 5 mg/5mL injection 5 mg  5 mg Intravenous Q4H PRN    apixaban (Eliquis) tab 2.5 mg  2.5 mg Oral BID     albuterol (Ventolin) (5 MG/ML) 0.5% nebulizer solution 5 mg  5 mg Nebulization Q4H PRN    acetaminophen (Tylenol) 160 MG/5ML oral liquid 650 mg  650 mg Per NG Tube Q4H PRN    Or    acetaminophen (Tylenol) rectal suppository 650 mg  650 mg Rectal Q4H PRN    senna (Senokot) 8.8 MG/5ML oral syrup 17.6 mg  10 mL Per NG Tube BID    docusate (Colace) 50 MG/5ML oral solution 100 mg  100 mg Per NG Tube BID    polyethylene glycol (PEG 3350) (Miralax) 17 g oral packet 17 g  17 g Per NG Tube Daily PRN    bisacodyl (Dulcolax) 10 MG rectal suppository 10 mg  10 mg Rectal Daily PRN    ondansetron (Zofran) 4 MG/2ML injection 4 mg  4 mg Intravenous Q6H PRN    metoclopramide (Reglan) 5 mg/mL injection 10 mg  10 mg Intravenous Q8H PRN       Continuous Infusions:    dextrose 10%         Physical Exam  Constitutional: Arousable  Eyes: PERRL  ENT: nares pateint  Neck: supple, no JVD  Cardio: RRR, S1 S2  Respiratory: Diminished bibasilar breath sounds with crackles present  GI: abdomen soft, non tender, active bowel sounds, no organomegaly + PEG tube in place  Extremities: no clubbing, cyanosis, edema  Neurologic: Does not follow commands  Skin: warm, dry      Results:     Lab Results   Component Value Date    WBC 7.4 01/01/2025    HGB 9.9 01/01/2025    HCT 30.5 01/01/2025    .0 01/01/2025    CREATSERUM 0.43 01/01/2025    BUN 18 01/01/2025     01/01/2025    K 3.9 01/01/2025    CL 96 01/01/2025    CO2 35.0 01/01/2025     01/01/2025    CA 9.6 01/01/2025         No results found.          Assessment   1.  Fevers  2.  Acute encephalopathy  3.  Acute on chronic hypercapnic hypoxemic respiratory failure  4.  Leukocytosis  5.  HFpEF  6.  History of atrial fibrillation  7.  Prior history of pulmonary embolism  9.  Dementia  10.  Dysphagia     Plan   -Patient presents with chief complaint of fevers ongoing altered mental status over the last several days.  Recently discharged on 11/24/2024 from [facility] after  hospitalization for presumed aspiration pneumonia.  -Worsening hypercapnic respiratory failure on NIV.  Patient intubated on 12/12/2024.  Extubated on 12/27/2024 after discussion with goals of care with palliative care team.  Initially required NIV for 2 days after extubation but now weaned off all oxygen  -CT chest on 12/11/2024 with lower lobe mucous plugging concerning for aspiration with small pleural effusions.  -Completed course of antibiotic therapy  -Chest physiotherapy  -Bronchial hygiene.  Frequent suctioning  -DVT prophylaxis: Eliquis  -Patient DNR/DNI moving forward  -Plan to return to [facility] for discharge from pulmonary perspective.  Will sign off.        Dayo Espino,   Pulmonary Critical Care Medicine  [facility]

## 2025-01-01 NOTE — PLAN OF CARE
Problem: Patient Centered Care  Goal: Patient preferences are identified and integrated in the patient's plan of care  Description: Interventions:  - What would you like us to know as we care for you?   - Provide timely, complete, and accurate information to patient/family  - Incorporate patient and family knowledge, values, beliefs, and cultural backgrounds into the planning and delivery of care  - Encourage patient/family to participate in care and decision-making at the level they choose  - Honor patient and family perspectives and choices  Outcome: Progressing     Problem: Safety Risk - Non-Violent Restraints  Goal: Patient will remain free from self-harm  Description: INTERVENTIONS:  - Apply the least restrictive restraint to prevent harm  - Notify patient and family of reasons restraints applied  - Assess for any contributing factors to confusion (electrolyte disturbances, delirium, medications)  - Discontinue any unnecessary medical devices as soon as possible  - Assess the patient's physical comfort, circulation, skin condition, hydration, nutrition and elimination needs   - Reorient and redirection as needed  - Assess for the need to continue restraints  Outcome: Progressing     Problem: PAIN - ADULT  Goal: Verbalizes/displays adequate comfort level or patient's stated pain goal  Description: INTERVENTIONS:  - Encourage pt to monitor pain and request assistance  - Assess pain using appropriate pain scale  - Administer analgesics based on type and severity of pain and evaluate response  - Implement non-pharmacological measures as appropriate and evaluate response  - Consider cultural and social influences on pain and pain management  - Manage/alleviate anxiety  - Utilize distraction and/or relaxation techniques  - Monitor for opioid side effects  - Notify MD/LIP if interventions unsuccessful or patient reports new pain  - Anticipate increased pain with activity and pre-medicate as appropriate  Outcome:  Progressing     Problem: RISK FOR INFECTION - ADULT  Goal: Absence of fever/infection during anticipated neutropenic period  Description: INTERVENTIONS  - Monitor WBC  - Administer growth factors as ordered  - Implement neutropenic guidelines  Outcome: Progressing     Problem: SAFETY ADULT - FALL  Goal: Free from fall injury  Description: INTERVENTIONS:  - Assess pt frequently for physical needs  - Identify cognitive and physical deficits and behaviors that affect risk of falls.  - [place] fall precautions as indicated by assessment.  - Educate pt/family on patient safety including physical limitations  - Instruct pt to call for assistance with activity based on assessment  - Modify environment to reduce risk of injury  - Provide assistive devices as appropriate  - Consider OT/PT consult to assist with strengthening/mobility  - Encourage toileting schedule  Outcome: Progressing     Problem: Altered Communication/Language Barrier  Goal: Patient/Family is able to understand and participate in their care  Description: Interventions:  - Assess communication ability and preferred communication style  - Implement communication aides and strategies  - Use visual cues when possible  - Listen attentively, be patient, do not interrupt  - Minimize distractions  - Allow time for understanding and response  - Establish method for patient to ask for assistance (call light)  - Provide an  as needed  - Communicate barriers and strategies to overcome with those who interact with patient  Outcome: Progressing     Problem: RESPIRATORY - ADULT  Goal: Achieves optimal ventilation and oxygenation  Description: INTERVENTIONS:  - Assess for changes in respiratory status  - Assess for changes in mentation and behavior  - Position to facilitate oxygenation and minimize respiratory effort  - Oxygen supplementation based on oxygen saturation or ABGs  - Provide Smoking Cessation handout, if applicable  - Encourage broncho-pulmonary  hygiene including cough, deep breathe, Incentive Spirometry  - Assess the need for suctioning and perform as needed  - Assess and instruct to report SOB or any respiratory difficulty  - Respiratory Therapy support as indicated  - Manage/alleviate anxiety  - Monitor for signs/symptoms of CO2 retention  Outcome: Progressing     Problem: NEUROLOGICAL - ADULT  Goal: Achieves stable or improved neurological status  Description: INTERVENTIONS  - Assess for and report changes in neurological status  - Initiate measures to prevent increased intracranial pressure  - Maintain blood pressure and fluid volume within ordered parameters to optimize cerebral perfusion and minimize risk of hemorrhage  - Monitor temperature, glucose, and sodium. Initiate appropriate interventions as ordered  Outcome: Progressing  Goal: Absence of seizures  Description: INTERVENTIONS  - Monitor for seizure activity  - Administer anti-seizure medications as ordered  - Monitor neurological status  Outcome: Progressing  Goal: Remains free of injury related to seizure activity  Description: INTERVENTIONS:  - Maintain airway, patient safety  and administer oxygen as ordered  - Monitor patient for seizure activity, document and report duration and description of seizure to MD/LIP  - If seizure occurs, turn patient to side and suction secretions as needed  - Reorient patient post seizure  - Seizure pads on all 4 side rails  - Instruct patient/family to notify RN of any seizure activity  - Instruct patient/family to call for assistance with activity based on assessment  Outcome: Progressing  Goal: Achieves maximal functionality and self care  Description: INTERVENTIONS  - Monitor swallowing and airway patency with patient fatigue and changes in neurological status  - Encourage and assist patient to increase activity and self care with guidance from PT/OT  - Encourage visually impaired, hearing impaired and aphasic patients to use assistive/communication  devices  Outcome: Progressing

## 2025-01-01 NOTE — PROGRESS NOTES
[facility]  part of [facility]    Progress Note    Yin Butcher Patient Status:  Inpatient    1931 MRN [id number]   Location [facility]5W Attending Vika Elmore MD   Hosp Day # 20 PCP Mina Walker MD     SUBJECTIVE:  Pt seen and examined at bedside.   Remains on room air  No new event overnight      /81 (BP Location: Right arm)   Pulse 82   Temp 97.4 °F (36.3 °C) (Axillary)   Resp 22   Ht 5' 7.01\" (1.702 m)   Wt 121 lb 0.5 oz (54.9 kg)   SpO2 93%   BMI 18.95 kg/m²     Physical Examination:    Gen: Awake, Appears comfortable.  HEENT: PERRLA  Lungs: CTA B/L  Heart: Normal S1 S2, No M/G/R   Abd: Abdomen soft, nontender, nondistended, no organomegaly, bowel sounds present,, G-tube in place.  Ext: No edema, no calf tenderness    Labs:   Lab Results   Component Value Date    WBC 7.4 2025    HGB 9.9 2025    HCT 30.5 2025    .0 2025    CREATSERUM 0.43 2025    BUN 18 2025     2025    K 3.9 2025    CL 96 2025    CO2 35.0 2025     2025    CA 9.6 2025       No results for input(s): \"PGLU\" in the last 168 hours.  No results for input(s): \"INR\" in the last 168 hours.    Imaging:  Imaging reviewed in Epic.    Meds:   Current Facility-Administered Medications   Medication Dose Route Frequency    midodrine (ProAmatine) tab 7.5 mg  7.5 mg Per NG Tube TID    acetaZOLAMIDE (Diamox) tab 250 mg  250 mg Oral Daily    furosemide (Lasix) tab 20 mg  20 mg Oral Daily    famotidine (Pepcid) tab 20 mg  20 mg Per NG Tube Daily    metoprolol tartrate (Lopressor) tab 25 mg  25 mg Oral 2x Daily(Beta Blocker)    glycopyrrolate (Robinul) 0.2 MG/ML injection 0.1 mg  0.1 mg Intravenous Q6H PRN    multivitamin (Tab-A-Jenny/Beta Carotene) tab 1 tablet  1 tablet Per G Tube Daily    ipratropium-albuterol (Duoneb) 0.5-2.5 (3) MG/3ML inhalation solution 3 mL  3 mL Nebulization Q6H PRN    digoxin (Lanoxin) tab  125 mcg  125 mcg Oral Daily    acetaminophen (Ofirmev) 10 mg/mL infusion premix 650 mg  15 mg/kg Intravenous Q6H PRN    dextrose 10% infusion (TPN no rate)   Intravenous Continuous PRN    pancrelipase (Lip-Prot-Amyl) (Zenpep) DR particles cap 10,000 Units  10,000 Units Per G Tube PRN    And    sodium bicarbonate tab 325 mg  325 mg Per G Tube PRN    metoprolol (Lopressor) 5 mg/5mL injection 5 mg  5 mg Intravenous Q4H PRN    apixaban (Eliquis) tab 2.5 mg  2.5 mg Oral BID    albuterol (Ventolin) (5 MG/ML) 0.5% nebulizer solution 5 mg  5 mg Nebulization Q4H PRN    acetaminophen (Tylenol) 160 MG/5ML oral liquid 650 mg  650 mg Per NG Tube Q4H PRN    Or    acetaminophen (Tylenol) rectal suppository 650 mg  650 mg Rectal Q4H PRN    senna (Senokot) 8.8 MG/5ML oral syrup 17.6 mg  10 mL Per NG Tube BID    docusate (Colace) 50 MG/5ML oral solution 100 mg  100 mg Per NG Tube BID    polyethylene glycol (PEG 3350) (Miralax) 17 g oral packet 17 g  17 g Per NG Tube Daily PRN    bisacodyl (Dulcolax) 10 MG rectal suppository 10 mg  10 mg Rectal Daily PRN    ondansetron (Zofran) 4 MG/2ML injection 4 mg  4 mg Intravenous Q6H PRN    metoclopramide (Reglan) 5 mg/mL injection 10 mg  10 mg Intravenous Q8H PRN       Assessment:   *Acute respiratory failure with hypercapnia/Aspiration pneumonia  CXR and CT chest reviewed   Intubated/sedated 12/12   Extubated 12/27 - was on BIPAP, now tolerating  NC   S/P 10 day course of antibiotics   Cont nebs   Leukocytosis improving   Pulmonology following   Palliative following   Guarded prognosis      *HFpEF  Cont lasix  Cardiology following      *Atrial fibrillation with rapid ventricular response (HCC)  Rates improved   Cont Eliquis and digoxin  Cardiology following      *Dysphagia   Cont Tube feeds, at goal  Aspiration precautions      *Hypotension  Cont midodrine   Cardiology following         DVT prophylaxis: Eliquis  Code status: DNR/DNI      Dispo: Pt extubated 12/27/24, was on cont BIPAP, on on  NC. Palliative following with multiple family meetings. Prognosis remains guarded.   Patient completed course of antibiotic therapy.  Now on room air.  Tolerating tube feeding.  Medically stable for discharge once accepting facility finalized.      Vika Elmore MD   1/1/2025  11:29 AM

## 2025-01-02 ENCOUNTER — CASE MANAGEMENT (OUTPATIENT)
Dept: CARE COORDINATION | Age: OVER 89
End: 2025-01-02

## 2025-01-02 NOTE — PHYSICAL THERAPY NOTE
PHYSICAL THERAPY EVALUATION - INPATIENT     Room Number: 524/524-A  Evaluation Date: 1/2/2025  Type of Evaluation: Initial   Physician Order: PT Eval and Treat    Presenting Problem: sepsis, fever, respiratory failure, unresponsive, intubated 12/12/24 and extubated 12/27/24  Co-Morbidities : atrial fibrillation, pulmonary embolism presented to ER with fever, lethargy  Reason for Therapy: Mobility Dysfunction and Discharge Planning    PHYSICAL THERAPY ASSESSMENT   Patient is a 89 year old female admitted 12/11/2024 for sepsis, fever, respiratory failure, unresponsive, intubated 12/12/24 and extubated 12/27/24. Prior to admission, patient's baseline is from [facility] - assist with all mobility and ADLs. Per PT note from [facility] on 11/20/24, patient was Mod A x 2 for stand pivot transfer. Patient is currently functioning below baseline with bed mobility, transfers, gait, maintaining seated position, and standing prolonged periods.  Patient is requiring dependent and assist x 2  as a result of the following impairments: decreased functional strength, decreased endurance/aerobic capacity, pain, impaired coordination, impaired motor planning, decreased muscular endurance, cognitive deficits (impaired command following), medical status, and limited BUE/BLE ROM.  Physical Therapy will continue to follow for duration of hospitalization.    Patient will benefit from continued skilled PT Services while hospitalized on a trial basis pending progress and medical POC. Patient reporting \"all over\" pain, actively resisting PROM. Patient with guarded rehab potential due to limited ability to actively participate in therapy services. Per EMR, patient's family would prefer for patient to return to a gradual rehab setting - will defer to Social Work/Case Management Team.     PLAN DURING HOSPITALIZATION  Nursing Mobility Recommendation : Lift Equipment  PT Device Recommendation: Mechanical lift  PT Treatment Plan: Bed  mobility;Body mechanics;Endurance;Energy conservation;Strengthening;Balance training;Transfer training;Range of motion  Rehab Potential : Guarded  Frequency (Obs): 3x/week     PHYSICAL THERAPY MEDICAL/SOCIAL HISTORY     Problem List  Principal Problem:    Sepsis, due to unspecified organism, unspecified whether acute organ dysfunction present (HCC)  Active Problems:    Acute respiratory failure with hypercapnia (HCC)    Atrial fibrillation with rapid ventricular response (HCC)    Acute on chronic respiratory failure with hypoxia and hypercapnia (HCC)    Palliative care encounter    Counseling regarding advance care planning and goals of care    HOME SITUATION  Type of Home: Skilled nursing facility (admitted from [facility]; typically at home with daughter)  Home Layout: One level  Lives With: Staff 24 hours      SUBJECTIVE  \"Yes\"     PHYSICAL THERAPY EXAMINATION   OBJECTIVE  Precautions: Bed/chair alarm;Limb alert - left  Fall Risk: High fall risk    PAIN ASSESSMENT  Rating: Unable to rate  Location: \"all over\"  Management Techniques: Activity promotion;Body mechanics;Repositioning    COGNITION  Overall Cognitive Status:  Impaired  Following Commands:  follows one-step commands inconsistently    RANGE OF MOTION AND STRENGTH ASSESSMENT  Lower extremity ROM is impaired due to generalized stiffness and patient actively resisting movement  Lower extremity strength is impaired; unable to formally assess due to impaired command following and patient actively resisting PROM    BALANCE  Static Sitting: Not tested  Dynamic Sitting: Not tested  Static Standing: Not tested  Dynamic Standing: Not tested    ACTIVITY TOLERANCE  Pulse: 99  Heart Rate Source: Monitor     BP: 127/89  BP Location: Right arm  BP Method: Automatic  Patient Position: Lying    O2 WALK  Oxygen Therapy  SPO2% on Room Air at Rest: 94    AM-PAC '6-Clicks' INPATIENT SHORT FORM - BASIC MOBILITY  How much difficulty does the patient currently have...  Patient  Difficulty: Turning over in bed (including adjusting bedclothes, sheets and blankets)?: Unable   Patient Difficulty: Sitting down on and standing up from a chair with arms (e.g., wheelchair, bedside commode, etc.): Unable   Patient Difficulty: Moving from lying on back to sitting on the side of the bed?: Unable   How much help from another person does the patient currently need...   Help from Another: Moving to and from a bed to a chair (including a wheelchair)?: Total   Help from Another: Need to walk in hospital room?: Total   Help from Another: Climbing 3-5 steps with a railing?: Total     AM-PAC Score:  Raw Score: 6   Approx Degree of Impairment: 100%   Standardized Score (AM-PAC Scale): 23.55   CMS Modifier (G-Code): CN    FUNCTIONAL ABILITY STATUS  Functional Mobility/Gait Assessment  Gait Assistance: Not tested  Rolling: dependent x 2 for re-positioning and boosting towards HOB. Re-positioned with pillows in order to promote pressure relief/off-loading.     Exercise/Education Provided:  Bed mobility  Posture    Skilled Therapy Provided: Patient in bed upon arrival. RN approved activity. Educated patient on POC and benefits of mobilization. Agreeable to participate. Patient reporting \"all over\" pain, not quantified per the pain scale. Patient minimally verbal throughout session, only stating \"all over\" when asked about pain, and stating \"yes\" once when asked if she wanted blankets. Patient noted to be actively resisting PROM at times. Further activity deferred due to increased level of assistance required for rolling and re-postioning in bed.    The patient's Approx Degree of Impairment: 100% has been calculated based on documentation in the [facility] '6 clicks' Inpatient Basic Mobility Short Form.  Research supports that patients with this level of impairment may benefit from LTAC.  Final disposition will be made by interdisciplinary medical team.    Patient End of Session: In bed;Needs met;Call light within  reach;RN aware of session/findings;All patient questions and concerns addressed;Hospital anti-slip socks;Alarm set    CURRENT GOALS  Goals to be met by: 1/27/25  Patient Goal Patient's self-stated goal is: none stated   Goal #1 Patient is able to demonstrate supine - sit EOB @ level: maximum assistance     Goal #1   Current Status    Goal #2 Patient is able to demonstrate transfers EOB to/from Chair/Wheelchair at assistance level: moderate assistance with 2 person     Goal #2  Current Status    Goal #3 Patient will tolerate static sitting EOB for 10 minutes with BUE support and Min A in order to prepare for future out of bed activities.    Goal #3   Current Status    Goal #5 Patient to demonstrate independence with home activity/exercise instructions provided to patient in preparation for discharge.   Goal #5   Current Status      Patient Evaluation Complexity Level:  History Moderate - 1 or 2 personal factors and/or co-morbidities   Examination of body systems Moderate - addressing a total of 3 or more elements   Clinical Presentation  Moderate - Evolving   Clinical Decision Making  Moderate Complexity     Therapeutic Activity:  11 minutes

## 2025-01-02 NOTE — OCCUPATIONAL THERAPY NOTE
OCCUPATIONAL THERAPY EVALUATION - INPATIENT     Room Number: 524/524-A  Evaluation Date: 1/2/2025  Type of Evaluation: Initial   Presenting Problem: fever, lethargy; intubated 12/12, extubated 12/27  Co-Morbidities: atrial fibrillation     Physician Order: IP Consult to Occupational Therapy  Reason for Therapy: ADL/IADL Dysfunction and Discharge Planning    OCCUPATIONAL THERAPY ASSESSMENT   Patient is a 89 year old female admitted 12/11/2024 for fever, lethargy; intubated 12/12, extubated 12/27.  Prior to admission, patient's baseline is independent with all ADLs per EMR.  Patient is currently functioning below baseline with ADLs, bed mobility, functional transfers, and functional mobility.  Patient is requiring dependent as a result of the following impairments: decreased functional strength, decreased functional reach, pain, impaired sitting/standing balance, impaired motor planning, decreased muscular endurance, and medical status. Occupational Therapy will continue to follow for duration of hospitalization.    Patient will benefit from continued skilled OT Services on a trial basis pending progress and medical POC. Patient reporting \"all over\" pain, actively resisting PROM. Patient with guarded rehab potential due to limited ability to actively participate in therapy services. Per EMR, patient's family would prefer for patient to return to gradual rehab setting - will defer to Social Work/Case Management Team.     PLAN DURING HOSPITALIZATION  OT Device Recommendations: TBD  OT Treatment Plan: Energy conservation/work simplification techniques;ADL training;Endurance training;Patient/Family education;Patient/Family training;Equipment eval/education     OCCUPATIONAL THERAPY MEDICAL/SOCIAL HISTORY   Problem List  Principal Problem:    Sepsis, due to unspecified organism, unspecified whether acute organ dysfunction present (HCC)  Active Problems:    Acute respiratory failure with hypercapnia (HCC)    Atrial  fibrillation with rapid ventricular response (HCC)    Acute on chronic respiratory failure with hypoxia and hypercapnia (HCC)    Palliative care encounter    Counseling regarding advance care planning and goals of care    HOME SITUATION  Type of Home: Skilled nursing facility (admitted from [facility]; typically at home with daughter)  Home Layout: One level  Lives With: Staff 24 hours  *Home info obtained from EMR* Pt unable to provide information.     Prior Level of [place]: Pt's PLOF is unclear due to pt's difficulty to arouse, command follow, or communicate verbally. Per EMR pt was independent with ADLs prior to admission.     SUBJECTIVE  \"Ok\"     OCCUPATIONAL THERAPY EXAMINATION      OBJECTIVE  Precautions: Bed/chair alarm; Limb alert - left  Fall Risk: High fall risk      PAIN ASSESSMENT  Rating: Non-verbal signs of pain/discomfort observed  Location: \"all over\"  Management Techniques: Relaxation; Repositioning      ACTIVITY TOLERANCE   Pulse: 99   Heart Rate Source: Monitor   BP: 127/89  BP Location: Right arm  BP Method: Automatic   Patient position: Lying                       O2 SATURATIONS   SPO2% on Room Air at Rest     COGNITION  Overall Cognitive Status:  Impaired  Arousal/Alertness:  inconsistent responses to stimuli  Attention Span:  difficulty attending to directions  Following Commands:  follows one-step commands inconsistently  Initiation: hand over hand to initiate tasks    Communication: Pt with minimal verbal communication this session. Stating \"ok\" in response to therapist questions ~ 3 times.     RANGE OF MOTION   Unable to accurately assess BUE ROM. Pt with difficulty following directions and observed to be actively fighting PROM intermittently.     STRENGTH ASSESSMENT  Unable to accurately assess due to pt's difficulty following directions. Pt observed to be actively resisting PROM in LUE during portion of assessment.    ACTIVITIES OF DAILY LIVING ASSESSMENT  AM-PAC ‘6-Clicks’  Inpatient Daily Activity Short Form  How much help from another person does the patient currently need…  -   Putting on and taking off regular lower body clothing?: Total  -   Bathing (including washing, rinsing, drying)?: Total  -   Toileting, which includes using toilet, bedpan or urinal? : Total  -   Putting on and taking off regular upper body clothing?: A Lot  -   Taking care of personal grooming such as brushing teeth?: A Lot  -   Eating meals?: Total    AM-PAC Score:  Score: 8  Approx Degree of Impairment: 85.69%  Standardized Score (AM-PAC Scale): 22.86  CMS Modifier (G-Code): CM       BED MOBILITY  Rolling: Dependent       FUNCTIONAL ADL ASSESSMENT  UB Dressing Seated: Dependent  LB Dressing Seated: Dependent  Toileting Seated: Dependent       Skilled Therapy Provided:   RN cleared pt for participation. Session coordinated with PT. Pt received in bed with no visitors present. Pt with limited verbal communication throughout session, but willing to say \"yes\" when asked simple questions (ex: Do you want a blanket?). Pt stated \"all over\" when asked about pain levels. Unable to provide PLOF information. Pt requiring dependent assist for rolling and bed level ADLs due to decreased strength and difficulty following directions. Pt grimacing with any bed level movement. Pt demo inconsistent command following, accurately following <25% of commands and inconsistent responses to verbal/tactile stimuli. Pt unable to successfully self-initiate any bed level tasks despite hand-over-hand assist from therapists. Pt requiring dependent assist for repositioning at end of session, remained supine in bed with RN aware of session.       EDUCATION PROVIDED  Patient Education : Role of Occupational Therapy; Plan of Care; Posture/Positioning; Proper Body Mechanics  Patient's Response to Education: Requires Further Education    The patient's Approx Degree of Impairment: 85.69% has been calculated based on documentation in the [facility]  '6 clicks' Inpatient Daily Activity Short Form.  Research supports that patients with this level of impairment may benefit from LTC.  Final disposition will be made by interdisciplinary medical team.     Patient End of Session: In bed;Needs met;Call light within reach;RN aware of session/findings;All patient questions and concerns addressed;Hospital anti-slip socks;Alarm set    OT Goals  Patients self stated goal is: none stated      Patient will complete functional transfer with min assist   Comment:     Patient will complete toileting with min assist  Comment:     Patient will tolerate sitting EOB for 2 minutes in prep for adls with min assist    Comment:     Comment:          Goals  on: 25  Frequency: 3x/week    Patient Evaluation Complexity Level:   Occupational Profile/Medical History HIGH - Extensive review of history including review of medical or therapy records    Specific performance deficits impacting engagement in ADL/IADL HIGH  5+ performance deficits    Client Assessment/Performance Deficits HIGH - Comorbidities and significant modifications of tasks    Clinical Decision Making HIGH - Analysis of occupational profile, comprehensive assessments, multiple treatment options    Overall Complexity HIGH     OT Session Time: 12 minutes  Self-Care Home Management: 12 minutes

## 2025-01-02 NOTE — PLAN OF CARE
Problem: Patient Centered Care  Goal: Patient preferences are identified and integrated in the patient's plan of care  Description: Interventions:  - What would you like us to know as we care for you? Pt came from [facility] & was hospitalized at [facility]. Per daughter, pt is not returning back to [facility].  - Provide timely, complete, and accurate information to patient/family  - Incorporate patient and family knowledge, values, beliefs, and cultural backgrounds into the planning and delivery of care  - Encourage patient/family to participate in care and decision-making at the level they choose  - Honor patient and family perspectives and choices  Outcome: Progressing     Problem: PAIN - ADULT  Goal: Verbalizes/displays adequate comfort level or patient's stated pain goal  Description: INTERVENTIONS:  - Encourage pt to monitor pain and request assistance  - Assess pain using appropriate pain scale  - Administer analgesics based on type and severity of pain and evaluate response  - Implement non-pharmacological measures as appropriate and evaluate response  - Consider cultural and social influences on pain and pain management  - Manage/alleviate anxiety  - Utilize distraction and/or relaxation techniques  - Monitor for opioid side effects  - Notify MD/LIP if interventions unsuccessful or patient reports new pain  - Anticipate increased pain with activity and pre-medicate as appropriate  Outcome: Progressing     Problem: RISK FOR INFECTION - ADULT  Goal: Absence of fever/infection during anticipated neutropenic period  Description: INTERVENTIONS  - Monitor WBC  - Administer growth factors as ordered  - Implement neutropenic guidelines  Outcome: Progressing     Problem: SAFETY ADULT - FALL  Goal: Free from fall injury  Description: INTERVENTIONS:  - Assess pt frequently for physical needs  - Identify cognitive and physical deficits and behaviors that affect risk of falls.  - [place] fall  precautions as indicated by assessment.  - Educate pt/family on patient safety including physical limitations  - Instruct pt to call for assistance with activity based on assessment  - Modify environment to reduce risk of injury  - Provide assistive devices as appropriate  - Consider OT/PT consult to assist with strengthening/mobility  - Encourage toileting schedule  Outcome: Progressing     Problem: DISCHARGE PLANNING  Goal: Discharge to home or other facility with appropriate resources  Description: INTERVENTIONS:  - Identify barriers to discharge w/pt and caregiver  - Include patient/family/discharge partner in discharge planning  - Arrange for needed discharge resources and transportation as appropriate  - Identify discharge learning needs (meds, wound care, etc)  - Arrange for interpreters to assist at discharge as needed  - Consider post-discharge preferences of patient/family/discharge partner  - Complete POLST form as appropriate  - Assess patient's ability to be responsible for managing their own health  - Refer to Case Management Department for coordinating discharge planning if the patient needs post-hospital services based on physician/LIP order or complex needs related to functional status, cognitive ability or social support system  Outcome: Progressing     Problem: Altered Communication/Language Barrier  Goal: Patient/Family is able to understand and participate in their care  Description: Interventions:  - Assess communication ability and preferred communication style  - Implement communication aides and strategies  - Use visual cues when possible  - Listen attentively, be patient, do not interrupt  - Minimize distractions  - Allow time for understanding and response  - Establish method for patient to ask for assistance (call light)  - Provide an  as needed  - Communicate barriers and strategies to overcome with those who interact with patient  Outcome: Progressing     Problem: RESPIRATORY  - ADULT  Goal: Achieves optimal ventilation and oxygenation  Description: INTERVENTIONS:  - Assess for changes in respiratory status  - Assess for changes in mentation and behavior  - Position to facilitate oxygenation and minimize respiratory effort  - Oxygen supplementation based on oxygen saturation or ABGs  - Provide Smoking Cessation handout, if applicable  - Encourage broncho-pulmonary hygiene including cough, deep breathe, Incentive Spirometry  - Assess the need for suctioning and perform as needed  - Assess and instruct to report SOB or any respiratory difficulty  - Respiratory Therapy support as indicated  - Manage/alleviate anxiety  - Monitor for signs/symptoms of CO2 retention  Outcome: Progressing     Problem: NEUROLOGICAL - ADULT  Goal: Achieves stable or improved neurological status  Description: INTERVENTIONS  - Assess for and report changes in neurological status  - Initiate measures to prevent increased intracranial pressure  - Maintain blood pressure and fluid volume within ordered parameters to optimize cerebral perfusion and minimize risk of hemorrhage  - Monitor temperature, glucose, and sodium. Initiate appropriate interventions as ordered  Outcome: Progressing  Goal: Absence of seizures  Description: INTERVENTIONS  - Monitor for seizure activity  - Administer anti-seizure medications as ordered  - Monitor neurological status  Outcome: Progressing  Goal: Achieves maximal functionality and self care  Description: INTERVENTIONS  - Monitor swallowing and airway patency with patient fatigue and changes in neurological status  - Encourage and assist patient to increase activity and self care with guidance from PT/OT  - Encourage visually impaired, hearing impaired and aphasic patients to use assistive/communication devices  Outcome: Not Progressing     Problem: Delirium  Goal: Minimize duration of delirium  Description: Interventions:  - Encourage use of hearing aids, eye glasses  - Promote highest  level of mobility daily  - Provide frequent reorientation  - Promote wakefulness i.e. lights on, blinds open  - Promote sleep, encourage patient's normal rest cycle i.e. lights off, TV off, minimize noise and interruptions  - Encourage family to assist in orientation and promotion of home routines  Outcome: Progressing

## 2025-01-02 NOTE — PROGRESS NOTES
Palliative Care Progress Note    Yin Butcher Patient Status:  Inpatient    1931 MRN [id number]   Location [facility] 2W/SW Attending Carmelina Duong MD   Hosp Day # 21 PCP Mina Walker MD     Date of Consult: 2024  Patient seen at: [facility] Inpatient    Reason for Consultation: Consult requested for goals of care and care coordination.    Subjective     S: Transferred to floor. Hoarse, emaciated, in restraints.     Review of Systems: Palliative Care symptom needs assessed:     Unable to accurately obtain due to intubation    Palliative Care Social History:   Marital Status:    Children: Yes  Living Situation Prior to Admit: Home  Occupational History: Retired  Personal:     Spiritual  Yin Butcher  NA     requested: No    Medical History: obtained from [facility]  Past Medical History:    Arthritis    Atrial fibrillation (HCC)    Back problem    Cancer (HCC)    Cataract    Osteoporosis    Personal history of antineoplastic chemotherapy     Past Surgical History:   Procedure Laterality Date    Other surgical history  2014    Procedure: HIP HEMIARTHROPLASTY/ BIPOLAR;  Surgeon: Vasu Matamoros MD;  Location:  MAIN OR    Removal of tonsils,12+ y/o         Family History: obtained from [facility]  Family History   Family history unknown: Yes       Allergies:  Allergies[1]    Medications:     Current Facility-Administered Medications:     midodrine (ProAmatine) tab 7.5 mg, 7.5 mg, Per NG Tube, TID    acetaZOLAMIDE (Diamox) tab 250 mg, 250 mg, Oral, Daily    furosemide (Lasix) tab 20 mg, 20 mg, Oral, Daily    famotidine (Pepcid) tab 20 mg, 20 mg, Per NG Tube, Daily    metoprolol tartrate (Lopressor) tab 25 mg, 25 mg, Oral, 2x Daily(Beta Blocker)    glycopyrrolate (Robinul) 0.2 MG/ML injection 0.1 mg, 0.1 mg, Intravenous, Q6H PRN    multivitamin (Tab-A-Jenny/Beta Carotene) tab 1 tablet, 1 tablet, Per G Tube, Daily    ipratropium-albuterol (Duoneb) 0.5-2.5 (3) MG/3ML  inhalation solution 3 mL, 3 mL, Nebulization, Q6H PRN    digoxin (Lanoxin) tab 125 mcg, 125 mcg, Oral, Daily    acetaminophen (Ofirmev) 10 mg/mL infusion premix 650 mg, 15 mg/kg, Intravenous, Q6H PRN    dextrose 10% infusion (TPN no rate), , Intravenous, Continuous PRN    pancrelipase (Lip-Prot-Amyl) (Zenpep) DR particles cap 10,000 Units, 10,000 Units, Per G Tube, PRN **AND** sodium bicarbonate tab 325 mg, 325 mg, Per G Tube, PRN    metoprolol (Lopressor) 5 mg/5mL injection 5 mg, 5 mg, Intravenous, Q4H PRN    apixaban (Eliquis) tab 2.5 mg, 2.5 mg, Oral, BID    albuterol (Ventolin) (5 MG/ML) 0.5% nebulizer solution 5 mg, 5 mg, Nebulization, Q4H PRN    acetaminophen (Tylenol) 160 MG/5ML oral liquid 650 mg, 650 mg, Per NG Tube, Q4H PRN **OR** acetaminophen (Tylenol) rectal suppository 650 mg, 650 mg, Rectal, Q4H PRN    senna (Senokot) 8.8 MG/5ML oral syrup 17.6 mg, 10 mL, Per NG Tube, BID    docusate (Colace) 50 MG/5ML oral solution 100 mg, 100 mg, Per NG Tube, BID    polyethylene glycol (PEG 3350) (Miralax) 17 g oral packet 17 g, 17 g, Per NG Tube, Daily PRN    bisacodyl (Dulcolax) 10 MG rectal suppository 10 mg, 10 mg, Rectal, Daily PRN    ondansetron (Zofran) 4 MG/2ML injection 4 mg, 4 mg, Intravenous, Q6H PRN    metoclopramide (Reglan) 5 mg/mL injection 10 mg, 10 mg, Intravenous, Q8H PRN  No current outpatient medications on file.         Palliative Performance Scale: 20 %  % Ambulation Activity Level Self-Care Intake Consciousness   100 Full  Normal  No Disease Full Normal Full   90 Full  Normal  Some Disease Full Normal Full   80 Full  Normal w/effort  Some Disease Full Normal or reduced Full   70 Reduced  Can't Perform Job  Some Disease Full Normal or reduced Full   60 Reduced  Can't Perform Hobby   Significant Disease Occ Assist Normal or reduced Full or confused   50 Mainly sit/lie Can't do any work  Extensive Disease Partial Assist Normal or reduced Full or confused   40 Mainly in bed Can't do any  work  Extensive Disease Mainly Assist Normal or reduced Full or confused   30 Bed Bound Can't do any work  Extensive Disease Max Assist  Total Care Reduced  Drowsy/confused   20 Bed Bound Can't do any work  Extensive Disease Max Assist  Total Care Minimal  Drowsy/confused   10 Bed Bound Can't do any work  Extensive Disease Max Assist  Total Care Mouth Care  Drowsy/confused   0 Death        Objective      Vital Signs:  Blood pressure 151/87, pulse 99, temperature 98.4 °F (36.9 °C), temperature source Axillary, resp. rate 19, height 5' 7.01\" (1.702 m), weight 121 lb 0.5 oz (54.9 kg), SpO2 100%.  Body mass index is 18.95 kg/m².  Non-verbal signs of pain present: No    Physical Exam:  General: Mildly SOB, thin, confused  HEENT: AT/NC  Cardiac: RRR  Lungs: coarse BS  Abdomen: Soft, non-tender, non-distended, normal bowel sounds X 4 quadrants   Extremities: mild-mod Edema present  Skin: Warm and dry.    Hematology:  Lab Results   Component Value Date    WBC 8.2 01/02/2025    HGB 10.4 (L) 01/02/2025    HCT 32.6 (L) 01/02/2025    .0 01/02/2025       Coags:  Lab Results   Component Value Date    PT 15.5 (H) 07/13/2014    INR 1.32 (H) 01/29/2024    PTT 34.6 01/31/2024       Chemistry:  Lab Results   Component Value Date    CREATSERUM 0.42 (L) 01/02/2025    BUN 16 01/02/2025     01/02/2025    K 4.7 01/02/2025    CL 99 01/02/2025    CO2 33.0 (H) 01/02/2025     (H) 01/02/2025    CA 9.8 01/02/2025    ALB 4.0 12/11/2024    ALKPHO 112 12/11/2024    BILT 1.0 (H) 12/11/2024    TP 8.2 12/11/2024    AST 24 12/11/2024    ALT 30 12/11/2024    DDIMER 2.38 (H) 01/20/2024    MG 2.0 12/14/2024    PHOS 4.1 12/14/2024    TROP <0.046 02/19/2015       Imaging:  No results found.    Assessment and Recommendations        Sepsis, due to unspecified organism, unspecified whether acute organ dysfunction present (HCC)    Acute respiratory failure with hypercapnia (HCC)    Atrial fibrillation with rapid ventricular response (HCC)     Acute on chronic respiratory failure with hypoxia and hypercapnia (HCC)    Palliative care encounter    Counseling regarding advance care planning and goals of care    Symptom Management       Dyspnea   -on NC   -daughter is uncomfortable with opioids or sedation   -will discuss comfort vs select treatment intent      2.     Serious Illness Conversation:   Code Status: DNAR/Full  POA: Surrogate is daughter Jada  I met with Jada in person and also present via phone was Bebobryon, her fiance. Jada lives with Prema in [facility], and splits her time in [place], where her fiancee lives. Prema was home apparently until the last several months when she began problems with her effusions, aspiration etc.   I asked if Jada could give me a big picture sense of what was going with her mom. She is able to discuss the details of all of the illnesses and episodes but I also wondered if she felt overall that Prema was improving or declining.   I expressed my view/worry that her lungs are shutting down/have shut down etc to the extent that she is dying from this. I explained the physiology of chronic aspiration in context of dysphagia as a terminal/lethal component of not just dementia or old age but of many complex chronic illnesses.   I explained that I felt that Prema is unfortunately showing us that she is dying, albeit slowly and that we are in a position to decide how much to try and resist or fight natural dying or how much we can decide to support or allow for it.   Prema repeated that her mom had always just said 'she wants everything done' but unfortunately had little more context or nuance. Ie what 'everything' looks like, logistically, financially, quality etc. Librado said that he thinks Prema is pretty understanding of these issues, having dealt with her own  dying and other parents. He and Jada were hoping to involved Prema in the conversation.   When I spoke to Prema she could nod yes or no in response to basic  questions but when I asked about natural dying she closed her eyes and would not answer. Jada saw this and said that she is probably thinking and that she and her fiancee would talk to Prema in more detail. They hoped ideally that they could ask her when she is extubated.  They wanted to know what their deadline is to 'make a decision.' They did not seem opposed to consider compassionate extubation but instead seem very unsure about what to do. They appear open to learning as much as possible about their options.   I met with Jada and called Librado separately. I explained that her body is not supporting life, and that her best possible chance of accomplishing their goal of breathing independently and being at home would be compassionate extubation. Jada seems to understand but continued to ask about trach. I think it has been helpful to explain that what she has witnessed over the last several months are not just 'hiccups' but they are a gradual dying from dementia and advanced age.   I explained that extubation should probably happen within the next 24-48 hours as discussed with Dr. Espino and that we would endeavor to help her be comfortable if she were passing away.   I can have a more definitive discussion about plan tomorrow with lisset is present after 10-11am or so unless another provider discusses it earlier. I think we should support Jada in the grief process as gently as possible.   See separate note for discussion. Family agreeable to DNR/DNI and compassionate extubation.  I spoke to Jada via phone last night. Her goal is to have Prema go to rehab to get stronger and then return home. Unfortunately I feel that she is probably not a candidate for this, but I will defer to primary and PT/OT. I will call Jada and Librado to discuss comfort care/hospice. I did explain that no matter what she will most likely need around the clock care and Jada agreed, and agreed that she would NOT want her mom in a facility  permanently. Jada is not viewing this process as a terminal process, but focused on isolated details of Prema's course in hopes that this is another 'hiccup' and that she will recover. I encourage all care providers to continue to educate her.  Prema is too weak to phonate. She is staring up at the ceiling with mouth agape. She can blink and nod but cannot answer my questions due to effort/weakness. I personally feel that she is nearing death and should be transitioned to comfort care and be home with hospice. I will recommend this again to Jada and Librado. I appreciate other team members calling family as well to advise them.      Palliative Care Follow Up: Palliative care team will Continue to follow while inpatient    Thank you for allowing Palliative Care services to participate in the care of Yin Butcher.    A total of 50 minutes were spent on this visit, which included all of the following: direct face to face contact, history taking, physical examination, and >50% was spent counseling and coordinating care.    Barry Liz MD  Palliative Care Services  [facility] [phone]    Note to patient:  The 21st Century Cures Act makes medical notes like these available to patients in the interest of transparency. However, be advised this is a medical document. It is intended as peer to peer communication. It is written in medical language and may contain abbreviations or verbiage that are unfamiliar. It may appear blunt or direct. Medical documents are intended to carry relevant information, facts as evident, and the clinical opinion of the practitioner.           [1]   Allergies  Allergen Reactions    Erythromycin OTHER (SEE COMMENTS)    Oxycodone HALLUCINATION    Gabapentin NAUSEA ONLY and OTHER (SEE COMMENTS)     Pt stated she didn't feel like herself     Tizanidine ITCHING and OTHER (SEE COMMENTS)     Pt states burning sensation

## 2025-01-02 NOTE — PROGRESS NOTES
Progress Note  Yin Butcher Patient Status:  Inpatient    1931 MRN [id number]   Location [facility]5W Attending Vika Elmore MD   Hosp Day # 21 PCP Mina Walker MD     Subjective:  Pt laying in bed in no apparent; awakens to speech - nonverbal.     Objective:  BP (!) 141/93 (BP Location: Right arm)   Pulse 99   Temp 97.8 °F (36.6 °C) (Axillary)   Resp 18   Ht 5' 7.01\" (1.702 m)   Wt 121 lb 0.5 oz (54.9 kg)   SpO2 97%   BMI 18.95 kg/m²     Telemetry: afib rate controlled    Intake/Output:    Intake/Output Summary (Last 24 hours) at 2025 0845  Last data filed at 2025 0600  Gross per 24 hour   Intake 1410 ml   Output 1000 ml   Net 410 ml       Last 3 Weights   24 1250 121 lb 0.5 oz (54.9 kg)   24 0600 126 lb 8.7 oz (57.4 kg)   24 0600 127 lb 10.3 oz (57.9 kg)   24 0500 126 lb 8.7 oz (57.4 kg)   24 0600 126 lb 1.7 oz (57.2 kg)   24 0356 126 lb 8.7 oz (57.4 kg)   24 0600 124 lb 5.4 oz (56.4 kg)   24 0600 122 lb 12.7 oz (55.7 kg)   24 0504 123 lb 14.4 oz (56.2 kg)   24 0600 125 lb (56.7 kg)   24 0600 124 lb 1.9 oz (56.3 kg)   24 0600 116 lb 6.5 oz (52.8 kg)   24 0400 101 lb 6.6 oz (46 kg)   12/15/24 0400 99 lb 3.3 oz (45 kg)   24 0530 92 lb 9.5 oz (42 kg)   24 1610 105 lb (47.6 kg)   24 0600 125 lb (56.7 kg)   24 0430 125 lb 12.8 oz (57.1 kg)   24 0400 128 lb 6.4 oz (58.2 kg)   24 0047 120 lb (54.4 kg)   24 0000 115 lb 11.9 oz (52.5 kg)   24 0000 113 lb 12.1 oz (51.6 kg)   24 0007 109 lb 2 oz (49.5 kg)   24 2143 105 lb 13.1 oz (48 kg)   10/29/23 0600 104 lb 11.5 oz (47.5 kg)   10/24/23 0457 114 lb 3.2 oz (51.8 kg)   10/23/23 0340 92 lb 9.5 oz (42 kg)   10/22/23 2033 92 lb 9.5 oz (42 kg)       Labs:  Recent Labs   Lab 24  0622 25  0638 25  0531   * 137* 135*   BUN 21 18 16   CREATSERUM 0.37* 0.43* 0.42*   EGFRCR 94 91 91    CA 9.6 9.6 9.8   * 135* 136   K 4.5 3.9 4.7   CL 92* 96* 99   CO2 >40.0* 35.0* 33.0*     Recent Labs   Lab 12/31/24  0622 01/01/25  0638 01/02/25  0531   RBC 3.20* 3.42* 3.63*   HGB 8.9* 9.9* 10.4*   HCT 28.7* 30.5* 32.6*   MCV 89.7 89.2 89.8   MCH 27.8 28.9 28.7   MCHC 31.0 32.5 31.9   RDW 19.6* 19.7* 19.5*   NEPRELIM 7.60 6.34 7.04   WBC 8.9 7.4 8.2   .0 265.0 254.0         No results for input(s): \"TROP\", \"TROPHS\", \"CK\" in the last 168 hours.    Diagnostics:  No results found.   Review of Systems   Reason unable to perform ROS: mental status.       Physical Exam:    Gen: drowsy, disoriented, cachectic, NAD  Heent: pupils equal, reactive. Mucous membranes moist.   Neck: no jvd  Cardiac: irregular rate and rhythm, normal S1,S2, no murmur, clicks, rub or gallop  Lungs: diminished  Abd: soft, NT/ND +bs, + GT  Ext: no edema  Skin: Warm, dry  Neuro: No focal deficits      Medications:     midodrine  7.5 mg Per NG Tube TID    acetaZOLAMIDE  250 mg Oral Daily    furosemide  20 mg Oral Daily    famotidine  20 mg Per NG Tube Daily    metoprolol tartrate  25 mg Oral 2x Daily(Beta Blocker)    multivitamin  1 tablet Per G Tube Daily    digoxin  125 mcg Oral Daily    apixaban  2.5 mg Oral BID    senna  10 mL Per NG Tube BID    docusate  100 mg Per NG Tube BID      dextrose 10%         Assessment:  Acute Hypoxic/Hypercapneic Respiratory Failure, Recurrent Aspiration PNA - Resolving  S/P Prolonged intubation - extubation on 12/27; now on room air   S/P course antibiotics  Lonstanding Persistent Atrial Fibrillation  Rate controlled  On BB, digoxin  [id number] 6-7 on Eliquis 2.5mg po BID (reduced for age, wt)  Chronic HFpEF, Pulmonary Hypertension  Echo LVEF 70-75%, no RWMA, mildly reduced RV function (RV s' 9.2cm/sec), biatrial dilation, mild MR, mild TR, PASP 45-50  S/P diuresis w/IV Lasix now on 20mg po daily; diamox 250mg po daily (hx on at home)  Serum bicarb improved w/diamox - suspect a mix of metabolic  compensation w/resp acidosis as well as contraction alkalosis  Hypotension, now Hypertension - on midodrine  Midodrine reduced to 7.5mg po TID d/t elev BP  Dysphagia - on tube feeds  Hx PE - on Eliquis  Dementia   Palliative Care following    Plan:  Decrease midodrine to 5mg po TID (may consider prn dosing)  - BP is improved and being hypertensive  Continue lopressor, digoxin, eliquis - afib with controlled rates.   Discharge planning per primary - ok from cardiology standpoint    Plan of care discussed with patient, RN.    Carla Espana, APRN  1/2/2025  8:45 AM  [phone] [facility]  [phone] [facility]        Seen/examined patient independently.  Agree with findings, assessment and plan as outlined above.     Patient is nonverbal, mouth open, able to make eye contact however not following commands or responding to questions.  Regards to persistent A-fib, continue rate control, on anticoagulation for elevated [id number] suitable for age/weight per family decision.  Given her hypertensive BP, reduce midodrine.  Appreciate palliative care evaluation/management.    Marquise Riley, DO

## 2025-01-03 ENCOUNTER — CASE MANAGEMENT (OUTPATIENT)
Dept: CARE COORDINATION | Age: OVER 89
End: 2025-01-03

## 2025-01-03 NOTE — PLAN OF CARE
Problem: Patient Centered Care  Goal: Patient preferences are identified and integrated in the patient's plan of care  Description: Interventions:  - What would you like us to know as we care for you? Pt came from [facility] & was hospitalized at [facility]. Per daughter, pt is not returning back to [place].  - Provide timely, complete, and accurate information to patient/family  - Incorporate patient and family knowledge, values, beliefs, and cultural backgrounds into the planning and delivery of care  - Encourage patient/family to participate in care and decision-making at the level they choose  - Honor patient and family perspectives and choices  Outcome: Progressing     Problem: PAIN - ADULT  Goal: Verbalizes/displays adequate comfort level or patient's stated pain goal  Description: INTERVENTIONS:  - Encourage pt to monitor pain and request assistance  - Assess pain using appropriate pain scale  - Administer analgesics based on type and severity of pain and evaluate response  - Implement non-pharmacological measures as appropriate and evaluate response  - Consider cultural and social influences on pain and pain management  - Manage/alleviate anxiety  - Utilize distraction and/or relaxation techniques  - Monitor for opioid side effects  - Notify MD/LIP if interventions unsuccessful or patient reports new pain  - Anticipate increased pain with activity and pre-medicate as appropriate  Outcome: Progressing     Problem: RISK FOR INFECTION - ADULT  Goal: Absence of fever/infection during anticipated neutropenic period  Description: INTERVENTIONS  - Monitor WBC  - Administer growth factors as ordered  - Implement neutropenic guidelines  Outcome: Progressing     Problem: SAFETY ADULT - FALL  Goal: Free from fall injury  Description: INTERVENTIONS:  - Assess pt frequently for physical needs  - Identify cognitive and physical deficits and behaviors that affect risk of falls.  - [place] fall  precautions as indicated by assessment.  - Educate pt/family on patient safety including physical limitations  - Instruct pt to call for assistance with activity based on assessment  - Modify environment to reduce risk of injury  - Provide assistive devices as appropriate  - Consider OT/PT consult to assist with strengthening/mobility  - Encourage toileting schedule  Outcome: Progressing     Problem: DISCHARGE PLANNING  Goal: Discharge to home or other facility with appropriate resources  Description: INTERVENTIONS:  - Identify barriers to discharge w/pt and caregiver  - Include patient/family/discharge partner in discharge planning  - Arrange for needed discharge resources and transportation as appropriate  - Identify discharge learning needs (meds, wound care, etc)  - Arrange for interpreters to assist at discharge as needed  - Consider post-discharge preferences of patient/family/discharge partner  - Complete POLST form as appropriate  - Assess patient's ability to be responsible for managing their own health  - Refer to Case Management Department for coordinating discharge planning if the patient needs post-hospital services based on physician/LIP order or complex needs related to functional status, cognitive ability or social support system  Outcome: Progressing     Problem: RESPIRATORY - ADULT  Goal: Achieves optimal ventilation and oxygenation  Description: INTERVENTIONS:  - Assess for changes in respiratory status  - Assess for changes in mentation and behavior  - Position to facilitate oxygenation and minimize respiratory effort  - Oxygen supplementation based on oxygen saturation or ABGs  - Provide Smoking Cessation handout, if applicable  - Encourage broncho-pulmonary hygiene including cough, deep breathe, Incentive Spirometry  - Assess the need for suctioning and perform as needed  - Assess and instruct to report SOB or any respiratory difficulty  - Respiratory Therapy support as indicated  -  Manage/alleviate anxiety  - Monitor for signs/symptoms of CO2 retention  Outcome: Progressing     Problem: NEUROLOGICAL - ADULT  Goal: Achieves stable or improved neurological status  Description: INTERVENTIONS  - Assess for and report changes in neurological status  - Initiate measures to prevent increased intracranial pressure  - Maintain blood pressure and fluid volume within ordered parameters to optimize cerebral perfusion and minimize risk of hemorrhage  - Monitor temperature, glucose, and sodium. Initiate appropriate interventions as ordered  Outcome: Progressing  Goal: Absence of seizures  Description: INTERVENTIONS  - Monitor for seizure activity  - Administer anti-seizure medications as ordered  - Monitor neurological status  Outcome: Progressing  Goal: Remains free of injury related to seizure activity  Description: INTERVENTIONS:  - Maintain airway, patient safety  and administer oxygen as ordered  - Monitor patient for seizure activity, document and report duration and description of seizure to MD/LIP  - If seizure occurs, turn patient to side and suction secretions as needed  - Reorient patient post seizure  - Seizure pads on all 4 side rails  - Instruct patient/family to notify RN of any seizure activity  - Instruct patient/family to call for assistance with activity based on assessment  Outcome: Progressing  Goal: Achieves maximal functionality and self care  Description: INTERVENTIONS  - Monitor swallowing and airway patency with patient fatigue and changes in neurological status  - Encourage and assist patient to increase activity and self care with guidance from PT/OT  - Encourage visually impaired, hearing impaired and aphasic patients to use assistive/communication devices  Outcome: Not-Progressing     Problem: Delirium  Goal: Minimize duration of delirium  Description: Interventions:  - Encourage use of hearing aids, eye glasses  - Promote highest level of mobility daily  - Provide frequent  reorientation  - Promote wakefulness i.e. lights on, blinds open  - Promote sleep, encourage patient's normal rest cycle i.e. lights off, TV off, minimize noise and interruptions  - Encourage family to assist in orientation and promotion of home routines  Outcome: Progressing

## 2025-01-03 NOTE — PROGRESS NOTES
[facility]  part of [facility]    Progress Note    Yin Butcher Patient Status:  Inpatient    1931 MRN [id number]   Location [facility]5W Attending Vika Elmore MD   Hosp Day # 22 PCP Mina Walker MD         Subjective:     Unable to perform ROS    Called to evaluate patient again today  Unresponsive on AVAPS  Daughter at bedside  Objective:   Blood pressure 118/80, pulse 87, temperature 97.7 °F (36.5 °C), temperature source Axillary, resp. rate 24, height 5' 7.01\" (1.702 m), weight 121 lb 0.5 oz (54.9 kg), SpO2 100%.  Physical Exam  Vitals and nursing note reviewed.   Constitutional:       General: She is in acute distress.      Appearance: She is ill-appearing.   HENT:      Head: Atraumatic.      Mouth/Throat:      Mouth: Mucous membranes are dry.   Eyes:      General: No scleral icterus.  Cardiovascular:      Rate and Rhythm: Normal rate.      Heart sounds: Murmur heard.   Pulmonary:      Comments: Severe intercostal muscle wasting  Diminished breath sounds bilaterally worse in the bases  Prolonged  expiration  No wheezes  Abdominal:      General: There is no distension.      Palpations: Abdomen is soft.   Musculoskeletal:      Cervical back: No rigidity.      Right lower leg: No edema.      Left lower leg: No edema.      Comments: Profound muscle wasting   Neurological:      Comments: Unresponsive         Results:   Lab Results   Component Value Date    WBC 13.1 (H) 2025    HGB 10.0 (L) 2025    HCT 32.1 (L) 2025    .0 2025    CREATSERUM 0.47 (L) 2025    BUN 22 2025     (L) 2025    K 4.5 2025    CL 98 2025    CO2 33.0 (H) 2025     (H) 2025    CA 9.7 2025    ALB 4.0 2024    ALKPHO 112 2024    BILT 1.0 (H) 2024    TP 8.2 2024    AST 24 2024    ALT 30 2024    PTT 34.6 2024    INR 1.32 (H) 2024    PT 15.5 (H) 2014    T4F 1.0  02/19/2015    TSH 2.138 12/11/2024    LIP 35 12/11/2024    DDIMER 2.38 (H) 01/20/2024    MG 2.0 12/14/2024    PHOS 4.1 12/14/2024    TROP <0.046 02/19/2015    TROPHS 25 01/20/2024    CK 32 01/20/2024    B12 586 01/23/2024       XR CHEST AP PORTABLE  (CPT=71045)    Result Date: 1/3/2025  CONCLUSION:   Small to moderate right pleural effusion has increased/worsened since examination from 8 days prior.  Hazy right basilar opacities have also worsened since prior and could relate to any combination of compressive atelectasis or coexistent infection/aspiration.  Stable small left pleural effusion and probable associated compressive atelectasis at the left lung base.  Interval extubation.  Lesser incidental findings as above.   A preliminary report was issued by the [facility] [employer] teleradiology service. There are no major discrepancies.  elm-remote  Dictated by (CST): Ricco Segundo MD on 1/03/2025 at 9:31 AM     Finalized by (CST): Ricco Segundo MD on 1/03/2025 at 9:33 AM               Assessment & Plan:         1-acute on chronic hypoxemic and hypercapnic respiratory failure  - Dementia / dysphagia - aspiration and weakness/muscle wasting and ventilatory failure  - HFPEF with edema / chronic basilar transudate effusion   - Aspiration pneumonia    Had a prolonged intubation  Extubated and now DNAR/select with no reintubation  Today unresponsive and placed again on NIV with AVAPS  Chest x-ray worsening basilar atelectasis with a chronic  basilar effusion  Completed course of antibiotics    2-dementia and dysphagia  Supportive care  Nutrition via G-tube     3-failure to thrive with profound muscle weakness and fatigue      4-HFpEF with chronic A-fib on Eliquis  Per cardiology     5-toxic and metabolic and likely hypercapnic encephalopathy  Supportive care and NIV / avaps      6-DVT prophylaxis  Patient on Eliquis    7-DNR/select  Patient seems hospice candidate  Palliative care following      D/w daughter at bedside  at length today   D/w staff   Poor prognosis             Hansel Eisenberg MD  1/3/2025

## 2025-01-03 NOTE — PROGRESS NOTES
Progress Note  Yin Butcher Patient Status:  Inpatient    1931 MRN [id number]   Location [facility]5W Attending Vika Elmore MD   Hosp Day # 22 PCP Mina Walker MD     Subjective:  Pt with decreased LOC throughout the day yesterday and with increasing O2 requirements, inability to clear secretions overnight. Now on AVAPS, 100% FiO2, unresponsive. Daughter, Jada, at bedside.     Objective:  /86 (BP Location: Right arm)   Pulse 116   Temp 97.4 °F (36.3 °C) (Axillary)   Resp 23   Ht 5' 7.01\" (1.702 m)   Wt 121 lb 0.5 oz (54.9 kg)   SpO2 99%   BMI 18.95 kg/m²     Telemetry: afib 90's    Intake/Output:    Intake/Output Summary (Last 24 hours) at 1/3/2025 0754  Last data filed at 1/3/2025 0600  Gross per 24 hour   Intake 702 ml   Output 200 ml   Net 502 ml       Last 3 Weights   24 1250 121 lb 0.5 oz (54.9 kg)   24 0600 126 lb 8.7 oz (57.4 kg)   24 0600 127 lb 10.3 oz (57.9 kg)   24 0500 126 lb 8.7 oz (57.4 kg)   24 0600 126 lb 1.7 oz (57.2 kg)   24 0356 126 lb 8.7 oz (57.4 kg)   24 0600 124 lb 5.4 oz (56.4 kg)   24 0600 122 lb 12.7 oz (55.7 kg)   24 0504 123 lb 14.4 oz (56.2 kg)   24 0600 125 lb (56.7 kg)   24 0600 124 lb 1.9 oz (56.3 kg)   24 0600 116 lb 6.5 oz (52.8 kg)   24 0400 101 lb 6.6 oz (46 kg)   12/15/24 0400 99 lb 3.3 oz (45 kg)   24 0530 92 lb 9.5 oz (42 kg)   24 1610 105 lb (47.6 kg)   24 0600 125 lb (56.7 kg)   24 0430 125 lb 12.8 oz (57.1 kg)   24 0400 128 lb 6.4 oz (58.2 kg)   24 0047 120 lb (54.4 kg)   24 0000 115 lb 11.9 oz (52.5 kg)   24 0000 113 lb 12.1 oz (51.6 kg)   24 0007 109 lb 2 oz (49.5 kg)   24 2143 105 lb 13.1 oz (48 kg)   10/29/23 0600 104 lb 11.5 oz (47.5 kg)   10/24/23 0457 114 lb 3.2 oz (51.8 kg)   10/23/23 0340 92 lb 9.5 oz (42 kg)   10/22/23 2033 92 lb 9.5 oz (42 kg)       Labs:  Recent Labs   Lab 25  0638  01/02/25  0531 01/03/25  0555   * 135* 115*   BUN 18 16 22   CREATSERUM 0.43* 0.42* 0.47*   EGFRCR 91 91 89   CA 9.6 9.8 9.7   * 136 135*   K 3.9 4.7 4.5   CL 96* 99 98   CO2 35.0* 33.0* 33.0*     Recent Labs   Lab 01/01/25  0638 01/02/25  0531 01/03/25  0555   RBC 3.42* 3.63* 3.60*   HGB 9.9* 10.4* 10.0*   HCT 30.5* 32.6* 32.1*   MCV 89.2 89.8 89.2   MCH 28.9 28.7 27.8   MCHC 32.5 31.9 31.2   RDW 19.7* 19.5* 19.4*   NEPRELIM 6.34 7.04 12.03*   WBC 7.4 8.2 13.1*   .0 254.0 283.0         No results for input(s): \"TROP\", \"TROPHS\", \"CK\" in the last 168 hours.    Diagnostics:  No results found.   Review of Systems   Reason unable to perform ROS: unable to complete; unreponsive.       Physical Exam:    Gen: unresponsive, cachectic, critically-ill appearing  Neck: no jvd  Cardiac: irregular rate and rhythm, normal S1,S2, no murmur, clicks, rub or gallop  Lungs: coarse  Abd: soft, NT/ND +bs  Ext: no edema  Skin: Warm, dry  Neuro: No focal deficits      Medications:     midodrine  5 mg Per NG Tube TID    acetaZOLAMIDE  250 mg Per G Tube Daily    apixaban  2.5 mg Per G Tube BID    furosemide  20 mg Per G Tube Daily    metoprolol tartrate  25 mg Per G Tube 2x Daily(Beta Blocker)    famotidine  20 mg Per NG Tube Daily    multivitamin  1 tablet Per G Tube Daily    digoxin  125 mcg Oral Daily    senna  10 mL Per NG Tube BID    docusate  100 mg Per NG Tube BID      dextrose 10%           Assessment:  Acute Hypoxic/Hypercapneic Respiratory Failure, Recurrent Aspiration PNA   S/P Prolonged intubation - extubation on 12/27  Increased O2 requirements overnight; leukocytosis - now on 100% FiO2 AVAPS  S/P course antibiotics  Lonstanding Persistent Atrial Fibrillation  Rates fairly well controlled  On BB, digoxin  [id number] 6-7 on Eliquis 2.5mg po BID (reduced for age, wt)  Chronic HFpEF, Pulmonary Hypertension  Echo LVEF 70-75%, no RWMA, mildly reduced RV function (RV s' 9.2cm/sec), biatrial dilation, mild MR,  mild TR, PASP 45-50  S/P diuresis w/IV Lasix now on 20mg po daily; diamox 250mg po daily (hx on at home)  Serum bicarb improved w/diamox - suspect a mix of metabolic compensation w/resp acidosis as well as contraction alkalosis  Hypotension, now improved- on midodrine  Midodrine reduced to 5mg po TID d/t elev BP  Dysphagia - on tube feeds (on hold - concern for aspiration)  Hx PE - on Eliquis  Chronic Anemia - Hgb stable  Dementia   Palliative Care following    Plan:  Continue bb, digoxin, eliquis  Hold diuretic today with borderline BP  Continue midodrine 5mg po TID  Discussed pt's condition and GOC, including transition to comfort care, with daughter at bedside. Pt's daughter does not wish to transition to comfort care at this time. Palliative care team following. Prognosis remains guarded.     ADDENDUM:  Cardiology will sign off at this time. Feel free to call with any questions or concerns.     Plan of care discussed with patient, RN, Dr Riley.     Carla Espana, APRN  1/3/2025  7:54 AM  [phone] [facility]  [phone] [facility]

## 2025-01-03 NOTE — PLAN OF CARE
Problem: Patient Centered Care  Goal: Patient preferences are identified and integrated in the patient's plan of care  Description: Interventions:  - What would you like us to know as we care for you? Pt came from [place] [facility] at [facility] & was hospitalized at [facility]. Per daughter, pt is not returning back to [place].  - Provide timely, complete, and accurate information to patient/family  - Incorporate patient and family knowledge, values, beliefs, and cultural backgrounds into the planning and delivery of care  - Encourage patient/family to participate in care and decision-making at the level they choose  - Honor patient and family perspectives and choices  Outcome: Not Progressing     Problem: Patient/Family Goals  Goal: Patient/Family Long Term Goal  Description: Patient's Long Term Goal: discharge     Interventions:  - monitor vitals, monitor labs,   - See additional Care Plan goals for specific interventions  Outcome: Not Progressing  Goal: Patient/Family Short Term Goal  Description: Patient's Short Term Goal: feel better     Interventions:   - monitor labs, monitor vitals   - See additional Care Plan goals for specific interventions  Outcome: Not Progressing     Problem: Safety Risk - Non-Violent Restraints  Goal: Patient will remain free from self-harm  Description: INTERVENTIONS:  - Apply the least restrictive restraint to prevent harm  - Notify patient and family of reasons restraints applied  - Assess for any contributing factors to confusion (electrolyte disturbances, delirium, medications)  - Discontinue any unnecessary medical devices as soon as possible  - Assess the patient's physical comfort, circulation, skin condition, hydration, nutrition and elimination needs   - Reorient and redirection as needed  - Assess for the need to continue restraints  Outcome: Not Progressing     Problem: PAIN - ADULT  Goal: Verbalizes/displays adequate comfort level or patient's stated pain  goal  Description: INTERVENTIONS:  - Encourage pt to monitor pain and request assistance  - Assess pain using appropriate pain scale  - Administer analgesics based on type and severity of pain and evaluate response  - Implement non-pharmacological measures as appropriate and evaluate response  - Consider cultural and social influences on pain and pain management  - Manage/alleviate anxiety  - Utilize distraction and/or relaxation techniques  - Monitor for opioid side effects  - Notify MD/LIP if interventions unsuccessful or patient reports new pain  - Anticipate increased pain with activity and pre-medicate as appropriate  Outcome: Not Progressing     Problem: RISK FOR INFECTION - ADULT  Goal: Absence of fever/infection during anticipated neutropenic period  Description: INTERVENTIONS  - Monitor WBC  - Administer growth factors as ordered  - Implement neutropenic guidelines  Outcome: Not Progressing     Problem: SAFETY ADULT - FALL  Goal: Free from fall injury  Description: INTERVENTIONS:  - Assess pt frequently for physical needs  - Identify cognitive and physical deficits and behaviors that affect risk of falls.  - [place] fall precautions as indicated by assessment.  - Educate pt/family on patient safety including physical limitations  - Instruct pt to call for assistance with activity based on assessment  - Modify environment to reduce risk of injury  - Provide assistive devices as appropriate  - Consider OT/PT consult to assist with strengthening/mobility  - Encourage toileting schedule  Outcome: Not Progressing     Problem: DISCHARGE PLANNING  Goal: Discharge to home or other facility with appropriate resources  Description: INTERVENTIONS:  - Identify barriers to discharge w/pt and caregiver  - Include patient/family/discharge partner in discharge planning  - Arrange for needed discharge resources and transportation as appropriate  - Identify discharge learning needs (meds, wound care, etc)  - Arrange for  interpreters to assist at discharge as needed  - Consider post-discharge preferences of patient/family/discharge partner  - Complete POLST form as appropriate  - Assess patient's ability to be responsible for managing their own health  - Refer to Case Management Department for coordinating discharge planning if the patient needs post-hospital services based on physician/LIP order or complex needs related to functional status, cognitive ability or social support system  Outcome: Not Progressing     Problem: Altered Communication/Language Barrier  Goal: Patient/Family is able to understand and participate in their care  Description: Interventions:  - Assess communication ability and preferred communication style  - Implement communication aides and strategies  - Use visual cues when possible  - Listen attentively, be patient, do not interrupt  - Minimize distractions  - Allow time for understanding and response  - Establish method for patient to ask for assistance (call light)  - Provide an  as needed  - Communicate barriers and strategies to overcome with those who interact with patient  Outcome: Not Progressing     Problem: RESPIRATORY - ADULT  Goal: Achieves optimal ventilation and oxygenation  Description: INTERVENTIONS:  - Assess for changes in respiratory status  - Assess for changes in mentation and behavior  - Position to facilitate oxygenation and minimize respiratory effort  - Oxygen supplementation based on oxygen saturation or ABGs  - Provide Smoking Cessation handout, if applicable  - Encourage broncho-pulmonary hygiene including cough, deep breathe, Incentive Spirometry  - Assess the need for suctioning and perform as needed  - Assess and instruct to report SOB or any respiratory difficulty  - Respiratory Therapy support as indicated  - Manage/alleviate anxiety  - Monitor for signs/symptoms of CO2 retention  Outcome: Not Progressing     Problem: NEUROLOGICAL - ADULT  Goal: Achieves stable or  improved neurological status  Description: INTERVENTIONS  - Assess for and report changes in neurological status  - Initiate measures to prevent increased intracranial pressure  - Maintain blood pressure and fluid volume within ordered parameters to optimize cerebral perfusion and minimize risk of hemorrhage  - Monitor temperature, glucose, and sodium. Initiate appropriate interventions as ordered  Outcome: Not Progressing  Goal: Absence of seizures  Description: INTERVENTIONS  - Monitor for seizure activity  - Administer anti-seizure medications as ordered  - Monitor neurological status  Outcome: Not Progressing  Goal: Remains free of injury related to seizure activity  Description: INTERVENTIONS:  - Maintain airway, patient safety  and administer oxygen as ordered  - Monitor patient for seizure activity, document and report duration and description of seizure to MD/LIP  - If seizure occurs, turn patient to side and suction secretions as needed  - Reorient patient post seizure  - Seizure pads on all 4 side rails  - Instruct patient/family to notify RN of any seizure activity  - Instruct patient/family to call for assistance with activity based on assessment  Outcome: Not Progressing  Goal: Achieves maximal functionality and self care  Description: INTERVENTIONS  - Monitor swallowing and airway patency with patient fatigue and changes in neurological status  - Encourage and assist patient to increase activity and self care with guidance from PT/OT  - Encourage visually impaired, hearing impaired and aphasic patients to use assistive/communication devices  Outcome: Not Progressing     Problem: Diabetes/Glucose Control  Goal: Glucose maintained within prescribed range  Description: INTERVENTIONS:  - Monitor Blood Glucose as ordered  - Assess for signs and symptoms of hyperglycemia and hypoglycemia  - Administer ordered medications to maintain glucose within target range  - Assess barriers to adequate nutritional  intake and initiate nutrition consult as needed  - Instruct patient on self management of diabetes  Outcome: Not Progressing     Problem: Delirium  Goal: Minimize duration of delirium  Description: Interventions:  - Encourage use of hearing aids, eye glasses  - Promote highest level of mobility daily  - Provide frequent reorientation  - Promote wakefulness i.e. lights on, blinds open  - Promote sleep, encourage patient's normal rest cycle i.e. lights off, TV off, minimize noise and interruptions  - Encourage family to assist in orientation and promotion of home routines  Outcome: Not Progressing

## 2025-01-03 NOTE — PALLIATIVE CARE NOTE
[facility]  part of [facility]  Palliative Care Progress Note    Yin Butcher Patient Status:  Inpatient    1931 MRN [id number]   Location [facility]5W Attending Vika Elmore MD   Hosp Day # 22 PCP Mina Walker MD     The  Cures Act makes medical notes like these available to patients in the interest of transparency. Please be advised this is a medical document. Medical documents are intended to carry relevant information, facts as evident, and the clinical opinion of the practitioner. The medical note is intended as peer to peer communication and may appear blunt or direct. It is written in medical language and may contain abbreviations or verbiage that are unfamiliar.     Subjective     When I entered the room, Prema is in the bed she is not responsive, on Bipap FIO2 100%, guppy breathing, pale and is at EOL.     Review of pertinent medication requirements in past 24 hours:   No medications      Palliative Care symptom needs assessed:   Pertinent items are noted in HPI/below  Unable to obtain ROS due to Pt is not responsive at EOL    Allergies:  Allergies[1]    Medications:     Current Facility-Administered Medications:     midodrine (ProAmatine) tab 5 mg, 5 mg, Per NG Tube, TID    acetaZOLAMIDE (Diamox) tab 250 mg, 250 mg, Per G Tube, Daily    apixaban (Eliquis) tab 2.5 mg, 2.5 mg, Per G Tube, BID    furosemide (Lasix) tab 20 mg, 20 mg, Per G Tube, Daily    metoprolol tartrate (Lopressor) tab 25 mg, 25 mg, Per G Tube, 2x Daily(Beta Blocker)    famotidine (Pepcid) tab 20 mg, 20 mg, Per NG Tube, Daily    glycopyrrolate (Robinul) 0.2 MG/ML injection 0.1 mg, 0.1 mg, Intravenous, Q6H PRN    multivitamin (Tab-A-Jenny/Beta Carotene) tab 1 tablet, 1 tablet, Per G Tube, Daily    ipratropium-albuterol (Duoneb) 0.5-2.5 (3) MG/3ML inhalation solution 3 mL, 3 mL, Nebulization, Q6H PRN    digoxin (Lanoxin) tab 125 mcg, 125 mcg, Oral, Daily    acetaminophen (Ofirmev) 10  mg/mL infusion premix 650 mg, 15 mg/kg, Intravenous, Q6H PRN    dextrose 10% infusion (TPN no rate), , Intravenous, Continuous PRN    pancrelipase (Lip-Prot-Amyl) (Zenpep) DR particles cap 10,000 Units, 10,000 Units, Per G Tube, PRN **AND** sodium bicarbonate tab 325 mg, 325 mg, Per G Tube, PRN    metoprolol (Lopressor) 5 mg/5mL injection 5 mg, 5 mg, Intravenous, Q4H PRN    albuterol (Ventolin) (5 MG/ML) 0.5% nebulizer solution 5 mg, 5 mg, Nebulization, Q4H PRN    acetaminophen (Tylenol) 160 MG/5ML oral liquid 650 mg, 650 mg, Per NG Tube, Q4H PRN **OR** acetaminophen (Tylenol) rectal suppository 650 mg, 650 mg, Rectal, Q4H PRN    senna (Senokot) 8.8 MG/5ML oral syrup 17.6 mg, 10 mL, Per NG Tube, BID    docusate (Colace) 50 MG/5ML oral solution 100 mg, 100 mg, Per NG Tube, BID    polyethylene glycol (PEG 3350) (Miralax) 17 g oral packet 17 g, 17 g, Per NG Tube, Daily PRN    bisacodyl (Dulcolax) 10 MG rectal suppository 10 mg, 10 mg, Rectal, Daily PRN    ondansetron (Zofran) 4 MG/2ML injection 4 mg, 4 mg, Intravenous, Q6H PRN    metoclopramide (Reglan) 5 mg/mL injection 10 mg, 10 mg, Intravenous, Q8H PRN    Objective     Vital Signs:  Blood pressure 134/86, pulse 116, temperature 97.4 °F (36.3 °C), temperature source Axillary, resp. rate 23, height 5' 7.01\" (1.702 m), weight 121 lb 0.5 oz (54.9 kg), SpO2 99%.  Body mass index is 18.95 kg/m².  Present Level of pain: MARGARITA  Non-verbal signs of pain present: No    Physical Exam:  General: Prema is in bed Bipap FIO2 100%, she is guppy breathing and at EOL.  HEENT: Bipap, Dry oral MM  Cardiac: irregular rate and rhythm, S1, S2 normal, no murmur, rub or gallop.  Lungs: diminished shallow breaths, on 100% 02.  Abdomen: Soft, non-tender, faint bowel sounds  Extremities: Without clubbing, cyanosis.   Neurologic: not responsive.  Skin: Warm and dry, pale    Prior to admission Palliative performance scale PPSv2 (%): 30    Hematology:  Lab Results   Component Value Date    WBC 13.1  (H) 2025    HGB 10.0 (L) 2025    HCT 32.1 (L) 2025    .0 2025       Coags:  Lab Results   Component Value Date    PT 15.5 (H) 2014    INR 1.32 (H) 2024    PTT 34.6 2024       Chemistry:  Lab Results   Component Value Date    CREATSERUM 0.47 (L) 2025    BUN 22 2025     (L) 2025    K 4.5 2025    CL 98 2025    CO2 33.0 (H) 2025     (H) 2025    CA 9.7 2025    ALB 4.0 2024    ALKPHO 112 2024    BILT 1.0 (H) 2024    TP 8.2 2024    AST 24 2024    ALT 30 2024    DDIMER 2.38 (H) 2024    MG 2.0 2024    PHOS 4.1 2024    TROP <0.046 2015       Imaging:  No results found.      Summary of Discussion    Followed up this morning for Dr Liz who is off today.   Dtr Jada was present at the bedside. Luis is on FIO2 100%-guppy breathing, Luis is at EOL. Discussed with Jada her mother is dying, suggested removing the Bipap mask providing morphine for comfort and using NC. Jada stated No! to this suggestion, I asked if there were any other family that I could call for support and would want to see luis before she , Jada stated No! I offered  services for support Jada declined. Offered emotional support to Jada.   Would recommend medical team to continue to support Jada as her mother passes she clearly does not want comfort measures at this time, Luis is DNAR/DNI with selective treatment.   Please call if needed I can follow up again.     Assessment and Recommendation       Acute respiratory failure with hypercapnia-was extubated 24    Aspiration pneumonia    HFpEF    Afib with RVR    Dysphagia    Hypotension      Goals of care counseling  -see subjective above for details  -Pt's is DNAR/DNI with selective treatment  -Dispo: inpt death expected  -Provided emotional support to Jada who is coping as best she can at this time.    Advance care  planning  -see subjective above for details  -Pt's dtr Jada Butcher is HCPOA [phone]    Palliative Performance Scale 10%    Discussed today's visit with  Adriana DEJESUS and Dr Elmore    Palliative Care Follow Up: Palliative care team will continue to follow for support.  Feel free to contact our team with any questions or concerns.    Thank you for allowing Palliative Care services to participate in the care of Yni Butcher.    A total of 25 minutes were spent on this follow-up, which included all of the following: chart review, direct face to face contact, history taking, physical examination, counseling and coordinating care, and documentation.     Doretha Marcum, [id number]  1/3/2025  8:31AM  Palliative Care Services       [1]   Allergies  Allergen Reactions    Erythromycin OTHER (SEE COMMENTS)    Oxycodone HALLUCINATION    Gabapentin NAUSEA ONLY and OTHER (SEE COMMENTS)     Pt stated she didn't feel like herself     Tizanidine ITCHING and OTHER (SEE COMMENTS)     Pt states burning sensation

## 2025-01-03 NOTE — PROGRESS NOTES
[facility]  part of [facility]    Progress Note    Yin Butcher Patient Status:  Inpatient    1931 MRN [id number]   Location [facility]5W Attending Vika Elmore MD   Hosp Day # 21 PCP Mina Walker MD     SUBJECTIVE:  Pt seen and examined at bedside.   On room air  No new event reported per nursing        /88 (BP Location: Right arm)   Pulse 105   Temp 97.6 °F (36.4 °C) (Oral)   Resp 18   Ht 5' 7.01\" (1.702 m)   Wt 121 lb 0.5 oz (54.9 kg)   SpO2 95%   BMI 18.95 kg/m²     Physical Examination:    Gen: Awake, alert, oriented. Appears comfortable.  HEENT: PERRLA  Lungs: CTA B/L  Heart: Normal S1 S2, No M/G/R   Abd: Abdomen soft, nontender, nondistended, no organomegaly, bowel sounds present  Ext: No edema, no calf tenderness    Labs:   Lab Results   Component Value Date    WBC 8.2 2025    HGB 10.4 2025    HCT 32.6 2025    .0 2025    CREATSERUM 0.42 2025    BUN 16 2025     2025    K 4.7 2025    CL 99 2025    CO2 33.0 2025     2025    CA 9.8 2025       No results for input(s): \"PGLU\" in the last 168 hours.  No results for input(s): \"INR\" in the last 168 hours.    Imaging:  Imaging reviewed in Epic.    Meds:   Current Facility-Administered Medications   Medication Dose Route Frequency    midodrine (ProAmatine) tab 5 mg  5 mg Per NG Tube TID    [START ON 1/3/2025] acetaZOLAMIDE (Diamox) tab 250 mg  250 mg Per G Tube Daily    apixaban (Eliquis) tab 2.5 mg  2.5 mg Per G Tube BID    [START ON 1/3/2025] furosemide (Lasix) tab 20 mg  20 mg Per G Tube Daily    [START ON 1/3/2025] metoprolol tartrate (Lopressor) tab 25 mg  25 mg Per G Tube 2x Daily(Beta Blocker)    famotidine (Pepcid) tab 20 mg  20 mg Per NG Tube Daily    glycopyrrolate (Robinul) 0.2 MG/ML injection 0.1 mg  0.1 mg Intravenous Q6H PRN    multivitamin (Tab-A-Jenny/Beta Carotene) tab 1 tablet  1 tablet Per G Tube Daily     ipratropium-albuterol (Duoneb) 0.5-2.5 (3) MG/3ML inhalation solution 3 mL  3 mL Nebulization Q6H PRN    digoxin (Lanoxin) tab 125 mcg  125 mcg Oral Daily    acetaminophen (Ofirmev) 10 mg/mL infusion premix 650 mg  15 mg/kg Intravenous Q6H PRN    dextrose 10% infusion (TPN no rate)   Intravenous Continuous PRN    pancrelipase (Lip-Prot-Amyl) (Zenpep) DR particles cap 10,000 Units  10,000 Units Per G Tube PRN    And    sodium bicarbonate tab 325 mg  325 mg Per G Tube PRN    metoprolol (Lopressor) 5 mg/5mL injection 5 mg  5 mg Intravenous Q4H PRN    albuterol (Ventolin) (5 MG/ML) 0.5% nebulizer solution 5 mg  5 mg Nebulization Q4H PRN    acetaminophen (Tylenol) 160 MG/5ML oral liquid 650 mg  650 mg Per NG Tube Q4H PRN    Or    acetaminophen (Tylenol) rectal suppository 650 mg  650 mg Rectal Q4H PRN    senna (Senokot) 8.8 MG/5ML oral syrup 17.6 mg  10 mL Per NG Tube BID    docusate (Colace) 50 MG/5ML oral solution 100 mg  100 mg Per NG Tube BID    polyethylene glycol (PEG 3350) (Miralax) 17 g oral packet 17 g  17 g Per NG Tube Daily PRN    bisacodyl (Dulcolax) 10 MG rectal suppository 10 mg  10 mg Rectal Daily PRN    ondansetron (Zofran) 4 MG/2ML injection 4 mg  4 mg Intravenous Q6H PRN    metoclopramide (Reglan) 5 mg/mL injection 10 mg  10 mg Intravenous Q8H PRN       Assessment:       *Acute respiratory failure with hypercapnia/Aspiration pneumonia  CXR and CT chest reviewed   Intubated/sedated 12/12   Extubated 12/27 - was on BIPAP, now tolerating  NC   S/P 10 day course of antibiotics   Cont nebs   Leukocytosis improving   Pulmonology following   Palliative following   Guarded prognosis      *HFpEF  Cont lasix  Cardiology following      *Atrial fibrillation with rapid ventricular response (HCC)  Rates improved   Cont Eliquis and digoxin  Cardiology following      *Dysphagia   Cont Tube feeds, at goal  Aspiration precautions      *Hypotension  Cont midodrine   Cardiology following         DVT prophylaxis:  Eliquis  Code status: DNR/DNI      Dispo: Pt extubated 12/27/24, was on cont BIPAP, on on NC. Palliative following with multiple family meetings. Prognosis remains guarded.   Patient completed course of antibiotic therapy.  Now on room air.  Tolerating tube feeding.  Medically stable for discharge once accepting facility finalized.         Vika Elmore MD   1/2/2025  10:02 PM

## 2025-01-03 NOTE — PROGRESS NOTES
Pt became more SOB, gurgling a lot, requiring frequent suctioning. O2 saturation decreased down to 70's & 80's on RA. Non-re breather & high flow O2 at 15 L placed on pt,. RT came to evaluate pt. Pt very moist & having increased secretions build up. Nocturnist came to see pt at bedside. Per MD's order, continuous AVAPS placed on order for pt. CXR ordered, & TF stopped d/t to pt being so gurgling w/ increased secretions and needing constant suctioning. Attending MD notified. Daughter made aware of pt.'s change of condition, per daughter, no comfort measures at this time. Will continue to monitor.

## 2025-01-03 NOTE — PROGRESS NOTES
[facility]  part of [facility]    Progress Note    Yin Butcher Patient Status:  Inpatient    1931 MRN [id number]   Location [facility]5W Attending Vika Elmore MD   Hosp Day # 22 PCP Mina Walker MD     SUBJECTIVE:  Pt seen and examined at bedside.   Remains lethargic  Increased oxygen requirement, had to be put on AVAPS  Daughter at bedside    /64 (BP Location: Right arm)   Pulse 74   Temp 97.7 °F (36.5 °C) (Axillary)   Resp 24   Ht 5' 7.01\" (1.702 m)   Wt 121 lb 0.5 oz (54.9 kg)   SpO2 100%   BMI 18.95 kg/m²     Physical Examination:    Gen:  lethargic Appears comfortable.  HEENT: PERRLA  Lungs: CTA B/L  Heart: Normal S1 S2, No M/G/R   Abd: Abdomen soft, nontender, nondistended, no organomegaly, bowel sounds present  Ext: No edema, no calf tenderness    Labs:   Lab Results   Component Value Date    WBC 13.1 2025    HGB 10.0 2025    HCT 32.1 2025    .0 2025    CREATSERUM 0.47 2025    BUN 22 2025     2025    K 4.5 2025    CL 98 2025    CO2 33.0 2025     2025    CA 9.7 2025       No results for input(s): \"PGLU\" in the last 168 hours.  No results for input(s): \"INR\" in the last 168 hours.    Imaging:  Imaging reviewed in Epic.    Meds:   Current Facility-Administered Medications   Medication Dose Route Frequency    piperacillin-tazobactam (Zosyn) 3.375 g in dextrose 5% 100 mL IVPB-ADDV  3.375 g Intravenous Q8H    midodrine (ProAmatine) tab 5 mg  5 mg Per NG Tube TID    [Held by provider] acetaZOLAMIDE (Diamox) tab 250 mg  250 mg Per G Tube Daily    apixaban (Eliquis) tab 2.5 mg  2.5 mg Per G Tube BID    [Held by provider] furosemide (Lasix) tab 20 mg  20 mg Per G Tube Daily    metoprolol tartrate (Lopressor) tab 25 mg  25 mg Per G Tube 2x Daily(Beta Blocker)    famotidine (Pepcid) tab 20 mg  20 mg Per NG Tube Daily    glycopyrrolate (Robinul) 0.2 MG/ML injection 0.1 mg   0.1 mg Intravenous Q6H PRN    multivitamin (Tab-A-Jenny/Beta Carotene) tab 1 tablet  1 tablet Per G Tube Daily    ipratropium-albuterol (Duoneb) 0.5-2.5 (3) MG/3ML inhalation solution 3 mL  3 mL Nebulization Q6H PRN    digoxin (Lanoxin) tab 125 mcg  125 mcg Oral Daily    acetaminophen (Ofirmev) 10 mg/mL infusion premix 650 mg  15 mg/kg Intravenous Q6H PRN    dextrose 10% infusion (TPN no rate)   Intravenous Continuous PRN    pancrelipase (Lip-Prot-Amyl) (Zenpep) DR particles cap 10,000 Units  10,000 Units Per G Tube PRN    And    sodium bicarbonate tab 325 mg  325 mg Per G Tube PRN    metoprolol (Lopressor) 5 mg/5mL injection 5 mg  5 mg Intravenous Q4H PRN    albuterol (Ventolin) (5 MG/ML) 0.5% nebulizer solution 5 mg  5 mg Nebulization Q4H PRN    acetaminophen (Tylenol) 160 MG/5ML oral liquid 650 mg  650 mg Per NG Tube Q4H PRN    Or    acetaminophen (Tylenol) rectal suppository 650 mg  650 mg Rectal Q4H PRN    senna (Senokot) 8.8 MG/5ML oral syrup 17.6 mg  10 mL Per NG Tube BID    docusate (Colace) 50 MG/5ML oral solution 100 mg  100 mg Per NG Tube BID    polyethylene glycol (PEG 3350) (Miralax) 17 g oral packet 17 g  17 g Per NG Tube Daily PRN    bisacodyl (Dulcolax) 10 MG rectal suppository 10 mg  10 mg Rectal Daily PRN    ondansetron (Zofran) 4 MG/2ML injection 4 mg  4 mg Intravenous Q6H PRN    metoclopramide (Reglan) 5 mg/mL injection 10 mg  10 mg Intravenous Q8H PRN       Assessment:     *Acute respiratory failure with hypercapnia/Aspiration pneumonia  CXR and CT chest reviewed   Intubated/sedated 12/12   Extubated 12/27 - was on BIPAP, now tolerating  NC   S/P 10 day course of antibiotics   Cont nebs   Leukocytosis improving   Pulmonology following   Palliative following   Guarded prognosis      *HFpEF  Cont lasix  Cardiology following      *Atrial fibrillation with rapid ventricular response (HCC)  Rates improved   Cont Eliquis and digoxin  Cardiology following      *Dysphagia   Cont Tube feeds, at  goal  Aspiration precautions      *Hypotension  Cont midodrine   Cardiology following      # Dementia  Dysphagia  - Failure to thrive with profound muscle weakness and fatigue  Metabolic encephalopathy  -  Increasing oxygen requirement requiring AVAPS.  Zosyn restarted with increasing WBC, new finding on chest x-ray  Daughter against comfort care  Patient is hospice appropriate  POC discussed in detail with daughter at bedside  Discussed with nursing  Continue with AVAPS     DVT prophylaxis: Eliquis  Code status: DNR/DNI                    Vika Elmore MD   1/3/2025  2:24 PM

## 2025-01-04 NOTE — PROGRESS NOTES
01/03/25 0744 01/03/25 0921 01/03/25 0959   BiPAP   $ Vent Care / Non-Invasive Subsequent Day Yes  --   --    Device V60  --   --    BiPAP bacteria filter Yes  --   --    BIPAP plugged into main power? Yes  --   --    Mode AVAPS AVAPS  --    Interface Full face mask  --   --    Mask Size Large  --   --    Control Settings   Oxygen Percent 100 %  --   --    Inspiratory time 1  --   --    Insp rise time 3  --   --    AVAPS   Min IPAP 20  --   --    Max IPAP 35  --   --    EPAP Level 8  --   --    Set rate 14  --   --    Tidal volume 450  --   --    SIPAP   Resp  --  24  --    FiO2 (%)  --   --   --    BiPAP/CPAP Alarm Settings   Hi Rate 50  --   --    Low Rate 10  --   --    Hi VT 1500  --   --    Low   --   --    Hi Pressure 40  --   --    Low Pressure 5  --   --    Low MV 3  --   --    BiPAP/CPAP Monitored Parameters   Pulse 116 106 96   SpO2 99 % 100 %  --    PIP 35  --   --    Total Rate 23 breaths/min  --   --    Minute Volume 13  --   --    Tidal Volume 462  --   --    Total Leak 29  --   --    Trigger % 100  --   --    Ti/Ttot % 30  --   --    Toleration Well  --   --    Skin Integrity Normal  --   --       01/03/25 1047 01/03/25 1135 01/03/25 1236   BiPAP   $ Vent Care / Non-Invasive Subsequent Day  --  Yes  --    Device  --  V60  --    BiPAP bacteria filter  --   --   --    BIPAP plugged into main power?  --   --   --    Mode AVAPS AVAPS AVAPS   Interface  --   --   --    Mask Size  --   --   --    Control Settings   Oxygen Percent  --  100 %  --    Inspiratory time  --  1  --    Insp rise time  --  3  --    AVAPS   Min IPAP  --  15  --    Max IPAP  --  35  --    EPAP Level  --  8  --    Set rate  --  14  --    Tidal volume  --  450  --    SIPAP   Resp 24  --  24   FiO2 (%)  --   --  100 %   BiPAP/CPAP Alarm Settings   Hi Rate  --   --   --    Low Rate  --   --   --    Hi VT  --   --   --    Low VT  --   --   --    Hi Pressure  --   --   --    Low Pressure  --   --   --    Low MV  --   --   --     BiPAP/CPAP Monitored Parameters   Pulse 94 87 74   SpO2 100 %  --  100 %   PIP  --  16  --    Total Rate  --  21 breaths/min  --    Minute Volume  --  13  --    Tidal Volume  --  552  --    Total Leak  --  31  --    Trigger %  --  96  --    Ti/Ttot %  --  27  --    Toleration  --  Well  --    Skin Integrity  --   --   --       01/03/25 1243   BiPAP   $ Vent Care / Non-Invasive Subsequent Day  --    Device  --    BiPAP bacteria filter  --    BIPAP plugged into main power?  --    Mode  --    Interface  --    Mask Size  --    Control Settings   Oxygen Percent (S)  80 %   Inspiratory time  --    Insp rise time  --    AVAPS   Min IPAP  --    Max IPAP  --    EPAP Level  --    Set rate  --    Tidal volume  --    SIPAP   Resp  --    FiO2 (%)  --    BiPAP/CPAP Alarm Settings   Hi Rate  --    Low Rate  --    Hi VT  --    Low VT  --    Hi Pressure  --    Low Pressure  --    Low MV  --    BiPAP/CPAP Monitored Parameters   Pulse  --    SpO2  --    PIP  --    Total Rate  --    Minute Volume  --    Tidal Volume  --    Total Leak  --    Trigger %  --    Ti/Ttot %  --    Toleration  --    Skin Integrity  --

## 2025-01-04 NOTE — PROGRESS NOTES
[facility] [place]  part of [facility]    Progress Note    Yin Butcher Patient Status:  Inpatient    1931 MRN [id number]   Location [facility]5W Attending Carmelina Duong MD   Hosp Day # 23 PCP Mina Walker MD         Subjective:     Unable to perform ROS    On BiPAP  Remains unresponsive  No review of system from patient  Daughter at bedside  Objective:   Blood pressure 110/58, pulse 92, temperature 97.7 °F (36.5 °C), temperature source Axillary, resp. rate (!) 38, height 5' 7.01\" (1.702 m), weight 121 lb 0.5 oz (54.9 kg), SpO2 98%.  Physical Exam  Vitals and nursing note reviewed.   Constitutional:       Appearance: She is ill-appearing.   HENT:      Head: Atraumatic.      Mouth/Throat:      Mouth: Mucous membranes are dry.   Eyes:      General: No scleral icterus.  Cardiovascular:      Rate and Rhythm: Tachycardia present.      Heart sounds:      No gallop.   Pulmonary:      Breath sounds: Rhonchi and rales present. No wheezing.   Abdominal:      General: There is no distension.      Palpations: Abdomen is soft.      Tenderness: There is no guarding.   Musculoskeletal:      Cervical back: No rigidity.      Right lower leg: No edema.      Left lower leg: No edema.   Skin:     General: Skin is dry.   Neurological:      Comments: Unresponsive         Results:   Lab Results   Component Value Date    WBC 30.9 (H) 2025    HGB 10.4 (L) 2025    HCT 34.7 (L) 2025    .0 2025    CREATSERUM 0.91 2025    BUN 38 (H) 2025     (L) 2025    K 4.8 2025    CL 96 (L) 2025    CO2 32.0 2025    GLU 98 2025    CA 10.2 2025    ALB 4.0 2024    ALKPHO 112 2024    BILT 1.0 (H) 2024    TP 8.2 2024    AST 24 2024    ALT 30 2024    PTT 34.6 2024    INR 1.32 (H) 2024    PT 15.5 (H) 2014    T4F 1.0 2015    TSH 2.138 2024    LIP 35 2024    DDIMER 2.38 (H)  01/20/2024    MG 2.0 12/14/2024    PHOS 4.1 12/14/2024    TROP <0.046 02/19/2015    TROPHS 25 01/20/2024    CK 32 01/20/2024    B12 586 01/23/2024       XR CHEST AP PORTABLE  (CPT=71045)    Result Date: 1/3/2025  CONCLUSION:   Small to moderate right pleural effusion has increased/worsened since examination from 8 days prior.  Hazy right basilar opacities have also worsened since prior and could relate to any combination of compressive atelectasis or coexistent infection/aspiration.  Stable small left pleural effusion and probable associated compressive atelectasis at the left lung base.  Interval extubation.  Lesser incidental findings as above.   A preliminary report was issued by the [facility] teleradiology service. There are no major discrepancies.  elm-remote  Dictated by (CST): Ricco Segundo MD on 1/03/2025 at 9:31 AM     Finalized by (CST): Ricco Segundo MD on 1/03/2025 at 9:33 AM               Assessment & Plan:       1-acute on chronic hypoxemic and hypercapnic respiratory failure  - Dementia / dysphagia - aspiration and weakness/muscle wasting and ventilatory failure  - HFPEF with edema / chronic basilar transudate effusion   - Aspiration pneumonia     Had a prolonged intubation  Extubated and now DNAR/select with no reintubation  Today unresponsive and placed again on NIV with AVAPS  Chest x-ray worsening basilar atelectasis with a chronic  basilar effusion  Significant leukocytosis    Started again on Zosyn , will add vancomycin      2-dementia and dysphagia  Supportive care  Nutrition via G-tube     3-failure to thrive with profound muscle weakness and fatigue        4-HFpEF with chronic A-fib on Eliquis  Per cardiology      5-toxic and metabolic and likely hypercapnic encephalopathy  Supportive care and NIV / avaps      6-DVT prophylaxis  Patient on Eliquis     7-DNR/select  Patient seems hospice candidate  Palliative care following        D/w daughter at bedside at length today   D/w staff    Poor prognosis             Hansel Eisenberg MD  1/4/2025

## 2025-01-04 NOTE — PLAN OF CARE
Problem: Patient Centered Care  Goal: Patient preferences are identified and integrated in the patient's plan of care  Description: Interventions:  - What would you like us to know as we care for you? Pt came from [facility] & was hospitalized at [facility]. Per daughter, pt is not returning back to [place].  - Provide timely, complete, and accurate information to patient/family  - Incorporate patient and family knowledge, values, beliefs, and cultural backgrounds into the planning and delivery of care  - Encourage patient/family to participate in care and decision-making at the level they choose  - Honor patient and family perspectives and choices  Outcome: Progressing       Problem: Safety Risk - Non-Violent Restraints  Goal: Patient will remain free from self-harm  Description: INTERVENTIONS:  - Apply the least restrictive restraint to prevent harm  - Notify patient and family of reasons restraints applied  - Assess for any contributing factors to confusion (electrolyte disturbances, delirium, medications)  - Discontinue any unnecessary medical devices as soon as possible  - Assess the patient's physical comfort, circulation, skin condition, hydration, nutrition and elimination needs   - Reorient and redirection as needed  - Assess for the need to continue restraints  Outcome: Progressing     Problem: PAIN - ADULT  Goal: Verbalizes/displays adequate comfort level or patient's stated pain goal  Description: INTERVENTIONS:  - Encourage pt to monitor pain and request assistance  - Assess pain using appropriate pain scale  - Administer analgesics based on type and severity of pain and evaluate response  - Implement non-pharmacological measures as appropriate and evaluate response  - Consider cultural and social influences on pain and pain management  - Manage/alleviate anxiety  - Utilize distraction and/or relaxation techniques  - Monitor for opioid side effects  - Notify MD/LIP if interventions  unsuccessful or patient reports new pain  - Anticipate increased pain with activity and pre-medicate as appropriate  Outcome: Progressing     Problem: RISK FOR INFECTION - ADULT  Goal: Absence of fever/infection during anticipated neutropenic period  Description: INTERVENTIONS  - Monitor WBC  - Administer growth factors as ordered  - Implement neutropenic guidelines  Outcome: Progressing     Problem: SAFETY ADULT - FALL  Goal: Free from fall injury  Description: INTERVENTIONS:  - Assess pt frequently for physical needs  - Identify cognitive and physical deficits and behaviors that affect risk of falls.  - [place] fall precautions as indicated by assessment.  - Educate pt/family on patient safety including physical limitations  - Instruct pt to call for assistance with activity based on assessment  - Modify environment to reduce risk of injury  - Provide assistive devices as appropriate  - Consider OT/PT consult to assist with strengthening/mobility  - Encourage toileting schedule  Outcome: Progressing     Problem: DISCHARGE PLANNING  Goal: Discharge to home or other facility with appropriate resources  Description: INTERVENTIONS:  - Identify barriers to discharge w/pt and caregiver  - Include patient/family/discharge partner in discharge planning  - Arrange for needed discharge resources and transportation as appropriate  - Identify discharge learning needs (meds, wound care, etc)  - Arrange for interpreters to assist at discharge as needed  - Consider post-discharge preferences of patient/family/discharge partner  - Complete POLST form as appropriate  - Assess patient's ability to be responsible for managing their own health  - Refer to Case Management Department for coordinating discharge planning if the patient needs post-hospital services based on physician/LIP order or complex needs related to functional status, cognitive ability or social support system  Outcome: Progressing     Problem: Altered  Communication/Language Barrier  Goal: Patient/Family is able to understand and participate in their care  Description: Interventions:  - Assess communication ability and preferred communication style  - Implement communication aides and strategies  - Use visual cues when possible  - Listen attentively, be patient, do not interrupt  - Minimize distractions  - Allow time for understanding and response  - Establish method for patient to ask for assistance (call light)  - Provide an  as needed  - Communicate barriers and strategies to overcome with those who interact with patient  Outcome: Progressing     Problem: NEUROLOGICAL - ADULT  Goal: Absence of seizures  Description: INTERVENTIONS  - Monitor for seizure activity  - Administer anti-seizure medications as ordered  - Monitor neurological status  Outcome: Progressing  Goal: Remains free of injury related to seizure activity  Description: INTERVENTIONS:  - Maintain airway, patient safety  and administer oxygen as ordered  - Monitor patient for seizure activity, document and report duration and description of seizure to MD/LIP  - If seizure occurs, turn patient to side and suction secretions as needed  - Reorient patient post seizure  - Seizure pads on all 4 side rails  - Instruct patient/family to notify RN of any seizure activity  - Instruct patient/family to call for assistance with activity based on assessment  Outcome: Progressing     Problem: Diabetes/Glucose Control  Goal: Glucose maintained within prescribed range  Description: INTERVENTIONS:  - Monitor Blood Glucose as ordered  - Assess for signs and symptoms of hyperglycemia and hypoglycemia  - Administer ordered medications to maintain glucose within target range  - Assess barriers to adequate nutritional intake and initiate nutrition consult as needed  - Instruct patient on self management of diabetes  Outcome: Progressing     Problem: Delirium  Goal: Minimize duration of delirium  Description:  Interventions:  - Encourage use of hearing aids, eye glasses  - Promote highest level of mobility daily  - Provide frequent reorientation  - Promote wakefulness i.e. lights on, blinds open  - Promote sleep, encourage patient's normal rest cycle i.e. lights off, TV off, minimize noise and interruptions  - Encourage family to assist in orientation and promotion of home routines  Outcome: Progressing     Problem: RESPIRATORY - ADULT  Goal: Achieves optimal ventilation and oxygenation  Description: INTERVENTIONS:  - Assess for changes in respiratory status  - Assess for changes in mentation and behavior  - Position to facilitate oxygenation and minimize respiratory effort  - Oxygen supplementation based on oxygen saturation or ABGs  - Provide Smoking Cessation handout, if applicable  - Encourage broncho-pulmonary hygiene including cough, deep breathe, Incentive Spirometry  - Assess the need for suctioning and perform as needed  - Assess and instruct to report SOB or any respiratory difficulty  - Respiratory Therapy support as indicated  - Manage/alleviate anxiety  - Monitor for signs/symptoms of CO2 retention  Outcome: Not Progressing     Problem: NEUROLOGICAL - ADULT  Goal: Achieves stable or improved neurological status  Description: INTERVENTIONS  - Assess for and report changes in neurological status  - Initiate measures to prevent increased intracranial pressure  - Maintain blood pressure and fluid volume within ordered parameters to optimize cerebral perfusion and minimize risk of hemorrhage  - Monitor temperature, glucose, and sodium. Initiate appropriate interventions as ordered  Outcome: Not Progressing  Goal: Achieves maximal functionality and self care  Description: INTERVENTIONS  - Monitor swallowing and airway patency with patient fatigue and changes in neurological status  - Encourage and assist patient to increase activity and self care with guidance from PT/OT  - Encourage visually impaired, hearing  impaired and aphasic patients to use assistive/communication devices  Outcome: Not Progressing

## 2025-01-04 NOTE — PROGRESS NOTES
[facility] Pharmacy Dosing Service      Initial Pharmacokinetic Consult for Vancomycin Dosing     Yin Butcher is a 89 year old female who is being initiated on vancomycin therapy for pneumonia.  Pharmacy has been asked to dose vancomycin by Hansel Eisenberg MD.  The initial treatment and monitoring approach will be non-AUC strategy.        Weight and Temperature:    Wt Readings from Last 1 Encounters:   24 54.9 kg (121 lb 0.5 oz)        Temp Readings from Last 1 Encounters:   25 98.1 °F (36.7 °C) (Axillary)      Labs:   Recent Labs   Lab 25  0531 25  0555 25  0456   CREATSERUM 0.42* 0.47* 0.91      Estimated Creatinine Clearance: 33.5 mL/min (based on SCr of 0.91 mg/dL).     Recent Labs   Lab 25  0531 25  0555 25  0456   WBC 8.2 13.1* 30.9*          The Pharmacokinetic Target is:    Trough/random 10-15 mg/L    Renal Dosing Considerations:    FLORESITA/ARF     Assessment/Plan:   Initial/Loading dose: Will receive 1000 mg IV (20 mg/kg, capped at 2250 mg) x 1 initial dose.      Maintenance dose: Pharmacy will dose vancomycin at 1000 mg IV every 24 hours    Monitorin) Plan for vancomycin trough to be obtained before the 3rd dose    2) Pharmacy will order SCr as clinically indicated to assess renal function.    3) Pharmacy will monitor for toxicity and efficacy, adjust vancomycin dose and/or frequency, and order vancomycin levels as appropriate per the Pharmacy and Therapeutics Committee approved protocol until discontinuation of the medication.       We appreciate the opportunity to assist in the care of this patient.     Chong Kohler [facility]  2025  4:26 PM  [place]  Pharmacy Extension: [phone]

## 2025-01-04 NOTE — RESPIRATORY THERAPY NOTE
Pt received on continuous bipap.     Settings: AVAPS 14/450/+8/50%    No acute events overnight. RT to continue to monitor.           01/04/25 0413   BiPAP/CPAP Monitored Parameters   PIP 20   Total Rate 23 breaths/min   Minute Volume 14   Tidal Volume 696   Total Leak 42   Trigger % 100   Ti/Ttot % 35

## 2025-01-04 NOTE — DIETARY NOTE
ADULT NUTRITION REASSESSMENT    Pt is at high nutrition risk.  Pt meets severe malnutrition criteria.      CRITERIA FOR MALNUTRITION DIAGNOSIS:  Criteria for severe malnutrition diagnosis: chronic illness related to body fat severe depletion and muscle mass severe depletion.    RECOMMENDATIONS TO MD:   Tube feeds remain on hold.     ADMITTING DIAGNOSIS:  Atrial fibrillation with rapid ventricular response ([facility]) [I48.91]  Acute respiratory failure with hypercapnia ([facility]) [J96.02]  Sepsis, due to unspecified organism, unspecified whether acute organ dysfunction present ([facility]) [A41.9]  PERTINENT PAST MEDICAL HISTORY:   Past Medical History:    Arthritis    Atrial fibrillation (HCC)    Back problem    Cancer (HCC)    Cataract    Osteoporosis    Personal history of antineoplastic chemotherapy     PATIENT STATUS:   12/13/24 INITIAL VISIT: Pt assessed due to low BMI and new consult for RD to initiate tube feeds. Pt presented to hospital with AMS, fevers over the past \"few days\". Extensive PMH as listed above including recent discharge from [facility] on 11/24/24. Pt is orally intubated, sedated on Propofol. No family present at time of visit. Unable to obtain recent diet hx.   12/18/24 UPDATE: Remains orally intubated, Sedated on Propofol. Pt's DTR at bedside at time of visit - provided with diet hx. Per DTR, pt with good/normal appetite/intake with stable wt prior to recent admission in November. Per DTR, pt sustained an injury to her esophagus during that admission resulting in dysphagia therefore a G-tube was surgically placed. DTR did note some wt loss recently however unable to quantify. Expressed desire to see \"wt gain\" with administration of EN feeds. Explained to DTR that the goal of EN administration is currently to meet needs as not to overfeed pt and worsen respiratory status. Pt was transferred to rehab following discharge where she was starting to work with SLP however remained NPO.  12/23/24 UPDATE: EN tolerated well.  Re-calculate needs/[place] equation and increasing tube feeds accordingly. Propofol intake negligible. Adding daily multivitamin to assure meeting >100% RDI's. Noted 24# wt gain in 5-6 days if accurate but is c/w +10L Net I/O- ? Fluid gains.   12/27/24 UPDATE: Remains orally intubated. Off sedation. Failed SBT again today. GOC discussions on-going. Tolerating EN feeds via G-tube at goal rate. Pt's prognosis remains poor - not appropriate for tracheostomy. Noted hyponatremia - adjusted EN and FWF.      Update 12/31/24: RA completed per protocol. Chart reviewed, extubated 12/27. Off Bipap currently on room air. PEG placed 11/22/24 per outside hospital RD note review. Discussion with RN, tolerating TF at current weight (current receiving 45 ml/hr using Promote). Adjusted calorie needs now extubated. Adjusted TF to Jevity 1.5 (usual TF per rehab records). Palliative care following, note reviewed. Current Code Status: DNAR/Select  1/4/25 Update: Tube feeds on hold since 1/2 0300 for increased suctioning and more moist and gurgly, decreased O2 sats.  IVF have been started. Per MD notes pt seems hospice candidate and palliative is following. Will monitor Goals of care to drive nutrition plan of care. Currently DNAR/Select (no reintubation).     FOOD/NUTRITION RELATED HISTORY:  Intake:  EN orders per rehab record Jevity 1.5 @ 45 ml/hr x 21 hrs + ProHeal Sugar Free Critical Care AWC 30 ml BID (100 kcal & 17g protein in 30 ml) + 250 ml H2O flushes TID to provide 1618 kcal (39 kcal/kg), 94 g protein (2.2 g/kg) and 1528 ml free H2O.   Intake Meeting Needs: PtA yes.   Percent Meals Eaten (last 3 days)       Date/Time Percent Meals Eaten (%)    01/03/25 0959 0 %     Percent Meals Eaten (%): pt npo at 01/03/25 0959    01/03/25 1516 0 %     Percent Meals Eaten (%): pt npo at 01/03/25 1516    01/03/25 1815 0 %     Percent Meals Eaten (%): pt npo at 01/03/25 1815    01/04/25 0853 0 %     Percent Meals Eaten (%): NPO at 01/04/25  0853          Food Allergies: No Known Food Allergies (NKFA)  Cultural/Ethnic/Temple Preferences: Not Obtained    GASTROINTESTINAL: +BM 1/1/24 large loose brown.  - PEG/G-tube in place. 1/3: feeds on hold.     MEDICATIONS: reviewed; Noted cardiac electrolyte replacement protocol ordered; scheduled senokot and colace;   sodium chloride      dextrose 10%        vancomycin  125 mg Per NG Tube Daily    piperacillin-tazobactam  3.375 g Intravenous Q8H    midodrine  5 mg Per NG Tube TID    [Held by provider] acetaZOLAMIDE  250 mg Per G Tube Daily    apixaban  2.5 mg Per G Tube BID    [Held by provider] furosemide  20 mg Per G Tube Daily    metoprolol tartrate  25 mg Per G Tube 2x Daily(Beta Blocker)    famotidine  20 mg Per NG Tube Daily    multivitamin  1 tablet Per G Tube Daily    digoxin  125 mcg Oral Daily    senna  10 mL Per NG Tube BID    docusate  100 mg Per NG Tube BID   PRN: Dulcolax if needed.     LABS: reviewed. Hyponatremia now on IVF with 0.45% NaCL.   Recent Labs     01/02/25  0531 01/03/25  0555 01/04/25  0456   * 115* 98   BUN 16 22 38*   CREATSERUM 0.42* 0.47* 0.91   CA 9.8 9.7 10.2    135* 134*   K 4.7 4.5 4.8   CL 99 98 96*   CO2 33.0* 33.0* 32.0   OSMOCALC 285 284 287     NUTRITION RELATED PHYSICAL FINDINGS:  - Nutrition Focused Physical Exam (NFPE): severe depletion body fat orbital region and fat overlying rib cage region and severe depletion muscle mass temple region and clavicle region.   - Fluid Accumulation: non-pitting Bilateral Lower extremity and Feet, Left Upper extremity and Hand (s), and perineal, and Right Upper extremity, and +2 Right Hand (s) per flowsheet review --> See RN documentation for details  - Skin Integrity: Pressure Injury: Stage 2 on coccyx, at risk, and other wounds noted see RN documentation for details    ANTHROPOMETRICS:  HT:  170.2 cm (5'7\")   WT: 54.9 kg (121 lb 0.5 oz) (wt up 31 % (28 lbs) from lowest wt this admission with noted edema present) Big ARI  in weight from 101 lbs on 12/16 and 116# 6 oz on 12/19 to 125 lbs on 12/21- appears fluid gain (net I/0: +4.49L)   Last Visit WT: 126# 9 oz on 12/27/24  Admit WT: 105# on 12/11/24  DOSING WT: 42 kg (93 lbs - lowest wt this admission) - wt utilized for anthropometric assessment  BMI: Body mass index is 18.95 kg/m² current weight with above noted edema.  BMI CLASSIFICATION: less than 18.5 kg/m2 - underweight dosing wt  IBW: 135 lbs        78% IBW admit wt.   Usual Body Wt: 105 lbs (per EMR ~1 yr ago)      100% UBW admit wt    WEIGHT HISTORY: Current wt reflects 11% (12 lb)  unintentional wt loss.   Patient Weight(s) for the past 336 hrs:   Weight   12/30/24 1250 54.9 kg (121 lb 0.5 oz)   12/29/24 0600 57.4 kg (126 lb 8.7 oz)   12/28/24 0600 57.9 kg (127 lb 10.3 oz)   12/27/24 0500 57.4 kg (126 lb 8.7 oz)   12/26/24 0600 57.2 kg (126 lb 1.7 oz)   12/25/24 0356 57.4 kg (126 lb 8.7 oz)   12/24/24 0600 56.4 kg (124 lb 5.4 oz)   12/23/24 0600 55.7 kg (122 lb 12.7 oz)   12/22/24 0504 56.2 kg (123 lb 14.4 oz)     Wt Readings from Last 10 Encounters:   12/30/24 54.9 kg (121 lb 0.5 oz)   02/02/24 56.7 kg (125 lb)   10/29/23 47.5 kg (104 lb 11.5 oz)   09/10/23 42.1 kg (92 lb 14.4 oz)   04/13/23 53.5 kg (118 lb)   04/10/23 53.5 kg (118 lb)   04/03/23 53.5 kg (118 lb)   03/30/23 53.5 kg (118 lb)   03/27/23 53.3 kg (117 lb 9.6 oz)   03/22/23 64 kg (141 lb)     NUTRITION DIAGNOSIS/PROBLEM:   Severe Malnutrition related to Chronic - Physiological causes resulting in anorexia or diminished intake as evidenced by body fat severe depletion and muscle mass severe depletion.    NUTRITION DIAGNOSIS PROGRESS:  NO Improvement (unresolved) - EN feeds on hold.     NUTRITION INTERVENTION:   NUTRITION PRESCRIPTION:   Estimated Nutrition needs: --dosing wt of 42 kg - wt taken on 12/12/24 **Updated 12/31/24  Calories: 3929-8014 calories/day (35-40 calories per kg Dosing wt)  Protein: 63-84 g protein/day (1.5-2.0 g protein/kg Dosing wt)  Fluid  Needs: 9836-2534 ml/day (1 ml/kcal)    - Diet:       Procedures    NPO      - Enteral Nutrition: TUBE FEEDS on HOLD. Prior rx; Jevity 1.5 at 45 ml/hr per G-Tube/PEG. Based on average 22 hour infusion time. Current rate provides 1485 kcal, 63 grams protein, 752 ml total free water, and 99% RDI's, 100% calorie needs and 100% protein needs. Water flushes 60 ml q 4 hours (360 ml). Total water 1112 ml daily (decreased free water due to mild hyponatremia).    - RD Malnutrition Care Plan: resume feeds when/if deemed safe.   - Vitamin and mineral supplements: MVI  - Nutrition education: not appropriate at this time  - Coordination of nutrition care: collaboration with other providers   - Discharge and transfer of nutrition care to new setting or provider: monitor plans     MONITOR AND EVALUATE/NUTRITION GOALS:  - Food and Nutrient Intake:      Monitor: N/A  - Food and Nutrient Administration:      Monitor: if able to resume EN (deemed safe) and in accordance with treatment plans.   - Anthropometric Measurement:    Monitor weight  - Nutrition Goals:      labs within acceptable limits, euglycemia, support wound healing, and resume EN if deemed safe and in accordance with overall goals of care.     DIETITIAN FOLLOW UP: RD to follow and monitor nutrition status and feeding plans.     Pati Matthew RD, LDN, [facility] ([id number])

## 2025-01-05 NOTE — PLAN OF CARE
Problem: RESPIRATORY - ADULT  Goal: Achieves optimal ventilation and oxygenation  Description: INTERVENTIONS:  - Assess for changes in respiratory status  - Assess for changes in mentation and behavior  - Position to facilitate oxygenation and minimize respiratory effort  - Oxygen supplementation based on oxygen saturation or ABGs  - Provide Smoking Cessation handout, if applicable  - Encourage broncho-pulmonary hygiene including cough, deep breathe, Incentive Spirometry  - Assess the need for suctioning and perform as needed  - Assess and instruct to report SOB or any respiratory difficulty  - Respiratory Therapy support as indicated  - Manage/alleviate anxiety  - Monitor for signs/symptoms of CO2 retention  Outcome: Progressing    Received patient on continuous AVAPS settings below,       01/04/25 1630   AVAPS   Min IPAP 13   Max IPAP 35   EPAP Level 8   Set rate 14   Tidal volume 450   FIO2                                                   50%    Titrate fio2 45%, tolerating well    1750 : placed on HFNC 6LPM, and given ordered Albuterol BID neb tx.    Will go back to AVAPS later, RT will continue to monitor.

## 2025-01-05 NOTE — PROGRESS NOTES
[facility]  part of [facility]    Progress Note    Yin Butcher Patient Status:  Inpatient    1931 MRN [id number]   Location [facility]5W Attending Carmelina Duong MD   Hosp Day # 23 PCP Mina Walker MD     Chief Complaint:       HPI  Yin Butcher is a(n) 89 year old female with past medical history of atrial fibrillation, pulmonary embolism presented to ER with fever, lethargy.  Patient was recently discharged from [facility] where she was admitted for aspiration pneumonia.  Patient was placed on BiPAP in ER.        Subjective: non responsive     Unable to perform ROS      Objective:   Blood pressure 108/61, pulse 104, temperature 97.4 °F (36.3 °C), temperature source Axillary, resp. rate (!) 32, height 5' 7.01\" (1.702 m), weight 121 lb 0.5 oz (54.9 kg), SpO2 94%.      Physical Exam  Constitutional:       Appearance: She is ill-appearing.      Comments: Non responsive to verbal or tactile stimuli at this visit    Cardiovascular:      Rate and Rhythm: Normal rate and regular rhythm.      Pulses: Normal pulses.      Heart sounds: Normal heart sounds.   Pulmonary:      Comments: Diminished at bases bilat , AVAPS in place   Skin:     General: Skin is warm and dry.   Neurological:      Comments: Non responsive to verbal or tactile Stimuli this visit          Results:   Lab Results   Component Value Date    WBC 30.9 (H) 2025    HGB 10.4 (L) 2025    HCT 34.7 (L) 2025    .0 2025    CREATSERUM 0.91 2025    BUN 38 (H) 2025     (L) 2025    K 4.8 2025    CL 96 (L) 2025    CO2 32.0 2025    GLU 98 2025    CA 10.2 2025    ALB 4.0 2024    ALKPHO 112 2024    BILT 1.0 (H) 2024    TP 8.2 2024    AST 24 2024    ALT 30 2024    PTT 34.6 2024    INR 1.32 (H) 2024    PT 15.5 (H) 2014    T4F 1.0 2015    TSH 2.138 2024    LIP 35  12/11/2024    DDIMER 2.38 (H) 01/20/2024    MG 2.0 12/14/2024    PHOS 4.1 12/14/2024    TROP <0.046 02/19/2015    TROPHS 25 01/20/2024    CK 32 01/20/2024    B12 586 01/23/2024       XR CHEST AP PORTABLE  (CPT=71045)    Result Date: 1/3/2025  CONCLUSION:   Small to moderate right pleural effusion has increased/worsened since examination from 8 days prior.  Hazy right basilar opacities have also worsened since prior and could relate to any combination of compressive atelectasis or coexistent infection/aspiration.  Stable small left pleural effusion and probable associated compressive atelectasis at the left lung base.  Interval extubation.  Lesser incidental findings as above.   A preliminary report was issued by the [email or web address] teleradiology service. There are no major discrepancies.  elm-remote  Dictated by (CST): Ricco Segundo MD on 1/03/2025 at 9:31 AM     Finalized by (CST): Ricco Segundo MD on 1/03/2025 at 9:33 AM               Assessment & Plan:      *Acute respiratory failure with hypercapnia/Aspiration pneumonia  -Intubated/sedated 12/12   -Extubated 12/27 - was on BIPAP, now tolerating  NC   -S/P 10 day course of antibiotics   -Cont nebs   -Pulmonology following   -Palliative following   -Guarded prognosis   -DNAR/select with no reintubation   -chest Xray 1/3 with Hazy right basilar opacities have also worsened since prior and could relate to any combination of compressive atelectasis or coexistent   infection/aspiration.   -on Zosyn and vancomycin      *HFpEF  -on BB   -Cardiology following      *Atrial fibrillation with rapid ventricular response (HCC)  -Cont Eliquis and digoxin  Cardiology following      *Dysphagia   -Tube feeds on hold due to concern for aspiration   -Aspiration precautions      *Hypotension  -Cont midodrine   -Cardiology following   -start .45 NS at 60 ml per hour        *Dementia  Failure to thrive with profound muscle weakness and fatigue  Metabolic encephalopathy  -Patient  is hospice appropriate, family declines   -POC discussed in detail with daughter at bedside again today   -daughter met with Palliative Care on 1/3  -Pt's is DNAR/DNI with selective treatment        DVT prophylaxis: Eliquis  Code status:  DNAR/DNI with selective treatment               Dr Carmelina Hodges, APRN  1/4/2025

## 2025-01-05 NOTE — PROGRESS NOTES
[facility]  part of [facility]    Progress Note    Yin Butcher Patient Status:  Inpatient    1931 MRN [id number]   Location [facility]5W Attending Carmelina Duong MD   Hosp Day # 24 PCP Mina Walker MD         Subjective:     Unable to perform ROS    Lethargic  No review of system from patient  Patient was on BiPAP she was taken off BiPAP and placed on high flow nasal cannula  Objective:   Blood pressure 102/66, pulse 78, temperature 97.7 °F (36.5 °C), temperature source Axillary, resp. rate (!) 38, height 5' 7.01\" (1.702 m), weight 121 lb 0.5 oz (54.9 kg), SpO2 98%.  Physical Exam  Vitals and nursing note reviewed.   Constitutional:       General: She is in acute distress.      Appearance: She is ill-appearing.   HENT:      Head: Atraumatic.      Mouth/Throat:      Mouth: Mucous membranes are dry.   Eyes:      General: No scleral icterus.  Cardiovascular:      Rate and Rhythm: Normal rate.   Pulmonary:      Breath sounds: Rales present. No wheezing.      Comments: Diminished breath sounds at the bases with bilateral crackles  Abdominal:      General: There is no distension.      Palpations: Abdomen is soft.   Lymphadenopathy:      Cervical: No cervical adenopathy.   Skin:     General: Skin is dry.   Neurological:      Comments: Lethargic         Results:   Lab Results   Component Value Date    WBC 23.5 (H) 2025    HGB 9.7 (L) 2025    HCT 31.2 (L) 2025    .0 2025    CREATSERUM 0.70 2025    BUN 40 (H) 2025     2025    K 3.7 2025    CL 99 2025    CO2 33.0 (H) 2025    GLU 91 2025    CA 10.5 (H) 2025    ALB 4.0 2024    ALKPHO 112 2024    BILT 1.0 (H) 2024    TP 8.2 2024    AST 24 2024    ALT 30 2024    PTT 34.6 2024    INR 1.32 (H) 2024    PT 15.5 (H) 2014    T4F 1.0 2015    TSH 2.138 2024    LIP 35 2024    DDIMER 2.38  (H) 01/20/2024    MG 2.0 12/14/2024    PHOS 4.1 12/14/2024    TROP <0.046 02/19/2015    TROPHS 25 01/20/2024    CK 32 01/20/2024    B12 586 01/23/2024             Assessment & Plan:       1-acute on chronic hypoxemic and hypercapnic respiratory failure  - Dementia / dysphagia - aspiration and weakness/muscle wasting and ventilatory failure  - HFPEF with edema / chronic basilar transudate effusion   - Aspiration pneumonia     Had a prolonged intubation  Extubated and now DNAR/select with no reintubation    Worse in the last few days with increased lethargy and placed again on NIV with AVAPS  Antibiotics added with Zosyn and vancomycin for leukocytosis  Bronchial hygiene     2-dementia and dysphagia  Supportive care  Nutrition via G-tube     3-failure to thrive with profound muscle weakness and fatigue        4-HFpEF with chronic A-fib on Eliquis  Per cardiology      5-toxic and metabolic and likely hypercapnic encephalopathy  Supportive care and NIV     6-DVT prophylaxis  Patient on Eliquis     7-DNR/select  Patient seems hospice candidate  Palliative care following              Hansel Eisenberg MD  1/5/2025

## 2025-01-05 NOTE — PROGRESS NOTES
[facility]  part of [facility]    Progress Note    Yin Butcher Patient Status:  Inpatient    1931 MRN [id number]   Location [facility]5W Attending Carmelina Duong MD   Hosp Day # 24 PCP Mina Wakler MD     Chief Complaint:     Subjective:     Unable to perform ROS      Objective:   Blood pressure 114/74, pulse 105, temperature 98.2 °F (36.8 °C), temperature source Axillary, resp. rate 26, height 5' 7.01\" (1.702 m), weight 121 lb 0.5 oz (54.9 kg), SpO2 100%.  Physical Exam  Constitutional:       Appearance: She is ill-appearing.      Comments: Patient is thin and frail    HENT:      Head: Normocephalic.   Cardiovascular:      Rate and Rhythm: Normal rate and regular rhythm.      Pulses: Normal pulses.      Heart sounds: Normal heart sounds.   Pulmonary:      Comments: Coarse breath sounds bilat   Skin:     General: Skin is warm and dry.   Neurological:      Comments: Minimally responsive          Results:   Lab Results   Component Value Date    WBC 23.5 (H) 2025    HGB 9.7 (L) 2025    HCT 31.2 (L) 2025    .0 2025    CREATSERUM 0.70 2025    BUN 40 (H) 2025     2025    K 3.7 2025    CL 99 2025    CO2 33.0 (H) 2025    GLU 91 2025    CA 10.5 (H) 2025    ALB 4.0 2024    ALKPHO 112 2024    BILT 1.0 (H) 2024    TP 8.2 2024    AST 24 2024    ALT 30 2024    PTT 34.6 2024    INR 1.32 (H) 2024    PT 15.5 (H) 2014    T4F 1.0 2015    TSH 2.138 2024    LIP 35 2024    DDIMER 2.38 (H) 2024    MG 2.0 2024    PHOS 4.1 2024    TROP <0.046 2015    TROPHS 25 2024    CK 32 2024    B12 586 2024       No results found.        Assessment & Plan:      *Acute respiratory failure with hypercapnia/Aspiration pneumonia  -Intubated/sedated    -Extubated   - now on AVAPS  -S/P 10 day course of  antibiotics   -Cont nebs   -Pulmonology following   -Palliative following   -Guarded prognosis   -DNAR/select with no reintubation   -chest Xray 1/3 with Hazy right basilar opacities have also worsened since prior and could relate to any combination of compressive atelectasis or coexistent   infection/aspiration.   -on Zosyn and vancomycin     *Leukocytosis   -WBC 23.5 , improved   -continue Zosyn and Vanco  -likely from aspiration      *HFpEF  -on BB   -Cardiology following      *Atrial fibrillation with rapid ventricular response (HCC)  -Cont Eliquis and digoxin  Cardiology following      *Dysphagia   -Tube feeds on hold due to concern for aspiration   -Aspiration precautions      *Hypotension  -Cont midodrine   -Cardiology following   -start .45 NS at 60 ml per hour         *Dementia  Failure to thrive with profound muscle weakness and fatigue  Metabolic encephalopathy  -Patient is hospice appropriate, family declines   -POC discussed in detail with daughter   -daughter met with Palliative Care on 1/3, they are available if she changes her mind   -Pt's is DNAR/DNI with selective treatment         DVT prophylaxis: Eliquis  Code status:  DNAR/DNI with selective treatment                       Alexa Hodges, APRN  1/5/2025

## 2025-01-05 NOTE — PLAN OF CARE
Problem: Patient Centered Care  Goal: Patient preferences are identified and integrated in the patient's plan of care  Description: Interventions:  - What would you like us to know as we care for you? Pt came from [facility] & was hospitalized at [facility]. Per daughter, pt is not returning back to [place].  - Provide timely, complete, and accurate information to patient/family  - Incorporate patient and family knowledge, values, beliefs, and cultural backgrounds into the planning and delivery of care  - Encourage patient/family to participate in care and decision-making at the level they choose  - Honor patient and family perspectives and choices  Outcome: Progressing     Problem: Safety Risk - Non-Violent Restraints  Goal: Patient will remain free from self-harm  Description: INTERVENTIONS:  - Apply the least restrictive restraint to prevent harm  - Notify patient and family of reasons restraints applied  - Assess for any contributing factors to confusion (electrolyte disturbances, delirium, medications)  - Discontinue any unnecessary medical devices as soon as possible  - Assess the patient's physical comfort, circulation, skin condition, hydration, nutrition and elimination needs   - Reorient and redirection as needed  - Assess for the need to continue restraints  Outcome: Progressing     Problem: RISK FOR INFECTION - ADULT  Goal: Absence of fever/infection during anticipated neutropenic period  Description: INTERVENTIONS  - Monitor WBC  - Administer growth factors as ordered  - Implement neutropenic guidelines  Outcome: Progressing     Problem: SAFETY ADULT - FALL  Goal: Free from fall injury  Description: INTERVENTIONS:  - Assess pt frequently for physical needs  - Identify cognitive and physical deficits and behaviors that affect risk of falls.  - [place] fall precautions as indicated by assessment.  - Educate pt/family on patient safety including physical limitations  - Instruct pt to  call for assistance with activity based on assessment  - Modify environment to reduce risk of injury  - Provide assistive devices as appropriate  - Consider OT/PT consult to assist with strengthening/mobility  - Encourage toileting schedule  Outcome: Progressing     Problem: DISCHARGE PLANNING  Goal: Discharge to home or other facility with appropriate resources  Description: INTERVENTIONS:  - Identify barriers to discharge w/pt and caregiver  - Include patient/family/discharge partner in discharge planning  - Arrange for needed discharge resources and transportation as appropriate  - Identify discharge learning needs (meds, wound care, etc)  - Arrange for interpreters to assist at discharge as needed  - Consider post-discharge preferences of patient/family/discharge partner  - Complete POLST form as appropriate  - Assess patient's ability to be responsible for managing their own health  - Refer to Case Management Department for coordinating discharge planning if the patient needs post-hospital services based on physician/LIP order or complex needs related to functional status, cognitive ability or social support system  Outcome: Progressing     Problem: Altered Communication/Language Barrier  Goal: Patient/Family is able to understand and participate in their care  Description: Interventions:  - Assess communication ability and preferred communication style  - Implement communication aides and strategies  - Use visual cues when possible  - Listen attentively, be patient, do not interrupt  - Minimize distractions  - Allow time for understanding and response  - Establish method for patient to ask for assistance (call light)  - Provide an  as needed  - Communicate barriers and strategies to overcome with those who interact with patient  Outcome: Progressing     Problem: RESPIRATORY - ADULT  Goal: Achieves optimal ventilation and oxygenation  Description: INTERVENTIONS:  - Assess for changes in respiratory  status  - Assess for changes in mentation and behavior  - Position to facilitate oxygenation and minimize respiratory effort  - Oxygen supplementation based on oxygen saturation or ABGs  - Provide Smoking Cessation handout, if applicable  - Encourage broncho-pulmonary hygiene including cough, deep breathe, Incentive Spirometry  - Assess the need for suctioning and perform as needed  - Assess and instruct to report SOB or any respiratory difficulty  - Respiratory Therapy support as indicated  - Manage/alleviate anxiety  - Monitor for signs/symptoms of CO2 retention  Outcome: Progressing     Problem: NEUROLOGICAL - ADULT  Goal: Absence of seizures  Description: INTERVENTIONS  - Monitor for seizure activity  - Administer anti-seizure medications as ordered  - Monitor neurological status  Outcome: Progressing  Goal: Remains free of injury related to seizure activity  Description: INTERVENTIONS:  - Maintain airway, patient safety  and administer oxygen as ordered  - Monitor patient for seizure activity, document and report duration and description of seizure to MD/LIP  - If seizure occurs, turn patient to side and suction secretions as needed  - Reorient patient post seizure  - Seizure pads on all 4 side rails  - Instruct patient/family to notify RN of any seizure activity  - Instruct patient/family to call for assistance with activity based on assessment  Outcome: Progressing     Problem: Diabetes/Glucose Control  Goal: Glucose maintained within prescribed range  Description: INTERVENTIONS:  - Monitor Blood Glucose as ordered  - Assess for signs and symptoms of hyperglycemia and hypoglycemia  - Administer ordered medications to maintain glucose within target range  - Assess barriers to adequate nutritional intake and initiate nutrition consult as needed  - Instruct patient on self management of diabetes  Outcome: Progressing     Problem: Delirium  Goal: Minimize duration of delirium  Description: Interventions:  -  Encourage use of hearing aids, eye glasses  - Promote highest level of mobility daily  - Provide frequent reorientation  - Promote wakefulness i.e. lights on, blinds open  - Promote sleep, encourage patient's normal rest cycle i.e. lights off, TV off, minimize noise and interruptions  - Encourage family to assist in orientation and promotion of home routines  Outcome: Progressing

## 2025-01-05 NOTE — PROGRESS NOTES
Pt received on continuous bipap.   Settings: AVAPS 14/450/+8/45%   No acute events overnight.      01/05/25 0407   BiPAP/CPAP Monitored Parameters   PIP 14   Total Rate 33 breaths/min   Minute Volume 13   Tidal Volume 450   Total Leak 35   Trigger % 97   Ti/Ttot % 35

## 2025-01-05 NOTE — PLAN OF CARE
Problem: Patient Centered Care  Goal: Patient preferences are identified and integrated in the patient's plan of care  Description: Interventions:  - What would you like us to know as we care for you? Pt came from [facility] & was hospitalized at [facility]. Per daughter, pt is not returning back to [place].  - Provide timely, complete, and accurate information to patient/family  - Incorporate patient and family knowledge, values, beliefs, and cultural backgrounds into the planning and delivery of care  - Encourage patient/family to participate in care and decision-making at the level they choose  - Honor patient and family perspectives and choices  Outcome: Progressing       Problem: Safety Risk - Non-Violent Restraints  Goal: Patient will remain free from self-harm  Description: INTERVENTIONS:  - Apply the least restrictive restraint to prevent harm  - Notify patient and family of reasons restraints applied  - Assess for any contributing factors to confusion (electrolyte disturbances, delirium, medications)  - Discontinue any unnecessary medical devices as soon as possible  - Assess the patient's physical comfort, circulation, skin condition, hydration, nutrition and elimination needs   - Reorient and redirection as needed  - Assess for the need to continue restraints  Outcome: Progressing     Problem: PAIN - ADULT  Goal: Verbalizes/displays adequate comfort level or patient's stated pain goal  Description: INTERVENTIONS:  - Encourage pt to monitor pain and request assistance  - Assess pain using appropriate pain scale  - Administer analgesics based on type and severity of pain and evaluate response  - Implement non-pharmacological measures as appropriate and evaluate response  - Consider cultural and social influences on pain and pain management  - Manage/alleviate anxiety  - Utilize distraction and/or relaxation techniques  - Monitor for opioid side effects  - Notify MD/LIP if interventions  unsuccessful or patient reports new pain  - Anticipate increased pain with activity and pre-medicate as appropriate  Outcome: Progressing     Problem: RISK FOR INFECTION - ADULT  Goal: Absence of fever/infection during anticipated neutropenic period  Description: INTERVENTIONS  - Monitor WBC  - Administer growth factors as ordered  - Implement neutropenic guidelines  Outcome: Progressing     Problem: SAFETY ADULT - FALL  Goal: Free from fall injury  Description: INTERVENTIONS:  - Assess pt frequently for physical needs  - Identify cognitive and physical deficits and behaviors that affect risk of falls.  - [place] fall precautions as indicated by assessment.  - Educate pt/family on patient safety including physical limitations  - Instruct pt to call for assistance with activity based on assessment  - Modify environment to reduce risk of injury  - Provide assistive devices as appropriate  - Consider OT/PT consult to assist with strengthening/mobility  - Encourage toileting schedule  Outcome: Progressing     Problem: DISCHARGE PLANNING  Goal: Discharge to home or other facility with appropriate resources  Description: INTERVENTIONS:  - Identify barriers to discharge w/pt and caregiver  - Include patient/family/discharge partner in discharge planning  - Arrange for needed discharge resources and transportation as appropriate  - Identify discharge learning needs (meds, wound care, etc)  - Arrange for interpreters to assist at discharge as needed  - Consider post-discharge preferences of patient/family/discharge partner  - Complete POLST form as appropriate  - Assess patient's ability to be responsible for managing their own health  - Refer to Case Management Department for coordinating discharge planning if the patient needs post-hospital services based on physician/LIP order or complex needs related to functional status, cognitive ability or social support system  Outcome: Progressing     Problem: NEUROLOGICAL -  ADULT  Goal: Absence of seizures  Description: INTERVENTIONS  - Monitor for seizure activity  - Administer anti-seizure medications as ordered  - Monitor neurological status  Outcome: Progressing  Goal: Remains free of injury related to seizure activity  Description: INTERVENTIONS:  - Maintain airway, patient safety  and administer oxygen as ordered  - Monitor patient for seizure activity, document and report duration and description of seizure to MD/LIP  - If seizure occurs, turn patient to side and suction secretions as needed  - Reorient patient post seizure  - Seizure pads on all 4 side rails  - Instruct patient/family to notify RN of any seizure activity  - Instruct patient/family to call for assistance with activity based on assessment  Outcome: Progressing     Problem: Diabetes/Glucose Control  Goal: Glucose maintained within prescribed range  Description: INTERVENTIONS:  - Monitor Blood Glucose as ordered  - Assess for signs and symptoms of hyperglycemia and hypoglycemia  - Administer ordered medications to maintain glucose within target range  - Assess barriers to adequate nutritional intake and initiate nutrition consult as needed  - Instruct patient on self management of diabetes  Outcome: Progressing     Problem: Delirium  Goal: Minimize duration of delirium  Description: Interventions:  - Encourage use of hearing aids, eye glasses  - Promote highest level of mobility daily  - Provide frequent reorientation  - Promote wakefulness i.e. lights on, blinds open  - Promote sleep, encourage patient's normal rest cycle i.e. lights off, TV off, minimize noise and interruptions  - Encourage family to assist in orientation and promotion of home routines  Outcome: Progressing     Problem: Altered Communication/Language Barrier  Goal: Patient/Family is able to understand and participate in their care  Description: Interventions:  - Assess communication ability and preferred communication style  - Implement  communication aides and strategies  - Use visual cues when possible  - Listen attentively, be patient, do not interrupt  - Minimize distractions  - Allow time for understanding and response  - Establish method for patient to ask for assistance (call light)  - Provide an  as needed  - Communicate barriers and strategies to overcome with those who interact with patient  Outcome: Not Progressing     Problem: RESPIRATORY - ADULT  Goal: Achieves optimal ventilation and oxygenation  Description: INTERVENTIONS:  - Assess for changes in respiratory status  - Assess for changes in mentation and behavior  - Position to facilitate oxygenation and minimize respiratory effort  - Oxygen supplementation based on oxygen saturation or ABGs  - Provide Smoking Cessation handout, if applicable  - Encourage broncho-pulmonary hygiene including cough, deep breathe, Incentive Spirometry  - Assess the need for suctioning and perform as needed  - Assess and instruct to report SOB or any respiratory difficulty  - Respiratory Therapy support as indicated  - Manage/alleviate anxiety  - Monitor for signs/symptoms of CO2 retention  Outcome: Not Progressing     Problem: NEUROLOGICAL - ADULT  Goal: Achieves stable or improved neurological status  Description: INTERVENTIONS  - Assess for and report changes in neurological status  - Initiate measures to prevent increased intracranial pressure  - Maintain blood pressure and fluid volume within ordered parameters to optimize cerebral perfusion and minimize risk of hemorrhage  - Monitor temperature, glucose, and sodium. Initiate appropriate interventions as ordered  Outcome: Not Progressing  Goal: Achieves maximal functionality and self care  Description: INTERVENTIONS  - Monitor swallowing and airway patency with patient fatigue and changes in neurological status  - Encourage and assist patient to increase activity and self care with guidance from PT/OT  - Encourage visually impaired,  hearing impaired and aphasic patients to use assistive/communication devices  Outcome: Not Progressing

## 2025-01-05 NOTE — PLAN OF CARE
Problem: RESPIRATORY - ADULT  Goal: Achieves optimal ventilation and oxygenation  Description: INTERVENTIONS:  - Assess for changes in respiratory status  - Assess for changes in mentation and behavior  - Position to facilitate oxygenation and minimize respiratory effort  - Oxygen supplementation based on oxygen saturation or ABGs  - Provide Smoking Cessation handout, if applicable  - Encourage broncho-pulmonary hygiene including cough, deep breathe, Incentive Spirometry  - Assess the need for suctioning and perform as needed  - Assess and instruct to report SOB or any respiratory difficulty  - Respiratory Therapy support as indicated  - Manage/alleviate anxiety  - Monitor for signs/symptoms of CO2 retention  Outcome: Progressing    Received patient on continuous AVAPS settings below,          01/04/25 1630   AVAPS   Min IPAP 13   Max IPAP 35   EPAP Level 8   Set rate 14   Tidal volume 450   FIO2                                                   45%     Titrate fio2 40%, tolerating well,  Given ordered BID Albuterol neb tx. Suction the pt.

## 2025-01-05 NOTE — PLAN OF CARE
Problem: PAIN - ADULT  Goal: Verbalizes/displays adequate comfort level or patient's stated pain goal  Description: INTERVENTIONS:  - Encourage pt to monitor pain and request assistance  - Assess pain using appropriate pain scale  - Administer analgesics based on type and severity of pain and evaluate response  - Implement non-pharmacological measures as appropriate and evaluate response  - Consider cultural and social influences on pain and pain management  - Manage/alleviate anxiety  - Utilize distraction and/or relaxation techniques  - Monitor for opioid side effects  - Notify MD/LIP if interventions unsuccessful or patient reports new pain  - Anticipate increased pain with activity and pre-medicate as appropriate  Outcome: Progressing     Problem: RISK FOR INFECTION - ADULT  Goal: Absence of fever/infection during anticipated neutropenic period  Description: INTERVENTIONS  - Monitor WBC  - Administer growth factors as ordered  - Implement neutropenic guidelines  Outcome: Progressing     Problem: SAFETY ADULT - FALL  Goal: Free from fall injury  Description: INTERVENTIONS:  - Assess pt frequently for physical needs  - Identify cognitive and physical deficits and behaviors that affect risk of falls.  - [place] fall precautions as indicated by assessment.  - Educate pt/family on patient safety including physical limitations  - Instruct pt to call for assistance with activity based on assessment  - Modify environment to reduce risk of injury  - Provide assistive devices as appropriate  - Consider OT/PT consult to assist with strengthening/mobility  - Encourage toileting schedule  Outcome: Progressing     Problem: RESPIRATORY - ADULT  Goal: Achieves optimal ventilation and oxygenation  Description: INTERVENTIONS:  - Assess for changes in respiratory status  - Assess for changes in mentation and behavior  - Position to facilitate oxygenation and minimize respiratory effort  - Oxygen supplementation based on oxygen  saturation or ABGs  - Provide Smoking Cessation handout, if applicable  - Encourage broncho-pulmonary hygiene including cough, deep breathe, Incentive Spirometry  - Assess the need for suctioning and perform as needed  - Assess and instruct to report SOB or any respiratory difficulty  - Respiratory Therapy support as indicated  - Manage/alleviate anxiety  - Monitor for signs/symptoms of CO2 retention  Outcome: Progressing     Problem: NEUROLOGICAL - ADULT  Goal: Absence of seizures  Description: INTERVENTIONS  - Monitor for seizure activity  - Administer anti-seizure medications as ordered  - Monitor neurological status  Outcome: Progressing  Goal: Remains free of injury related to seizure activity  Description: INTERVENTIONS:  - Maintain airway, patient safety  and administer oxygen as ordered  - Monitor patient for seizure activity, document and report duration and description of seizure to MD/LIP  - If seizure occurs, turn patient to side and suction secretions as needed  - Reorient patient post seizure  - Seizure pads on all 4 side rails  - Instruct patient/family to notify RN of any seizure activity  - Instruct patient/family to call for assistance with activity based on assessment  Outcome: Progressing

## 2025-01-06 ENCOUNTER — CASE MANAGEMENT (OUTPATIENT)
Dept: CARE COORDINATION | Age: OVER 89
End: 2025-01-06

## 2025-01-06 NOTE — PROGRESS NOTES
Palliative Care Progress Note    Yin Butcher Patient Status:  Inpatient    1931 MRN [id number]   Location [facility] 2W/SW Attending Carmelina Duong MD   Hosp Day # 25 PCP Mina Walker MD     Date of Consult: 2024  Patient seen at: [facility] Inpatient    Reason for Consultation: Consult requested for goals of care and care coordination.    Subjective     S: Transferred to floor. Hoarse, emaciated, no longer responsive.     Review of Systems: Palliative Care symptom needs assessed:     Unable to accurately obtain due to intubation    Palliative Care Social History:   Marital Status:    Children: Yes  Living Situation Prior to Admit: Home  Occupational History: Retired  Personal:     Spiritual  Yin Butcher  NA     requested: No    Medical History: obtained from [facility]  Past Medical History:    Arthritis    Atrial fibrillation (HCC)    Back problem    Cancer (HCC)    Cataract    Osteoporosis    Personal history of antineoplastic chemotherapy     Past Surgical History:   Procedure Laterality Date    Other surgical history  2014    Procedure: HIP HEMIARTHROPLASTY/ BIPOLAR;  Surgeon: Vasu Matamoros MD;  Location:  MAIN OR    Removal of tonsils,12+ y/o         Family History: obtained from [facility]  Family History   Family history unknown: Yes       Allergies:  Allergies[1]    Medications:     Current Facility-Administered Medications:     vancomycin (Vancocin) 750 mg in sodium chloride 0.9% 250 mL IVPB-ADDV, 15 mg/kg, Intravenous, Q24H    vancomycin (Firvanq) 50 mg/mL oral solution 125 mg, 125 mg, Per NG Tube, Daily    sodium chloride 0.45% infusion, , Intravenous, Continuous    albuterol (Ventolin) (2.5 MG/3ML) 0.083% nebulizer solution 2.5 mg, 2.5 mg, Nebulization, BID    piperacillin-tazobactam (Zosyn) 3.375 g in dextrose 5% 100 mL IVPB-ADDV, 3.375 g, Intravenous, Q8H    midodrine (ProAmatine) tab 5 mg, 5 mg, Per NG Tube, TID    [Held by provider]  acetaZOLAMIDE (Diamox) tab 250 mg, 250 mg, Per G Tube, Daily    apixaban (Eliquis) tab 2.5 mg, 2.5 mg, Per G Tube, BID    [Held by provider] furosemide (Lasix) tab 20 mg, 20 mg, Per G Tube, Daily    metoprolol tartrate (Lopressor) tab 25 mg, 25 mg, Per G Tube, 2x Daily(Beta Blocker)    famotidine (Pepcid) tab 20 mg, 20 mg, Per NG Tube, Daily    glycopyrrolate (Robinul) 0.2 MG/ML injection 0.1 mg, 0.1 mg, Intravenous, Q6H PRN    multivitamin (Tab-A-Jenny/Beta Carotene) tab 1 tablet, 1 tablet, Per G Tube, Daily    ipratropium-albuterol (Duoneb) 0.5-2.5 (3) MG/3ML inhalation solution 3 mL, 3 mL, Nebulization, Q6H PRN    digoxin (Lanoxin) tab 125 mcg, 125 mcg, Oral, Daily    acetaminophen (Ofirmev) 10 mg/mL infusion premix 650 mg, 15 mg/kg, Intravenous, Q6H PRN    dextrose 10% infusion (TPN no rate), , Intravenous, Continuous PRN    pancrelipase (Lip-Prot-Amyl) (Zenpep) DR particles cap 10,000 Units, 10,000 Units, Per G Tube, PRN **AND** sodium bicarbonate tab 325 mg, 325 mg, Per G Tube, PRN    metoprolol (Lopressor) 5 mg/5mL injection 5 mg, 5 mg, Intravenous, Q4H PRN    albuterol (Ventolin) (5 MG/ML) 0.5% nebulizer solution 5 mg, 5 mg, Nebulization, Q4H PRN    acetaminophen (Tylenol) 160 MG/5ML oral liquid 650 mg, 650 mg, Per NG Tube, Q4H PRN **OR** acetaminophen (Tylenol) rectal suppository 650 mg, 650 mg, Rectal, Q4H PRN    senna (Senokot) 8.8 MG/5ML oral syrup 17.6 mg, 10 mL, Per NG Tube, BID    docusate (Colace) 50 MG/5ML oral solution 100 mg, 100 mg, Per NG Tube, BID    polyethylene glycol (PEG 3350) (Miralax) 17 g oral packet 17 g, 17 g, Per NG Tube, Daily PRN    bisacodyl (Dulcolax) 10 MG rectal suppository 10 mg, 10 mg, Rectal, Daily PRN    ondansetron (Zofran) 4 MG/2ML injection 4 mg, 4 mg, Intravenous, Q6H PRN    metoclopramide (Reglan) 5 mg/mL injection 10 mg, 10 mg, Intravenous, Q8H PRN  No current outpatient medications on file.         Palliative Performance Scale: 20 %  % Ambulation Activity Level  Self-Care Intake Consciousness   100 Full  Normal  No Disease Full Normal Full   90 Full  Normal  Some Disease Full Normal Full   80 Full  Normal w/effort  Some Disease Full Normal or reduced Full   70 Reduced  Can't Perform Job  Some Disease Full Normal or reduced Full   60 Reduced  Can't Perform Hobby   Significant Disease Occ Assist Normal or reduced Full or confused   50 Mainly sit/lie Can't do any work  Extensive Disease Partial Assist Normal or reduced Full or confused   40 Mainly in bed Can't do any work  Extensive Disease Mainly Assist Normal or reduced Full or confused   30 Bed Bound Can't do any work  Extensive Disease Max Assist  Total Care Reduced  Drowsy/confused   20 Bed Bound Can't do any work  Extensive Disease Max Assist  Total Care Minimal  Drowsy/confused   10 Bed Bound Can't do any work  Extensive Disease Max Assist  Total Care Mouth Care  Drowsy/confused   0 Death        Objective      Vital Signs:  Blood pressure 92/45, pulse 69, temperature 97.6 °F (36.4 °C), temperature source Axillary, resp. rate (!) 35, height 5' 7.01\" (1.702 m), weight 121 lb 0.5 oz (54.9 kg), SpO2 100%.  Body mass index is 18.95 kg/m².  Non-verbal signs of pain present: No    Physical Exam:  General: Mildly SOB, thin, confused  HEENT: AT/NC  Cardiac: RRR  Lungs: coarse BS  Abdomen: Soft, non-tender, non-distended, normal bowel sounds X 4 quadrants   Extremities: mild-mod Edema present  Skin: Warm and dry.    Hematology:  Lab Results   Component Value Date    WBC 13.7 (H) 01/06/2025    HGB 8.8 (L) 01/06/2025    HCT 28.1 (L) 01/06/2025    .0 01/06/2025       Coags:  Lab Results   Component Value Date    PT 15.5 (H) 07/13/2014    INR 1.32 (H) 01/29/2024    PTT 34.6 01/31/2024       Chemistry:  Lab Results   Component Value Date    CREATSERUM 0.55 01/06/2025    BUN 31 (H) 01/06/2025     01/06/2025    K 4.1 01/06/2025    K 4.1 01/06/2025     01/06/2025    CO2 34.0 (H) 01/06/2025    GLU 92 01/06/2025    CA  10.2 01/06/2025    ALB 4.0 12/11/2024    ALKPHO 112 12/11/2024    BILT 1.0 (H) 12/11/2024    TP 8.2 12/11/2024    AST 24 12/11/2024    ALT 30 12/11/2024    DDIMER 2.38 (H) 01/20/2024    MG 2.0 12/14/2024    PHOS 4.1 12/14/2024    TROP <0.046 02/19/2015       Imaging:  No results found.    Assessment and Recommendations        Sepsis, due to unspecified organism, unspecified whether acute organ dysfunction present (HCC)    Acute respiratory failure with hypercapnia (HCC)    Atrial fibrillation with rapid ventricular response (HCC)    Acute on chronic respiratory failure with hypoxia and hypercapnia (HCC)    Palliative care encounter    Counseling regarding advance care planning and goals of care    Symptom Management       Dyspnea   -on NC   -daughter is uncomfortable with opioids or sedation   -will discuss comfort vs select treatment intent      2.     Serious Illness Conversation:   Code Status: DNAR/Full  POA: Surrogate is daughter Jada  I met with Jdaa in person and also present via phone was Librado, her fiance. Jada lives with Prema in [facility], and splits her time in [place], where her fisullye lives. Prema was home apparently until the last several months when she began problems with her effusions, aspiration etc.   I asked if Jada could give me a big picture sense of what was going with her mom. She is able to discuss the details of all of the illnesses and episodes but I also wondered if she felt overall that Prema was improving or declining.   I expressed my view/worry that her lungs are shutting down/have shut down etc to the extent that she is dying from this. I explained the physiology of chronic aspiration in context of dysphagia as a terminal/lethal component of not just dementia or old age but of many complex chronic illnesses.   I explained that I felt that Prema is unfortunately showing us that she is dying, albeit slowly and that we are in a position to decide how much to try and resist or fight  natural dying or how much we can decide to support or allow for it.   Prema repeated that her mom had always just said 'she wants everything done' but unfortunately had little more context or nuance. Ie what 'everything' looks like, logistically, financially, quality etc. Librado said that he thinks Prema is pretty understanding of these issues, having dealt with her own  dying and other parents. He and Jada were hoping to involved Prema in the conversation.   When I spoke to Prema she could nod yes or no in response to basic questions but when I asked about natural dying she closed her eyes and would not answer. Jada saw this and said that she is probably thinking and that she and her fiancee would talk to Prema in more detail. They hoped ideally that they could ask her when she is extubated.  They wanted to know what their deadline is to 'make a decision.' They did not seem opposed to consider compassionate extubation but instead seem very unsure about what to do. They appear open to learning as much as possible about their options.   I met with Jada and called Librado separately. I explained that her body is not supporting life, and that her best possible chance of accomplishing their goal of breathing independently and being at home would be compassionate extubation. Jada seems to understand but continued to ask about trach. I think it has been helpful to explain that what she has witnessed over the last several months are not just 'hiccups' but they are a gradual dying from dementia and advanced age.   I explained that extubation should probably happen within the next 24-48 hours as discussed with Dr. Espino and that we would endeavor to help her be comfortable if she were passing away.   I can have a more definitive discussion about plan tomorrow with lisset is present after 10-11am or so unless another provider discusses it earlier. I think we should support Jada in the grief process as gently as  possible.   See separate note for discussion. Family agreeable to DNR/DNI and compassionate extubation.  I spoke to Jada via phone last night. Her goal is to have Prema go to rehab to get stronger and then return home. Unfortunately I feel that she is probably not a candidate for this, but I will defer to primary and PT/OT. I will call Jada and Librado to discuss comfort care/hospice. I did explain that no matter what she will most likely need around the clock care and Jada agreed, and agreed that she would NOT want her mom in a facility permanently. Jada is not viewing this process as a terminal process, but focused on isolated details of Prema's course in hopes that this is another 'hiccup' and that she will recover. I encourage all care providers to continue to educate her.  Prema is too weak to phonate. She is staring up at the ceiling with mouth agape. She can blink and nod but cannot answer my questions due to effort/weakness. I personally feel that she is nearing death and should be transitioned to comfort care and be home with hospice. I will recommend this again to Jada and Librado. I appreciate other team members calling family as well to advise them.  See separate note for GOC discussion      Palliative Care Follow Up: Palliative care team will Continue to follow while inpatient    Thank you for allowing Palliative Care services to participate in the care of Yin Butcher.    A total of 60 minutes were spent on this visit, which included all of the following: direct face to face contact, history taking, physical examination, and >50% was spent counseling and coordinating care.    Barry Liz MD  Palliative Care Services  [facility] [phone]    Note to patient:  The 21st Century Cures Act makes medical notes like these available to patients in the interest of transparency. However, be advised this is a medical document. It is intended as peer to peer communication. It is written in  medical language and may contain abbreviations or verbiage that are unfamiliar. It may appear blunt or direct. Medical documents are intended to carry relevant information, facts as evident, and the clinical opinion of the practitioner.           [1]   Allergies  Allergen Reactions    Erythromycin OTHER (SEE COMMENTS)    Oxycodone HALLUCINATION    Gabapentin NAUSEA ONLY and OTHER (SEE COMMENTS)     Pt stated she didn't feel like herself     Tizanidine ITCHING and OTHER (SEE COMMENTS)     Pt states burning sensation

## 2025-01-06 NOTE — PLAN OF CARE
Problem: Patient Centered Care  Goal: Patient preferences are identified and integrated in the patient's plan of care  Description: Interventions:  - What would you like us to know as we care for you? Pt came from [facility] & was hospitalized at [facility]. Per daughter, pt is not returning back to [place].  - Provide timely, complete, and accurate information to patient/family  - Incorporate patient and family knowledge, values, beliefs, and cultural backgrounds into the planning and delivery of care  - Encourage patient/family to participate in care and decision-making at the level they choose  - Honor patient and family perspectives and choices  Outcome: Progressing   Problem: Safety Risk - Non-Violent Restraints  Goal: Patient will remain free from self-harm  Description: INTERVENTIONS:  - Apply the least restrictive restraint to prevent harm  - Notify patient and family of reasons restraints applied  - Assess for any contributing factors to confusion (electrolyte disturbances, delirium, medications)  - Discontinue any unnecessary medical devices as soon as possible  - Assess the patient's physical comfort, circulation, skin condition, hydration, nutrition and elimination needs   - Reorient and redirection as needed  - Assess for the need to continue restraints  Outcome: Progressing     Problem: PAIN - ADULT  Goal: Verbalizes/displays adequate comfort level or patient's stated pain goal  Description: INTERVENTIONS:  - Encourage pt to monitor pain and request assistance  - Assess pain using appropriate pain scale  - Administer analgesics based on type and severity of pain and evaluate response  - Implement non-pharmacological measures as appropriate and evaluate response  - Consider cultural and social influences on pain and pain management  - Manage/alleviate anxiety  - Utilize distraction and/or relaxation techniques  - Monitor for opioid side effects  - Notify MD/LIP if interventions  unsuccessful or patient reports new pain  - Anticipate increased pain with activity and pre-medicate as appropriate  Outcome: Progressing     Problem: RISK FOR INFECTION - ADULT  Goal: Absence of fever/infection during anticipated neutropenic period  Description: INTERVENTIONS  - Monitor WBC  - Administer growth factors as ordered  - Implement neutropenic guidelines  Outcome: Progressing     Problem: SAFETY ADULT - FALL  Goal: Free from fall injury  Description: INTERVENTIONS:  - Assess pt frequently for physical needs  - Identify cognitive and physical deficits and behaviors that affect risk of falls.  - [place] fall precautions as indicated by assessment.  - Educate pt/family on patient safety including physical limitations  - Instruct pt to call for assistance with activity based on assessment  - Modify environment to reduce risk of injury  - Provide assistive devices as appropriate  - Consider OT/PT consult to assist with strengthening/mobility  - Encourage toileting schedule  Outcome: Progressing     Problem: DISCHARGE PLANNING  Goal: Discharge to home or other facility with appropriate resources  Description: INTERVENTIONS:  - Identify barriers to discharge w/pt and caregiver  - Include patient/family/discharge partner in discharge planning  - Arrange for needed discharge resources and transportation as appropriate  - Identify discharge learning needs (meds, wound care, etc)  - Arrange for interpreters to assist at discharge as needed  - Consider post-discharge preferences of patient/family/discharge partner  - Complete POLST form as appropriate  - Assess patient's ability to be responsible for managing their own health  - Refer to Case Management Department for coordinating discharge planning if the patient needs post-hospital services based on physician/LIP order or complex needs related to functional status, cognitive ability or social support system  Outcome: Progressing     Problem: Altered  Communication/Language Barrier  Goal: Patient/Family is able to understand and participate in their care  Description: Interventions:  - Assess communication ability and preferred communication style  - Implement communication aides and strategies  - Use visual cues when possible  - Listen attentively, be patient, do not interrupt  - Minimize distractions  - Allow time for understanding and response  - Establish method for patient to ask for assistance (call light)  - Provide an  as needed  - Communicate barriers and strategies to overcome with those who interact with patient  Outcome: Progressing     Problem: RESPIRATORY - ADULT  Goal: Achieves optimal ventilation and oxygenation  Description: INTERVENTIONS:  - Assess for changes in respiratory status  - Assess for changes in mentation and behavior  - Position to facilitate oxygenation and minimize respiratory effort  - Oxygen supplementation based on oxygen saturation or ABGs  - Provide Smoking Cessation handout, if applicable  - Encourage broncho-pulmonary hygiene including cough, deep breathe, Incentive Spirometry  - Assess the need for suctioning and perform as needed  - Assess and instruct to report SOB or any respiratory difficulty  - Respiratory Therapy support as indicated  - Manage/alleviate anxiety  - Monitor for signs/symptoms of CO2 retention  Outcome: Progressing     Problem: NEUROLOGICAL - ADULT  Goal: Achieves stable or improved neurological status  Description: INTERVENTIONS  - Assess for and report changes in neurological status  - Initiate measures to prevent increased intracranial pressure  - Maintain blood pressure and fluid volume within ordered parameters to optimize cerebral perfusion and minimize risk of hemorrhage  - Monitor temperature, glucose, and sodium. Initiate appropriate interventions as ordered  Outcome: Progressing  Goal: Absence of seizures  Description: INTERVENTIONS  - Monitor for seizure activity  - Administer  anti-seizure medications as ordered  - Monitor neurological status  Outcome: Progressing  Goal: Remains free of injury related to seizure activity  Description: INTERVENTIONS:  - Maintain airway, patient safety  and administer oxygen as ordered  - Monitor patient for seizure activity, document and report duration and description of seizure to MD/LIP  - If seizure occurs, turn patient to side and suction secretions as needed  - Reorient patient post seizure  - Seizure pads on all 4 side rails  - Instruct patient/family to notify RN of any seizure activity  - Instruct patient/family to call for assistance with activity based on assessment  Outcome: Progressing  Goal: Achieves maximal functionality and self care  Description: INTERVENTIONS  - Monitor swallowing and airway patency with patient fatigue and changes in neurological status  - Encourage and assist patient to increase activity and self care with guidance from PT/OT  - Encourage visually impaired, hearing impaired and aphasic patients to use assistive/communication devices  Outcome: Progressing     Problem: Diabetes/Glucose Control  Goal: Glucose maintained within prescribed range  Description: INTERVENTIONS:  - Monitor Blood Glucose as ordered  - Assess for signs and symptoms of hyperglycemia and hypoglycemia  - Administer ordered medications to maintain glucose within target range  - Assess barriers to adequate nutritional intake and initiate nutrition consult as needed  - Instruct patient on self management of diabetes  Outcome: Progressing     Problem: Delirium  Goal: Minimize duration of delirium  Description: Interventions:  - Encourage use of hearing aids, eye glasses  - Promote highest level of mobility daily  - Provide frequent reorientation  - Promote wakefulness i.e. lights on, blinds open  - Promote sleep, encourage patient's normal rest cycle i.e. lights off, TV off, minimize noise and interruptions  - Encourage family to assist in orientation and  promotion of home routines  Outcome: Progressing

## 2025-01-06 NOTE — PROGRESS NOTES
Pulmonary Medicine Inpatient Progress Note                 Subjective:  On HFNC  Afebrile  Dtr at bedside       ALLERGIES:  Allergies[1]       MEDS:  Home Medications:  Medications Taking[2]  Scheduled Medication:   vancomycin  15 mg/kg Intravenous Q24H    vancomycin  125 mg Per NG Tube Daily    albuterol  2.5 mg Nebulization BID    piperacillin-tazobactam  3.375 g Intravenous Q8H    midodrine  5 mg Per NG Tube TID    [Held by provider] acetaZOLAMIDE  250 mg Per G Tube Daily    apixaban  2.5 mg Per G Tube BID    [Held by provider] furosemide  20 mg Per G Tube Daily    metoprolol tartrate  25 mg Per G Tube 2x Daily(Beta Blocker)    famotidine  20 mg Per NG Tube Daily    multivitamin  1 tablet Per G Tube Daily    digoxin  125 mcg Oral Daily    docusate  100 mg Per NG Tube BID     Continuous Infusing Medication:   sodium chloride 60 mL/hr at 01/05/25 1831    dextrose 10%       PRN Medications:    glycopyrrolate    ipratropium-albuterol    acetaminophen    dextrose 10%    lipase-protease-amylase (Lip-Prot-Amyl) **AND** sodium bicarbonate    metoprolol    albuterol    acetaminophen **OR** acetaminophen    polyethylene glycol (PEG 3350)    bisacodyl    ondansetron    metoclopramide       PHYSICAL EXAM:  BP 94/58 (BP Location: Right arm)   Pulse 69   Temp 97.6 °F (36.4 °C) (Axillary)   Resp (!) 28   Ht 5' 7.01\" (1.702 m)   Wt 121 lb 0.5 oz (54.9 kg)   SpO2 100%   BMI 18.95 kg/m²   CONSTITUTIONAL: NAD-sleeping but easily awakes  HEENT: atraumatic normocephalic  MOUTH: mucous membranes are moist. No OP exudates  NECK/THROAT: no JVD. Trachea midline. No obvious thyromegaly  LUNG: clear upper with basilar crackles and diminished. Chest symmetric with respiratory motion  HEART: regular rate and rhythm, no obvious murmers or gallops note  ABD: soft non tender. + bowel sounds. No organomegaly noted  EXT: no clubbing, cyanosis, or edema noted       IMAGES:  CXR 1/3/25  Small to moderate right pleural effusion has  increased/worsened since examination from 8 days prior.  Hazy right basilar opacities have also worsened since prior and could relate to any combination of compressive atelectasis or coexistent   infection/aspiration. Stable small left pleural effusion and probable associated compressive atelectasis at the left lung base. Interval extubation.   Lesser incidental findings as above.       LABS:  Recent Labs   Lab 01/04/25  0456 01/05/25  0605 01/06/25  0501   RBC 3.79* 3.48* 3.08*   HGB 10.4* 9.7* 8.8*   HCT 34.7* 31.2* 28.1*   MCV 91.6 89.7 91.2   MCH 27.4 27.9 28.6   MCHC 30.0* 31.1 31.3   RDW 19.5* 19.4* 19.3*   NEPRELIM 29.07* 21.92* 12.47*   WBC 30.9* 23.5* 13.7*   .0 284.0 259.0       Recent Labs   Lab 01/04/25  0456 01/05/25  0605 01/06/25  0501   GLU 98 91 92   BUN 38* 40* 31*   CREATSERUM 0.91 0.70 0.55   EGFRCR 59* 81 85   CA 10.2 10.5* 10.2   * 136 138   K 4.8 3.7 4.1  4.1   CL 96* 99 102   CO2 32.0 33.0* 34.0*          ASSESSMENT/PLAN:  Acute on chronic hypoxemic and hypercapnic respiratory failure  -dementia / dysphagia-aspiration and weakness/muscle wasting and ventilatory failure  -HFpEF with edema/chronic basilar transudate effusion   -aspiration pneumonia on abx/vanco/zosyn with improvement in WBC ct today  -s/p prolonged intubation  -extubated and now DNAR/select with no reintubation  -worsened in the last few days with increased lethargy and placed again on NIV with AVAPS-now weaned off  -abx added with zosyn and vancomycin for leukocytosis and worsening CXR  -continue bronchial hygiene     Dementia and dysphagia  -supportive care  -TF via G-tube have been stopped d/t concerns of aspiration. 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     ROBSON Amaya DO, MPH  Pulmonary Critical Care Medicine  [facility]          [1]   Allergies  Allergen Reactions    Erythromycin OTHER (SEE COMMENTS)    Oxycodone HALLUCINATION    Gabapentin NAUSEA ONLY and OTHER (SEE COMMENTS)     Pt stated she didn't feel like herself     Tizanidine ITCHING and OTHER (SEE COMMENTS)     Pt states burning sensation    [2]   Outpatient Medications Marked as Taking for the 12/11/24 encounter (Hospital Encounter)   Medication Sig Dispense Refill    acetaZOLAMIDE 250 MG Oral Tab 1 tablet (250 mg total) by Per G Tube route daily.      Wheat Dextrin (BENEFIBER DRINK MIX) Oral Powd Pack 1 Package by Per G Tube route daily as needed (constipation).      furosemide 20 MG Oral Tab 1 tablet (20 mg total) by Per G Tube route daily.      apixaban 5 MG Oral Tab 0.5 tablets (2.5 mg total) by Per G Tube route 2 (two) times daily.      midodrine 5 MG Oral Tab 1 tablet (5 mg total) by Per G Tube route in the morning and 1 tablet (5 mg total) at noon and 1 tablet (5 mg total) in the evening. Take along with 10mg for a for a total dose of 15mg three times daily. .      midodrine 10 MG Oral Tab 1 tablet (10 mg total) by Per G Tube route 3 (three) times daily. Take along with 5mg for a for a total dose of 15mg three times daily.      multivitamin Oral Tab 1 tablet by Per G Tube route daily.      diphenhydrAMINE-APAP, sleep, (TYLENOL PM EXTRA STRENGTH)  MG Oral Tab 1 tablet by Per G Tube route daily as needed (Pain).      digoxin 0.125 MG Oral Tab 1 tablet (125 mcg total) by Per G Tube route Every Monday, Wednesday, and Friday.  0    metoprolol tartrate 50 MG Oral Tab 0.5 tablets (25 mg total) by Per G Tube route 2 (two) times daily. Hold for BP less than 90mmHg and pulse less than 60 bpm      lidocaine-menthol 4-1 % External Patch Place 1 patch onto  the skin daily. (Patient taking differently: Place 1 patch onto the skin daily. Applies to lower back) 30 patch 0    Calcium Carbonate-Vitamin D (CALCIUM-D) 600-400 MG-UNIT Oral Tab 1 tablet by Per G Tube route daily.

## 2025-01-06 NOTE — PALLIATIVE CARE NOTE
Brief Palliative Care Note    Saw Prema and Jada again, some questions answered. Jada is sort of stuck/paralyzed about decision making so I recommended that if Prema declines, we then change immediately to comfort care. She agreed with this. I also asked if I could provide some pain medicine IF she needs it or IF she has any trouble with air hunger. I said I would use hydromorphone, not morphine and a small dose. She was agreeable to this also.     I attempted to have family friend 'stanton' on the phone but she did not answer our call. She has been recommending comfort care to Jada.     I will follow up again tomorrow am early. Recommend if there is clinical decline we say: 'we will now transition to comfort care like we discussed with Dr. Liz etc etc'    Barry Liz MD  Palliative Care Services  [facility] [phone]

## 2025-01-06 NOTE — PALLIATIVE CARE NOTE
Palliative Care Note    Jada at bedside. She was/is confused about her mom's waxing and waning level of alertness but we discussed that she is dying. Jada says she 'understands' this but viscerally is not ready to accept it. My sense from our conversations is that she feels alone, and losing her mom is a very big and potentially traumatic life event. A close family friend 'stanton' told her that if Prema is not responding, maybe the BIPAP and tube feeds etc is not helping her. I told Jada gently that I agreed with stanton, and she became tearful. I also explained comfort care and hospice. She asked 'if we have to do that today?' I told her that I recommend it, but that she has to also feel like she can 'let her mom go' if she is leaving. I explained that she IS leaving us, and that we are pushing back with all of the life supports.      I wont change code status to comfort yet but hoping to do that later today or tomorrow when I check back with Jada. If something big changes clinically, I recommend we use that opportunity to signal that 'now is the time' for comfort, and do not escalate etc.    I will check back later today.       Barry Liz MD  Palliative Care Services  [facility] [phone]

## 2025-01-07 ENCOUNTER — CASE MANAGEMENT (OUTPATIENT)
Dept: CARE COORDINATION | Age: OVER 89
End: 2025-01-07

## 2025-01-07 NOTE — PLAN OF CARE
Pt alert xO. Tele in place. Vital signs stable. Pt's dtr at bedside, updated on plan of care. Tube feeds restarted per dtr request. Tube feeds stopped d/t not tolerating feeds, md made aware and continuous iv fluids restarted. Iv antibiotics infused. Pt laying comfortably in bed. Pillow support in place. Call light within reach  Problem: Patient Centered Care  Goal: Patient preferences are identified and integrated in the patient's plan of care  Description: Interventions:  - What would you like us to know as we care for you? Pt came from [facility] & was hospitalized at [facility]. Per daughter, pt is not returning back to [place] [facility].  - Provide timely, complete, and accurate information to patient/family  - Incorporate patient and family knowledge, values, beliefs, and cultural backgrounds into the planning and delivery of care  - Encourage patient/family to participate in care and decision-making at the level they choose  - Honor patient and family perspectives and choices  Outcome: Progressing     Problem: Patient/Family Goals  Goal: Patient/Family Long Term Goal  Description: Patient's Long Term Goal:     Interventions:  -   - See additional Care Plan goals for specific interventions  Outcome: Progressing  Goal: Patient/Family Short Term Goal  Description: Patient's Short Term Goal:     Interventions:   -   - See additional Care Plan goals for specific interventions  Outcome: Progressing     Problem: Safety Risk - Non-Violent Restraints  Goal: Patient will remain free from self-harm  Description: INTERVENTIONS:  - Apply the least restrictive restraint to prevent harm  - Notify patient and family of reasons restraints applied  - Assess for any contributing factors to confusion (electrolyte disturbances, delirium, medications)  - Discontinue any unnecessary medical devices as soon as possible  - Assess the patient's physical comfort, circulation, skin condition, hydration, nutrition and  elimination needs   - Reorient and redirection as needed  - Assess for the need to continue restraints  Outcome: Progressing     Problem: PAIN - ADULT  Goal: Verbalizes/displays adequate comfort level or patient's stated pain goal  Description: INTERVENTIONS:  - Encourage pt to monitor pain and request assistance  - Assess pain using appropriate pain scale  - Administer analgesics based on type and severity of pain and evaluate response  - Implement non-pharmacological measures as appropriate and evaluate response  - Consider cultural and social influences on pain and pain management  - Manage/alleviate anxiety  - Utilize distraction and/or relaxation techniques  - Monitor for opioid side effects  - Notify MD/LIP if interventions unsuccessful or patient reports new pain  - Anticipate increased pain with activity and pre-medicate as appropriate  Outcome: Progressing     Problem: RISK FOR INFECTION - ADULT  Goal: Absence of fever/infection during anticipated neutropenic period  Description: INTERVENTIONS  - Monitor WBC  - Administer growth factors as ordered  - Implement neutropenic guidelines  Outcome: Progressing     Problem: SAFETY ADULT - FALL  Goal: Free from fall injury  Description: INTERVENTIONS:  - Assess pt frequently for physical needs  - Identify cognitive and physical deficits and behaviors that affect risk of falls.  - Carmen fall precautions as indicated by assessment.  - Educate pt/family on patient safety including physical limitations  - Instruct pt to call for assistance with activity based on assessment  - Modify environment to reduce risk of injury  - Provide assistive devices as appropriate  - Consider OT/PT consult to assist with strengthening/mobility  - Encourage toileting schedule  Outcome: Progressing     Problem: DISCHARGE PLANNING  Goal: Discharge to home or other facility with appropriate resources  Description: INTERVENTIONS:  - Identify barriers to discharge w/pt and caregiver  -  Include patient/family/discharge partner in discharge planning  - Arrange for needed discharge resources and transportation as appropriate  - Identify discharge learning needs (meds, wound care, etc)  - Arrange for interpreters to assist at discharge as needed  - Consider post-discharge preferences of patient/family/discharge partner  - Complete POLST form as appropriate  - Assess patient's ability to be responsible for managing their own health  - Refer to Case Management Department for coordinating discharge planning if the patient needs post-hospital services based on physician/LIP order or complex needs related to functional status, cognitive ability or social support system  Outcome: Progressing     Problem: Altered Communication/Language Barrier  Goal: Patient/Family is able to understand and participate in their care  Description: Interventions:  - Assess communication ability and preferred communication style  - Implement communication aides and strategies  - Use visual cues when possible  - Listen attentively, be patient, do not interrupt  - Minimize distractions  - Allow time for understanding and response  - Establish method for patient to ask for assistance (call light)  - Provide an  as needed  - Communicate barriers and strategies to overcome with those who interact with patient  Outcome: Progressing     Problem: RESPIRATORY - ADULT  Goal: Achieves optimal ventilation and oxygenation  Description: INTERVENTIONS:  - Assess for changes in respiratory status  - Assess for changes in mentation and behavior  - Position to facilitate oxygenation and minimize respiratory effort  - Oxygen supplementation based on oxygen saturation or ABGs  - Provide Smoking Cessation handout, if applicable  - Encourage broncho-pulmonary hygiene including cough, deep breathe, Incentive Spirometry  - Assess the need for suctioning and perform as needed  - Assess and instruct to report SOB or any respiratory  difficulty  - Respiratory Therapy support as indicated  - Manage/alleviate anxiety  - Monitor for signs/symptoms of CO2 retention  Outcome: Progressing     Problem: NEUROLOGICAL - ADULT  Goal: Achieves stable or improved neurological status  Description: INTERVENTIONS  - Assess for and report changes in neurological status  - Initiate measures to prevent increased intracranial pressure  - Maintain blood pressure and fluid volume within ordered parameters to optimize cerebral perfusion and minimize risk of hemorrhage  - Monitor temperature, glucose, and sodium. Initiate appropriate interventions as ordered  Outcome: Progressing  Goal: Absence of seizures  Description: INTERVENTIONS  - Monitor for seizure activity  - Administer anti-seizure medications as ordered  - Monitor neurological status  Outcome: Progressing  Goal: Remains free of injury related to seizure activity  Description: INTERVENTIONS:  - Maintain airway, patient safety  and administer oxygen as ordered  - Monitor patient for seizure activity, document and report duration and description of seizure to MD/LIP  - If seizure occurs, turn patient to side and suction secretions as needed  - Reorient patient post seizure  - Seizure pads on all 4 side rails  - Instruct patient/family to notify RN of any seizure activity  - Instruct patient/family to call for assistance with activity based on assessment  Outcome: Progressing  Goal: Achieves maximal functionality and self care  Description: INTERVENTIONS  - Monitor swallowing and airway patency with patient fatigue and changes in neurological status  - Encourage and assist patient to increase activity and self care with guidance from PT/OT  - Encourage visually impaired, hearing impaired and aphasic patients to use assistive/communication devices  Outcome: Progressing     Problem: Diabetes/Glucose Control  Goal: Glucose maintained within prescribed range  Description: INTERVENTIONS:  - Monitor Blood Glucose as  ordered  - Assess for signs and symptoms of hyperglycemia and hypoglycemia  - Administer ordered medications to maintain glucose within target range  - Assess barriers to adequate nutritional intake and initiate nutrition consult as needed  - Instruct patient on self management of diabetes  Outcome: Progressing     Problem: Delirium  Goal: Minimize duration of delirium  Description: Interventions:  - Encourage use of hearing aids, eye glasses  - Promote highest level of mobility daily  - Provide frequent reorientation  - Promote wakefulness i.e. lights on, blinds open  - Promote sleep, encourage patient's normal rest cycle i.e. lights off, TV off, minimize noise and interruptions  - Encourage family to assist in orientation and promotion of home routines  Outcome: Progressing

## 2025-01-07 NOTE — SPIRITUAL CARE NOTE
Spiritual Care Visit Note    Patient Name: Yin Butcher Date of Spiritual Care Visit: 25   : 1931 Primary Dx: Sepsis, due to unspecified organism, unspecified whether acute organ dysfunction present (HCC)       Referred By: Referral From: Nurse;    Spiritual Care Taxonomy:    Intended Effects: Build relationship of care and support    Methods: Encourage self care;Demonstrate acceptance;Collaborate with care team member;Encourage self reflection;Encourage sharing of feelings;Explore quality of life;Explore rosalio and values;Offer spiritual/Church support;Offer support    Interventions: Acknowledge current situation;Active listening;Ask guided questions;Acknowledge response to difficult experience;Ask questions to bring forth feelings;Ask guided questions about rosalio;Assist patient with documenting choices;Communicate patient's needs/concerns to others;Discuss coping mechanism with someone;Connect someone with their rosalio community/clergy;Discuss frustrations with someone;Explain  role;Silent prayer;Provide hospitality;Provide Grief Processing Session;Identify supportive relationship(s)    Visit Type/Summary:     - Spiritual Care: Consulted with RN prior to visit. Offered empathic listening and emotional support. Patient and family expressed appreciation for  visit. Provided information regarding how to contact Spiritual Care and left a Spiritual Care information card. Provided support for Patient's spiritual/Church requests. Coordinated  visit for Sacrament of the Sick.  remains available for follow up. Visited at length with daughter Jada who is at a crossroad with patients care and having difficulty making a decision about whether to continue aggressive treatment.  explained what Palliative and Hospice care can look like and encouraged Jada to consider accepting a consult. Daughter shared her sadness and frustration and seemed more at ease and  understanding of care choices available to patient and reframed navigating patient choices based on the patients quality of life.  remains available as needed for follow up.    Spiritual Care support can be requested via an Epic consult. For urgent/immediate needs, please contact the On Call  at: [place]: ext [phone]    Rev Alida Chaney MDiv

## 2025-01-07 NOTE — PROGRESS NOTES
Palliative Care Progress Note    Yin Butcher Patient Status:  Inpatient    1931 MRN [id number]   Location [facility] 2W/SW Attending Carmelina Duong MD   Hosp Day # 26 PCP Mina Walker MD     Date of Consult: 2024  Patient seen at: [facility] Inpatient    Reason for Consultation: Consult requested for goals of care and care coordination.    Subjective     S: Tube feeds started then stopped. Patient mouthing some words. Asking for water.     Review of Systems: Palliative Care symptom needs assessed:     Unable to accurately obtain due to mental status    Palliative Care Social History:   Marital Status:    Children: Yes  Living Situation Prior to Admit: Home  Occupational History: Retired  Personal:     Spiritual  Yin Butcher  NA     requested: No    Medical History: obtained from [facility]  Past Medical History:    Arthritis    Atrial fibrillation (HCC)    Back problem    Cancer (HCC)    Cataract    Osteoporosis    Personal history of antineoplastic chemotherapy     Past Surgical History:   Procedure Laterality Date    Other surgical history  2014    Procedure: HIP HEMIARTHROPLASTY/ BIPOLAR;  Surgeon: Vasu Matamoros MD;  Location:  MAIN OR    Removal of tonsils,12+ y/o         Family History: obtained from [facility]  Family History   Family history unknown: Yes       Allergies:  Allergies[1]    Medications:     Current Facility-Administered Medications:     sodium chloride 0.45% infusion, , Intravenous, Continuous    HYDROmorphone (Dilaudid) 1 MG/ML injection 0.2 mg, 0.2 mg, Intravenous, Q2H PRN    albuterol (Ventolin) (2.5 MG/3ML) 0.083% nebulizer solution 2.5 mg, 2.5 mg, Nebulization, BID    [Held by provider] acetaZOLAMIDE (Diamox) tab 250 mg, 250 mg, Per G Tube, Daily    apixaban (Eliquis) tab 2.5 mg, 2.5 mg, Per G Tube, BID    [Held by provider] furosemide (Lasix) tab 20 mg, 20 mg, Per G Tube, Daily    metoprolol tartrate (Lopressor) tab 25 mg, 25 mg,  Per G Tube, 2x Daily(Beta Blocker)    famotidine (Pepcid) tab 20 mg, 20 mg, Per NG Tube, Daily    glycopyrrolate (Robinul) 0.2 MG/ML injection 0.1 mg, 0.1 mg, Intravenous, Q6H PRN    ipratropium-albuterol (Duoneb) 0.5-2.5 (3) MG/3ML inhalation solution 3 mL, 3 mL, Nebulization, Q6H PRN    digoxin (Lanoxin) tab 125 mcg, 125 mcg, Oral, Daily    acetaminophen (Ofirmev) 10 mg/mL infusion premix 650 mg, 15 mg/kg, Intravenous, Q6H PRN    dextrose 10% infusion (TPN no rate), , Intravenous, Continuous PRN    pancrelipase (Lip-Prot-Amyl) (Zenpep) DR particles cap 10,000 Units, 10,000 Units, Per G Tube, PRN **AND** sodium bicarbonate tab 325 mg, 325 mg, Per G Tube, PRN    metoprolol (Lopressor) 5 mg/5mL injection 5 mg, 5 mg, Intravenous, Q4H PRN    albuterol (Ventolin) (5 MG/ML) 0.5% nebulizer solution 5 mg, 5 mg, Nebulization, Q4H PRN    acetaminophen (Tylenol) 160 MG/5ML oral liquid 650 mg, 650 mg, Per NG Tube, Q4H PRN **OR** acetaminophen (Tylenol) rectal suppository 650 mg, 650 mg, Rectal, Q4H PRN    polyethylene glycol (PEG 3350) (Miralax) 17 g oral packet 17 g, 17 g, Per NG Tube, Daily PRN    bisacodyl (Dulcolax) 10 MG rectal suppository 10 mg, 10 mg, Rectal, Daily PRN    ondansetron (Zofran) 4 MG/2ML injection 4 mg, 4 mg, Intravenous, Q6H PRN    metoclopramide (Reglan) 5 mg/mL injection 10 mg, 10 mg, Intravenous, Q8H PRN  No current outpatient medications on file.         Palliative Performance Scale: 20 %  % Ambulation Activity Level Self-Care Intake Consciousness   100 Full  Normal  No Disease Full Normal Full   90 Full  Normal  Some Disease Full Normal Full   80 Full  Normal w/effort  Some Disease Full Normal or reduced Full   70 Reduced  Can't Perform Job  Some Disease Full Normal or reduced Full   60 Reduced  Can't Perform Hobby   Significant Disease Occ Assist Normal or reduced Full or confused   50 Mainly sit/lie Can't do any work  Extensive Disease Partial Assist Normal or reduced Full or confused   40 Mainly  in bed Can't do any work  Extensive Disease Mainly Assist Normal or reduced Full or confused   30 Bed Bound Can't do any work  Extensive Disease Max Assist  Total Care Reduced  Drowsy/confused   20 Bed Bound Can't do any work  Extensive Disease Max Assist  Total Care Minimal  Drowsy/confused   10 Bed Bound Can't do any work  Extensive Disease Max Assist  Total Care Mouth Care  Drowsy/confused   0 Death        Objective      Vital Signs:  Blood pressure 101/65, pulse 95, temperature 97.9 °F (36.6 °C), temperature source Axillary, resp. rate 22, height 5' 7.01\" (1.702 m), weight 121 lb 0.5 oz (54.9 kg), SpO2 96%.  Body mass index is 18.95 kg/m².  Non-verbal signs of pain present: No    Physical Exam:  General: Mildly SOB, thin, confused  HEENT: AT/NC  Cardiac: RRR  Lungs: coarse BS  Abdomen: Soft, non-tender, non-distended, normal bowel sounds X 4 quadrants   Extremities: mild-mod Edema present  Skin: Warm and dry.    Hematology:  Lab Results   Component Value Date    WBC 13.7 (H) 01/06/2025    HGB 8.8 (L) 01/06/2025    HCT 28.1 (L) 01/06/2025    .0 01/06/2025       Coags:  Lab Results   Component Value Date    PT 15.5 (H) 07/13/2014    INR 1.32 (H) 01/29/2024    PTT 34.6 01/31/2024       Chemistry:  Lab Results   Component Value Date    CREATSERUM 0.44 (L) 01/07/2025    BUN 18 01/07/2025     01/07/2025    K 4.3 01/07/2025     01/07/2025    CO2 33.0 (H) 01/07/2025    GLU 84 01/07/2025    CA 9.8 01/07/2025    ALB 4.0 12/11/2024    ALKPHO 112 12/11/2024    BILT 1.0 (H) 12/11/2024    TP 8.2 12/11/2024    AST 24 12/11/2024    ALT 30 12/11/2024    DDIMER 2.38 (H) 01/20/2024    MG 2.0 12/14/2024    PHOS 4.1 12/14/2024    TROP <0.046 02/19/2015       Imaging:  No results found.    Assessment and Recommendations        Sepsis, due to unspecified organism, unspecified whether acute organ dysfunction present (HCC)    Acute respiratory failure with hypercapnia (HCC)    Atrial fibrillation with rapid  ventricular response (HCC)    Acute on chronic respiratory failure with hypoxia and hypercapnia (HCC)    Palliative care encounter    Counseling regarding advance care planning and goals of care    Symptom Management       Dyspnea   -on NC   -dilaudid 0.2mg IV q1-2hr prn     Agitation    -will add prn haldol for agitation   -1mg IV q2hr prn    2.     Serious Illness Conversation:   Code Status: DNAR/Full  POA: Surrogate is daughter Jada  I met with Jada in person and also present via phone was Librado, her fiance. Jada lives with Prema in [facility], and splits her time in [place], where her fimorena lives. Prema was home apparently until the last several months when she began problems with her effusions, aspiration etc.   I asked if Jada could give me a big picture sense of what was going with her mom. She is able to discuss the details of all of the illnesses and episodes but I also wondered if she felt overall that Prema was improving or declining.   I expressed my view/worry that her lungs are shutting down/have shut down etc to the extent that she is dying from this. I explained the physiology of chronic aspiration in context of dysphagia as a terminal/lethal component of not just dementia or old age but of many complex chronic illnesses.   I explained that I felt that Prema is unfortunately showing us that she is dying, albeit slowly and that we are in a position to decide how much to try and resist or fight natural dying or how much we can decide to support or allow for it.   Prema repeated that her mom had always just said 'she wants everything done' but unfortunately had little more context or nuance. Ie what 'everything' looks like, logistically, financially, quality etc. Librado said that he thinks Prema is pretty understanding of these issues, having dealt with her own  dying and other parents. He and Jada were hoping to involved Prema in the conversation.   When I spoke to Prema she could nod yes or  no in response to basic questions but when I asked about natural dying she closed her eyes and would not answer. Jada saw this and said that she is probably thinking and that she and her fiancee would talk to Prema in more detail. They hoped ideally that they could ask her when she is extubated.  They wanted to know what their deadline is to 'make a decision.' They did not seem opposed to consider compassionate extubation but instead seem very unsure about what to do. They appear open to learning as much as possible about their options.   I met with Jada and called Librado separately. I explained that her body is not supporting life, and that her best possible chance of accomplishing their goal of breathing independently and being at home would be compassionate extubation. Jada seems to understand but continued to ask about trach. I think it has been helpful to explain that what she has witnessed over the last several months are not just 'hiccups' but they are a gradual dying from dementia and advanced age.   I explained that extubation should probably happen within the next 24-48 hours as discussed with Dr. Espino and that we would endeavor to help her be comfortable if she were passing away.   I can have a more definitive discussion about plan tomorrow with lisset is present after 10-11am or so unless another provider discusses it earlier. I think we should support Jada in the grief process as gently as possible.   See separate note for discussion. Family agreeable to DNR/DNI and compassionate extubation.  I spoke to Jada via phone last night. Her goal is to have Prema go to rehab to get stronger and then return home. Unfortunately I feel that she is probably not a candidate for this, but I will defer to primary and PT/OT. I will call Jada and Librado to discuss comfort care/hospice. I did explain that no matter what she will most likely need around the clock care and Jada agreed, and agreed that she would NOT  want her mom in a facility permanently. Jada is not viewing this process as a terminal process, but focused on isolated details of Prema's course in hopes that this is another 'hiccup' and that she will recover. I encourage all care providers to continue to educate her.  Prema is too weak to phonate. She is staring up at the ceiling with mouth agape. She can blink and nod but cannot answer my questions due to effort/weakness. I personally feel that she is nearing death and should be transitioned to comfort care and be home with hospice. I will recommend this again to Jada and Librado. I appreciate other team members calling family as well to advise them.  I made pt comfort care, and deprescribed some meds and explained to Jada that they are not helping Prema wake up or feel better. She does not want full comfort measures or hospice. I did say I would add some prn dilaudid and haldol.   I do not think this family will enroll in hospice and so I think we will have to provide end of life care while continually re-orienting Daughter to goals and realistic expectations for Prema. (She is asking if she got better today could we resume tube feeding)  I will ask naseem to explain to Jada and family friend Sheri in further detail.       Palliative Care Follow Up: Palliative care team will Continue to follow while inpatient    Thank you for allowing Palliative Care services to participate in the care of Yin Butcher.    A total of 60 minutes were spent on this visit, which included all of the following: direct face to face contact, history taking, physical examination, and >50% was spent counseling and coordinating care.    Barry Liz MD  Palliative Care Services  [facility] [phone]    Note to patient:  The 21st Century Cures Act makes medical notes like these available to patients in the interest of transparency. However, be advised this is a medical document. It is intended as peer to peer communication.  It is written in medical language and may contain abbreviations or verbiage that are unfamiliar. It may appear blunt or direct. Medical documents are intended to carry relevant information, facts as evident, and the clinical opinion of the practitioner.           [1]   Allergies  Allergen Reactions    Erythromycin OTHER (SEE COMMENTS)    Oxycodone HALLUCINATION    Gabapentin NAUSEA ONLY and OTHER (SEE COMMENTS)     Pt stated she didn't feel like herself     Tizanidine ITCHING and OTHER (SEE COMMENTS)     Pt states burning sensation

## 2025-01-07 NOTE — PLAN OF CARE
A&Ox0-1. Comfort orders per palliative. Pt max asst, IV fluids infusing, voiding via depends, switched to reg/gen diet and mod thickened liquids, on 2L via NC. Frequent rounding by nursing staff. Safety precautions maintained/call light within reach.    Problem: Patient Centered Care  Goal: Patient preferences are identified and integrated in the patient's plan of care  Description: Interventions:  - What would you like us to know as we care for you? Pt came from [facility] & was hospitalized at [facility]. Per daughter, pt is not returning back to [place].  - Provide timely, complete, and accurate information to patient/family  - Incorporate patient and family knowledge, values, beliefs, and cultural backgrounds into the planning and delivery of care  - Encourage patient/family to participate in care and decision-making at the level they choose  - Honor patient and family perspectives and choices  Outcome: Progressing     Problem: Safety Risk - Non-Violent Restraints  Goal: Patient will remain free from self-harm  Description: INTERVENTIONS:  - Apply the least restrictive restraint to prevent harm  - Notify patient and family of reasons restraints applied  - Assess for any contributing factors to confusion (electrolyte disturbances, delirium, medications)  - Discontinue any unnecessary medical devices as soon as possible  - Assess the patient's physical comfort, circulation, skin condition, hydration, nutrition and elimination needs   - Reorient and redirection as needed  - Assess for the need to continue restraints  Outcome: Progressing     Problem: PAIN - ADULT  Goal: Verbalizes/displays adequate comfort level or patient's stated pain goal  Description: INTERVENTIONS:  - Encourage pt to monitor pain and request assistance  - Assess pain using appropriate pain scale  - Administer analgesics based on type and severity of pain and evaluate response  - Implement non-pharmacological measures as  appropriate and evaluate response  - Consider cultural and social influences on pain and pain management  - Manage/alleviate anxiety  - Utilize distraction and/or relaxation techniques  - Monitor for opioid side effects  - Notify MD/LIP if interventions unsuccessful or patient reports new pain  - Anticipate increased pain with activity and pre-medicate as appropriate  Outcome: Progressing     Problem: RISK FOR INFECTION - ADULT  Goal: Absence of fever/infection during anticipated neutropenic period  Description: INTERVENTIONS  - Monitor WBC  - Administer growth factors as ordered  - Implement neutropenic guidelines  Outcome: Progressing     Problem: SAFETY ADULT - FALL  Goal: Free from fall injury  Description: INTERVENTIONS:  - Assess pt frequently for physical needs  - Identify cognitive and physical deficits and behaviors that affect risk of falls.  - [place] fall precautions as indicated by assessment.  - Educate pt/family on patient safety including physical limitations  - Instruct pt to call for assistance with activity based on assessment  - Modify environment to reduce risk of injury  - Provide assistive devices as appropriate  - Consider OT/PT consult to assist with strengthening/mobility  - Encourage toileting schedule  Outcome: Progressing     Problem: DISCHARGE PLANNING  Goal: Discharge to home or other facility with appropriate resources  Description: INTERVENTIONS:  - Identify barriers to discharge w/pt and caregiver  - Include patient/family/discharge partner in discharge planning  - Arrange for needed discharge resources and transportation as appropriate  - Identify discharge learning needs (meds, wound care, etc)  - Arrange for interpreters to assist at discharge as needed  - Consider post-discharge preferences of patient/family/discharge partner  - Complete POLST form as appropriate  - Assess patient's ability to be responsible for managing their own health  - Refer to Case Management Department  for coordinating discharge planning if the patient needs post-hospital services based on physician/LIP order or complex needs related to functional status, cognitive ability or social support system  Outcome: Progressing     Problem: Altered Communication/Language Barrier  Goal: Patient/Family is able to understand and participate in their care  Description: Interventions:  - Assess communication ability and preferred communication style  - Implement communication aides and strategies  - Use visual cues when possible  - Listen attentively, be patient, do not interrupt  - Minimize distractions  - Allow time for understanding and response  - Establish method for patient to ask for assistance (call light)  - Provide an  as needed  - Communicate barriers and strategies to overcome with those who interact with patient  Outcome: Progressing     Problem: RESPIRATORY - ADULT  Goal: Achieves optimal ventilation and oxygenation  Description: INTERVENTIONS:  - Assess for changes in respiratory status  - Assess for changes in mentation and behavior  - Position to facilitate oxygenation and minimize respiratory effort  - Oxygen supplementation based on oxygen saturation or ABGs  - Provide Smoking Cessation handout, if applicable  - Encourage broncho-pulmonary hygiene including cough, deep breathe, Incentive Spirometry  - Assess the need for suctioning and perform as needed  - Assess and instruct to report SOB or any respiratory difficulty  - Respiratory Therapy support as indicated  - Manage/alleviate anxiety  - Monitor for signs/symptoms of CO2 retention  Outcome: Progressing     Problem: NEUROLOGICAL - ADULT  Goal: Achieves stable or improved neurological status  Description: INTERVENTIONS  - Assess for and report changes in neurological status  - Initiate measures to prevent increased intracranial pressure  - Maintain blood pressure and fluid volume within ordered parameters to optimize cerebral perfusion and  minimize risk of hemorrhage  - Monitor temperature, glucose, and sodium. Initiate appropriate interventions as ordered  Outcome: Progressing  Goal: Absence of seizures  Description: INTERVENTIONS  - Monitor for seizure activity  - Administer anti-seizure medications as ordered  - Monitor neurological status  Outcome: Progressing  Goal: Remains free of injury related to seizure activity  Description: INTERVENTIONS:  - Maintain airway, patient safety  and administer oxygen as ordered  - Monitor patient for seizure activity, document and report duration and description of seizure to MD/LIP  - If seizure occurs, turn patient to side and suction secretions as needed  - Reorient patient post seizure  - Seizure pads on all 4 side rails  - Instruct patient/family to notify RN of any seizure activity  - Instruct patient/family to call for assistance with activity based on assessment  Outcome: Progressing  Goal: Achieves maximal functionality and self care  Description: INTERVENTIONS  - Monitor swallowing and airway patency with patient fatigue and changes in neurological status  - Encourage and assist patient to increase activity and self care with guidance from PT/OT  - Encourage visually impaired, hearing impaired and aphasic patients to use assistive/communication devices  Outcome: Progressing     Problem: Diabetes/Glucose Control  Goal: Glucose maintained within prescribed range  Description: INTERVENTIONS:  - Monitor Blood Glucose as ordered  - Assess for signs and symptoms of hyperglycemia and hypoglycemia  - Administer ordered medications to maintain glucose within target range  - Assess barriers to adequate nutritional intake and initiate nutrition consult as needed  - Instruct patient on self management of diabetes  Outcome: Progressing     Problem: Delirium  Goal: Minimize duration of delirium  Description: Interventions:  - Encourage use of hearing aids, eye glasses  - Promote highest level of mobility daily  -  Provide frequent reorientation  - Promote wakefulness i.e. lights on, blinds open  - Promote sleep, encourage patient's normal rest cycle i.e. lights off, TV off, minimize noise and interruptions  - Encourage family to assist in orientation and promotion of home routines  Outcome: Progressing

## 2025-01-07 NOTE — PLAN OF CARE
Problem: RISK FOR INFECTION - ADULT  Goal: Absence of fever/infection during anticipated neutropenic period  Description: INTERVENTIONS  - Monitor WBC  - Administer growth factors as ordered  - Implement neutropenic guidelines  Outcome: Progressing     Problem: SAFETY ADULT - FALL  Goal: Free from fall injury  Description: INTERVENTIONS:  - Assess pt frequently for physical needs  - Identify cognitive and physical deficits and behaviors that affect risk of falls.  - Nelson fall precautions as indicated by assessment.  - Educate pt/family on patient safety including physical limitations  - Instruct pt to call for assistance with activity based on assessment  - Modify environment to reduce risk of injury  - Provide assistive devices as appropriate  - Consider OT/PT consult to assist with strengthening/mobility  - Encourage toileting schedule  Outcome: Progressing     Problem: DISCHARGE PLANNING  Goal: Discharge to home or other facility with appropriate resources  Description: INTERVENTIONS:  - Identify barriers to discharge w/pt and caregiver  - Include patient/family/discharge partner in discharge planning  - Arrange for needed discharge resources and transportation as appropriate  - Identify discharge learning needs (meds, wound care, etc)  - Arrange for interpreters to assist at discharge as needed  - Consider post-discharge preferences of patient/family/discharge partner  - Complete POLST form as appropriate  - Assess patient's ability to be responsible for managing their own health  - Refer to Case Management Department for coordinating discharge planning if the patient needs post-hospital services based on physician/LIP order or complex needs related to functional status, cognitive ability or social support system  Outcome: Progressing     Problem: RESPIRATORY - ADULT  Goal: Achieves optimal ventilation and oxygenation  Description: INTERVENTIONS:  - Assess for changes in respiratory status  - Assess for  changes in mentation and behavior  - Position to facilitate oxygenation and minimize respiratory effort  - Oxygen supplementation based on oxygen saturation or ABGs  - Provide Smoking Cessation handout, if applicable  - Encourage broncho-pulmonary hygiene including cough, deep breathe, Incentive Spirometry  - Assess the need for suctioning and perform as needed  - Assess and instruct to report SOB or any respiratory difficulty  - Respiratory Therapy support as indicated  - Manage/alleviate anxiety  - Monitor for signs/symptoms of CO2 retention  Outcome: Progressing     Problem: NEUROLOGICAL - ADULT  Goal: Absence of seizures  Description: INTERVENTIONS  - Monitor for seizure activity  - Administer anti-seizure medications as ordered  - Monitor neurological status  Outcome: Progressing  Goal: Remains free of injury related to seizure activity  Description: INTERVENTIONS:  - Maintain airway, patient safety  and administer oxygen as ordered  - Monitor patient for seizure activity, document and report duration and description of seizure to MD/LIP  - If seizure occurs, turn patient to side and suction secretions as needed  - Reorient patient post seizure  - Seizure pads on all 4 side rails  - Instruct patient/family to notify RN of any seizure activity  - Instruct patient/family to call for assistance with activity based on assessment  Outcome: Progressing

## 2025-01-07 NOTE — DIETARY NOTE
BRIEF NUTRITION NOTE    Consult received for initiation of TF. Pt being seen by palliative team and placed on comfort care. Spoke with RN, pt aspirated on TF and order was cancelled. Patient continues to receive IVF and IV antibiotics. Consult cleared.      Sumi Darling  Dietetic Intern  P: [phone]

## 2025-01-07 NOTE — PROGRESS NOTES
[facility]  part of [facility]    Progress Note    Yin Butcher Patient Status:  Inpatient    1931 MRN [id number]   Location [facility]5W Attending Vika Elmore MD   Hosp Day # 25 PCP Mina Walker MD     SUBJECTIVE:  Pt seen and examined at bedside.   Off BiPAP, currently on 4 L nasal cannula oxygen.  Daughter at bedside  Lethargic, not answering any question    /63 (BP Location: Right arm)   Pulse 78   Temp 97 °F (36.1 °C) (Axillary)   Resp (!) 28   Ht 5' 7.01\" (1.702 m)   Wt 121 lb 0.5 oz (54.9 kg)   SpO2 100%   BMI 18.95 kg/m²     Physical Examination:    Gen: Lethargic, appears comfortable.  HEENT: PERRLA  Lungs: CTA B/L  Heart: Normal S1 S2, No M/G/R   Abd: Abdomen soft, nontender, nondistended, no organomegaly, bowel sounds present  Ext: No edema, no calf tenderness    Labs:   Lab Results   Component Value Date    WBC 13.7 2025    HGB 8.8 2025    HCT 28.1 2025    .0 2025    CREATSERUM 0.55 2025    BUN 31 2025     2025    K 4.1 2025    K 4.1 2025     2025    CO2 34.0 2025    GLU 92 2025    CA 10.2 2025       Recent Labs   Lab 25  1159   PGLU 128*     No results for input(s): \"INR\" in the last 168 hours.    Imaging:  Imaging reviewed in Epic.    Meds:   Current Facility-Administered Medications   Medication Dose Route Frequency    HYDROmorphone (Dilaudid) 1 MG/ML injection 0.2 mg  0.2 mg Intravenous Q2H PRN    vancomycin (Vancocin) 750 mg in sodium chloride 0.9% 250 mL IVPB-ADDV  15 mg/kg Intravenous Q24H    vancomycin (Firvanq) 50 mg/mL oral solution 125 mg  125 mg Per NG Tube Daily    sodium chloride 0.45% infusion   Intravenous Continuous    albuterol (Ventolin) (2.5 MG/3ML) 0.083% nebulizer solution 2.5 mg  2.5 mg Nebulization BID    piperacillin-tazobactam (Zosyn) 3.375 g in dextrose 5% 100 mL IVPB-ADDV  3.375 g Intravenous Q8H    midodrine  (ProAmatine) tab 5 mg  5 mg Per NG Tube TID    [Held by provider] acetaZOLAMIDE (Diamox) tab 250 mg  250 mg Per G Tube Daily    apixaban (Eliquis) tab 2.5 mg  2.5 mg Per G Tube BID    [Held by provider] furosemide (Lasix) tab 20 mg  20 mg Per G Tube Daily    metoprolol tartrate (Lopressor) tab 25 mg  25 mg Per G Tube 2x Daily(Beta Blocker)    famotidine (Pepcid) tab 20 mg  20 mg Per NG Tube Daily    glycopyrrolate (Robinul) 0.2 MG/ML injection 0.1 mg  0.1 mg Intravenous Q6H PRN    multivitamin (Tab-A-Jenny/Beta Carotene) tab 1 tablet  1 tablet Per G Tube Daily    ipratropium-albuterol (Duoneb) 0.5-2.5 (3) MG/3ML inhalation solution 3 mL  3 mL Nebulization Q6H PRN    digoxin (Lanoxin) tab 125 mcg  125 mcg Oral Daily    acetaminophen (Ofirmev) 10 mg/mL infusion premix 650 mg  15 mg/kg Intravenous Q6H PRN    dextrose 10% infusion (TPN no rate)   Intravenous Continuous PRN    pancrelipase (Lip-Prot-Amyl) (Zenpep) DR particles cap 10,000 Units  10,000 Units Per G Tube PRN    And    sodium bicarbonate tab 325 mg  325 mg Per G Tube PRN    metoprolol (Lopressor) 5 mg/5mL injection 5 mg  5 mg Intravenous Q4H PRN    albuterol (Ventolin) (5 MG/ML) 0.5% nebulizer solution 5 mg  5 mg Nebulization Q4H PRN    acetaminophen (Tylenol) 160 MG/5ML oral liquid 650 mg  650 mg Per NG Tube Q4H PRN    Or    acetaminophen (Tylenol) rectal suppository 650 mg  650 mg Rectal Q4H PRN    docusate (Colace) 50 MG/5ML oral solution 100 mg  100 mg Per NG Tube BID    polyethylene glycol (PEG 3350) (Miralax) 17 g oral packet 17 g  17 g Per NG Tube Daily PRN    bisacodyl (Dulcolax) 10 MG rectal suppository 10 mg  10 mg Rectal Daily PRN    ondansetron (Zofran) 4 MG/2ML injection 4 mg  4 mg Intravenous Q6H PRN    metoclopramide (Reglan) 5 mg/mL injection 10 mg  10 mg Intravenous Q8H PRN       Assessment:  #  *Acute respiratory failure with hypercapnia/Aspiration pneumonia  -Intubated/sedated 12/12   -Extubated 12/27  - now on AVAPS  -S/P 10 day course of  antibiotics   -Cont nebs   -Pulmonology following   -Palliative following   -Guarded prognosis   -DNAR/select with no reintubation   -chest Xray 1/3 with Hazy right basilar opacities have also worsened since prior and could relate to any combination of compressive atelectasis or coexistent   infection/aspiration.   -on Zosyn and vancomycin      *Leukocytosis   -WBC 23.5 , improved   -continue Zosyn and Vanco  -likely from aspiration      *HFpEF  -on BB   -Cardiology following      *Atrial fibrillation with rapid ventricular response (HCC)  -Cont Eliquis and digoxin  Cardiology following      *Dysphagia   -Tube feeds on hold due to concern for aspiration   -Aspiration precautions      *Hypotension  -Cont midodrine   -Cardiology following   -start .45 NS at 60 ml per hour         *Dementia  Failure to thrive with profound muscle weakness and fatigue  Metabolic encephalopathy  -Patient is hospice appropriate, family declines   -POC discussed in detail with daughter   -daughter met with Palliative Care on 1/3, they are available if she changes her mind   -Pt's is DNAR/DNI with selective treatment         DVT prophylaxis: Eliquis  Code status:  DNAR/DNI with selective treatment   Has been off BiPAP now daughter requesting to start on tube feeding.  Aspiration risk discussed.  Daughter having unrealistic expectation  Discussed with nursing  Discussed with daughter at bedside.                 Vika Elmore MD   1/6/2025  7:49 PM

## 2025-01-07 NOTE — PROGRESS NOTES
[facility]  part of [facility]     Progress Note    Yin Butcher Patient Status:  Inpatient    1931 MRN [id number]   Location [facility] 2W/SW Attending Vika Elmore MD   Hosp Day # 26 PCP Mina Walker MD       Subjective:   Patient seen and examined.  Resting in bed.  No significant distress.  Objective:   Blood pressure 110/70, pulse 79, temperature 97.8 °F (36.6 °C), temperature source Axillary, resp. rate 20, height 5' 7.01\" (1.702 m), weight 121 lb 0.5 oz (54.9 kg), SpO2 93%.  Intake/Output:   Last 3 shifts: I/O last 3 completed shifts:  In: 1493 [I.V.:360; NG/GT:483; IV PIGGYBACK:650]  Out: 0    This shift: No intake/output data recorded.     Vent Settings:      Hemodynamic parameters (last 24 hours):      Scheduled Meds:   Current Facility-Administered Medications   Medication Dose Route Frequency    sodium chloride 0.45% infusion   Intravenous Continuous    vancomycin (Vancocin) 750 mg in sodium chloride 0.9% 250 mL IVPB-ADDV  15 mg/kg Intravenous Q24H    piperacillin-tazobactam (Zosyn) 3.375 g in dextrose 5% 100 mL IVPB-ADDV  3.375 g Intravenous Q8H    HYDROmorphone (Dilaudid) 1 MG/ML injection 0.2 mg  0.2 mg Intravenous Q2H PRN    albuterol (Ventolin) (2.5 MG/3ML) 0.083% nebulizer solution 2.5 mg  2.5 mg Nebulization BID    [Held by provider] acetaZOLAMIDE (Diamox) tab 250 mg  250 mg Per G Tube Daily    apixaban (Eliquis) tab 2.5 mg  2.5 mg Per G Tube BID    [Held by provider] furosemide (Lasix) tab 20 mg  20 mg Per G Tube Daily    metoprolol tartrate (Lopressor) tab 25 mg  25 mg Per G Tube 2x Daily(Beta Blocker)    famotidine (Pepcid) tab 20 mg  20 mg Per NG Tube Daily    glycopyrrolate (Robinul) 0.2 MG/ML injection 0.1 mg  0.1 mg Intravenous Q6H PRN    ipratropium-albuterol (Duoneb) 0.5-2.5 (3) MG/3ML inhalation solution 3 mL  3 mL Nebulization Q6H PRN    acetaminophen (Ofirmev) 10 mg/mL infusion premix 650 mg  15 mg/kg Intravenous Q6H PRN    dextrose 10%  infusion (TPN no rate)   Intravenous Continuous PRN    pancrelipase (Lip-Prot-Amyl) (Zenpep) DR particles cap 10,000 Units  10,000 Units Per G Tube PRN    And    sodium bicarbonate tab 325 mg  325 mg Per G Tube PRN    metoprolol (Lopressor) 5 mg/5mL injection 5 mg  5 mg Intravenous Q4H PRN    albuterol (Ventolin) (5 MG/ML) 0.5% nebulizer solution 5 mg  5 mg Nebulization Q4H PRN    acetaminophen (Tylenol) 160 MG/5ML oral liquid 650 mg  650 mg Per NG Tube Q4H PRN    Or    acetaminophen (Tylenol) rectal suppository 650 mg  650 mg Rectal Q4H PRN    bisacodyl (Dulcolax) 10 MG rectal suppository 10 mg  10 mg Rectal Daily PRN    ondansetron (Zofran) 4 MG/2ML injection 4 mg  4 mg Intravenous Q6H PRN    metoclopramide (Reglan) 5 mg/mL injection 10 mg  10 mg Intravenous Q8H PRN       Continuous Infusions:    sodium chloride 30 mL/hr at 01/07/25 0932    dextrose 10%         Physical Exam  Constitutional: Arousable  Eyes: PERRL  ENT: nares pateint  Neck: supple, no JVD  Cardio: RRR, S1 S2  Respiratory: Diminished bibasilar breath sounds with crackles present  GI: abdomen soft, non tender, active bowel sounds, no organomegaly + PEG tube in place  Extremities: no clubbing, cyanosis, edema  Neurologic: Does not follow commands  Skin: warm, dry      Results:     Lab Results   Component Value Date    CREATSERUM 0.44 01/07/2025    BUN 18 01/07/2025     01/07/2025    K 4.3 01/07/2025     01/07/2025    CO2 33.0 01/07/2025    GLU 84 01/07/2025    CA 9.8 01/07/2025         No results found.          Assessment   1.  Fevers  2.  Acute encephalopathy  3.  Acute on chronic hypercapnic hypoxemic respiratory failure  4.  Leukocytosis  5.  HFpEF  6.  History of atrial fibrillation  7.  Prior history of pulmonary embolism  9.  Dementia  10.  Dysphagia     Plan   -Patient presents with chief complaint of fevers ongoing altered mental status over the last several days.  Recently discharged on 11/24/[facility] from [facility]  after hospitalization for presumed aspiration pneumonia.  -Worsening hypercapnic respiratory failure on NIV.  Patient intubated on 12/12/2024.  Extubated on 12/27/2024 after discussion with goals of care with palliative care team.    -CT chest on 12/11/2024 with lower lobe mucous plugging concerning for aspiration with small pleural effusions.  -Completed course of antibiotic therapy now restarted on antibiotic therapy with Zosyn.  -Intermittent NIV as necessary  -Chest physiotherapy  -Bronchial hygiene.  Frequent suctioning  -DVT prophylaxis: Eliquis  -Patient DNR/DNI moving forward.  Patient hospice appropriate.  Ongoing discussions with palliative care regarding goals of care.          Dayo Espino, DO  Pulmonary Critical Care Medicine  [facility]

## 2025-01-08 ENCOUNTER — CASE MANAGEMENT (OUTPATIENT)
Dept: CARE COORDINATION | Age: OVER 89
End: 2025-01-08

## 2025-01-08 NOTE — PLAN OF CARE
Pt A&Ox0-1, says some words/ phrases, mostly nonsensical speech. Pt on comfort measures. Tele in place. Iv antibiotics infused. On 2L O2 nasal cannula. Tolerating some moderately thickened liquids. Prn tylenol given for pain. Updates given to daughterJada. Bed low and locked. Pillow support in place. Frequent rounding done.   Problem: Patient Centered Care  Goal: Patient preferences are identified and integrated in the patient's plan of care  Description: Interventions:  - What would you like us to know as we care for you? Pt came from [facility] & was hospitalized at [facility]. Per daughter, pt is not returning back to [place].  - Provide timely, complete, and accurate information to patient/family  - Incorporate patient and family knowledge, values, beliefs, and cultural backgrounds into the planning and delivery of care  - Encourage patient/family to participate in care and decision-making at the level they choose  - Honor patient and family perspectives and choices  Outcome: Progressing     Problem: Patient/Family Goals  Goal: Patient/Family Long Term Goal  Description: Patient's Long Term Goal:     Interventions:  -   - See additional Care Plan goals for specific interventions  Outcome: Progressing  Goal: Patient/Family Short Term Goal  Description: Patient's Short Term Goal:     Interventions:   -   - See additional Care Plan goals for specific interventions  Outcome: Progressing     Problem: Safety Risk - Non-Violent Restraints  Goal: Patient will remain free from self-harm  Description: INTERVENTIONS:  - Apply the least restrictive restraint to prevent harm  - Notify patient and family of reasons restraints applied  - Assess for any contributing factors to confusion (electrolyte disturbances, delirium, medications)  - Discontinue any unnecessary medical devices as soon as possible  - Assess the patient's physical comfort, circulation, skin condition, hydration, nutrition and  elimination needs   - Reorient and redirection as needed  - Assess for the need to continue restraints  Outcome: Progressing     Problem: PAIN - ADULT  Goal: Verbalizes/displays adequate comfort level or patient's stated pain goal  Description: INTERVENTIONS:  - Encourage pt to monitor pain and request assistance  - Assess pain using appropriate pain scale  - Administer analgesics based on type and severity of pain and evaluate response  - Implement non-pharmacological measures as appropriate and evaluate response  - Consider cultural and social influences on pain and pain management  - Manage/alleviate anxiety  - Utilize distraction and/or relaxation techniques  - Monitor for opioid side effects  - Notify MD/LIP if interventions unsuccessful or patient reports new pain  - Anticipate increased pain with activity and pre-medicate as appropriate  Outcome: Progressing     Problem: RISK FOR INFECTION - ADULT  Goal: Absence of fever/infection during anticipated neutropenic period  Description: INTERVENTIONS  - Monitor WBC  - Administer growth factors as ordered  - Implement neutropenic guidelines  Outcome: Progressing     Problem: SAFETY ADULT - FALL  Goal: Free from fall injury  Description: INTERVENTIONS:  - Assess pt frequently for physical needs  - Identify cognitive and physical deficits and behaviors that affect risk of falls.  - [place] fall precautions as indicated by assessment.  - Educate pt/family on patient safety including physical limitations  - Instruct pt to call for assistance with activity based on assessment  - Modify environment to reduce risk of injury  - Provide assistive devices as appropriate  - Consider OT/PT consult to assist with strengthening/mobility  - Encourage toileting schedule  Outcome: Progressing     Problem: DISCHARGE PLANNING  Goal: Discharge to home or other facility with appropriate resources  Description: INTERVENTIONS:  - Identify barriers to discharge w/pt and caregiver  -  Include patient/family/discharge partner in discharge planning  - Arrange for needed discharge resources and transportation as appropriate  - Identify discharge learning needs (meds, wound care, etc)  - Arrange for interpreters to assist at discharge as needed  - Consider post-discharge preferences of patient/family/discharge partner  - Complete POLST form as appropriate  - Assess patient's ability to be responsible for managing their own health  - Refer to Case Management Department for coordinating discharge planning if the patient needs post-hospital services based on physician/LIP order or complex needs related to functional status, cognitive ability or social support system  Outcome: Progressing     Problem: Altered Communication/Language Barrier  Goal: Patient/Family is able to understand and participate in their care  Description: Interventions:  - Assess communication ability and preferred communication style  - Implement communication aides and strategies  - Use visual cues when possible  - Listen attentively, be patient, do not interrupt  - Minimize distractions  - Allow time for understanding and response  - Establish method for patient to ask for assistance (call light)  - Provide an  as needed  - Communicate barriers and strategies to overcome with those who interact with patient  Outcome: Progressing     Problem: RESPIRATORY - ADULT  Goal: Achieves optimal ventilation and oxygenation  Description: INTERVENTIONS:  - Assess for changes in respiratory status  - Assess for changes in mentation and behavior  - Position to facilitate oxygenation and minimize respiratory effort  - Oxygen supplementation based on oxygen saturation or ABGs  - Provide Smoking Cessation handout, if applicable  - Encourage broncho-pulmonary hygiene including cough, deep breathe, Incentive Spirometry  - Assess the need for suctioning and perform as needed  - Assess and instruct to report SOB or any respiratory  difficulty  - Respiratory Therapy support as indicated  - Manage/alleviate anxiety  - Monitor for signs/symptoms of CO2 retention  Outcome: Progressing     Problem: NEUROLOGICAL - ADULT  Goal: Achieves stable or improved neurological status  Description: INTERVENTIONS  - Assess for and report changes in neurological status  - Initiate measures to prevent increased intracranial pressure  - Maintain blood pressure and fluid volume within ordered parameters to optimize cerebral perfusion and minimize risk of hemorrhage  - Monitor temperature, glucose, and sodium. Initiate appropriate interventions as ordered  Outcome: Progressing  Goal: Absence of seizures  Description: INTERVENTIONS  - Monitor for seizure activity  - Administer anti-seizure medications as ordered  - Monitor neurological status  Outcome: Progressing  Goal: Remains free of injury related to seizure activity  Description: INTERVENTIONS:  - Maintain airway, patient safety  and administer oxygen as ordered  - Monitor patient for seizure activity, document and report duration and description of seizure to MD/LIP  - If seizure occurs, turn patient to side and suction secretions as needed  - Reorient patient post seizure  - Seizure pads on all 4 side rails  - Instruct patient/family to notify RN of any seizure activity  - Instruct patient/family to call for assistance with activity based on assessment  Outcome: Progressing  Goal: Achieves maximal functionality and self care  Description: INTERVENTIONS  - Monitor swallowing and airway patency with patient fatigue and changes in neurological status  - Encourage and assist patient to increase activity and self care with guidance from PT/OT  - Encourage visually impaired, hearing impaired and aphasic patients to use assistive/communication devices  Outcome: Progressing     Problem: Diabetes/Glucose Control  Goal: Glucose maintained within prescribed range  Description: INTERVENTIONS:  - Monitor Blood Glucose as  ordered  - Assess for signs and symptoms of hyperglycemia and hypoglycemia  - Administer ordered medications to maintain glucose within target range  - Assess barriers to adequate nutritional intake and initiate nutrition consult as needed  - Instruct patient on self management of diabetes  Outcome: Progressing     Problem: Delirium  Goal: Minimize duration of delirium  Description: Interventions:  - Encourage use of hearing aids, eye glasses  - Promote highest level of mobility daily  - Provide frequent reorientation  - Promote wakefulness i.e. lights on, blinds open  - Promote sleep, encourage patient's normal rest cycle i.e. lights off, TV off, minimize noise and interruptions  - Encourage family to assist in orientation and promotion of home routines  Outcome: Progressing

## 2025-01-08 NOTE — PROGRESS NOTES
[facility]  part of [facility]    Progress Note    Yin Butcher Patient Status:  Inpatient    1931 MRN [id number]   Location [facility]5W Attending Vika Elmore MD   Hosp Day # 26 PCP Mina Walker MD     SUBJECTIVE:  Pt seen and examined at bedside.   Remains lethargic.  Tube feeding was started last night but patient started coughing, gurgling and tube feeding put on hold    /71 (BP Location: Right arm)   Pulse 103   Temp 98.1 °F (36.7 °C) (Axillary)   Resp 22   Ht 5' 7.01\" (1.702 m)   Wt 121 lb 0.5 oz (54.9 kg)   SpO2 92%   BMI 18.95 kg/m²     Physical Examination:    Gen: Lethargic. Appears comfortable.  HEENT: PERRLA  Lungs: CTA B/L  Heart: Normal S1 S2, No M/G/R   Abd: Abdomen soft, nontender, nondistended, no organomegaly, bowel sounds present  Ext: No edema, no calf tenderness    Labs:   Lab Results   Component Value Date    CREATSERUM 0.44 2025    BUN 18 2025     2025    K 4.3 2025     2025    CO2 33.0 2025    GLU 84 2025    CA 9.8 2025       Recent Labs   Lab 25  1159   PGLU 128*     No results for input(s): \"INR\" in the last 168 hours.    Imaging:  Imaging reviewed in Epic.    Meds:   Current Facility-Administered Medications   Medication Dose Route Frequency    sodium chloride 0.45% infusion   Intravenous Continuous    vancomycin (Vancocin) 750 mg in sodium chloride 0.9% 250 mL IVPB-ADDV  15 mg/kg Intravenous Q24H    piperacillin-tazobactam (Zosyn) 3.375 g in dextrose 5% 100 mL IVPB-ADDV  3.375 g Intravenous Q8H    HYDROmorphone (Dilaudid) 1 MG/ML injection 0.2 mg  0.2 mg Intravenous Q2H PRN    albuterol (Ventolin) (2.5 MG/3ML) 0.083% nebulizer solution 2.5 mg  2.5 mg Nebulization BID    [Held by provider] acetaZOLAMIDE (Diamox) tab 250 mg  250 mg Per G Tube Daily    apixaban (Eliquis) tab 2.5 mg  2.5 mg Per G Tube BID    [Held by provider] furosemide (Lasix) tab 20 mg  20 mg Per G  Tube Daily    metoprolol tartrate (Lopressor) tab 25 mg  25 mg Per G Tube 2x Daily(Beta Blocker)    famotidine (Pepcid) tab 20 mg  20 mg Per NG Tube Daily    glycopyrrolate (Robinul) 0.2 MG/ML injection 0.1 mg  0.1 mg Intravenous Q6H PRN    ipratropium-albuterol (Duoneb) 0.5-2.5 (3) MG/3ML inhalation solution 3 mL  3 mL Nebulization Q6H PRN    acetaminophen (Ofirmev) 10 mg/mL infusion premix 650 mg  15 mg/kg Intravenous Q6H PRN    dextrose 10% infusion (TPN no rate)   Intravenous Continuous PRN    pancrelipase (Lip-Prot-Amyl) (Zenpep) DR particles cap 10,000 Units  10,000 Units Per G Tube PRN    And    sodium bicarbonate tab 325 mg  325 mg Per G Tube PRN    metoprolol (Lopressor) 5 mg/5mL injection 5 mg  5 mg Intravenous Q4H PRN    albuterol (Ventolin) (5 MG/ML) 0.5% nebulizer solution 5 mg  5 mg Nebulization Q4H PRN    acetaminophen (Tylenol) 160 MG/5ML oral liquid 650 mg  650 mg Per NG Tube Q4H PRN    Or    acetaminophen (Tylenol) rectal suppository 650 mg  650 mg Rectal Q4H PRN    bisacodyl (Dulcolax) 10 MG rectal suppository 10 mg  10 mg Rectal Daily PRN    ondansetron (Zofran) 4 MG/2ML injection 4 mg  4 mg Intravenous Q6H PRN    metoclopramide (Reglan) 5 mg/mL injection 10 mg  10 mg Intravenous Q8H PRN       Assessment:         *Acute respiratory failure with hypercapnia/Aspiration pneumonia  -Intubated/sedated 12/12   -Extubated 12/27  - now on AVAPS  -S/P 10 day course of antibiotics   -Cont nebs   -Pulmonology following   -Palliative following   -Guarded prognosis   -DNAR/select with no reintubation   -chest Xray 1/3 with Hazy right basilar opacities have also worsened since prior and could relate to any combination of compressive atelectasis or coexistent   infection/aspiration.   -on Zosyn and vancomycin      *Leukocytosis   -WBC 23.5 , improved   -continue Zosyn and Vanco  -likely from aspiration      *HFpEF  -on BB   -Cardiology following      *Atrial fibrillation with rapid ventricular response  (HCC)  -Cont Eliquis and digoxin  Cardiology following      *Dysphagia   -Tube feeds on hold due to concern for aspiration   -Aspiration precautions      *Hypotension  -Cont midodrine   -Cardiology following   -start .45 NS at 60 ml per hour         *Dementia  Failure to thrive with profound muscle weakness and fatigue  Metabolic encephalopathy  -Patient is hospice appropriate, family declines   -POC discussed in detail with daughter   -daughter met with Palliative Care on 1/3, they are available if she changes her mind   -Pt's is DNAR/DNI with selective treatment         DVT prophylaxis: Eliquis  Code status:  DNAR/DNI with selective treatment   Has been off BiPAP now daughter requesting to start on tube feeding.  Aspiration risk discussed.  Daughter having unrealistic expectation  Discussed with nursing  Appreciate Palliative discussing the [facility]   Code status: DNAR/Comfort            Vika Elmore MD   1/7/2025  7:13 PM

## 2025-01-08 NOTE — PROGRESS NOTES
[facility]  part of [facility]     Progress Note    Yin Butcher Patient Status:  Inpatient    1931 MRN [id number]   Location [facility] 2W/SW Attending Vika Elmore MD   Hosp Day # 27 PCP Mina Walker MD       Subjective:   Patient seen and examined.  Resting in bed.  No significant distress.  Objective:   Blood pressure 137/82, pulse 99, temperature 97.7 °F (36.5 °C), temperature source Axillary, resp. rate 26, height 5' 7.01\" (1.702 m), weight 121 lb 0.5 oz (54.9 kg), SpO2 92%.  Intake/Output:   Last 3 shifts: I/O last 3 completed shifts:  In: 1273 [P.O.:55; I.V.:680; NG/GT:138; IV PIGGYBACK:400]  Out: -    This shift: No intake/output data recorded.     Vent Settings:      Hemodynamic parameters (last 24 hours):      Scheduled Meds:   Current Facility-Administered Medications   Medication Dose Route Frequency    acetaminophen (Tylenol) 160 MG/5ML oral liquid 650 mg  650 mg Per NG Tube Q4H PRN    Or    acetaminophen (Tylenol) rectal suppository 650 mg  650 mg Rectal Q4H PRN    potassium chloride 40 mEq in 250mL sodium chloride 0.9% IVPB premix  40 mEq Intravenous Once    sodium chloride 0.45% infusion   Intravenous Continuous    vancomycin (Vancocin) 750 mg in sodium chloride 0.9% 250 mL IVPB-ADDV  15 mg/kg Intravenous Q24H    piperacillin-tazobactam (Zosyn) 3.375 g in dextrose 5% 100 mL IVPB-ADDV  3.375 g Intravenous Q8H    HYDROmorphone (Dilaudid) 1 MG/ML injection 0.2 mg  0.2 mg Intravenous Q2H PRN    albuterol (Ventolin) (2.5 MG/3ML) 0.083% nebulizer solution 2.5 mg  2.5 mg Nebulization BID    [Held by provider] acetaZOLAMIDE (Diamox) tab 250 mg  250 mg Per G Tube Daily    apixaban (Eliquis) tab 2.5 mg  2.5 mg Per G Tube BID    [Held by provider] furosemide (Lasix) tab 20 mg  20 mg Per G Tube Daily    metoprolol tartrate (Lopressor) tab 25 mg  25 mg Per G Tube 2x Daily(Beta Blocker)    famotidine (Pepcid) tab 20 mg  20 mg Per NG Tube Daily    glycopyrrolate  (Robinul) 0.2 MG/ML injection 0.1 mg  0.1 mg Intravenous Q6H PRN    ipratropium-albuterol (Duoneb) 0.5-2.5 (3) MG/3ML inhalation solution 3 mL  3 mL Nebulization Q6H PRN    acetaminophen (Ofirmev) 10 mg/mL infusion premix 650 mg  15 mg/kg Intravenous Q6H PRN    dextrose 10% infusion (TPN no rate)   Intravenous Continuous PRN    pancrelipase (Lip-Prot-Amyl) (Zenpep) DR particles cap 10,000 Units  10,000 Units Per G Tube PRN    And    sodium bicarbonate tab 325 mg  325 mg Per G Tube PRN    metoprolol (Lopressor) 5 mg/5mL injection 5 mg  5 mg Intravenous Q4H PRN    albuterol (Ventolin) (5 MG/ML) 0.5% nebulizer solution 5 mg  5 mg Nebulization Q4H PRN    bisacodyl (Dulcolax) 10 MG rectal suppository 10 mg  10 mg Rectal Daily PRN    ondansetron (Zofran) 4 MG/2ML injection 4 mg  4 mg Intravenous Q6H PRN    metoclopramide (Reglan) 5 mg/mL injection 10 mg  10 mg Intravenous Q8H PRN       Continuous Infusions:    sodium chloride 30 mL/hr at 01/07/25 2107    dextrose 10%         Physical Exam  Constitutional: Arousable  Eyes: PERRL  ENT: nares pateint  Neck: supple, no JVD  Cardio: RRR, S1 S2  Respiratory: Diminished bibasilar breath sounds with crackles present  GI: abdomen soft, non tender, active bowel sounds, no organomegaly + PEG tube in place  Extremities: no clubbing, cyanosis, edema  Neurologic: Does not follow commands  Skin: warm, dry      Results:     Lab Results   Component Value Date    CREATSERUM 0.45 01/08/2025    BUN 13 01/08/2025     01/08/2025    K 3.4 01/08/2025     01/08/2025    CO2 32.0 01/08/2025    GLU 94 01/08/2025    CA 9.6 01/08/2025         No results found.          Assessment   1.  Fevers  2.  Acute encephalopathy  3.  Acute on chronic hypercapnic hypoxemic respiratory failure  4.  Leukocytosis  5.  HFpEF  6.  History of atrial fibrillation  7.  Prior history of pulmonary embolism  9.  Dementia  10.  Dysphagia     Plan   -Patient presents with chief complaint of fevers ongoing altered  mental status over the last several days.  Recently discharged on 11/24/2024 from [facility] after hospitalization for presumed aspiration pneumonia.  -Worsening hypercapnic respiratory failure on NIV.  Patient intubated on 12/12/2024.  Extubated on 12/27/2024 after discussion with goals of care with palliative care team.    -CT chest on 12/11/2024 with lower lobe mucous plugging concerning for aspiration with small pleural effusions.  -Completed course of antibiotic therapy now restarted on antibiotic therapy with Zosyn.  Okay to discontinue Zosyn at this time.  -Intermittent NIV as necessary  -Chest physiotherapy  -Bronchial hygiene.  Frequent suctioning  -DVT prophylaxis: Eliquis  -Patient DNR/DNI moving forward.  Patient hospice appropriate.  Ongoing discussions with palliative care regarding goals of care.  Clear for discharge from pulmonary perspective.  Okay to this discontinue antibiotic therapy from pulmonary perspective          Dayo Espino, DO  Pulmonary Critical Care Medicine  [facility]

## 2025-01-08 NOTE — PLAN OF CARE
A&Ox0- lethargic. Pt max asst, IV fluids infusing, on 3L via HFNC, dilaudid provided as needed for dyspnea. Frequent rounding by nursing staff. Safety precautions maintained/call light within reach.     Problem: Patient Centered Care  Goal: Patient preferences are identified and integrated in the patient's plan of care  Description: Interventions:  - What would you like us to know as we care for you? Pt came from [facility] & was hospitalized at [facility]. Per daughter, pt is not returning back to [place].  - Provide timely, complete, and accurate information to patient/family  - Incorporate patient and family knowledge, values, beliefs, and cultural backgrounds into the planning and delivery of care  - Encourage patient/family to participate in care and decision-making at the level they choose  - Honor patient and family perspectives and choices  Outcome: Progressing     Problem: Safety Risk - Non-Violent Restraints  Goal: Patient will remain free from self-harm  Description: INTERVENTIONS:  - Apply the least restrictive restraint to prevent harm  - Notify patient and family of reasons restraints applied  - Assess for any contributing factors to confusion (electrolyte disturbances, delirium, medications)  - Discontinue any unnecessary medical devices as soon as possible  - Assess the patient's physical comfort, circulation, skin condition, hydration, nutrition and elimination needs   - Reorient and redirection as needed  - Assess for the need to continue restraints  Outcome: Progressing     Problem: PAIN - ADULT  Goal: Verbalizes/displays adequate comfort level or patient's stated pain goal  Description: INTERVENTIONS:  - Encourage pt to monitor pain and request assistance  - Assess pain using appropriate pain scale  - Administer analgesics based on type and severity of pain and evaluate response  - Implement non-pharmacological measures as appropriate and evaluate response  - Consider cultural  and social influences on pain and pain management  - Manage/alleviate anxiety  - Utilize distraction and/or relaxation techniques  - Monitor for opioid side effects  - Notify MD/LIP if interventions unsuccessful or patient reports new pain  - Anticipate increased pain with activity and pre-medicate as appropriate  Outcome: Progressing     Problem: RISK FOR INFECTION - ADULT  Goal: Absence of fever/infection during anticipated neutropenic period  Description: INTERVENTIONS  - Monitor WBC  - Administer growth factors as ordered  - Implement neutropenic guidelines  Outcome: Progressing     Problem: SAFETY ADULT - FALL  Goal: Free from fall injury  Description: INTERVENTIONS:  - Assess pt frequently for physical needs  - Identify cognitive and physical deficits and behaviors that affect risk of falls.  - [place] fall precautions as indicated by assessment.  - Educate pt/family on patient safety including physical limitations  - Instruct pt to call for assistance with activity based on assessment  - Modify environment to reduce risk of injury  - Provide assistive devices as appropriate  - Consider OT/PT consult to assist with strengthening/mobility  - Encourage toileting schedule  Outcome: Progressing     Problem: DISCHARGE PLANNING  Goal: Discharge to home or other facility with appropriate resources  Description: INTERVENTIONS:  - Identify barriers to discharge w/pt and caregiver  - Include patient/family/discharge partner in discharge planning  - Arrange for needed discharge resources and transportation as appropriate  - Identify discharge learning needs (meds, wound care, etc)  - Arrange for interpreters to assist at discharge as needed  - Consider post-discharge preferences of patient/family/discharge partner  - Complete POLST form as appropriate  - Assess patient's ability to be responsible for managing their own health  - Refer to Case Management Department for coordinating discharge planning if the patient  needs post-hospital services based on physician/LIP order or complex needs related to functional status, cognitive ability or social support system  Outcome: Progressing     Problem: Altered Communication/Language Barrier  Goal: Patient/Family is able to understand and participate in their care  Description: Interventions:  - Assess communication ability and preferred communication style  - Implement communication aides and strategies  - Use visual cues when possible  - Listen attentively, be patient, do not interrupt  - Minimize distractions  - Allow time for understanding and response  - Establish method for patient to ask for assistance (call light)  - Provide an  as needed  - Communicate barriers and strategies to overcome with those who interact with patient  Outcome: Progressing     Problem: RESPIRATORY - ADULT  Goal: Achieves optimal ventilation and oxygenation  Description: INTERVENTIONS:  - Assess for changes in respiratory status  - Assess for changes in mentation and behavior  - Position to facilitate oxygenation and minimize respiratory effort  - Oxygen supplementation based on oxygen saturation or ABGs  - Provide Smoking Cessation handout, if applicable  - Encourage broncho-pulmonary hygiene including cough, deep breathe, Incentive Spirometry  - Assess the need for suctioning and perform as needed  - Assess and instruct to report SOB or any respiratory difficulty  - Respiratory Therapy support as indicated  - Manage/alleviate anxiety  - Monitor for signs/symptoms of CO2 retention  Outcome: Progressing     Problem: NEUROLOGICAL - ADULT  Goal: Achieves stable or improved neurological status  Description: INTERVENTIONS  - Assess for and report changes in neurological status  - Initiate measures to prevent increased intracranial pressure  - Maintain blood pressure and fluid volume within ordered parameters to optimize cerebral perfusion and minimize risk of hemorrhage  - Monitor temperature,  glucose, and sodium. Initiate appropriate interventions as ordered  Outcome: Progressing  Goal: Absence of seizures  Description: INTERVENTIONS  - Monitor for seizure activity  - Administer anti-seizure medications as ordered  - Monitor neurological status  Outcome: Progressing  Goal: Remains free of injury related to seizure activity  Description: INTERVENTIONS:  - Maintain airway, patient safety  and administer oxygen as ordered  - Monitor patient for seizure activity, document and report duration and description of seizure to MD/LIP  - If seizure occurs, turn patient to side and suction secretions as needed  - Reorient patient post seizure  - Seizure pads on all 4 side rails  - Instruct patient/family to notify RN of any seizure activity  - Instruct patient/family to call for assistance with activity based on assessment  Outcome: Progressing  Goal: Achieves maximal functionality and self care  Description: INTERVENTIONS  - Monitor swallowing and airway patency with patient fatigue and changes in neurological status  - Encourage and assist patient to increase activity and self care with guidance from PT/OT  - Encourage visually impaired, hearing impaired and aphasic patients to use assistive/communication devices  Outcome: Progressing     Problem: Diabetes/Glucose Control  Goal: Glucose maintained within prescribed range  Description: INTERVENTIONS:  - Monitor Blood Glucose as ordered  - Assess for signs and symptoms of hyperglycemia and hypoglycemia  - Administer ordered medications to maintain glucose within target range  - Assess barriers to adequate nutritional intake and initiate nutrition consult as needed  - Instruct patient on self management of diabetes  Outcome: Progressing     Problem: Delirium  Goal: Minimize duration of delirium  Description: Interventions:  - Encourage use of hearing aids, eye glasses  - Promote highest level of mobility daily  - Provide frequent reorientation  - Promote wakefulness  i.e. lights on, blinds open  - Promote sleep, encourage patient's normal rest cycle i.e. lights off, TV off, minimize noise and interruptions  - Encourage family to assist in orientation and promotion of home routines  Outcome: Progressing

## 2025-01-08 NOTE — CM/SW NOTE
[facility] received phone call referral from MD Liz. [facility] will follow up with family today to schedule informational hospice meeting.      Sharon Cooper JOELLE  [facility]  [phone]

## 2025-01-08 NOTE — PROGRESS NOTES
Palliative Care Progress Note    Yin Butcher Patient Status:  Inpatient    1931 MRN [id number]   Location [facility] 2W/SW Attending Carmelina Duong MD   Hosp Day # 27 PCP Mina Walker MD     Date of Consult: 2024  Patient seen at: [facility]    Reason for Consultation: Consult requested for goals of care and care coordination.    Subjective     S: Tube feeds started then stopped. Patient now unconscious.     Review of Systems: Palliative Care symptom needs assessed:     Unable to accurately obtain due to mental status    Palliative Care Social History:   Marital Status:    Children: Yes  Living Situation Prior to Admit: Home  Occupational History: Retired  Personal:     Spiritual  Yin Butcher  NA     requested: No    Medical History: obtained from [facility]  Past Medical History:    Arthritis    Atrial fibrillation (HCC)    Back problem    Cancer (HCC)    Cataract    Osteoporosis    Personal history of antineoplastic chemotherapy     Past Surgical History:   Procedure Laterality Date    Other surgical history  2014    Procedure: HIP HEMIARTHROPLASTY/ BIPOLAR;  Surgeon: Vasu Matamoros MD;  Location:  MAIN OR    Removal of tonsils,12+ y/o         Family History: obtained from [facility]  Family History   Family history unknown: Yes       Allergies:  Allergies[1]    Medications:     Current Facility-Administered Medications:     acetaminophen (Tylenol) 160 MG/5ML oral liquid 650 mg, 650 mg, Per NG Tube, Q4H PRN **OR** acetaminophen (Tylenol) rectal suppository 650 mg, 650 mg, Rectal, Q4H PRN    sodium chloride 0.45% infusion, , Intravenous, Continuous    HYDROmorphone (Dilaudid) 1 MG/ML injection 0.2 mg, 0.2 mg, Intravenous, Q2H PRN    albuterol (Ventolin) (2.5 MG/3ML) 0.083% nebulizer solution 2.5 mg, 2.5 mg, Nebulization, BID    [Held by provider] acetaZOLAMIDE (Diamox) tab 250 mg, 250 mg, Per G Tube, Daily    [Held by provider] furosemide (Lasix)  tab 20 mg, 20 mg, Per G Tube, Daily    glycopyrrolate (Robinul) 0.2 MG/ML injection 0.1 mg, 0.1 mg, Intravenous, Q6H PRN    ipratropium-albuterol (Duoneb) 0.5-2.5 (3) MG/3ML inhalation solution 3 mL, 3 mL, Nebulization, Q6H PRN    acetaminophen (Ofirmev) 10 mg/mL infusion premix 650 mg, 15 mg/kg, Intravenous, Q6H PRN    dextrose 10% infusion (TPN no rate), , Intravenous, Continuous PRN    pancrelipase (Lip-Prot-Amyl) (Zenpep) DR particles cap 10,000 Units, 10,000 Units, Per G Tube, PRN **AND** sodium bicarbonate tab 325 mg, 325 mg, Per G Tube, PRN    metoprolol (Lopressor) 5 mg/5mL injection 5 mg, 5 mg, Intravenous, Q4H PRN    albuterol (Ventolin) (5 MG/ML) 0.5% nebulizer solution 5 mg, 5 mg, Nebulization, Q4H PRN    bisacodyl (Dulcolax) 10 MG rectal suppository 10 mg, 10 mg, Rectal, Daily PRN    ondansetron (Zofran) 4 MG/2ML injection 4 mg, 4 mg, Intravenous, Q6H PRN    metoclopramide (Reglan) 5 mg/mL injection 10 mg, 10 mg, Intravenous, Q8H PRN  No current outpatient medications on file.         Palliative Performance Scale: 20 %  % Ambulation Activity Level Self-Care Intake Consciousness   100 Full  Normal  No Disease Full Normal Full   90 Full  Normal  Some Disease Full Normal Full   80 Full  Normal w/effort  Some Disease Full Normal or reduced Full   70 Reduced  Can't Perform Job  Some Disease Full Normal or reduced Full   60 Reduced  Can't Perform Hobby   Significant Disease Occ Assist Normal or reduced Full or confused   50 Mainly sit/lie Can't do any work  Extensive Disease Partial Assist Normal or reduced Full or confused   40 Mainly in bed Can't do any work  Extensive Disease Mainly Assist Normal or reduced Full or confused   30 Bed Bound Can't do any work  Extensive Disease Max Assist  Total Care Reduced  Drowsy/confused   20 Bed Bound Can't do any work  Extensive Disease Max Assist  Total Care Minimal  Drowsy/confused   10 Bed Bound Can't do any work  Extensive Disease Max Assist  Total Care Mouth Care   Drowsy/confused   0 Death        Objective      Vital Signs:  Blood pressure 121/71, pulse 93, temperature 98 °F (36.7 °C), temperature source Axillary, resp. rate 24, height 5' 7.01\" (1.702 m), weight 121 lb 0.5 oz (54.9 kg), SpO2 100%.  Body mass index is 18.95 kg/m².  Non-verbal signs of pain present: No    Physical Exam:  General: Mildly SOB, thin, confused  HEENT: AT/NC  Cardiac: RRR  Lungs: coarse BS  Abdomen: Soft, non-tender, non-distended, normal bowel sounds X 4 quadrants   Extremities: mild-mod Edema present  Skin: Warm and dry.    Hematology:  Lab Results   Component Value Date    WBC 7.4 01/08/2025    HGB 9.5 (L) 01/08/2025    HCT 30.1 (L) 01/08/2025    .0 01/08/2025       Coags:  Lab Results   Component Value Date    PT 15.5 (H) 07/13/2014    INR 1.32 (H) 01/29/2024    PTT 34.6 01/31/2024       Chemistry:  Lab Results   Component Value Date    CREATSERUM 0.45 (L) 01/08/2025    BUN 13 01/08/2025     01/08/2025    K 3.4 (L) 01/08/2025     01/08/2025    CO2 32.0 01/08/2025    GLU 94 01/08/2025    CA 9.6 01/08/2025    ALB 4.0 12/11/2024    ALKPHO 112 12/11/2024    BILT 1.0 (H) 12/11/2024    TP 8.2 12/11/2024    AST 24 12/11/2024    ALT 30 12/11/2024    DDIMER 2.38 (H) 01/20/2024    MG 2.0 12/14/2024    PHOS 4.1 12/14/2024    TROP <0.046 02/19/2015       Imaging:  No results found.    Assessment and Recommendations        Sepsis, due to unspecified organism, unspecified whether acute organ dysfunction present (HCC)    Acute respiratory failure with hypercapnia (HCC)    Atrial fibrillation with rapid ventricular response (HCC)    Acute on chronic respiratory failure with hypoxia and hypercapnia (HCC)    Palliative care encounter    Counseling regarding advance care planning and goals of care    Symptom Management       Dyspnea   -on NC   -will recommend schedule dilaudid 0.3mg q6hr     Agitation    -will add prn haldol for agitation   -1mg IV q2hr prn    2.     Serious Illness  Conversation:   Code Status: DNAR/Full  POA: Surrogate is daughter Jada  I met with Jada in person and also present via phone was Librado, her fiance. Jada lives with Prema in [facility], and splits her time in [place], where her fisullye lives. Prema was home apparently until the last several months when she began problems with her effusions, aspiration etc.   I asked if Jada could give me a big picture sense of what was going with her mom. She is able to discuss the details of all of the illnesses and episodes but I also wondered if she felt overall that Prema was improving or declining.   I expressed my view/worry that her lungs are shutting down/have shut down etc to the extent that she is dying from this. I explained the physiology of chronic aspiration in context of dysphagia as a terminal/lethal component of not just dementia or old age but of many complex chronic illnesses.   I explained that I felt that Prema is unfortunately showing us that she is dying, albeit slowly and that we are in a position to decide how much to try and resist or fight natural dying or how much we can decide to support or allow for it.   Prema repeated that her mom had always just said 'she wants everything done' but unfortunately had little more context or nuance. Ie what 'everything' looks like, logistically, financially, quality etc. Librado said that he thinks Prema is pretty understanding of these issues, having dealt with her own  dying and other parents. He and Jada were hoping to involved Prema in the conversation.   When I spoke to Prema she could nod yes or no in response to basic questions but when I asked about natural dying she closed her eyes and would not answer. Jada saw this and said that she is probably thinking and that she and her fiancee would talk to Prema in more detail. They hoped ideally that they could ask her when she is extubated.  They wanted to know what their deadline is to 'make a decision.' They did  not seem opposed to consider compassionate extubation but instead seem very unsure about what to do. They appear open to learning as much as possible about their options.   I met with Jada and called Librado separately. I explained that her body is not supporting life, and that her best possible chance of accomplishing their goal of breathing independently and being at home would be compassionate extubation. Jada seems to understand but continued to ask about trach. I think it has been helpful to explain that what she has witnessed over the last several months are not just 'hiccups' but they are a gradual dying from dementia and advanced age.   I explained that extubation should probably happen within the next 24-48 hours as discussed with Dr. Espino and that we would endeavor to help her be comfortable if she were passing away.   I can have a more definitive discussion about plan tomorrow with tiffance is present after 10-11am or so unless another provider discusses it earlier. I think we should support Jada in the grief process as gently as possible.   See separate note for discussion. Family agreeable to DNR/DNI and compassionate extubation.  I spoke to Jada via phone last night. Her goal is to have Prema go to rehab to get stronger and then return home. Unfortunately I feel that she is probably not a candidate for this, but I will defer to primary and PT/OT. I will call Jada and Librado to discuss comfort care/hospice. I did explain that no matter what she will most likely need around the clock care and Jada agreed, and agreed that she would NOT want her mom in a facility permanently. Jada is not viewing this process as a terminal process, but focused on isolated details of Prema's course in hopes that this is another 'hiccup' and that she will recover. I encourage all care providers to continue to educate her.  Prema is too weak to phonate. She is staring up at the ceiling with mouth agape. She can blink and nod  but cannot answer my questions due to effort/weakness. I personally feel that she is nearing death and should be transitioned to comfort care and be home with hospice. I will recommend this again to Jada and Librado. I appreciate other team members calling family as well to advise them.  I made pt comfort care, and deprescribed some meds and explained to Jada that they are not helping Prema wake up or feel better. She does not want full comfort measures or hospice. I did say I would add some prn dilaudid and haldol.   I do not think this family will enroll in hospice and so I think we will have to provide end of life care while continually re-orienting Daughter to goals and realistic expectations for Prema. (She is asking if she got better today could we resume tube feeding)  I will ask naseem to explain to Jada and family friend Sheri in further detail.   Family is amenable to meeting with hospice today. I will consult [facility].      Palliative Care Follow Up: Palliative care team will Continue to follow while inpatient    Thank you for allowing Palliative Care services to participate in the care of Yin Butcher.    A total of 50 minutes were spent on this visit, which included all of the following: direct face to face contact, history taking, physical examination, and >50% was spent counseling and coordinating care.    Barry Liz MD  Palliative Care Services  [facility] [phone]    Note to patient:  The 21st Century Cures Act makes medical notes like these available to patients in the interest of transparency. However, be advised this is a medical document. It is intended as peer to peer communication. It is written in medical language and may contain abbreviations or verbiage that are unfamiliar. It may appear blunt or direct. Medical documents are intended to carry relevant information, facts as evident, and the clinical opinion of the practitioner.           [1]   Allergies  Allergen  Reactions    Erythromycin OTHER (SEE COMMENTS)    Oxycodone HALLUCINATION    Gabapentin NAUSEA ONLY and OTHER (SEE COMMENTS)     Pt stated she didn't feel like herself     Tizanidine ITCHING and OTHER (SEE COMMENTS)     Pt states burning sensation

## 2025-01-08 NOTE — CM/SW NOTE
Per Dr Liz; pt/family wants hospice.  MDO hospice acknowledged.  This writer spoke to Teo to notify of order.    CM to remain available for support and/or discharge planning.     Radha FRANKS, RN, CCM  PRN RN CCM  Ext.  [phone]

## 2025-01-09 ENCOUNTER — CASE MANAGEMENT (OUTPATIENT)
Dept: CARE COORDINATION | Age: OVER 89
End: 2025-01-09

## 2025-01-09 NOTE — PLAN OF CARE
Problem: Patient Centered Care  Goal: Patient preferences are identified and integrated in the patient's plan of care  Description: Interventions:  - What would you like us to know as we care for you? Pt came from [facility] & was hospitalized at [facility]. Per daughter, pt is not returning back to [facility].  - Provide timely, complete, and accurate information to patient/family  - Incorporate patient and family knowledge, values, beliefs, and cultural backgrounds into the planning and delivery of care  - Encourage patient/family to participate in care and decision-making at the level they choose  - Honor patient and family perspectives and choices  Outcome: Not Progressing     Problem: Patient/Family Goals  Goal: Patient/Family Long Term Goal  Description: Patient's Long Term Goal: To be comfortable    Interventions:  -  Monitor vital signs   - Monitor appropriate labs   - Pain management   - Administer medications per order   - Follow MD orders   - Diagnostics per order   - Update/inform patient and family on plan of care   - See additional Care Plan goals for specific interventions  Outcome: Not Progressing  Goal: Patient/Family Short Term Goal  Description: Patient's Short Term Goal: To be comfortable     Interventions:   - Monitor vital signs   - Monitor appropriate labs   - Pain management   - Administer medications per order   - Follow MD orders   - Diagnostics per order   - Update/inform patient and family on plan of care   - See additional Care Plan goals for specific interventions  Outcome: Not Progressing     Problem: Safety Risk - Non-Violent Restraints  Goal: Patient will remain free from self-harm  Description: INTERVENTIONS:  - Apply the least restrictive restraint to prevent harm  - Notify patient and family of reasons restraints applied  - Assess for any contributing factors to confusion (electrolyte disturbances, delirium, medications)  - Discontinue any unnecessary medical  devices as soon as possible  - Assess the patient's physical comfort, circulation, skin condition, hydration, nutrition and elimination needs   - Reorient and redirection as needed  - Assess for the need to continue restraints  Outcome: Not Progressing     Problem: PAIN - ADULT  Goal: Verbalizes/displays adequate comfort level or patient's stated pain goal  Description: INTERVENTIONS:  - Encourage pt to monitor pain and request assistance  - Assess pain using appropriate pain scale  - Administer analgesics based on type and severity of pain and evaluate response  - Implement non-pharmacological measures as appropriate and evaluate response  - Consider cultural and social influences on pain and pain management  - Manage/alleviate anxiety  - Utilize distraction and/or relaxation techniques  - Monitor for opioid side effects  - Notify MD/LIP if interventions unsuccessful or patient reports new pain  - Anticipate increased pain with activity and pre-medicate as appropriate  Outcome: Not Progressing     Problem: RISK FOR INFECTION - ADULT  Goal: Absence of fever/infection during anticipated neutropenic period  Description: INTERVENTIONS  - Monitor WBC  - Administer growth factors as ordered  - Implement neutropenic guidelines  Outcome: Not Progressing     Problem: SAFETY ADULT - FALL  Goal: Free from fall injury  Description: INTERVENTIONS:  - Assess pt frequently for physical needs  - Identify cognitive and physical deficits and behaviors that affect risk of falls.  - [place] fall precautions as indicated by assessment.  - Educate pt/family on patient safety including physical limitations  - Instruct pt to call for assistance with activity based on assessment  - Modify environment to reduce risk of injury  - Provide assistive devices as appropriate  - Consider OT/PT consult to assist with strengthening/mobility  - Encourage toileting schedule  Outcome: Not Progressing     Problem: DISCHARGE PLANNING  Goal: Discharge  to home or other facility with appropriate resources  Description: INTERVENTIONS:  - Identify barriers to discharge w/pt and caregiver  - Include patient/family/discharge partner in discharge planning  - Arrange for needed discharge resources and transportation as appropriate  - Identify discharge learning needs (meds, wound care, etc)  - Arrange for interpreters to assist at discharge as needed  - Consider post-discharge preferences of patient/family/discharge partner  - Complete POLST form as appropriate  - Assess patient's ability to be responsible for managing their own health  - Refer to Case Management Department for coordinating discharge planning if the patient needs post-hospital services based on physician/LIP order or complex needs related to functional status, cognitive ability or social support system  Outcome: Not Progressing     Problem: Altered Communication/Language Barrier  Goal: Patient/Family is able to understand and participate in their care  Description: Interventions:  - Assess communication ability and preferred communication style  - Implement communication aides and strategies  - Use visual cues when possible  - Listen attentively, be patient, do not interrupt  - Minimize distractions  - Allow time for understanding and response  - Establish method for patient to ask for assistance (call light)  - Provide an  as needed  - Communicate barriers and strategies to overcome with those who interact with patient  Outcome: Not Progressing     Problem: RESPIRATORY - ADULT  Goal: Achieves optimal ventilation and oxygenation  Description: INTERVENTIONS:  - Assess for changes in respiratory status  - Assess for changes in mentation and behavior  - Position to facilitate oxygenation and minimize respiratory effort  - Oxygen supplementation based on oxygen saturation or ABGs  - Provide Smoking Cessation handout, if applicable  - Encourage broncho-pulmonary hygiene including cough, deep  breathe, Incentive Spirometry  - Assess the need for suctioning and perform as needed  - Assess and instruct to report SOB or any respiratory difficulty  - Respiratory Therapy support as indicated  - Manage/alleviate anxiety  - Monitor for signs/symptoms of CO2 retention  Outcome: Not Progressing     Problem: NEUROLOGICAL - ADULT  Goal: Achieves stable or improved neurological status  Description: INTERVENTIONS  - Assess for and report changes in neurological status  - Initiate measures to prevent increased intracranial pressure  - Maintain blood pressure and fluid volume within ordered parameters to optimize cerebral perfusion and minimize risk of hemorrhage  - Monitor temperature, glucose, and sodium. Initiate appropriate interventions as ordered  Outcome: Not Progressing  Goal: Absence of seizures  Description: INTERVENTIONS  - Monitor for seizure activity  - Administer anti-seizure medications as ordered  - Monitor neurological status  Outcome: Not Progressing  Goal: Remains free of injury related to seizure activity  Description: INTERVENTIONS:  - Maintain airway, patient safety  and administer oxygen as ordered  - Monitor patient for seizure activity, document and report duration and description of seizure to MD/LIP  - If seizure occurs, turn patient to side and suction secretions as needed  - Reorient patient post seizure  - Seizure pads on all 4 side rails  - Instruct patient/family to notify RN of any seizure activity  - Instruct patient/family to call for assistance with activity based on assessment  Outcome: Not Progressing  Goal: Achieves maximal functionality and self care  Description: INTERVENTIONS  - Monitor swallowing and airway patency with patient fatigue and changes in neurological status  - Encourage and assist patient to increase activity and self care with guidance from PT/OT  - Encourage visually impaired, hearing impaired and aphasic patients to use assistive/communication devices  Outcome:  Not Progressing     Problem: Diabetes/Glucose Control  Goal: Glucose maintained within prescribed range  Description: INTERVENTIONS:  - Monitor Blood Glucose as ordered  - Assess for signs and symptoms of hyperglycemia and hypoglycemia  - Administer ordered medications to maintain glucose within target range  - Assess barriers to adequate nutritional intake and initiate nutrition consult as needed  - Instruct patient on self management of diabetes  Outcome: Not Progressing     Problem: Delirium  Goal: Minimize duration of delirium  Description: Interventions:  - Encourage use of hearing aids, eye glasses  - Promote highest level of mobility daily  - Provide frequent reorientation  - Promote wakefulness i.e. lights on, blinds open  - Promote sleep, encourage patient's normal rest cycle i.e. lights off, TV off, minimize noise and interruptions  - Encourage family to assist in orientation and promotion of home routines  Outcome: Not Progressing     Daughter at bedside. Monitoring vital signs, stable at this time. No acute changes at this moment. Pain medication provided as needed. Fall precautions in place- bed alarm on, bed locked in lowest position, call light within reach. Frequent rounding by nursing staff.

## 2025-01-09 NOTE — HOSPICE RN NOTE
[facility] RN visit for follow up on Hospice consult order. Met with elie Elias at bedside. Discussed information on the Hospice philosophy, Medicare benefit, levels of care, team approach and focus on comfort with questions and concerns addressed. They expressed the desire for more time to think about hospice philosophy and if they want to pursue aggressive treatments/rehab. The family would like to discuss POLST status. The family would like to wait until elie Walter is available tomorrow and they would like to discuss with the Palliative and Pulmonology teams.    Plan of Care reviewed with ANEJLICA Patel and MD Liz. Informational meeting complete. Open for [facility] follow up tomorrow at 1300 to provide support for any questions they may have.     Jennifer Coelho RN, BSN-CHPN  Transitional Nurse Liaison  [facility]  [phone] (After-hours)

## 2025-01-09 NOTE — PROGRESS NOTES
[facility], [place]  part of [facility]    Progress Note    Yin Butcher Patient Status:  Inpatient    1931 MRN [id number]   Location [facility]5W Attending Vika Elmore MD   Hosp Day # 27 PCP Mina Walker MD     SUBJECTIVE:  Pt seen and examined at bedside.   More lethargic today  Earlier daughter agreed to meet hospice but wanted to take more time to decide on that.  Remains on O2 supplement      /71 (BP Location: Right arm)   Pulse 94   Temp 98 °F (36.7 °C) (Axillary)   Resp 20   Ht 5' 7.01\" (1.702 m)   Wt 121 lb 0.5 oz (54.9 kg)   SpO2 95%   BMI 18.95 kg/m²     Physical Examination:    Gen: Lethargic, Appears comfortable.  HEENT: PERRLA  Lungs: CTA B/L  Heart: Normal S1 S2, No M/G/R   Abd: Abdomen soft, nontender, nondistended, no organomegaly, bowel sounds present  Ext: No edema, no calf tenderness    Labs:   Lab Results   Component Value Date    WBC 7.4 2025    HGB 9.5 2025    HCT 30.1 2025    .0 2025    CREATSERUM 0.45 2025    BUN 13 2025     2025    K 3.4 2025     2025    CO2 32.0 2025    GLU 94 2025    CA 9.6 2025       Recent Labs   Lab 25  1159   PGLU 128*     No results for input(s): \"INR\" in the last 168 hours.    Imaging:  Imaging reviewed in Epic.    Meds:   Current Facility-Administered Medications   Medication Dose Route Frequency    acetaminophen (Tylenol) 160 MG/5ML oral liquid 650 mg  650 mg Per NG Tube Q4H PRN    Or    acetaminophen (Tylenol) rectal suppository 650 mg  650 mg Rectal Q4H PRN    sodium chloride 0.45% infusion   Intravenous Continuous    HYDROmorphone (Dilaudid) 1 MG/ML injection 0.2 mg  0.2 mg Intravenous Q2H PRN    albuterol (Ventolin) (2.5 MG/3ML) 0.083% nebulizer solution 2.5 mg  2.5 mg Nebulization BID    [Held by provider] acetaZOLAMIDE (Diamox) tab 250 mg  250 mg Per G Tube Daily    [Held by provider] furosemide (Lasix) tab 20 mg   20 mg Per G Tube Daily    glycopyrrolate (Robinul) 0.2 MG/ML injection 0.1 mg  0.1 mg Intravenous Q6H PRN    ipratropium-albuterol (Duoneb) 0.5-2.5 (3) MG/3ML inhalation solution 3 mL  3 mL Nebulization Q6H PRN    acetaminophen (Ofirmev) 10 mg/mL infusion premix 650 mg  15 mg/kg Intravenous Q6H PRN    dextrose 10% infusion (TPN no rate)   Intravenous Continuous PRN    pancrelipase (Lip-Prot-Amyl) (Zenpep) DR particles cap 10,000 Units  10,000 Units Per G Tube PRN    And    sodium bicarbonate tab 325 mg  325 mg Per G Tube PRN    metoprolol (Lopressor) 5 mg/5mL injection 5 mg  5 mg Intravenous Q4H PRN    albuterol (Ventolin) (5 MG/ML) 0.5% nebulizer solution 5 mg  5 mg Nebulization Q4H PRN    bisacodyl (Dulcolax) 10 MG rectal suppository 10 mg  10 mg Rectal Daily PRN    ondansetron (Zofran) 4 MG/2ML injection 4 mg  4 mg Intravenous Q6H PRN    metoclopramide (Reglan) 5 mg/mL injection 10 mg  10 mg Intravenous Q8H PRN       Assessment:           *Acute respiratory failure with hypercapnia/Aspiration pneumonia  -Intubated/sedated 12/12   -Extubated 12/27  - now on AVAPS  -S/P 10 day course of antibiotics   -Cont nebs   -Pulmonology following   -Palliative following   -Guarded prognosis   -DNAR/select with no reintubation   -chest Xray 1/3 with Hazy right basilar opacities have also worsened since prior and could relate to any combination of compressive atelectasis or coexistent   infection/aspiration.   -on Zosyn and vancomycin      *Leukocytosis   -WBC 23.5 , improved   -continue Zosyn and Vanco  -likely from aspiration      *HFpEF  -on BB   -Cardiology following      *Atrial fibrillation with rapid ventricular response (HCC)  -Cont Eliquis and digoxin  Cardiology following      *Dysphagia   -Tube feeds on hold due to concern for aspiration   -Aspiration precautions      *Hypotension  -Cont midodrine   -Cardiology following   -start .45 NS at 60 ml per hour         *Dementia  Failure to thrive with profound muscle  weakness and fatigue  Metabolic encephalopathy  -Patient is hospice appropriate, family declines   -POC discussed in detail with daughter   -daughter met with Palliative Care on 1/3, they are available if she changes her mind   -Pt's is DNAR/DNI with selective treatment         DVT prophylaxis: Eliquis  Code status:  DNAR/DNI with selective treatment   Has been off BiPAP now daughter requesting to start on tube feeding.  Aspiration risk discussed.  Daughter having unrealistic expectation  Discussed with nursing  Appreciate Palliative discussing the GOC   Code status: DNAR/Comfort   Ongoing GOC discussion.     Discharge planning to SNF on O2.         Vika Elmore MD   1/8/2025  8:46 PM

## 2025-01-09 NOTE — PROGRESS NOTES
Palliative Care Progress Note    Yin Butcher Patient Status:  Inpatient    1931 MRN [id number]   Location [facility] 2W/SW Attending Carmelina Duong MD   Hosp Day # 28 PCP Mina Wakler MD     Date of Consult: 2024  Patient seen at: [facility] Inpatient    Reason for Consultation: Consult requested for goals of care and care coordination.    Subjective     S: Non verbal. Occasional signs of pain.     Review of Systems: Palliative Care symptom needs assessed:     Unable to accurately obtain due to mental status    Palliative Care Social History:   Marital Status:    Children: Yes  Living Situation Prior to Admit: Home  Occupational History: Retired  Personal:     Spiritual  Yin Butcher  NA     requested: No    Medical History: obtained from [facility]  Past Medical History:    Arthritis    Atrial fibrillation (HCC)    Back problem    Cancer (HCC)    Cataract    Osteoporosis    Personal history of antineoplastic chemotherapy     Past Surgical History:   Procedure Laterality Date    Other surgical history  2014    Procedure: HIP HEMIARTHROPLASTY/ BIPOLAR;  Surgeon: Vasu Matamoros MD;  Location:  MAIN OR    Removal of tonsils,12+ y/o         Family History: obtained from [facility]  Family History   Family history unknown: Yes       Allergies:  Allergies[1]    Medications:     Current Facility-Administered Medications:     acetaminophen (Tylenol) 160 MG/5ML oral liquid 650 mg, 650 mg, Per NG Tube, Q4H PRN **OR** acetaminophen (Tylenol) rectal suppository 650 mg, 650 mg, Rectal, Q4H PRN    HYDROmorphone HCl (DILAUDID) oral liquid 1 mg, 1 mg, Per G Tube, Q2H PRN    LORazepam Intensol 2 mg/mL oral concentrated solution 1 mg, 1 mg, Per G Tube, Q4H PRN    HYDROmorphone (Dilaudid) 1 MG/ML injection 0.2 mg, 0.2 mg, Intravenous, Q2H PRN    [Held by provider] acetaZOLAMIDE (Diamox) tab 250 mg, 250 mg, Per G Tube, Daily    [Held by provider] furosemide (Lasix) tab 20 mg, 20  mg, Per G Tube, Daily    glycopyrrolate (Robinul) 0.2 MG/ML injection 0.1 mg, 0.1 mg, Intravenous, Q6H PRN    ipratropium-albuterol (Duoneb) 0.5-2.5 (3) MG/3ML inhalation solution 3 mL, 3 mL, Nebulization, Q6H PRN    acetaminophen (Ofirmev) 10 mg/mL infusion premix 650 mg, 15 mg/kg, Intravenous, Q6H PRN    dextrose 10% infusion (TPN no rate), , Intravenous, Continuous PRN    pancrelipase (Lip-Prot-Amyl) (Zenpep) DR particles cap 10,000 Units, 10,000 Units, Per G Tube, PRN **AND** sodium bicarbonate tab 325 mg, 325 mg, Per G Tube, PRN    metoprolol (Lopressor) 5 mg/5mL injection 5 mg, 5 mg, Intravenous, Q4H PRN    albuterol (Ventolin) (5 MG/ML) 0.5% nebulizer solution 5 mg, 5 mg, Nebulization, Q4H PRN    bisacodyl (Dulcolax) 10 MG rectal suppository 10 mg, 10 mg, Rectal, Daily PRN    ondansetron (Zofran) 4 MG/2ML injection 4 mg, 4 mg, Intravenous, Q6H PRN    metoclopramide (Reglan) 5 mg/mL injection 10 mg, 10 mg, Intravenous, Q8H PRN  No current outpatient medications on file.         Palliative Performance Scale: 20 %  % Ambulation Activity Level Self-Care Intake Consciousness   100 Full  Normal  No Disease Full Normal Full   90 Full  Normal  Some Disease Full Normal Full   80 Full  Normal w/effort  Some Disease Full Normal or reduced Full   70 Reduced  Can't Perform Job  Some Disease Full Normal or reduced Full   60 Reduced  Can't Perform Hobby   Significant Disease Occ Assist Normal or reduced Full or confused   50 Mainly sit/lie Can't do any work  Extensive Disease Partial Assist Normal or reduced Full or confused   40 Mainly in bed Can't do any work  Extensive Disease Mainly Assist Normal or reduced Full or confused   30 Bed Bound Can't do any work  Extensive Disease Max Assist  Total Care Reduced  Drowsy/confused   20 Bed Bound Can't do any work  Extensive Disease Max Assist  Total Care Minimal  Drowsy/confused   10 Bed Bound Can't do any work  Extensive Disease Max Assist  Total Care Mouth Care   Drowsy/confused   0 Death        Objective      Vital Signs:  Blood pressure 128/87, pulse 118, temperature 97.3 °F (36.3 °C), temperature source Axillary, resp. rate 20, height 5' 7.01\" (1.702 m), weight 121 lb 0.5 oz (54.9 kg), SpO2 100%.  Body mass index is 18.95 kg/m².  Non-verbal signs of pain present: No    Physical Exam:  General: Mildly SOB, thin, obtunded  HEENT: AT/NC  Cardiac: RRR  Lungs: coarse BS  Abdomen: Soft, non-tender, non-distended, normal bowel sounds X 4 quadrants   Extremities: mild-mod Edema present  Skin: Warm and dry.    Hematology:  Lab Results   Component Value Date    WBC 7.4 01/08/2025    HGB 9.5 (L) 01/08/2025    HCT 30.1 (L) 01/08/2025    .0 01/08/2025       Coags:  Lab Results   Component Value Date    PT 15.5 (H) 07/13/2014    INR 1.32 (H) 01/29/2024    PTT 34.6 01/31/2024       Chemistry:  Lab Results   Component Value Date    CREATSERUM 0.45 (L) 01/08/2025    BUN 13 01/08/2025     01/08/2025    K 3.4 (L) 01/08/2025     01/08/2025    CO2 32.0 01/08/2025    GLU 94 01/08/2025    CA 9.6 01/08/2025    ALB 4.0 12/11/2024    ALKPHO 112 12/11/2024    BILT 1.0 (H) 12/11/2024    TP 8.2 12/11/2024    AST 24 12/11/2024    ALT 30 12/11/2024    DDIMER 2.38 (H) 01/20/2024    MG 2.0 12/14/2024    PHOS 4.1 12/14/2024    TROP <0.046 02/19/2015       Imaging:  No results found.    Assessment and Recommendations        Sepsis, due to unspecified organism, unspecified whether acute organ dysfunction present (HCC)    Acute respiratory failure with hypercapnia (HCC)    Atrial fibrillation with rapid ventricular response (HCC)    Acute on chronic respiratory failure with hypoxia and hypercapnia (HCC)    Palliative care encounter    Counseling regarding advance care planning and goals of care    Symptom Management       Dyspnea   -on NC   -will recommend schedule dilaudid 0.3mg q6hr   -prn liquid dilaudid via Gtube     Agitation    -will add prn haldol for agitation   -1mg IV q2hr  prn     FEN   -saline lock IV   -comfort feeds only   -oral care for xerostomia prn    2.     Serious Illness Conversation:   Code Status: DNAR/Full  POA: Surrogate is daughter Jada  I met with Jada in person and also present via phone was Librado, her fiance. Jada lives with Prema in [facility], and splits her time in [place], where her fimorena lives. Prema was home apparently until the last several months when she began problems with her effusions, aspiration etc.   I asked if Jada could give me a big picture sense of what was going with her mom. She is able to discuss the details of all of the illnesses and episodes but I also wondered if she felt overall that Prema was improving or declining.   I expressed my view/worry that her lungs are shutting down/have shut down etc to the extent that she is dying from this. I explained the physiology of chronic aspiration in context of dysphagia as a terminal/lethal component of not just dementia or old age but of many complex chronic illnesses.   I explained that I felt that Prema is unfortunately showing us that she is dying, albeit slowly and that we are in a position to decide how much to try and resist or fight natural dying or how much we can decide to support or allow for it.   Prema repeated that her mom had always just said 'she wants everything done' but unfortunately had little more context or nuance. Ie what 'everything' looks like, logistically, financially, quality etc. Librado said that he thinks Prema is pretty understanding of these issues, having dealt with her own  dying and other parents. He and Jada were hoping to involved Prema in the conversation.   When I spoke to Prema she could nod yes or no in response to basic questions but when I asked about natural dying she closed her eyes and would not answer. Jada saw this and said that she is probably thinking and that she and her fiancee would talk to Prema in more detail. They hoped ideally that they  could ask her when she is extubated.  They wanted to know what their deadline is to 'make a decision.' They did not seem opposed to consider compassionate extubation but instead seem very unsure about what to do. They appear open to learning as much as possible about their options.   I met with Jada and called Librado separately. I explained that her body is not supporting life, and that her best possible chance of accomplishing their goal of breathing independently and being at home would be compassionate extubation. Jada seems to understand but continued to ask about trach. I think it has been helpful to explain that what she has witnessed over the last several months are not just 'hiccups' but they are a gradual dying from dementia and advanced age.   I explained that extubation should probably happen within the next 24-48 hours as discussed with Dr. Espino and that we would endeavor to help her be comfortable if she were passing away.   I can have a more definitive discussion about plan tomorrow with fiance is present after 10-11am or so unless another provider discusses it earlier. I think we should support Jada in the grief process as gently as possible.   See separate note for discussion. Family agreeable to DNR/DNI and compassionate extubation.  I spoke to Jada via phone last night. Her goal is to have Prema go to rehab to get stronger and then return home. Unfortunately I feel that she is probably not a candidate for this, but I will defer to primary and PT/OT. I will call Jada and Librado to discuss comfort care/hospice. I did explain that no matter what she will most likely need around the clock care and Jada agreed, and agreed that she would NOT want her mom in a facility permanently. Jada is not viewing this process as a terminal process, but focused on isolated details of Prema's course in hopes that this is another 'hiccup' and that she will recover. I encourage all care providers to continue to  educate her.  Prema is too weak to phonate. She is staring up at the ceiling with mouth agape. She can blink and nod but cannot answer my questions due to effort/weakness. I personally feel that she is nearing death and should be transitioned to comfort care and be home with hospice. I will recommend this again to Jada and Librado. I appreciate other team members calling family as well to advise them.  I made pt comfort care, and deprescribed some meds and explained to Jada that they are not helping Prema wake up or feel better. She does not want full comfort measures or hospice. I did say I would add some prn dilaudid and haldol.   I do not think this family will enroll in hospice and so I think we will have to provide end of life care while continually re-orienting Daughter to goals and realistic expectations for Prema. (She is asking if she got better today could we resume tube feeding)  I will ask naseem to explain to Jada and family friend Sheri in further detail.   Family is amenable to meeting with hospice today. I will consult [facility].  Patient is in imminent dying phase, hospice appropriate.       Palliative Care Follow Up: Palliative care team will Continue to follow while inpatient    Thank you for allowing Palliative Care services to participate in the care of Yin Butcher.    A total of 40 minutes were spent on this visit, which included all of the following: direct face to face contact, history taking, physical examination, and >50% was spent counseling and coordinating care.    Barry Liz MD  Palliative Care Services  [facility] [phone]    Note to patient:  The 21st Century Cures Act makes medical notes like these available to patients in the interest of transparency. However, be advised this is a medical document. It is intended as peer to peer communication. It is written in medical language and may contain abbreviations or verbiage that are unfamiliar. It may appear blunt  or direct. Medical documents are intended to carry relevant information, facts as evident, and the clinical opinion of the practitioner.           [1]   Allergies  Allergen Reactions    Erythromycin OTHER (SEE COMMENTS)    Oxycodone HALLUCINATION    Gabapentin NAUSEA ONLY and OTHER (SEE COMMENTS)     Pt stated she didn't feel like herself     Tizanidine ITCHING and OTHER (SEE COMMENTS)     Pt states burning sensation

## 2025-01-09 NOTE — HOSPICE RN NOTE
[facility] follow up visit to daughter, Jada, at bedside. Hospice RN, Hospice LSW, and Dr. Liz present during visit. Patient was seen in bed, somnolent. Open mouth breathing. RR 28 and labored. Pt received Dilaudid 0.2mg IVP x3 doses and 1 dose of oral/liquid Dilaudid. Patient does qualify for inpatient hospice for symptom management of uncontrolled dyspnea. Explained to Jada about inpatient hospice and routine hospice in her home. Jada expressed that she would prefer to bring her mother home than to a SNF for routine hospice. She wanted to look over the [facility] booklet and review the hospice consents with Saba before signing. RH will follow up tomorrow, unless Jada requests sooner. RH will remain available to support patient and family.    Aundrea Ervin RN, BSN CHPN  Transitional Nurse Liaison  [facility]  [phone] (After-hours)

## 2025-01-09 NOTE — PLAN OF CARE
Problem: Patient Centered Care  Goal: Patient preferences are identified and integrated in the patient's plan of care  Description: Interventions:  - What would you like us to know as we care for you? Pt came from [facility] & was hospitalized at [facility]. Per daughter, pt is not returning back to [place].  - Provide timely, complete, and accurate information to patient/family  - Incorporate patient and family knowledge, values, beliefs, and cultural backgrounds into the planning and delivery of care  - Encourage patient/family to participate in care and decision-making at the level they choose  - Honor patient and family perspectives and choices  Outcome: Progressing     Problem: Patient/Family Goals  Goal: Patient/Family Long Term Goal  Description: Patient's Long Term Goal:     Interventions:  - See additional Care Plan goals for specific interventions  Outcome: Progressing  Goal: Patient/Family Short Term Goal  Description: Patient's Short Term Goal:     Interventions:   - See additional Care Plan goals for specific interventions  Outcome: Progressing     Problem: Safety Risk - Non-Violent Restraints  Goal: Patient will remain free from self-harm  Description: INTERVENTIONS:  - Apply the least restrictive restraint to prevent harm  - Notify patient and family of reasons restraints applied  - Assess for any contributing factors to confusion (electrolyte disturbances, delirium, medications)  - Discontinue any unnecessary medical devices as soon as possible  - Assess the patient's physical comfort, circulation, skin condition, hydration, nutrition and elimination needs   - Reorient and redirection as needed  - Assess for the need to continue restraints  Outcome: Progressing     Problem: PAIN - ADULT  Goal: Verbalizes/displays adequate comfort level or patient's stated pain goal  Description: INTERVENTIONS:  - Encourage pt to monitor pain and request assistance  - Assess pain using  appropriate pain scale  - Administer analgesics based on type and severity of pain and evaluate response  - Implement non-pharmacological measures as appropriate and evaluate response  - Consider cultural and social influences on pain and pain management  - Manage/alleviate anxiety  - Utilize distraction and/or relaxation techniques  - Monitor for opioid side effects  - Notify MD/LIP if interventions unsuccessful or patient reports new pain  - Anticipate increased pain with activity and pre-medicate as appropriate  Outcome: Progressing     Problem: RISK FOR INFECTION - ADULT  Goal: Absence of fever/infection during anticipated neutropenic period  Description: INTERVENTIONS  - Monitor WBC  - Administer growth factors as ordered  - Implement neutropenic guidelines  Outcome: Progressing     Problem: SAFETY ADULT - FALL  Goal: Free from fall injury  Description: INTERVENTIONS:  - Assess pt frequently for physical needs  - Identify cognitive and physical deficits and behaviors that affect risk of falls.  - Lynn fall precautions as indicated by assessment.  - Educate pt/family on patient safety including physical limitations  - Instruct pt to call for assistance with activity based on assessment  - Modify environment to reduce risk of injury  - Provide assistive devices as appropriate  - Consider OT/PT consult to assist with strengthening/mobility  - Encourage toileting schedule  Outcome: Progressing     Problem: Altered Communication/Language Barrier  Goal: Patient/Family is able to understand and participate in their care  Description: Interventions:  - Assess communication ability and preferred communication style  - Implement communication aides and strategies  - Use visual cues when possible  - Listen attentively, be patient, do not interrupt  - Minimize distractions  - Allow time for understanding and response  - Establish method for patient to ask for assistance (call light)  - Provide an  as  needed  - Communicate barriers and strategies to overcome with those who interact with patient  Outcome: Progressing     Problem: RESPIRATORY - ADULT  Goal: Achieves optimal ventilation and oxygenation  Description: INTERVENTIONS:  - Assess for changes in respiratory status  - Assess for changes in mentation and behavior  - Position to facilitate oxygenation and minimize respiratory effort  - Oxygen supplementation based on oxygen saturation or ABGs  - Provide Smoking Cessation handout, if applicable  - Encourage broncho-pulmonary hygiene including cough, deep breathe, Incentive Spirometry  - Assess the need for suctioning and perform as needed  - Assess and instruct to report SOB or any respiratory difficulty  - Respiratory Therapy support as indicated  - Manage/alleviate anxiety  - Monitor for signs/symptoms of CO2 retention  Outcome: Progressing     Problem: NEUROLOGICAL - ADULT  Goal: Absence of seizures  Description: INTERVENTIONS  - Monitor for seizure activity  - Administer anti-seizure medications as ordered  - Monitor neurological status  Outcome: Progressing  Goal: Remains free of injury related to seizure activity  Description: INTERVENTIONS:  - Maintain airway, patient safety  and administer oxygen as ordered  - Monitor patient for seizure activity, document and report duration and description of seizure to MD/LIP  - If seizure occurs, turn patient to side and suction secretions as needed  - Reorient patient post seizure  - Seizure pads on all 4 side rails  - Instruct patient/family to notify RN of any seizure activity  - Instruct patient/family to call for assistance with activity based on assessment  Outcome: Progressing  Goal: Achieves maximal functionality and self care  Description: INTERVENTIONS  - Monitor swallowing and airway patency with patient fatigue and changes in neurological status  - Encourage and assist patient to increase activity and self care with guidance from PT/OT  - Encourage  visually impaired, hearing impaired and aphasic patients to use assistive/communication devices  Outcome: Progressing     Problem: Diabetes/Glucose Control  Goal: Glucose maintained within prescribed range  Description: INTERVENTIONS:  - Monitor Blood Glucose as ordered  - Assess for signs and symptoms of hyperglycemia and hypoglycemia  - Administer ordered medications to maintain glucose within target range  - Assess barriers to adequate nutritional intake and initiate nutrition consult as needed  - Instruct patient on self management of diabetes  Outcome: Progressing     Problem: Delirium  Goal: Minimize duration of delirium  Description: Interventions:  - Encourage use of hearing aids, eye glasses  - Promote highest level of mobility daily  - Provide frequent reorientation  - Promote wakefulness i.e. lights on, blinds open  - Promote sleep, encourage patient's normal rest cycle i.e. lights off, TV off, minimize noise and interruptions  - Encourage family to assist in orientation and promotion of home routines  Outcome: Progressing     Problem: DISCHARGE PLANNING  Goal: Discharge to home or other facility with appropriate resources  Description: INTERVENTIONS:  - Identify barriers to discharge w/pt and caregiver  - Include patient/family/discharge partner in discharge planning  - Arrange for needed discharge resources and transportation as appropriate  - Identify discharge learning needs (meds, wound care, etc)  - Arrange for interpreters to assist at discharge as needed  - Consider post-discharge preferences of patient/family/discharge partner  - Complete POLST form as appropriate  - Assess patient's ability to be responsible for managing their own health  - Refer to Case Management Department for coordinating discharge planning if the patient needs post-hospital services based on physician/LIP order or complex needs related to functional status, cognitive ability or social support system  Outcome: Not  Progressing     Problem: NEUROLOGICAL - ADULT  Goal: Achieves stable or improved neurological status  Description: INTERVENTIONS  - Assess for and report changes in neurological status  - Initiate measures to prevent increased intracranial pressure  - Maintain blood pressure and fluid volume within ordered parameters to optimize cerebral perfusion and minimize risk of hemorrhage  - Monitor temperature, glucose, and sodium. Initiate appropriate interventions as ordered  Outcome: Not Progressing

## 2025-01-09 NOTE — PROGRESS NOTES
[facility]  part of [facility]    Progress Note    Yin Butcher Patient Status:  Inpatient    1931 MRN [id number]   Location [facility] Attending Vika Elmore MD   Hosp Day # 28 PCP Mina Walker MD     SUBJECTIVE:  Pt seen and examined at bedside.   Clinically same, lethargic    /87 (BP Location: Right arm)   Pulse 118   Temp 97.3 °F (36.3 °C) (Axillary)   Resp 20   Ht 5' 7.01\" (1.702 m)   Wt 121 lb 0.5 oz (54.9 kg)   SpO2 100%   BMI 18.95 kg/m²     Physical Examination:    Gen: lethargic Appears comfortable.  HEENT: PERRLA  Lungs: CTA B/L  Heart: Normal S1 S2, No M/G/R   Abd: Abdomen soft, nontender, nondistended, no organomegaly, bowel sounds present  Ext: No edema, no calf tenderness    Labs:   Lab Results   Component Value Date    WBC 7.4 2025    HGB 9.5 2025    HCT 30.1 2025    .0 2025       Recent Labs   Lab 25  1159   PGLU 128*     No results for input(s): \"INR\" in the last 168 hours.    Imaging:  Imaging reviewed in Epic.    Meds:   Current Facility-Administered Medications   Medication Dose Route Frequency    acetaminophen (Tylenol) 160 MG/5ML oral liquid 650 mg  650 mg Per NG Tube Q4H PRN    Or    acetaminophen (Tylenol) rectal suppository 650 mg  650 mg Rectal Q4H PRN    HYDROmorphone HCl (DILAUDID) oral liquid 1 mg  1 mg Per G Tube Q2H PRN    LORazepam Intensol 2 mg/mL oral concentrated solution 1 mg  1 mg Per G Tube Q4H PRN    sodium chloride 0.45% infusion   Intravenous Continuous    HYDROmorphone (Dilaudid) 1 MG/ML injection 0.2 mg  0.2 mg Intravenous Q2H PRN    albuterol (Ventolin) (2.5 MG/3ML) 0.083% nebulizer solution 2.5 mg  2.5 mg Nebulization BID    [Held by provider] acetaZOLAMIDE (Diamox) tab 250 mg  250 mg Per G Tube Daily    [Held by provider] furosemide (Lasix) tab 20 mg  20 mg Per G Tube Daily    glycopyrrolate (Robinul) 0.2 MG/ML injection 0.1 mg  0.1 mg Intravenous Q6H PRN     ipratropium-albuterol (Duoneb) 0.5-2.5 (3) MG/3ML inhalation solution 3 mL  3 mL Nebulization Q6H PRN    acetaminophen (Ofirmev) 10 mg/mL infusion premix 650 mg  15 mg/kg Intravenous Q6H PRN    dextrose 10% infusion (TPN no rate)   Intravenous Continuous PRN    pancrelipase (Lip-Prot-Amyl) (Zenpep) DR particles cap 10,000 Units  10,000 Units Per G Tube PRN    And    sodium bicarbonate tab 325 mg  325 mg Per G Tube PRN    metoprolol (Lopressor) 5 mg/5mL injection 5 mg  5 mg Intravenous Q4H PRN    albuterol (Ventolin) (5 MG/ML) 0.5% nebulizer solution 5 mg  5 mg Nebulization Q4H PRN    bisacodyl (Dulcolax) 10 MG rectal suppository 10 mg  10 mg Rectal Daily PRN    ondansetron (Zofran) 4 MG/2ML injection 4 mg  4 mg Intravenous Q6H PRN    metoclopramide (Reglan) 5 mg/mL injection 10 mg  10 mg Intravenous Q8H PRN       Assessment:         *Acute respiratory failure with hypercapnia/Aspiration pneumonia  -Intubated/sedated 12/12   -Extubated 12/27  - now on AVAPS  -S/P 10 day course of antibiotics   -Cont nebs   -Pulmonology following   -Palliative following   -Guarded prognosis   -DNAR/select with no reintubation   -chest Xray 1/3 with Hazy right basilar opacities have also worsened since prior and could relate to any combination of compressive atelectasis or coexistent   infection/aspiration.   -on Zosyn and vancomycin      *Leukocytosis   -WBC 23.5 , improved   -continue Zosyn and Vanco  -likely from aspiration      *HFpEF  -on BB   -Cardiology following      *Atrial fibrillation with rapid ventricular response (HCC)  -Cont Eliquis and digoxin  Cardiology following      *Dysphagia   -Tube feeds on hold due to concern for aspiration   -Aspiration precautions      *Hypotension  -Cont midodrine   -Cardiology following   -start .45 NS at 60 ml per hour         *Dementia  Failure to thrive with profound muscle weakness and fatigue  Metabolic encephalopathy  -Patient is hospice appropriate, family declines   -POC discussed in  detail with daughter   -daughter met with Palliative Care on 1/3, they are available if she changes her mind   -Pt's is DNAR/DNI with selective treatment         DVT prophylaxis: Eliquis  Code status:  DNAR/DNI with selective treatment   Has been off BiPAP now daughter requesting to start on tube feeding.  Aspiration risk discussed.  Daughter having unrealistic expectation  Discussed with nursing  Appreciate Palliative discussing the GOC   Code status: DNAR/Comfort   Ongoing GOC discussion.      Discharge planning to SNF on O2 if daughter doesn't sign hospice today.  Discussed with CM  D/w pulmonary                  Vika Elmore MD   1/9/2025  9:05 AM

## 2025-01-09 NOTE — PROGRESS NOTES
[facility]  part of [facility]     Progress Note    Yin Butcher Patient Status:  Inpatient    1931 MRN [id number]   Location [facility] 2W/SW Attending Vika Elmore MD   Hosp Day # 28 PCP Mina Walker MD       Subjective:   Patient seen and examined.  Resting in bed.  No significant distress.  Objective:   Blood pressure 128/87, pulse 118, temperature 97.3 °F (36.3 °C), temperature source Axillary, resp. rate 20, height 5' 7.01\" (1.702 m), weight 121 lb 0.5 oz (54.9 kg), SpO2 100%.  Intake/Output:   Last 3 shifts: I/O last 3 completed shifts:  In: 796.5 [P.O.:15; I.V.:430.5; NG/GT:151; IV PIGGYBACK:200]  Out: -    This shift: No intake/output data recorded.     Vent Settings:      Hemodynamic parameters (last 24 hours):      Scheduled Meds:   Current Facility-Administered Medications   Medication Dose Route Frequency    acetaminophen (Tylenol) 160 MG/5ML oral liquid 650 mg  650 mg Per NG Tube Q4H PRN    Or    acetaminophen (Tylenol) rectal suppository 650 mg  650 mg Rectal Q4H PRN    HYDROmorphone HCl (DILAUDID) oral liquid 1 mg  1 mg Per G Tube Q2H PRN    LORazepam Intensol 2 mg/mL oral concentrated solution 1 mg  1 mg Per G Tube Q4H PRN    HYDROmorphone (Dilaudid) 1 MG/ML injection 0.2 mg  0.2 mg Intravenous Q2H PRN    [Held by provider] acetaZOLAMIDE (Diamox) tab 250 mg  250 mg Per G Tube Daily    [Held by provider] furosemide (Lasix) tab 20 mg  20 mg Per G Tube Daily    glycopyrrolate (Robinul) 0.2 MG/ML injection 0.1 mg  0.1 mg Intravenous Q6H PRN    ipratropium-albuterol (Duoneb) 0.5-2.5 (3) MG/3ML inhalation solution 3 mL  3 mL Nebulization Q6H PRN    acetaminophen (Ofirmev) 10 mg/mL infusion premix 650 mg  15 mg/kg Intravenous Q6H PRN    dextrose 10% infusion (TPN no rate)   Intravenous Continuous PRN    pancrelipase (Lip-Prot-Amyl) (Zenpep) DR particles cap 10,000 Units  10,000 Units Per G Tube PRN    And    sodium bicarbonate tab 325 mg  325 mg Per G Tube PRN     metoprolol (Lopressor) 5 mg/5mL injection 5 mg  5 mg Intravenous Q4H PRN    albuterol (Ventolin) (5 MG/ML) 0.5% nebulizer solution 5 mg  5 mg Nebulization Q4H PRN    bisacodyl (Dulcolax) 10 MG rectal suppository 10 mg  10 mg Rectal Daily PRN    ondansetron (Zofran) 4 MG/2ML injection 4 mg  4 mg Intravenous Q6H PRN    metoclopramide (Reglan) 5 mg/mL injection 10 mg  10 mg Intravenous Q8H PRN       Continuous Infusions:    dextrose 10%         Physical Exam  Constitutional: Arousable  Eyes: PERRL  ENT: nares pateint  Neck: supple, no JVD  Cardio: RRR, S1 S2  Respiratory: Diminished bibasilar breath sounds with crackles present  GI: abdomen soft, non tender, active bowel sounds, no organomegaly + PEG tube in place  Extremities: no clubbing, cyanosis, edema  Neurologic: Does not follow commands  Skin: warm, dry      Results:     Lab Results   Component Value Date    WBC 7.4 01/08/2025    HGB 9.5 01/08/2025    HCT 30.1 01/08/2025    .0 01/08/2025         No results found.          Assessment   1.  Fevers  2.  Acute encephalopathy  3.  Acute on chronic hypercapnic hypoxemic respiratory failure  4.  Leukocytosis  5.  HFpEF  6.  History of atrial fibrillation  7.  Prior history of pulmonary embolism  9.  Dementia  10.  Dysphagia     Plan   -Patient presents with chief complaint of fevers ongoing altered mental status over the last several days.  Recently discharged on 11/24/2024 from [facility] after hospitalization for presumed aspiration pneumonia.  -Worsening hypercapnic respiratory failure on NIV.  Patient intubated on 12/12/2024.  Extubated on 12/27/2024 after discussion with goals of care with palliative care team.    -CT chest on 12/11/2024 with lower lobe mucous plugging concerning for aspiration with small pleural effusions.  -Completed course of antibiotic therapy   -Intermittent NIV as necessary  -Chest physiotherapy  -Bronchial hygiene.  Frequent suctioning  -DVT prophylaxis:  Eliquis  -Patient DNR/DNI moving forward.  Patient hospice appropriate.  Ongoing discussions with palliative care regarding goals of care.  Cleared for discharge from pulmonary perspective.  Discussed with nursing staff and primary care physician          Dayo Espino DO  Pulmonary Critical Care Medicine  [facility]

## 2025-01-10 ENCOUNTER — CASE MANAGEMENT (OUTPATIENT)
Dept: CARE COORDINATION | Age: OVER 89
End: 2025-01-10

## 2025-01-10 NOTE — PLAN OF CARE
Problem: Patient Centered Care  Goal: Patient preferences are identified and integrated in the patient's plan of care  Description: Interventions:  - What would you like us to know as we care for you? Pt came from [facility] & was hospitalized at [facility]. Per daughter, pt is not returning back to [facility].  - Provide timely, complete, and accurate information to patient/family  - Incorporate patient and family knowledge, values, beliefs, and cultural backgrounds into the planning and delivery of care  - Encourage patient/family to participate in care and decision-making at the level they choose  - Honor patient and family perspectives and choices  Outcome: Not Progressing     Problem: Patient/Family Goals  Goal: Patient/Family Long Term Goal  Description: Patient's Long Term Goal: To be comfortable    Interventions:  -  Monitor vital signs   - Monitor appropriate labs   - Pain management   - Administer medications per order   - Follow MD orders   - Diagnostics per order   - Update/inform patient and family on plan of care   - See additional Care Plan goals for specific interventions  Outcome: Not Progressing  Goal: Patient/Family Short Term Goal  Description: Patient's Short Term Goal: To be comfortable     Interventions:   - Monitor vital signs   - Monitor appropriate labs   - Pain management   - Administer medications per order   - Follow MD orders   - Diagnostics per order   - Update/inform patient and family on plan of care   - See additional Care Plan goals for specific interventions  Outcome: Not Progressing     Problem: Safety Risk - Non-Violent Restraints  Goal: Patient will remain free from self-harm  Description: INTERVENTIONS:  - Apply the least restrictive restraint to prevent harm  - Notify patient and family of reasons restraints applied  - Assess for any contributing factors to confusion (electrolyte disturbances, delirium, medications)  - Discontinue any unnecessary medical  devices as soon as possible  - Assess the patient's physical comfort, circulation, skin condition, hydration, nutrition and elimination needs   - Reorient and redirection as needed  - Assess for the need to continue restraints  Outcome: Not Progressing     Problem: PAIN - ADULT  Goal: Verbalizes/displays adequate comfort level or patient's stated pain goal  Description: INTERVENTIONS:  - Encourage pt to monitor pain and request assistance  - Assess pain using appropriate pain scale  - Administer analgesics based on type and severity of pain and evaluate response  - Implement non-pharmacological measures as appropriate and evaluate response  - Consider cultural and social influences on pain and pain management  - Manage/alleviate anxiety  - Utilize distraction and/or relaxation techniques  - Monitor for opioid side effects  - Notify MD/LIP if interventions unsuccessful or patient reports new pain  - Anticipate increased pain with activity and pre-medicate as appropriate  Outcome: Not Progressing     Problem: RISK FOR INFECTION - ADULT  Goal: Absence of fever/infection during anticipated neutropenic period  Description: INTERVENTIONS  - Monitor WBC  - Administer growth factors as ordered  - Implement neutropenic guidelines  Outcome: Not Progressing     Problem: SAFETY ADULT - FALL  Goal: Free from fall injury  Description: INTERVENTIONS:  - Assess pt frequently for physical needs  - Identify cognitive and physical deficits and behaviors that affect risk of falls.  - [place] fall precautions as indicated by assessment.  - Educate pt/family on patient safety including physical limitations  - Instruct pt to call for assistance with activity based on assessment  - Modify environment to reduce risk of injury  - Provide assistive devices as appropriate  - Consider OT/PT consult to assist with strengthening/mobility  - Encourage toileting schedule  Outcome: Not Progressing     Problem: DISCHARGE PLANNING  Goal: Discharge  to home or other facility with appropriate resources  Description: INTERVENTIONS:  - Identify barriers to discharge w/pt and caregiver  - Include patient/family/discharge partner in discharge planning  - Arrange for needed discharge resources and transportation as appropriate  - Identify discharge learning needs (meds, wound care, etc)  - Arrange for interpreters to assist at discharge as needed  - Consider post-discharge preferences of patient/family/discharge partner  - Complete POLST form as appropriate  - Assess patient's ability to be responsible for managing their own health  - Refer to Case Management Department for coordinating discharge planning if the patient needs post-hospital services based on physician/LIP order or complex needs related to functional status, cognitive ability or social support system  Outcome: Not Progressing     Problem: Altered Communication/Language Barrier  Goal: Patient/Family is able to understand and participate in their care  Description: Interventions:  - Assess communication ability and preferred communication style  - Implement communication aides and strategies  - Use visual cues when possible  - Listen attentively, be patient, do not interrupt  - Minimize distractions  - Allow time for understanding and response  - Establish method for patient to ask for assistance (call light)  - Provide an  as needed  - Communicate barriers and strategies to overcome with those who interact with patient  Outcome: Not Progressing     Problem: RESPIRATORY - ADULT  Goal: Achieves optimal ventilation and oxygenation  Description: INTERVENTIONS:  - Assess for changes in respiratory status  - Assess for changes in mentation and behavior  - Position to facilitate oxygenation and minimize respiratory effort  - Oxygen supplementation based on oxygen saturation or ABGs  - Provide Smoking Cessation handout, if applicable  - Encourage broncho-pulmonary hygiene including cough, deep  breathe, Incentive Spirometry  - Assess the need for suctioning and perform as needed  - Assess and instruct to report SOB or any respiratory difficulty  - Respiratory Therapy support as indicated  - Manage/alleviate anxiety  - Monitor for signs/symptoms of CO2 retention  Outcome: Not Progressing     Problem: NEUROLOGICAL - ADULT  Goal: Achieves stable or improved neurological status  Description: INTERVENTIONS  - Assess for and report changes in neurological status  - Initiate measures to prevent increased intracranial pressure  - Maintain blood pressure and fluid volume within ordered parameters to optimize cerebral perfusion and minimize risk of hemorrhage  - Monitor temperature, glucose, and sodium. Initiate appropriate interventions as ordered  Outcome: Not Progressing  Goal: Absence of seizures  Description: INTERVENTIONS  - Monitor for seizure activity  - Administer anti-seizure medications as ordered  - Monitor neurological status  Outcome: Not Progressing  Goal: Remains free of injury related to seizure activity  Description: INTERVENTIONS:  - Maintain airway, patient safety  and administer oxygen as ordered  - Monitor patient for seizure activity, document and report duration and description of seizure to MD/LIP  - If seizure occurs, turn patient to side and suction secretions as needed  - Reorient patient post seizure  - Seizure pads on all 4 side rails  - Instruct patient/family to notify RN of any seizure activity  - Instruct patient/family to call for assistance with activity based on assessment  Outcome: Not Progressing  Goal: Achieves maximal functionality and self care  Description: INTERVENTIONS  - Monitor swallowing and airway patency with patient fatigue and changes in neurological status  - Encourage and assist patient to increase activity and self care with guidance from PT/OT  - Encourage visually impaired, hearing impaired and aphasic patients to use assistive/communication devices  Outcome:  Not Progressing     Problem: Diabetes/Glucose Control  Goal: Glucose maintained within prescribed range  Description: INTERVENTIONS:  - Monitor Blood Glucose as ordered  - Assess for signs and symptoms of hyperglycemia and hypoglycemia  - Administer ordered medications to maintain glucose within target range  - Assess barriers to adequate nutritional intake and initiate nutrition consult as needed  - Instruct patient on self management of diabetes  Outcome: Not Progressing     Problem: Delirium  Goal: Minimize duration of delirium  Description: Interventions:  - Encourage use of hearing aids, eye glasses  - Promote highest level of mobility daily  - Provide frequent reorientation  - Promote wakefulness i.e. lights on, blinds open  - Promote sleep, encourage patient's normal rest cycle i.e. lights off, TV off, minimize noise and interruptions  - Encourage family to assist in orientation and promotion of home routines  Outcome: Not Progressing     Daughter at bedside. Monitoring vital signs. Pain medication provided as needed. Fall precautions in place- bed locked in lowest position, call light within reach. Frequent rounding by nursing staff.

## 2025-01-10 NOTE — PLAN OF CARE
Problem: Patient Centered Care  Goal: Patient preferences are identified and integrated in the patient's plan of care  Description: Interventions:  - What would you like us to know as we care for you? Pt came from [facility] at [facility] & was hospitalized at [facility]. Per daughter, pt is not returning back to [facility].  - Provide timely, complete, and accurate information to patient/family  - Incorporate patient and family knowledge, values, beliefs, and cultural backgrounds into the planning and delivery of care  - Encourage patient/family to participate in care and decision-making at the level they choose  - Honor patient and family perspectives and choices  Outcome: Progressing     Problem: Patient/Family Goals  Goal: Patient/Family Long Term Goal  Description: Patient's Long Term Goal:     Interventions:  -   - See additional Care Plan goals for specific interventions  Outcome: Progressing  Goal: Patient/Family Short Term Goal  Description: Patient's Short Term Goal:     Interventions:   -   - See additional Care Plan goals for specific interventions  Outcome: Progressing     Problem: Safety Risk - Non-Violent Restraints  Goal: Patient will remain free from self-harm  Description: INTERVENTIONS:  - Apply the least restrictive restraint to prevent harm  - Notify patient and family of reasons restraints applied  - Assess for any contributing factors to confusion (electrolyte disturbances, delirium, medications)  - Discontinue any unnecessary medical devices as soon as possible  - Assess the patient's physical comfort, circulation, skin condition, hydration, nutrition and elimination needs   - Reorient and redirection as needed  - Assess for the need to continue restraints  Outcome: Progressing     Problem: PAIN - ADULT  Goal: Verbalizes/displays adequate comfort level or patient's stated pain goal  Description: INTERVENTIONS:  - Encourage pt to monitor pain and request assistance  - Assess pain using  appropriate pain scale  - Administer analgesics based on type and severity of pain and evaluate response  - Implement non-pharmacological measures as appropriate and evaluate response  - Consider cultural and social influences on pain and pain management  - Manage/alleviate anxiety  - Utilize distraction and/or relaxation techniques  - Monitor for opioid side effects  - Notify MD/LIP if interventions unsuccessful or patient reports new pain  - Anticipate increased pain with activity and pre-medicate as appropriate  Outcome: Progressing     Problem: RISK FOR INFECTION - ADULT  Goal: Absence of fever/infection during anticipated neutropenic period  Description: INTERVENTIONS  - Monitor WBC  - Administer growth factors as ordered  - Implement neutropenic guidelines  Outcome: Progressing     Problem: SAFETY ADULT - FALL  Goal: Free from fall injury  Description: INTERVENTIONS:  - Assess pt frequently for physical needs  - Identify cognitive and physical deficits and behaviors that affect risk of falls.  - [place] fall precautions as indicated by assessment.  - Educate pt/family on patient safety including physical limitations  - Instruct pt to call for assistance with activity based on assessment  - Modify environment to reduce risk of injury  - Provide assistive devices as appropriate  - Consider OT/PT consult to assist with strengthening/mobility  - Encourage toileting schedule  Outcome: Progressing

## 2025-01-10 NOTE — DIETARY NOTE
BRIEF NUTRITION NOTE    Patient screened today for re-assesment. Per palliative, pt remains DNAR/comfort and is desaturating. RD will follow up per protocol or PRN.      Sumi Darling  Dietetic Intern  [id number]

## 2025-01-10 NOTE — PROGRESS NOTES
[facility]  part of [facility]    Progress Note    Yin Butcher Patient Status:  Inpatient    1931 MRN [id number]   Location [facility]5W Attending Vika Elmore MD   Hosp Day # 29 PCP Mina Walker MD     SUBJECTIVE:  Pt seen and examined at bedside.   Lethargic, mouth breathing, tachypneic  Minimally responsive    /70 (BP Location: Right arm)   Pulse (!) 130   Temp 97.3 °F (36.3 °C) (Axillary)   Resp 26   Ht 5' 7.01\" (1.702 m)   Wt 121 lb 0.5 oz (54.9 kg)   SpO2 (!) 78%   BMI 18.95 kg/m²     Physical Examination:    Gen: Lethargic, nonverbal appears comfortable.  HEENT: PERRLA  Lungs: CTA B/L  Heart: Normal S1 S2, No M/G/R   Abd: Abdomen soft, nontender, nondistended, no organomegaly, bowel sounds present  Ext: No edema, no calf tenderness  Not following any command, in respiratory distress    Labs:        Recent Labs   Lab 25  1159   PGLU 128*     No results for input(s): \"INR\" in the last 168 hours.    Imaging:  Imaging reviewed in Epic.    Meds:   Current Facility-Administered Medications   Medication Dose Route Frequency    LORazepam Intensol 2 mg/mL oral concentrated solution 1 mg  1 mg Per G Tube q6h    scopolamine (Transderm-Scop) 1 MG/3DAYS patch 1 patch  1 patch Transdermal Q72H    sodium chloride 0.9% 0.9% flush injection 10 mL  10 mL Intravenous PRN    morphINE in sodium chloride 0.9% 100 mg/100mL infusion premix  1 mg/hr Intravenous Continuous PRN    acetaminophen (Tylenol) 160 MG/5ML oral liquid 650 mg  650 mg Per NG Tube Q4H PRN    Or    acetaminophen (Tylenol) rectal suppository 650 mg  650 mg Rectal Q4H PRN    HYDROmorphone (Dilaudid) 1 MG/ML injection 0.2 mg  0.2 mg Intravenous Q2H PRN    [Held by provider] acetaZOLAMIDE (Diamox) tab 250 mg  250 mg Per G Tube Daily    [Held by provider] furosemide (Lasix) tab 20 mg  20 mg Per G Tube Daily    glycopyrrolate (Robinul) 0.2 MG/ML injection 0.1 mg  0.1 mg Intravenous Q6H PRN     ipratropium-albuterol (Duoneb) 0.5-2.5 (3) MG/3ML inhalation solution 3 mL  3 mL Nebulization Q6H PRN    acetaminophen (Ofirmev) 10 mg/mL infusion premix 650 mg  15 mg/kg Intravenous Q6H PRN    dextrose 10% infusion (TPN no rate)   Intravenous Continuous PRN    pancrelipase (Lip-Prot-Amyl) (Zenpep) DR particles cap 10,000 Units  10,000 Units Per G Tube PRN    And    sodium bicarbonate tab 325 mg  325 mg Per G Tube PRN    metoprolol (Lopressor) 5 mg/5mL injection 5 mg  5 mg Intravenous Q4H PRN    albuterol (Ventolin) (5 MG/ML) 0.5% nebulizer solution 5 mg  5 mg Nebulization Q4H PRN    bisacodyl (Dulcolax) 10 MG rectal suppository 10 mg  10 mg Rectal Daily PRN    ondansetron (Zofran) 4 MG/2ML injection 4 mg  4 mg Intravenous Q6H PRN    metoclopramide (Reglan) 5 mg/mL injection 10 mg  10 mg Intravenous Q8H PRN       Assessment:        *Acute respiratory failure with hypercapnia/Aspiration pneumonia  -Intubated/sedated 12/12   -Extubated 12/27  - now on AVAPS  -S/P 10 day course of antibiotics   -Cont nebs   -Pulmonology following   -Palliative following   -Guarded prognosis   -DNAR/select with no reintubation   -chest Xray 1/3 with Hazy right basilar opacities have also worsened since prior and could relate to any combination of compressive atelectasis or coexistent   infection/aspiration.   -on Zosyn and vancomycin      *Leukocytosis   -WBC 23.5 , improved   -continue Zosyn and Vanco  -likely from aspiration      *HFpEF  -on BB   -Cardiology following      *Atrial fibrillation with rapid ventricular response (HCC)  -Cont Eliquis and digoxin  Cardiology following      *Dysphagia   -Tube feeds on hold due to concern for aspiration   -Aspiration precautions      *Hypotension  -Cont midodrine   -Cardiology following   -start .45 NS at 60 ml per hour         *Dementia  Failure to thrive with profound muscle weakness and fatigue  Metabolic encephalopathy  -Patient is hospice appropriate, family declines   -POC discussed in  detail with daughter   -daughter met with Palliative Care on 1/3, they are available if she changes her mind   -Pt's is DNAR/DNI with selective treatment         DVT prophylaxis: Eliquis  Code status:  DNAR/DNI with selective treatment   Has been off BiPAP now daughter requesting to start on tube feeding.  Aspiration risk discussed.  Daughter having unrealistic expectation  Discussed with nursing  Appreciate Palliative discussing the GOC   Code status: DNAR/Comfort   Ongoing GOC discussion.    Multiple rounds of discussion with palliative and hospice, clearly stating that patient is hospice appropriate and patient is actively dying daughter not ready to sign hospice.              Vika Elmore MD   1/10/2025  3:25 PM

## 2025-01-10 NOTE — PROGRESS NOTES
[facility]  part of [facility]     Progress Note    Yin Butcher Patient Status:  Inpatient    1931 MRN [id number]   Location [facility] 2W/SW Attending Vika Elmore MD   Hosp Day # 29 PCP Mina Walker MD       Subjective:   Patient seen and examined.  Resting in bed.  No significant distress.  On 15 L oxygen this morning  Objective:   Blood pressure 106/58, pulse 108, temperature 97.7 °F (36.5 °C), temperature source Axillary, resp. rate 24, height 5' 7.01\" (1.702 m), weight 121 lb 0.5 oz (54.9 kg), SpO2 93%.  Intake/Output:   Last 3 shifts: I/O last 3 completed shifts:  In: 151.5 [I.V.:120.5; NG/GT:31]  Out: -    This shift: No intake/output data recorded.     Vent Settings:      Hemodynamic parameters (last 24 hours):      Scheduled Meds:   Current Facility-Administered Medications   Medication Dose Route Frequency    acetaminophen (Tylenol) 160 MG/5ML oral liquid 650 mg  650 mg Per NG Tube Q4H PRN    Or    acetaminophen (Tylenol) rectal suppository 650 mg  650 mg Rectal Q4H PRN    HYDROmorphone HCl (DILAUDID) oral liquid 1 mg  1 mg Per G Tube Q2H PRN    LORazepam Intensol 2 mg/mL oral concentrated solution 1 mg  1 mg Per G Tube Q4H PRN    HYDROmorphone (Dilaudid) 1 MG/ML injection 0.2 mg  0.2 mg Intravenous Q2H PRN    [Held by provider] acetaZOLAMIDE (Diamox) tab 250 mg  250 mg Per G Tube Daily    [Held by provider] furosemide (Lasix) tab 20 mg  20 mg Per G Tube Daily    glycopyrrolate (Robinul) 0.2 MG/ML injection 0.1 mg  0.1 mg Intravenous Q6H PRN    ipratropium-albuterol (Duoneb) 0.5-2.5 (3) MG/3ML inhalation solution 3 mL  3 mL Nebulization Q6H PRN    acetaminophen (Ofirmev) 10 mg/mL infusion premix 650 mg  15 mg/kg Intravenous Q6H PRN    dextrose 10% infusion (TPN no rate)   Intravenous Continuous PRN    pancrelipase (Lip-Prot-Amyl) (Zenpep) DR particles cap 10,000 Units  10,000 Units Per G Tube PRN    And    sodium bicarbonate tab 325 mg  325 mg Per G Tube PRN     metoprolol (Lopressor) 5 mg/5mL injection 5 mg  5 mg Intravenous Q4H PRN    albuterol (Ventolin) (5 MG/ML) 0.5% nebulizer solution 5 mg  5 mg Nebulization Q4H PRN    bisacodyl (Dulcolax) 10 MG rectal suppository 10 mg  10 mg Rectal Daily PRN    ondansetron (Zofran) 4 MG/2ML injection 4 mg  4 mg Intravenous Q6H PRN    metoclopramide (Reglan) 5 mg/mL injection 10 mg  10 mg Intravenous Q8H PRN       Continuous Infusions:    dextrose 10%         Physical Exam  Constitutional: Arousable  Eyes: PERRL  ENT: nares pateint  Neck: supple, no JVD  Cardio: RRR, S1 S2  Respiratory: Diminished bibasilar breath sounds with crackles present  GI: abdomen soft, non tender, active bowel sounds, no organomegaly + PEG tube in place  Extremities: no clubbing, cyanosis, edema  Neurologic: Does not follow commands  Skin: warm, dry      Results:              No results found.          Assessment   1.  Fevers  2.  Acute encephalopathy  3.  Acute on chronic hypercapnic hypoxemic respiratory failure  4.  Leukocytosis  5.  HFpEF  6.  History of atrial fibrillation  7.  Prior history of pulmonary embolism  9.  Dementia  10.  Dysphagia     Plan   -Patient presents with chief complaint of fevers ongoing altered mental status over the last several days.  Recently discharged on 11/24/2024 from [facility] after hospitalization for presumed aspiration pneumonia.  -Worsening hypercapnic respiratory failure on NIV.  Patient intubated on 12/12/2024.  Extubated on 12/27/2024 after discussion with goals of care with palliative care team.    -CT chest on 12/11/2024 with lower lobe mucous plugging concerning for aspiration with small pleural effusions.  -Completed course of antibiotic therapy   -Chest physiotherapy  -Increasing oxygenation requirements over the last 24 hours currently on 15 L.  Chest x-ray ordered for this morning.  Discussed with daughter.  -Bronchial hygiene.  Frequent suctioning  -DVT prophylaxis: Eliquis  -Patient DNR/DNI  moving forward.  Patient hospice appropriate.  Ongoing discussions with palliative care regarding goals of care.            Dayo Espino, DO  Pulmonary Critical Care Medicine  [facility]

## 2025-01-10 NOTE — PALLIATIVE CARE NOTE
Brief Palliative Care Note    Patient desaturating. Daughter explained she is in dying phase. She frequently reverts back to denial. She asked if I can give 'the lowest dose of dilaudid so she is not a zombie and can respond'. I explained that unfortunately she is no longer responsive. Jada feels that her making eye contact and mouthing some words is responding.     We discussed the cxr and agreed that it will not add to our care. We discussed providing more comfort meds now that she is wincing and breathing rapidly. This is requiring almost constant re-orienting with Jada. I explicit stated that her mother is dying, possibly in the next hours/days. She expressed her understanding.      Barry Liz MD  Palliative Care Services  [facility] [phone]

## 2025-01-10 NOTE — PROGRESS NOTES
Palliative Care Progress Note    Yin Butcher Patient Status:  Inpatient    1931 MRN [id number]   Location [facility] 2W/SW Attending Carmelina Duong MD   Hosp Day # 29 PCP Mina Walker MD     Date of Consult: 2024  Patient seen at: [facility] Inpatient    Reason for Consultation: Consult requested for goals of care and care coordination.    Subjective     S: Non verbal. Wincing.     Review of Systems: Palliative Care symptom needs assessed:     Unable to accurately obtain due to mental status    Palliative Care Social History:   Marital Status:    Children: Yes  Living Situation Prior to Admit: Home  Occupational History: Retired  Personal:     Spiritual  Yni Butcher  NA     requested: No    Medical History: obtained from [facility]  Past Medical History:    Arthritis    Atrial fibrillation (HCC)    Back problem    Cancer (HCC)    Cataract    Osteoporosis    Personal history of antineoplastic chemotherapy     Past Surgical History:   Procedure Laterality Date    Other surgical history  2014    Procedure: HIP HEMIARTHROPLASTY/ BIPOLAR;  Surgeon: Vasu Matamoros MD;  Location:  MAIN OR    Removal of tonsils,12+ y/o         Family History: obtained from [facility]  Family History   Family history unknown: Yes       Allergies:  Allergies[1]    Medications:     Current Facility-Administered Medications:     HYDROmorphone HCl (DILAUDID) oral liquid 1 mg, 1 mg, Per G Tube, q4h    LORazepam Intensol 2 mg/mL oral concentrated solution 1 mg, 1 mg, Per G Tube, q6h    acetaminophen (Tylenol) 160 MG/5ML oral liquid 650 mg, 650 mg, Per NG Tube, Q4H PRN **OR** acetaminophen (Tylenol) rectal suppository 650 mg, 650 mg, Rectal, Q4H PRN    HYDROmorphone (Dilaudid) 1 MG/ML injection 0.2 mg, 0.2 mg, Intravenous, Q2H PRN    [Held by provider] acetaZOLAMIDE (Diamox) tab 250 mg, 250 mg, Per G Tube, Daily    [Held by provider] furosemide (Lasix) tab 20 mg, 20 mg, Per G Tube, Daily     glycopyrrolate (Robinul) 0.2 MG/ML injection 0.1 mg, 0.1 mg, Intravenous, Q6H PRN    ipratropium-albuterol (Duoneb) 0.5-2.5 (3) MG/3ML inhalation solution 3 mL, 3 mL, Nebulization, Q6H PRN    acetaminophen (Ofirmev) 10 mg/mL infusion premix 650 mg, 15 mg/kg, Intravenous, Q6H PRN    dextrose 10% infusion (TPN no rate), , Intravenous, Continuous PRN    pancrelipase (Lip-Prot-Amyl) (Zenpep) DR particles cap 10,000 Units, 10,000 Units, Per G Tube, PRN **AND** sodium bicarbonate tab 325 mg, 325 mg, Per G Tube, PRN    metoprolol (Lopressor) 5 mg/5mL injection 5 mg, 5 mg, Intravenous, Q4H PRN    albuterol (Ventolin) (5 MG/ML) 0.5% nebulizer solution 5 mg, 5 mg, Nebulization, Q4H PRN    bisacodyl (Dulcolax) 10 MG rectal suppository 10 mg, 10 mg, Rectal, Daily PRN    ondansetron (Zofran) 4 MG/2ML injection 4 mg, 4 mg, Intravenous, Q6H PRN    metoclopramide (Reglan) 5 mg/mL injection 10 mg, 10 mg, Intravenous, Q8H PRN  No current outpatient medications on file.         Palliative Performance Scale: 20 %  % Ambulation Activity Level Self-Care Intake Consciousness   100 Full  Normal  No Disease Full Normal Full   90 Full  Normal  Some Disease Full Normal Full   80 Full  Normal w/effort  Some Disease Full Normal or reduced Full   70 Reduced  Can't Perform Job  Some Disease Full Normal or reduced Full   60 Reduced  Can't Perform Hobby   Significant Disease Occ Assist Normal or reduced Full or confused   50 Mainly sit/lie Can't do any work  Extensive Disease Partial Assist Normal or reduced Full or confused   40 Mainly in bed Can't do any work  Extensive Disease Mainly Assist Normal or reduced Full or confused   30 Bed Bound Can't do any work  Extensive Disease Max Assist  Total Care Reduced  Drowsy/confused   20 Bed Bound Can't do any work  Extensive Disease Max Assist  Total Care Minimal  Drowsy/confused   10 Bed Bound Can't do any work  Extensive Disease Max Assist  Total Care Mouth Care  Drowsy/confused   0 Death         Objective      Vital Signs:  Blood pressure 118/82, pulse 109, temperature 97.4 °F (36.3 °C), temperature source Axillary, resp. rate 25, height 5' 7.01\" (1.702 m), weight 121 lb 0.5 oz (54.9 kg), SpO2 95%.  Body mass index is 18.95 kg/m².  Non-verbal signs of pain present: No    Physical Exam:  General: Mildly SOB, thin, obtunded. Agonal breathing.  HEENT: AT/NC  Cardiac: RRR  Lungs: coarse BS  Abdomen: Soft, non-tender, non-distended, normal bowel sounds X 4 quadrants   Extremities: mild-mod Edema present  Skin: Warm and dry.    Hematology:  Lab Results   Component Value Date    WBC 7.4 01/08/2025    HGB 9.5 (L) 01/08/2025    HCT 30.1 (L) 01/08/2025    .0 01/08/2025       Coags:  Lab Results   Component Value Date    PT 15.5 (H) 07/13/2014    INR 1.32 (H) 01/29/2024    PTT 34.6 01/31/2024       Chemistry:  Lab Results   Component Value Date    CREATSERUM 0.45 (L) 01/08/2025    BUN 13 01/08/2025     01/08/2025    K 3.4 (L) 01/08/2025     01/08/2025    CO2 32.0 01/08/2025    GLU 94 01/08/2025    CA 9.6 01/08/2025    ALB 4.0 12/11/2024    ALKPHO 112 12/11/2024    BILT 1.0 (H) 12/11/2024    TP 8.2 12/11/2024    AST 24 12/11/2024    ALT 30 12/11/2024    DDIMER 2.38 (H) 01/20/2024    MG 2.0 12/14/2024    PHOS 4.1 12/14/2024    TROP <0.046 02/19/2015       Imaging:  No results found.    Assessment and Recommendations        Sepsis, due to unspecified organism, unspecified whether acute organ dysfunction present (HCC)    Acute respiratory failure with hypercapnia (HCC)    Atrial fibrillation with rapid ventricular response (HCC)    Acute on chronic respiratory failure with hypoxia and hypercapnia (HCC)    Palliative care encounter    Counseling regarding advance care planning and goals of care    Symptom Management       Dyspnea   -on NC   -will recommend schedule dilaudid 1mg q4hr oral   -maintain 0.2mg IV prn     Agitation    -will add prn haldol for agitation   -1mg IV q2hr  prn     FEN   -saline lock IV   -comfort feeds only   -oral care for xerostomia prn    2.     Serious Illness Conversation:   Code Status: DNAR/Full  POA: Surrogate is daughter Jada  I met with Jada in person and also present via phone was Librado, her fiance. Jada lives with Pre[place] in [facility], and splits her time in [place], where her fimorena lives. Prema was home apparently until the last several months when she began problems with her effusions, aspiration etc.   I asked if Jada could give me a big picture sense of what was going with her mom. She is able to discuss the details of all of the illnesses and episodes but I also wondered if she felt overall that Prema was improving or declining.   I expressed my view/worry that her lungs are shutting down/have shut down etc to the extent that she is dying from this. I explained the physiology of chronic aspiration in context of dysphagia as a terminal/lethal component of not just dementia or old age but of many complex chronic illnesses.   I explained that I felt that Prema is unfortunately showing us that she is dying, albeit slowly and that we are in a position to decide how much to try and resist or fight natural dying or how much we can decide to support or allow for it.   Prema repeated that her mom had always just said 'she wants everything done' but unfortunately had little more context or nuance. Ie what 'everything' looks like, logistically, financially, quality etc. Librado said that he thinks Prema is pretty understanding of these issues, having dealt with her own  dying and other parents. He and Jada were hoping to involved Prema in the conversation.   When I spoke to Prema she could nod yes or no in response to basic questions but when I asked about natural dying she closed her eyes and would not answer. Jada saw this and said that she is probably thinking and that she and her fiancee would talk to Prema in more detail. They hoped ideally that they  could ask her when she is extubated.  They wanted to know what their deadline is to 'make a decision.' They did not seem opposed to consider compassionate extubation but instead seem very unsure about what to do. They appear open to learning as much as possible about their options.   I met with Jada and called Libraod separately. I explained that her body is not supporting life, and that her best possible chance of accomplishing their goal of breathing independently and being at home would be compassionate extubation. Jada seems to understand but continued to ask about trach. I think it has been helpful to explain that what she has witnessed over the last several months are not just 'hiccups' but they are a gradual dying from dementia and advanced age.   I explained that extubation should probably happen within the next 24-48 hours as discussed with Dr. Espino and that we would endeavor to help her be comfortable if she were passing away.   I can have a more definitive discussion about plan tomorrow with fiance is present after 10-11am or so unless another provider discusses it earlier. I think we should support Jada in the grief process as gently as possible.   See separate note for discussion. Family agreeable to DNR/DNI and compassionate extubation.  I spoke to Jada via phone last night. Her goal is to have Prema go to rehab to get stronger and then return home. Unfortunately I feel that she is probably not a candidate for this, but I will defer to primary and PT/OT. I will call Jada and Librado to discuss comfort care/hospice. I did explain that no matter what she will most likely need around the clock care and Jada agreed, and agreed that she would NOT want her mom in a facility permanently. Jada is not viewing this process as a terminal process, but focused on isolated details of Prema's course in hopes that this is another 'hiccup' and that she will recover. I encourage all care providers to continue to  educate her.  Prema is too weak to phonate. She is staring up at the ceiling with mouth agape. She can blink and nod but cannot answer my questions due to effort/weakness. I personally feel that she is nearing death and should be transitioned to comfort care and be home with hospice. I will recommend this again to Jada and Librado. I appreciate other team members calling family as well to advise them.  I made pt comfort care, and deprescribed some meds and explained to Jada that they are not helping Prema wake up or feel better. She does not want full comfort measures or hospice. I did say I would add some prn dilaudid and haldol.   I do not think this family will enroll in hospice and so I think we will have to provide end of life care while continually re-orienting Daughter to goals and realistic expectations for Prema. (She is asking if she got better today could we resume tube feeding)  I will ask naseem to explain to Jada and family friend Sheri in further detail.   Family is amenable to meeting with hospice today. I will consult [facility].  Patient is in imminent dying phase, hospice appropriate.       Palliative Care Follow Up: Palliative care team will Continue to follow while inpatient    Thank you for allowing Palliative Care services to participate in the care of Yin Butcher.    A total of 50 minutes were spent on this visit, which included all of the following: direct face to face contact, history taking, physical examination, and >50% was spent counseling and coordinating care.    Barry Liz MD  Palliative Care Services  [facility] [phone]    Note to patient:  The 21st Century Cures Act makes medical notes like these available to patients in the interest of transparency. However, be advised this is a medical document. It is intended as peer to peer communication. It is written in medical language and may contain abbreviations or verbiage that are unfamiliar. It may appear blunt  or direct. Medical documents are intended to carry relevant information, facts as evident, and the clinical opinion of the practitioner.           [1]   Allergies  Allergen Reactions    Erythromycin OTHER (SEE COMMENTS)    Oxycodone HALLUCINATION    Gabapentin NAUSEA ONLY and OTHER (SEE COMMENTS)     Pt stated she didn't feel like herself     Tizanidine ITCHING and OTHER (SEE COMMENTS)     Pt states burning sensation

## 2025-01-10 NOTE — CM/SW NOTE
LSW follow up with pt and dtr. Pt's dtr Jada was present at bedside. Dtr is not ready to make a decision regarding hospice. LSW told dtr to call [facility] if she has any additional questions or if she would like to proceed with hospice.      Sharon Cooper, LSW  [facility]  [phone]

## 2025-01-11 NOTE — SPIRITUAL CARE NOTE
Spiritual Care Visit Note    Patient Name: Yin Butcher Date of Spiritual Care Visit: 01/10/25   : 1931 Primary Dx: Sepsis, due to unspecified organism, unspecified whether acute organ dysfunction present (HCC)       Referred By: Referral From: Nurse;Family    Spiritual Care Taxonomy:    Intended Effects: Aligning care plan with patient's values    Methods: Assist with finding purpose;Offer spiritual/Latter day support;Offer emotional support    Interventions: Acknowledge current situation;Acknowledge response to difficult experience;Active listening;Ask guided questions;Ask questions to bring forth feelings;[place];Explain  role;Provide hospitality    Visit Type/Summary:     - Spiritual Care: Responded to a request for spiritual care and assessed the patient for spiritual care needs. Consulted with RN prior to visit. Offered empathic listening and emotional support. Patient and family expressed appreciation for  visit. Provided information regarding how to contact Spiritual Care and left a Spiritual Care information card. Provided support for Patient's spiritual/Latter day requests. Offered prayer. Coordinated  visit for Sacrament of the Sick. Daughter is at bedside providing support for the patient.  remains available for follow up.    Spiritual Care support can be requested via an Epic consult. For urgent/immediate needs, please contact the On Call  at: [place]: ext [phone]    Chaplain Resident, Cecile Henley, PhD

## 2025-01-12 NOTE — DISCHARGE SUMMARY
[facility]  part of [facility]    Discharge Summary    Yin Butcher Patient Status:  Inpatient    1931 MRN [id number]   Location [facility]5W Attending No att. providers found   Hosp Day # 30 PCP Mina Walker MD     Date of Admission: 2024 Disposition:      Date of Discharge: 2025 12:47 AM    Admitting Diagnosis: Atrial fibrillation with rapid ventricular response ([facility]) [I48.91]  Acute respiratory failure with hypercapnia ([facility]) [J96.02]  Sepsis, due to unspecified organism, unspecified whether acute organ dysfunction present ([facility]) [A41.9]    Discharge Diagnosis:   Patient Active Problem List   Diagnosis    Hip fracture ([facility])    Hypotension    Osteoporosis    General weakness    Anemia    Thrombocytopenia (HCC)    Colon cancer (HCC)    Arthritis    Cancer (HCC)    PAF (paroxysmal atrial fibrillation) ([facility])    Hyponatremia    Nausea and vomiting in adult    SIADH (syndrome of inappropriate ADH production) ([facility])    Closed compression fracture of L1 lumbar vertebra, sequela    Encephalopathy, INFECTION    Altered mental status, unspecified altered mental status type    UTI (urinary tract infection)    Hypochloremia    Lightheadedness    Hyperglycemia    Azotemia    Metabolic alkalosis    Pleural effusion    Elevated troponin    Chronic atrial fibrillation (HCC)    Acute respiratory failure (HCC)    Dyspnea, unspecified type    Acute on chronic congestive heart failure, unspecified heart failure type (HCC)    Acute cystitis with hematuria    Acute on chronic diastolic congestive heart failure (HCC)    Sepsis (HCC)    Acute respiratory failure with hypoxia (HCC)    Somnolence    Hyperkalemia    Respiratory acidosis    Multiple subsegmental pulmonary emboli without acute cor pulmonale ([facility])    Palliative care by specialist    Goals of care, counseling/discussion    Encephalopathy    Ventral hernia without obstruction or gangrene    Advance care planning     Sepsis, due to unspecified organism, unspecified whether acute organ dysfunction present (HCC)    Acute respiratory failure with hypercapnia (HCC)    Atrial fibrillation with rapid ventricular response (HCC)    Acute on chronic respiratory failure with hypoxia and hypercapnia (HCC)    Palliative care encounter    Counseling regarding advance care planning and goals of care       Reason for Admission: Acute respiratory distress    Physical Exam:     History of Present Illness:   Yin Butcher is a(n) 89 year old female with past medical history of atrial fibrillation, pulmonary embolism presented to ER with fever, lethargy.  Patient was recently discharged from [facility] where she was admitted for aspiration pneumonia.  Patient was placed on BiPAP in ER.  Admitted to ICU.  Family wants her to be full code.  Overnight on BiPAP, continued to be tachypneic, ended up getting intubated today  Hospital Course:       *Acute respiratory failure with hypercapnia/Aspiration pneumonia  -Intubated/sedated    -Extubated   - now on AVAPS  -S/P 10 day course of antibiotics   -Cont nebs   -Pulmonology following   -Palliative following   -Guarded prognosis   -DNAR/select with no reintubation   -chest Xray 1/3 with Hazy right basilar opacities have also worsened since prior and could relate to any combination of compressive atelectasis or coexistent   infection/aspiration.   -on Zosyn and vancomycin      *Leukocytosis   -WBC 23.5 , improved   -continue Zosyn and Vanco  -likely from aspiration      *HFpEF  -on BB   -Cardiology following      *Atrial fibrillation with rapid ventricular response (HCC)  -Cont Eliquis and digoxin  Cardiology following      *Dysphagia   -Tube feeds on hold due to concern for aspiration   -Aspiration precautions      *Hypotension  -Cont midodrine   -Cardiology following   -start .45 NS at 60 ml per hour         *Dementia  Failure to thrive with profound muscle weakness and  fatigue  Metabolic encephalopathy  -Patient is hospice appropriate, family declines   -POC discussed in detail with daughter   -daughter met with Palliative Care on 1/3, they are available if she changes her mind   -Pt's is DNAR/DNI with selective treatment         DVT prophylaxis: Eliquis  Code status:  DNAR/DNI with selective treatment   Has been off BiPAP now daughter requesting to start on tube feeding.  Aspiration risk discussed.  Daughter having unrealistic expectation  Discussed with nursing  Appreciate Palliative discussing the GOC   Code status: DNAR/Comfort   Ongoing GOC discussion.  Patient actively dying, .    Consultations:     Procedures:     Complications:     Discharge Condition:          Discharge Medications:      Discharge Medications        ASK your doctor about these medications        Instructions Prescription details   acetaZOLAMIDE 250 MG Tabs  Commonly known as: Diamox      1 tablet (250 mg total) by Per G Tube route daily.   Refills: 0     apixaban 5 MG Tabs  Commonly known as: Eliquis  Ask about: Which instructions should I use?      0.5 tablets (2.5 mg total) by Per G Tube route 2 (two) times daily.   Refills: 0     Benefiber Drink Mix Pack      1 Package by Per G Tube route daily as needed (constipation).   Refills: 0     Calcium-D 600-400 MG-UNIT Tabs      1 tablet by Per G Tube route daily.   Refills: 0     digoxin 0.125 MG Tabs  Commonly known as: Lanoxin      1 tablet (125 mcg total) by Per G Tube route Every Monday, Wednesday, and Friday.   Refills: 0     furosemide 20 MG Tabs  Commonly known as: Lasix  Ask about: Which instructions should I use?      1 tablet (20 mg total) by Per G Tube route daily.   Refills: 0     metoprolol tartrate 50 MG Tabs  Commonly known as: Lopressor      0.5 tablets (25 mg total) by Per G Tube route 2 (two) times daily. Hold for BP less than 90mmHg and pulse less than 60 bpm   Refills: 0     midodrine 5 MG Tabs  Commonly known as: ProAmatine  Ask  about: Which instructions should I use?      1 tablet (5 mg total) by Per G Tube route in the morning and 1 tablet (5 mg total) at noon and 1 tablet (5 mg total) in the evening. Take along with 10mg for a for a total dose of 15mg three times daily. .   Refills: 0     midodrine 10 MG Tabs  Commonly known as: ProAmatine  Ask about: Which instructions should I use?      1 tablet (10 mg total) by Per G Tube route 3 (three) times daily. Take along with 5mg for a for a total dose of 15mg three times daily.   Refills: 0     multivitamin Tabs      1 tablet by Per G Tube route daily.   Refills: 0     Tylenol PM Extra Strength 500-25 MG Tabs  Generic drug: diphenhydrAMINE-APAP (sleep)      1 tablet by Per G Tube route daily as needed (Pain).   Refills: 0                Vika Elmore MD  1/12/2025  1:30 PM

## 2025-01-13 ENCOUNTER — CASE MANAGEMENT (OUTPATIENT)
Dept: CARE COORDINATION | Age: OVER 89
End: 2025-01-13

## (undated) DEVICE — SPONGE SURG 4X4IN CTN 16 PLY XRY DTCT STRL LF DISP

## (undated) DEVICE — ADAPTER FEEDING SET ENFIT CATH TO FEMALE POUCH TRNS NS LF

## (undated) DEVICE — Device

## (undated) DEVICE — KIT SAFETY PEG PULL 20F

## (undated) DEVICE — GOWN SURG XL L3 NONREINFORCE SET IN SLV STRL LF DISP BLUE

## (undated) DEVICE — GLOVE SURG 7.5 PROTEXIS LF CRM PF SMTH BEAD CUFF STRL

## (undated) DEVICE — DRAPE ABD MAJ POUCH SURG TIBURON

## (undated) DEVICE — TOWEL OR XRAY DTBLE BLU 4PK

## (undated) DEVICE — TUBING SCT CLR 12FT 316IN MDVC MAXI-GRIP NCDTV MALE TO MALE

## (undated) DEVICE — NEEDLE HPO 25GA 1.5IN REG WALL REG BVL LL HUB DEHP-FR STRL

## (undated) DEVICE — TOWEL SURG 26X15IN BLUE TIBURON NABSB DRAPE ADH STRIP STRL

## (undated) DEVICE — NEEDLE HPO 16GA 1.5IN REG WALL REG BVL LL HUB DEHP-FR STRL

## (undated) DEVICE — WATER STRL PLASTIC POUR BTL 500 ML

## (undated) DEVICE — KIT ENDO CLN 200ML 1STP KRINKLE SIMPLE2 LF

## (undated) DEVICE — TRAY CATH SURESTEP LUBRI-SIL STLK 16FR 350ML FOLEY URMTR

## (undated) DEVICE — DRAPE .75 SHT FNFLD 76X52IN SURG CNVRT STRL LF DISP TIBURON

## (undated) DEVICE — GLOVE SURG 7.5 PROTEXIS LF BLUE PF SMTH BEAD CUFF INTLK STRL

## (undated) DEVICE — SYRINGE 10ML GRAD N-PYRG DEHP-FR PVC FREE STRL MED LF DISP

## (undated) DEVICE — BINDER ABD MED LG 12IN 46-62IN HOOK LOOP CLSR 4 PNL ELASTIC

## (undated) NOTE — IP AVS SNAPSHOT
[address]            (For Outpatient Use Only) Initial Admit Date: 2022   Inpt/Obs Admit Date: Inpt: 22 / Obs: N/A   Discharge Date:    Hospital Acct:  [de-identified]   MRN: [de-identified]   CSN: [id number]   CEID: [id number]        ENCOUNTER  Patient Class: Inpatient Admitting Provider: Olive Mclaughlin MD Unit: [facility] 4NW-A   Hospital Service: Medical Attending Provider: Marta Tamayo DO   Bed: 404-A   Visit Type:   Referring Physician: No ref. provider found Billing Flag:    Admit Diagnosis: Hyponatremia [E87.1]      PATIENT  Legal Name:   Marisa Jennings   Legal Sex: Female  Gender ID:              Pref Name:   Chante Carranza PCP:  Julio Velez MD Home: [phone]   Address:  [facility] : 1931 (89 yrs) Mobile: [phone]         City/State/Zip: [address] Marital:  Language: Jennifer Tamayonels: Kelly SSN4: [id number] Taoist: [address] Not [place] Harriman*     Race: White Ethnicity: Non  Or [address] [place] [address] Street   Name Relationship Legal Guardian? Home Phone Work Phone Mobile Phone   Casper Anguiano Daughter  OTHER          [phone]     GUARANTOR  Guarantor: Pauly Ward : 1931 Home Phone: [phone]   Address: [place] [address]  Sex: Female Work Phone:    City/State/Zip: [address]   Rel. to Patient: Self Guarantor ID: [id number]   GUARANTOR EMPLOYER   Employer:  Status: RETIRED     COVERAGE  PRIMARY INSURANCE   Payor: MEDICARE Plan: MEDICARE PART A&B   Group Number:  Insurance Type: INDEMNITY   Subscriber Name: Myles Stevens : 1931   Subscriber ID: [id number] Pt Rel to Subscriber: Self   SECONDARY INSURANCE   Payor: [address]: [facility]   Group Number: [id number] Insurance Type: [id number] Name: Myles Stevens : 1931   Subscriber ID: [id number] Pt Rel to Subscriber: SELF   TERTIARY INSURANCE   Payor: Plan:    Group Number:  Insurance Type:    Subscriber Name:  Subscriber :    Subscriber ID:  Pt Rel to Subscriber:    Hospital Account Financial Class: Medicare    2022

## (undated) NOTE — LETTER
[facility] 4SW-A  [address] [facility] [address]  [phone]    Blood Transfusion Consent    In the course of your treatment, it may become necessary to administer a transfusion of blood or blood components. This form provides basic information concerning this procedure and, if signed by you, authorizes its administration. By signing this form, you agree that all of your questions about the administration of blood or blood products have been answered by the ordering medical professional or designee.    Description of Procedure  Blood is introduced into one of your veins, commonly in the arm, using a sterilized disposable needle. The amount of blood transfused, and whether the transfusion will be of blood or blood components is a judgement the physician will make based on your particular needs.    Risks  The transfusion is a common procedure of low risk.  MINOR AND TEMPORARY REACTIONS ARE NOT UNCOMMON, including a slight bruise, swelling or local reaction in the area where the needle pierces your skin, or a nonserious reaction to the transfused material itself, including headache, fever or mild skin reaction, such as rash.  Serious reactions are possible, though very unlikely, and include severe allergic reaction (shock) and destruction (hemolysis) of transfused blood cells.  Infectious diseases which are known to be transmitted by blood transfusion include certain types of viral Hepatitis(liver infection from a virus), Human Immunodeficiency Virus (HIV-1,2) infection, a viral infection known to cause Acquired Immunodeficiency Syndrome (AIDS), as well as certain other bacterial, viral, and parasitic diseases. While a minimal risk of acquiring an infectious disease from transfused blood exists, in accordance with the Federal and State law, all due care has been taken in donor selection and testing to avoid transmission of disease.    Alternatives  If loss of blood poses serious threats during your  treatment, THERE IS NO EFFECTIVE ALTERNATIVE TO BLOOD TRANSFUSION. However, if you have any further questions on this matter, your provider will fully explain the alternatives to you if it has not already been done.    I, ______________________________, have read/had read to me the above. I understand the matters bearing on the decision whether or not to authorize a transfusion of blood or blood components. I have no questions which have not been answered to my full satisfaction. I hereby consent to such transfusion as my physician may deem necessary or advisable in the course of my treatment.    ______________________________________________                    ___________________________  (Signature of Patient or Responsible party in case of minor,                 (Printed Name of Patient or incompetent, or unconscious patient)              Responsible Party)    ___________________________               _____________________  (Relationship to Patient if not self)                                    (Date and Time)    __________________________                                                           ______________________              (Signature of Witness)               (Printed Name of Witness)     Language line ()    Telephone/Verbal/Video Consent    __________________________                     ____________________  (Signature of 2nd Witness           (Printed Name of 2nd  Telephone/Verbal/Video Consent)           Witness)    Patient Name: Yin Butcher     : 1931                 Printed: 2024     Medical Record #: [id number]      Rev: 2023

## (undated) NOTE — IP AVS SNAPSHOT
Patient Demographics     Address  [address] [place] [address] Phone  [phone] (Home) *Preferred*  [phone] (Mobile) E-mail Address  [email or web address]      Patient Contacts     Name Relation Home Work Mobile    [place] WOLFE Daughter [phone]    ISABELLE HOANG [phone]      Allergies as of 2/2/2024  Review status set to In Progress on 1/21/2024       Noted Reaction Type Reactions    Erythromycin 07/18/2007   Systemic OTHER (SEE COMMENTS)    Oxycodone 11/24/2022    HALLUCINATION    Gabapentin 11/24/2022    NAUSEA ONLY, OTHER (SEE COMMENTS)    Pt stated she didn't feel like herself     Tizanidine 11/25/2022    ITCHING, OTHER (SEE COMMENTS)    Pt states burning sensation       Code Status Information     Code Status    Full Code        Patient Instructions       Eliquis: for free trial offer or $10 savings coupons call 8-747-XLDEKJC ([phone]). Patient Assistance program: Call [phone] or visit [email or web address]      Continue avaps as directed by pulmonology  Continue PT/OT/ST  Fall/aspiration precautions  Repeat BMP/CBC in 3 days  Encourage po intake, supplements - magic cup BID     Follow-up Information     Mina Walker MD. Schedule an appointment as soon as possible for a visit in 1 week(s).    Specialties: Internal Medicine, IP Consult to Primary Care  Contact information:  [facility], [address] [facility] [id number] [phone]                        Your Home Meds List      TAKE these medications       Instructions Authorizing Provider Morning Afternoon Evening As Needed   acetaminophen 500 MG Tabs  Commonly known as: Tylenol Extra Strength  Next dose due: As directed      Take 1 tablet (500 mg total) by mouth every 4 (four) hours as needed for Pain.   Aurelia Patino         apixaban 5 MG Tabs  Commonly known as: Eliquis  Next dose due: Tonight 2/2      Take 2 tabs (10mg) by mouth twice daily for 7 days, then take 1 tab (5mg) by  mouth twice daily thereafter.   Leslie Rogers         apixaban 5 MG Tabs  Commonly known as: Eliquis  Start taking on: March 1, 2024  Next dose due: Start AFTER twice a day tabs dosing is done      Take 1 tablet (5 mg total) by mouth 2 (two) times daily. Start this dosing after you complete the starter pack   Leslie Rogers         Calcium-D 600-400 MG-UNIT Tabs  Next dose due: Tomorrow 2/3      Take  by mouth.          digoxin 0.125 MG Tabs  Commonly known as: Lanoxin  Next dose due: As directed      Take 1 tablet (125 mcg total) by mouth Every Monday, Wednesday, and Friday.   Annabella Mcpherson         furosemide 20 MG Tabs  Commonly known as: Lasix  Next dose due: Tomorrow 2/3      Take 1 tablet (20 mg total) by mouth daily.   Landon Cordero         lidocaine-menthol 4-1 % Ptch  Next dose due: As directed      Place 1 patch onto the skin daily.   Aurelia Patino         metoprolol tartrate 50 MG Tabs  Commonly known as: Lopressor  Next dose due: Tonight 2/2      Take 0.5 tablets (25 mg total) by mouth 2 (two) times daily.          midodrine 10 MG Tabs  Commonly known as: ProAmatine  Next dose due: Tonight 2/2      Take 1 tablet (10 mg total) by mouth in the morning and 1 tablet (10 mg total) at noon and 1 tablet (10 mg total) in the evening.   Aurelia Patino         multivitamin Chew  Next dose due: Tomorrow 2/3      Chew 1 tablet by mouth daily.   Aurelia Patino         polyethylene glycol (PEG 3350) 17 g Pack  Commonly known as: Miralax  Next dose due: As directed      Take 17 g by mouth daily as needed (constipation).          PROLIA SC  Next dose due: As directed      Inject into the skin.                Where to Get Your Medications      Please  your prescriptions at the location directed by your doctor or nurse    Bring a paper prescription for each of these medications  apixaban 5 MG Tabs  apixaban 5 MG Tabs  midodrine 10 MG Tabs           [id number]-A - MAR ACTION REPORT  (last 48  hrs)    ** SITE UNKNOWN **     Order ID Medication Name Action Time Action Reason Comments    [id number] apixaban (Eliquis) tab 10 mg (Followed by Linked Group #1) 01/31/24 2051 Given      [id number] apixaban (Eliquis) tab 10 mg 02/01/24 0940 Given      [id number] apixaban (Eliquis) tab 10 mg 02/01/24 2025 Given      [id number] apixaban (Eliquis) tab 10 mg 02/02/24 0922 Given      [id number] digoxin (Lanoxin) tab 125 mcg 02/02/24 0922 Given      [id number] docusate (Colace) 50 MG/5ML oral solution 100 mg 02/01/24 0947 Given      [id number] docusate (Colace) 50 MG/5ML oral solution 100 mg 02/02/24 0922 Given      [id number] furosemide (Lasix) tab 20 mg 02/02/24 0922 Given      [id number] metoprolol tartrate (Lopressor) tab 25 mg 01/31/24 1722 Given      [id number] metoprolol tartrate (Lopressor) tab 25 mg 02/01/24 0558 Given      [id number] metoprolol tartrate (Lopressor) tab 25 mg 02/01/24 2025 Given      [id number] metoprolol tartrate (Lopressor) tab 25 mg 02/02/24 0547 Given      [id number] midodrine (ProAmatine) tab 10 mg 01/31/24 1722 Given      [id number] midodrine (ProAmatine) tab 10 mg 02/01/24 0558 Given      [id number] midodrine (ProAmatine) tab 10 mg 02/01/24 1200 Given      [id number] midodrine (ProAmatine) tab 10 mg 02/01/24 2024 Given      [id number] midodrine (ProAmatine) tab 10 mg 02/02/24 0547 Given      [id number] multivitamin (Centrum) chewable tab (Adult) 1 tablet 02/01/24 0941 Given      [id number] multivitamin (Centrum) chewable tab (Adult) 1 tablet 02/02/24 0922 Given              Recent Vital Signs    Flowsheet Row Most Recent Value   /76 Filed at 02/02/2024 1143   Pulse 73 Filed at 02/02/2024 1143   Resp 16 Filed at 02/02/2024 1143   Temp 98 °F (36.7 °C) Filed at 02/02/2024 1143   SpO2 100 % Filed at 02/02/2024 1143      Patient's Most Recent Weight    Flowsheet Row Most Recent Value   Patient Weight 56.7 kg (125 lb)      CPAP Settings (Inpatient)    Flowsheet Row Most Recent Value   Mode AVAPS    Interface Full face mask   Mask size Large   Set rate 18 breaths/min   Set IPAP 12   Set EPAP 5   O2% 50 %   I time 0.9      Lab Results Last 24 Hours    No matching results found     Microbiology Results (All)     Procedure Component Value Units Date/Time    Blood Culture [[id number]] Collected: 01/27/24 1307    Order Status: Completed Lab Status: Final result Updated: 02/01/24 1901    Specimen: Blood,peripheral      Blood Culture Result No Growth 5 Days    Narrative:      Aerobic Bottle Volume - 12 mL    Blood Culture [[id number]] Collected: 01/27/24 1313    Order Status: Completed Lab Status: Final result Updated: 02/01/24 1901    Specimen: Blood,peripheral      Blood Culture Result No Growth 5 Days    Narrative:      Aerobic Bottle Volume - 13 mL    Blood Culture [[id number]]  (Abnormal) Collected: 01/20/24 2159    Order Status: Completed Lab Status: Final result Updated: 01/26/24 1155    Specimen: Blood,peripheral      Blood Culture Result Cutibacterium (Proprionibacterium) acnes     Blood Smear Positive Blood Culture      Gram Positive Rods    Narrative:      Aerobic Bottle Volume - 11 mL  Anaerobic Bottle Volume - 11 mL  Anaerobic Bottle Volume - 6 mL    Anaerobic Bottle Time to Detection - 89 hr  36 min       Blood Culture PCR [[id number]] Collected: 01/20/24 2159    Order Status: Completed Lab Status: Final result Updated: 01/26/24 1155    Specimen: Blood,peripheral      Blood Culture ID by PCR No Target Detected     PCR Comment --    Blood Culture [[id number]] Collected: 01/20/24 2159    Order Status: Completed Lab Status: Final result Updated: 01/26/24 0200    Specimen: Blood,peripheral      Blood Culture Result No Growth 5 Days    Narrative:      Aerobic Bottle Volume - 3 mL    Comment -  Less than optimal blood volume collected, which may yield a false-negative result. Adequate volume is correlated with increased recovery rates. Lower volumes may adversely affect recovery and/or detection times. Clinical  correlation is   recommended.  Anaerobic Bottle Volume - 1 mL    Emergency MRSA Screen by PCR [[id number]]  (Normal) Collected: 01/21/24 0015    Order Status: Completed Lab Status: Final result Updated: 01/21/24 0134    Specimen: Other from Nares      MRSA Screen By PCR Negative    $$$$Respiratory Flu Expanded Panel + Covid-19$$$$ [[id number]]  (Normal) Collected: 01/21/24 0011    Order Status: Completed Lab Status: Final result Updated: 01/21/24 0107    Specimen: Other from Nasopharyngeal swab      SARS-CoV-2 PCR: Not Detected     Adenovirus PCR: Not Detected     Coronavirus 229E PCR: Not Detected     Coronavirus Hku1 PCR: Not Detected     Coronavirus Nl63 PCR: Not Detected     Coronavirus Oc43 PCR: Not Detected     Metapneumovirus PCR: Not Detected     Rhinovirus/Entero PCR: Not Detected     Influenza A PCR: Not Detected     Influenza B PCR: Not Detected     Parainfluenza 1 PCR: Not Detected     Parainfluenza 2 PCR Not Detected     Parainfluenza 3 PCR Not Detected     Parainfluenza 4 PCR Not Detected     Resp Syncytial Virus PCR Not Detected     Bordetella Pertussis PCR Not Detected     Bordetella Parapertussis PCR Not Detected     Chlamydia pneumonia PCR: Not Detected     Mycoplasma pneumonia PCR: Not Detected    Narrative:      This test is intended for the simultaneous qualitative detection and differentiation of nucleic acids from multiple viral and bacterial respiratory organisms, including nucleic acid from Severe Acute Respiratory Syndrome Coronavirus 2 (SARS-CoV-2) in nasopharyngeal swab from individuals suspected of respiratory viral infection consistent with COVID-19 by their healthcare provider.    Test performed using the [employer] Respiratory Panel 2.1 (RP2.1) assay on the Monitise 2.0 System, [employer], [employer], [place] [address].    This test is being used under the Food and Drug Administration's Emergency Use Authorization.    The authorized Fact Sheet for Healthcare Providers for this  assay is available upon request from the laboratory.    SARS and MERS coronaviruses are not tested on this assay.    Sputum culture [[id number]]     Order Status: Sent Lab Status: No result     Specimen: Other from Sputum     SARS-CoV-2/Flu A and B/RSV by PCR (GeneXpert) [[id number]]  (Normal) Collected: 24    Order Status: Completed Lab Status: Final result Updated: 24    Specimen: Other from Nares      SARS-CoV-2 (COVID-19) - (GeneXpert) Not Detected     Influenza A by PCR Negative     Influenza B by PCR Negative     RSV by PCR Negative    Narrative:      This test is intended for the qualitative detection and differentiation of SARS-CoV-2, influenza A, influenza B, and respiratory syncytial virus (RSV) viral RNA in nasopharyngeal or nares swabs from individuals suspected of respiratory viral infection consistent with COVID-19 by their healthcare provider. Signs and symptoms of respiratory viral infection due to SARS-CoV-2, influenza, and RSV can be similar.    Test performed using the Xpert Xpress SARS-CoV-2/FLU/RSV (real time RT-PCR)  assay on the [facility] EHRpert instrument, [place], [place] [address].   This test is being used under the Food and Drug Administration's Emergency Use Authorization.    The authorized Fact Sheet for Healthcare Providers for this assay is available upon request from the laboratory.      Pending Labs     Order Current Status    ABG PANEL W ELECT AND LACTATE Collected ([id number])         H&P - H&P Note      H&P signed by Jodie Barbosa MD at 2024  1:42 AM  Version 3 of 3    Author: Jodie Barbosa MD Service: Hospitalist Author Type: Physician    Filed: 2024  1:42 AM Date of Service: 2024 11:22 PM Status: Addendum    : Jodie Barbosa MD (Physician)    Related Notes: Original Note by Jodie Barbosa MD (Physician) filed at 2024 11:35 PM         [facility]  History and Physical     Yin Butcher Patient Status:  Emergency     12/2/1931 MRN [id number]   [facility] EMERGENCY DEPARTMENT Attending Horace Wills MD   Hosp Day # 0 PCP Mina Walker MD     Chief Complaint: unresponsive    Subjective:    History of Present Illness:     Yin Butcher is a 89 year old female with medical history of atrial fibrillation who was brought to the ER with complaints of being unresponsive.  Her family called EMS as she was noted to be unresponsive.  Unknown when she was last normal .  When EMS arrived they were not allowed in the house immediately to assess the patient as it was unsafe due to hoarding.  Patient was eventually brought to the ER and she was noted to have hypothermia and placed on a elizabeth hugger,  she was also hypoxic and placed on BIPAP.  Patient is currently unresponsive and unable to provide any history,  no family is present at bedside    History/Other:    Past Medical History:  Past Medical History:   Diagnosis Date    Arrhythmia     Arthritis     Back problem     Cancer (HCC)     Cataract     Osteoporosis     Personal history of antineoplastic chemotherapy      Past Surgical History:   Past Surgical History:   Procedure Laterality Date    OTHER SURGICAL HISTORY  7/12/2014    Procedure: HIP HEMIARTHROPLASTY/ BIPOLAR;  Surgeon: Vasu Matamoros MD;  Location:  MAIN OR    REMOVAL OF TONSILS,12+ Y/O        Family History:   Family History   Family history unknown: Yes     Social History:    reports that she has never smoked. She has never used smokeless tobacco. She reports that she does not currently use alcohol. She reports that she does not use drugs.     Allergies:   Allergies   Allergen Reactions    Erythromycin OTHER (SEE COMMENTS)    Oxycodone HALLUCINATION    Gabapentin NAUSEA ONLY and OTHER (SEE COMMENTS)     Pt stated she didn't feel like herself     Tizanidine ITCHING and OTHER (SEE COMMENTS)     Pt states burning sensation        Medications:    Current Facility-Administered Medications on File Prior to  Encounter   Medication Dose Route Frequency Provider Last Rate Last Admin    [COMPLETED] magnesium oxide (Mag-Ox) tab 400 mg  400 mg Oral Once Landon Cordero DO   400 mg at 10/26/23 0929    [COMPLETED] cefTRIAXone (Rocephin) 1 g in D5W 100 mL IVPB-ADD  1 g Intravenous Once Rhett Kuo MD   Stopped at 10/22/23 7346     Current Outpatient Medications on File Prior to Encounter   Medication Sig Dispense Refill    furosemide 20 MG Oral Tab Take 1 tablet (20 mg total) by mouth daily. 30 tablet 0    digoxin 0.125 MG Oral Tab Take 1 tablet (125 mcg total) by mouth Every Monday, Wednesday, and Friday.  0    polyethylene glycol, PEG 3350, 17 g Oral Powd Pack Take 17 g by mouth daily as needed (constipation).      metoprolol tartrate 50 MG Oral Tab Take 12.5 mg by mouth 2 (two) times daily.      lidocaine-menthol 4-1 % External Patch Place 1 patch onto the skin daily. 30 patch 0    multivitamin Oral Chew Tab Chew 1 tablet by mouth daily. 30 tablet 0    acetaminophen 500 MG Oral Tab Take 1 tablet (500 mg total) by mouth every 4 (four) hours as needed for Pain. 20 tablet 0    Denosumab (PROLIA SC) Inject into the skin.      Calcium Carbonate-Vitamin D (CALCIUM-D) 600-400 MG-UNIT Oral Tab Take  by mouth.         Review of Systems:   A comprehensive review of systems was completed.    Pertinent positives and negatives noted in the HPI.    Objective:   Physical Exam:    /71   Pulse 60   Temp (!) 95.6 °F (35.3 °C) (Rectal)   Resp 20   Wt 105 lb 13.1 oz (48 kg)   SpO2 95%   BMI 16.57 kg/m²   General: unresponsive  Respiratory: diminished bilaterally  Cardiovascular: S1, S2. Regular rate and rhythm  Abdomen: Soft, non-distended, positive bowel sounds  Neuro: No new focal deficits  Extremities: trace edema      Results:    Labs:      Labs Last 24 Hours:    Recent Labs   Lab 01/20/24  2159   RBC 3.34*   HGB 8.2*   HCT 27.8*   MCV 83.2   MCH 24.6*   MCHC 29.5*   RDW 18.8   NEPRELIM 7.43   WBC 8.6   .0       Recent  Labs   Lab 01/20/24 2159   *   BUN 34*   CREATSERUM 1.16*   EGFRCR 44*   CA 8.5   ALB 2.9*      K 5.0      CO2 30.0   ALKPHO 84   AST 43*   BILT 1.5   TP 7.0       Lab Results   Component Value Date    PT 15.5 (H) 07/13/2014    PT 14.4 (H) 07/11/2014    INR 1.22 (H) 10/27/2023    INR 1.52 (H) 10/24/2023    INR 1.42 (H) 10/22/2023       Recent Labs   Lab 01/20/24 2159   TROPHS 25       Recent Labs   Lab 01/20/24 2159   PBNP 15,224*       No results for input(s): \"PCT\" in the last 168 hours.    Imaging: Imaging data reviewed in Epic.    Assessment & Plan:      #Unresponsive  # Hypothermic  # Hypotensive on pressors  -elizabeth hugger  -rule out sepsis  -Blood Cx pending  -UA, reflex culture  -COVID, Influenza, RSV negative  -CT brain, chest x-ray and CTA chest pending    # CHF, diastolic dysfunction  -ECHO  -diuresis as able  -ProBNP 15,224    # Lactic acidosis  -due to hypoxia vs sepsis  -given pulmonary edema will hold off of aggressive IVF and bolus    # Acute hypoxic respiratory failure  -on BIPAP  -diuresis  -empiric antibiotics while await Cx results    # Normocytic anemia  -trend    # A.fib  -on dig and BB PTA  -not on anticoagulation    # FLORESITA  -baseline Cr around 0.5    Plan of care discussed with ER physician    Jodie Barbosa MD    Supplementary Documentation:     The 21st Century Cures Act makes medical notes like these available to patients in the interest of transparency. Please be advised this is a medical document. Medical documents are intended to carry relevant information, facts as evident, and the clinical opinion of the practitioner. The medical note is intended as peer to peer communication and may appear blunt or direct. It is written in medical language and may contain abbreviations or verbiage that are unfamiliar.                                   Electronically signed by Jodie Barbosa MD on 1/21/2024  1:42 AM     H&P signed by Jodie Barbosa MD at 1/20/2024 11:35 PM   Version 2 of 3    Author: Jodie Barbosa MD Service: — Author Type: Physician    Filed: 2024 11:35 PM Date of Service: 2024 11:22 PM Status: Addendum    : Jodie Barbosa MD (Physician)    Related Notes: Addendum by Jodie Barbosa MD (Physician) filed at 2024  1:42 AM  Original Note by Jodie Barbosa MD (Physician) filed at 2024 11:34 PM         [facility]     Yin Butcher Patient Status:  Emergency    1931 MRN [id number]   Location [facility] EMERGENCY DEPARTMENT Attending Horace Wills MD   Hosp Day # 0 PCP Mina Walker MD     Chief Complaint: unresponsive    Subjective:    History of Present Illness:     Yin Butcher is a 89 year old female with medical history of atrial fibrillation who was brought to the ER with complaints of being unresponsive.  Her family called EMS as she was noted to be unresponsive.  Unknown when she was last normal .  When EMS arrived they were not allowed in the house immediately to assess the patient as it was unsafe due to hoarding.  Patient was eventually brought to the ER and she was noted to have hypothermia and placed on a elizabeth hugger,  she was also hypoxic and placed on BIPAP.  Patient is currently unresponsive and unable to provide any history,  no family is present at bedside    History/Other:    Past Medical History:  Past Medical History:   Diagnosis Date    Arrhythmia     Arthritis     Back problem     Cancer (HCC)     Cataract     Osteoporosis     Personal history of antineoplastic chemotherapy      Past Surgical History:   Past Surgical History:   Procedure Laterality Date    OTHER SURGICAL HISTORY  2014    Procedure: HIP HEMIARTHROPLASTY/ BIPOLAR;  Surgeon: Vasu Matamoros MD;  Location:  MAIN OR    REMOVAL OF TONSILS,12+ Y/O        Family History:   Family History   Family history unknown: Yes     Social History:    reports that she has never smoked. She has never used smokeless  tobacco. She reports that she does not currently use alcohol. She reports that she does not use drugs.     Allergies:   Allergies   Allergen Reactions    Erythromycin OTHER (SEE COMMENTS)    Oxycodone HALLUCINATION    Gabapentin NAUSEA ONLY and OTHER (SEE COMMENTS)     Pt stated she didn't feel like herself     Tizanidine ITCHING and OTHER (SEE COMMENTS)     Pt states burning sensation        Medications:    Current Facility-Administered Medications on File Prior to Encounter   Medication Dose Route Frequency Provider Last Rate Last Admin    [COMPLETED] magnesium oxide (Mag-Ox) tab 400 mg  400 mg Oral Once Landon Cordero DO   400 mg at 10/26/23 0929    [COMPLETED] cefTRIAXone (Rocephin) 1 g in D5W 100 mL IVPB-ADD  1 g Intravenous Once Rhett Kuo MD   Stopped at 10/22/23 2463     Current Outpatient Medications on File Prior to Encounter   Medication Sig Dispense Refill    furosemide 20 MG Oral Tab Take 1 tablet (20 mg total) by mouth daily. 30 tablet 0    digoxin 0.125 MG Oral Tab Take 1 tablet (125 mcg total) by mouth Every Monday, Wednesday, and Friday.  0    polyethylene glycol, PEG 3350, 17 g Oral Powd Pack Take 17 g by mouth daily as needed (constipation).      metoprolol tartrate 50 MG Oral Tab Take 12.5 mg by mouth 2 (two) times daily.      lidocaine-menthol 4-1 % External Patch Place 1 patch onto the skin daily. 30 patch 0    multivitamin Oral Chew Tab Chew 1 tablet by mouth daily. 30 tablet 0    acetaminophen 500 MG Oral Tab Take 1 tablet (500 mg total) by mouth every 4 (four) hours as needed for Pain. 20 tablet 0    Denosumab (PROLIA SC) Inject into the skin.      Calcium Carbonate-Vitamin D (CALCIUM-D) 600-400 MG-UNIT Oral Tab Take  by mouth.         Review of Systems:   A comprehensive review of systems was completed.    Pertinent positives and negatives noted in the HPI.    Objective:   Physical Exam:    /71   Pulse 60   Temp (!) 95.6 °F (35.3 °C) (Rectal)   Resp 20   Wt 105 lb 13.1 oz (48  kg)   SpO2 95%   BMI 16.57 kg/m²   General: unresponsive  Respiratory: diminished bilaterally  Cardiovascular: S1, S2. Regular rate and rhythm  Abdomen: Soft, non-distended, positive bowel sounds  Neuro: No new focal deficits  Extremities: trace edema      Results:    Labs:      Labs Last 24 Hours:    Recent Labs   Lab 01/20/24 2159   RBC 3.34*   HGB 8.2*   HCT 27.8*   MCV 83.2   MCH 24.6*   MCHC 29.5*   RDW 18.8   NEPRELIM 7.43   WBC 8.6   .0       Recent Labs   Lab 01/20/24 2159   *   BUN 34*   CREATSERUM 1.16*   EGFRCR 44*   CA 8.5   ALB 2.9*      K 5.0      CO2 30.0   ALKPHO 84   AST 43*   BILT 1.5   TP 7.0       Lab Results   Component Value Date    PT 15.5 (H) 07/13/2014    PT 14.4 (H) 07/11/2014    INR 1.22 (H) 10/27/2023    INR 1.52 (H) 10/24/2023    INR 1.42 (H) 10/22/2023       Recent Labs   Lab 01/20/24 2159   TROPHS 25       Recent Labs   Lab 01/20/24 2159   PBNP 15,224*       No results for input(s): \"PCT\" in the last 168 hours.    Imaging: Imaging data reviewed in Epic.    Assessment & Plan:      #Unresponsive  # Hypothermic  # Hypotensive on pressors  -elizabeth hugger  -rule out sepsis  -Blood Cx pending  -UA, reflex culture  -COVID, Influenza, RSV negative    # CHF, diastolic dysfunction  -ECHO  -diuresis as able  -ProBNP 15,224    # Lactic acidosis  -due to hypoxia vs sepsis  -given pulmonary edema will hold off of aggressive IVF and bolus    # Acute hypoxic respiratory failure  -on BIPAP  -diuresis  -empiric antibiotics while await Cx results    # Normocytic anemia  -trend    # A.fib  -on dig and BB PTA  -not on anticoagulation    # FLORESITA  -baseline Cr around 0.5    Plan of care discussed with ER physician    Jodie Barbosa MD    Supplementary Documentation:     The 21st Century Cures Act makes medical notes like these available to patients in the interest of transparency. Please be advised this is a medical document. Medical documents are intended to carry relevant  information, facts as evident, and the clinical opinion of the practitioner. The medical note is intended as peer to peer communication and may appear blunt or direct. It is written in medical language and may contain abbreviations or verbiage that are unfamiliar.                                   Electronically signed by Jodie Barbosa MD on 2024 11:35 PM     H&P signed by Jodie Barbosa MD at 2024 11:34 PM  Version 1 of 3    Author: Jodie Barbosa MD Service: — Author Type: Physician    Filed: 2024 11:34 PM Date of Service: 2024 11:22 PM Status: Signed    : Jodie Barbosa MD (Physician)    Related Notes: Addendum by Jodie Barbosa MD (Physician) filed at 2024 11:35 PM         [facility]     Yin Butcher Patient Status:  Emergency    1931 MRN [id number]   Location [facility] EMERGENCY DEPARTMENT Attending Horace Wills MD   Hosp Day # 0 PCP Mina Walker MD     Chief Complaint: unresponsive    Subjective:    History of Present Illness:     Yin Butcher is a 89 year old female with medical history of atrial fibrillation who was brought to the ER with complaints of being unresponsive.  Her family called EMS as she was noted to be unresponsive.  Unknown when she was last normal .  When EMS arrived they were not allowed in the house immediately to assess the patient as it was unsafe due to hoarding.  Patient was eventually brought to the ER and she was noted to have hypothermia and placed on a elizabeth hugger,  she was also hypoxic and placed on BIPAP.  Patient is currently unresponsive and unable to provide any history,  no family is present at bedside    History/Other:    Past Medical History:  Past Medical History:   Diagnosis Date    Arrhythmia     Arthritis     Back problem     Cancer (HCC)     Cataract     Osteoporosis     Personal history of antineoplastic chemotherapy      Past Surgical History:   Past Surgical History:    Procedure Laterality Date    OTHER SURGICAL HISTORY  7/12/2014    Procedure: HIP HEMIARTHROPLASTY/ BIPOLAR;  Surgeon: Vasu Matamoros MD;  Location: [facility] MAIN OR    REMOVAL OF TONSILS,12+ Y/O        Family History:   Family History   Family history unknown: Yes     Social History:    reports that she has never smoked. She has never used smokeless tobacco. She reports that she does not currently use alcohol. She reports that she does not use drugs.     Allergies:   Allergies   Allergen Reactions    Erythromycin OTHER (SEE COMMENTS)    Oxycodone HALLUCINATION    Gabapentin NAUSEA ONLY and OTHER (SEE COMMENTS)     Pt stated she didn't feel like herself     Tizanidine ITCHING and OTHER (SEE COMMENTS)     Pt states burning sensation        Medications:    Current Facility-Administered Medications on File Prior to Encounter   Medication Dose Route Frequency Provider Last Rate Last Admin    [COMPLETED] magnesium oxide (Mag-Ox) tab 400 mg  400 mg Oral Once Landon Cordero DO   400 mg at 10/26/23 0929    [COMPLETED] cefTRIAXone (Rocephin) 1 g in D5W 100 mL IVPB-ADD  1 g Intravenous Once Rhett Kuo MD   Stopped at 10/22/23 8627     Current Outpatient Medications on File Prior to Encounter   Medication Sig Dispense Refill    furosemide 20 MG Oral Tab Take 1 tablet (20 mg total) by mouth daily. 30 tablet 0    digoxin 0.125 MG Oral Tab Take 1 tablet (125 mcg total) by mouth Every Monday, Wednesday, and Friday.  0    polyethylene glycol, PEG 3350, 17 g Oral Powd Pack Take 17 g by mouth daily as needed (constipation).      metoprolol tartrate 50 MG Oral Tab Take 12.5 mg by mouth 2 (two) times daily.      lidocaine-menthol 4-1 % External Patch Place 1 patch onto the skin daily. 30 patch 0    multivitamin Oral Chew Tab Chew 1 tablet by mouth daily. 30 tablet 0    acetaminophen 500 MG Oral Tab Take 1 tablet (500 mg total) by mouth every 4 (four) hours as needed for Pain. 20 tablet 0    Denosumab (PROLIA SC) Inject into the  skin.      Calcium Carbonate-Vitamin D (CALCIUM-D) 600-400 MG-UNIT Oral Tab Take  by mouth.         Review of Systems:   A comprehensive review of systems was completed.    Pertinent positives and negatives noted in the HPI.    Objective:   Physical Exam:    /71   Pulse 60   Temp (!) 95.6 °F (35.3 °C) (Rectal)   Resp 20   Wt 105 lb 13.1 oz (48 kg)   SpO2 95%   BMI 16.57 kg/m²   General: unresponsive  Respiratory: diminished bilaterally  Cardiovascular: S1, S2. Regular rate and rhythm  Abdomen: Soft, non-distended, positive bowel sounds  Neuro: No new focal deficits  Extremities: trace edema      Results:    Labs:      Labs Last 24 Hours:    Recent Labs   Lab 01/20/24 2159   RBC 3.34*   HGB 8.2*   HCT 27.8*   MCV 83.2   MCH 24.6*   MCHC 29.5*   RDW 18.8   NEPRELIM 7.43   WBC 8.6   .0       Recent Labs   Lab 01/20/24 2159   *   BUN 34*   CREATSERUM 1.16*   EGFRCR 44*   CA 8.5   ALB 2.9*      K 5.0      CO2 30.0   ALKPHO 84   AST 43*   BILT 1.5   TP 7.0       Lab Results   Component Value Date    PT 15.5 (H) 07/13/2014    PT 14.4 (H) 07/11/2014    INR 1.22 (H) 10/27/2023    INR 1.52 (H) 10/24/2023    INR 1.42 (H) 10/22/2023       Recent Labs   Lab 01/20/24 2159   TROPHS 25       Recent Labs   Lab 01/20/24 2159   PBNP 15,224*       No results for input(s): \"PCT\" in the last 168 hours.    Imaging: Imaging data reviewed in Epic.    Assessment & Plan:      #Unresponsive  # Hypothermic  -elizabeth hugger  -rule out sepsis  -Blood Cx pending  -UA, reflex culture  -COVID, Influenza, RSV negative    # CHF, diastolic dysfunction  -ECHO  -diuresis as able  -ProBNP 15,224    # Lactic acidosis  -due to hypoxia vs sepsis  -given pulmonary edema will hold off of aggressive IVF and bolus    # Acute hypoxic respiratory failure  -on BIPAP  -diuresis  -empiric antibiotics while await Cx results    # Normocytic anemia  -trend    # A.fib  -on dig and BB PTA  -not on anticoagulation    # FLORESITA  -baseline  Cr around 0.5    Plan of care discussed with ER physician    Jodie Barbosa MD    Supplementary Documentation:     The 21st Century Cures Act makes medical notes like these available to patients in the interest of transparency. Please be advised this is a medical document. Medical documents are intended to carry relevant information, facts as evident, and the clinical opinion of the practitioner. The medical note is intended as peer to peer communication and may appear blunt or direct. It is written in medical language and may contain abbreviations or verbiage that are unfamiliar.                                   Electronically signed by Jodie Barbosa MD on 1/20/2024 11:34 PM              Consults - MD Consult Notes      Consults signed by Arnulfo Sharma MD at 1/26/2024  9:00 AM     Author: Arnulfo Sharma MD Service: Pulmonology Author Type: Physician    Filed: 1/26/2024  9:00 AM Date of Service: 1/26/2024  8:21 AM Status: Signed    : Arnulfo Sharma MD (Physician)           HISTORY OF PRESENT ILLNESS     Yin Butcher is a 89 year old female with a history of atrial fibrillation who presented to the ER 1/20/24 with unresponsiveness, hypothermia, and shock. She was admitted to ICU, was started on BIPAP, vasopressors, and antibiotics. She was found to have acute bilateral pulmonary emboli for which she was started on heparin. She improved and was transferred to the floor. We've been consulted to assist in the patient's care.     The patient is currently being assisted with breakfast. She denies having any shortness of breath or cough at this time. She voices no complaints.     PAST MEDICAL HISTORY     Past Medical History:   Diagnosis Date    Arthritis     Atrial fibrillation (HCC)     Back problem     Cancer (HCC)     Cataract     Osteoporosis     Personal history of antineoplastic chemotherapy        PAST SURGICAL HISTORY      Past Surgical History:   Procedure Laterality Date    OTHER SURGICAL  HISTORY  7/12/2014    Procedure: HIP HEMIARTHROPLASTY/ BIPOLAR;  Surgeon: Vasu Matamoros MD;  Location:  MAIN OR    REMOVAL OF TONSILS,12+ Y/O         HOME MEDICATIONS      Prior to Admission Medications   Prescriptions Last Dose Informant Patient Reported? Taking?   Calcium Carbonate-Vitamin D (CALCIUM-D) 600-400 MG-UNIT Oral Tab   Yes No   Sig: Take  by mouth.   Denosumab (PROLIA SC) More than a month  Yes No   Sig: Inject into the skin.   acetaminophen 500 MG Oral Tab Past Month  No Yes   Sig: Take 1 tablet (500 mg total) by mouth every 4 (four) hours as needed for Pain.   digoxin 0.125 MG Oral Tab 1/20/2024  Yes Yes   Sig: Take 1 tablet (125 mcg total) by mouth Every Monday, Wednesday, and Friday.   furosemide 20 MG Oral Tab 1/20/2024  No Yes   Sig: Take 1 tablet (20 mg total) by mouth daily.   lidocaine-menthol 4-1 % External Patch More than a month  No No   Sig: Place 1 patch onto the skin daily.   metoprolol tartrate 50 MG Oral Tab Past Week  Yes Yes   Sig: Take 0.5 tablets (25 mg total) by mouth 2 (two) times daily.   midodrine 5 MG Oral Tab   Yes Yes   Sig: Take 1 tablet (5 mg total) by mouth as needed (as needed).   multivitamin Oral Chew Tab Past Month  No Yes   Sig: Chew 1 tablet by mouth daily.   polyethylene glycol, PEG 3350, 17 g Oral Powd Pack Not Taking  Yes No   Sig: Take 17 g by mouth daily as needed (constipation).   Patient not taking: Reported on 1/21/2024      Facility-Administered Medications: None       CURRENT MEDICATIONS       metoprolol tartrate  12.5 mg Oral 2x Daily(Beta Blocker)    digoxin  125 mcg Oral Q MWF    docusate sodium  100 mg Oral BID    midodrine  10 mg Oral TID    multivitamin  1 tablet Oral Daily       PRN meds:  polyethylene glycol (PEG 3350), magnesium hydroxide, bisacodyl, fleet enema, metoclopramide, ondansetron, glucose **OR** glucose **OR** glucose-vitamin C **OR** dextrose **OR** glucose **OR** glucose **OR** glucose-vitamin C, acetaminophen, acetaminophen,  morphINE    Infusions:   continuous dose heparin 1,100 Units/hr (01/26/24 6919)         ALLERGIES      Allergies   Allergen Reactions    Erythromycin OTHER (SEE COMMENTS)    Oxycodone HALLUCINATION    Gabapentin NAUSEA ONLY and OTHER (SEE COMMENTS)     Pt stated she didn't feel like herself     Tizanidine ITCHING and OTHER (SEE COMMENTS)     Pt states burning sensation        SOCIAL HISTORY        Social History     Socioeconomic History    Marital status:      Spouse name: Not on file    Number of children: Not on file    Years of education: Not on file    Highest education level: Not on file   Occupational History    Not on file   Tobacco Use    Smoking status: Never    Smokeless tobacco: Never   Vaping Use    Vaping Use: Never used   Substance and Sexual Activity    Alcohol use: Not Currently    Drug use: Never    Sexual activity: Not on file   Other Topics Concern    Not on file   Social History Narrative    ** Merged History Encounter **          Social Determinants of Health     Financial Resource Strain: Not on file   Food Insecurity: Unknown (1/24/2024)    Food Insecurity     Food Insecurity: Patient unable to answer   Transportation Needs: Unknown (1/24/2024)    Transportation Needs     Lack of Transportation: Patient unable to answer   Physical Activity: Not on file   Stress: Not on file   Social Connections: Not on file   Housing Stability: Unknown (1/24/2024)    Housing Stability     Housing Instability: Patient unable to answer     Housing Instability Emergency: Not on file       FAMILY HISTORY      Family History   Family history unknown: Yes       OBJECTIVE      Vitals:    01/25/24 2300 01/26/24 0000 01/26/24 0047 01/26/24 0400   BP:  (!) 139/103 128/80 (!) 134/93   BP Location:   Left arm Right arm   Pulse: 114 100 97 98   Resp: (!) 28 (!) 33     Temp:   98.1 °F (36.7 °C) 98.1 °F (36.7 °C)   TempSrc:   Oral Oral   SpO2: 94% 91% 94% 94%   Weight:   120 lb (54.4 kg)    Height:         O2:  2LPM    Gen - Alert, in restraints, pleasant, somewhat confused but in no distress  HEENT - head normocephalic, atraumatic. Eyes-no scleral icterus or injection  Lungs - decreased breath sounds at both bases, otherwise clear  CV - regular rate & rhythm. Normal S1, S2. No murmurs, rubs, or gallops appreciated.  Abdomen - soft, nontender to palpation. Reducible umbilical hernia noted   Extremities - No cyanosis, clubbing, edema appreciated.      Labs:    Recent Labs   Lab 01/23/24  0506 01/23/24  1527 01/24/24  0434 01/25/24  0806   WBC 14.0*  --  11.8* 8.8   HGB 7.2* 8.6* 8.1* 7.8*   .0  --  199.0 185.0     Recent Labs   Lab 01/20/24  2159 01/21/24  0055 01/21/24  0441 01/22/24  0224 01/23/24  0506 01/24/24  0434 01/25/24  0806     --  135* 138 138 134* 139   K 5.0  --  4.6 3.7 3.3*  3.3* 3.9  3.9 4.0     --  99 102 103 103 103   CO2 30.0  --  27.0 32.0 34.0* 34.0* 34.0*   BUN 34*  --  40* 45* 30* 25* 21   CREATSERUM 1.16*  --  1.35* 1.28* 0.86 0.48* 0.49*   *  --  101* 111* 125* 105* 78   ANIONGAP 5  --  9 4 1 <0* 2   ALB 2.9*  --  2.7* 2.8* 2.6* 2.8* 2.6*   CA 8.5  --  8.2* 8.2* 8.3* 8.3* 8.7   MG  --   --  1.8 2.2  --   --   --    PHOS  --   --  6.1*  --   --   --   --    ALKPHO 84  --  77 68 79 77 80   AST 43*  --  86* 70* 34 17 18   BILT 1.5  --  1.4 1.4 1.1 1.5 1.2   TP 7.0  --  6.8 6.4 6.4 6.0* 5.7*   LACTI 6.3* 6.0* 4.1*  --   --   --   --      Recent Labs   Lab 01/22/24  0530 01/22/24  1151 01/22/24  1717 01/22/24  2037 01/25/24  1407   PGLU 136* 119* 101* 178* 105*     Recent Labs   Lab 01/21/24  0441 01/21/24  1933   INR 1.64* 1.84*     Recent Labs   Lab 01/20/24 2159 01/21/24  0441   CK 32  --    PBNP 15,224* 19,682*   TROPHS 25  --      Recent Labs   Lab 01/20/24 2159 01/21/24  0055 01/23/24  0506   DORIAN  --   --  24.1   DDIMER 2.38*  --   --    PCT <0.05  --   --    NH3  --  17  --      Recent Labs   Lab 01/23/24  1909 01/23/24  2359 01/24/24  0833   ABGPHT 7.31* 7.31* 7.39    UBBYXY0W 69* 66* 58*   FQZYH0S 220* 88 141*   ABGHCO3 30.5* 29.4* 31.9*   ABGBE 7.1* 5.8* 8.9*     Recent Labs   Lab 01/20/24  2247 01/21/24  0011 01/21/24  0024   COVID19 Not Detected Not Detected  --    INFAPCR Negative Not Detected  --    INFBPCR Negative Not Detected  --    RSV Negative  --   --    STREPPNEUMAG  --   --  Negative   LEGIONAG  --   --  Negative       Urinalysis:  Recent Labs   Lab 01/20/24  2217   COLORUR Yellow   CLARITY Clear   SPECGRAVITY 1.017   PHURINE 5.0   PROUR 50*   KETUR Negative   GLUUR Normal   BILUR Negative   BLOODURINE Negative   NITRITE Negative   UROBILINOGEN Normal   LEUUR Negative   EPIUR Few*   WBCUR 1-5   BACUR None Seen          Imaging reviewed.    ASSESSMENT AND PLAN     Acute hypoxic and hypercapnic respiratory failure- due to acute PE and bilateral pleural effusions. Pt was briefly treated with zosyn, but pneumonia is less likely  - wean O2 as able; AVAPS during sleep as tolerated  - diuretics per cardiology  Shock - hypovolemic vs cardiogenic. TTE showed LVEF 65-70% with component of diastolic dysfunction. Morning cortisol level was wnl. Pt was able to be weaned off vasopressors.  - continue midodrine as pt has a component of chronic hypotension   Acute bilateral PE. LE doppler was negative for DVT. TTE was without right heart strain  - currently on heparin drip- transition to DOAC if no procedures planned.  Bilateral pleural effusions - Recurrent, likely due to diastolic dysfunction. R effusion is partially loculated. Previously pt has undergone thoracentesis ; showed transudative fluid consistent with CHF  - diuresis per cardiology  - hold off on thoracentesis, pt is at high risk for developing an ex-vacuo pneumothorax as the effusion is chronic, loculated and will likely recur   HFpEF - TTE 65-70%, likely diastolic dysfunction  - Diurese as able  - Cardiology following  Chronic Afib - Not on anticoagulation at home  - per cardiology  Acute Encephalopathy - possibly  due to TME and underlying dementia. CT brain was negative   - supportive care  Proph:  - Heparin drip  Dispo  -full code   -inpatient     We will continue to follow with you     Arnulfo Sharma M.D.  Pulmonary/Critical Care      Electronically signed by Arnulfo Sharma MD on 1/26/2024  9:00 AM           D/C Summary    No notes of this type exist for this encounter.     Imaging Results (HF patients)    Chest X-Ray Results (HF patients only)    XR CHEST AP/PA (1 VIEW) (CPT=71045)    Result Date: 1/24/2024  PROCEDURE:  XR CHEST AP/PA (1 VIEW) (CPT=71045)  TECHNIQUE:  AP chest radiograph was obtained.  COMPARISON:  [facility] , XR, XR CHEST AP/PA (1 VIEW) (CPT=71045), 1/23/2024, 9:14 AM.  INDICATIONS:  pleural effusion  PATIENT STATED HISTORY: (As transcribed by Technologist)  Pleural effusion.    FINDINGS:  Confluent opacity right lung base is consistent with moderate to large right pleural effusion.  There is a smaller left pleural effusion noted.  There is dependent atelectasis or consolidation in lower lungs bilaterally.  Heart size is within normal limits.  Aorta is atherosclerotic.  There are calcified breast implants noted bilaterally.            CONCLUSION:  There is no significant change in the appearance of the portable chest radiograph.  Moderate to large right and small left pleural effusions are noted with dependent atelectasis or consolidation in the lower lobes.   LOCATION:  [facility]      Dictated by (CST): Manohar Salinas MD on 1/24/2024 at 8:36 AM     Finalized by (CST): Manohar Salinas MD on 1/24/2024 at 8:37 AM       XR CHEST AP/PA (1 VIEW) (CPT=71045)    Result Date: 1/23/2024  PROCEDURE:  XR CHEST AP/PA (1 VIEW) (CPT=71045)  TECHNIQUE:  AP chest radiograph was obtained.  COMPARISON:  [facility] , XR, XR CHEST AP PORTABLE  (CPT=71045), 1/22/2024, 4:53 AM.  INDICATIONS:  pleural effusions  PATIENT STATED HISTORY: (As transcribed by Technologist)  Patient offered no additional history at this time.               CONCLUSION:    On the right, there is worsening appearance with decreased aeration of the right lung with an ovoid area of aeration right upper-mid lung, but decreasing in size since yesterday, with the rest of the lung opacified by what appears to be combination of pleural fluid, lung consolidation and potentially mass.  The left chest is better aerated, but also shows decrease in aeration with left pleural effusion and lung consolidation.  The heart is mostly obscured.  No sign of pneumothorax heavily calcified breast implants.  LOCATION:  [facility]      Dictated by (CST): Malcom Purcell MD on 2024 at 9:35 AM     Finalized by (CST): Malcom Purcell MD on 2024 at 9:36 AM           2D Echocardiogram Results (HF patients only)    CARD ECHO 2D DOPPLER (CPT=93306)    Result Date: 2024  Transthoracic Echocardiogram Name:Yin Butcher Date: 2024 :  1931 Ht:  (67in)  BP: 98 / 55 MRN:  [id number]    Age:  89years    Wt:  (109lb) HR: 84bpm Loc:  [facility]       Gndr: F          BSA: 1.56m^2 Sonographer: [facility] Ordering:    Dipak Umana Consulting:  Arnulfo Shrama ---------------------------------------------------------------------------- History/Indications:   Dyspnea. ---------------------------------------------------------------------------- Procedure information:  A transthoracic complete 2D study was performed. Additional evaluation included M-mode, complete spectral Doppler, and color Doppler.  Patient status:  Inpatient.  Location:  Bedside.    This was a routine study. Transthoracic echocardiography for ventricular function evaluation and assessment of valvular function. Image quality was adequate. The study was technically limited due to poor acoustic window availability. Satisfactory imaging could not be obtained from the apical acoustic window(s). ECG rhythm:   Atrial fibrillation ---------------------------------------------------------------------------- Conclusions: 1.  Procedure narrative: Image quality was adequate. The study was    technically limited due to poor acoustic window availability.    Satisfactory imaging could not be obtained from the apical acoustic    window(s). 2. Left ventricle: The cavity size was normal. Wall thickness was normal.    Systolic function was vigorous. The estimated ejection fraction was    65-70%, by visual assessment. Wall motion is normal; there are no    regional wall motion abnormalities. Unable to assess LV diastolic    function. 3. Right ventricle: The cavity size was normal. Systolic function was    normal. 4. Left atrium: The atrium was dilated. 5. Right atrium: The atrium was moderately to markedly dilated. 6. Aortic valve: There was mild regurgitation. 7. Mitral valve: The annulus was moderately calcified. There was mild to    moderate regurgitation. 8. Tricuspid valve: There was mild-moderate regurgitation. 9. Pericardium, extracardiac: There was a left pleural effusion. Impressions:  This study is compared with previous dated 09/05/2023: No significant change since prior study. * ---------------------------------------------------------------------------- * Findings: Left ventricle:  The cavity size was normal. Wall thickness was normal. Systolic function was vigorous. The estimated ejection fraction was 65-70%, by visual assessment. Wall motion is normal; there are no regional wall motion abnormalities. Unable to assess LV diastolic function. Left atrium:  The atrium was dilated. Right ventricle:  The cavity size was normal. Systolic function was normal. Right atrium:  The atrium was moderately to markedly dilated. Mitral valve:  The annulus was moderately calcified. Leaflet separation was normal.  Doppler:  Transvalvular velocity was within the normal range. There was no evidence for stenosis. There was mild to moderate regurgitation. Aortic valve:   The valve was trileaflet. The leaflets were moderately thickened and moderately  calcified. Cusp separation was normal.  Doppler: Transvalvular velocity was within the normal range. There was no evidence for stenosis. There was mild regurgitation. Tricuspid valve:  The valve is structurally normal. Leaflet separation was normal.  Doppler:  Transvalvular velocity was within the normal range. There was no evidence for stenosis. There was mild-moderate regurgitation. Pulmonic valve:   The valve is structurally normal. Cusp separation was normal.  Doppler:  Transvalvular velocity was within the normal range. There was no evidence for stenosis. There was no significant regurgitation. Pericardium:   There was no pericardial effusion. Pleura:  There was a left pleural effusion. Aorta: Aortic root: The aortic root was normal. ---------------------------------------------------------------------------- Measurements  Left ventricle                     Value       Ref  IVS thickness, ED, PLAX            0.9   cm    0.6 - 0.9  LV ID, ED, PLAX                (L) 3.6   cm    3.8 - 5.2  LV ID, ES, PLAX                    2.2   cm    2.2 - 3.5  LV PW thickness, ED, PLAX          0.8   cm    0.6 - 0.9  IVS/LV PW ratio, ED, PLAX          1.10        ---------  LV PW/LV ID ratio, ED, PLAX        0.23        ---------  LV ejection fraction               70    %     54 - 74  Aortic root                        Value       Ref  Aortic root ID, ED                 3.0   cm    2.3 - 3.8  Left atrium                        Value       Ref  LA ID, A-P, ES                 (H) 4.0   cm    2.7 - 3.8  LA/aortic root ratio               1.33        ---------  Pulmonary artery                   Value       Ref  PA pressure, S, DP                 35    mm Hg ---------  Tricuspid valve                    Value       Ref  Tricuspid regurg peak velocity (H) 2.81  m/sec <=2.8  Tricuspid peak RV-RA gradient      32    mm Hg ---------  Systemic veins                     Value       Ref  Estimated CVP                      3     mm Hg  ---------  Right ventricle                    Value       Ref  RV pressure, S, DP                 35    mm Hg --------- Legend: (L)  and  (H)  corinna values outside specified reference range. ---------------------------------------------------------------------------- Prepared and electronically signed by Dipak Umana 01/21/2024 19:18         Cath Angiogram Results (HF Patients only)    No exam resulted this encounter.          Physical Therapy Notes (last 72 hours)      Physical Therapy Note signed by Graciela Mckeon PTA at 2/1/2024  2:24 PM  Version 1 of 1    Author: Graciela Mckeon PTA Service: Rehab Author Type: Physical Therapy Assistant    Filed: 2/1/2024  2:24 PM Date of Service: 2/1/2024  2:24 PM Status: Signed    : Graciela Mckeon PTA (Physical Therapy Assistant)       Attempted to see Pt this Pm - RN aware of attempt.  Pt requesting to rest, \"I have done enough\".  Will f/u later today if time permits, after all other patients are attempted per tentative schedule.          Physical Therapy Note signed by Janet Wolfe PT at 1/30/2024  2:42 PM  Version 1 of 1    Author: Janet Wolfe PT Service: Rehab Author Type: Physical Therapist    Filed: 1/30/2024  2:42 PM Date of Service: 1/30/2024  2:20 PM Status: Signed    : Janet Wolfe PT (Physical Therapist)          PHYSICAL THERAPY TREATMENT NOTE - INPATIENT    Room Number: 3620/3620-A     Session: 2     Number of Visits to Meet Established Goals: 5    Presenting Problem: pleural effusion  Co-Morbidities : h/o Afib, chronic back pain    History related to current admission: Patient is a 89 year old female admitted on 1/20/2024 from home with AMS, pleural effusion, PE.     ASSESSMENT     Pt presents to therapy with decreased strength, functional mobility, endurance, balance, activity tolerance, trunk control. These impairments contribute to limitations in walking, transfers, bed mobility. Pt with poor activity tolerance, requires  stimulation and cuing to maintain eyes open. Pt will continue to benefit from skilled IP PT to address impairments and functional limitations.       DISCHARGE RECOMMENDATIONS  PT Discharge Recommendations: Sub-acute rehabilitation     PLAN  PT Treatment Plan: Bed mobility;Body mechanics;Endurance;Energy conservation;Patient education;Family education;Gait training;Range of motion;Strengthening;Transfer training;Balance training  Rehab Potential : Good  Frequency (Obs):  (2-3x/week)    CURRENT GOALS   Goal #1 Patient is able to demonstrate supine - sit EOB @ level: supervision      Goal #2 Patient is able to demonstrate transfers EOB to/from Chair/Wheelchair at assistance level: min A      Goal #3        Goal #4     Goal #5     Goal #6     Goal Comments: Goals established on 2024 all goals ongoing      SUBJECTIVE  \"I feel at peace\"     OBJECTIVE  Precautions: Bed/chair alarm    WEIGHT BEARING RESTRICTION                   PAIN ASSESSMENT   Ratin  Location: R heel  Management Techniques: Relaxation;Repositioning;Activity promotion;Body mechanics    BALANCE                                                                                                                       Static Sitting: Poor  Dynamic Sitting: Poor           Static Standing: Not tested  Dynamic Standing: Not tested    ACTIVITY TOLERANCE  Pulse: 107  Heart Rate Source: Monitor                   O2 WALK  Oxygen Therapy  SPO2% on Room Air at Rest: 2  SPO2% on Oxygen at Rest: 97      AM-PAC '6-Clicks' INPATIENT SHORT FORM - BASIC MOBILITY  How much difficulty does the patient currently have...  Patient Difficulty: Turning over in bed (including adjusting bedclothes, sheets and blankets)?: Unable   Patient Difficulty: Sitting down on and standing up from a chair with arms (e.g., wheelchair, bedside commode, etc.): Unable   Patient Difficulty: Moving from lying on back to sitting on the side of the bed?: Unable   How much help from  another person does the patient currently need...   Help from Another: Moving to and from a bed to a chair (including a wheelchair)?: Total   Help from Another: Need to walk in hospital room?: Total   Help from Another: Climbing 3-5 steps with a railing?: Total       AM-PAC Score:  Raw Score: 6   Approx Degree of Impairment: 100%   Standardized Score (AM-PAC Scale): 23.55   CMS Modifier (G-Code): CN    FUNCTIONAL ABILITY STATUS  Gait Assessment   Functional Mobility/Gait Assessment  Gait Assistance: Not tested    Skilled Therapy Provided    Bed Mobility:  Rolling: NT   Supine<>Sit: maxA x2   Sit<>Supine: maxA x2     Transfer Mobility:  Sit<>Stand: NT   Stand<>Sit: NT   Gait: NT    Pt cued on usage of bed rail to facilitate supine to sit transfer and trunk rotation. Pt cued on finding static sitting balance, pt with increased retropulsion and lean to L, able to slightly correct when cued. Assisted with anterior scooting to improved contact with feet on floor for improved support. Participated in lateral leans onto forearms x5, req modA.     Therapist's Comments: RN cleared for session. Pt agreeable for therapy, received supine.      Patient End of Session: In bed;Needs met;Call light within reach;RN aware of session/findings;All patient questions and concerns addressed;Alarm set;Discussed recommendations with /    PT Session Time: 25 minutes  Therapeutic Activity: 10 minutes  Neuromuscular re-ed: 14 minutes                         Occupational Therapy Notes (last 72 hours)      Occupational Therapy Note signed by Alvin Aguiar OT at 1/30/2024  2:59 PM  Version 1 of 1    Author: Alvin Aguiar OT Service: Rehab Author Type: Occupational Therapist    Filed: 1/30/2024  2:59 PM Date of Service: 1/30/2024  2:40 PM Status: Signed    : Alvin Aguiar OT (Occupational Therapist)       OCCUPATIONAL THERAPY TREATMENT NOTE - INPATIENT     Room Number: 3620/3620-A  Session: 1   Number of Visits to  Meet Established Goals: 5    Presenting Problem: pleural effusion, PE    History: Patient is a 89 year old female admitted on 1/20/2024 with Presenting Problem: pleural effusion, PE. Co-Morbidities : h/o Afib, chronic back pain. Pt was admitted from home on 1/20 with altered mental status. Admitted for pleural effusion and B PE.  B LE doppler negative for DVTs.       Chest CT 1/21  CONCLUSION:    1. There are acute bilateral lower lobe and right middle lobe pulmonary emboli.  The critical results were called to the patient's nurse by the vision radiologist at 0318 a.m..   2. Findings suggest right heart failure/right heart strain.   3. Markedly enlarged left atrium raises suspicion for mitral valve stenosis.   4. Large right and moderate left pleural effusions are noted.   5. Bilateral lower lobe and right middle lobe atelectasis.   6. Multiple other incidental findings as detailed above.       ASSESSMENT   Patient presents with the following performance deficits: strength, activity tolerance, endurance, sitting balance. These deficits impact the patient’s ability to participate in ADL, transfers, instrumental activities of daily living, rest and sleep, leisure and social participation.     Pt received semi supine in bed, pleasant and cooperative for OT session. Pt performs bed mobility at max A x 2 to sit EOB with max multimodal cues provided for hand placement. Max A required for static sitting. Pt engages in various activities in sitting including forward functional reaching and side to side elbow propping to improve sitting balance/core strength required for ADLs, functional transfers, and bed mobility. Max cues provided for pt to keep eyes open and stay alert and engaged in session. Multimodal cues provided for pt top self correct posture from L lateral leaning and retropulsion. Pt returns to supine at max A x 2 and requires total assist x  to reposition to \[facility] [place] [facility]\"".    The patient is functioning below her previous  functional level and would benefit from skilled inpatient OT to address the above deficits, maximizing patient’s ability to return safely to her prior level of function.    OT Discharge Recommendations: Sub-acute rehabilitation       WEIGHT BEARING RESTRICTION                     TREATMENT SESSION:  Patient Start of Session: semi supine in bed  FUNCTIONAL TRANSFER ASSESSMENT  Sit to Stand: Edge of Bed  Edge of Bed: Not Tested    BED MOBILITY  Supine to Sit : Dependent (max A x 2)  Sit to Supine (OT): Dependent (max A x 2)  Scooting: Max A    BALANCE ASSESSMENT  Static Sitting: Maximum Assist    FUNCTIONAL ADL ASSESSMENT       ACTIVITY TOLERANCE: fair                         O2 SATURATIONS       EDUCATION PROVIDED  Patient : Role of Occupational Therapy; Plan of Care; Discharge Recommendations; Functional Transfer Techniques; Fall Prevention; Posture/Positioning; Energy Conservation; Proper Body Mechanics  Patient's Response to Education: Requires Further Education      Patient End of Session: In bed;Needs met;Call light within reach;RN aware of session/findings;All patient questions and concerns addressed;Alarm set;Restraints    SUBJECTIVE  Pt soft spoken. Pleasant and cooperative for OT session.   \"I feel at peace.\"    PAIN ASSESSMENT  Ratin           OBJECTIVE  Precautions: Bed/chair alarm    AM-PAC ‘6-Clicks’ Inpatient Daily Activity Short Form  -   Putting on and taking off regular lower body clothing?: A Lot  -   Bathing (including washing, rinsing, drying)?: A Lot  -   Toileting, which includes using toilet, bedpan or urinal? : A Lot  -   Putting on and taking off regular upper body clothing?: A Little  -   Taking care of personal grooming such as brushing teeth?: A Little  -   Eating meals?: A Little    AM-PAC Score:  Score: 15  Approx Degree of Impairment: 56.46%  Standardized Score (AM-PAC Scale): 34.69    PLAN  OT Treatment Plan: Balance activities;Energy conservation/work simplification techniques;ADL  training;UE strengthening/ROM;Functional transfer training;Patient/Family training;Cognitive reorientation;Patient/Family education;Equipment eval/education  Rehab Potential : Fair  Frequency: 3x/week    OT Goals:     All goals ongoing 01/30    ADL Goals   Patient will perform upper body dressing:  with setup  Patient will perform lower body dressing:  with mod assist  Patient will perform toileting: with mod assist     Functional Transfer Goals  Patient will transfer to bedside commode:  with mod assist     UE Exercise Program Goal  Patient will be supervision with bilateral AROM HEP (home exercise program).    OT Session Time: 25 minutes  Therapeutic Activity: 25 minutes                 Video Swallow Study Notes    No notes of this type exist for this encounter.        SLP Note - SLP Notes      SLP Note signed by Blake Duarte SLP at 1/30/2024  2:53 PM  Version 1 of 1    Author: Blake Duarte SLP Service: Rehab Author Type: Speech and Language Pathologist    Filed: 1/30/2024  2:53 PM Date of Service: 1/30/2024  2:51 PM Status: Signed    : Blake Duarte SLP (Speech and Language Pathologist)       SPEECH DAILY NOTE - INPATIENT    ASSESSMENT & PLAN   ASSESSMENT  Pt seen for dysphagia tx to assess tolerance with recommended diet, ensure proper utilization of aspiration precautions and provide pt/family education.  Patient drowsy, but arousable, in bed. She reported good tolerance of PO intake today. Vocal quality noted to be weak and breathy. Patient agreeable to PO trials of mildly thick liquids. Bolus acceptance was adequate without evidence of anterior bolus loss. No overt s/s of aspiration observed and patient denied odynophagia and globus sensation across consistencies. She politely refused solid consistencies during my visit. Recommend patient continue a regular diet and mildly thick liquids. SLP will continue to follow per POC.     Diet Recommendations - Solids: Regular  Diet Recommendations - Liquids:  Nectar thick liquids/ Mildly thick    Compensatory Strategies Recommended: Small bites and sips  Aspiration Precautions: Upright position  Medication Administration Recommendations: One pill at a time                     Treatment Plan  Treatment Plan/Recommendations: Aspiration precautions    Interdisciplinary Communication: NA            GOALS  Goal #1 The patient will tolerate regular consistency and mildly thick liquids without overt signs or symptoms of aspiration with 95 % accuracy over 1-2 session(s).  In Progress   Goal #2 The patient/family/caregiver will demonstrate understanding and implementation of aspiration precautions and swallow strategies independently over 1-2 session(s).     In Progress   Goal #3       Goal #4       Goal #5       Goal #6       Goal #7       Goal #8            FOLLOW UP  Follow Up Needed (Documentation Required): Yes  SLP Follow-up Date: 02/01/24       Session: 3    If you have any questions, please contact ADELA[facility] Womack    Electronically signed by Blake Duarte SLP on 1/30/2024  2:53 PM           Multidisciplinary Problems     Active Goals     Not on file          Resolved Goals        Problem: Patient/Family Goals    Goal Priority Disciplines Outcome Interventions   Patient/Family Long Term Goal   (Resolved)     Interdisciplinary Adequate for Discharge    Description: Patient's Long Term Goal: return home    Interventions:  - cooperate with PT/OT/SLP as ordered  - ?learn how to give Lovenox injections  - See additional Care Plan goals for specific interventions   Patient/Family Short Term Goal   (Resolved)     Interdisciplinary Adequate for Discharge    Description: Patient's Short Term Goal: wean off levophed    Interventions:   - antibiotics as prescribed  - IV fluid as prescribed  - PRBC as prescribed  - See additional Care Plan goals for specific interventions               Discharge Treatment Preferences    Flowsheet Row Most Recent Value   Preferences    PASRR Level 1  Submitted Yes